# Patient Record
Sex: FEMALE | Race: BLACK OR AFRICAN AMERICAN | NOT HISPANIC OR LATINO | Employment: OTHER | URBAN - METROPOLITAN AREA
[De-identification: names, ages, dates, MRNs, and addresses within clinical notes are randomized per-mention and may not be internally consistent; named-entity substitution may affect disease eponyms.]

---

## 2017-11-09 ENCOUNTER — TELEPHONE (OUTPATIENT)
Dept: SURGERY | Facility: CLINIC | Age: 62
End: 2017-11-09

## 2017-11-09 NOTE — TELEPHONE ENCOUNTER
----- Message from Josephine De La Cruz sent at 11/9/2017  1:22 PM CST -----  Contact: Josephine De La Cruz - Clinic    Burton Manjarrez and staff     Dr SOUTH Hernández is referring this pt to see you dx Rectal cancer.     The pts records etc are in media.     Can you plz review and assist with appt?     Many thanks!   Josephine De La Cruz  [facility]    [id number]

## 2017-11-15 ENCOUNTER — OFFICE VISIT (OUTPATIENT)
Dept: SURGERY | Facility: CLINIC | Age: 62
End: 2017-11-15
Payer: COMMERCIAL

## 2017-11-15 VITALS — DIASTOLIC BLOOD PRESSURE: 71 MMHG | WEIGHT: 222.44 LBS | SYSTOLIC BLOOD PRESSURE: 151 MMHG | HEART RATE: 85 BPM

## 2017-11-15 DIAGNOSIS — C20 RECTAL CANCER: Primary | ICD-10-CM

## 2017-11-15 PROCEDURE — 99999 PR PBB SHADOW E&M-EST. PATIENT-LVL III: CPT | Mod: PBBFAC,,, | Performed by: COLON & RECTAL SURGERY

## 2017-11-15 PROCEDURE — 99205 OFFICE O/P NEW HI 60 MIN: CPT | Mod: S$GLB,,, | Performed by: COLON & RECTAL SURGERY

## 2017-11-15 RX ORDER — SILVER SULFADIAZINE 10 G/1000G
CREAM TOPICAL 2 TIMES DAILY
COMMUNITY
End: 2018-05-10

## 2017-11-15 RX ORDER — HYDROCHLOROTHIAZIDE 12.5 MG/1
12.5 CAPSULE ORAL DAILY
COMMUNITY

## 2017-11-15 RX ORDER — ASPIRIN 81 MG/1
81 TABLET ORAL DAILY
Status: ON HOLD | COMMUNITY
End: 2023-03-01

## 2017-11-15 RX ORDER — HYDROXYZINE HYDROCHLORIDE 25 MG/1
25 TABLET, FILM COATED ORAL 3 TIMES DAILY
COMMUNITY
End: 2018-05-10

## 2017-11-15 RX ORDER — NEOMYCIN SULFATE 500 MG/1
1000 TABLET ORAL ONCE
Qty: 6 TABLET | Refills: 0 | Status: SHIPPED | OUTPATIENT
Start: 2017-11-15 | End: 2017-11-15

## 2017-11-15 RX ORDER — CLONIDINE HYDROCHLORIDE 0.1 MG/1
0.1 TABLET ORAL 2 TIMES DAILY
COMMUNITY

## 2017-11-15 RX ORDER — SODIUM CHLORIDE 9 MG/ML
INJECTION, SOLUTION INTRAVENOUS CONTINUOUS
Status: CANCELLED | OUTPATIENT
Start: 2017-11-15

## 2017-11-15 RX ORDER — MUPIROCIN 20 MG/G
OINTMENT TOPICAL
Status: CANCELLED | OUTPATIENT
Start: 2017-11-15

## 2017-11-15 RX ORDER — ATORVASTATIN CALCIUM 20 MG/1
20 TABLET, FILM COATED ORAL NIGHTLY
COMMUNITY

## 2017-11-15 RX ORDER — FAMOTIDINE 20 MG/1
20 TABLET, FILM COATED ORAL 2 TIMES DAILY
COMMUNITY
End: 2018-07-11

## 2017-11-15 RX ORDER — SPIRONOLACTONE 25 MG/1
25 TABLET ORAL DAILY
COMMUNITY
End: 2018-07-11

## 2017-11-15 RX ORDER — HYDROMORPHONE HYDROCHLORIDE 4 MG/1
4 TABLET ORAL EVERY 4 HOURS PRN
Status: ON HOLD | COMMUNITY
End: 2017-12-15 | Stop reason: HOSPADM

## 2017-11-15 RX ORDER — DOCUSATE SODIUM 100 MG/1
100 CAPSULE, LIQUID FILLED ORAL 2 TIMES DAILY
Status: ON HOLD | COMMUNITY
End: 2017-12-15 | Stop reason: HOSPADM

## 2017-11-15 RX ORDER — OMEPRAZOLE 20 MG/1
20 CAPSULE, DELAYED RELEASE ORAL DAILY
COMMUNITY
End: 2018-05-10

## 2017-11-15 RX ORDER — DICLOFENAC SODIUM 75 MG/1
75 TABLET, DELAYED RELEASE ORAL 2 TIMES DAILY
COMMUNITY
End: 2018-05-10

## 2017-11-15 RX ORDER — ACETAMINOPHEN 10 MG/ML
1000 INJECTION, SOLUTION INTRAVENOUS
Status: CANCELLED | OUTPATIENT
Start: 2017-11-15 | End: 2017-11-15

## 2017-11-15 RX ORDER — METRONIDAZOLE 250 MG/1
TABLET ORAL
Qty: 3 TABLET | Refills: 0 | Status: ON HOLD | OUTPATIENT
Start: 2017-11-15 | End: 2017-12-15 | Stop reason: HOSPADM

## 2017-11-15 RX ORDER — FLUTICASONE PROPIONATE 50 MCG
1 SPRAY, SUSPENSION (ML) NASAL DAILY
COMMUNITY

## 2017-11-15 RX ORDER — METRONIDAZOLE 500 MG/100ML
500 INJECTION, SOLUTION INTRAVENOUS
Status: CANCELLED | OUTPATIENT
Start: 2017-11-15

## 2017-11-15 RX ORDER — DULOXETIN HYDROCHLORIDE 60 MG/1
60 CAPSULE, DELAYED RELEASE ORAL DAILY
COMMUNITY
End: 2018-05-10

## 2017-11-15 NOTE — LETTER
November 15, 2017      Keith Hernández MD  [address] [place] [address]           [facility]  [address]  [facility] [address]  Phone: [phone]          Patient: Coby Marshall   MR Number: [id number]   YOB: 1955   Date of Visit: 11/15/2017       Dear Dr. Keith Hernández:    Thank you for referring Coby Marshall to me for evaluation. Attached you will find relevant portions of my assessment and plan of care.    If you have questions, please do not hesitate to call me. I look forward to following Coby Marshall along with you.    Sincerely,    Suhas Manjarrez MD    Enclosure  CC:  No Recipients    If you would like to receive this communication electronically, please contact [email or web address] or [phone] to request more information on [employer] access.    For providers and/or their staff who would like to refer a patient to [facility], please contact us through our one-stop-shop provider referral line, [facility], at [phone].    If you feel you have received this communication in error or would no longer like to receive these types of communications, please e-mail [email or web address]

## 2017-11-15 NOTE — PROGRESS NOTES
Patient ID:  Coby Marshall is a 62 y.o. female     Chief Complaint:   Chief Complaint   Patient presents with    Rectal Cancer        HPI: Patient with rectal cancer. Completed chemoXRT      4 weeks ago     Procto Nov 11, 2017 mass at 4-5 cm from verge  Ct scan  (11/3/17) with improvement (nodes reduced in size) Thickening rectal wall 7 cm from anal verge    ROS:        Constitutional: No fever, chills, activity or appetite change.      HENT: No hearing loss, facial swelling, neck pain or stiffness.       Eyes: No discharge, itching and visual disturbance.      Respiratory: No apnea, cough, choking or shortness of breath.       Cardiovascular: No leg swelling or chest pain      Gastrointestinal: No abdominal distention or change in bowel habbits     Genitourinary: No dysuria, frequency or flank pain.      Musculoskeletal: No arthralgias or gait problem.      Neurological: No dizziness, seizures or weakness.      Hematological: No adenopathy.      Psychiatric/Behavioral: No hallucinations or behavioral problems.       PE:    APPEARANCE: Well nourished, well developed, in no acute distress.   CHEST: Lungs clear. Normal respiratory effort.  CARDIOVASCULAR: Normal rhythm. No edema.  ABDOMEN: Soft. No tenderness or masses.  Rectum:  Normal skin, NST, no masses or tenderness. Possible mass at tip of finger    Musculoskeletal: Symmetric, normal range of motion and strength.   Neurological: Alert and oriented to person, place, and time. Normal reflexes.   Skin: Skin is warm and dry.   Psychiatric: Normal mood and affect. Behavior is normal and appropriate.     Impression: Rectal cancer  PLAN: LAR and possible loop ileostomy Dec 11.  I have explained the procedure including indications, alternatives, expected outcomes and potential complications. The patient appears to understand and gives informed consent. The patient is medically ready for surgery.

## 2017-11-15 NOTE — H&P
Coby Marshall is a 62 y.o. female     Chief Complaint:   Chief Complaint   Patient presents with    Rectal Cancer        HPI: Patient with rectal cancer. Completed chemoXRT      4 weeks ago     Procto Nov 11, 2017 mass at 4-5 cm from verge  Ct scan  (11/3/17) with improvement (nodes reduced in size) Thickening rectal wall 7 cm from anal verge    ROS:        Constitutional: No fever, chills, activity or appetite change.      HENT: No hearing loss, facial swelling, neck pain or stiffness.       Eyes: No discharge, itching and visual disturbance.      Respiratory: No apnea, cough, choking or shortness of breath.       Cardiovascular: No leg swelling or chest pain      Gastrointestinal: No abdominal distention or change in bowel habbits     Genitourinary: No dysuria, frequency or flank pain.      Musculoskeletal: No arthralgias or gait problem.      Neurological: No dizziness, seizures or weakness.      Hematological: No adenopathy.      Psychiatric/Behavioral: No hallucinations or behavioral problems.       PE:    APPEARANCE: Well nourished, well developed, in no acute distress.   CHEST: Lungs clear. Normal respiratory effort.  CARDIOVASCULAR: Normal rhythm. No edema.  ABDOMEN: Soft. No tenderness or masses.  Rectum:  Normal skin, NST, no masses or tenderness. Possible mass at tip of finger    Musculoskeletal: Symmetric, normal range of motion and strength.   Neurological: Alert and oriented to person, place, and time. Normal reflexes.   Skin: Skin is warm and dry.   Psychiatric: Normal mood and affect. Behavior is normal and appropriate.     Impression: Rectal cancer  PLAN: LAR and possible loop ileostomy Dec 11.  I have explained the procedure including indications, alternatives, expected outcomes and potential complications. The patient appears to understand and gives informed consent. The patient is medically ready for surgery.

## 2017-11-29 ENCOUNTER — TELEPHONE (OUTPATIENT)
Dept: SURGERY | Facility: CLINIC | Age: 62
End: 2017-11-29

## 2017-11-29 NOTE — TELEPHONE ENCOUNTER
----- Message from Parviz Schaefer sent at 11/29/2017  3:45 PM CST -----  Contact: Pt :[phone]  Pt  states she would like to speak with 's Nurse in regards to accommodations for a place to stay.

## 2017-12-01 ENCOUNTER — TELEPHONE (OUTPATIENT)
Dept: RESEARCH | Facility: HOSPITAL | Age: 62
End: 2017-12-01

## 2017-12-01 NOTE — TELEPHONE ENCOUNTER
Study staff spoke with Ms. Marshall about participating in the research studies.  She stated that she is willing to participate in them.      Study staff will meet with her in the pre-op area to review the consents and sign.    During the conversation her  asked about their request to stay at [facility].  Study staff followed up with the nursing staff regarding the paperwork for their stay.  Nursing staff advised to have patient call and see if there would be a room available.  Study staff recontacted the Nathalia to pass on that information and relay the phone number for the [facility].    Study staff was Denise Lizama MS, CRC

## 2017-12-08 ENCOUNTER — TELEPHONE (OUTPATIENT)
Dept: SURGERY | Facility: CLINIC | Age: 62
End: 2017-12-08

## 2017-12-08 DIAGNOSIS — C20 RECTAL CANCER: Primary | ICD-10-CM

## 2017-12-08 DIAGNOSIS — Z00.6 RESEARCH STUDY PATIENT: ICD-10-CM

## 2017-12-11 ENCOUNTER — ANESTHESIA (OUTPATIENT)
Dept: SURGERY | Facility: HOSPITAL | Age: 62
DRG: 330 | End: 2017-12-11
Payer: COMMERCIAL

## 2017-12-11 ENCOUNTER — SURGERY (OUTPATIENT)
Age: 62
End: 2017-12-11

## 2017-12-11 ENCOUNTER — ANESTHESIA EVENT (OUTPATIENT)
Dept: SURGERY | Facility: HOSPITAL | Age: 62
DRG: 330 | End: 2017-12-11
Payer: COMMERCIAL

## 2017-12-11 ENCOUNTER — HOSPITAL ENCOUNTER (INPATIENT)
Facility: HOSPITAL | Age: 62
LOS: 4 days | Discharge: HOME | DRG: 330 | End: 2017-12-15
Attending: COLON & RECTAL SURGERY | Admitting: COLON & RECTAL SURGERY
Payer: COMMERCIAL

## 2017-12-11 DIAGNOSIS — C20 RECTAL CANCER: ICD-10-CM

## 2017-12-11 PROCEDURE — C9290 INJ, BUPIVACAINE LIPOSOME: HCPCS | Performed by: COLON & RECTAL SURGERY

## 2017-12-11 PROCEDURE — 25000003 PHARM REV CODE 250: Performed by: COLON & RECTAL SURGERY

## 2017-12-11 PROCEDURE — S0030 INJECTION, METRONIDAZOLE: HCPCS | Performed by: COLON & RECTAL SURGERY

## 2017-12-11 PROCEDURE — 88342 IMHCHEM/IMCYTCHM 1ST ANTB: CPT | Mod: 26,,, | Performed by: PATHOLOGY

## 2017-12-11 PROCEDURE — 88331 PATH CONSLTJ SURG 1 BLK 1SPC: CPT | Mod: 26,,, | Performed by: PATHOLOGY

## 2017-12-11 PROCEDURE — 71000039 HC RECOVERY, EACH ADD'L HOUR: Performed by: COLON & RECTAL SURGERY

## 2017-12-11 PROCEDURE — 37000008 HC ANESTHESIA 1ST 15 MINUTES: Performed by: COLON & RECTAL SURGERY

## 2017-12-11 PROCEDURE — 88307 TISSUE EXAM BY PATHOLOGIST: CPT | Mod: 26,,, | Performed by: PATHOLOGY

## 2017-12-11 PROCEDURE — 63600175 PHARM REV CODE 636 W HCPCS: Performed by: COLON & RECTAL SURGERY

## 2017-12-11 PROCEDURE — 0D1B0Z4 BYPASS ILEUM TO CUTANEOUS, OPEN APPROACH: ICD-10-PCS | Performed by: COLON & RECTAL SURGERY

## 2017-12-11 PROCEDURE — 25000003 PHARM REV CODE 250: Performed by: STUDENT IN AN ORGANIZED HEALTH CARE EDUCATION/TRAINING PROGRAM

## 2017-12-11 PROCEDURE — 88302 TISSUE EXAM BY PATHOLOGIST: CPT | Mod: 26,,, | Performed by: PATHOLOGY

## 2017-12-11 PROCEDURE — 36000708 HC OR TIME LEV III 1ST 15 MIN: Performed by: COLON & RECTAL SURGERY

## 2017-12-11 PROCEDURE — D9220A PRA ANESTHESIA: Mod: ,,, | Performed by: ANESTHESIOLOGY

## 2017-12-11 PROCEDURE — C1729 CATH, DRAINAGE: HCPCS | Performed by: COLON & RECTAL SURGERY

## 2017-12-11 PROCEDURE — 27201423 OPTIME MED/SURG SUP & DEVICES STERILE SUPPLY: Performed by: COLON & RECTAL SURGERY

## 2017-12-11 PROCEDURE — 45119 REMOVE RECTUM W/RESERVOIR: CPT | Mod: ,,, | Performed by: COLON & RECTAL SURGERY

## 2017-12-11 PROCEDURE — 88309 TISSUE EXAM BY PATHOLOGIST: CPT | Mod: 26,,, | Performed by: PATHOLOGY

## 2017-12-11 PROCEDURE — 94761 N-INVAS EAR/PLS OXIMETRY MLT: CPT

## 2017-12-11 PROCEDURE — 63600175 PHARM REV CODE 636 W HCPCS: Performed by: STUDENT IN AN ORGANIZED HEALTH CARE EDUCATION/TRAINING PROGRAM

## 2017-12-11 PROCEDURE — 0DBP0ZZ EXCISION OF RECTUM, OPEN APPROACH: ICD-10-PCS | Performed by: COLON & RECTAL SURGERY

## 2017-12-11 PROCEDURE — 36000709 HC OR TIME LEV III EA ADD 15 MIN: Performed by: COLON & RECTAL SURGERY

## 2017-12-11 PROCEDURE — 94799 UNLISTED PULMONARY SVC/PX: CPT

## 2017-12-11 PROCEDURE — 88305 TISSUE EXAM BY PATHOLOGIST: CPT | Mod: 26,,, | Performed by: PATHOLOGY

## 2017-12-11 PROCEDURE — 88331 PATH CONSLTJ SURG 1 BLK 1SPC: CPT | Performed by: PATHOLOGY

## 2017-12-11 PROCEDURE — 88313 SPECIAL STAINS GROUP 2: CPT | Mod: 26,,, | Performed by: PATHOLOGY

## 2017-12-11 PROCEDURE — 25000003 PHARM REV CODE 250: Performed by: ANESTHESIOLOGY

## 2017-12-11 PROCEDURE — 37000009 HC ANESTHESIA EA ADD 15 MINS: Performed by: COLON & RECTAL SURGERY

## 2017-12-11 PROCEDURE — 44120 REMOVAL OF SMALL INTESTINE: CPT | Mod: 51,,, | Performed by: COLON & RECTAL SURGERY

## 2017-12-11 PROCEDURE — C1765 ADHESION BARRIER: HCPCS | Performed by: COLON & RECTAL SURGERY

## 2017-12-11 PROCEDURE — 0DBE0ZZ EXCISION OF LARGE INTESTINE, OPEN APPROACH: ICD-10-PCS | Performed by: COLON & RECTAL SURGERY

## 2017-12-11 PROCEDURE — 88305 TISSUE EXAM BY PATHOLOGIST: CPT | Performed by: PATHOLOGY

## 2017-12-11 PROCEDURE — 20600001 HC STEP DOWN PRIVATE ROOM

## 2017-12-11 PROCEDURE — 71000033 HC RECOVERY, INTIAL HOUR: Performed by: COLON & RECTAL SURGERY

## 2017-12-11 DEVICE — MEMBRANE SEPRAFILM 5 X 6: Type: IMPLANTABLE DEVICE | Site: ABDOMEN | Status: FUNCTIONAL

## 2017-12-11 RX ORDER — ENOXAPARIN SODIUM 100 MG/ML
40 INJECTION SUBCUTANEOUS
Status: DISCONTINUED | OUTPATIENT
Start: 2017-12-12 | End: 2017-12-14

## 2017-12-11 RX ORDER — FAMOTIDINE 20 MG/1
20 TABLET, FILM COATED ORAL 2 TIMES DAILY
Status: DISCONTINUED | OUTPATIENT
Start: 2017-12-11 | End: 2017-12-15 | Stop reason: HOSPADM

## 2017-12-11 RX ORDER — LABETALOL HYDROCHLORIDE 5 MG/ML
10 INJECTION, SOLUTION INTRAVENOUS ONCE
Status: COMPLETED | OUTPATIENT
Start: 2017-12-11 | End: 2017-12-11

## 2017-12-11 RX ORDER — METRONIDAZOLE 500 MG/100ML
500 INJECTION, SOLUTION INTRAVENOUS
Status: COMPLETED | OUTPATIENT
Start: 2017-12-11 | End: 2017-12-12

## 2017-12-11 RX ORDER — MEPERIDINE HYDROCHLORIDE 50 MG/ML
12.5 INJECTION INTRAMUSCULAR; INTRAVENOUS; SUBCUTANEOUS ONCE AS NEEDED
Status: DISCONTINUED | OUTPATIENT
Start: 2017-12-11 | End: 2017-12-11 | Stop reason: HOSPADM

## 2017-12-11 RX ORDER — LIDOCAINE HYDROCHLORIDE 10 MG/ML
1 INJECTION, SOLUTION EPIDURAL; INFILTRATION; INTRACAUDAL; PERINEURAL ONCE
Status: COMPLETED | OUTPATIENT
Start: 2017-12-11 | End: 2017-12-11

## 2017-12-11 RX ORDER — IBUPROFEN 200 MG
16 TABLET ORAL
Status: DISCONTINUED | OUTPATIENT
Start: 2017-12-11 | End: 2017-12-15 | Stop reason: HOSPADM

## 2017-12-11 RX ORDER — ATORVASTATIN CALCIUM 20 MG/1
20 TABLET, FILM COATED ORAL DAILY
Status: DISCONTINUED | OUTPATIENT
Start: 2017-12-11 | End: 2017-12-15 | Stop reason: HOSPADM

## 2017-12-11 RX ORDER — MEPERIDINE HYDROCHLORIDE 50 MG/ML
12.5 INJECTION INTRAMUSCULAR; INTRAVENOUS; SUBCUTANEOUS ONCE AS NEEDED
Status: DISCONTINUED | OUTPATIENT
Start: 2017-12-11 | End: 2017-12-11

## 2017-12-11 RX ORDER — MIDAZOLAM HYDROCHLORIDE 1 MG/ML
INJECTION, SOLUTION INTRAMUSCULAR; INTRAVENOUS
Status: DISCONTINUED | OUTPATIENT
Start: 2017-12-11 | End: 2017-12-11

## 2017-12-11 RX ORDER — DULOXETIN HYDROCHLORIDE 60 MG/1
60 CAPSULE, DELAYED RELEASE ORAL DAILY
Status: DISCONTINUED | OUTPATIENT
Start: 2017-12-11 | End: 2017-12-15 | Stop reason: HOSPADM

## 2017-12-11 RX ORDER — ROCURONIUM BROMIDE 10 MG/ML
INJECTION, SOLUTION INTRAVENOUS
Status: DISCONTINUED | OUTPATIENT
Start: 2017-12-11 | End: 2017-12-11

## 2017-12-11 RX ORDER — ONDANSETRON 2 MG/ML
4 INJECTION INTRAMUSCULAR; INTRAVENOUS EVERY 6 HOURS PRN
Status: DISCONTINUED | OUTPATIENT
Start: 2017-12-11 | End: 2017-12-15 | Stop reason: HOSPADM

## 2017-12-11 RX ORDER — SPIRONOLACTONE 25 MG/1
25 TABLET ORAL DAILY
Status: DISCONTINUED | OUTPATIENT
Start: 2017-12-11 | End: 2017-12-15 | Stop reason: HOSPADM

## 2017-12-11 RX ORDER — METRONIDAZOLE 500 MG/100ML
500 INJECTION, SOLUTION INTRAVENOUS
Status: COMPLETED | OUTPATIENT
Start: 2017-12-11 | End: 2017-12-11

## 2017-12-11 RX ORDER — SODIUM CHLORIDE 0.9 % (FLUSH) 0.9 %
3 SYRINGE (ML) INJECTION
Status: DISCONTINUED | OUTPATIENT
Start: 2017-12-11 | End: 2017-12-11

## 2017-12-11 RX ORDER — SODIUM CHLORIDE 9 MG/ML
INJECTION, SOLUTION INTRAVENOUS CONTINUOUS
Status: DISCONTINUED | OUTPATIENT
Start: 2017-12-11 | End: 2017-12-12

## 2017-12-11 RX ORDER — FENTANYL CITRATE 50 UG/ML
INJECTION, SOLUTION INTRAMUSCULAR; INTRAVENOUS
Status: DISCONTINUED | OUTPATIENT
Start: 2017-12-11 | End: 2017-12-11

## 2017-12-11 RX ORDER — SODIUM CHLORIDE, SODIUM LACTATE, POTASSIUM CHLORIDE, CALCIUM CHLORIDE 600; 310; 30; 20 MG/100ML; MG/100ML; MG/100ML; MG/100ML
INJECTION, SOLUTION INTRAVENOUS CONTINUOUS
Status: DISCONTINUED | OUTPATIENT
Start: 2017-12-11 | End: 2017-12-12

## 2017-12-11 RX ORDER — GLYCOPYRROLATE 0.2 MG/ML
INJECTION INTRAMUSCULAR; INTRAVENOUS
Status: DISCONTINUED | OUTPATIENT
Start: 2017-12-11 | End: 2017-12-11

## 2017-12-11 RX ORDER — NEOSTIGMINE METHYLSULFATE 1 MG/ML
INJECTION, SOLUTION INTRAVENOUS
Status: DISCONTINUED | OUTPATIENT
Start: 2017-12-11 | End: 2017-12-11

## 2017-12-11 RX ORDER — CLONIDINE HYDROCHLORIDE 0.1 MG/1
0.1 TABLET ORAL 2 TIMES DAILY
Status: DISCONTINUED | OUTPATIENT
Start: 2017-12-11 | End: 2017-12-15 | Stop reason: HOSPADM

## 2017-12-11 RX ORDER — HYDROMORPHONE HYDROCHLORIDE 2 MG/ML
0.2 INJECTION, SOLUTION INTRAMUSCULAR; INTRAVENOUS; SUBCUTANEOUS EVERY 5 MIN PRN
Status: COMPLETED | OUTPATIENT
Start: 2017-12-11 | End: 2017-12-11

## 2017-12-11 RX ORDER — HYDROMORPHONE HYDROCHLORIDE 2 MG/ML
1 INJECTION, SOLUTION INTRAMUSCULAR; INTRAVENOUS; SUBCUTANEOUS EVERY 4 HOURS PRN
Status: DISCONTINUED | OUTPATIENT
Start: 2017-12-11 | End: 2017-12-15 | Stop reason: HOSPADM

## 2017-12-11 RX ORDER — SODIUM CHLORIDE 9 MG/ML
INJECTION, SOLUTION INTRAVENOUS CONTINUOUS
Status: DISCONTINUED | OUTPATIENT
Start: 2017-12-11 | End: 2017-12-11

## 2017-12-11 RX ORDER — ACETAMINOPHEN 10 MG/ML
1000 INJECTION, SOLUTION INTRAVENOUS
Status: COMPLETED | OUTPATIENT
Start: 2017-12-11 | End: 2017-12-11

## 2017-12-11 RX ORDER — IBUPROFEN 200 MG
24 TABLET ORAL
Status: DISCONTINUED | OUTPATIENT
Start: 2017-12-11 | End: 2017-12-15 | Stop reason: HOSPADM

## 2017-12-11 RX ORDER — ASPIRIN 81 MG/1
81 TABLET ORAL DAILY
Status: DISCONTINUED | OUTPATIENT
Start: 2017-12-12 | End: 2017-12-11

## 2017-12-11 RX ORDER — GABAPENTIN 100 MG/1
100 CAPSULE ORAL 3 TIMES DAILY
Status: DISCONTINUED | OUTPATIENT
Start: 2017-12-11 | End: 2017-12-15

## 2017-12-11 RX ORDER — NALOXONE HCL 0.4 MG/ML
0.02 VIAL (ML) INJECTION
Status: DISCONTINUED | OUTPATIENT
Start: 2017-12-11 | End: 2017-12-15 | Stop reason: HOSPADM

## 2017-12-11 RX ORDER — PROPOFOL 10 MG/ML
VIAL (ML) INTRAVENOUS
Status: DISCONTINUED | OUTPATIENT
Start: 2017-12-11 | End: 2017-12-11

## 2017-12-11 RX ORDER — SIMETHICONE 80 MG
1 TABLET,CHEWABLE ORAL EVERY 8 HOURS PRN
Status: DISCONTINUED | OUTPATIENT
Start: 2017-12-11 | End: 2017-12-15 | Stop reason: HOSPADM

## 2017-12-11 RX ORDER — METOCLOPRAMIDE HYDROCHLORIDE 5 MG/ML
5 INJECTION INTRAMUSCULAR; INTRAVENOUS EVERY 6 HOURS PRN
Status: DISCONTINUED | OUTPATIENT
Start: 2017-12-11 | End: 2017-12-15 | Stop reason: HOSPADM

## 2017-12-11 RX ORDER — GLUCAGON 1 MG
1 KIT INJECTION CONTINUOUS PRN
Status: DISCONTINUED | OUTPATIENT
Start: 2017-12-11 | End: 2017-12-15 | Stop reason: HOSPADM

## 2017-12-11 RX ORDER — PANTOPRAZOLE SODIUM 40 MG/1
40 TABLET, DELAYED RELEASE ORAL DAILY
Status: DISCONTINUED | OUTPATIENT
Start: 2017-12-11 | End: 2017-12-11

## 2017-12-11 RX ORDER — OXYCODONE HYDROCHLORIDE 5 MG/1
5 TABLET ORAL EVERY 6 HOURS PRN
Status: DISCONTINUED | OUTPATIENT
Start: 2017-12-11 | End: 2017-12-15 | Stop reason: HOSPADM

## 2017-12-11 RX ORDER — LIDOCAINE HCL/PF 100 MG/5ML
SYRINGE (ML) INTRAVENOUS
Status: DISCONTINUED | OUTPATIENT
Start: 2017-12-11 | End: 2017-12-11

## 2017-12-11 RX ORDER — BUPIVACAINE HYDROCHLORIDE 2.5 MG/ML
INJECTION, SOLUTION EPIDURAL; INFILTRATION; INTRACAUDAL
Status: DISCONTINUED | OUTPATIENT
Start: 2017-12-11 | End: 2017-12-11 | Stop reason: HOSPADM

## 2017-12-11 RX ORDER — ACETAMINOPHEN 10 MG/ML
1000 INJECTION, SOLUTION INTRAVENOUS EVERY 8 HOURS
Status: COMPLETED | OUTPATIENT
Start: 2017-12-11 | End: 2017-12-12

## 2017-12-11 RX ORDER — HYDROCHLOROTHIAZIDE 12.5 MG/1
12.5 TABLET ORAL DAILY
Status: DISCONTINUED | OUTPATIENT
Start: 2017-12-11 | End: 2017-12-15 | Stop reason: HOSPADM

## 2017-12-11 RX ORDER — FLUTICASONE PROPIONATE 50 MCG
1 SPRAY, SUSPENSION (ML) NASAL DAILY
Status: DISCONTINUED | OUTPATIENT
Start: 2017-12-11 | End: 2017-12-15 | Stop reason: HOSPADM

## 2017-12-11 RX ORDER — ONDANSETRON 2 MG/ML
4 INJECTION INTRAMUSCULAR; INTRAVENOUS DAILY PRN
Status: DISCONTINUED | OUTPATIENT
Start: 2017-12-11 | End: 2017-12-11

## 2017-12-11 RX ORDER — HYDROMORPHONE HYDROCHLORIDE 2 MG/ML
0.2 INJECTION, SOLUTION INTRAMUSCULAR; INTRAVENOUS; SUBCUTANEOUS EVERY 5 MIN PRN
Status: DISCONTINUED | OUTPATIENT
Start: 2017-12-11 | End: 2017-12-11 | Stop reason: HOSPADM

## 2017-12-11 RX ORDER — CALCIUM CARBONATE 200(500)MG
1000 TABLET,CHEWABLE ORAL EVERY 8 HOURS PRN
Status: DISCONTINUED | OUTPATIENT
Start: 2017-12-11 | End: 2017-12-15 | Stop reason: HOSPADM

## 2017-12-11 RX ORDER — MUPIROCIN 20 MG/G
OINTMENT TOPICAL
Status: DISCONTINUED | OUTPATIENT
Start: 2017-12-11 | End: 2017-12-11

## 2017-12-11 RX ORDER — TAMSULOSIN HYDROCHLORIDE 0.4 MG/1
0.4 CAPSULE ORAL DAILY
Status: DISCONTINUED | OUTPATIENT
Start: 2017-12-11 | End: 2017-12-14

## 2017-12-11 RX ORDER — HYDROMORPHONE HYDROCHLORIDE 2 MG/1
4 TABLET ORAL EVERY 4 HOURS PRN
Status: DISCONTINUED | OUTPATIENT
Start: 2017-12-11 | End: 2017-12-11

## 2017-12-11 RX ORDER — ALBUTEROL SULFATE 90 UG/1
1 AEROSOL, METERED RESPIRATORY (INHALATION) EVERY 6 HOURS PRN
Status: DISCONTINUED | OUTPATIENT
Start: 2017-12-11 | End: 2017-12-15 | Stop reason: HOSPADM

## 2017-12-11 RX ORDER — ONDANSETRON 2 MG/ML
INJECTION INTRAMUSCULAR; INTRAVENOUS
Status: DISCONTINUED | OUTPATIENT
Start: 2017-12-11 | End: 2017-12-11

## 2017-12-11 RX ORDER — HYDROXYZINE HYDROCHLORIDE 25 MG/1
25 TABLET, FILM COATED ORAL 3 TIMES DAILY
Status: DISCONTINUED | OUTPATIENT
Start: 2017-12-11 | End: 2017-12-11

## 2017-12-11 RX ADMIN — SODIUM CHLORIDE, SODIUM GLUCONATE, SODIUM ACETATE, POTASSIUM CHLORIDE, MAGNESIUM CHLORIDE, SODIUM PHOSPHATE, DIBASIC, AND POTASSIUM PHOSPHATE: .53; .5; .37; .037; .03; .012; .00082 INJECTION, SOLUTION INTRAVENOUS at 07:12

## 2017-12-11 RX ADMIN — HYDROMORPHONE HYDROCHLORIDE 0.2 MG: 2 INJECTION, SOLUTION INTRAMUSCULAR; INTRAVENOUS; SUBCUTANEOUS at 10:12

## 2017-12-11 RX ADMIN — OXYCODONE HYDROCHLORIDE 5 MG: 5 TABLET ORAL at 12:12

## 2017-12-11 RX ADMIN — LIDOCAINE HYDROCHLORIDE 80 MG: 20 INJECTION, SOLUTION INTRAVENOUS at 07:12

## 2017-12-11 RX ADMIN — FAMOTIDINE 20 MG: 20 TABLET, FILM COATED ORAL at 01:12

## 2017-12-11 RX ADMIN — FAMOTIDINE 20 MG: 20 TABLET, FILM COATED ORAL at 10:12

## 2017-12-11 RX ADMIN — ROCURONIUM BROMIDE 10 MG: 10 INJECTION, SOLUTION INTRAVENOUS at 09:12

## 2017-12-11 RX ADMIN — SPIRONOLACTONE 25 MG: 25 TABLET, FILM COATED ORAL at 12:12

## 2017-12-11 RX ADMIN — SODIUM CHLORIDE 1000 ML: 0.9 INJECTION, SOLUTION INTRAVENOUS at 06:12

## 2017-12-11 RX ADMIN — ROCURONIUM BROMIDE 50 MG: 10 INJECTION, SOLUTION INTRAVENOUS at 07:12

## 2017-12-11 RX ADMIN — METRONIDAZOLE 500 MG: 500 SOLUTION INTRAVENOUS at 06:12

## 2017-12-11 RX ADMIN — GABAPENTIN 100 MG: 100 CAPSULE ORAL at 10:12

## 2017-12-11 RX ADMIN — OXYCODONE HYDROCHLORIDE 5 MG: 5 TABLET ORAL at 06:12

## 2017-12-11 RX ADMIN — HYDROMORPHONE HYDROCHLORIDE 0.2 MG: 2 INJECTION, SOLUTION INTRAMUSCULAR; INTRAVENOUS; SUBCUTANEOUS at 11:12

## 2017-12-11 RX ADMIN — LIDOCAINE HYDROCHLORIDE 0.2 MG: 10 INJECTION, SOLUTION EPIDURAL; INFILTRATION; INTRACAUDAL; PERINEURAL at 06:12

## 2017-12-11 RX ADMIN — GLYCOPYRROLATE 0.6 MG: 0.2 INJECTION, SOLUTION INTRAMUSCULAR; INTRAVENOUS at 10:12

## 2017-12-11 RX ADMIN — ONDANSETRON 4 MG: 2 INJECTION INTRAMUSCULAR; INTRAVENOUS at 09:12

## 2017-12-11 RX ADMIN — ROCURONIUM BROMIDE 20 MG: 10 INJECTION, SOLUTION INTRAVENOUS at 08:12

## 2017-12-11 RX ADMIN — IBUPROFEN 400 MG: 800 INJECTION INTRAVENOUS at 05:12

## 2017-12-11 RX ADMIN — NEOSTIGMINE METHYLSULFATE 5 MG: 1 INJECTION INTRAVENOUS at 10:12

## 2017-12-11 RX ADMIN — SODIUM CHLORIDE: 0.9 INJECTION, SOLUTION INTRAVENOUS at 06:12

## 2017-12-11 RX ADMIN — CLONIDINE HYDROCHLORIDE 0.1 MG: 0.1 TABLET ORAL at 01:12

## 2017-12-11 RX ADMIN — ACETAMINOPHEN 1000 MG: 10 INJECTION, SOLUTION INTRAVENOUS at 07:12

## 2017-12-11 RX ADMIN — Medication 800 MG: at 07:12

## 2017-12-11 RX ADMIN — HYDROCHLOROTHIAZIDE 12.5 MG: 12.5 TABLET ORAL at 01:12

## 2017-12-11 RX ADMIN — DULOXETINE 60 MG: 60 CAPSULE, DELAYED RELEASE ORAL at 12:12

## 2017-12-11 RX ADMIN — BUPIVACAINE 20 ML: 13.3 INJECTION, SUSPENSION, LIPOSOMAL INFILTRATION at 09:12

## 2017-12-11 RX ADMIN — GABAPENTIN 100 MG: 100 CAPSULE ORAL at 01:12

## 2017-12-11 RX ADMIN — MIDAZOLAM HYDROCHLORIDE 2 MG: 1 INJECTION, SOLUTION INTRAMUSCULAR; INTRAVENOUS at 07:12

## 2017-12-11 RX ADMIN — ATORVASTATIN CALCIUM 20 MG: 20 TABLET, FILM COATED ORAL at 01:12

## 2017-12-11 RX ADMIN — SODIUM CHLORIDE 250 ML: 900 INJECTION, SOLUTION INTRAVENOUS at 05:12

## 2017-12-11 RX ADMIN — ACETAMINOPHEN 1000 MG: 10 INJECTION, SOLUTION INTRAVENOUS at 02:12

## 2017-12-11 RX ADMIN — LABETALOL HYDROCHLORIDE 10 MG: 5 INJECTION, SOLUTION INTRAVENOUS at 05:12

## 2017-12-11 RX ADMIN — METRONIDAZOLE 500 MG: 500 SOLUTION INTRAVENOUS at 07:12

## 2017-12-11 RX ADMIN — FENTANYL CITRATE 50 MCG: 50 INJECTION, SOLUTION INTRAMUSCULAR; INTRAVENOUS at 07:12

## 2017-12-11 RX ADMIN — PROPOFOL 150 MG: 10 INJECTION, EMULSION INTRAVENOUS at 07:12

## 2017-12-11 RX ADMIN — TAMSULOSIN HYDROCHLORIDE 0.4 MG: 0.4 CAPSULE ORAL at 01:12

## 2017-12-11 RX ADMIN — MUPIROCIN: 20 OINTMENT TOPICAL at 07:12

## 2017-12-11 RX ADMIN — BUPIVACAINE HYDROCHLORIDE 30 ML: 2.5 INJECTION, SOLUTION EPIDURAL; INFILTRATION; INTRACAUDAL; PERINEURAL at 09:12

## 2017-12-11 RX ADMIN — SODIUM CHLORIDE, SODIUM LACTATE, POTASSIUM CHLORIDE, AND CALCIUM CHLORIDE: 600; 310; 30; 20 INJECTION, SOLUTION INTRAVENOUS at 10:12

## 2017-12-11 RX ADMIN — ACETAMINOPHEN 1000 MG: 10 INJECTION, SOLUTION INTRAVENOUS at 10:12

## 2017-12-11 RX ADMIN — CEFTRIAXONE SODIUM 2 G: 2 INJECTION, POWDER, FOR SOLUTION INTRAMUSCULAR; INTRAVENOUS at 07:12

## 2017-12-11 NOTE — NURSING TRANSFER
Nursing Transfer Note      12/11/2017     Transfer To: 605 From PACU    Transfer via stretcher    Transfer with IV pump    Transported by PCT    Medicines sent: none    Chart send with patient: Yes    Notified: spouse, daughter    Patient reassessed at: 12/11/17 @ 1730     Upon arrival to floor:

## 2017-12-11 NOTE — ANESTHESIA PREPROCEDURE EVALUATION
[facility]  Anesthesia Pre-Operative Evaluation         Patient Name: Coby Marshall  YOB: 1955  MRN: [id number]    SUBJECTIVE:     Pre-operative evaluation for Procedure(s) (LRB):  RESECTION-COLON-LOW ANTERIOR, possible loop ileostomy (N/A)     12/11/2017    Coby Marshall is a 62 y.o. female w/ a significant PMHx Asthma (well controlled), HTN, HLD, GERD who was recently diagnosed with rectal cancer who presents for the above procedure..    Patient now presents for the above procedure(s).      LDA: None documented.       Prev airway: None documented.    Drips: None documented.      Patient Active Problem List   Diagnosis    Rectal cancer       Review of patient's allergies indicates:   Allergen Reactions    Lisinopril-hydrochlorothiazide Swelling     Mouth Swelling       Current Inpatient Medications:      No current facility-administered medications on file prior to encounter.      Current Outpatient Prescriptions on File Prior to Encounter   Medication Sig Dispense Refill    ALBUTEROL INHL Inhale into the lungs.      aspirin (ECOTRIN) 81 MG EC tablet Take 81 mg by mouth once daily.      atorvastatin (LIPITOR) 20 MG tablet Take 20 mg by mouth once daily.      cloNIDine (CATAPRES) 0.1 MG tablet Take 0.1 mg by mouth 2 (two) times daily.      diclofenac (VOLTAREN) 75 MG EC tablet Take 75 mg by mouth 2 (two) times daily.      docusate sodium (COLACE) 100 MG capsule Take 100 mg by mouth 2 (two) times daily.      DULoxetine (CYMBALTA) 60 MG capsule Take 60 mg by mouth once daily.      famotidine (PEPCID) 20 MG tablet Take 20 mg by mouth 2 (two) times daily.      fluticasone (FLONASE) 50 mcg/actuation nasal spray 1 spray by Each Nare route once daily.      hydroCHLOROthiazide (MICROZIDE) 12.5 mg capsule Take 12.5 mg by mouth once daily.      HYDROmorphone (DILAUDID) 4 MG tablet Take 4 mg by mouth every 4 (four) hours as needed for Pain.      hydrOXYzine HCl (ATARAX) 25 MG  tablet Take 25 mg by mouth 3 (three) times daily.      metroNIDAZOLE (FLAGYL) 250 MG tablet Take tablet at 1300, 1400, and 2300 day prior to surgery 3 tablet 0    omeprazole (PRILOSEC) 20 MG capsule Take 20 mg by mouth once daily.      silver sulfADIAZINE 1% (SILVADENE) 1 % cream Apply topically 2 (two) times daily.      spironolactone (ALDACTONE) 25 MG tablet Take 25 mg by mouth once daily.         No past surgical history on file.    Social History     Social History    Marital status:      Spouse name: N/A    Number of children: N/A    Years of education: N/A     Occupational History    Not on file.     Social History Main Topics    Smoking status: Not on file    Smokeless tobacco: Not on file    Alcohol use Not on file    Drug use: Unknown    Sexual activity: Not on file     Other Topics Concern    Not on file     Social History Narrative    No narrative on file       OBJECTIVE:     Vital Signs Range (Last 24H):         CBC:   No results for input(s): WBC, RBC, HGB, HCT, PLT, MCV, MCH, MCHC in the last 72 hours.    CMP: No results for input(s): NA, K, CL, CO2, BUN, CREATININE, GLU, MG, PHOS, CALCIUM, ALBUMIN, PROT, ALKPHOS, ALT, AST, BILITOT in the last 72 hours.    INR:  No results for input(s): PT, INR, PROTIME, APTT in the last 72 hours.    Diagnostic Studies: No relevant studies.    EKG: No recent studies available.    2D ECHO:  No results found for this or any previous visit.      ASSESSMENT/PLAN:         Anesthesia Evaluation    I have reviewed the Patient Summary Reports.     I have reviewed the Medications.     Review of Systems  Anesthesia Hx:  No problems with previous Anesthesia  History of prior surgery of interest to airway management or planning: Denies Family Hx of Anesthesia complications.   Denies Personal Hx of Anesthesia complications.   Social:  No Alcohol Use, Non-Smoker    Hematology/Oncology:  Hematology Normal      Current/Recent Cancer.   EENT/Dental:EENT/Dental  Normal   Cardiovascular:   Hypertension hyperlipidemia    Pulmonary:   Asthma asymptomatic    Renal/:  Renal/ Normal     Hepatic/GI:   GERD    Musculoskeletal:  Musculoskeletal Normal    Neurological:  Neurology Normal    Endocrine:  Endocrine Normal    Psych:  Psychiatric Normal           Physical Exam  General:  Well nourished, Obesity    Airway/Jaw/Neck:  Airway Findings: Mouth Opening: Normal Tongue: Normal  General Airway Assessment: Adult, Good  Mallampati: I  TM Distance: 4 - 6 cm  Jaw/Neck Findings:  Neck ROM: Normal ROM      Dental:  Dental Findings: Periodontal disease, Severe, Prominent Incisors    Chest/Lungs:  Chest/Lungs Findings: Clear to auscultation, Normal Respiratory Rate     Heart/Vascular:  Heart Findings: Normal Heart murmur: negative    Abdomen:  Abdomen Findings: Normal    Musculoskeletal:  Musculoskeletal Findings: Normal   Skin:  Skin Findings: Normal    Mental Status:  Mental Status Findings:  Cooperative, Alert and Oriented         Anesthesia Plan  Type of Anesthesia, risks & benefits discussed:  Anesthesia Type:  general  Patient's Preference:   Intra-op Monitoring Plan: standard ASA monitors  Intra-op Monitoring Plan Comments:   Post Op Pain Control Plan: per primary service following discharge from PACU, IV/PO Opioids PRN and multimodal analgesia  Post Op Pain Control Plan Comments:   Induction:   IV  Beta Blocker:  Patient is not currently on a Beta-Blocker (No further documentation required).       Informed Consent: Patient understands risks and agrees with Anesthesia plan.  Questions answered. Anesthesia consent signed with patient.  ASA Score: 2     Day of Surgery Review of History & Physical:  There are no significant changes.          Ready For Surgery From Anesthesia Perspective.

## 2017-12-11 NOTE — TRANSFER OF CARE
"Anesthesia Transfer of Care Note    Patient: Coby Marshall    Procedure(s) Performed: Procedure(s) (LRB):  RESECTION-COLON-LOW ANTERIOR, loop ileostomy, Colonic J Pouch, Ileo Resection, Ileocolic Anastomosis (N/A)    Patient location: PACU    Anesthesia Type: general    Transport from OR: Transported from OR on room air with adequate spontaneous ventilation    Post pain: adequate analgesia    Post assessment: no apparent anesthetic complications    Post vital signs: stable    Level of consciousness: awake and alert    Nausea/Vomiting: no nausea/vomiting    Complications: none    Transfer of care protocol was followed      Last vitals:   Visit Vitals  /73   Pulse 69   Temp 36.5 °C (97.7 °F) (Temporal)   Resp 12   Ht 5' 6" (1.676 m)   Wt 94.8 kg (209 lb)   SpO2 100%   Breastfeeding? No   BMI 33.73 kg/m²     "

## 2017-12-11 NOTE — BRIEF OP NOTE
[facility]  Colon and Rectal Surgery  Operative Note    SUMMARY     Date of Procedure: 12/11/2017     Procedure Procedure(s) (LRB):  RESECTION-COLON-LOW ANTERIOR, colonic J-pouch, loop ileostomy (N/A), ileal resevtion with ileocolic anastomosis    :Surgeon(s) and Role:     * Suhas Manjarrez MD - Primary    Assisting Surgeon: None    Pre-Operative Diagnosis: Rectal cancer [C20]    Post-Operative Diagnosis: Post-Op Diagnosis Codes:     * Rectal cancer [C20]    Anesthesia: General, Exparel 266 mg, Marcaine 0.25% (75 mg) Saline 10 cc preperironeal injection      Technical Procedures Used: colonic J 29 CDS , distal spatle, STSFETE Ileocolic anastomosis, loop ileostomy    Description of the Findings of the Procedure: low small tumor rectum, small neoplasm 1 cm in TI ,   Significant Surgical Tasks Conducted by the Assistant(s), if Applicable: n/a    Complications: No    Estimated Blood Loss (EBL): 400 mL           Implants:   Implant Name Type Inv. Item Serial No.  Lot No. LRB No. Used   MEMBRANE SEPRAFILM 5 X 6 - [id number]   MEMBRANE SEPRAFILM 5 X 6   [employer] [id number] N/A 1       Specimens:   Specimen (12h ago through future)    Start     Ordered    12/11/17 0937  Specimen to Pathology - Surgery  Once     Comments:  1. Ileo-mass (frozen)2. Sigmoid and Rectum (perm)3. Distal anastomotic donut (perm)4. Proximal anastomotic donut (perm)5. Terminal Ileum (perm)      12/11/17 0907           Condition: Good    Disposition: PACU - hemodynamically stable.    Attestation: I was present and scrubbed for the entire procedure.

## 2017-12-11 NOTE — INTERVAL H&P NOTE
The patient has been examined and the H&P has been reviewed:    I concur with the findings and no changes have occurred since H&P was written.    Anesthesia/Surgery risks, benefits and alternative options discussed and understood by patient/family. Ostomy site marked.          There are no hospital problems to display for this patient.

## 2017-12-11 NOTE — ANESTHESIA POSTPROCEDURE EVALUATION
"Anesthesia Post Evaluation    Patient: Coby Marshall    Procedure(s) Performed: Procedure(s) (LRB):  RESECTION-COLON-LOW ANTERIOR, loop ileostomy, Colonic J Pouch, Ileo Resection, Ileocolic Anastomosis (N/A)    Final Anesthesia Type: general  Patient location during evaluation: PACU  Patient participation: Yes- Able to Participate  Level of consciousness: awake and alert  Post-procedure vital signs: reviewed and stable  Pain management: adequate  Airway patency: patent  PONV status at discharge: No PONV  Anesthetic complications: no      Cardiovascular status: blood pressure returned to baseline  Respiratory status: spontaneous ventilation and room air  Hydration status: euvolemic  Follow-up not needed.        Visit Vitals  BP (!) 169/77   Pulse 77   Temp 36.7 °C (98.1 °F)   Resp 14   Ht 5' 6" (1.676 m)   Wt 94.8 kg (209 lb)   SpO2 100%   Breastfeeding? No   BMI 33.73 kg/m²       Pain/Mikey Score: Pain Assessment Performed: Yes (12/11/2017  2:30 PM)  Presence of Pain: complains of pain/discomfort (12/11/2017  2:30 PM)  Pain Rating Prior to Med Admin: 5 (12/11/2017  2:20 PM)  Pain Rating Post Med Admin: 4 (12/11/2017  2:30 PM)  Mikey Score: 10 (12/11/2017  2:30 PM)      "

## 2017-12-12 LAB
ANION GAP SERPL CALC-SCNC: 8 MMOL/L
BASOPHILS # BLD AUTO: 0.01 K/UL
BASOPHILS NFR BLD: 0.1 %
BUN SERPL-MCNC: 13 MG/DL
CALCIUM SERPL-MCNC: 8.4 MG/DL
CHLORIDE SERPL-SCNC: 104 MMOL/L
CO2 SERPL-SCNC: 25 MMOL/L
CREAT SERPL-MCNC: 1 MG/DL
DIFFERENTIAL METHOD: ABNORMAL
EOSINOPHIL # BLD AUTO: 0.1 K/UL
EOSINOPHIL NFR BLD: 0.6 %
ERYTHROCYTE [DISTWIDTH] IN BLOOD BY AUTOMATED COUNT: 16.1 %
EST. GFR  (AFRICAN AMERICAN): >60 ML/MIN/1.73 M^2
EST. GFR  (NON AFRICAN AMERICAN): 59.7 ML/MIN/1.73 M^2
GLUCOSE SERPL-MCNC: 122 MG/DL
HCT VFR BLD AUTO: 32.6 %
HGB BLD-MCNC: 10.3 G/DL
IMM GRANULOCYTES # BLD AUTO: 0.03 K/UL
IMM GRANULOCYTES NFR BLD AUTO: 0.3 %
LYMPHOCYTES # BLD AUTO: 0.9 K/UL
LYMPHOCYTES NFR BLD: 9.4 %
MAGNESIUM SERPL-MCNC: 1.9 MG/DL
MCH RBC QN AUTO: 27.2 PG
MCHC RBC AUTO-ENTMCNC: 31.6 G/DL
MCV RBC AUTO: 86 FL
MONOCYTES # BLD AUTO: 0.8 K/UL
MONOCYTES NFR BLD: 8.4 %
NEUTROPHILS # BLD AUTO: 7.6 K/UL
NEUTROPHILS NFR BLD: 81.2 %
NRBC BLD-RTO: 0 /100 WBC
PHOSPHATE SERPL-MCNC: 3.7 MG/DL
PLATELET # BLD AUTO: 260 K/UL
PMV BLD AUTO: 9.1 FL
POTASSIUM SERPL-SCNC: 4.1 MMOL/L
RBC # BLD AUTO: 3.78 M/UL
SODIUM SERPL-SCNC: 137 MMOL/L
WBC # BLD AUTO: 9.34 K/UL

## 2017-12-12 PROCEDURE — 83735 ASSAY OF MAGNESIUM: CPT

## 2017-12-12 PROCEDURE — 25000242 PHARM REV CODE 250 ALT 637 W/ HCPCS: Performed by: COLON & RECTAL SURGERY

## 2017-12-12 PROCEDURE — 63600175 PHARM REV CODE 636 W HCPCS: Performed by: COLON & RECTAL SURGERY

## 2017-12-12 PROCEDURE — S0030 INJECTION, METRONIDAZOLE: HCPCS | Performed by: COLON & RECTAL SURGERY

## 2017-12-12 PROCEDURE — 84100 ASSAY OF PHOSPHORUS: CPT

## 2017-12-12 PROCEDURE — 80048 BASIC METABOLIC PNL TOTAL CA: CPT

## 2017-12-12 PROCEDURE — 63600175 PHARM REV CODE 636 W HCPCS: Performed by: NURSE PRACTITIONER

## 2017-12-12 PROCEDURE — 85025 COMPLETE CBC W/AUTO DIFF WBC: CPT

## 2017-12-12 PROCEDURE — 20600001 HC STEP DOWN PRIVATE ROOM

## 2017-12-12 PROCEDURE — 25000003 PHARM REV CODE 250: Performed by: COLON & RECTAL SURGERY

## 2017-12-12 PROCEDURE — 36415 COLL VENOUS BLD VENIPUNCTURE: CPT

## 2017-12-12 RX ORDER — DIPHENHYDRAMINE HYDROCHLORIDE 50 MG/ML
12.5 INJECTION INTRAMUSCULAR; INTRAVENOUS EVERY 6 HOURS PRN
Status: DISCONTINUED | OUTPATIENT
Start: 2017-12-12 | End: 2017-12-15 | Stop reason: HOSPADM

## 2017-12-12 RX ORDER — DEXTROSE MONOHYDRATE, SODIUM CHLORIDE, AND POTASSIUM CHLORIDE 50; 1.49; 4.5 G/1000ML; G/1000ML; G/1000ML
INJECTION, SOLUTION INTRAVENOUS CONTINUOUS
Status: DISCONTINUED | OUTPATIENT
Start: 2017-12-12 | End: 2017-12-15

## 2017-12-12 RX ADMIN — SODIUM CHLORIDE, SODIUM LACTATE, POTASSIUM CHLORIDE, AND CALCIUM CHLORIDE: 600; 310; 30; 20 INJECTION, SOLUTION INTRAVENOUS at 12:12

## 2017-12-12 RX ADMIN — DULOXETINE 60 MG: 60 CAPSULE, DELAYED RELEASE ORAL at 08:12

## 2017-12-12 RX ADMIN — DEXTROSE MONOHYDRATE, SODIUM CHLORIDE, AND POTASSIUM CHLORIDE: 50; 4.5; 1.49 INJECTION, SOLUTION INTRAVENOUS at 08:12

## 2017-12-12 RX ADMIN — OXYCODONE HYDROCHLORIDE 5 MG: 5 TABLET ORAL at 04:12

## 2017-12-12 RX ADMIN — CLONIDINE HYDROCHLORIDE 0.1 MG: 0.1 TABLET ORAL at 09:12

## 2017-12-12 RX ADMIN — ENOXAPARIN SODIUM 40 MG: 100 INJECTION SUBCUTANEOUS at 08:12

## 2017-12-12 RX ADMIN — IBUPROFEN 400 MG: 800 INJECTION INTRAVENOUS at 11:12

## 2017-12-12 RX ADMIN — SPIRONOLACTONE 25 MG: 25 TABLET, FILM COATED ORAL at 08:12

## 2017-12-12 RX ADMIN — GABAPENTIN 100 MG: 100 CAPSULE ORAL at 02:12

## 2017-12-12 RX ADMIN — DIPHENHYDRAMINE HYDROCHLORIDE 12.5 MG: 50 INJECTION, SOLUTION INTRAMUSCULAR; INTRAVENOUS at 03:12

## 2017-12-12 RX ADMIN — FLUTICASONE PROPIONATE 1 SPRAY: 50 SPRAY, METERED NASAL at 09:12

## 2017-12-12 RX ADMIN — FAMOTIDINE 20 MG: 20 TABLET, FILM COATED ORAL at 08:12

## 2017-12-12 RX ADMIN — GABAPENTIN 100 MG: 100 CAPSULE ORAL at 09:12

## 2017-12-12 RX ADMIN — TAMSULOSIN HYDROCHLORIDE 0.4 MG: 0.4 CAPSULE ORAL at 08:12

## 2017-12-12 RX ADMIN — IBUPROFEN 400 MG: 800 INJECTION INTRAVENOUS at 05:12

## 2017-12-12 RX ADMIN — OXYCODONE HYDROCHLORIDE 5 MG: 5 TABLET ORAL at 12:12

## 2017-12-12 RX ADMIN — FAMOTIDINE 20 MG: 20 TABLET, FILM COATED ORAL at 09:12

## 2017-12-12 RX ADMIN — DEXTROSE MONOHYDRATE, SODIUM CHLORIDE, AND POTASSIUM CHLORIDE: 50; 4.5; 1.49 INJECTION, SOLUTION INTRAVENOUS at 11:12

## 2017-12-12 RX ADMIN — ACETAMINOPHEN 1000 MG: 10 INJECTION, SOLUTION INTRAVENOUS at 07:12

## 2017-12-12 RX ADMIN — METRONIDAZOLE 500 MG: 500 SOLUTION INTRAVENOUS at 04:12

## 2017-12-12 RX ADMIN — IBUPROFEN 400 MG: 800 INJECTION INTRAVENOUS at 12:12

## 2017-12-12 RX ADMIN — ATORVASTATIN CALCIUM 20 MG: 20 TABLET, FILM COATED ORAL at 08:12

## 2017-12-12 RX ADMIN — HYDROCHLOROTHIAZIDE 12.5 MG: 12.5 TABLET ORAL at 08:12

## 2017-12-12 RX ADMIN — GABAPENTIN 100 MG: 100 CAPSULE ORAL at 05:12

## 2017-12-12 NOTE — PLAN OF CARE
Problem: Patient Care Overview  Goal: Plan of Care Review  Outcome: Ongoing (interventions implemented as appropriate)  Patient is awake and oriented. Complains of mild abdominal pain. Prn pain medicine is available. VS stable. Right colostomy. [place] to gravity. Verbalizes understanding of plan of care. Nurse will continue to monitor.

## 2017-12-12 NOTE — PLAN OF CARE
Problem: Patient Care Overview  Goal: Plan of Care Review  Outcome: Ongoing (interventions implemented as appropriate)  POC reviewed with pt, pt verbalized understanding. AAOx4. VSS on room air. Up in chair during shift and walked in gunter x1 with stand-by assist. Using IS independently. Adequate UOP per cuba catheter. Ostomy with + thin brown output. Pain controlled with prn pain meds. Tolerating clears without N/V. C/o itching, benadryl ordered. IV fluids infusing. Call light within reach. Frequent rounds made.

## 2017-12-12 NOTE — PLAN OF CARE
SW met with pt to discuss d/c planning. SW discussed the recommendation of HH for d/c.  SW provided pt with a list of HH options.  SW and pt reviewed the HH list.  Pt chose [facility] [phone] as her provider of choice.  SW will send referral to [facility].             Angeles Ellis, LMSW [facility]  [id number]

## 2017-12-12 NOTE — PROGRESS NOTES
"[facility]  Colorectal Surgery  Progress Note    Patient Name: Coby Marshall  MRN: [id number]  Admission Date: 12/11/2017  Hospital Length of Stay: 1 days  Attending Physician: Suhas Manjarrez MD    Subjective:     Interval History:     NAEON. Pain controlled this morning, rates it a "3". Denies CP or SOB. Denies nausea or vomiting, tolerating some liquids. No issues with ostomy bag, has been changed once already.    Post-Op Info:  Procedure(s) (LRB):  RESECTION-COLON-LOW ANTERIOR, loop ileostomy, Colonic J Pouch, Ileo Resection, Ileocolic Anastomosis (N/A)   1 Day Post-Op      Medications:  Continuous Infusions:   sodium chloride 0.9%      glucagon (human recombinant)      lactated ringers 75 mL/hr at 12/12/17 0024     Scheduled Meds:   acetaminophen  1,000 mg Intravenous Q8H    atorvastatin  20 mg Oral Daily    cloNIDine  0.1 mg Oral BID    DULoxetine  60 mg Oral Daily    enoxparin  40 mg Subcutaneous Q24H    famotidine  20 mg Oral BID    fluticasone  1 spray Each Nare Daily    gabapentin  100 mg Oral TID    hydroCHLOROthiazide  12.5 mg Oral Daily    ibuprofen  400 mg Intravenous Q6H    spironolactone  25 mg Oral Daily    tamsulosin  0.4 mg Oral Daily     PRN Meds:   albuterol    calcium carbonate    dextrose 50%    dextrose 50%    dextrose 50%    glucagon (human recombinant)    glucose    glucose    glucose    HYDROmorphone    metoclopramide HCl    naloxone    ondansetron    oxyCODONE    simethicone        Objective:     Vital Signs (Most Recent):  Temp: 98 °F (36.7 °C) (12/12/17 0416)  Pulse: 80 (12/12/17 0416)  Resp: 16 (12/12/17 0024)  BP: 139/62 (12/12/17 0416)  SpO2: 100 % (12/12/17 0416) Vital Signs (24h Range):  Temp:  [97.3 °F (36.3 °C)-98.4 °F (36.9 °C)] 98 °F (36.7 °C)  Pulse:  [65-92] 80  Resp:  [10-19] 16  SpO2:  [100 %] 100 %  BP: (103-180)/(62-94) 139/62     Intake/Output - Last 3 Shifts       12/10 0700 - 12/11 0659 12/11 0700 - 12/12 0659    P.O.  " 240    I.V. (mL/kg)  2845 (30)    IV Piggyback  450    Total Intake(mL/kg)  3535 (37.3)    Urine (mL/kg/hr)  970 (0.4)    Stool  125 (0.1)    Blood  400 (0.2)    Total Output   1495    Net   +2040                Physical Exam   Constitutional: She is oriented to person, place, and time. She appears well-developed and well-nourished. No distress.   HENT:   Head: Normocephalic and atraumatic.   Eyes: EOM are normal.   Neck: Normal range of motion.   Cardiovascular: Normal rate.    Pulmonary/Chest: Effort normal. No respiratory distress. She has no wheezes.   Abdominal: Soft. She exhibits no distension and no mass. There is tenderness (at incisions which are c/d/i). There is no rebound and no guarding. No hernia.   Ostomy mucosa pink and healthy, succus in bag   Neurological: She is alert and oriented to person, place, and time.   Skin: Skin is warm and dry. She is not diaphoretic.   Psychiatric: She has a normal mood and affect. Her behavior is normal. Thought content normal.     Significant Labs:  BMP (Last 3 Results):   Recent Labs  Lab 12/12/17  0405   *      K 4.1      CO2 25   BUN 13   CREATININE 1.0   CALCIUM 8.4*   MG 1.9     CBC (Last 3 Results):   Recent Labs  Lab 12/12/17  0405   WBC 9.34   RBC 3.78*   HGB 10.3*   HCT 32.6*      MCV 86   MCH 27.2   MCHC 31.6*     Assessment/Plan:     Rectal cancer    62yoF 1 Day Post-Op LAR+DLI, colonic J, SBR for ileal mass.    -Continue CLD, possibly advance later today. MIVF.  -Ambulate, PT to see.  -WOCN to see for ostomy teaching.  -IRON cuba tomorrow.  -Pain meds PRN.  -DVT ppx.                  Dimitrios Bautista MD  Colorectal Surgery  [facility]

## 2017-12-12 NOTE — CONSULTS
Consult received on patient's ileostomy education. Patient POD 1. Educational booklet, diet education sheet, and ostomy nurses contact information provided. Plan on following up tomorrow for lesson. Nursing to continue care.

## 2017-12-12 NOTE — SUBJECTIVE & OBJECTIVE
"  Subjective:     Interval History:     NAEON. Pain controlled this morning, rates it a "3". Denies CP or SOB. Denies nausea or vomiting, tolerating some liquids. No issues with ostomy bag, has been changed once already.    Post-Op Info:  Procedure(s) (LRB):  RESECTION-COLON-LOW ANTERIOR, loop ileostomy, Colonic J Pouch, Ileo Resection, Ileocolic Anastomosis (N/A)   1 Day Post-Op      Medications:  Continuous Infusions:   sodium chloride 0.9%      glucagon (human recombinant)      lactated ringers 75 mL/hr at 12/12/17 0024     Scheduled Meds:   acetaminophen  1,000 mg Intravenous Q8H    atorvastatin  20 mg Oral Daily    cloNIDine  0.1 mg Oral BID    DULoxetine  60 mg Oral Daily    enoxparin  40 mg Subcutaneous Q24H    famotidine  20 mg Oral BID    fluticasone  1 spray Each Nare Daily    gabapentin  100 mg Oral TID    hydroCHLOROthiazide  12.5 mg Oral Daily    ibuprofen  400 mg Intravenous Q6H    spironolactone  25 mg Oral Daily    tamsulosin  0.4 mg Oral Daily     PRN Meds:   albuterol    calcium carbonate    dextrose 50%    dextrose 50%    dextrose 50%    glucagon (human recombinant)    glucose    glucose    glucose    HYDROmorphone    metoclopramide HCl    naloxone    ondansetron    oxyCODONE    simethicone        Objective:     Vital Signs (Most Recent):  Temp: 98 °F (36.7 °C) (12/12/17 0416)  Pulse: 80 (12/12/17 0416)  Resp: 16 (12/12/17 0024)  BP: 139/62 (12/12/17 0416)  SpO2: 100 % (12/12/17 0416) Vital Signs (24h Range):  Temp:  [97.3 °F (36.3 °C)-98.4 °F (36.9 °C)] 98 °F (36.7 °C)  Pulse:  [65-92] 80  Resp:  [10-19] 16  SpO2:  [100 %] 100 %  BP: (103-180)/(62-94) 139/62     Intake/Output - Last 3 Shifts       12/10 0700 - 12/11 0659 12/11 0700 - 12/12 0659    P.O.  240    I.V. (mL/kg)  2845 (30)    IV Piggyback  450    Total Intake(mL/kg)  3535 (37.3)    Urine (mL/kg/hr)  970 (0.4)    Stool  125 (0.1)    Blood  400 (0.2)    Total Output   1495    Net   +2040          "       Physical Exam   Constitutional: She is oriented to person, place, and time. She appears well-developed and well-nourished. No distress.   HENT:   Head: Normocephalic and atraumatic.   Eyes: EOM are normal.   Neck: Normal range of motion.   Cardiovascular: Normal rate.    Pulmonary/Chest: Effort normal. No respiratory distress. She has no wheezes.   Abdominal: Soft. She exhibits no distension and no mass. There is tenderness (at incisions which are c/d/i). There is no rebound and no guarding. No hernia.   Ostomy mucosa pink and healthy, succus in bag   Neurological: She is alert and oriented to person, place, and time.   Skin: Skin is warm and dry. She is not diaphoretic.   Psychiatric: She has a normal mood and affect. Her behavior is normal. Thought content normal.     Significant Labs:  BMP (Last 3 Results):   Recent Labs  Lab 12/12/17  0405   *      K 4.1      CO2 25   BUN 13   CREATININE 1.0   CALCIUM 8.4*   MG 1.9     CBC (Last 3 Results):   Recent Labs  Lab 12/12/17  0405   WBC 9.34   RBC 3.78*   HGB 10.3*   HCT 32.6*      MCV 86   MCH 27.2   MCHC 31.6*

## 2017-12-12 NOTE — NURSING TRANSFER
Nursing Transfer Note      12/11/2017     Transfer to: 605 from PACU    Transfer via stretcher    Transfer with IV pump    Transported by PCT    Medicines sent: none    Chart send with patient: yes    Notified: spouse    Patient reassessed at: 12/11/17 1808    Upon arrival to floor: bed in lowest position, call light in reach

## 2017-12-12 NOTE — ASSESSMENT & PLAN NOTE
62yoF 1 Day Post-Op LAR+DLI, colonic J, SBR for ileal mass.    -Advance diet to LRD. MIVF.  -Ambulate, PT to see.  -WOCN to see for ostomy teaching.  -IRON cub[place] tomorrow.  -Pain meds PRN.  -DVT ppx.

## 2017-12-12 NOTE — OP NOTE
DATE OF PROCEDURE:  12/11/2017    PREOPERATIVE DIAGNOSIS:  Rectal cancer.    POSTOPERATIVE DIAGNOSES:  1.  Rectal cancer.  2.  Terminal ileal mass.    PROCEDURES PERFORMED:  Low anterior resection with colonic J-pouch and diverting   loop ileostomy, takedown of splenic flexure, excision of terminal ileal mass.    SURGEON:  Suhas Manjarrez M.D.    ASSISTANT:  Dimitrios Bautista M.D. (RES)    ESTIMATED BLOOD LOSS:  400 mL.    SPECIMENS:  Rectum and sigmoid, proximal and distal anastomotic donuts, ileal   mass, terminal ileum.    INDICATIONS FOR PROCEDURE:  The patient is a 62-year-old female who was found to   have rectal cancer about 45 cm from the anal verge.  She underwent neoadjuvant   chemoradiotherapy, which finished in October.  This resulted in improvement and   size of nodes and thickening of the rectum on CT scan.  She was consented and   set up for a low anterior resection.    DESCRIPTION OF PROCEDURE: Details of procedure, risks, benefits, and   alternatives were discussed with the patient and consent was obtained.  The   patient was brought to the OR and was induced under general anesthesia after   SCDs were on and pumping.  She was placed into the supine lithotomy position [facility]   [facility].  Rectal irrigation utilizing a Malecot drain was performed.  A Osuna   catheter was placed into the bladder in a sterile fashion.  The patient was then   prepped and draped in the standard sterile technique and prior to incision, a   timeout was performed confirming correct patient, procedure, surgeon, and   perioperative medications.  The patient did receive preincision antibiotics.  A   midline incision was made going from about the level of the pubis up above the   umbilicus.  This was brought down through the subcutaneous tissues down to the   level of the fascia using electrocautery.  The abdomen was then entered and a   few omental attachments to the anterior abdominal wall were taken down with   electrocautery.  The  abdominal cavity was surveyed and there was no evidence of   peritoneal carcinomatosis.  The liver was palpated, but the left and right   lobes, and no gross tumor was appreciated.  We placed our wound retractor into   the incision for exposure.  The sigmoid colon was grasped and we performed some   lateral mobilization along the white line of Toldt going up towards the splenic   flexure.  This was carried up to the level of the splenic flexure and then we   proceeded to go to the transverse colon.  The distal transverse colon where we   divided the omentum from the gastrocolic ligament and further freed up the   splenic flexure.  Once we had completely mobilized the splenic flexure, we then   turned our attention back down to the sigmoid colon.  Further lateral   attachments were taken down and the left ureter was clearly identified.  The LILIAM   pedicle was identified and a window was made just distal to the vessel in a   clear space and we encircled the inferior mesenteric artery.  We performed a   high ligation by clamping with Josephine's and oversewing with a chromic suture.  We   then followed the ascending branch of the left colic artery out after a branch   from the LILIAM and chose this as our division point for our proximal portion of   our descending colon.  This was done with a blue load YULISSA stapler.  We then   brought our dissection down to the pelvis entering into the posterior presacral   space utilizing again electrocautery dissection.  This was brought down to the   level of the levators and we then extended our dissection laterally and   anteriorly, taking care to avoid injury of the vagina by staying within the   rectovaginal septum.  We were careful not to disrupt the mesorectum and once we   had dissected down circumferentially down to the level of the levators, which we   were able to palpate the tumor, which was approximately 2 cm in size and was   approximately 5 or 6 cm above the anal verge.  We  dissected out the rectum and   came across it with a green load TA stapler ensuring that we had an adequate   margin distal to the tumor.  The stapler was fired and the proximal rectum was   sharply cut with a 10-blade scalpel.  Specimen was removed and was marked for   pathology.  We opted due to this being a very low anastomosis to create a   colonic J-pouch.  In order to get adequate length, we did go up and divide the   IMV again between two Josephine's and oversewing with a chromic suture.  After doing   this, our descending colon reached easily.  We fashioned an approximately 5 cm   colonic J-pouch.  A colotomy was made after stay sutures were placed at four   points and a single firing of a blue load YULISSA stapler was used to create a   common channel.  The staple line was Lemberted with 2-0 Vicryl and we used a   Prolene to perform a pursestring around the colotomy.  The anvil of the 29 mm   CDH stapler was then placed into the colonic J-pouch and the pursestring was   tied tight around this.  Once this was done, I then went below and introduced   the CDH stapler into the rectal stump.  Utilizing this stapler, we then   performed our anastomosis.  The stapler was removed and two complete donuts were   seen and these were sent separately for pathology.  On digital rectal   examination, the staple lines felt complete circumferentially.  Using an Asepto   bulb, we then placed Betadine into the rectum and there was no gross leakage of   Betadine as seen from above.  We then performed an air leak test, which was also   negative for any leaks.  We ensured adequate hemostasis in the pelvis and then   turned our attention to fashioning our loop ileostomy.  We identified the cecum   and proceeded to mobilize the cecum off the lateral wall.  In assessing our   ideal spot for our loop ileostomy, we noted that there was an intraluminal mass   within the terminal ileum just proximal to the ileocecal valve, which was hard   and  fixed.  We opted to perform an enterotomy here and excised an approximately   1 cm lesion, which was slightly firm to the touch and tan appearing.  This was   sent for frozen section and frozen section showed that this was an endocrine   tumor.  Further identification would be pending for complete pathology.  We then   opted to do a small bowel resection to excise that portion of the terminal   ileum.  This was done with blue load YULISSA staplers and then we performed an   ileocecal anastomosis.  This was again done using blue load YULISSA stapler and the   common enterotomy was closed in two layers.  We then ran the small bowel in its   entirety by lifting up the transverse colon and identifying the ligament of   Treitz and tracing this down to the level of our previous anastomosis.  We did   not palpate any other abnormalities within small intestine.  We then chose a   portion of terminal ileum just upstream to our anastomosis for our planned loop   ileostomy.  The patient had been previously marked by an enterostomal therapist   and we performed a trephination at that marked spot.  This was brought down   again to the level of the fascia and this was incised with electrocautery.  We   then performed a muscle splitting technique to bring the loop of ileum up   through the fascia to the skin and this came up easily and we used a chromic to   tack this in place due to the patient's abundance of subcutaneous fat.  We then   performed a bilateral TAP block using Exparel solution along with Marcaine.    After this was done, we performed a lap count and once this was confirmed as   correct and we were happy with our hemostasis, we then closed the midline fascia   utilizing two #1 PDS sutures from the superior and inferior aspect of the   incision.  Once the fascia was closed, we then closed our incision using 4-0   Monocryl suture.  We then turned our attention to fashioning the loop ileostomy.    A longitudinal incision was  made using electrocautery and then using 2-0   chromic, we performed a standard Erica loop ileostomy.  Once this had been   completed, we placed glue over the midline incision and then an ostomy bag over   the loop ileostomy.  Final lap, needle, and sponge counts were all reported as   correct.  The patient was then extubated and was transferred to the PACU without   any apparent complications.  Dr. Manjarrez was present for all aspects of the   surgery.    DICTATED BY:  Dimitrios Bautista M.D. (RES)      GELACIO/PATRICIA  dd: 12/11/2017 13:15:39 (CST)  td: 12/11/2017 18:06:36 (CST)  Doc ID   #[id number]  Job ID #[id number]    CC:

## 2017-12-12 NOTE — PLAN OF CARE
Pt is postop day #1, AA&Ox4, resting in bed.   met with patient and obtained assessment information.      Pt with new Ileostomy.  WOCN consulted.   educated pt on importance of starting to learn self-care of ostomy while here in hospital, patient voiced understanding and agreement.  CM discussed  home health care as a resource at time of discharge, pt expressed interest in HH.  CM will communicate this to SW.     PCP:Randal Casillas MD    Pharmacy of Choice:   [id number] - [place], [place] - [address]  [address]  [place] [address]  Phone: [phone] Fax: [phone]       12/12/17 0750   Discharge Assessment   Assessment Type Discharge Planning Assessment   Confirmed/corrected address and phone number on facesheet? Yes   Assessment information obtained from? Patient;Medical Record   Expected Length of Stay (days) 6   Communicated expected length of stay with patient/caregiver yes   Prior to hospitilization cognitive status: Alert/Oriented   Prior to hospitalization functional status: Independent   Current cognitive status: Alert/Oriented   Current Functional Status: Needs Assistance   Lives With spouse   Able to Return to Prior Arrangements unable to determine at this time (comments)   Is patient able to care for self after discharge? Unable to determine at this time (comments)   Who are your caregiver(s) and their phone number(s)? Sree Akers , [phone]   Patient's perception of discharge disposition home or selfcare   Readmission Within The Last 30 Days no previous admission in last 30 days   Patient currently being followed by outpatient case management? No   Patient currently receives any other outside agency services? No   Equipment Currently Used at Home none   Do you have any problems affording any of your prescribed medications? No   Is the patient taking medications as prescribed? yes   Does the patient have transportation home? Yes   Transportation  Available family or friend will provide   Does the patient receive services at the [facility]? No   Discharge Plan A Home with family;Home Health   Discharge Plan B Home with family   Patient/Family In Agreement With Plan yes

## 2017-12-13 LAB
ANION GAP SERPL CALC-SCNC: 6 MMOL/L
BASOPHILS # BLD AUTO: 0.03 K/UL
BASOPHILS NFR BLD: 0.3 %
BUN SERPL-MCNC: 11 MG/DL
CALCIUM SERPL-MCNC: 9 MG/DL
CHLORIDE SERPL-SCNC: 103 MMOL/L
CO2 SERPL-SCNC: 28 MMOL/L
CREAT SERPL-MCNC: 1 MG/DL
DIFFERENTIAL METHOD: ABNORMAL
EOSINOPHIL # BLD AUTO: 0.3 K/UL
EOSINOPHIL NFR BLD: 3.4 %
ERYTHROCYTE [DISTWIDTH] IN BLOOD BY AUTOMATED COUNT: 16.3 %
EST. GFR  (AFRICAN AMERICAN): >60 ML/MIN/1.73 M^2
EST. GFR  (NON AFRICAN AMERICAN): 59.7 ML/MIN/1.73 M^2
GLUCOSE SERPL-MCNC: 115 MG/DL
HCT VFR BLD AUTO: 29.8 %
HGB BLD-MCNC: 9.4 G/DL
IMM GRANULOCYTES # BLD AUTO: 0.03 K/UL
IMM GRANULOCYTES NFR BLD AUTO: 0.3 %
LYMPHOCYTES # BLD AUTO: 0.9 K/UL
LYMPHOCYTES NFR BLD: 10.1 %
MAGNESIUM SERPL-MCNC: 2 MG/DL
MCH RBC QN AUTO: 27.5 PG
MCHC RBC AUTO-ENTMCNC: 31.5 G/DL
MCV RBC AUTO: 87 FL
MONOCYTES # BLD AUTO: 0.9 K/UL
MONOCYTES NFR BLD: 10.2 %
NEUTROPHILS # BLD AUTO: 6.8 K/UL
NEUTROPHILS NFR BLD: 75.7 %
NRBC BLD-RTO: 0 /100 WBC
PHOSPHATE SERPL-MCNC: 3.1 MG/DL
PLATELET # BLD AUTO: 244 K/UL
PMV BLD AUTO: 9.5 FL
POTASSIUM SERPL-SCNC: 4.4 MMOL/L
RBC # BLD AUTO: 3.42 M/UL
SODIUM SERPL-SCNC: 137 MMOL/L
WBC # BLD AUTO: 8.99 K/UL

## 2017-12-13 PROCEDURE — 80048 BASIC METABOLIC PNL TOTAL CA: CPT

## 2017-12-13 PROCEDURE — 97530 THERAPEUTIC ACTIVITIES: CPT

## 2017-12-13 PROCEDURE — 84100 ASSAY OF PHOSPHORUS: CPT

## 2017-12-13 PROCEDURE — 25000003 PHARM REV CODE 250: Performed by: COLON & RECTAL SURGERY

## 2017-12-13 PROCEDURE — 83735 ASSAY OF MAGNESIUM: CPT

## 2017-12-13 PROCEDURE — 20600001 HC STEP DOWN PRIVATE ROOM

## 2017-12-13 PROCEDURE — 36415 COLL VENOUS BLD VENIPUNCTURE: CPT

## 2017-12-13 PROCEDURE — 97161 PT EVAL LOW COMPLEX 20 MIN: CPT

## 2017-12-13 PROCEDURE — 63600175 PHARM REV CODE 636 W HCPCS: Performed by: COLON & RECTAL SURGERY

## 2017-12-13 PROCEDURE — 85025 COMPLETE CBC W/AUTO DIFF WBC: CPT

## 2017-12-13 RX ADMIN — GABAPENTIN 100 MG: 100 CAPSULE ORAL at 01:12

## 2017-12-13 RX ADMIN — TAMSULOSIN HYDROCHLORIDE 0.4 MG: 0.4 CAPSULE ORAL at 08:12

## 2017-12-13 RX ADMIN — CLONIDINE HYDROCHLORIDE 0.1 MG: 0.1 TABLET ORAL at 08:12

## 2017-12-13 RX ADMIN — FAMOTIDINE 20 MG: 20 TABLET, FILM COATED ORAL at 08:12

## 2017-12-13 RX ADMIN — IBUPROFEN 400 MG: 800 INJECTION INTRAVENOUS at 06:12

## 2017-12-13 RX ADMIN — GABAPENTIN 100 MG: 100 CAPSULE ORAL at 06:12

## 2017-12-13 RX ADMIN — DEXTROSE MONOHYDRATE, SODIUM CHLORIDE, AND POTASSIUM CHLORIDE: 50; 4.5; 1.49 INJECTION, SOLUTION INTRAVENOUS at 09:12

## 2017-12-13 RX ADMIN — IBUPROFEN 400 MG: 800 INJECTION INTRAVENOUS at 11:12

## 2017-12-13 RX ADMIN — IBUPROFEN 400 MG: 800 INJECTION INTRAVENOUS at 05:12

## 2017-12-13 RX ADMIN — DULOXETINE 60 MG: 60 CAPSULE, DELAYED RELEASE ORAL at 08:12

## 2017-12-13 RX ADMIN — GABAPENTIN 100 MG: 100 CAPSULE ORAL at 09:12

## 2017-12-13 RX ADMIN — ENOXAPARIN SODIUM 40 MG: 100 INJECTION SUBCUTANEOUS at 08:12

## 2017-12-13 RX ADMIN — FAMOTIDINE 20 MG: 20 TABLET, FILM COATED ORAL at 09:12

## 2017-12-13 RX ADMIN — HYDROCHLOROTHIAZIDE 12.5 MG: 12.5 TABLET ORAL at 08:12

## 2017-12-13 RX ADMIN — CLONIDINE HYDROCHLORIDE 0.1 MG: 0.1 TABLET ORAL at 09:12

## 2017-12-13 RX ADMIN — SPIRONOLACTONE 25 MG: 25 TABLET, FILM COATED ORAL at 08:12

## 2017-12-13 RX ADMIN — ATORVASTATIN CALCIUM 20 MG: 20 TABLET, FILM COATED ORAL at 08:12

## 2017-12-13 NOTE — SUBJECTIVE & OBJECTIVE
Subjective:     Interval History:     NAEON. Pain controlled. Ambulated in halls yesterday. Osuna removed this morning. Denies nausea or vomiting, tolerating liquids. No issues with stoma, teaching done yesterday.    Post-Op Info:  Procedure(s) (LRB):  RESECTION-COLON-LOW ANTERIOR, loop ileostomy, Colonic J Pouch, Ileo Resection, Ileocolic Anastomosis (N/A)   2 Days Post-Op      Medications:  Continuous Infusions:   dextrose 5 % and 0.45 % NaCl with KCl 20 mEq 50 mL/hr at 12/12/17 2344    glucagon (human recombinant)       Scheduled Meds:   atorvastatin  20 mg Oral Daily    cloNIDine  0.1 mg Oral BID    DULoxetine  60 mg Oral Daily    enoxparin  40 mg Subcutaneous Q24H    famotidine  20 mg Oral BID    fluticasone  1 spray Each Nare Daily    gabapentin  100 mg Oral TID    hydroCHLOROthiazide  12.5 mg Oral Daily    ibuprofen  400 mg Intravenous Q6H    spironolactone  25 mg Oral Daily    tamsulosin  0.4 mg Oral Daily     PRN Meds:   albuterol    calcium carbonate    dextrose 50%    dextrose 50%    dextrose 50%    diphenhydrAMINE    glucagon (human recombinant)    glucose    glucose    glucose    HYDROmorphone    metoclopramide HCl    naloxone    ondansetron    oxyCODONE    simethicone        Objective:     Vital Signs (Most Recent):  Temp: 98.3 °F (36.8 °C) (12/13/17 0709)  Pulse: 79 (12/13/17 0709)  Resp: 17 (12/13/17 0709)  BP: 128/62 (12/13/17 0709)  SpO2: (!) 94 % (12/13/17 0709) Vital Signs (24h Range):  Temp:  [97.4 °F (36.3 °C)-98.6 °F (37 °C)] 98.3 °F (36.8 °C)  Pulse:  [74-84] 79  Resp:  [14-20] 17  SpO2:  [94 %-97 %] 94 %  BP: (114-128)/(56-66) 128/62     Intake/Output - Last 3 Shifts       12/11 0700 - 12/12 0659 12/12 0700 - 12/13 0659 12/13 0700 - 12/14 0659    P.O. 340 920     I.V. (mL/kg) 2845 (30) 2139.6 (22.6)     IV Piggyback 450 500     Total Intake(mL/kg) 3635 (38.3) 3559.6 (37.5)     Urine (mL/kg/hr) 970 (0.4) 2625 (1.2)     Emesis/NG output  0 (0)     Other  0 (0)      Stool 125 (0.1) 70 (0)     Blood 400 (0.2) 0 (0)     Total Output 1495 2695      Net +2140 +864.6             Stool Occurrence  0 x 0 x    Emesis Occurrence  0 x           Physical Exam   Constitutional: She is oriented to person, place, and time. She appears well-developed and well-nourished. No distress.   HENT:   Head: Normocephalic and atraumatic.   Eyes: EOM are normal.   Neck: Normal range of motion.   Cardiovascular: Normal rate.    Pulmonary/Chest: Effort normal. No respiratory distress. She has no wheezes.   Abdominal: Soft. She exhibits no distension and no mass. There is tenderness (at incisions which are c/d/i). There is no rebound and no guarding. No hernia.   Ostomy mucosa pink and healthy, succus in bag   Neurological: She is alert and oriented to person, place, and time.   Skin: Skin is warm and dry. She is not diaphoretic.   Psychiatric: She has a normal mood and affect. Her behavior is normal. Thought content normal.     Significant Labs:  BMP (Last 3 Results):   Recent Labs  Lab 12/12/17  0405 12/13/17  0451   * 115*    137   K 4.1 4.4    103   CO2 25 28   BUN 13 11   CREATININE 1.0 1.0   CALCIUM 8.4* 9.0   MG 1.9 2.0     CBC (Last 3 Results):   Recent Labs  Lab 12/12/17  0405 12/13/17  0451   WBC 9.34 8.99   RBC 3.78* 3.42*   HGB 10.3* 9.4*   HCT 32.6* 29.8*    244   MCV 86 87   MCH 27.2 27.5   MCHC 31.6* 31.5*

## 2017-12-13 NOTE — PLAN OF CARE
Problem: Patient Care Overview  Goal: Plan of Care Review  Outcome: Ongoing (interventions implemented as appropriate)  Plan of care reviewed with pt. Pt AAOx's4, vital signs stable. Currently on room. Tolerating a regular low fiber diet well. No complaints of pain. Ostomy and incision site intact. Pt ambulated in gunter with stand-by assistance and remains free from falls. Bed in low and locked position with call light in reach. No acute events at this time. WCTM

## 2017-12-13 NOTE — PLAN OF CARE
SW following for d/c needs. Pt expected to d/c on 12/16/2017.  Pt to d/c home with Hh.  [facility] chose [facility] her [facility] provider.  SW sent referral for HH services. Pt accepted by [facility].           Angeles Ellis, LMSW [facility]  [id number]

## 2017-12-13 NOTE — PLAN OF CARE
Problem: Patient Care Overview  Goal: Plan of Care Review  Outcome: Ongoing (interventions implemented as appropriate)  POC reviewed and understood by patient. Patient's AAOx4. Patient didn't c/o of any pain during the shift. Patient's IVs patent and intact. IV dressing clean, dry, and intact. Patient's ostomy been putting out gas and thin liquid stool. Patient tolerated clears and IV fluids w/o any c/o of N/V. Patient's VSS. SCDS in use.Family at bedside. Call light WNR. Bed in lowest position. WCTM.

## 2017-12-13 NOTE — PT/OT/SLP EVAL
"Physical Therapy Evaluation    Patient Name:  Coby Marshall   MRN:  [id number]    Recommendations:     Discharge Recommendations:  home   Discharge Equipment Recommendations: none   Barriers to discharge: None    Assessment:     Coby Marshall is a 62 y.o. female admitted with a medical diagnosis of Rectal cancer.  She presents with the following impairments/functional limitations:  pain, impaired functional mobilty . Pt is motivated to progress with mobility.    Rehab Prognosis:  good; patient would benefit from acute skilled PT services to address these deficits and reach maximum level of function.      Recent Surgery: Procedure(s) (LRB):  RESECTION-COLON-LOW ANTERIOR, loop ileostomy, Colonic J Pouch, Ileo Resection, Ileocolic Anastomosis (N/A) 2 Days Post-Op    Plan:     During this hospitalization, patient to be seen 3 x/week to address the above listed problems via gait training, therapeutic activities, therapeutic exercises, neuromuscular re-education  · Plan of Care Expires:  01/12/18   Plan of Care Reviewed with: patient, spouse    Subjective     Communicated with nurse prior to session.  Patient found supine upon PT entry to room, agreeable to evaluation.      Chief Complaint: "I am ready to walk"  Patient comments/goals: "I need to use the bathroom"  Pain/Comfort:  · Pain Rating 1: 3/10  · Location - Orientation 1: generalized  · Location 1: abdomen  · Pain Addressed 1: Pre-medicate for activity, Reposition  · Pain Rating Post-Intervention 1: 3/10    Patients cultural, spiritual, Rastafari conflicts given the current situation: none noted    Living Environment:  Pt lives with  in 1 story home with no steps to enter.   Prior to admission, patients level of function was independent and drives; pt has a walk in shower with a grab bar.  Patient has the following equipment: none.  Upon discharge, patient will have assistance from .    Objective:     Patient found with: peripheral IV "     General Precautions: Standard, fall   Orthopedic Precautions:N/A   Braces: N/A     Exams:  · Cognitive Exam:  Patient is oriented to Person, Place, Time and Situation and follows 100% of multistep commands   · Sensation:    · -       Intact  light/touch B LE  · RLE ROM: WFL  · RLE Strength: WFL  · LLE ROM: WFL  · LLE Strength: WFL    Functional Mobility:  Pt's family had assisted pt with supine to sit prior to PT arrival.  · Transfers:     · Sit to Stand:  contact guard assistance with no AD  · Gait: 200ft x 2 trials with seated rest period in between with decreased step length and at slow pace.    AM-PAC 6 CLICK MOBILITY  Total Score:18     Therapeutic Activities and Exercises:   Pt ambulated to the bathroom and attempted to urinate with no success. Pt stood at sink to wash her hands with supervision    Patient left seated on the EOB with all lines intact, call button in reach, nurse notified and family present.    GOALS:    Physical Therapy Goals        Problem: Physical Therapy Goal    Goal Priority Disciplines Outcome Goal Variances Interventions   Physical Therapy Goal     PT/OT, PT Ongoing (interventions implemented as appropriate)     Description:  PT goals until 12/18/17    1. Pt supine to sit with supervision-not met  2. Pt sit to supine with supervision-not met  3. Pt sit to stand with no AD with supervision-not met  4. Pt to perform gait 400ft with no AD with supervision.-not met                      History:     History reviewed. No pertinent past medical history.    History reviewed. No pertinent surgical history.    Clinical Decision Making:     History  Co-morbidities and personal factors that may impact the plan of care Examination  Body Structures and Functions, activity limitations and participation restrictions that may impact the plan of care Clinical Presentation   Decision Making/ Complexity Score   Co-morbidities:   [] Time since onset of injury / illness / exacerbation  [] Status of  current condition  []Patient's cognitive status and safety concerns    [] Multiple Medical Problems (see med hx)  Personal Factors:   [] Patient's age  [] Prior Level of function   [] Patient's home situation (environment and family support)  [] Patient's level of motivation  [] Expected progression of patient      HISTORY:(criteria)    [x] 27294 - no personal factors/history    [] 83622 - has 1-2 personal factor/comorbidity     [] 25248 - has >3 personal factor/comorbidity     Body Regions:  [] Objective examination findings  [] Head     []  Neck  [] Trunk   [] Upper Extremity  [] Lower Extremity    Body Systems:  [] For communication ability, affect, cognition, language, and learning style: the assessment of the ability to make needs known, consciousness, orientation (person, place, and time), expected emotional /behavioral responses, and learning preferences (eg, learning barriers, education  needs)  [x] For the neuromuscular system: a general assessment of gross coordinated movement (eg, balance, gait, locomotion, transfers, and transitions) and motor function  (motor control and motor learning)  [] For the musculoskeletal system: the assessment of gross symmetry, gross range of motion, gross strength, height, and weight  [] For the integumentary system: the assessment of pliability(texture), presence of scar formation, skin color, and skin integrity  [] For cardiovascular/pulmonary system: the assessment of heart rate, respiratory rate, blood pressure, and edema     Activity limitations:    [] Patient's cognitive status and saf ety concerns          [] Status of current condition      [] Weight bearing restriction  [] Cardiopulmunary Restriction    Participation Restrictions:   [] Goals and goal agreement with the patient     [] Rehab potential (prognosis) and probable outcome      Examination of Body System: (criteria)    [] 08831 - addressing 1-2 elements    [] 59677 - addressing a total of 3 or more elements      [] 91940 -  Addressing a total of 4 or more elements         Clinical Presentation: (criteria)  Stable - 31467     On examination of body system using standardized tests and measures patient presents with 1-2 elements from any of the following: body structures and functions, activity limitations, and/or participation restrictions.  Leading to a clinical presentation that is considered stable and/or uncomplicated                              Clinical Decision Making  (Eval Complexity):  Low- 80690     Time Tracking:     PT Received On: 12/13/17  PT Start Time: 1313     PT Stop Time: 1337  PT Total Time (min): 24 min     Billable Minutes: Evaluation 14 and Therapeutic Activity 10      Thuy Rosen, PT  12/13/2017

## 2017-12-13 NOTE — PROGRESS NOTES
[facility]  Colorectal Surgery  Progress Note    Patient Name: Coby Marshall  MRN: [id number]  Admission Date: 12/11/2017  Hospital Length of Stay: 2 days  Attending Physician: Suhas Manjarrez MD    Subjective:     Interval History:     NAEON. Pain controlled. Ambulated in halls yesterday. Osuna removed this morning. Denies nausea or vomiting, tolerating liquids. No issues with stoma, teaching done yesterday.    Post-Op Info:  Procedure(s) (LRB):  RESECTION-COLON-LOW ANTERIOR, loop ileostomy, Colonic J Pouch, Ileo Resection, Ileocolic Anastomosis (N/A)   2 Days Post-Op      Medications:  Continuous Infusions:   dextrose 5 % and 0.45 % NaCl with KCl 20 mEq 50 mL/hr at 12/12/17 2344    glucagon (human recombinant)       Scheduled Meds:   atorvastatin  20 mg Oral Daily    cloNIDine  0.1 mg Oral BID    DULoxetine  60 mg Oral Daily    enoxparin  40 mg Subcutaneous Q24H    famotidine  20 mg Oral BID    fluticasone  1 spray Each Nare Daily    gabapentin  100 mg Oral TID    hydroCHLOROthiazide  12.5 mg Oral Daily    ibuprofen  400 mg Intravenous Q6H    spironolactone  25 mg Oral Daily    tamsulosin  0.4 mg Oral Daily     PRN Meds:   albuterol    calcium carbonate    dextrose 50%    dextrose 50%    dextrose 50%    diphenhydrAMINE    glucagon (human recombinant)    glucose    glucose    glucose    HYDROmorphone    metoclopramide HCl    naloxone    ondansetron    oxyCODONE    simethicone        Objective:     Vital Signs (Most Recent):  Temp: 98.3 °F (36.8 °C) (12/13/17 0709)  Pulse: 79 (12/13/17 0709)  Resp: 17 (12/13/17 0709)  BP: 128/62 (12/13/17 0709)  SpO2: (!) 94 % (12/13/17 0709) Vital Signs (24h Range):  Temp:  [97.4 °F (36.3 °C)-98.6 °F (37 °C)] 98.3 °F (36.8 °C)  Pulse:  [74-84] 79  Resp:  [14-20] 17  SpO2:  [94 %-97 %] 94 %  BP: (114-128)/(56-66) 128/62     Intake/Output - Last 3 Shifts       12/11 0700 - 12/12 0659 12/12 0700 - 12/13 0659 12/13 0700 - 12/14 0659     P.O. 340 920     I.V. (mL/kg) 2845 (30) 2139.6 (22.6)     IV Piggyback 450 500     Total Intake(mL/kg) 3635 (38.3) 3559.6 (37.5)     Urine (mL/kg/hr) 970 (0.4) 2625 (1.2)     Emesis/NG output  0 (0)     Other  0 (0)     Stool 125 (0.1) 70 (0)     Blood 400 (0.2) 0 (0)     Total Output 1495 2695      Net +2140 +864.6             Stool Occurrence  0 x 0 x    Emesis Occurrence  0 x           Physical Exam   Constitutional: She is oriented to person, place, and time. She appears well-developed and well-nourished. No distress.   HENT:   Head: Normocephalic and atraumatic.   Eyes: EOM are normal.   Neck: Normal range of motion.   Cardiovascular: Normal rate.    Pulmonary/Chest: Effort normal. No respiratory distress. She has no wheezes.   Abdominal: Soft. She exhibits no distension and no mass. There is tenderness (at incisions which are c/d/i). There is no rebound and no guarding. No hernia.   Ostomy mucosa pink and healthy, succus in bag   Neurological: She is alert and oriented to person, place, and time.   Skin: Skin is warm and dry. She is not diaphoretic.   Psychiatric: She has a normal mood and affect. Her behavior is normal. Thought content normal.     Significant Labs:  BMP (Last 3 Results):   Recent Labs  Lab 12/12/17  0405 12/13/17  0451   * 115*    137   K 4.1 4.4    103   CO2 25 28   BUN 13 11   CREATININE 1.0 1.0   CALCIUM 8.4* 9.0   MG 1.9 2.0     CBC (Last 3 Results):   Recent Labs  Lab 12/12/17  0405 12/13/17  0451   WBC 9.34 8.99   RBC 3.78* 3.42*   HGB 10.3* 9.4*   HCT 32.6* 29.8*    244   MCV 86 87   MCH 27.2 27.5   MCHC 31.6* 31.5*     Assessment/Plan:     * Rectal cancer    62yoF 2 Days Post-Op LAR+DLI, colonic J, SBR for ileal mass.    -LRD, continue MIVF.  -Continue OOB, IS.  -Osuna removed, void trial.  -Pain meds PRN.  -DVT ppx.  -F/U pathology.                  Dimitrios Bautista MD  Colorectal Surgery  [facility]

## 2017-12-13 NOTE — PLAN OF CARE
Problem: Physical Therapy Goal  Goal: Physical Therapy Goal  PT goals until 12/18/17    1. Pt supine to sit with supervision-not met  2. Pt sit to supine with supervision-not met  3. Pt sit to stand with no AD with supervision-not met  4. Pt to perform gait 400ft with no AD with supervision.-not met    Outcome: Ongoing (interventions implemented as appropriate)  Pt's goals set and pt will benefit from skilled PT services to work towards improved functional mobility including: bed mobility, transfers, and gait.   Thuy Rosen, PT  12/13/2017

## 2017-12-13 NOTE — ASSESSMENT & PLAN NOTE
62yoF 2 Days Post-Op LAR+DLI, colonic J, SBR for ileal mass.    -LRD, continue MIVF.  -Continue OOB, IS.  -Osuna removed, void trial.  -Pain meds PRN.  -DVT ppx.  -F/U pathology.

## 2017-12-14 LAB
ANION GAP SERPL CALC-SCNC: 6 MMOL/L
BASOPHILS # BLD AUTO: 0.02 K/UL
BASOPHILS NFR BLD: 0.2 %
BUN SERPL-MCNC: 15 MG/DL
CALCIUM SERPL-MCNC: 9.1 MG/DL
CHLORIDE SERPL-SCNC: 101 MMOL/L
CO2 SERPL-SCNC: 26 MMOL/L
CREAT SERPL-MCNC: 1.3 MG/DL
DIFFERENTIAL METHOD: ABNORMAL
EOSINOPHIL # BLD AUTO: 0.3 K/UL
EOSINOPHIL NFR BLD: 3.9 %
ERYTHROCYTE [DISTWIDTH] IN BLOOD BY AUTOMATED COUNT: 16.1 %
EST. GFR  (AFRICAN AMERICAN): 50.1 ML/MIN/1.73 M^2
EST. GFR  (NON AFRICAN AMERICAN): 43.5 ML/MIN/1.73 M^2
GLUCOSE SERPL-MCNC: 118 MG/DL
HCT VFR BLD AUTO: 28.6 %
HGB BLD-MCNC: 9 G/DL
IMM GRANULOCYTES # BLD AUTO: 0.03 K/UL
IMM GRANULOCYTES NFR BLD AUTO: 0.4 %
LYMPHOCYTES # BLD AUTO: 0.9 K/UL
LYMPHOCYTES NFR BLD: 10.6 %
MAGNESIUM SERPL-MCNC: 1.8 MG/DL
MCH RBC QN AUTO: 26.9 PG
MCHC RBC AUTO-ENTMCNC: 31.5 G/DL
MCV RBC AUTO: 85 FL
MONOCYTES # BLD AUTO: 0.8 K/UL
MONOCYTES NFR BLD: 9.5 %
NEUTROPHILS # BLD AUTO: 6 K/UL
NEUTROPHILS NFR BLD: 75.4 %
NRBC BLD-RTO: 0 /100 WBC
PHOSPHATE SERPL-MCNC: 3.7 MG/DL
PLATELET # BLD AUTO: 263 K/UL
PMV BLD AUTO: 9.4 FL
POTASSIUM SERPL-SCNC: 4.3 MMOL/L
RBC # BLD AUTO: 3.35 M/UL
SODIUM SERPL-SCNC: 133 MMOL/L
WBC # BLD AUTO: 8.01 K/UL

## 2017-12-14 PROCEDURE — 63600175 PHARM REV CODE 636 W HCPCS: Performed by: NURSE PRACTITIONER

## 2017-12-14 PROCEDURE — 20600001 HC STEP DOWN PRIVATE ROOM

## 2017-12-14 PROCEDURE — 84100 ASSAY OF PHOSPHORUS: CPT

## 2017-12-14 PROCEDURE — 36415 COLL VENOUS BLD VENIPUNCTURE: CPT

## 2017-12-14 PROCEDURE — 25000003 PHARM REV CODE 250: Performed by: COLON & RECTAL SURGERY

## 2017-12-14 PROCEDURE — 80048 BASIC METABOLIC PNL TOTAL CA: CPT

## 2017-12-14 PROCEDURE — 85025 COMPLETE CBC W/AUTO DIFF WBC: CPT

## 2017-12-14 PROCEDURE — 83735 ASSAY OF MAGNESIUM: CPT

## 2017-12-14 PROCEDURE — 63600175 PHARM REV CODE 636 W HCPCS: Performed by: COLON & RECTAL SURGERY

## 2017-12-14 RX ORDER — HEPARIN SODIUM 5000 [USP'U]/ML
5000 INJECTION, SOLUTION INTRAVENOUS; SUBCUTANEOUS EVERY 8 HOURS
Status: DISCONTINUED | OUTPATIENT
Start: 2017-12-14 | End: 2017-12-15 | Stop reason: HOSPADM

## 2017-12-14 RX ADMIN — DIPHENHYDRAMINE HYDROCHLORIDE 12.5 MG: 50 INJECTION, SOLUTION INTRAMUSCULAR; INTRAVENOUS at 12:12

## 2017-12-14 RX ADMIN — HEPARIN SODIUM 5000 UNITS: 5000 INJECTION, SOLUTION INTRAVENOUS; SUBCUTANEOUS at 01:12

## 2017-12-14 RX ADMIN — GABAPENTIN 100 MG: 100 CAPSULE ORAL at 01:12

## 2017-12-14 RX ADMIN — ATORVASTATIN CALCIUM 20 MG: 20 TABLET, FILM COATED ORAL at 09:12

## 2017-12-14 RX ADMIN — IBUPROFEN 400 MG: 800 INJECTION INTRAVENOUS at 06:12

## 2017-12-14 RX ADMIN — DIPHENHYDRAMINE HYDROCHLORIDE 12.5 MG: 50 INJECTION, SOLUTION INTRAMUSCULAR; INTRAVENOUS at 11:12

## 2017-12-14 RX ADMIN — CLONIDINE HYDROCHLORIDE 0.1 MG: 0.1 TABLET ORAL at 09:12

## 2017-12-14 RX ADMIN — GABAPENTIN 100 MG: 100 CAPSULE ORAL at 10:12

## 2017-12-14 RX ADMIN — SPIRONOLACTONE 25 MG: 25 TABLET, FILM COATED ORAL at 09:12

## 2017-12-14 RX ADMIN — SODIUM CHLORIDE, SODIUM LACTATE, POTASSIUM CHLORIDE, AND CALCIUM CHLORIDE 500 ML: .6; .31; .03; .02 INJECTION, SOLUTION INTRAVENOUS at 09:12

## 2017-12-14 RX ADMIN — IBUPROFEN 400 MG: 800 INJECTION INTRAVENOUS at 12:12

## 2017-12-14 RX ADMIN — OXYCODONE HYDROCHLORIDE 5 MG: 5 TABLET ORAL at 01:12

## 2017-12-14 RX ADMIN — CLONIDINE HYDROCHLORIDE 0.1 MG: 0.1 TABLET ORAL at 08:12

## 2017-12-14 RX ADMIN — DULOXETINE 60 MG: 60 CAPSULE, DELAYED RELEASE ORAL at 09:12

## 2017-12-14 RX ADMIN — GABAPENTIN 100 MG: 100 CAPSULE ORAL at 06:12

## 2017-12-14 RX ADMIN — DEXTROSE MONOHYDRATE, SODIUM CHLORIDE, AND POTASSIUM CHLORIDE: 50; 4.5; 1.49 INJECTION, SOLUTION INTRAVENOUS at 08:12

## 2017-12-14 RX ADMIN — SIMETHICONE CHEW TAB 80 MG 80 MG: 80 TABLET ORAL at 08:12

## 2017-12-14 RX ADMIN — HEPARIN SODIUM 5000 UNITS: 5000 INJECTION, SOLUTION INTRAVENOUS; SUBCUTANEOUS at 10:12

## 2017-12-14 RX ADMIN — FAMOTIDINE 20 MG: 20 TABLET, FILM COATED ORAL at 09:12

## 2017-12-14 RX ADMIN — HYDROCHLOROTHIAZIDE 12.5 MG: 12.5 TABLET ORAL at 09:12

## 2017-12-14 RX ADMIN — FAMOTIDINE 20 MG: 20 TABLET, FILM COATED ORAL at 08:12

## 2017-12-14 NOTE — SUBJECTIVE & OBJECTIVE
Subjective:     Interval History:     NAEON. Tolerating solid food. Ostomy functioning no leakage issues. Pain controlled. Ambulating.    Post-Op Info:  Procedure(s) (LRB):  RESECTION-COLON-LOW ANTERIOR, loop ileostomy, Colonic J Pouch, Ileo Resection, Ileocolic Anastomosis (N/A)   3 Days Post-Op      Medications:  Continuous Infusions:   dextrose 5 % and 0.45 % NaCl with KCl 20 mEq 50 mL/hr at 12/13/17 2116    glucagon (human recombinant)       Scheduled Meds:   atorvastatin  20 mg Oral Daily    cloNIDine  0.1 mg Oral BID    DULoxetine  60 mg Oral Daily    famotidine  20 mg Oral BID    fluticasone  1 spray Each Nare Daily    gabapentin  100 mg Oral TID    heparin (porcine)  5,000 Units Subcutaneous Q8H    hydroCHLOROthiazide  12.5 mg Oral Daily    lactated ringers  500 mL Intravenous Once    spironolactone  25 mg Oral Daily     PRN Meds:   albuterol    calcium carbonate    dextrose 50%    dextrose 50%    dextrose 50%    diphenhydrAMINE    glucagon (human recombinant)    glucose    glucose    glucose    HYDROmorphone    metoclopramide HCl    naloxone    ondansetron    oxyCODONE    simethicone        Objective:     Vital Signs (Most Recent):  Temp: 98 °F (36.7 °C) (12/14/17 0719)  Pulse: 86 (12/14/17 0719)  Resp: 17 (12/14/17 0719)  BP: (!) 107/58 (12/14/17 0719)  SpO2: 95 % (12/14/17 0719) Vital Signs (24h Range):  Temp:  [98 °F (36.7 °C)-99.2 °F (37.3 °C)] 98 °F (36.7 °C)  Pulse:  [86-97] 86  Resp:  [16-18] 17  SpO2:  [95 %-98 %] 95 %  BP: (102-120)/(56-65) 107/58     Intake/Output - Last 3 Shifts       12/12 0700 - 12/13 0659 12/13 0700 - 12/14 0659 12/14 0700 - 12/15 0659    P.O. 920 720     I.V. (mL/kg) 2139.6 (22.6) 838.3 (8.8)     IV Piggyback 500      Total Intake(mL/kg) 3559.6 (37.5) 1558.3 (16.4)     Urine (mL/kg/hr) 2625 (1.2) 850 (0.4) 250 (0.8)    Emesis/NG output 0 (0)      Other 0 (0)      Stool 70 (0) 575 (0.3) 0 (0)    Blood 0 (0)      Total Output 2695 1425 250    Net  +864.6 +133.3 -250           Stool Occurrence 0 x 0 x 0 x    Emesis Occurrence 0 x            Physical Exam   Constitutional: She is oriented to person, place, and time. She appears well-developed and well-nourished. No distress.   HENT:   Head: Normocephalic and atraumatic.   Eyes: EOM are normal.   Neck: Normal range of motion.   Cardiovascular: Normal rate.    Pulmonary/Chest: Effort normal. No respiratory distress. She has no wheezes.   Abdominal: Soft. She exhibits no distension and no mass. There is tenderness (at incisions which are c/d/i). There is no rebound and no guarding. No hernia.   Ostomy mucosa pink and healthy, succus in bag   Neurological: She is alert and oriented to person, place, and time.   Skin: Skin is warm and dry. She is not diaphoretic.   Psychiatric: She has a normal mood and affect. Her behavior is normal. Thought content normal.     Significant Labs:  BMP (Last 3 Results):   Recent Labs  Lab 12/12/17  0405 12/13/17  0451 12/14/17  0427   * 115* 118*    137 133*   K 4.1 4.4 4.3    103 101   CO2 25 28 26   BUN 13 11 15   CREATININE 1.0 1.0 1.3   CALCIUM 8.4* 9.0 9.1   MG 1.9 2.0 1.8     CBC (Last 3 Results):   Recent Labs  Lab 12/12/17  0405 12/13/17  0451 12/14/17  0427   WBC 9.34 8.99 8.01   RBC 3.78* 3.42* 3.35*   HGB 10.3* 9.4* 9.0*   HCT 32.6* 29.8* 28.6*    244 263   MCV 86 87 85   MCH 27.2 27.5 26.9*   MCHC 31.6* 31.5* 31.5*

## 2017-12-14 NOTE — PROGRESS NOTES
Patient seen for follow up ileostomy education. Patient's  and daughter at bedside. Attentive and involved in care. Educated patient and family on pouch care: how often, removing pouch, cleaning peristomal skin, measuring/cutting/applying pouch, and emptying pouch. Educated on diet and hydration. Reviewed diet sheet with patient and family. Pouch change performed. Stoma pink slightly budded, pink, 30mm, liquid output noted. Peristomal skin intact. Abdomen distended. Scant serosanguinous drainage noted from midline abdominal incision. Patient may benefit from convexity pouch, coloplast convexity 75464 applied. Patient tolerated well. Answered patient's and family members' questions. Educational booklet and food chart left at bedside. Ostomy dept to follow.

## 2017-12-14 NOTE — PLAN OF CARE
Problem: Patient Care Overview  Goal: Plan of Care Review  Outcome: Ongoing (interventions implemented as appropriate)  POC reviewed with patient, who verbalized understanding. AAOx4.   Remains free of falls and injury.   VSS. Small ML with dermabond. Ileostomy in place, stoma intact, liquid dark green stool and gas. Tolerating diet.  No Nausea. No Pain. Episode of itching, relieved with 12.5 Benadryl.    IVF infusing. Voiding per BC.    Up with assist to BC.    TEDS SCDS in place.   No acute events. No distress noted. Call bell in reach. Eager for ostomy teaching  Will continue to monitor.

## 2017-12-14 NOTE — PLAN OF CARE
Problem: Patient Care Overview  Goal: Plan of Care Review  Outcome: Ongoing (interventions implemented as appropriate)  Plan of care reviewed with pt. Pt AAOX'S 4, vital signs stable. No complaints of pain. Pt ambulated throughout the day independently and remained free from falls. Colostomy and incision intact. Pt currently on room air and tolerating a regular low fiber diet well. No acute events at this time. Bed in low and locked position with call light in reach.  at bedside. WCTM

## 2017-12-14 NOTE — PROGRESS NOTES
[facility]  Colorectal Surgery  Progress Note    Patient Name: Coby Marshall  MRN: [id number]  Admission Date: 12/11/2017  Hospital Length of Stay: 3 days  Attending Physician: Suhas Manjarrez MD    Subjective:     Interval History:     NAEON. Tolerating solid food. Ostomy functioning no leakage issues. Pain controlled. Ambulating.    Post-Op Info:  Procedure(s) (LRB):  RESECTION-COLON-LOW ANTERIOR, loop ileostomy, Colonic J Pouch, Ileo Resection, Ileocolic Anastomosis (N/A)   3 Days Post-Op      Medications:  Continuous Infusions:   dextrose 5 % and 0.45 % NaCl with KCl 20 mEq 50 mL/hr at 12/13/17 2116    glucagon (human recombinant)       Scheduled Meds:   atorvastatin  20 mg Oral Daily    cloNIDine  0.1 mg Oral BID    DULoxetine  60 mg Oral Daily    famotidine  20 mg Oral BID    fluticasone  1 spray Each Nare Daily    gabapentin  100 mg Oral TID    heparin (porcine)  5,000 Units Subcutaneous Q8H    hydroCHLOROthiazide  12.5 mg Oral Daily    lactated ringers  500 mL Intravenous Once    spironolactone  25 mg Oral Daily     PRN Meds:   albuterol    calcium carbonate    dextrose 50%    dextrose 50%    dextrose 50%    diphenhydrAMINE    glucagon (human recombinant)    glucose    glucose    glucose    HYDROmorphone    metoclopramide HCl    naloxone    ondansetron    oxyCODONE    simethicone        Objective:     Vital Signs (Most Recent):  Temp: 98 °F (36.7 °C) (12/14/17 0719)  Pulse: 86 (12/14/17 0719)  Resp: 17 (12/14/17 0719)  BP: (!) 107/58 (12/14/17 0719)  SpO2: 95 % (12/14/17 0719) Vital Signs (24h Range):  Temp:  [98 °F (36.7 °C)-99.2 °F (37.3 °C)] 98 °F (36.7 °C)  Pulse:  [86-97] 86  Resp:  [16-18] 17  SpO2:  [95 %-98 %] 95 %  BP: (102-120)/(56-65) 107/58     Intake/Output - Last 3 Shifts       12/12 0700 - 12/13 0659 12/13 0700 - 12/14 0659 12/14 0700 - 12/15 0659    P.O. 920 720     I.V. (mL/kg) 2139.6 (22.6) 838.3 (8.8)     IV Piggyback 500      Total  Intake(mL/kg) 3559.6 (37.5) 1558.3 (16.4)     Urine (mL/kg/hr) 2625 (1.2) 850 (0.4) 250 (0.8)    Emesis/NG output 0 (0)      Other 0 (0)      Stool 70 (0) 575 (0.3) 0 (0)    Blood 0 (0)      Total Output 2695 1425 250    Net +864.6 +133.3 -250           Stool Occurrence 0 x 0 x 0 x    Emesis Occurrence 0 x            Physical Exam   Constitutional: She is oriented to person, place, and time. She appears well-developed and well-nourished. No distress.   HENT:   Head: Normocephalic and atraumatic.   Eyes: EOM are normal.   Neck: Normal range of motion.   Cardiovascular: Normal rate.    Pulmonary/Chest: Effort normal. No respiratory distress. She has no wheezes.   Abdominal: Soft. She exhibits no distension and no mass. There is tenderness (at incisions which are c/d/i). There is no rebound and no guarding. No hernia.   Ostomy mucosa pink and healthy, succus in bag   Neurological: She is alert and oriented to person, place, and time.   Skin: Skin is warm and dry. She is not diaphoretic.   Psychiatric: She has a normal mood and affect. Her behavior is normal. Thought content normal.     Significant Labs:  BMP (Last 3 Results):   Recent Labs  Lab 12/12/17 0405 12/13/17 0451 12/14/17 0427   * 115* 118*    137 133*   K 4.1 4.4 4.3    103 101   CO2 25 28 26   BUN 13 11 15   CREATININE 1.0 1.0 1.3   CALCIUM 8.4* 9.0 9.1   MG 1.9 2.0 1.8     CBC (Last 3 Results):   Recent Labs  Lab 12/12/17  0405 12/13/17  0451 12/14/17  0427   WBC 9.34 8.99 8.01   RBC 3.78* 3.42* 3.35*   HGB 10.3* 9.4* 9.0*   HCT 32.6* 29.8* 28.6*    244 263   MCV 86 87 85   MCH 27.2 27.5 26.9*   MCHC 31.6* 31.5* 31.5*     Assessment/Plan:     * Rectal cancer    62yoF 3 Days Post-Op LAR+DLI, colonic J, SBR for ileal mass.    -Continue LRD. Keep MIVF for now.  -Cr bumped to 1.3, urine a little dark per patient. 500cc bolus this AM. DC ibuprofen and switch lvx to heparin.  -Anticipate discharge tomorrow.                  Dimitrios  MD Lily  Colorectal Surgery  [facility]

## 2017-12-14 NOTE — ASSESSMENT & PLAN NOTE
62yoF 3 Days Post-Op LAR+DLI, colonic J, SBR for ileal mass.    -Continue LRD. Keep MIVF for now.  -Cr bumped to 1.3, urine a little dark per patient. 500cc bolus this AM. DC ibuprofen and switch lvx to heparin.  -Anticipate discharge tomorrow.

## 2017-12-14 NOTE — PLAN OF CARE
[facility]    HOME HEALTH ORDERS  FACE TO FACE ENCOUNTER    Patient Name: Coby Marshall  YOB: 1955    PCP: Randal Casillas MD   PCP Address: [address] [place] [address] [facility] [place] [address]  PCP Phone Number: [phone]  PCP Fax: [phone]    Encounter Date: 12/14/2017    Admit to Home Health    Diagnoses:  Active Hospital Problems    Diagnosis  POA    *Rectal cancer [C20]  Yes      Resolved Hospital Problems    Diagnosis Date Resolved POA   No resolved problems to display.       Future Appointments  Date Time Provider Department Center   12/27/2017 10:15 AM NELIDA Andrews [facility] [place]O [place]   12/27/2017 11:00 AM Suhas Manjarrez MD [facility] [place]ON [place]           I have seen and examined this patient face to face today. My clinical findings that support the need for the home health skilled services and home bound status are the following:  Weakness/numbness causing balance and gait disturbance due to Surgery making it taxing to leave home.    Allergies:  Review of patient's allergies indicates:   Allergen Reactions    Lisinopril-hydrochlorothiazide Swelling     Mouth Swelling    Sulfa (sulfonamide antibiotics) Swelling     Swelling of lips       Diet: regular diet    Activities: no lifting, Driving, or Strenuous exercise for 6 weeks    Nursing:   SN to complete comprehensive assessment including routine vital signs. Instruct on disease process and s/s of complications to report to MD. Review/verify medication list sent home with the patient at time of discharge  and instruct patient/caregiver as needed. Frequency may be adjusted depending on start of care date.    Notify MD if SBP > 160 or < 90; DBP > 90 or < 50; HR > 120 or < 50; Temp > 101; Other:         CONSULTS:    Physical Therapy to evaluate and treat. Evaluate for home safety and equipment needs; Establish/upgrade home exercise program. Perform / instruct on therapeutic exercises, gait training,  transfer training, and Range of Motion.  Occupational Therapy to evaluate and treat. Evaluate home environment for safety and equipment needs. Perform/Instruct on transfers, ADL training, ROM, and therapeutic exercises.   to evaluate for community resources/long-range planning.    MISCELLANEOUS CARE:  Colostomy Care:  Instruct patient/caregiver to empty bag when full and PRN., Change and clean site every 48 hours and Monitor skin integrity.    WOUND CARE ORDERS  yes:  Surgical Wound:  Location: abd    Consult ET nurse        Apply the following to wound:   Cleanse wound daily withr wound cleanse and keep clean and dry      Medications: Review discharge medications with patient and family and provide education.      Current Discharge Medication List      CONTINUE these medications which have NOT CHANGED    Details   atorvastatin (LIPITOR) 20 MG tablet Take 20 mg by mouth once daily.      cloNIDine (CATAPRES) 0.1 MG tablet Take 0.1 mg by mouth 2 (two) times daily.      diclofenac (VOLTAREN) 75 MG EC tablet Take 75 mg by mouth 2 (two) times daily.      docusate sodium (COLACE) 100 MG capsule Take 100 mg by mouth 2 (two) times daily.      DULoxetine (CYMBALTA) 60 MG capsule Take 60 mg by mouth once daily.      famotidine (PEPCID) 20 MG tablet Take 20 mg by mouth 2 (two) times daily.      hydroCHLOROthiazide (MICROZIDE) 12.5 mg capsule Take 12.5 mg by mouth once daily.      HYDROmorphone (DILAUDID) 4 MG tablet Take 4 mg by mouth every 4 (four) hours as needed for Pain.      metroNIDAZOLE (FLAGYL) 250 MG tablet Take tablet at 1300, 1400, and 2300 day prior to surgery  Qty: 3 tablet, Refills: 0      spironolactone (ALDACTONE) 25 MG tablet Take 25 mg by mouth once daily.      ALBUTEROL INHL Inhale into the lungs.      aspirin (ECOTRIN) 81 MG EC tablet Take 81 mg by mouth once daily.      fluticasone (FLONASE) 50 mcg/actuation nasal spray 1 spray by Each Nare route once daily.      hydrOXYzine HCl (ATARAX) 25 MG  tablet Take 25 mg by mouth 3 (three) times daily.      omeprazole (PRILOSEC) 20 MG capsule Take 20 mg by mouth once daily.      silver sulfADIAZINE 1% (SILVADENE) 1 % cream Apply topically 2 (two) times daily.         STOP taking these medications       INV lifeseal kit Comments:   Reason for Stopping:               I certify that this patient is confined to her home and needs intermittent skilled nursing care, physical therapy and occupational therapy.

## 2017-12-14 NOTE — PLAN OF CARE
SW sent HH orders to [facility].  Pt expected to d/c HH on 12/15/2017.                Angeles Ellis, LMSW [facility]  [id number]

## 2017-12-15 VITALS
OXYGEN SATURATION: 100 % | HEIGHT: 66 IN | SYSTOLIC BLOOD PRESSURE: 104 MMHG | RESPIRATION RATE: 17 BRPM | DIASTOLIC BLOOD PRESSURE: 59 MMHG | TEMPERATURE: 98 F | WEIGHT: 209 LBS | HEART RATE: 80 BPM | BODY MASS INDEX: 33.59 KG/M2

## 2017-12-15 LAB
ANION GAP SERPL CALC-SCNC: 7 MMOL/L
BASOPHILS # BLD AUTO: 0.03 K/UL
BASOPHILS NFR BLD: 0.4 %
BUN SERPL-MCNC: 17 MG/DL
CALCIUM SERPL-MCNC: 9.5 MG/DL
CHLORIDE SERPL-SCNC: 102 MMOL/L
CO2 SERPL-SCNC: 27 MMOL/L
CREAT SERPL-MCNC: 1.4 MG/DL
DIFFERENTIAL METHOD: ABNORMAL
EOSINOPHIL # BLD AUTO: 0.4 K/UL
EOSINOPHIL NFR BLD: 4.3 %
ERYTHROCYTE [DISTWIDTH] IN BLOOD BY AUTOMATED COUNT: 16.1 %
EST. GFR  (AFRICAN AMERICAN): 45.8 ML/MIN/1.73 M^2
EST. GFR  (NON AFRICAN AMERICAN): 39.7 ML/MIN/1.73 M^2
GLUCOSE SERPL-MCNC: 105 MG/DL
HCT VFR BLD AUTO: 30.2 %
HGB BLD-MCNC: 9.4 G/DL
IMM GRANULOCYTES # BLD AUTO: 0.04 K/UL
IMM GRANULOCYTES NFR BLD AUTO: 0.5 %
LYMPHOCYTES # BLD AUTO: 1.1 K/UL
LYMPHOCYTES NFR BLD: 13.3 %
MAGNESIUM SERPL-MCNC: 1.8 MG/DL
MCH RBC QN AUTO: 27.2 PG
MCHC RBC AUTO-ENTMCNC: 31.1 G/DL
MCV RBC AUTO: 87 FL
MONOCYTES # BLD AUTO: 0.8 K/UL
MONOCYTES NFR BLD: 9.6 %
NEUTROPHILS # BLD AUTO: 5.9 K/UL
NEUTROPHILS NFR BLD: 71.9 %
NRBC BLD-RTO: 0 /100 WBC
PHOSPHATE SERPL-MCNC: 4.1 MG/DL
PLATELET # BLD AUTO: 290 K/UL
PMV BLD AUTO: 8.7 FL
POTASSIUM SERPL-SCNC: 4.5 MMOL/L
RBC # BLD AUTO: 3.46 M/UL
SODIUM SERPL-SCNC: 136 MMOL/L
WBC # BLD AUTO: 8.14 K/UL

## 2017-12-15 PROCEDURE — 85025 COMPLETE CBC W/AUTO DIFF WBC: CPT

## 2017-12-15 PROCEDURE — 63600175 PHARM REV CODE 636 W HCPCS: Performed by: COLON & RECTAL SURGERY

## 2017-12-15 PROCEDURE — 84100 ASSAY OF PHOSPHORUS: CPT

## 2017-12-15 PROCEDURE — 83735 ASSAY OF MAGNESIUM: CPT

## 2017-12-15 PROCEDURE — 36415 COLL VENOUS BLD VENIPUNCTURE: CPT

## 2017-12-15 PROCEDURE — 80048 BASIC METABOLIC PNL TOTAL CA: CPT

## 2017-12-15 PROCEDURE — 25000003 PHARM REV CODE 250: Performed by: COLON & RECTAL SURGERY

## 2017-12-15 RX ORDER — OXYCODONE HYDROCHLORIDE 5 MG/1
5 TABLET ORAL EVERY 4 HOURS PRN
Qty: 30 TABLET | Refills: 0 | Status: ON HOLD | OUTPATIENT
Start: 2017-12-15 | End: 2018-01-25 | Stop reason: HOSPADM

## 2017-12-15 RX ADMIN — DULOXETINE 60 MG: 60 CAPSULE, DELAYED RELEASE ORAL at 08:12

## 2017-12-15 RX ADMIN — ATORVASTATIN CALCIUM 20 MG: 20 TABLET, FILM COATED ORAL at 08:12

## 2017-12-15 RX ADMIN — FAMOTIDINE 20 MG: 20 TABLET, FILM COATED ORAL at 08:12

## 2017-12-15 RX ADMIN — HEPARIN SODIUM 5000 UNITS: 5000 INJECTION, SOLUTION INTRAVENOUS; SUBCUTANEOUS at 06:12

## 2017-12-15 RX ADMIN — SPIRONOLACTONE 25 MG: 25 TABLET, FILM COATED ORAL at 08:12

## 2017-12-15 RX ADMIN — GABAPENTIN 100 MG: 100 CAPSULE ORAL at 06:12

## 2017-12-15 RX ADMIN — FLUTICASONE PROPIONATE 1 SPRAY: 50 SPRAY, METERED NASAL at 08:12

## 2017-12-15 RX ADMIN — HYDROCHLOROTHIAZIDE 12.5 MG: 12.5 TABLET ORAL at 08:12

## 2017-12-15 RX ADMIN — CLONIDINE HYDROCHLORIDE 0.1 MG: 0.1 TABLET ORAL at 08:12

## 2017-12-15 NOTE — PROGRESS NOTES
[facility]  Colorectal Surgery  Progress Note    Patient Name: oCby Marshall  MRN: [id number]  Admission Date: 12/11/2017  Hospital Length of Stay: 4 days  Attending Physician: Suhas Manjarrez MD    Subjective:     Interval History:     NAEON. Tolerating LRD. Denies nausea or vomiting. No issues with her stoma. Ambulating. Using IS. Pain controlled.    Post-Op Info:  Procedure(s) (LRB):  RESECTION-COLON-LOW ANTERIOR, loop ileostomy, Colonic J Pouch, Ileo Resection, Ileocolic Anastomosis (N/A)   4 Days Post-Op      Medications:  Continuous Infusions:   dextrose 5 % and 0.45 % NaCl with KCl 20 mEq 50 mL/hr at 12/14/17 2003    glucagon (human recombinant)       Scheduled Meds:   atorvastatin  20 mg Oral Daily    cloNIDine  0.1 mg Oral BID    DULoxetine  60 mg Oral Daily    famotidine  20 mg Oral BID    fluticasone  1 spray Each Nare Daily    heparin (porcine)  5,000 Units Subcutaneous Q8H    hydroCHLOROthiazide  12.5 mg Oral Daily    lactated ringers  500 mL Intravenous Once    spironolactone  25 mg Oral Daily     PRN Meds:   albuterol    calcium carbonate    dextrose 50%    dextrose 50%    dextrose 50%    diphenhydrAMINE    glucagon (human recombinant)    glucose    glucose    glucose    HYDROmorphone    metoclopramide HCl    naloxone    ondansetron    oxyCODONE    simethicone        Objective:     Vital Signs (Most Recent):  Temp: 98.4 °F (36.9 °C) (12/15/17 0701)  Pulse: 80 (12/15/17 0701)  Resp: 17 (12/15/17 0701)  BP: (!) 104/59 (12/15/17 0701)  SpO2: 100 % (12/15/17 0701) Vital Signs (24h Range):  Temp:  [98.2 °F (36.8 °C)-98.5 °F (36.9 °C)] 98.4 °F (36.9 °C)  Pulse:  [75-90] 80  Resp:  [17-18] 17  SpO2:  [96 %-100 %] 100 %  BP: (102-119)/(55-63) 104/59     Intake/Output - Last 3 Shifts       12/13 0700 - 12/14 0659 12/14 0700 - 12/15 0659 12/15 0700 - 12/16 0659    P.O. 720 480     I.V. (mL/kg) 838.3 (8.8) 1611.7 (17)     IV Piggyback       Total Intake(mL/kg) 1558.3  (16.4) 2091.7 (22.1)     Urine (mL/kg/hr) 850 (0.4) 1465 (0.6) 300 (1.1)    Emesis/NG output       Other       Stool 575 (0.3) 350 (0.2) 0 (0)    Blood       Total Output 1425 1815 300    Net +133.3 +276.7 -300           Stool Occurrence 0 x 0 x 0 x          Physical Exam   Constitutional: She is oriented to person, place, and time. She appears well-developed and well-nourished. No distress.   HENT:   Head: Normocephalic and atraumatic.   Eyes: EOM are normal.   Neck: Normal range of motion.   Cardiovascular: Normal rate.    Pulmonary/Chest: Effort normal. No respiratory distress. She has no wheezes.   Abdominal: Soft. She exhibits no distension and no mass. There is tenderness (at incisions which are c/d/i). There is no rebound and no guarding. No hernia.   Ostomy mucosa pink and healthy, succus in bag   Neurological: She is alert and oriented to person, place, and time.   Skin: Skin is warm and dry. She is not diaphoretic.   Psychiatric: She has a normal mood and affect. Her behavior is normal. Thought content normal.     Significant Labs:  BMP (Last 3 Results):   Recent Labs  Lab 12/13/17  0451 12/14/17  0427 12/15/17  0558   * 118* 105    133* 136   K 4.4 4.3 4.5    101 102   CO2 28 26 27   BUN 11 15 17   CREATININE 1.0 1.3 1.4   CALCIUM 9.0 9.1 9.5   MG 2.0 1.8 1.8     CBC (Last 3 Results):   Recent Labs  Lab 12/13/17 0451 12/14/17 0427 12/15/17  0558   WBC 8.99 8.01 8.14   RBC 3.42* 3.35* 3.46*   HGB 9.4* 9.0* 9.4*   HCT 29.8* 28.6* 30.2*    263 290   MCV 87 85 87   MCH 27.5 26.9* 27.2   MCHC 31.5* 31.5* 31.1*     Assessment/Plan:     * Rectal cancer    62yoF 4 Days Post-Op LAR+DLI, colonic J, SBR for ileal mass.    -Continue LRD, encouraged PO hydration. HLIV.  -Cr 1.4 from 1.3 from 1.0. Has good UOP. Will administer another 500cc bolus.  -F/U pathology.  -Patient and her  feel comfortable taking care of stoma.  -Anticipate discharge today.                    Dimitrios Bautista,  MD  Colorectal Surgery  [facility]

## 2017-12-15 NOTE — HPI
The patient is a 62-year-old female who was found to   have rectal cancer about 45 cm from the anal verge.  She underwent neoadjuvant   chemoradiotherapy, which finished in October.  This resulted in improvement and   size of nodes and thickening of the rectum on CT scan.  She was consented and   set up for a low anterior resection.

## 2017-12-15 NOTE — SUBJECTIVE & OBJECTIVE
Subjective:     Interval History:     NAEON. Tolerating LRD. Denies nausea or vomiting. No issues with her stoma. Ambulating. Using IS. Pain controlled.    Post-Op Info:  Procedure(s) (LRB):  RESECTION-COLON-LOW ANTERIOR, loop ileostomy, Colonic J Pouch, Ileo Resection, Ileocolic Anastomosis (N/A)   4 Days Post-Op      Medications:  Continuous Infusions:   dextrose 5 % and 0.45 % NaCl with KCl 20 mEq 50 mL/hr at 12/14/17 2003    glucagon (human recombinant)       Scheduled Meds:   atorvastatin  20 mg Oral Daily    cloNIDine  0.1 mg Oral BID    DULoxetine  60 mg Oral Daily    famotidine  20 mg Oral BID    fluticasone  1 spray Each Nare Daily    heparin (porcine)  5,000 Units Subcutaneous Q8H    hydroCHLOROthiazide  12.5 mg Oral Daily    lactated ringers  500 mL Intravenous Once    spironolactone  25 mg Oral Daily     PRN Meds:   albuterol    calcium carbonate    dextrose 50%    dextrose 50%    dextrose 50%    diphenhydrAMINE    glucagon (human recombinant)    glucose    glucose    glucose    HYDROmorphone    metoclopramide HCl    naloxone    ondansetron    oxyCODONE    simethicone        Objective:     Vital Signs (Most Recent):  Temp: 98.4 °F (36.9 °C) (12/15/17 0701)  Pulse: 80 (12/15/17 0701)  Resp: 17 (12/15/17 0701)  BP: (!) 104/59 (12/15/17 0701)  SpO2: 100 % (12/15/17 0701) Vital Signs (24h Range):  Temp:  [98.2 °F (36.8 °C)-98.5 °F (36.9 °C)] 98.4 °F (36.9 °C)  Pulse:  [75-90] 80  Resp:  [17-18] 17  SpO2:  [96 %-100 %] 100 %  BP: (102-119)/(55-63) 104/59     Intake/Output - Last 3 Shifts       12/13 0700 - 12/14 0659 12/14 0700 - 12/15 0659 12/15 0700 - 12/16 0659    P.O. 720 480     I.V. (mL/kg) 838.3 (8.8) 1611.7 (17)     IV Piggyback       Total Intake(mL/kg) 1558.3 (16.4) 2091.7 (22.1)     Urine (mL/kg/hr) 850 (0.4) 1465 (0.6) 300 (1.1)    Emesis/NG output       Other       Stool 575 (0.3) 350 (0.2) 0 (0)    Blood       Total Output 1425 1815 300    Net +133.3 +276.7 -300            Stool Occurrence 0 x 0 x 0 x          Physical Exam   Constitutional: She is oriented to person, place, and time. She appears well-developed and well-nourished. No distress.   HENT:   Head: Normocephalic and atraumatic.   Eyes: EOM are normal.   Neck: Normal range of motion.   Cardiovascular: Normal rate.    Pulmonary/Chest: Effort normal. No respiratory distress. She has no wheezes.   Abdominal: Soft. She exhibits no distension and no mass. There is tenderness (at incisions which are c/d/i). There is no rebound and no guarding. No hernia.   Ostomy mucosa pink and healthy, succus in bag   Neurological: She is alert and oriented to person, place, and time.   Skin: Skin is warm and dry. She is not diaphoretic.   Psychiatric: She has a normal mood and affect. Her behavior is normal. Thought content normal.     Significant Labs:  BMP (Last 3 Results):   Recent Labs  Lab 12/13/17  0451 12/14/17  0427 12/15/17  0558   * 118* 105    133* 136   K 4.4 4.3 4.5    101 102   CO2 28 26 27   BUN 11 15 17   CREATININE 1.0 1.3 1.4   CALCIUM 9.0 9.1 9.5   MG 2.0 1.8 1.8     CBC (Last 3 Results):   Recent Labs  Lab 12/13/17 0451 12/14/17 0427 12/15/17  0558   WBC 8.99 8.01 8.14   RBC 3.42* 3.35* 3.46*   HGB 9.4* 9.0* 9.4*   HCT 29.8* 28.6* 30.2*    263 290   MCV 87 85 87   MCH 27.5 26.9* 27.2   MCHC 31.5* 31.5* 31.1*

## 2017-12-15 NOTE — PLAN OF CARE
Future Appointments  Date Time Provider Department Center   12/27/2017 10:15 AM NELIDA Andrews [facility] [place] [facility]   12/27/2017 11:00 AM Suhas Manjarrez MD [facility] COLON [place] [facility]          12/15/17 1045   Final Note   Assessment Type Final Discharge Note   Discharge Disposition Home[facility]   Hospital Follow Up  Appt(s) scheduled? Yes   Discharge plans and expectations educations in teach back method with documentation complete? Yes

## 2017-12-15 NOTE — PROGRESS NOTES
Patient seen for follow up ileostomy education prior to discharge. Patient and family denied any questions or needs. Pouch intact with scant stool in pouch, patient recently emptied. Extra ostomy supplies provided, coloplast convexity [id number]. coloplast contacted for samples. Nursing to continue care.

## 2017-12-15 NOTE — PLAN OF CARE
EM sent message to [facility] via [employer] to inform them of pt d/c on today.              Angeles Ellis, LMSW [facility]  [id number]

## 2017-12-15 NOTE — HOSPITAL COURSE
Pt underwent above procedure, she had a normal post op course, once bowel function resumed and ostomy was functioanl diet was advanced.  On discharged day she is kali low fiber diet, is independent in care of ostomy, amb in gunter without diff, inc line heailng well, adequate pain control on oral meds, AF and VS stable, discharge home with home health and fu 2 weeks, instructed to call office for excessive output from ileostomy

## 2017-12-15 NOTE — ASSESSMENT & PLAN NOTE
62yoF 4 Days Post-Op LAR+DLI, colonic J, SBR for ileal mass.    -Continue LRD, encouraged PO hydration. HLIV.  -Cr 1.4 from 1.3 from 1.0. Has good UOP. Will administer another 500cc bolus.  -F/U pathology.  -Patient and her  feel comfortable taking care of stoma.  -Anticipate discharge today.

## 2017-12-15 NOTE — DISCHARGE SUMMARY
[facility]  Colorectal Surgery  Discharge Summary      Patient Name: Coby Marshall  MRN: [id number]  Admission Date: 12/11/2017  Hospital Length of Stay: 4 days  Discharge Date and Time: 12/15/2017 12:17 PM  Attending Physician: No att. providers found   Discharging Provider: Nataly An NP  Primary Care Provider: Randal Casillas MD     HPI:  The patient is a 62-year-old female who was found to   have rectal cancer about 45 cm from the anal verge.  She underwent neoadjuvant   chemoradiotherapy, which finished in October.  This resulted in improvement and   size of nodes and thickening of the rectum on CT scan.  She was consented and   set up for a low anterior resection.    Procedure(s) (LRB):  RESECTION-COLON-LOW ANTERIOR, loop ileostomy, Colonic J Pouch, Ileo Resection, Ileocolic Anastomosis (N/A)     Hospital Course:  Pt underwent above procedure, she had a normal post op course, once bowel function resumed and ostomy was functioanl diet was advanced.  On discharged day she is kali low fiber diet, is independent in care of ostomy, amb in gunter without diff, inc line heailng well, adequate pain control on oral meds, AF and VS stable, discharge home with home health and fu 2 weeks, instructed to call office for excessive output from ileostomy         Significant Diagnostic Studies: Labs:   BMP:   Recent Labs  Lab 12/14/17 0427 12/15/17  0558   * 105   * 136   K 4.3 4.5    102   CO2 26 27   BUN 15 17   CREATININE 1.3 1.4   CALCIUM 9.1 9.5   MG 1.8 1.8    and CBC   Recent Labs  Lab 12/14/17 0427 12/15/17  0558   WBC 8.01 8.14   HGB 9.0* 9.4*   HCT 28.6* 30.2*    290       Pending Diagnostic Studies:     None        Final Active Diagnoses:    Diagnosis Date Noted POA    PRINCIPAL PROBLEM:  Rectal cancer [C20] 11/15/2017 Yes      Problems Resolved During this Admission:    Diagnosis Date Noted Date Resolved POA      Discharged Condition: good    Disposition: Home  or Self Care    Follow Up:  Follow-up Information     Suhas Manjarrez MD In 2 weeks.    Specialty:  Colon and Rectal Surgery  Contact information:  [address]  [facility] [id number] [phone]             NELIDA Andrews In 2 weeks.    Specialties:  Colon and Rectal Surgery, Wound Care  Contact information:  [address]  [facility] [id number] [phone]                 Patient Instructions:     Diet general   Order Comments: No fresh fruit or fresh vegetables, no nuts, popcorn, grapes or raisins for 2 weeks low fiber diet     Lifting restrictions   Order Comments: No lifting greater then 5 pounds  May shower, no tub bath     Call MD for:  persistent nausea and vomiting or diarrhea     Call MD for:  severe uncontrolled pain     Call MD for:  redness, tenderness, or signs of infection (pain, swelling, redness, odor or green/yellow discharge around incision site)     Call MD for:  difficulty breathing or increased cough     Call MD for:  severe persistent headache     Call MD for:  worsening rash     Call MD for:  persistent dizziness, light-headedness, or visual disturbances     Call MD for:  increased confusion or weakness     Call MD for:   Order Comments: Call for temp above 101       Medications:  Reconciled Home Medications:   Discharge Medication List as of 12/15/2017  9:17 AM      START taking these medications    Details   oxyCODONE (ROXICODONE) 5 MG immediate release tablet Take 1 tablet (5 mg total) by mouth every 4 (four) hours as needed., Starting Fri 12/15/2017, Print         CONTINUE these medications which have NOT CHANGED    Details   atorvastatin (LIPITOR) 20 MG tablet Take 20 mg by mouth once daily., Historical Med      cloNIDine (CATAPRES) 0.1 MG tablet Take 0.1 mg by mouth 2 (two) times daily., Historical Med      diclofenac (VOLTAREN) 75 MG EC tablet Take 75 mg by mouth 2 (two) times daily., Historical Med      DULoxetine (CYMBALTA) 60 MG capsule Take 60 mg by mouth once  daily., Historical Med      famotidine (PEPCID) 20 MG tablet Take 20 mg by mouth 2 (two) times daily., Historical Med      hydroCHLOROthiazide (MICROZIDE) 12.5 mg capsule Take 12.5 mg by mouth once daily., Historical Med      spironolactone (ALDACTONE) 25 MG tablet Take 25 mg by mouth once daily., Historical Med      ALBUTEROL INHL Inhale into the lungs., Historical Med      aspirin (ECOTRIN) 81 MG EC tablet Take 81 mg by mouth once daily., Historical Med      fluticasone (FLONASE) 50 mcg/actuation nasal spray 1 spray by Each Nare route once daily., Historical Med      hydrOXYzine HCl (ATARAX) 25 MG tablet Take 25 mg by mouth 3 (three) times daily., Historical Med      omeprazole (PRILOSEC) 20 MG capsule Take 20 mg by mouth once daily., Historical Med      silver sulfADIAZINE 1% (SILVADENE) 1 % cream Apply topically 2 (two) times daily., Historical Med         STOP taking these medications       docusate sodium (COLACE) 100 MG capsule Comments:   Reason for Stopping:         HYDROmorphone (DILAUDID) 4 MG tablet Comments:   Reason for Stopping:         metroNIDAZOLE (FLAGYL) 250 MG tablet Comments:   Reason for Stopping:         INV lifeseal kit Comments:   Reason for Stopping:               Nataly An NP  Colorectal Surgery  [facility]

## 2017-12-19 NOTE — PROGRESS NOTES
Physical Therapy Discharge Summary    Name: Coby Marshall  MRN: [id number]   Principal Problem: Rectal cancer     Patient Discharged from acute Physical Therapy on 12/15/17.  Please refer to prior PT noted date on 12/13/17 for functional status.     Assessment:     Patient appropriate for care in another setting.    Objective:     GOALS:    Physical Therapy Goals        Problem: Physical Therapy Goal    Goal Priority Disciplines Outcome Goal Variances Interventions   Physical Therapy Goal     PT/OT, PT Ongoing (interventions implemented as appropriate)     Description:  PT goals until 12/18/17    1. Pt supine to sit with supervision-not met  2. Pt sit to supine with supervision-not met  3. Pt sit to stand with no AD with supervision-not met  4. Pt to perform gait 400ft with no AD with supervision.-not met                    No goals met due to pt seen for eval only prior to D/C  Reasons for Discontinuation of Therapy Services  Transfer to alternate level of care.      Plan:     Patient Discharged to: Home with Home Health Service.    Thuy Rosen, PT  12/19/2017

## 2017-12-26 NOTE — PHYSICIAN QUERY
PT Name: Coby Marshall  MR #: [id number]     Physician Query Form - Documentation Clarification      CDS/: Sheyla Dsouza RN  CCDS               Contact information: [email or web address]  This form is a permanent document in the medical record.     Query Date: December 26, 2017    By submitting this query, we are merely seeking further clarification of documentation. Please utilize your independent clinical judgment when addressing the question(s) below.    The Medical record reflects the following:    Supporting Clinical Findings Location in Medical Record    The patient is a 62-year-old female who was found to   have rectal cancer about 45 cm from the anal verge. She underwent neoadjuvant chemoradiotherapy, which finished in October. This resulted in improvement and size of nodes and thickening of the rectum on CT scan. She was consented and set up for a low anterior resection   Pt underwent above procedure, she had a normal post op course, once bowel function resumed and ostomy was functioanl diet was advanced.     62 y.o. female w/ a significant PMHx Asthma (well controlled), HTN, HLD, GERD who was recently diagnosed with rectal cancer   Discharge Summary                           Anesthesia note 12/11    BUN = 11, creat = 1.0, GFR 59.7   BUN = 15, creat = 1.3, GFR = 43.5   BUN = 17, creat = 1.4, GFR = 39.7  Labs 12/13   Labs 12/13   Labs 12/15                                                                         Doctor, please specify diagnosis or diagnoses associated with above clinical findings.    Please clarify the clinical significance, if any, of the above indicators:    Provider Use Only      [  ]  Acute kidney injury/acute renal failure      [x  ]  Acute renal insufficiency      x ]  Clinically insignificant      [  ]  Clinically undetermined

## 2017-12-26 NOTE — PHYSICIAN QUERY
"PT Name: Coby Marshall  MR #: [id number]    Physician Query Form - Hematology Clarification      CDS/: Sheyla Dsouza RN  CCDS               Contact information: [email or web address]    This form is a permanent document in the medical record.      Query Date: December 26, 2017    By submitting this query, we are merely seeking further clarification of documentation. Please utilize your independent clinical judgment when addressing the question(s) below.    The Medical record contains the following:   Indicators  Supporting Clinical Findings Location in Medical Record    "Anemia" documented     X H & H =  H/H = 10.3/32.6   H/H = 9.4/29.8   H/H = 9.0/28.6  Labs 12/12   Labs 12/13   Labs 12/14    BP =                     HR=      "GI bleeding" documented     X Acute bleeding (Non GI site)  EBL = 400 mL  OP note 12/11    Transfusion(s)     X Treatment:  Patient with rectal cancer. Completed chemo XRT 4 weeks ago   H&P   X Other:   POSTOPERATIVE DIAGNOSES:   1. Rectal cancer.   2. Terminal ileal mass  PROCEDURES PERFORMED:  Low anterior resection with colonic J-pouch and diverting   loop ileostomy, takedown of splenic flexure, excision of terminal ileal mass.   OP note 12/11     Provider, please specify diagnosis or diagnoses associated with above clinical findings.    [x  ] Acute blood loss anemia expected post-operatively    [ x ] Anemia of chronic disease ( Specify chronic disease)      [  ] Neoplastic disease       [  ] Due to Chemotherapy      [  ] Other (Specify) _______________________________     [  ] Clinically Undetermined     [  ] Other Hematological Diagnosis (please specify): _________________________________    [  ] Clinically Undetermined       Please document in your progress notes daily for the duration of treatment, until resolved, and include in your discharge summary.                                                                                                      "

## 2017-12-27 ENCOUNTER — OFFICE VISIT (OUTPATIENT)
Dept: WOUND CARE | Facility: CLINIC | Age: 62
End: 2017-12-27
Payer: COMMERCIAL

## 2017-12-27 ENCOUNTER — TELEPHONE (OUTPATIENT)
Dept: SURGERY | Facility: CLINIC | Age: 62
End: 2017-12-27

## 2017-12-27 ENCOUNTER — OFFICE VISIT (OUTPATIENT)
Dept: SURGERY | Facility: CLINIC | Age: 62
End: 2017-12-27
Payer: COMMERCIAL

## 2017-12-27 VITALS
WEIGHT: 207.25 LBS | HEIGHT: 66 IN | SYSTOLIC BLOOD PRESSURE: 142 MMHG | DIASTOLIC BLOOD PRESSURE: 64 MMHG | BODY MASS INDEX: 33.31 KG/M2 | HEART RATE: 89 BPM

## 2017-12-27 DIAGNOSIS — D3A.026 CARCINOID TUMOR OF RECTUM: ICD-10-CM

## 2017-12-27 DIAGNOSIS — D3A.012 CARCINOID TUMOR OF ILEUM: ICD-10-CM

## 2017-12-27 DIAGNOSIS — Z86.012 HISTORY OF BENIGN CARCINOID TUMOR OF GASTROINTESTINAL TRACT: ICD-10-CM

## 2017-12-27 DIAGNOSIS — Z43.2 ATTENTION TO ILEOSTOMY: Primary | ICD-10-CM

## 2017-12-27 DIAGNOSIS — K94.19 IRRITANT CONTACT DERMATITIS DUE TO ILEOSTOMY: ICD-10-CM

## 2017-12-27 DIAGNOSIS — Z93.2 ILEOSTOMY IN PLACE: Primary | ICD-10-CM

## 2017-12-27 DIAGNOSIS — L24.9 IRRITANT CONTACT DERMATITIS DUE TO ILEOSTOMY: ICD-10-CM

## 2017-12-27 PROBLEM — C20 RECTAL CANCER: Status: RESOLVED | Noted: 2017-11-15 | Resolved: 2017-12-27

## 2017-12-27 PROCEDURE — 99999 PR PBB SHADOW E&M-EST. PATIENT-LVL IV: CPT | Mod: PBBFAC,,, | Performed by: COLON & RECTAL SURGERY

## 2017-12-27 PROCEDURE — 99024 POSTOP FOLLOW-UP VISIT: CPT | Mod: S$GLB,,, | Performed by: CLINICAL NURSE SPECIALIST

## 2017-12-27 PROCEDURE — 99024 POSTOP FOLLOW-UP VISIT: CPT | Mod: S$GLB,,, | Performed by: COLON & RECTAL SURGERY

## 2017-12-27 PROCEDURE — 99999 PR PBB SHADOW E&M-EST. PATIENT-LVL I: CPT | Mod: PBBFAC,,, | Performed by: CLINICAL NURSE SPECIALIST

## 2017-12-27 RX ORDER — SODIUM CHLORIDE 9 MG/ML
INJECTION, SOLUTION INTRAVENOUS CONTINUOUS
Status: CANCELLED | OUTPATIENT
Start: 2017-12-27

## 2017-12-27 RX ORDER — MUPIROCIN 20 MG/G
OINTMENT TOPICAL
Status: CANCELLED | OUTPATIENT
Start: 2017-12-27

## 2017-12-27 RX ORDER — METRONIDAZOLE 500 MG/100ML
500 INJECTION, SOLUTION INTRAVENOUS
Status: CANCELLED | OUTPATIENT
Start: 2017-12-27

## 2017-12-27 NOTE — PROGRESS NOTES
Patient ID:  Coby Marshall is a 62 y.o. female     Chief Complaint:   Chief Complaint   Patient presents with    Follow-up    Ileostomy    Hx carcinoid        HPI: 12/11/17 LAR  and ileal resection with loop ileostomy for 2 carcinoids.  (neg nodes)     Patient with rectal cancer. Completed chemoXRT      4 weeks ago     Procto Nov 11, 2017 mass at 4-5 cm from verge  Ct scan  (11/3/17) with improvement (nodes reduced in size) Thickening rectal wall 7 cm from anal verge    ROS:        Constitutional: No fever, chills, activity or appetite change.      HENT: No hearing loss, facial swelling, neck pain or stiffness.       Eyes: No discharge, itching and visual disturbance.      Respiratory: No apnea, cough, choking or shortness of breath.       Cardiovascular: No leg swelling or chest pain      Gastrointestinal: No abdominal distention or change in bowel habbits     Genitourinary: No dysuria, frequency or flank pain.      Musculoskeletal: No arthralgias or gait problem.      Neurological: No dizziness, seizures or weakness.      Hematological: No adenopathy.      Psychiatric/Behavioral: No hallucinations or behavioral problems.       PE:    APPEARANCE: Well nourished, well developed, in no acute distress.   CHEST: Lungs clear. Normal respiratory effort.  CARDIOVASCULAR: Normal rhythm. No edema.  ABDOMEN: Soft. Healed incision with loop ileostomy in RLQ. Seen wit WOCN    Rectum:  Normal skin,    Musculoskeletal: Symmetric, normal range of motion and strength.   Neurological: Alert and oriented to person, place, and time. Normal reflexes.   Skin: Skin is warm and dry.   Psychiatric: Normal mood and affect. Behavior is normal and appropriate.     Impression: Hx carcinoid of ileum and rectum  PLAN:  Pouchagram Jan 22 and if OK ileostomy c;losure Jan 23

## 2017-12-27 NOTE — PROGRESS NOTES
"This patient is known to me and is here in clinic today for first post op evaluation related to ileostomy. Surgery done 12/11 by Dr. Manjarrez. The patient reports no  problems with  new ostomy. The patient is receiving home health care with [facility] once a week.   Pain level today is reported as  0.    The ileostomy is 1"oval sheeba low budded stoma.  The patient is currently  wearing a 1piece pouching system by  [employer] ([id number]).   Average wear time is 3 days.   Peristomal skin is slightly eroded at base from cutting round and stoma is oval    There is no  mucocutaneous separation.  Pt is coping well with the new ostomy.  SUPPLIES/DME: none needed as closure set for  1/23 if all is healed    Pouching concerns include:just need to cut oval         SPECIAL NEEDS:  Pt counseled on skin care, nutrition, hydration   I spent greater than 50% of this 25 minute visit in face to face counseling.  "

## 2018-01-09 ENCOUNTER — TELEPHONE (OUTPATIENT)
Dept: SURGERY | Facility: CLINIC | Age: 63
End: 2018-01-09

## 2018-01-09 NOTE — TELEPHONE ENCOUNTER
----- Message from Ame Puente MA sent at 1/9/2018 11:07 AM CST -----  Contact: Home: [phone] Mobile: [phone]   Asking if arrangements can be made for her family to stay at the [facility] the week of 1/22.    Pt is seeing Dr Manjarerz on 1/22 then having surgery on 1/23 and will be in the hospital a few days.         Home: [phone] Mobile: [phone]

## 2018-01-09 NOTE — TELEPHONE ENCOUNTER
Spoke with pt and informed her that I faxed request form to [facility] and gave her their number to contact for status of request.

## 2018-01-22 ENCOUNTER — HOSPITAL ENCOUNTER (OUTPATIENT)
Dept: RADIOLOGY | Facility: HOSPITAL | Age: 63
Discharge: HOME | DRG: 331 | End: 2018-01-22
Attending: COLON & RECTAL SURGERY
Payer: COMMERCIAL

## 2018-01-22 DIAGNOSIS — Z93.2 ILEOSTOMY IN PLACE: ICD-10-CM

## 2018-01-22 DIAGNOSIS — Z86.012 HISTORY OF BENIGN CARCINOID TUMOR OF GASTROINTESTINAL TRACT: ICD-10-CM

## 2018-01-22 PROCEDURE — 25000003 PHARM REV CODE 250: Performed by: COLON & RECTAL SURGERY

## 2018-01-22 PROCEDURE — 74270 X-RAY XM COLON 1CNTRST STD: CPT | Mod: TC

## 2018-01-22 PROCEDURE — 74270 X-RAY XM COLON 1CNTRST STD: CPT | Mod: 26,,, | Performed by: RADIOLOGY

## 2018-01-22 PROCEDURE — 25500020 PHARM REV CODE 255: Performed by: COLON & RECTAL SURGERY

## 2018-01-22 RX ORDER — LIDOCAINE HYDROCHLORIDE 20 MG/ML
JELLY TOPICAL ONCE
Status: COMPLETED | OUTPATIENT
Start: 2018-01-22 | End: 2018-01-22

## 2018-01-22 RX ORDER — CHLORHEXIDINE/GLYCERIN/HE-CELL
JELLY (GRAM) TOPICAL ONCE
Status: COMPLETED | OUTPATIENT
Start: 2018-01-22 | End: 2018-01-22

## 2018-01-22 RX ADMIN — LIDOCAINE HYDROCHLORIDE: 20 JELLY TOPICAL at 09:01

## 2018-01-22 RX ADMIN — DIATRIZOATE MEGLUMINE AND DIATRIZOATE SODIUM 240 ML: 600; 100 SOLUTION ORAL; RECTAL at 09:01

## 2018-01-22 RX ADMIN — Medication: at 09:01

## 2018-01-23 ENCOUNTER — HOSPITAL ENCOUNTER (INPATIENT)
Facility: HOSPITAL | Age: 63
LOS: 2 days | Discharge: HOME | DRG: 331 | End: 2018-01-25
Attending: COLON & RECTAL SURGERY | Admitting: COLON & RECTAL SURGERY
Payer: COMMERCIAL

## 2018-01-23 ENCOUNTER — ANESTHESIA (OUTPATIENT)
Dept: SURGERY | Facility: HOSPITAL | Age: 63
DRG: 331 | End: 2018-01-23
Payer: COMMERCIAL

## 2018-01-23 ENCOUNTER — ANESTHESIA EVENT (OUTPATIENT)
Dept: SURGERY | Facility: HOSPITAL | Age: 63
DRG: 331 | End: 2018-01-23
Payer: COMMERCIAL

## 2018-01-23 DIAGNOSIS — Z86.012 HISTORY OF BENIGN CARCINOID TUMOR OF GASTROINTESTINAL TRACT: ICD-10-CM

## 2018-01-23 DIAGNOSIS — Z93.2 ILEOSTOMY IN PLACE: ICD-10-CM

## 2018-01-23 DIAGNOSIS — D3A.026 CARCINOID TUMOR OF RECTUM: Primary | ICD-10-CM

## 2018-01-23 PROCEDURE — 63600175 PHARM REV CODE 636 W HCPCS: Performed by: SURGERY

## 2018-01-23 PROCEDURE — 25000003 PHARM REV CODE 250: Performed by: COLON & RECTAL SURGERY

## 2018-01-23 PROCEDURE — 44625 REPAIR BOWEL OPENING: CPT | Mod: 58,,, | Performed by: COLON & RECTAL SURGERY

## 2018-01-23 PROCEDURE — 37000009 HC ANESTHESIA EA ADD 15 MINS: Performed by: COLON & RECTAL SURGERY

## 2018-01-23 PROCEDURE — C9290 INJ, BUPIVACAINE LIPOSOME: HCPCS | Performed by: COLON & RECTAL SURGERY

## 2018-01-23 PROCEDURE — D9220A PRA ANESTHESIA: Mod: ,,, | Performed by: ANESTHESIOLOGY

## 2018-01-23 PROCEDURE — 63600175 PHARM REV CODE 636 W HCPCS: Performed by: COLON & RECTAL SURGERY

## 2018-01-23 PROCEDURE — 25000003 PHARM REV CODE 250: Performed by: SURGERY

## 2018-01-23 PROCEDURE — 88304 TISSUE EXAM BY PATHOLOGIST: CPT | Performed by: PATHOLOGY

## 2018-01-23 PROCEDURE — 63600175 PHARM REV CODE 636 W HCPCS

## 2018-01-23 PROCEDURE — 63600175 PHARM REV CODE 636 W HCPCS: Performed by: STUDENT IN AN ORGANIZED HEALTH CARE EDUCATION/TRAINING PROGRAM

## 2018-01-23 PROCEDURE — 94799 UNLISTED PULMONARY SVC/PX: CPT

## 2018-01-23 PROCEDURE — 20600001 HC STEP DOWN PRIVATE ROOM

## 2018-01-23 PROCEDURE — 94761 N-INVAS EAR/PLS OXIMETRY MLT: CPT

## 2018-01-23 PROCEDURE — 37000008 HC ANESTHESIA 1ST 15 MINUTES: Performed by: COLON & RECTAL SURGERY

## 2018-01-23 PROCEDURE — 25000003 PHARM REV CODE 250: Performed by: STUDENT IN AN ORGANIZED HEALTH CARE EDUCATION/TRAINING PROGRAM

## 2018-01-23 PROCEDURE — 0DBB0ZZ EXCISION OF ILEUM, OPEN APPROACH: ICD-10-PCS | Performed by: COLON & RECTAL SURGERY

## 2018-01-23 PROCEDURE — 36000707: Performed by: COLON & RECTAL SURGERY

## 2018-01-23 PROCEDURE — 88304 TISSUE EXAM BY PATHOLOGIST: CPT | Mod: 26,,, | Performed by: PATHOLOGY

## 2018-01-23 PROCEDURE — 36000706: Performed by: COLON & RECTAL SURGERY

## 2018-01-23 PROCEDURE — 71000039 HC RECOVERY, EACH ADD'L HOUR: Performed by: COLON & RECTAL SURGERY

## 2018-01-23 PROCEDURE — S0030 INJECTION, METRONIDAZOLE: HCPCS | Performed by: COLON & RECTAL SURGERY

## 2018-01-23 PROCEDURE — 27201423 OPTIME MED/SURG SUP & DEVICES STERILE SUPPLY: Performed by: COLON & RECTAL SURGERY

## 2018-01-23 PROCEDURE — 27000221 HC OXYGEN, UP TO 24 HOURS

## 2018-01-23 PROCEDURE — 71000033 HC RECOVERY, INTIAL HOUR: Performed by: COLON & RECTAL SURGERY

## 2018-01-23 RX ORDER — MIDAZOLAM HYDROCHLORIDE 1 MG/ML
INJECTION, SOLUTION INTRAMUSCULAR; INTRAVENOUS
Status: DISCONTINUED | OUTPATIENT
Start: 2018-01-23 | End: 2018-01-23

## 2018-01-23 RX ORDER — ACETAMINOPHEN 10 MG/ML
1000 INJECTION, SOLUTION INTRAVENOUS EVERY 8 HOURS
Status: COMPLETED | OUTPATIENT
Start: 2018-01-23 | End: 2018-01-24

## 2018-01-23 RX ORDER — FAMOTIDINE 20 MG/1
20 TABLET, FILM COATED ORAL ONCE
Status: COMPLETED | OUTPATIENT
Start: 2018-01-23 | End: 2018-01-23

## 2018-01-23 RX ORDER — PROPOFOL 10 MG/ML
VIAL (ML) INTRAVENOUS
Status: DISCONTINUED | OUTPATIENT
Start: 2018-01-23 | End: 2018-01-23

## 2018-01-23 RX ORDER — ASPIRIN 81 MG/1
81 TABLET ORAL DAILY
Status: DISCONTINUED | OUTPATIENT
Start: 2018-01-23 | End: 2018-01-25 | Stop reason: HOSPADM

## 2018-01-23 RX ORDER — LIDOCAINE HCL/PF 100 MG/5ML
SYRINGE (ML) INTRAVENOUS
Status: DISCONTINUED | OUTPATIENT
Start: 2018-01-23 | End: 2018-01-23

## 2018-01-23 RX ORDER — DICLOFENAC SODIUM 75 MG/1
75 TABLET, DELAYED RELEASE ORAL 2 TIMES DAILY
Status: DISCONTINUED | OUTPATIENT
Start: 2018-01-23 | End: 2018-01-25 | Stop reason: HOSPADM

## 2018-01-23 RX ORDER — METOCLOPRAMIDE HYDROCHLORIDE 5 MG/ML
5 INJECTION INTRAMUSCULAR; INTRAVENOUS EVERY 6 HOURS PRN
Status: DISCONTINUED | OUTPATIENT
Start: 2018-01-23 | End: 2018-01-25 | Stop reason: HOSPADM

## 2018-01-23 RX ORDER — ONDANSETRON 2 MG/ML
INJECTION INTRAMUSCULAR; INTRAVENOUS
Status: DISCONTINUED | OUTPATIENT
Start: 2018-01-23 | End: 2018-01-23

## 2018-01-23 RX ORDER — SODIUM CHLORIDE 9 MG/ML
INJECTION, SOLUTION INTRAVENOUS CONTINUOUS
Status: DISCONTINUED | OUTPATIENT
Start: 2018-01-23 | End: 2018-01-23

## 2018-01-23 RX ORDER — SPIRONOLACTONE 25 MG/1
25 TABLET ORAL DAILY
Status: DISCONTINUED | OUTPATIENT
Start: 2018-01-23 | End: 2018-01-25 | Stop reason: HOSPADM

## 2018-01-23 RX ORDER — FENTANYL CITRATE 50 UG/ML
25 INJECTION, SOLUTION INTRAMUSCULAR; INTRAVENOUS
Status: DISCONTINUED | OUTPATIENT
Start: 2018-01-23 | End: 2018-01-25 | Stop reason: HOSPADM

## 2018-01-23 RX ORDER — MUPIROCIN 20 MG/G
1 OINTMENT TOPICAL 2 TIMES DAILY
Status: DISCONTINUED | OUTPATIENT
Start: 2018-01-23 | End: 2018-01-25 | Stop reason: HOSPADM

## 2018-01-23 RX ORDER — CLONIDINE HYDROCHLORIDE 0.1 MG/1
0.1 TABLET ORAL 2 TIMES DAILY
Status: DISCONTINUED | OUTPATIENT
Start: 2018-01-23 | End: 2018-01-25 | Stop reason: HOSPADM

## 2018-01-23 RX ORDER — NALOXONE HCL 0.4 MG/ML
0.02 VIAL (ML) INJECTION
Status: DISCONTINUED | OUTPATIENT
Start: 2018-01-23 | End: 2018-01-25 | Stop reason: HOSPADM

## 2018-01-23 RX ORDER — PANTOPRAZOLE SODIUM 40 MG/1
40 TABLET, DELAYED RELEASE ORAL DAILY
Status: DISCONTINUED | OUTPATIENT
Start: 2018-01-23 | End: 2018-01-25 | Stop reason: HOSPADM

## 2018-01-23 RX ORDER — ROCURONIUM BROMIDE 10 MG/ML
INJECTION, SOLUTION INTRAVENOUS
Status: DISCONTINUED | OUTPATIENT
Start: 2018-01-23 | End: 2018-01-23

## 2018-01-23 RX ORDER — FENTANYL CITRATE 50 UG/ML
25 INJECTION, SOLUTION INTRAMUSCULAR; INTRAVENOUS EVERY 5 MIN PRN
Status: DISCONTINUED | OUTPATIENT
Start: 2018-01-23 | End: 2018-01-23 | Stop reason: HOSPADM

## 2018-01-23 RX ORDER — MUPIROCIN 20 MG/G
OINTMENT TOPICAL
Status: DISCONTINUED | OUTPATIENT
Start: 2018-01-23 | End: 2018-01-23

## 2018-01-23 RX ORDER — ONDANSETRON 2 MG/ML
4 INJECTION INTRAMUSCULAR; INTRAVENOUS EVERY 12 HOURS PRN
Status: DISCONTINUED | OUTPATIENT
Start: 2018-01-23 | End: 2018-01-25 | Stop reason: HOSPADM

## 2018-01-23 RX ORDER — BUPIVACAINE HYDROCHLORIDE 2.5 MG/ML
INJECTION, SOLUTION EPIDURAL; INFILTRATION; INTRACAUDAL
Status: DISCONTINUED | OUTPATIENT
Start: 2018-01-23 | End: 2018-01-23 | Stop reason: HOSPADM

## 2018-01-23 RX ORDER — SODIUM CHLORIDE 0.9 % (FLUSH) 0.9 %
3 SYRINGE (ML) INJECTION
Status: DISCONTINUED | OUTPATIENT
Start: 2018-01-23 | End: 2018-01-25 | Stop reason: HOSPADM

## 2018-01-23 RX ORDER — SUCCINYLCHOLINE CHLORIDE 20 MG/ML
INJECTION INTRAMUSCULAR; INTRAVENOUS
Status: DISCONTINUED | OUTPATIENT
Start: 2018-01-23 | End: 2018-01-23

## 2018-01-23 RX ORDER — METRONIDAZOLE 500 MG/100ML
500 INJECTION, SOLUTION INTRAVENOUS
Status: COMPLETED | OUTPATIENT
Start: 2018-01-23 | End: 2018-01-23

## 2018-01-23 RX ORDER — ATORVASTATIN CALCIUM 20 MG/1
20 TABLET, FILM COATED ORAL DAILY
Status: DISCONTINUED | OUTPATIENT
Start: 2018-01-23 | End: 2018-01-25 | Stop reason: HOSPADM

## 2018-01-23 RX ORDER — HYDROXYZINE HYDROCHLORIDE 25 MG/1
25 TABLET, FILM COATED ORAL 3 TIMES DAILY
Status: DISCONTINUED | OUTPATIENT
Start: 2018-01-23 | End: 2018-01-25 | Stop reason: HOSPADM

## 2018-01-23 RX ORDER — CALCIUM CARBONATE 200(500)MG
1000 TABLET,CHEWABLE ORAL EVERY 8 HOURS PRN
Status: DISCONTINUED | OUTPATIENT
Start: 2018-01-23 | End: 2018-01-25 | Stop reason: HOSPADM

## 2018-01-23 RX ORDER — DULOXETIN HYDROCHLORIDE 60 MG/1
60 CAPSULE, DELAYED RELEASE ORAL DAILY
Status: DISCONTINUED | OUTPATIENT
Start: 2018-01-23 | End: 2018-01-25 | Stop reason: HOSPADM

## 2018-01-23 RX ORDER — FENTANYL CITRATE 50 UG/ML
INJECTION, SOLUTION INTRAMUSCULAR; INTRAVENOUS
Status: DISCONTINUED | OUTPATIENT
Start: 2018-01-23 | End: 2018-01-23

## 2018-01-23 RX ORDER — ONDANSETRON 2 MG/ML
4 INJECTION INTRAMUSCULAR; INTRAVENOUS DAILY PRN
Status: DISCONTINUED | OUTPATIENT
Start: 2018-01-23 | End: 2018-01-23 | Stop reason: HOSPADM

## 2018-01-23 RX ORDER — ALBUTEROL SULFATE 90 UG/1
2 AEROSOL, METERED RESPIRATORY (INHALATION) EVERY 4 HOURS PRN
Status: DISCONTINUED | OUTPATIENT
Start: 2018-01-23 | End: 2018-01-25 | Stop reason: HOSPADM

## 2018-01-23 RX ORDER — DIPHENHYDRAMINE HYDROCHLORIDE 50 MG/ML
12.5 INJECTION INTRAMUSCULAR; INTRAVENOUS EVERY 4 HOURS PRN
Status: DISCONTINUED | OUTPATIENT
Start: 2018-01-23 | End: 2018-01-25 | Stop reason: HOSPADM

## 2018-01-23 RX ORDER — ACETAMINOPHEN 10 MG/ML
INJECTION, SOLUTION INTRAVENOUS
Status: COMPLETED
Start: 2018-01-23 | End: 2018-01-23

## 2018-01-23 RX ORDER — HYDROCHLOROTHIAZIDE 12.5 MG/1
12.5 TABLET ORAL DAILY
Status: DISCONTINUED | OUTPATIENT
Start: 2018-01-23 | End: 2018-01-25 | Stop reason: HOSPADM

## 2018-01-23 RX ORDER — SODIUM CHLORIDE, SODIUM LACTATE, POTASSIUM CHLORIDE, CALCIUM CHLORIDE 600; 310; 30; 20 MG/100ML; MG/100ML; MG/100ML; MG/100ML
INJECTION, SOLUTION INTRAVENOUS CONTINUOUS
Status: DISCONTINUED | OUTPATIENT
Start: 2018-01-23 | End: 2018-01-24

## 2018-01-23 RX ORDER — FAMOTIDINE 20 MG/1
20 TABLET, FILM COATED ORAL 2 TIMES DAILY
Status: DISCONTINUED | OUTPATIENT
Start: 2018-01-23 | End: 2018-01-23

## 2018-01-23 RX ORDER — FLUTICASONE PROPIONATE 50 MCG
1 SPRAY, SUSPENSION (ML) NASAL DAILY
Status: DISCONTINUED | OUTPATIENT
Start: 2018-01-23 | End: 2018-01-25 | Stop reason: HOSPADM

## 2018-01-23 RX ORDER — RAMELTEON 8 MG/1
8 TABLET ORAL NIGHTLY PRN
Status: DISCONTINUED | OUTPATIENT
Start: 2018-01-23 | End: 2018-01-25 | Stop reason: HOSPADM

## 2018-01-23 RX ORDER — ENOXAPARIN SODIUM 100 MG/ML
40 INJECTION SUBCUTANEOUS
Status: DISCONTINUED | OUTPATIENT
Start: 2018-01-24 | End: 2018-01-25 | Stop reason: HOSPADM

## 2018-01-23 RX ORDER — OXYCODONE HYDROCHLORIDE 5 MG/1
5 TABLET ORAL EVERY 4 HOURS PRN
Status: DISCONTINUED | OUTPATIENT
Start: 2018-01-23 | End: 2018-01-25 | Stop reason: HOSPADM

## 2018-01-23 RX ORDER — FAMOTIDINE 20 MG/1
20 TABLET, FILM COATED ORAL DAILY
Status: DISCONTINUED | OUTPATIENT
Start: 2018-01-24 | End: 2018-01-25 | Stop reason: HOSPADM

## 2018-01-23 RX ADMIN — LIDOCAINE HYDROCHLORIDE 75 MG: 20 INJECTION, SOLUTION INTRAVENOUS at 12:01

## 2018-01-23 RX ADMIN — SODIUM CHLORIDE: 0.9 INJECTION, SOLUTION INTRAVENOUS at 11:01

## 2018-01-23 RX ADMIN — FAMOTIDINE 20 MG: 20 TABLET, FILM COATED ORAL at 09:01

## 2018-01-23 RX ADMIN — FENTANYL CITRATE 25 MCG: 50 INJECTION, SOLUTION INTRAMUSCULAR; INTRAVENOUS at 02:01

## 2018-01-23 RX ADMIN — MUPIROCIN 1 G: 20 OINTMENT TOPICAL at 09:01

## 2018-01-23 RX ADMIN — MUPIROCIN: 20 OINTMENT TOPICAL at 11:01

## 2018-01-23 RX ADMIN — DULOXETINE 60 MG: 60 CAPSULE, DELAYED RELEASE ORAL at 06:01

## 2018-01-23 RX ADMIN — ACETAMINOPHEN 1000 MG: 10 INJECTION, SOLUTION INTRAVENOUS at 09:01

## 2018-01-23 RX ADMIN — ROCURONIUM BROMIDE 10 MG: 10 INJECTION, SOLUTION INTRAVENOUS at 12:01

## 2018-01-23 RX ADMIN — SPIRONOLACTONE 25 MG: 25 TABLET, FILM COATED ORAL at 03:01

## 2018-01-23 RX ADMIN — ATORVASTATIN CALCIUM 20 MG: 20 TABLET, FILM COATED ORAL at 02:01

## 2018-01-23 RX ADMIN — OXYCODONE HYDROCHLORIDE 5 MG: 5 TABLET ORAL at 02:01

## 2018-01-23 RX ADMIN — PROPOFOL 150 MG: 10 INJECTION, EMULSION INTRAVENOUS at 12:01

## 2018-01-23 RX ADMIN — SODIUM CHLORIDE, SODIUM LACTATE, POTASSIUM CHLORIDE, AND CALCIUM CHLORIDE: .6; .31; .03; .02 INJECTION, SOLUTION INTRAVENOUS at 02:01

## 2018-01-23 RX ADMIN — SUCCINYLCHOLINE CHLORIDE 120 MG: 20 INJECTION, SOLUTION INTRAMUSCULAR; INTRAVENOUS at 12:01

## 2018-01-23 RX ADMIN — ACETAMINOPHEN 1000 MG: 10 INJECTION, SOLUTION INTRAVENOUS at 02:01

## 2018-01-23 RX ADMIN — ONDANSETRON 4 MG: 2 INJECTION INTRAMUSCULAR; INTRAVENOUS at 01:01

## 2018-01-23 RX ADMIN — FENTANYL CITRATE 100 MCG: 50 INJECTION, SOLUTION INTRAMUSCULAR; INTRAVENOUS at 12:01

## 2018-01-23 RX ADMIN — METRONIDAZOLE 500 MG: 500 INJECTION, SOLUTION INTRAVENOUS at 11:01

## 2018-01-23 RX ADMIN — METRONIDAZOLE 500 MG: 500 INJECTION, SOLUTION INTRAVENOUS at 12:01

## 2018-01-23 RX ADMIN — DICLOFENAC SODIUM 75 MG: 75 TABLET, DELAYED RELEASE ORAL at 09:01

## 2018-01-23 RX ADMIN — CEFTRIAXONE 2 G: 2 INJECTION, SOLUTION INTRAVENOUS at 11:01

## 2018-01-23 RX ADMIN — HYDROXYZINE HYDROCHLORIDE 25 MG: 25 TABLET, FILM COATED ORAL at 09:01

## 2018-01-23 RX ADMIN — ASPIRIN 81 MG: 81 TABLET, COATED ORAL at 02:01

## 2018-01-23 RX ADMIN — PANTOPRAZOLE SODIUM 40 MG: 40 TABLET, DELAYED RELEASE ORAL at 02:01

## 2018-01-23 RX ADMIN — CEFTRIAXONE 2 G: 2 INJECTION, SOLUTION INTRAVENOUS at 12:01

## 2018-01-23 RX ADMIN — IBUPROFEN 400 MG: 800 INJECTION INTRAVENOUS at 07:01

## 2018-01-23 RX ADMIN — OXYCODONE HYDROCHLORIDE 5 MG: 5 TABLET ORAL at 08:01

## 2018-01-23 RX ADMIN — MIDAZOLAM HYDROCHLORIDE 1 MG: 1 INJECTION, SOLUTION INTRAMUSCULAR; INTRAVENOUS at 11:01

## 2018-01-23 RX ADMIN — Medication 20 MG: at 12:01

## 2018-01-23 NOTE — ANESTHESIA PREPROCEDURE EVALUATION
01/23/2018  Pre-operative evaluation for Procedure(s) (LRB):  CLOSURE-ILEOSTOMY, loop (Bilateral)    Coby Marshall is a 62 y.o. female with PMHx of HTN, HLD, GERD, asthma, rectal ca (s/p chemo/XRT x4 weeks ago), and benign carcinoids (negative nodes, s/p resection with loop ileostomy) who presents for above procedure.      LDA:            Incision/Site 12/11/17 0744 abdomen   Incision Properties Date First Assessed/Time First Assessed: 12/11/17 0744 Location: abdomen          Colostomy 12/11/17 0830 Ascending RUQ   Colostomy Properties Placement Date/Time: 12/11/17 0830 Inserted by: MD Colostomy Type: Ascending Location: RUQ          Peripheral IV - Single Lumen 01/23/18 1100 Right   IV Properties Placement Date/Time: 01/23/18 1100 Size/Length: 20 G Orientation: Right Placement directed by: Anatomic Landmarks Site Prep: Chlorhexidine Local Anesthetic: Injectable Inserted by: RN Insertion attempts (enter comment if more than 2 attempts)...         Prev airway: Present Prior to Hospital Arrival?: No; Placement Date: 12/11/17; Placement Time: 0719; Method of Intubation: Direct laryngoscopy; Inserted by: Anesthesia Resident; Airway Device: Endotracheal Tube; Mask Ventilation: Easy - oral; Intubated: Postinduction; Blade: Clayton #2; Airway Device Size: 7.0; Style: Cuffed; Cuff Inflation: Minimal occlusive pressure; Inflation Amount: 8; Placement Verified By: Auscultation, Capnometry, ETT Condensation; Grade: Grade I; Complicating Factors: None; Intubation Findings: Positive EtCO2, Bilateral breath sounds, Atraumatic/Condition of teeth unchanged;  Depth of Insertion: 22; Securment: Lips; Complications: None; Breath Sounds: Equal Bilateral; Insertion Attempts: 1; Removal Date: 12/11/17;  Removal Time: 1016    Drips:    sodium chloride 0.9% 10 mL/hr at 01/23/18 1130       Patient Active Problem List   Diagnosis     Carcinoid tumor of rectum    Carcinoid tumor of ileum    Ileostomy in place       Review of patient's allergies indicates:   Allergen Reactions    Lisinopril-hydrochlorothiazide Swelling     Mouth Swelling    Sulfa (sulfonamide antibiotics) Swelling     Swelling of lips        No current facility-administered medications on file prior to encounter.      Current Outpatient Prescriptions on File Prior to Encounter   Medication Sig Dispense Refill    atorvastatin (LIPITOR) 20 MG tablet Take 20 mg by mouth once daily.      cloNIDine (CATAPRES) 0.1 MG tablet Take 0.1 mg by mouth 2 (two) times daily.      diclofenac (VOLTAREN) 75 MG EC tablet Take 75 mg by mouth 2 (two) times daily.      DULoxetine (CYMBALTA) 60 MG capsule Take 60 mg by mouth once daily.      famotidine (PEPCID) 20 MG tablet Take 20 mg by mouth 2 (two) times daily.      fluticasone (FLONASE) 50 mcg/actuation nasal spray 1 spray by Each Nare route once daily.      hydroCHLOROthiazide (MICROZIDE) 12.5 mg capsule Take 12.5 mg by mouth once daily.      hydrOXYzine HCl (ATARAX) 25 MG tablet Take 25 mg by mouth 3 (three) times daily.      omeprazole (PRILOSEC) 20 MG capsule Take 20 mg by mouth once daily.      silver sulfADIAZINE 1% (SILVADENE) 1 % cream Apply topically 2 (two) times daily.      spironolactone (ALDACTONE) 25 MG tablet Take 25 mg by mouth once daily.      ALBUTEROL INHL Inhale into the lungs.      aspirin (ECOTRIN) 81 MG EC tablet Take 81 mg by mouth once daily.      oxyCODONE (ROXICODONE) 5 MG immediate release tablet Take 1 tablet (5 mg total) by mouth every 4 (four) hours as needed. 30 tablet 0       Past Surgical History:   Procedure Laterality Date    COLON SURGERY      HYSTERECTOMY      port a cath Right     R chest wall       Social History     Social History    Marital status:      Spouse name: N/A    Number of children: N/A    Years of education: N/A     Occupational History    Not on file.     Social  History Main Topics    Smoking status: Never Smoker    Smokeless tobacco: Never Used    Alcohol use No    Drug use: No    Sexual activity: Not on file     Other Topics Concern    Not on file     Social History Narrative    No narrative on file         Vital Signs Range (Last 24H):  Temp:  [36.7 °C (98 °F)]   Pulse:  [82]   Resp:  [16]   BP: (123)/(67)   SpO2:  [100 %]       CBC: No results for input(s): WBC, RBC, HGB, HCT, PLT, MCV, MCH, MCHC in the last 72 hours.    CMP: No results for input(s): NA, K, CL, CO2, BUN, CREATININE, GLU, MG, PHOS, CALCIUM, ALBUMIN, PROT, ALKPHOS, ALT, AST, BILITOT in the last 72 hours.    INR  No results for input(s): PT, INR, PROTIME, APTT in the last 72 hours.          Anesthesia Evaluation    I have reviewed the Patient Summary Reports.     I have reviewed the Medications.     Review of Systems  Anesthesia Hx:  No problems with previous Anesthesia  History of prior surgery of interest to airway management or planning: Denies Family Hx of Anesthesia complications.   Denies Personal Hx of Anesthesia complications.   Social:  No Alcohol Use, Non-Smoker    Hematology/Oncology:  Hematology Normal      Current/Recent Cancer.   EENT/Dental:EENT/Dental Normal   Cardiovascular:   Hypertension hyperlipidemia    Pulmonary:   Asthma asymptomatic    Renal/:  Renal/ Normal     Hepatic/GI:   GERD    Musculoskeletal:  Musculoskeletal Normal    Neurological:  Neurology Normal    Endocrine:  Endocrine Normal    Psych:  Psychiatric Normal           Physical Exam  General:  Well nourished, Obesity    Airway/Jaw/Neck:  Airway Findings: Mouth Opening: Normal Tongue: Normal  General Airway Assessment: Adult  Mallampati: II  TM Distance: Normal, at least 6 cm     Eyes/Ears/Nose:  EYES/EARS/NOSE FINDINGS: Normal   Dental:  Dental Findings: upper partial dentures, lower partial dentures   Chest/Lungs:  Chest/Lungs Findings: Clear to auscultation, Normal Respiratory Rate     Heart/Vascular:  Heart  Findings: Rate: Normal        Mental Status:  Mental Status Findings:  Cooperative, Alert and Oriented         Anesthesia Plan  Type of Anesthesia, risks & benefits discussed:  Anesthesia Type:  general  Patient's Preference:   Intra-op Monitoring Plan: standard ASA monitors  Intra-op Monitoring Plan Comments:   Post Op Pain Control Plan:   Post Op Pain Control Plan Comments:   Induction:   IV  Beta Blocker:  Patient is not currently on a Beta-Blocker (No further documentation required).       Informed Consent: Patient understands risks and agrees with Anesthesia plan.  Questions answered. Anesthesia consent signed with patient.  ASA Score: 3     Day of Surgery Review of History & Physical: I have interviewed and examined the patient. I have reviewed the patient's H&P dated:    H&P update referred to the surgeon.         Ready For Surgery From Anesthesia Perspective.

## 2018-01-23 NOTE — BRIEF OP NOTE
Dictation Id: [id number]  [facility]  Colon and Rectal Surgery  Operative Note    SUMMARY     Date of Procedure: 1/23/2018     Procedure: Procedure(s) (LRB):  CLOSURE-ILEOSTOMY, loop (Bilateral)     Surgeon(s) and Role:     * Suhas Manjarrez MD - Primary    Assisting Surgeon: None    Pre-Operative Diagnosis: Ileostomy in place [Z93.2]  History of benign carcinoid tumor of gastrointestinal tract [Z86.012]    Post-Operative Diagnosis: Post-Op Diagnosis Codes:     * Ileostomy in place [Z93.2]     * History of benign carcinoid tumor of gastrointestinal tract [Z86.012]    Anesthesia: General, Exparel 266 mg, Marcaine 0.25% (75 mg) preperironeal injection    Technical Procedures Used: STSFETYE LC 75 Blue  Description of the Findings of the Procedure: debse adhesions  Significant Surgical Tasks Conducted by the Assistant(s), if Applicable: n/a    Complications: No    Estimated Blood Loss (EBL): * No values recorded between 1/23/2018 12:13 PM and 1/23/2018  1:14 PM *           Implants: * No implants in log *    Specimens:   Specimen (12h ago through future)    Start     Ordered    01/23/18 1308  Specimen to Pathology - Surgery  Once     Comments:  1.ILEOSTOMY      01/23/18 1308           Condition: Good    Disposition: PACU - hemodynamically stable.    Attestation: I was present and scrubbed for the entire procedure.

## 2018-01-23 NOTE — TRANSFER OF CARE
"Anesthesia Transfer of Care Note    Patient: Coby Marshall    Procedure(s) Performed: Procedure(s) (LRB):  CLOSURE-ILEOSTOMY, loop (Bilateral)    Patient location: PACU    Anesthesia Type: general    Transport from OR: Transported from OR on 2-3 L/min O2 by NC with adequate spontaneous ventilation    Post pain: adequate analgesia    Post assessment: no apparent anesthetic complications    Post vital signs: stable    Level of consciousness: awake, alert and oriented    Nausea/Vomiting: no nausea/vomiting    Complications: none    Transfer of care protocol was followed      Last vitals:   Visit Vitals  BP (!) 141/74 (BP Location: Right arm, Patient Position: Lying)   Pulse 86   Temp 36.7 °C (98.1 °F) (Axillary)   Resp 15   Ht 5' 6" (1.676 m)   Wt 93 kg (205 lb)   SpO2 100%   Breastfeeding? No   BMI 33.09 kg/m²     "

## 2018-01-23 NOTE — H&P
Coby Marshall is a 62 y.o. female      Chief Complaint:       Chief Complaint   Patient presents with    Follow-up    Ileostomy    Hx carcinoid         HPI: 12/11/17 LAR  and ileal resection with loop ileostomy for 2 carcinoids.  (neg nodes)      Patient with rectal cancer. Completed chemoXRT      4 weeks ago      Procto Nov 11, 2017 mass at 4-5 cm from verge  Ct scan  (11/3/17) with improvement (nodes reduced in size) Thickening rectal wall 7 cm from anal verge    1/22/18 Pouchagram OK     ROS:        Constitutional: No fever, chills, activity or appetite change.      HENT: No hearing loss, facial swelling, neck pain or stiffness.       Eyes: No discharge, itching and visual disturbance.      Respiratory: No apnea, cough, choking or shortness of breath.       Cardiovascular: No leg swelling or chest pain      Gastrointestinal: No abdominal distention or change in bowel habbits     Genitourinary: No dysuria, frequency or flank pain.      Musculoskeletal: No arthralgias or gait problem.      Neurological: No dizziness, seizures or weakness.      Hematological: No adenopathy.      Psychiatric/Behavioral: No hallucinations or behavioral problems.         PE:    APPEARANCE: Well nourished, well developed, in no acute distress.   CHEST: Lungs clear. Normal respiratory effort.  CARDIOVASCULAR: Normal rhythm. No edema.  ABDOMEN: Soft. Healed incision with loop ileostomy in RLQ. Seen wit WOCN     Rectum:  Normal skin,    Musculoskeletal: Symmetric, normal range of motion and strength.   Neurological: Alert and oriented to person, place, and time. Normal reflexes.   Skin: Skin is warm and dry.   Psychiatric: Normal mood and affect. Behavior is normal and appropriate.      Impression: Hx carcinoid of ileum and rectum  PLAN:   Ileostomy closure.  I have explained the procedure including indications, alternatives, expected outcomes and potential complications. The patient appears to understand and gives informed consent.  The patient is medically ready for surgery.

## 2018-01-24 LAB
ANION GAP SERPL CALC-SCNC: 10 MMOL/L
BASOPHILS # BLD AUTO: 0.02 K/UL
BASOPHILS NFR BLD: 0.3 %
BUN SERPL-MCNC: 19 MG/DL
CALCIUM SERPL-MCNC: 9.1 MG/DL
CHLORIDE SERPL-SCNC: 106 MMOL/L
CO2 SERPL-SCNC: 25 MMOL/L
CREAT SERPL-MCNC: 1.5 MG/DL
DIFFERENTIAL METHOD: ABNORMAL
EOSINOPHIL # BLD AUTO: 0.2 K/UL
EOSINOPHIL NFR BLD: 2.9 %
ERYTHROCYTE [DISTWIDTH] IN BLOOD BY AUTOMATED COUNT: 14.1 %
EST. GFR  (AFRICAN AMERICAN): 42.1 ML/MIN/1.73 M^2
EST. GFR  (NON AFRICAN AMERICAN): 36.6 ML/MIN/1.73 M^2
GLUCOSE SERPL-MCNC: 98 MG/DL
HCT VFR BLD AUTO: 27.7 %
HGB BLD-MCNC: 8.4 G/DL
IMM GRANULOCYTES # BLD AUTO: 0.02 K/UL
IMM GRANULOCYTES NFR BLD AUTO: 0.3 %
LYMPHOCYTES # BLD AUTO: 0.9 K/UL
LYMPHOCYTES NFR BLD: 11.7 %
MAGNESIUM SERPL-MCNC: 1.5 MG/DL
MCH RBC QN AUTO: 26.1 PG
MCHC RBC AUTO-ENTMCNC: 30.3 G/DL
MCV RBC AUTO: 86 FL
MONOCYTES # BLD AUTO: 0.7 K/UL
MONOCYTES NFR BLD: 9.3 %
NEUTROPHILS # BLD AUTO: 5.8 K/UL
NEUTROPHILS NFR BLD: 75.5 %
NRBC BLD-RTO: 0 /100 WBC
PHOSPHATE SERPL-MCNC: 4.3 MG/DL
PLATELET # BLD AUTO: 265 K/UL
PMV BLD AUTO: 9.5 FL
POTASSIUM SERPL-SCNC: 3.9 MMOL/L
RBC # BLD AUTO: 3.22 M/UL
SODIUM SERPL-SCNC: 141 MMOL/L
WBC # BLD AUTO: 7.62 K/UL

## 2018-01-24 PROCEDURE — 20600001 HC STEP DOWN PRIVATE ROOM

## 2018-01-24 PROCEDURE — 25000003 PHARM REV CODE 250: Performed by: SURGERY

## 2018-01-24 PROCEDURE — 84100 ASSAY OF PHOSPHORUS: CPT

## 2018-01-24 PROCEDURE — 85025 COMPLETE CBC W/AUTO DIFF WBC: CPT

## 2018-01-24 PROCEDURE — 63600175 PHARM REV CODE 636 W HCPCS: Performed by: SURGERY

## 2018-01-24 PROCEDURE — 36415 COLL VENOUS BLD VENIPUNCTURE: CPT

## 2018-01-24 PROCEDURE — 83735 ASSAY OF MAGNESIUM: CPT

## 2018-01-24 PROCEDURE — 80048 BASIC METABOLIC PNL TOTAL CA: CPT

## 2018-01-24 PROCEDURE — 25000242 PHARM REV CODE 250 ALT 637 W/ HCPCS: Performed by: SURGERY

## 2018-01-24 RX ADMIN — CLONIDINE HYDROCHLORIDE 0.1 MG: 0.1 TABLET ORAL at 09:01

## 2018-01-24 RX ADMIN — IBUPROFEN 400 MG: 800 INJECTION INTRAVENOUS at 11:01

## 2018-01-24 RX ADMIN — DICLOFENAC SODIUM 75 MG: 75 TABLET, DELAYED RELEASE ORAL at 09:01

## 2018-01-24 RX ADMIN — MUPIROCIN 1 G: 20 OINTMENT TOPICAL at 09:01

## 2018-01-24 RX ADMIN — HYDROXYZINE HYDROCHLORIDE 25 MG: 25 TABLET, FILM COATED ORAL at 09:01

## 2018-01-24 RX ADMIN — ATORVASTATIN CALCIUM 20 MG: 20 TABLET, FILM COATED ORAL at 09:01

## 2018-01-24 RX ADMIN — DULOXETINE 60 MG: 60 CAPSULE, DELAYED RELEASE ORAL at 09:01

## 2018-01-24 RX ADMIN — OXYCODONE HYDROCHLORIDE 5 MG: 5 TABLET ORAL at 09:01

## 2018-01-24 RX ADMIN — HYDROXYZINE HYDROCHLORIDE 25 MG: 25 TABLET, FILM COATED ORAL at 01:01

## 2018-01-24 RX ADMIN — SPIRONOLACTONE 25 MG: 25 TABLET, FILM COATED ORAL at 09:01

## 2018-01-24 RX ADMIN — IBUPROFEN 400 MG: 800 INJECTION INTRAVENOUS at 05:01

## 2018-01-24 RX ADMIN — HYDROXYZINE HYDROCHLORIDE 25 MG: 25 TABLET, FILM COATED ORAL at 05:01

## 2018-01-24 RX ADMIN — ASPIRIN 81 MG: 81 TABLET, COATED ORAL at 09:01

## 2018-01-24 RX ADMIN — HYDROCHLOROTHIAZIDE 12.5 MG: 12.5 TABLET ORAL at 09:01

## 2018-01-24 RX ADMIN — FAMOTIDINE 20 MG: 20 TABLET, FILM COATED ORAL at 01:01

## 2018-01-24 RX ADMIN — IBUPROFEN 400 MG: 800 INJECTION INTRAVENOUS at 06:01

## 2018-01-24 RX ADMIN — IBUPROFEN 400 MG: 800 INJECTION INTRAVENOUS at 12:01

## 2018-01-24 RX ADMIN — ACETAMINOPHEN 1000 MG: 10 INJECTION, SOLUTION INTRAVENOUS at 05:01

## 2018-01-24 RX ADMIN — FLUTICASONE PROPIONATE 50 MCG: 50 SPRAY, METERED NASAL at 09:01

## 2018-01-24 RX ADMIN — PANTOPRAZOLE SODIUM 40 MG: 40 TABLET, DELAYED RELEASE ORAL at 09:01

## 2018-01-24 RX ADMIN — ENOXAPARIN SODIUM 40 MG: 100 INJECTION SUBCUTANEOUS at 09:01

## 2018-01-24 NOTE — SUBJECTIVE & OBJECTIVE
Subjective:     Interval History: no acute events overnite, no n/v, pos for flatus, adequate pain control  Post-Op Info:  Procedure(s) (LRB):  CLOSURE-ILEOSTOMY, loop (Bilateral)   1 Day Post-Op      Medications:  Continuous Infusions:  Scheduled Meds:   aspirin  81 mg Oral Daily    atorvastatin  20 mg Oral Daily    cloNIDine  0.1 mg Oral BID    diclofenac  75 mg Oral BID    DULoxetine  60 mg Oral Daily    enoxparin  40 mg Subcutaneous Q24H    famotidine  20 mg Oral Daily    fluticasone  1 spray Each Nare Daily    hydroCHLOROthiazide  12.5 mg Oral Daily    hydrOXYzine HCl  25 mg Oral TID    ibuprofen  400 mg Intravenous Q6H    mupirocin  1 g Nasal BID    pantoprazole  40 mg Oral Daily    spironolactone  25 mg Oral Daily     PRN Meds:   albuterol    calcium carbonate    diphenhydrAMINE    fentaNYL    metoclopramide HCl    naloxone    ondansetron    oxyCODONE    ramelteon    sodium chloride 0.9%        Objective:     Vital Signs (Most Recent):  Temp: 97.6 °F (36.4 °C) (01/24/18 0709)  Pulse: 65 (01/24/18 0736)  Resp: 18 (01/24/18 0709)  BP: (!) 96/54 (01/24/18 0709)  SpO2: 100 % (01/24/18 0736) Vital Signs (24h Range):  Temp:  [97.6 °F (36.4 °C)-98.4 °F (36.9 °C)] 97.6 °F (36.4 °C)  Pulse:  [65-87] 65  Resp:  [10-20] 18  SpO2:  [96 %-100 %] 100 %  BP: ()/(54-76) 96/54     Intake/Output - Last 3 Shifts       01/22 0700 - 01/23 0659 01/23 0700 - 01/24 0659 01/24 0700 - 01/25 0659    I.V. (mL/kg)  1273.3 (13.7)     IV Piggyback  600     Total Intake(mL/kg)  1873.3 (20.1)     Urine (mL/kg/hr)  150 275 (0.7)    Total Output   150 275    Net   +1723.3 -275           Urine Occurrence  1 x           Physical Exam   Constitutional: She is oriented to person, place, and time. She appears well-developed and well-nourished. No distress.   HENT:   Head: Normocephalic.   Eyes: Pupils are equal, round, and reactive to light.   Cardiovascular: Normal rate, regular rhythm and normal heart sounds.     Pulmonary/Chest: Effort normal and breath sounds normal. No respiratory distress. She has no wheezes. She has no rales.   Abdominal: Soft. She exhibits no mass. There is no rebound and no guarding.   Former ostomy site clean and dry  Packing removed and repacked with damp gauze   Neurological: She is alert and oriented to person, place, and time.   Skin: Skin is warm and dry.   Psychiatric: She has a normal mood and affect. Her behavior is normal. Judgment and thought content normal.   Nursing note and vitals reviewed.        Significant Labs:  BMP (Last 3 Results):   Recent Labs  Lab 01/24/18  0421   GLU 98      K 3.9      CO2 25   BUN 19   CREATININE 1.5*   CALCIUM 9.1   MG 1.5*     CBC (Last 3 Results):   Recent Labs  Lab 01/24/18 0421   WBC 7.62   RBC 3.22*   HGB 8.4*   HCT 27.7*      MCV 86   MCH 26.1*   MCHC 30.3*       Significant Diagnostics:  None

## 2018-01-24 NOTE — NURSING
LAURA Summers MD on the floor. Notified that patients dressing site is saturated and leaked through the gown. Changed by MD Melina. Packing not removed, reinforced with ABD pad x2 and taped with paper tape. Will monitor site.

## 2018-01-24 NOTE — PROGRESS NOTES
[facility]  Colorectal Surgery  Progress Note    Patient Name: Coby Marshall  MRN: [id number]  Admission Date: 1/23/2018  Hospital Length of Stay: 1 days  Attending Physician: Suhas Manjarrez MD    Subjective:     Interval History: no acute events overnite, no n/v, pos for flatus, adequate pain control  Post-Op Info:  Procedure(s) (LRB):  CLOSURE-ILEOSTOMY, loop (Bilateral)   1 Day Post-Op      Medications:  Continuous Infusions:  Scheduled Meds:   aspirin  81 mg Oral Daily    atorvastatin  20 mg Oral Daily    cloNIDine  0.1 mg Oral BID    diclofenac  75 mg Oral BID    DULoxetine  60 mg Oral Daily    enoxparin  40 mg Subcutaneous Q24H    famotidine  20 mg Oral Daily    fluticasone  1 spray Each Nare Daily    hydroCHLOROthiazide  12.5 mg Oral Daily    hydrOXYzine HCl  25 mg Oral TID    ibuprofen  400 mg Intravenous Q6H    mupirocin  1 g Nasal BID    pantoprazole  40 mg Oral Daily    spironolactone  25 mg Oral Daily     PRN Meds:   albuterol    calcium carbonate    diphenhydrAMINE    fentaNYL    metoclopramide HCl    naloxone    ondansetron    oxyCODONE    ramelteon    sodium chloride 0.9%        Objective:     Vital Signs (Most Recent):  Temp: 97.6 °F (36.4 °C) (01/24/18 0709)  Pulse: 65 (01/24/18 0736)  Resp: 18 (01/24/18 0709)  BP: (!) 96/54 (01/24/18 0709)  SpO2: 100 % (01/24/18 0736) Vital Signs (24h Range):  Temp:  [97.6 °F (36.4 °C)-98.4 °F (36.9 °C)] 97.6 °F (36.4 °C)  Pulse:  [65-87] 65  Resp:  [10-20] 18  SpO2:  [96 %-100 %] 100 %  BP: ()/(54-76) 96/54     Intake/Output - Last 3 Shifts       01/22 0700 - 01/23 0659 01/23 0700 - 01/24 0659 01/24 0700 - 01/25 0659    I.V. (mL/kg)  1273.3 (13.7)     IV Piggyback  600     Total Intake(mL/kg)  1873.3 (20.1)     Urine (mL/kg/hr)  150 275 (0.7)    Total Output   150 275    Net   +1723.3 -275           Urine Occurrence  1 x           Physical Exam   Constitutional: She is oriented to person, place, and time. She  appears well-developed and well-nourished. No distress.   HENT:   Head: Normocephalic.   Eyes: Pupils are equal, round, and reactive to light.   Cardiovascular: Normal rate, regular rhythm and normal heart sounds.    Pulmonary/Chest: Effort normal and breath sounds normal. No respiratory distress. She has no wheezes. She has no rales.   Abdominal: Soft. She exhibits no mass. There is no rebound and no guarding.   Former ostomy site clean and dry  Packing removed and repacked with damp gauze   Neurological: She is alert and oriented to person, place, and time.   Skin: Skin is warm and dry.   Psychiatric: She has a normal mood and affect. Her behavior is normal. Judgment and thought content normal.   Nursing note and vitals reviewed.        Significant Labs:  BMP (Last 3 Results):   Recent Labs  Lab 01/24/18  0421   GLU 98      K 3.9      CO2 25   BUN 19   CREATININE 1.5*   CALCIUM 9.1   MG 1.5*     CBC (Last 3 Results):   Recent Labs  Lab 01/24/18  0421   WBC 7.62   RBC 3.22*   HGB 8.4*   HCT 27.7*      MCV 86   MCH 26.1*   MCHC 30.3*       Significant Diagnostics:  None    Assessment/Plan:     * Ileostomy in place    PO 1 ileostomy closure  -cld-lfd  -enc amb and IS  -scds and wiley hose  -monitor labs  -await return of bowel function  -HL IVF              Nataly An NP  Colorectal Surgery  [facility]

## 2018-01-24 NOTE — PLAN OF CARE
Problem: Patient Care Overview  Goal: Plan of Care Review  Outcome: Ongoing (interventions implemented as appropriate)  Patient and her spouse both verbalize understand POC review with no further questions at this time. AAO x4 with VSS on room air. RUQ old ostomy site dressed with gauze wet to dry with small packing with occlusive Tegaderm CDI. Pt up >3 times today ambulating independently while remaining free from falls and injury. PRN pain medications available as needed. Tolerating low fiber diet with no c/o nausea. Pt agrees with spending one more night in the hospital since she just started eating a normal diet.  and daughter at bedside. No acute distress or needs at this time. Bed in lowest position with call light within reach. WCTM

## 2018-01-24 NOTE — ASSESSMENT & PLAN NOTE
PO 1 ileostomy closure  -cld-lfd  -enc amb and IS  -scds and wiley hose  -monitor labs  -await return of bowel function  -HL IVF

## 2018-01-24 NOTE — PLAN OF CARE
Randal Csaillas MD     Payor: BLUE CROSS BLUE SHIELD / Plan: BCBS ALL OUT OF STATE / Product Type: PPO /      Extended Emergency Contact Information  Primary Emergency Contact: Sree Marshall   [facility]  Mobile Phone: [phone]  Relation: Spouse        01/24/18 1428   Discharge Assessment   Assessment Type Discharge Planning Assessment   Confirmed/corrected address and phone number on facesheet? Yes   Assessment information obtained from? Patient   Expected Length of Stay (days) 3   Communicated expected length of stay with patient/caregiver yes   Prior to hospitilization cognitive status: Alert/Oriented   Prior to hospitalization functional status: Independent   Current cognitive status: Alert/Oriented   Current Functional Status: Needs Assistance   Lives With spouse   Able to Return to Prior Arrangements yes   Is patient able to care for self after discharge? Yes   Patient's perception of discharge disposition home or selfcare   Readmission Within The Last 30 Days no previous admission in last 30 days   Patient currently being followed by outpatient case management? No   Patient currently receives any other outside agency services? No   Equipment Currently Used at Home (ostomy supplies)   Do you have any problems affording any of your prescribed medications? No   Is the patient taking medications as prescribed? yes   Does the patient have transportation home? Yes   Transportation Available family or friend will provide   Does the patient receive services at the [facility]? No   Discharge Plan A Home with family   Discharge Plan B Home with family;Home Health   Patient/Family In Agreement With Plan yes

## 2018-01-24 NOTE — PLAN OF CARE
Problem: Patient Care Overview  Goal: Plan of Care Review  Outcome: Ongoing (interventions implemented as appropriate)  Patient AAOx4. Plan of care and all safety precautions maintained and dicussed. Vital signs stable throughout shift. Pain controlled with PRN and scheduled medications. R abdomen abd pads dry and intact. IV fluids at 50mL/hr. Patient assisted to bathroom throughout the night without injury/fall. No acute events throughout shift.  remains at bedside. Call bell in reach. Encouraged to call for assistance. Verbalized understanding. Will continue to monitor.

## 2018-01-24 NOTE — OP NOTE
DATE OF PROCEDURE:  01/23/2018.    PROCEDURE PERFORMED:  Closure of diverting loop ileostomy.    ATTENDING SURGEON:  Suhas Manjarrez M.D.    ASSISTANT:  Jackelin Summers M.D., fellow.    PREOPERATIVE DIAGNOSES:  Status post diverting loop ileostomy and history of   rectal cancer and benign carcinoid of the small bowel.    POSTOPERATIVE DIAGNOSES:  Status post diverting loop ileostomy and history of   rectal cancer and benign carcinoid of the small bowel.    ANESTHESIA:  General and Exparel and Marcaine in preperitoneal injection.    ESTIMATED BLOOD LOSS:  Minimal.    SPECIMENS:  Ileostomy trim.    COMPLICATIONS:  None apparent.    DISPOSITION:  Extubated and stable to PACU.    INDICATIONS FOR PROCEDURE:  Coby Marshall is a 62-year-old female patient with   history of low rectal cancer, status post neoadjuvant chemoradiation, who   underwent a low anterior resection as well as ileal resection for small bowel   masses that ended up being carcinoid on 12/11/2017.  She also had a diverting   loop ileostomy at that time.  She underwent a Gastrografin enema, which did not   show any leak at the area of the anastomosis.  Therefore, she was indicated for   ileostomy reversal.  Risks, benefits and alternatives of the procedure were   discussed with the patient and consent was obtained.    INTRAOPERATIVE FINDINGS:  There were dense adhesions circumferentially to the   diverting loop ileostomy.  A couple of serosal tears were made, which were   repaired.    DESCRIPTION OF THE PROCEDURE IN DETAIL:  The patient was appropriately   identified in the preoperative holding area and brought to the Operating Room   where general endotracheal anesthesia was administered.  She was placed supine   on the operating table.  SCDs were placed.  She received preoperative   antibiotic.  The ileostomy bag was removed and the abdomen was then prepped and   draped in the usual sterile fashion.  We began the procedure by incising sharply   at the  mucocutaneous junction.  This was dissected down until the subcutaneous   tissue was divided.  The bowel was noted to be densely adhesed even in the   superficial location.  A combination of Metzenbaum scissors and dissection with   electrocautery was then performed circumferentially around the ileostomy.  It   was eventually freed from the fascia circumferentially.  There was a couple   serosal tears, which were repaired with interrupted Lembert Ethibond sutures.    Once the bowel was freed circumferentially and mobilized with adequate length,   then the edges of each of the proximal and distal limbs were then trimmed back   to healthy bowel and a stapled side-to-side functional end-to-end anastomosis   was performed with a YULISSA-75 blue load stapler.  The common enterotomy was then   closed using an inner layer of 3-0 Vicryl and an outer running 4-0 layer of   Ethibond suture in Lembert fashion.  The anastomosis was widely patent and was   healthy.  The bowel was then allowed to drop back down into the abdomen.  The   fascia was then closed using a running 0 PDS suture.  The wound was irrigated   with sterile saline and skin was closed partially with 4-0 Monocryl.  A 4 x 4   was then placed within the previous ileostomy site and then Tegaderm was placed   over this.  This concluded the case.  The final counts were correct at the end   of the procedure.  The patient tolerated the procedure well and was extubated   and taken to Postanesthesia Recovery Unit in stable condition.    The patient's attending surgeon, Dr. Suhas Manjarrez, was present and scrubbed for   the duration of the procedure.    DICTATED BY:  LUCIA Davis/PATRICIA  dd: 01/23/2018 21:07:53 (CST)  td: 01/24/2018 01:10:39 ([facility])  Doc ID   #[id number]  Job ID #[id number]    CC:

## 2018-01-25 VITALS
OXYGEN SATURATION: 97 % | HEART RATE: 81 BPM | RESPIRATION RATE: 16 BRPM | DIASTOLIC BLOOD PRESSURE: 52 MMHG | SYSTOLIC BLOOD PRESSURE: 89 MMHG | BODY MASS INDEX: 32.95 KG/M2 | WEIGHT: 205 LBS | TEMPERATURE: 98 F | HEIGHT: 66 IN

## 2018-01-25 LAB
ANION GAP SERPL CALC-SCNC: 12 MMOL/L
BASOPHILS # BLD AUTO: 0.02 K/UL
BASOPHILS NFR BLD: 0.2 %
BUN SERPL-MCNC: 28 MG/DL
CALCIUM SERPL-MCNC: 8.7 MG/DL
CHLORIDE SERPL-SCNC: 105 MMOL/L
CO2 SERPL-SCNC: 21 MMOL/L
CREAT SERPL-MCNC: 2.1 MG/DL
DIFFERENTIAL METHOD: ABNORMAL
EOSINOPHIL # BLD AUTO: 0.3 K/UL
EOSINOPHIL NFR BLD: 3.9 %
ERYTHROCYTE [DISTWIDTH] IN BLOOD BY AUTOMATED COUNT: 14.1 %
EST. GFR  (AFRICAN AMERICAN): 28.1 ML/MIN/1.73 M^2
EST. GFR  (NON AFRICAN AMERICAN): 24.3 ML/MIN/1.73 M^2
GLUCOSE SERPL-MCNC: 114 MG/DL
HCT VFR BLD AUTO: 27 %
HGB BLD-MCNC: 8.2 G/DL
IMM GRANULOCYTES # BLD AUTO: 0.03 K/UL
IMM GRANULOCYTES NFR BLD AUTO: 0.4 %
LYMPHOCYTES # BLD AUTO: 0.6 K/UL
LYMPHOCYTES NFR BLD: 7.3 %
MAGNESIUM SERPL-MCNC: 1.6 MG/DL
MCH RBC QN AUTO: 26.7 PG
MCHC RBC AUTO-ENTMCNC: 30.4 G/DL
MCV RBC AUTO: 88 FL
MONOCYTES # BLD AUTO: 0.6 K/UL
MONOCYTES NFR BLD: 7.1 %
NEUTROPHILS # BLD AUTO: 6.9 K/UL
NEUTROPHILS NFR BLD: 81.1 %
NRBC BLD-RTO: 0 /100 WBC
PHOSPHATE SERPL-MCNC: 4.1 MG/DL
PLATELET # BLD AUTO: 241 K/UL
PMV BLD AUTO: 9.6 FL
POTASSIUM SERPL-SCNC: 3.6 MMOL/L
RBC # BLD AUTO: 3.07 M/UL
SODIUM SERPL-SCNC: 138 MMOL/L
WBC # BLD AUTO: 8.5 K/UL

## 2018-01-25 PROCEDURE — 80048 BASIC METABOLIC PNL TOTAL CA: CPT

## 2018-01-25 PROCEDURE — 83735 ASSAY OF MAGNESIUM: CPT

## 2018-01-25 PROCEDURE — 63600175 PHARM REV CODE 636 W HCPCS: Performed by: SURGERY

## 2018-01-25 PROCEDURE — 25000003 PHARM REV CODE 250: Performed by: SURGERY

## 2018-01-25 PROCEDURE — 36415 COLL VENOUS BLD VENIPUNCTURE: CPT

## 2018-01-25 PROCEDURE — 85025 COMPLETE CBC W/AUTO DIFF WBC: CPT

## 2018-01-25 PROCEDURE — 84100 ASSAY OF PHOSPHORUS: CPT

## 2018-01-25 RX ORDER — OXYCODONE HYDROCHLORIDE 5 MG/1
5 TABLET ORAL EVERY 4 HOURS PRN
Qty: 30 TABLET | Refills: 0 | Status: ON HOLD | OUTPATIENT
Start: 2018-01-25 | End: 2018-03-08 | Stop reason: HOSPADM

## 2018-01-25 RX ADMIN — DICLOFENAC SODIUM 75 MG: 75 TABLET, DELAYED RELEASE ORAL at 08:01

## 2018-01-25 RX ADMIN — HYDROCHLOROTHIAZIDE 12.5 MG: 12.5 TABLET ORAL at 08:01

## 2018-01-25 RX ADMIN — FLUTICASONE PROPIONATE 50 MCG: 50 SPRAY, METERED NASAL at 08:01

## 2018-01-25 RX ADMIN — SPIRONOLACTONE 25 MG: 25 TABLET, FILM COATED ORAL at 08:01

## 2018-01-25 RX ADMIN — PANTOPRAZOLE SODIUM 40 MG: 40 TABLET, DELAYED RELEASE ORAL at 08:01

## 2018-01-25 RX ADMIN — MUPIROCIN 1 G: 20 OINTMENT TOPICAL at 08:01

## 2018-01-25 RX ADMIN — HYDROXYZINE HYDROCHLORIDE 25 MG: 25 TABLET, FILM COATED ORAL at 05:01

## 2018-01-25 RX ADMIN — ASPIRIN 81 MG: 81 TABLET, COATED ORAL at 08:01

## 2018-01-25 RX ADMIN — ATORVASTATIN CALCIUM 20 MG: 20 TABLET, FILM COATED ORAL at 08:01

## 2018-01-25 RX ADMIN — FAMOTIDINE 20 MG: 20 TABLET, FILM COATED ORAL at 08:01

## 2018-01-25 RX ADMIN — DULOXETINE 60 MG: 60 CAPSULE, DELAYED RELEASE ORAL at 08:01

## 2018-01-25 RX ADMIN — IBUPROFEN 400 MG: 800 INJECTION INTRAVENOUS at 05:01

## 2018-01-25 NOTE — PT/OT/SLP PROGRESS
Physical Therapy   Discharge    Coby Marshall   MRN: [id number]     Hospital discharge before PT eval could be initiated.    Suhas Kaur, PT  1/25/2018

## 2018-01-25 NOTE — PLAN OF CARE
01/25/18 1058   Final Note   Assessment Type Final Discharge Note   Discharge Disposition Home   Hospital Follow Up  Appt(s) scheduled? Yes   Discharge plans and expectations educations in teach back method with documentation complete? Yes   Right Care Referral Info   Post Acute Recommendation No Care

## 2018-01-25 NOTE — PLAN OF CARE
PT is AAOX3, no acute changes noted during shift, pt is up ad maria m with asst, pt repositions self in bed q2, no skin breakdown noted, VSS. No falls noted. Fall precautions remain Pain assessed. No pain noted. Pt resting comfortable in bed. Call light in reach. Will continue to monitor.

## 2018-01-25 NOTE — HPI
Coby Marshall is a 62-year-old female patient with   history of low rectal cancer, status post neoadjuvant chemoradiation, who   underwent a low anterior resection as well as ileal resection for small bowel   masses that ended up being carcinoid on 12/11/2017.  She also had a diverting   loop ileostomy at that time.  She underwent a Gastrografin enema, which did not   show any leak at the area of the anastomosis.  Therefore, she was indicated for   ileostomy reversal.  Risks, benefits and alternatives of the procedure were   discussed with the patient and consent was obtained.

## 2018-01-25 NOTE — PROGRESS NOTES
[facility]  Colorectal Surgery  Progress Note    Patient Name: Coby Marshall  MRN: [id number]  Admission Date: 1/23/2018  Hospital Length of Stay: 2 days  Attending Physician: Suhas Manjarrez MD    Subjective:     Interval History: Had BM and flatus yesterday. Pain controlled. Tolerating diet.    Post-Op Info:  Procedure(s) (LRB):  CLOSURE-ILEOSTOMY, loop (Bilateral)   2 Days Post-Op      Medications:  Continuous Infusions:  Scheduled Meds:   aspirin  81 mg Oral Daily    atorvastatin  20 mg Oral Daily    cloNIDine  0.1 mg Oral BID    diclofenac  75 mg Oral BID    DULoxetine  60 mg Oral Daily    enoxparin  40 mg Subcutaneous Q24H    famotidine  20 mg Oral Daily    fluticasone  1 spray Each Nare Daily    hydroCHLOROthiazide  12.5 mg Oral Daily    hydrOXYzine HCl  25 mg Oral TID    ibuprofen  400 mg Intravenous Q6H    mupirocin  1 g Nasal BID    pantoprazole  40 mg Oral Daily    spironolactone  25 mg Oral Daily     PRN Meds:   albuterol    calcium carbonate    diphenhydrAMINE    fentaNYL    metoclopramide HCl    naloxone    ondansetron    oxyCODONE    ramelteon    sodium chloride 0.9%        Objective:     Vital Signs (Most Recent):  Temp: 97 °F (36.1 °C) (01/25/18 0454)  Pulse: 78 (01/25/18 0454)  Resp: 18 (01/25/18 0454)  BP: (!) 91/50 (01/25/18 0454)  SpO2: 98 % (01/25/18 0454) Vital Signs (24h Range):  Temp:  [97 °F (36.1 °C)-98.4 °F (36.9 °C)] 97 °F (36.1 °C)  Pulse:  [65-81] 78  Resp:  [16-18] 18  SpO2:  [96 %-100 %] 98 %  BP: ()/(50-60) 91/50     Intake/Output - Last 3 Shifts       01/23 0700 - 01/24 0659 01/24 0700 - 01/25 0659    P.O.  1320    I.V. (mL/kg) 1273.3 (13.7)     IV Piggyback 600 200    Total Intake(mL/kg) 1873.3 (20.1) 1520 (16.3)    Urine (mL/kg/hr) 150 850 (0.4)    Total Output 150 850    Net +1723.3 +670          Urine Occurrence 1 x 1 x          Physical Exam   Constitutional: She is oriented to person, place, and time. She appears well-developed  and well-nourished. No distress.   HENT:   Head: Normocephalic.   Eyes: Pupils are equal, round, and reactive to light.   Cardiovascular: Normal rate, regular rhythm and normal heart sounds.    Pulmonary/Chest: Effort normal and breath sounds normal. No respiratory distress. She has no wheezes. She has no rales.   Abdominal: Soft. She exhibits no mass. There is no rebound and no guarding.   Former ostomy site clean and dry  Packing removed and repacked with gauze   Neurological: She is alert and oriented to person, place, and time.   Skin: Skin is warm and dry.   Psychiatric: She has a normal mood and affect. Her behavior is normal. Judgment and thought content normal.   Nursing note and vitals reviewed.      Significant Labs:  BMP (Last 3 Results):   Recent Labs  Lab 01/24/18  0421   GLU 98      K 3.9      CO2 25   BUN 19   CREATININE 1.5*   CALCIUM 9.1   MG 1.5*     CBC (Last 3 Results):   Recent Labs  Lab 01/24/18  0421   WBC 7.62   RBC 3.22*   HGB 8.4*   HCT 27.7*      MCV 86   MCH 26.1*   MCHC 30.3*       Significant Diagnostics:  None    Assessment/Plan:     * Ileostomy in place    2 Days Post-Op ileostomy closure  -Regular diet as tolerated  -Ambulation and IS           -scds and wiley hose  -monitor labs  -hep lock              Reji Nava MD  Colorectal Surgery  [facility]

## 2018-01-25 NOTE — SUBJECTIVE & OBJECTIVE
Subjective:     Interval History: Had BM and flatus yesterday. Pain controlled. Tolerating diet.    Post-Op Info:  Procedure(s) (LRB):  CLOSURE-ILEOSTOMY, loop (Bilateral)   2 Days Post-Op      Medications:  Continuous Infusions:  Scheduled Meds:   aspirin  81 mg Oral Daily    atorvastatin  20 mg Oral Daily    cloNIDine  0.1 mg Oral BID    diclofenac  75 mg Oral BID    DULoxetine  60 mg Oral Daily    enoxparin  40 mg Subcutaneous Q24H    famotidine  20 mg Oral Daily    fluticasone  1 spray Each Nare Daily    hydroCHLOROthiazide  12.5 mg Oral Daily    hydrOXYzine HCl  25 mg Oral TID    ibuprofen  400 mg Intravenous Q6H    mupirocin  1 g Nasal BID    pantoprazole  40 mg Oral Daily    spironolactone  25 mg Oral Daily     PRN Meds:   albuterol    calcium carbonate    diphenhydrAMINE    fentaNYL    metoclopramide HCl    naloxone    ondansetron    oxyCODONE    ramelteon    sodium chloride 0.9%        Objective:     Vital Signs (Most Recent):  Temp: 97 °F (36.1 °C) (01/25/18 0454)  Pulse: 78 (01/25/18 0454)  Resp: 18 (01/25/18 0454)  BP: (!) 91/50 (01/25/18 0454)  SpO2: 98 % (01/25/18 0454) Vital Signs (24h Range):  Temp:  [97 °F (36.1 °C)-98.4 °F (36.9 °C)] 97 °F (36.1 °C)  Pulse:  [65-81] 78  Resp:  [16-18] 18  SpO2:  [96 %-100 %] 98 %  BP: ()/(50-60) 91/50     Intake/Output - Last 3 Shifts       01/23 0700 - 01/24 0659 01/24 0700 - 01/25 0659    P.O.  1320    I.V. (mL/kg) 1273.3 (13.7)     IV Piggyback 600 200    Total Intake(mL/kg) 1873.3 (20.1) 1520 (16.3)    Urine (mL/kg/hr) 150 850 (0.4)    Total Output 150 850    Net +1723.3 +670          Urine Occurrence 1 x 1 x          Physical Exam   Constitutional: She is oriented to person, place, and time. She appears well-developed and well-nourished. No distress.   HENT:   Head: Normocephalic.   Eyes: Pupils are equal, round, and reactive to light.   Cardiovascular: Normal rate, regular rhythm and normal heart sounds.    Pulmonary/Chest:  Effort normal and breath sounds normal. No respiratory distress. She has no wheezes. She has no rales.   Abdominal: Soft. She exhibits no mass. There is no rebound and no guarding.   Former ostomy site clean and dry  Packing removed and repacked with gauze   Neurological: She is alert and oriented to person, place, and time.   Skin: Skin is warm and dry.   Psychiatric: She has a normal mood and affect. Her behavior is normal. Judgment and thought content normal.   Nursing note and vitals reviewed.      Significant Labs:  BMP (Last 3 Results):   Recent Labs  Lab 01/24/18  0421   GLU 98      K 3.9      CO2 25   BUN 19   CREATININE 1.5*   CALCIUM 9.1   MG 1.5*     CBC (Last 3 Results):   Recent Labs  Lab 01/24/18  0421   WBC 7.62   RBC 3.22*   HGB 8.4*   HCT 27.7*      MCV 86   MCH 26.1*   MCHC 30.3*       Significant Diagnostics:  None

## 2018-01-25 NOTE — HOSPITAL COURSE
Pt underwent above procdure, she had a normal post op course.  Once bowel function resumed diet was adavnced.  On discharge day she it kali low fiber diet, former ostomy site healing well, positive for flatus with small bm, adequate pain control with oral meds, VS stable, amb in [place] without diff and AF, fu Dr. Manjarrez 2 weeks

## 2018-01-25 NOTE — NURSING
Discharge instructions, medication, and follow-up appts complete with pt and her  verbalize understanding with no further questions at this jonel. IV d/c'd with no acute distress or needs at this time. Pt request wheel chair for transport to hospital exit for discharge.

## 2018-01-25 NOTE — ASSESSMENT & PLAN NOTE
2 Days Post-Op ileostomy closure  -Regular diet as tolerated  -Ambulation and IS           -scds and wiley hose  -monitor labs  -hep lock

## 2018-01-25 NOTE — DISCHARGE SUMMARY
[facility]  Colorectal Surgery  Discharge Summary      Patient Name: Coby Marshall  MRN: [id number]  Admission Date: 1/23/2018  Hospital Length of Stay: 2 days  Discharge Date and Time: 1/25/2018  9:59 AM  Attending Physician: Nicole att. providers found   Discharging Provider: Nataly An NP  Primary Care Provider: Randal Casillas MD     HPI:  Coby Marshall is a 62-year-old female patient with   history of low rectal cancer, status post neoadjuvant chemoradiation, who   underwent a low anterior resection as well as ileal resection for small bowel   masses that ended up being carcinoid on 12/11/2017.  She also had a diverting   loop ileostomy at that time.  She underwent a Gastrografin enema, which did not   show any leak at the area of the anastomosis.  Therefore, she was indicated for   ileostomy reversal.  Risks, benefits and alternatives of the procedure were   discussed with the patient and consent was obtained.    Procedure(s) (LRB):  CLOSURE-ILEOSTOMY, loop (Bilateral)     Hospital Course:  Pt underwent above procdure, she had a normal post op course.  Once bowel function resumed diet was adavnced.  On discharge day she it kali low fiber diet, former ostomy site healing well, positive for flatus with small bm, adequate pain control with oral meds, VS stable, amb in [place] without diff and AF, fu Dr. Manjarrez 2 weeks         Significant Diagnostic Studies: Labs:   BMP:   Recent Labs  Lab 01/24/18  0421 01/25/18  0858   GLU 98 114*    138   K 3.9 3.6    105   CO2 25 21*   BUN 19 28*   CREATININE 1.5* 2.1*   CALCIUM 9.1 8.7   MG 1.5* 1.6   , CMP   Recent Labs  Lab 01/24/18  0421 01/25/18  0858    138   K 3.9 3.6    105   CO2 25 21*   GLU 98 114*   BUN 19 28*   CREATININE 1.5* 2.1*   CALCIUM 9.1 8.7   ANIONGAP 10 12   ESTGFRAFRICA 42.1* 28.1*   EGFRNONAA 36.6* 24.3*    and CBC   Recent Labs  Lab 01/24/18  0421 01/25/18  0858   WBC 7.62 8.50   HGB 8.4* 8.2*   HCT 27.7*  27.0*    241       Pending Diagnostic Studies:     None        Final Active Diagnoses:    Diagnosis Date Noted POA    PRINCIPAL PROBLEM:  Ileostomy in place [Z93.2] 12/27/2017 Not Applicable    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    Specialty:  Colon and Rectal Surgery  Contact information:  [address]  [facility] [address] [phone]                 Patient Instructions:     Diet Adult Regular   Scheduling Instructions: No fresh fruit or fresh vegetables, no nuts, popcorn, raisins or grapes     Call MD for:  persistent nausea and vomiting or diarrhea     Call MD for:  severe uncontrolled pain     Call MD for:  redness, tenderness, or signs of infection (pain, swelling, redness, odor or green/yellow discharge around incision site)     Call MD for:  difficulty breathing or increased cough     Call MD for:  severe persistent headache     Call MD for:  worsening rash     Call MD for:  persistent dizziness, light-headedness, or visual disturbances     Call MD for:  increased confusion or weakness     Call MD for:   Order Comments: Call for temp above 101     Lifting restrictions   Scheduling Instructions: No lifting greater then 5 pounds     Change dressing (specify)   Order Comments: Lightly pack wound daily with damp dressing       Medications:  Reconciled Home Medications:   Discharge Medication List as of 1/25/2018  9:31 AM      CONTINUE these medications which have CHANGED    Details   oxyCODONE (ROXICODONE) 5 MG immediate release tablet Take 1 tablet (5 mg total) by mouth every 4 (four) hours as needed., Starting Thu 1/25/2018, Print         CONTINUE these medications which have NOT CHANGED    Details   ALBUTEROL INHL Inhale into the lungs., Historical Med       aspirin (ECOTRIN) 81 MG EC tablet Take 81 mg by mouth once daily., Historical Med      atorvastatin (LIPITOR) 20 MG tablet Take 20 mg by mouth once daily., Historical Med      cloNIDine (CATAPRES) 0.1 MG tablet Take 0.1 mg by mouth 2 (two) times daily., Historical Med      diclofenac (VOLTAREN) 75 MG EC tablet Take 75 mg by mouth 2 (two) times daily., Historical Med      DULoxetine (CYMBALTA) 60 MG capsule Take 60 mg by mouth once daily., Historical Med      famotidine (PEPCID) 20 MG tablet Take 20 mg by mouth 2 (two) times daily., Historical Med      fluticasone (FLONASE) 50 mcg/actuation nasal spray 1 spray by Each Nare route once daily., Historical Med      hydroCHLOROthiazide (MICROZIDE) 12.5 mg capsule Take 12.5 mg by mouth once daily., Historical Med      hydrOXYzine HCl (ATARAX) 25 MG tablet Take 25 mg by mouth 3 (three) times daily., Historical Med      omeprazole (PRILOSEC) 20 MG capsule Take 20 mg by mouth once daily., Historical Med      silver sulfADIAZINE 1% (SILVADENE) 1 % cream Apply topically 2 (two) times daily., Historical Med      spironolactone (ALDACTONE) 25 MG tablet Take 25 mg by mouth once daily., Historical Med             Nataly An NP  Colorectal Surgery  [facility]

## 2018-01-26 NOTE — ANESTHESIA POSTPROCEDURE EVALUATION
"Anesthesia Post Evaluation    Patient: Coby Marshall    Procedure(s) Performed: Procedure(s) (LRB):  CLOSURE-ILEOSTOMY, loop (Bilateral)    Final Anesthesia Type: general  Patient location during evaluation: PACU  Patient participation: No - Unable to Participate, Other Reason (see comments)  Level of consciousness: awake  Post-procedure vital signs: reviewed and stable  Pain management: adequate  Airway patency: patent  PONV status at discharge: No PONV  Anesthetic complications: no      Cardiovascular status: stable  Respiratory status: unassisted  Hydration status: euvolemic  Follow-up not needed.        Visit Vitals  BP (!) 89/52 (BP Location: Left arm, Patient Position: Lying)   Pulse 81   Temp 36.6 °C (97.9 °F) (Oral)   Resp 16   Ht 5' 6" (1.676 m)   Wt 93 kg (205 lb)   SpO2 97%   Breastfeeding? No   BMI 33.09 kg/m²       Pain/Mikey Score: Pain Assessment Performed: Yes (1/25/2018  7:50 AM)  Presence of Pain: denies (1/25/2018  7:50 AM)  Pain Rating Prior to Med Admin: 7 (1/25/2018  8:31 AM)      "

## 2018-02-05 ENCOUNTER — HOSPITAL ENCOUNTER (INPATIENT)
Facility: HOSPITAL | Age: 63
LOS: 4 days | Discharge: HOME | DRG: 390 | End: 2018-02-09
Attending: COLON & RECTAL SURGERY | Admitting: COLON & RECTAL SURGERY
Payer: COMMERCIAL

## 2018-02-05 DIAGNOSIS — D3A.012 CARCINOID TUMOR OF ILEUM: ICD-10-CM

## 2018-02-05 DIAGNOSIS — K56.600 PARTIAL SMALL BOWEL OBSTRUCTION: Primary | ICD-10-CM

## 2018-02-05 DIAGNOSIS — D3A.026 CARCINOID TUMOR OF RECTUM: ICD-10-CM

## 2018-02-05 PROCEDURE — 25000003 PHARM REV CODE 250: Performed by: STUDENT IN AN ORGANIZED HEALTH CARE EDUCATION/TRAINING PROGRAM

## 2018-02-05 PROCEDURE — 63600175 PHARM REV CODE 636 W HCPCS: Performed by: STUDENT IN AN ORGANIZED HEALTH CARE EDUCATION/TRAINING PROGRAM

## 2018-02-05 PROCEDURE — 20600001 HC STEP DOWN PRIVATE ROOM

## 2018-02-05 PROCEDURE — 99221 1ST HOSP IP/OBS SF/LOW 40: CPT | Mod: 24,,, | Performed by: COLON & RECTAL SURGERY

## 2018-02-05 RX ORDER — DULOXETIN HYDROCHLORIDE 60 MG/1
60 CAPSULE, DELAYED RELEASE ORAL DAILY
Status: DISCONTINUED | OUTPATIENT
Start: 2018-02-06 | End: 2018-02-09 | Stop reason: HOSPADM

## 2018-02-05 RX ORDER — SODIUM CHLORIDE 0.9 % (FLUSH) 0.9 %
3 SYRINGE (ML) INJECTION
Status: DISCONTINUED | OUTPATIENT
Start: 2018-02-05 | End: 2018-02-09 | Stop reason: HOSPADM

## 2018-02-05 RX ORDER — ALBUTEROL SULFATE 90 UG/1
1 AEROSOL, METERED RESPIRATORY (INHALATION) EVERY 4 HOURS PRN
Status: DISCONTINUED | OUTPATIENT
Start: 2018-02-05 | End: 2018-02-09 | Stop reason: HOSPADM

## 2018-02-05 RX ORDER — ACETAMINOPHEN 325 MG/1
650 TABLET ORAL EVERY 4 HOURS PRN
Status: DISCONTINUED | OUTPATIENT
Start: 2018-02-05 | End: 2018-02-09 | Stop reason: HOSPADM

## 2018-02-05 RX ORDER — FAMOTIDINE 20 MG/1
20 TABLET, FILM COATED ORAL 2 TIMES DAILY
Status: DISCONTINUED | OUTPATIENT
Start: 2018-02-05 | End: 2018-02-09 | Stop reason: HOSPADM

## 2018-02-05 RX ORDER — HYDROCHLOROTHIAZIDE 12.5 MG/1
12.5 TABLET ORAL DAILY
Status: DISCONTINUED | OUTPATIENT
Start: 2018-02-06 | End: 2018-02-09 | Stop reason: HOSPADM

## 2018-02-05 RX ORDER — HEPARIN SODIUM 5000 [USP'U]/ML
5000 INJECTION, SOLUTION INTRAVENOUS; SUBCUTANEOUS EVERY 8 HOURS
Status: DISCONTINUED | OUTPATIENT
Start: 2018-02-05 | End: 2018-02-09 | Stop reason: HOSPADM

## 2018-02-05 RX ORDER — ATORVASTATIN CALCIUM 20 MG/1
20 TABLET, FILM COATED ORAL DAILY
Status: DISCONTINUED | OUTPATIENT
Start: 2018-02-06 | End: 2018-02-09 | Stop reason: HOSPADM

## 2018-02-05 RX ORDER — ONDANSETRON 2 MG/ML
4 INJECTION INTRAMUSCULAR; INTRAVENOUS EVERY 12 HOURS PRN
Status: DISCONTINUED | OUTPATIENT
Start: 2018-02-05 | End: 2018-02-09 | Stop reason: HOSPADM

## 2018-02-05 RX ORDER — DIPHENHYDRAMINE HYDROCHLORIDE 50 MG/ML
25 INJECTION INTRAMUSCULAR; INTRAVENOUS EVERY 4 HOURS PRN
Status: DISCONTINUED | OUTPATIENT
Start: 2018-02-05 | End: 2018-02-09 | Stop reason: HOSPADM

## 2018-02-05 RX ORDER — SPIRONOLACTONE 25 MG/1
25 TABLET ORAL DAILY
Status: DISCONTINUED | OUTPATIENT
Start: 2018-02-06 | End: 2018-02-09 | Stop reason: HOSPADM

## 2018-02-05 RX ORDER — SODIUM CHLORIDE 9 MG/ML
INJECTION, SOLUTION INTRAVENOUS CONTINUOUS
Status: DISCONTINUED | OUTPATIENT
Start: 2018-02-05 | End: 2018-02-08

## 2018-02-05 RX ORDER — ACETAMINOPHEN 325 MG/1
650 TABLET ORAL EVERY 8 HOURS PRN
Status: DISCONTINUED | OUTPATIENT
Start: 2018-02-05 | End: 2018-02-06

## 2018-02-05 RX ADMIN — HEPARIN SODIUM 5000 UNITS: 5000 INJECTION, SOLUTION INTRAVENOUS; SUBCUTANEOUS at 06:02

## 2018-02-05 RX ADMIN — HEPARIN SODIUM 5000 UNITS: 5000 INJECTION, SOLUTION INTRAVENOUS; SUBCUTANEOUS at 09:02

## 2018-02-05 RX ADMIN — SODIUM CHLORIDE: 0.9 INJECTION, SOLUTION INTRAVENOUS at 06:02

## 2018-02-05 RX ADMIN — HEPARIN SODIUM 5000 UNITS: 5000 INJECTION, SOLUTION INTRAVENOUS; SUBCUTANEOUS at 01:02

## 2018-02-05 RX ADMIN — SODIUM CHLORIDE: 0.9 INJECTION, SOLUTION INTRAVENOUS at 09:02

## 2018-02-05 RX ADMIN — SODIUM CHLORIDE: 0.9 INJECTION, SOLUTION INTRAVENOUS at 02:02

## 2018-02-05 RX ADMIN — FAMOTIDINE 20 MG: 20 TABLET, FILM COATED ORAL at 09:02

## 2018-02-05 NOTE — PLAN OF CARE
Problem: Patient Care Overview  Goal: Plan of Care Review  Outcome: Ongoing (interventions implemented as appropriate)  POC reviewed with pt and pt's spouse, both verbalized understanding. AAOx4. VSS. Denies pain. NG to LIWS, scant amount of clear output in canister. Denies nausea. Denies pain. Skin intact. Pt is having incontinence episodes of bowel, small amounts of light brown liquid. Voiding in toilet. IV fluids infusing. Pt tolerating ice chips. Port unaccessed. Home meds not restarted. No falls or injuries. Walking in halls. Up ad maria m. Spouse at bedside. Call bell within reach, bed low. WCTM.

## 2018-02-05 NOTE — ASSESSMENT & PLAN NOTE
Ms. Marshall is a 61 y/o F who was found to have rectal mass and LAR w/ ileal resection with loop ileostomy for 2 carcinoids on 12/11/17 .  (neg nodes) On 1/22/18 she underwent loop ileostomy takedown on 1/23/18 CT at [facility] 2/4/18 revealing mildly dilated small bowel and distally decompressed bowel. Likely partial small bowel obstruction.    -Ng tube to LIWS  -KUB to confirm ng placement  -NPO  -IVF  -AM labs daily  -replete electrolytes  -GI/DVT ppx    Will attempt ngt decompression

## 2018-02-05 NOTE — PLAN OF CARE
PCP- DR. JL MALONE    PT HAS A RIDE HOME AND FAMILY SUPPORT WITH        02/05/18 1610   Discharge Assessment   Assessment Type Discharge Planning Assessment   Confirmed/corrected address and phone number on facesheet? Yes   Assessment information obtained from? Patient   Expected Length of Stay (days) 3   Communicated expected length of stay with patient/caregiver yes   Prior to hospitilization cognitive status: Alert/Oriented   Prior to hospitalization functional status: Independent   Current cognitive status: Alert/Oriented   Current Functional Status: Independent   Lives With spouse   Able to Return to Prior Arrangements yes   Is patient able to care for self after discharge? Yes   Patient's perception of discharge disposition home or selfcare   Readmission Within The Last 30 Days no previous admission in last 30 days   Patient currently being followed by outpatient case management? No   Patient currently receives any other outside agency services? No   Equipment Currently Used at Home colostomy/ostomy supplies   Do you have any problems affording any of your prescribed medications? No   Is the patient taking medications as prescribed? yes   Does the patient have transportation home? Yes   Transportation Available family or friend will provide;car   Does the patient receive services at the Coumadin Clinic? No   Discharge Plan A Home Health   Discharge Plan B Home Health;Home with family   Patient/Family In Agreement With Plan yes

## 2018-02-05 NOTE — HPI
Ms. Marshall is a 63 y/o F who was found to hav rectal mass and completed chemoXRT and LAR w/ ileal resection with loop ileostomy for 2 carcinoids on 12/11/17 .  (neg nodes) On 1/22/18 she underwent pouchagram and then subsequent loop ileostomy takedown on 1/23/18, she was discharged on 1/25/18 tolerating a low res diet and having bowel function.     Patient admitted as transfer this AM from outside ED w/ abdominal pain and small amounts of intermittent loose stools, as well as nausea and emesis on Friday w/ poor PO intake, none since. CT at OSH 2/4/18 revealing mildly dilated small bowel and distally decompressed bowel. Concern for Partial small bowel obstruction. She denies fevers/ chills/ blood in stool/ other symptoms.

## 2018-02-05 NOTE — SUBJECTIVE & OBJECTIVE
PTA Medications   Medication Sig    ALBUTEROL INHL Inhale into the lungs.    aspirin (ECOTRIN) 81 MG EC tablet Take 81 mg by mouth once daily.    atorvastatin (LIPITOR) 20 MG tablet Take 20 mg by mouth once daily.    cloNIDine (CATAPRES) 0.1 MG tablet Take 0.1 mg by mouth 2 (two) times daily.    diclofenac (VOLTAREN) 75 MG EC tablet Take 75 mg by mouth 2 (two) times daily.    DULoxetine (CYMBALTA) 60 MG capsule Take 60 mg by mouth once daily.    famotidine (PEPCID) 20 MG tablet Take 20 mg by mouth 2 (two) times daily.    fluticasone (FLONASE) 50 mcg/actuation nasal spray 1 spray by Each Nare route once daily.    hydroCHLOROthiazide (MICROZIDE) 12.5 mg capsule Take 12.5 mg by mouth once daily.    hydrOXYzine HCl (ATARAX) 25 MG tablet Take 25 mg by mouth 3 (three) times daily.    omeprazole (PRILOSEC) 20 MG capsule Take 20 mg by mouth once daily.    oxyCODONE (ROXICODONE) 5 MG immediate release tablet Take 1 tablet (5 mg total) by mouth every 4 (four) hours as needed.    silver sulfADIAZINE 1% (SILVADENE) 1 % cream Apply topically 2 (two) times daily.    spironolactone (ALDACTONE) 25 MG tablet Take 25 mg by mouth once daily.       Review of patient's allergies indicates:   Allergen Reactions    Lisinopril-hydrochlorothiazide Swelling     Mouth Swelling    Sulfa (sulfonamide antibiotics) Swelling     Swelling of lips       Past Medical History:   Diagnosis Date    Asthma     Hyperlipidemia     Hypertension      Past Surgical History:   Procedure Laterality Date    COLON SURGERY      HYSTERECTOMY      port a cath Right     R chest wall     Family History     None        Social History Main Topics    Smoking status: Never Smoker    Smokeless tobacco: Never Used    Alcohol use No    Drug use: No    Sexual activity: Not on file     Review of Systems   Constitutional: Positive for appetite change and fatigue. Negative for chills and fever.   HENT: Negative for congestion and dental problem.     Eyes: Negative for discharge.   Respiratory: Negative for chest tightness and shortness of breath.    Cardiovascular: Negative for chest pain and leg swelling.   Gastrointestinal: Positive for abdominal pain and diarrhea. Negative for blood in stool.   Endocrine: Negative for cold intolerance and heat intolerance.   Genitourinary: Negative for difficulty urinating and flank pain.   Musculoskeletal: Negative for joint swelling.   Skin: Negative for color change.   Neurological: Negative for dizziness and headaches.   Hematological: Negative for adenopathy.   Psychiatric/Behavioral: Negative for agitation.     Objective:     Vital Signs (Most Recent):  Temp: 98.2 °F (36.8 °C) (02/05/18 0811)  Pulse: 80 (02/05/18 0811)  Resp: 16 (02/05/18 0811)  BP: 137/72 (02/05/18 0811)  SpO2: 100 % (02/05/18 0811) Vital Signs (24h Range):  Temp:  [98.2 °F (36.8 °C)-98.3 °F (36.8 °C)] 98.2 °F (36.8 °C)  Pulse:  [67-80] 80  Resp:  [16] 16  SpO2:  [96 %-100 %] 100 %  BP: (137-155)/(72-77) 137/72        There is no height or weight on file to calculate BMI.    Physical Exam   Constitutional: She is oriented to person, place, and time. She appears well-developed and well-nourished.   HENT:   Head: Normocephalic and atraumatic.   Ng in place   Eyes: Conjunctivae are normal.   Neck: Normal range of motion. Neck supple.   Cardiovascular: Normal rate and regular rhythm.    Pulmonary/Chest: Effort normal and breath sounds normal. No respiratory distress.   Abdominal: She exhibits distension. She exhibits no mass. There is tenderness. There is no rebound and no guarding.   slightly tender to palpation diffusely  Distended   Musculoskeletal: Normal range of motion. She exhibits no edema.   Neurological: She is alert and oriented to person, place, and time.   Skin: Skin is warm and dry.   Psychiatric: She has a normal mood and affect. Her behavior is normal. Judgment and thought content normal.   Nursing note and vitals  reviewed.      Significant Labs:  CBC (Last 3 Results): No results for input(s): WBC, RBC, HGB, HCT, PLT, MCV, MCH, MCHC in the last 168 hours.  CMP (Last 3 Results): No results for input(s): GLU, CALCIUM, ALBUMIN, PROT, NA, K, CO2, CL, BUN, CREATININE, ALKPHOS, ALT, AST, BILITOT in the last 168 hours.    Significant Diagnostics:  None

## 2018-02-05 NOTE — H&P
[facility]  Colorectal Surgery  History & Physical    Patient Name: Coby Marshall  MRN: [id number]  Admission Date: 2/5/2018  Attending Physician: Suhas Manjarrez MD   Primary Care Provider: Randal Casillas MD    Subjective:     Chief Complaint/Reason for Admission: abdominal pain/ distention transfer from OSH    History of Present Illness:  Ms. Marshall is a 63 y/o F who was found to hav rectal mass and completed chemoXRT and LAR w/ ileal resection with loop ileostomy for 2 carcinoids on 12/11/17 .  (neg nodes) On 1/22/18 she underwent pouchagram and then subsequent loop ileostomy takedown on 1/23/18, she was discharged on 1/25/18 tolerating a low res diet and having bowel function.     Patient admitted as transfer this AM from outside ED w/ abdominal pain and small amounts of intermittent loose stools, as well as nausea and emesis on Friday w/ poor PO intake, none since. CT at OSH 2/4/18 revealing mildly dilated small bowel and distally decompressed bowel. Concern for Partial small bowel obstruction. She denies fevers/ chills/ blood in stool/ other symptoms.     PTA Medications   Medication Sig    ALBUTEROL INHL Inhale into the lungs.    aspirin (ECOTRIN) 81 MG EC tablet Take 81 mg by mouth once daily.    atorvastatin (LIPITOR) 20 MG tablet Take 20 mg by mouth once daily.    cloNIDine (CATAPRES) 0.1 MG tablet Take 0.1 mg by mouth 2 (two) times daily.    diclofenac (VOLTAREN) 75 MG EC tablet Take 75 mg by mouth 2 (two) times daily.    DULoxetine (CYMBALTA) 60 MG capsule Take 60 mg by mouth once daily.    famotidine (PEPCID) 20 MG tablet Take 20 mg by mouth 2 (two) times daily.    fluticasone (FLONASE) 50 mcg/actuation nasal spray 1 spray by Each Nare route once daily.    hydroCHLOROthiazide (MICROZIDE) 12.5 mg capsule Take 12.5 mg by mouth once daily.    hydrOXYzine HCl (ATARAX) 25 MG tablet Take 25 mg by mouth 3 (three) times daily.    omeprazole (PRILOSEC) 20 MG capsule Take 20 mg by  mouth once daily.    oxyCODONE (ROXICODONE) 5 MG immediate release tablet Take 1 tablet (5 mg total) by mouth every 4 (four) hours as needed.    silver sulfADIAZINE 1% (SILVADENE) 1 % cream Apply topically 2 (two) times daily.    spironolactone (ALDACTONE) 25 MG tablet Take 25 mg by mouth once daily.       Review of patient's allergies indicates:   Allergen Reactions    Lisinopril-hydrochlorothiazide Swelling     Mouth Swelling    Sulfa (sulfonamide antibiotics) Swelling     Swelling of lips       Past Medical History:   Diagnosis Date    Asthma     Hyperlipidemia     Hypertension      Past Surgical History:   Procedure Laterality Date    COLON SURGERY      HYSTERECTOMY      port a cath Right     R chest wall     Family History     None        Social History Main Topics    Smoking status: Never Smoker    Smokeless tobacco: Never Used    Alcohol use No    Drug use: No    Sexual activity: Not on file     Review of Systems   Constitutional: Positive for appetite change and fatigue. Negative for chills and fever.   HENT: Negative for congestion and dental problem.    Eyes: Negative for discharge.   Respiratory: Negative for chest tightness and shortness of breath.    Cardiovascular: Negative for chest pain and leg swelling.   Gastrointestinal: Positive for abdominal pain and diarrhea. Negative for blood in stool.   Endocrine: Negative for cold intolerance and heat intolerance.   Genitourinary: Negative for difficulty urinating and flank pain.   Musculoskeletal: Negative for joint swelling.   Skin: Negative for color change.   Neurological: Negative for dizziness and headaches.   Hematological: Negative for adenopathy.   Psychiatric/Behavioral: Negative for agitation.     Objective:     Vital Signs (Most Recent):  Temp: 98.2 °F (36.8 °C) (02/05/18 0811)  Pulse: 80 (02/05/18 0811)  Resp: 16 (02/05/18 0811)  BP: 137/72 (02/05/18 0811)  SpO2: 100 % (02/05/18 0811) Vital Signs (24h Range):  Temp:  [98.2 °F  (36.8 °C)-98.3 °F (36.8 °C)] 98.2 °F (36.8 °C)  Pulse:  [67-80] 80  Resp:  [16] 16  SpO2:  [96 %-100 %] 100 %  BP: (137-155)/(72-77) 137/72        There is no height or weight on file to calculate BMI.    Physical Exam   Constitutional: She is oriented to person, place, and time. She appears well-developed and well-nourished.   HENT:   Head: Normocephalic and atraumatic.   Ng in place   Eyes: Conjunctivae are normal.   Neck: Normal range of motion. Neck supple.   Cardiovascular: Normal rate and regular rhythm.    Pulmonary/Chest: Effort normal and breath sounds normal. No respiratory distress.   Abdominal: She exhibits distension. She exhibits no mass. There is tenderness. There is no rebound and no guarding.   slightly tender to palpation diffusely  Distended   Musculoskeletal: Normal range of motion. She exhibits no edema.   Neurological: She is alert and oriented to person, place, and time.   Skin: Skin is warm and dry.   Psychiatric: She has a normal mood and affect. Her behavior is normal. Judgment and thought content normal.   Nursing note and vitals reviewed.      Significant Labs:  CBC (Last 3 Results): No results for input(s): WBC, RBC, HGB, HCT, PLT, MCV, MCH, MCHC in the last 168 hours.  CMP (Last 3 Results): No results for input(s): GLU, CALCIUM, ALBUMIN, PROT, NA, K, CO2, CL, BUN, CREATININE, ALKPHOS, ALT, AST, BILITOT in the last 168 hours.    Significant Diagnostics:  None    Assessment/Plan:     Partial small bowel obstruction    Ms. Marshall is a 61 y/o F who was found to have rectal mass and LAR w/ ileal resection with loop ileostomy for 2 carcinoids on 12/11/17 .  (neg nodes) On 1/22/18 she underwent loop ileostomy takedown on 1/23/18 CT at OSH 2/4/18 revealing mildly dilated small bowel and distally decompressed bowel. Likely partial small bowel obstruction.    -Ng tube to LIWS  -KUB to confirm ng placement  -NPO  -IVF  -AM labs daily  -replete electrolytes  -GI/DVT ppx    Will attempt ngt  decompression                Pavan Seth MD  Colorectal Surgery  [facility]

## 2018-02-05 NOTE — NURSING
Notified MD Ann With MD [facility] service regarding patient's arrival to unit. Vitals signs stable and patient is resting quietly, will continue to monitor patient status.

## 2018-02-06 LAB
ANION GAP SERPL CALC-SCNC: 11 MMOL/L
BASOPHILS # BLD AUTO: 0.01 K/UL
BASOPHILS NFR BLD: 0.2 %
BUN SERPL-MCNC: 16 MG/DL
CALCIUM SERPL-MCNC: 8.6 MG/DL
CHLORIDE SERPL-SCNC: 108 MMOL/L
CO2 SERPL-SCNC: 24 MMOL/L
CREAT SERPL-MCNC: 0.9 MG/DL
DIFFERENTIAL METHOD: ABNORMAL
EOSINOPHIL # BLD AUTO: 0.2 K/UL
EOSINOPHIL NFR BLD: 3.8 %
ERYTHROCYTE [DISTWIDTH] IN BLOOD BY AUTOMATED COUNT: 14 %
EST. GFR  (AFRICAN AMERICAN): >60 ML/MIN/1.73 M^2
EST. GFR  (NON AFRICAN AMERICAN): >60 ML/MIN/1.73 M^2
GLUCOSE SERPL-MCNC: 79 MG/DL
HCT VFR BLD AUTO: 28 %
HGB BLD-MCNC: 8.4 G/DL
IMM GRANULOCYTES # BLD AUTO: 0.02 K/UL
IMM GRANULOCYTES NFR BLD AUTO: 0.4 %
LYMPHOCYTES # BLD AUTO: 1 K/UL
LYMPHOCYTES NFR BLD: 18.7 %
MAGNESIUM SERPL-MCNC: 1.6 MG/DL
MCH RBC QN AUTO: 25.8 PG
MCHC RBC AUTO-ENTMCNC: 30 G/DL
MCV RBC AUTO: 86 FL
MONOCYTES # BLD AUTO: 0.6 K/UL
MONOCYTES NFR BLD: 10.1 %
NEUTROPHILS # BLD AUTO: 3.7 K/UL
NEUTROPHILS NFR BLD: 66.8 %
NRBC BLD-RTO: 0 /100 WBC
PHOSPHATE SERPL-MCNC: 2.9 MG/DL
PLATELET # BLD AUTO: 430 K/UL
PMV BLD AUTO: 9.2 FL
POTASSIUM SERPL-SCNC: 4.1 MMOL/L
RBC # BLD AUTO: 3.25 M/UL
SODIUM SERPL-SCNC: 143 MMOL/L
WBC # BLD AUTO: 5.46 K/UL

## 2018-02-06 PROCEDURE — 20600001 HC STEP DOWN PRIVATE ROOM

## 2018-02-06 PROCEDURE — 84100 ASSAY OF PHOSPHORUS: CPT

## 2018-02-06 PROCEDURE — 99231 SBSQ HOSP IP/OBS SF/LOW 25: CPT | Mod: 24,,, | Performed by: COLON & RECTAL SURGERY

## 2018-02-06 PROCEDURE — 83735 ASSAY OF MAGNESIUM: CPT

## 2018-02-06 PROCEDURE — 36415 COLL VENOUS BLD VENIPUNCTURE: CPT

## 2018-02-06 PROCEDURE — 80048 BASIC METABOLIC PNL TOTAL CA: CPT

## 2018-02-06 PROCEDURE — 25000003 PHARM REV CODE 250: Performed by: STUDENT IN AN ORGANIZED HEALTH CARE EDUCATION/TRAINING PROGRAM

## 2018-02-06 PROCEDURE — 85025 COMPLETE CBC W/AUTO DIFF WBC: CPT

## 2018-02-06 PROCEDURE — 63600175 PHARM REV CODE 636 W HCPCS: Performed by: STUDENT IN AN ORGANIZED HEALTH CARE EDUCATION/TRAINING PROGRAM

## 2018-02-06 RX ORDER — DOCUSATE SODIUM 100 MG/1
100 CAPSULE, LIQUID FILLED ORAL 2 TIMES DAILY
Status: DISCONTINUED | OUTPATIENT
Start: 2018-02-06 | End: 2018-02-09 | Stop reason: HOSPADM

## 2018-02-06 RX ORDER — LANOLIN ALCOHOL/MO/W.PET/CERES
400 CREAM (GRAM) TOPICAL ONCE
Status: COMPLETED | OUTPATIENT
Start: 2018-02-06 | End: 2018-02-06

## 2018-02-06 RX ADMIN — MAGNESIUM OXIDE TAB 400 MG (241.3 MG ELEMENTAL MG) 400 MG: 400 (241.3 MG) TAB at 09:02

## 2018-02-06 RX ADMIN — HEPARIN SODIUM 5000 UNITS: 5000 INJECTION, SOLUTION INTRAVENOUS; SUBCUTANEOUS at 09:02

## 2018-02-06 RX ADMIN — FAMOTIDINE 20 MG: 20 TABLET, FILM COATED ORAL at 09:02

## 2018-02-06 RX ADMIN — DOCUSATE SODIUM 100 MG: 100 CAPSULE, LIQUID FILLED ORAL at 09:02

## 2018-02-06 RX ADMIN — DULOXETINE 60 MG: 60 CAPSULE, DELAYED RELEASE ORAL at 09:02

## 2018-02-06 RX ADMIN — SODIUM CHLORIDE: 0.9 INJECTION, SOLUTION INTRAVENOUS at 01:02

## 2018-02-06 RX ADMIN — SODIUM CHLORIDE: 0.9 INJECTION, SOLUTION INTRAVENOUS at 09:02

## 2018-02-06 RX ADMIN — HEPARIN SODIUM 5000 UNITS: 5000 INJECTION, SOLUTION INTRAVENOUS; SUBCUTANEOUS at 01:02

## 2018-02-06 RX ADMIN — ATORVASTATIN CALCIUM 20 MG: 20 TABLET, FILM COATED ORAL at 09:02

## 2018-02-06 RX ADMIN — HYDROCHLOROTHIAZIDE 12.5 MG: 12.5 TABLET ORAL at 09:02

## 2018-02-06 RX ADMIN — HEPARIN SODIUM 5000 UNITS: 5000 INJECTION, SOLUTION INTRAVENOUS; SUBCUTANEOUS at 05:02

## 2018-02-06 RX ADMIN — SPIRONOLACTONE 25 MG: 25 TABLET, FILM COATED ORAL at 09:02

## 2018-02-06 NOTE — SUBJECTIVE & OBJECTIVE
Subjective:     Interval History:     Patient improving, decrease NGT output to 300 cc. Having BM.     Post-Op Info:  * No surgery found *          Medications:  Continuous Infusions:   sodium chloride 0.9% 125 mL/hr at 02/05/18 2114     Scheduled Meds:   atorvastatin  20 mg Oral Daily    DULoxetine  60 mg Oral Daily    famotidine  20 mg Oral BID    heparin (porcine)  5,000 Units Subcutaneous Q8H    hydroCHLOROthiazide  12.5 mg Oral Daily    spironolactone  25 mg Oral Daily     PRN Meds:   acetaminophen    acetaminophen    albuterol    diphenhydrAMINE    ondansetron    pneumoc 13-janine conj-dip cr(PF)    promethazine (PHENERGAN) IVPB    sodium chloride 0.9%        Objective:     Vital Signs (Most Recent):  Temp: 97.8 °F (36.6 °C) (02/06/18 0732)  Pulse: 83 (02/06/18 0732)  Resp: 16 (02/06/18 0732)  BP: 130/60 (02/06/18 0732)  SpO2: 100 % (02/06/18 0732) Vital Signs (24h Range):  Temp:  [97.4 °F (36.3 °C)-98.5 °F (36.9 °C)] 97.8 °F (36.6 °C)  Pulse:  [81-90] 83  Resp:  [16-20] 16  SpO2:  [95 %-100 %] 100 %  BP: (116-140)/(56-69) 130/60     Intake/Output - Last 3 Shifts       02/04 0700 - 02/05 0659 02/05 0700 - 02/06 0659 02/06 0700 - 02/07 0659    P.O.  300     I.V.  2464.6     Total Intake   2764.6      Urine  400     Emesis/NG output  0     Drains  300     Other  0     Stool  0     Total Output   700      Net   +2064.6             Urine Occurrence  2 x     Stool Occurrence  2 x     Emesis Occurrence  0 x           Physical Exam    General: In no acute distress, well appearance.   CV: RRR, S1, S2 no murmur or gallops   Pulm: non labored breathing, b/l clear breath sounds.   Abd: soft, non distended, non tender. Minimal output from the NGT, clear color.   Ext: wwp      Significant Labs:  BMP (Last 3 Results):   Recent Labs  Lab 02/06/18  0633   GLU 79      K 4.1      CO2 24   BUN 16   CREATININE 0.9   CALCIUM 8.6*   MG 1.6     CBC (Last 3 Results):   Recent Labs  Lab 02/06/18  0633   WBC  5.46   RBC 3.25*   HGB 8.4*   HCT 28.0*   *   MCV 86   MCH 25.8*   MCHC 30.0*       Significant Diagnostics:    Abdominal XR    NG tube fundus. No obstruction, ileus, or perforation seen.

## 2018-02-06 NOTE — PROGRESS NOTES
[facility]  Colorectal Surgery  Progress Note    Patient Name: Coby Marshall  MRN: [id number]  Admission Date: 2/5/2018  Hospital Length of Stay: 1 days  Attending Physician: Suhas Manjarrez MD    Subjective:     Interval History:     Patient improving, decrease NGT output to 300 cc. Having BM.     Post-Op Info:  * No surgery found *          Medications:  Continuous Infusions:   sodium chloride 0.9% 125 mL/hr at 02/05/18 2114     Scheduled Meds:   atorvastatin  20 mg Oral Daily    DULoxetine  60 mg Oral Daily    famotidine  20 mg Oral BID    heparin (porcine)  5,000 Units Subcutaneous Q8H    hydroCHLOROthiazide  12.5 mg Oral Daily    spironolactone  25 mg Oral Daily     PRN Meds:   acetaminophen    acetaminophen    albuterol    diphenhydrAMINE    ondansetron    pneumoc 13-janine conj-dip cr(PF)    promethazine (PHENERGAN) IVPB    sodium chloride 0.9%        Objective:     Vital Signs (Most Recent):  Temp: 97.8 °F (36.6 °C) (02/06/18 0732)  Pulse: 83 (02/06/18 0732)  Resp: 16 (02/06/18 0732)  BP: 130/60 (02/06/18 0732)  SpO2: 100 % (02/06/18 0732) Vital Signs (24h Range):  Temp:  [97.4 °F (36.3 °C)-98.5 °F (36.9 °C)] 97.8 °F (36.6 °C)  Pulse:  [81-90] 83  Resp:  [16-20] 16  SpO2:  [95 %-100 %] 100 %  BP: (116-140)/(56-69) 130/60     Intake/Output - Last 3 Shifts       02/04 0700 - 02/05 0659 02/05 0700 - 02/06 0659 02/06 0700 - 02/07 0659    P.O.  300     I.V.  2464.6     Total Intake   2764.6      Urine  400     Emesis/NG output  0     Drains  300     Other  0     Stool  0     Total Output   700      Net   +2064.6             Urine Occurrence  2 x     Stool Occurrence  2 x     Emesis Occurrence  0 x           Physical Exam    General: In no acute distress, well appearance.   CV: RRR, S1, S2 no murmur or gallops   Pulm: non labored breathing, b/l clear breath sounds.   Abd: soft, non distended, non tender. Minimal output from the NGT, clear color.   Ext: wwp      Significant Labs:  BMP  (Last 3 Results):   Recent Labs  Lab 02/06/18  0633   GLU 79      K 4.1      CO2 24   BUN 16   CREATININE 0.9   CALCIUM 8.6*   MG 1.6     CBC (Last 3 Results):   Recent Labs  Lab 02/06/18  0633   WBC 5.46   RBC 3.25*   HGB 8.4*   HCT 28.0*   *   MCV 86   MCH 25.8*   MCHC 30.0*       Significant Diagnostics:    Abdominal XR    NG tube fundus. No obstruction, ileus, or perforation seen.    Assessment/Plan:     Partial small bowel obstruction    Ms. Marshall is a 63 y/o F who was found to have rectal mass and LAR w/ ileal resection with loop ileostomy for 2 carcinoids on 12/11/17 .  (neg nodes) On 1/22/18 she underwent loop ileostomy takedown on 1/23/18 CT at OSH 2/4/18 revealing mildly dilated small bowel and distally decompressed bowel. Likely partial small bowel obstruction.    - Remove NGT  - Keep NPO for now, just ice chips. May start some CLD.   - IVF  - AM labs daily  - replete electrolytes  - GI/DVT ppx        Jocelyn Kolb MD  General Surgery PGY IV  Beeper: [phone]

## 2018-02-06 NOTE — PLAN OF CARE
Problem: Patient Care Overview  Goal: Plan of Care Review  Outcome: Ongoing (interventions implemented as appropriate)  POC reviewed with pt and pt's spouse, both verbalized understanding. AAOx4. VSS. Denies pain. NG removed this am. Denies nausea. No emesis. Denies pain. Skin intact. Pt is having incontinence episodes of bowel, states it is becoming more firm. Now passing flatus. Voiding in toilet. IV fluids infusing. Pt tolerating clear liquids Port unaccessed. No falls or injuries. Walking in halls. Up ad maria m. Spouse and daughter at bedside. Call bell within reach, bed low. WCTM.

## 2018-02-06 NOTE — PLAN OF CARE
Problem: Patient Care Overview  Goal: Plan of Care Review  Outcome: Ongoing (interventions implemented as appropriate)  Pt AAOx4.  at bedside. L NGT intact and patent to LIWS. Pt denied pain throughout shift. VSS on RA. Pt slept between care. Pt ambulates to the bathroom with standby assist. Pt remains free of falls and injury. No acute events this shift. Will continue to monitor.

## 2018-02-06 NOTE — ASSESSMENT & PLAN NOTE
Ms. Marshall is a 63 y/o F who was found to have rectal mass and LAR w/ ileal resection with loop ileostomy for 2 carcinoids on 12/11/17 .  (neg nodes) On 1/22/18 she underwent loop ileostomy takedown on 1/23/18 CT at OSH 2/4/18 revealing mildly dilated small bowel and distally decompressed bowel. Likely partial small bowel obstruction.    - Remove NGT  - Keep NPO for now, just ice chips. May start some CLD.   - IVF  - AM labs daily  - replete electrolytes  - GI/DVT ppx        Jocelyn Kolb MD  General Surgery PGY IV  Beeper: [phone]

## 2018-02-07 PROBLEM — Z93.2 ILEOSTOMY IN PLACE: Status: RESOLVED | Noted: 2017-12-27 | Resolved: 2018-02-07

## 2018-02-07 LAB
ANION GAP SERPL CALC-SCNC: 8 MMOL/L
BASOPHILS # BLD AUTO: 0.01 K/UL
BASOPHILS NFR BLD: 0.2 %
BUN SERPL-MCNC: 13 MG/DL
CALCIUM SERPL-MCNC: 9 MG/DL
CHLORIDE SERPL-SCNC: 106 MMOL/L
CO2 SERPL-SCNC: 27 MMOL/L
CREAT SERPL-MCNC: 0.9 MG/DL
DIFFERENTIAL METHOD: ABNORMAL
EOSINOPHIL # BLD AUTO: 0.1 K/UL
EOSINOPHIL NFR BLD: 1.2 %
ERYTHROCYTE [DISTWIDTH] IN BLOOD BY AUTOMATED COUNT: 13.7 %
EST. GFR  (AFRICAN AMERICAN): >60 ML/MIN/1.73 M^2
EST. GFR  (NON AFRICAN AMERICAN): >60 ML/MIN/1.73 M^2
GLUCOSE SERPL-MCNC: 111 MG/DL
HCT VFR BLD AUTO: 27.3 %
HGB BLD-MCNC: 8.2 G/DL
IMM GRANULOCYTES # BLD AUTO: 0.02 K/UL
IMM GRANULOCYTES NFR BLD AUTO: 0.3 %
LYMPHOCYTES # BLD AUTO: 0.8 K/UL
LYMPHOCYTES NFR BLD: 12 %
MAGNESIUM SERPL-MCNC: 1.6 MG/DL
MCH RBC QN AUTO: 25.5 PG
MCHC RBC AUTO-ENTMCNC: 30 G/DL
MCV RBC AUTO: 85 FL
MONOCYTES # BLD AUTO: 0.7 K/UL
MONOCYTES NFR BLD: 10 %
NEUTROPHILS # BLD AUTO: 5 K/UL
NEUTROPHILS NFR BLD: 76.3 %
NRBC BLD-RTO: 0 /100 WBC
PHOSPHATE SERPL-MCNC: 3.1 MG/DL
PLATELET # BLD AUTO: 451 K/UL
PMV BLD AUTO: 9.1 FL
POTASSIUM SERPL-SCNC: 4 MMOL/L
RBC # BLD AUTO: 3.22 M/UL
SODIUM SERPL-SCNC: 141 MMOL/L
WBC # BLD AUTO: 6.49 K/UL

## 2018-02-07 PROCEDURE — 84100 ASSAY OF PHOSPHORUS: CPT

## 2018-02-07 PROCEDURE — 25000003 PHARM REV CODE 250: Performed by: STUDENT IN AN ORGANIZED HEALTH CARE EDUCATION/TRAINING PROGRAM

## 2018-02-07 PROCEDURE — 83735 ASSAY OF MAGNESIUM: CPT

## 2018-02-07 PROCEDURE — 99231 SBSQ HOSP IP/OBS SF/LOW 25: CPT | Mod: 24,,, | Performed by: COLON & RECTAL SURGERY

## 2018-02-07 PROCEDURE — 20600001 HC STEP DOWN PRIVATE ROOM

## 2018-02-07 PROCEDURE — 80048 BASIC METABOLIC PNL TOTAL CA: CPT

## 2018-02-07 PROCEDURE — 36415 COLL VENOUS BLD VENIPUNCTURE: CPT

## 2018-02-07 PROCEDURE — 63600175 PHARM REV CODE 636 W HCPCS: Performed by: STUDENT IN AN ORGANIZED HEALTH CARE EDUCATION/TRAINING PROGRAM

## 2018-02-07 PROCEDURE — 85025 COMPLETE CBC W/AUTO DIFF WBC: CPT

## 2018-02-07 RX ADMIN — FAMOTIDINE 20 MG: 20 TABLET, FILM COATED ORAL at 08:02

## 2018-02-07 RX ADMIN — ACETAMINOPHEN 650 MG: 325 TABLET, FILM COATED ORAL at 08:02

## 2018-02-07 RX ADMIN — ACETAMINOPHEN 650 MG: 325 TABLET, FILM COATED ORAL at 03:02

## 2018-02-07 RX ADMIN — HEPARIN SODIUM 5000 UNITS: 5000 INJECTION, SOLUTION INTRAVENOUS; SUBCUTANEOUS at 09:02

## 2018-02-07 RX ADMIN — HEPARIN SODIUM 5000 UNITS: 5000 INJECTION, SOLUTION INTRAVENOUS; SUBCUTANEOUS at 01:02

## 2018-02-07 RX ADMIN — HYDROCHLOROTHIAZIDE 12.5 MG: 12.5 TABLET ORAL at 08:02

## 2018-02-07 RX ADMIN — DOCUSATE SODIUM 100 MG: 100 CAPSULE, LIQUID FILLED ORAL at 09:02

## 2018-02-07 RX ADMIN — SPIRONOLACTONE 25 MG: 25 TABLET, FILM COATED ORAL at 08:02

## 2018-02-07 RX ADMIN — DULOXETINE 60 MG: 60 CAPSULE, DELAYED RELEASE ORAL at 08:02

## 2018-02-07 RX ADMIN — FAMOTIDINE 20 MG: 20 TABLET, FILM COATED ORAL at 09:02

## 2018-02-07 RX ADMIN — ACETAMINOPHEN 650 MG: 325 TABLET, FILM COATED ORAL at 07:02

## 2018-02-07 RX ADMIN — HEPARIN SODIUM 5000 UNITS: 5000 INJECTION, SOLUTION INTRAVENOUS; SUBCUTANEOUS at 05:02

## 2018-02-07 RX ADMIN — DOCUSATE SODIUM 100 MG: 100 CAPSULE, LIQUID FILLED ORAL at 08:02

## 2018-02-07 RX ADMIN — ATORVASTATIN CALCIUM 20 MG: 20 TABLET, FILM COATED ORAL at 08:02

## 2018-02-07 NOTE — PLAN OF CARE
Problem: Patient Care Overview  Goal: Plan of Care Review  Outcome: Ongoing (interventions implemented as appropriate)  Pt AAOx4.  at bedside. Pt denied nausea and pain throughout shift. VSS on RA. Pt slept between care. Pt ambulates to the bathroom with standby assist, UOP adequate overnight. No BM overnight, but passing gas. Pt remains free of falls and injury. No acute events this shift. Will continue to monitor.

## 2018-02-07 NOTE — SUBJECTIVE & OBJECTIVE
Subjective:     Interval History:     Patient improving. Ng tube removed yesterday. Had 2 small BM last night. Some nausea this AM, has been tolerating clears.     Post-Op Info:  * No surgery found *          Medications:  Continuous Infusions:   sodium chloride 0.9% 50 mL/hr at 02/06/18 2130     Scheduled Meds:   atorvastatin  20 mg Oral Daily    docusate sodium  100 mg Oral BID    DULoxetine  60 mg Oral Daily    famotidine  20 mg Oral BID    heparin (porcine)  5,000 Units Subcutaneous Q8H    hydroCHLOROthiazide  12.5 mg Oral Daily    spironolactone  25 mg Oral Daily     PRN Meds:   acetaminophen    albuterol    diphenhydrAMINE    ondansetron    pneumoc 13-janine conj-dip cr(PF)    promethazine (PHENERGAN) IVPB    sodium chloride 0.9%        Objective:     Vital Signs (Most Recent):  Temp: 98.9 °F (37.2 °C) (02/07/18 0745)  Pulse: 89 (02/07/18 0745)  Resp: 18 (02/07/18 0745)  BP: 127/71 (02/07/18 0745)  SpO2: 96 % (02/07/18 0745) Vital Signs (24h Range):  Temp:  [96.4 °F (35.8 °C)-98.9 °F (37.2 °C)] 98.9 °F (37.2 °C)  Pulse:  [79-89] 89  Resp:  [18-20] 18  SpO2:  [96 %-98 %] 96 %  BP: (118-160)/(56-73) 127/71     Intake/Output - Last 3 Shifts       02/05 0700 - 02/06 0659 02/06 0700 - 02/07 0659 02/07 0700 - 02/08 0659    P.O. 300 790     I.V. 2464.6 2557.5     Total Intake 2764.6 3347.5      Urine 400 800     Emesis/NG output 0 0     Drains 300      Other 0 0     Stool 0 0     Total Output 700 800      Net +2064.6 +2547.5             Urine Occurrence 2 x 0 x     Stool Occurrence 2 x 2 x     Emesis Occurrence 0 x 0 x           Physical Exam      General: In no acute distress, well appearance.   CV: RRR, S1, S2 no murmur or gallops   Pulm: non labored breathing, b/l clear breath sounds.   Abd: soft, non distended, non tender. NGT removed.   Ext: wwp      Significant Labs:  BMP (Last 3 Results):     Recent Labs  Lab 02/06/18  0633 02/07/18  0645   GLU 79 111*    141   K 4.1 4.0    106   CO2  24 27   BUN 16 13   CREATININE 0.9 0.9   CALCIUM 8.6* 9.0   MG 1.6 1.6     CBC (Last 3 Results):     Recent Labs  Lab 02/06/18  0633 02/07/18  0645   WBC 5.46 6.49   RBC 3.25* 3.22*   HGB 8.4* 8.2*   HCT 28.0* 27.3*   * 451*   MCV 86 85   MCH 25.8* 25.5*   MCHC 30.0* 30.0*       Significant Diagnostics:    Abdominal XR    NG tube fundus. No obstruction, ileus, or perforation seen.

## 2018-02-07 NOTE — PROGRESS NOTES
[facility]  Colorectal Surgery  Progress Note    Patient Name: Coby Marshall  MRN: [id number]  Admission Date: 2/5/2018  Hospital Length of Stay: 2 days  Attending Physician: Suhas Manjarrez MD    Subjective:     Interval History:     Patient improving. Ng tube removed yesterday. Had 2 small BM last night. Some nausea this AM, has been tolerating clears.     Post-Op Info:  * No surgery found *          Medications:  Continuous Infusions:   sodium chloride 0.9% 50 mL/hr at 02/06/18 2130     Scheduled Meds:   atorvastatin  20 mg Oral Daily    docusate sodium  100 mg Oral BID    DULoxetine  60 mg Oral Daily    famotidine  20 mg Oral BID    heparin (porcine)  5,000 Units Subcutaneous Q8H    hydroCHLOROthiazide  12.5 mg Oral Daily    spironolactone  25 mg Oral Daily     PRN Meds:   acetaminophen    albuterol    diphenhydrAMINE    ondansetron    pneumoc 13-janine conj-dip cr(PF)    promethazine (PHENERGAN) IVPB    sodium chloride 0.9%        Objective:     Vital Signs (Most Recent):  Temp: 98.9 °F (37.2 °C) (02/07/18 0745)  Pulse: 89 (02/07/18 0745)  Resp: 18 (02/07/18 0745)  BP: 127/71 (02/07/18 0745)  SpO2: 96 % (02/07/18 0745) Vital Signs (24h Range):  Temp:  [96.4 °F (35.8 °C)-98.9 °F (37.2 °C)] 98.9 °F (37.2 °C)  Pulse:  [79-89] 89  Resp:  [18-20] 18  SpO2:  [96 %-98 %] 96 %  BP: (118-160)/(56-73) 127/71     Intake/Output - Last 3 Shifts       02/05 0700 - 02/06 0659 02/06 0700 - 02/07 0659 02/07 0700 - 02/08 0659    P.O. 300 790     I.V. 2464.6 2557.5     Total Intake 2764.6 3347.5      Urine 400 800     Emesis/NG output 0 0     Drains 300      Other 0 0     Stool 0 0     Total Output 700 800      Net +2064.6 +2547.5             Urine Occurrence 2 x 0 x     Stool Occurrence 2 x 2 x     Emesis Occurrence 0 x 0 x           Physical Exam      General: In no acute distress, well appearance.   CV: RRR, S1, S2 no murmur or gallops   Pulm: non labored breathing, b/l clear breath sounds.    Abd: soft, non distended, non tender. NGT removed.   Ext: wwp      Significant Labs:  BMP (Last 3 Results):     Recent Labs  Lab 02/06/18  0633 02/07/18  0645   GLU 79 111*    141   K 4.1 4.0    106   CO2 24 27   BUN 16 13   CREATININE 0.9 0.9   CALCIUM 8.6* 9.0   MG 1.6 1.6     CBC (Last 3 Results):     Recent Labs  Lab 02/06/18  0633 02/07/18  0645   WBC 5.46 6.49   RBC 3.25* 3.22*   HGB 8.4* 8.2*   HCT 28.0* 27.3*   * 451*   MCV 86 85   MCH 25.8* 25.5*   MCHC 30.0* 30.0*       Significant Diagnostics:    Abdominal XR    NG tube fundus. No obstruction, ileus, or perforation seen.    Assessment/Plan:     Partial small bowel obstruction    Ms. Marshall is a 63 y/o F who was found to have rectal mass and LAR w/ ileal resection with loop ileostomy for 2 carcinoids on 12/11/17 .  (neg nodes) On 1/22/18 she underwent loop ileostomy takedown on 1/23/18 CT at OSH 2/4/18 revealing mildly dilated small bowel and distally decompressed bowel. Likely partial small bowel obstruction.    - NGT removed  - Tolerating CLD may advance to low residue diet later today  - IVF  - AM labs daily  - replete electrolytes  - GI/DVT ppx                      Pavan Seth MD  Colorectal Surgery  [facility]

## 2018-02-07 NOTE — DISCHARGE INSTRUCTIONS
Low-Fiber Diet     Eggs are high in protein and easy to digest.     Eating a low-fiber diet means eating foods that dont have much fiber. These foods are easy to digest.  Most of the fiber that you eat passes undigested through your bowel. This is what forms stool. Low-fiber foods can help to slow down your bowel movements. When you eat a low-fiber diet, you have fewer stools. This lets your intestine rest.  Your healthcare provider will tell you how long you need to be on this diet. It may only be for a short time. Low-fiber foods often don't give you all the nutrients you need to keep healthy. Your provider may have you take certain vitamins while you are on this diet.  Reasons to eat a low-fiber diet  The goal of a low-fiber diet is to limit the size and number of your stools. It may be prescribed if you:  · Are having chemotherapy or radiation treatments  · Have had intestinal surgery  · Have a condition that affects your intestine, such as irritable bowel syndrome, Crohns disease, ulcerative colitis, or diverticulitis  General guidelines for a low-fiber diet  In general, a low-fiber diet means having fewer than 13 grams of fiber a day. Your provider may give you a list of things you can and cant eat or drink. Read food labels. Choose foods and drinks that have as close to zero grams of fiber as possible. Here are general guidelines to follow:  Breads, pasta, cereal, rice, and other starches (6 to 11 servings daily)  · What to choose: white bread, biscuits, muffins, and white rolls; plain crackers; waffles; white pasta; white rice; cream of wheat; grits; white pancakes; corn flakes; cooked potatoes without skin.  Fiber content of these foods should be less than 0.5 (1/2) gram per serving.  · What to avoid: whole-wheat or whole-grain breads, crackers, and pasta; breads with seeds or nuts; wheat germ; jeffry crackers; cornbread; wild or brown rice; whole-grain, bran, and granola cereals; cereals with seeds,  nuts, coconut, or dried fruit; potatoes with skin  Milk and dairy (2 servings daily)  · What to choose: milk, buttermilk; yogurt or ice cream without seeds or nuts; custard or pudding; sour cream; cheese and cottage cheese  · What to avoid: ice cream and yogurt with seeds, nuts, or fruit chunks  Fruit (2 to 4 servings daily)  · What to choose: ripe banana; ripe nectarine, peach, apricot, papaya, and plum; soft honeydew melon and cantaloupe; cooked or canned fruit without skin or seeds (not sweetened with sorbitol); applesauce; strained fruit juice (without pulp)  · What to avoid: raw or dried fruit; all berries; raisins; canned and raw pineapple; prunes and prune juice; fruit juice with pulp  Vegetables (3 to 5 servings daily)  · What to choose: well-cooked or canned vegetables without seeds, such as spinach, eggplant, green and wax beans, carrots, yellow squash, pumpkin; lettuce on a sandwich  · What to avoid: all raw or steamed vegetables; vegetables with seeds, such as unstrained tomato sauce; green peas; lima beans; broccoli; corn; parsnips  Meats and protein (4 to 6 ounces daily)  · What to choose: tender, well-cooked meat, including ground meat, poultry, and fish; eggs; tofu; creamy peanut butter  · What to avoid: tough, chewy meat with gristle; peas, including split, yellow, and black-eyed; beans, including navy, lima, black, garbanzo, soy, jones, and lentil; peanuts and crunchy peanut butter   Fats, oils, sauces, condiments (fewer than 8 teaspoons daily)  · What to choose: butter, magarine, oils, whipped cream, sour cream, mayonnaise, smooth dressings and sauces; plain gravy; smooth condiments  · What to avoid: dressing with seeds or fruit chunks; pickles and relishes  Other foods and drinks  · What to choose: water; plain gelatin; plain puddings; pretzels; plain cookies and cakes; honey, syrup; decaffeinated drinks, including tea and coffee    · What to avoid: popcorn; potato chips; spicy foods; fried,  greasy foods; alcohol (ask your provider); marmalade, jam, and preserves; desserts that have seeds, nuts, coconut, dried fruit, whole grains or bran; candy that has seeds or nuts; drinks sweetened with sorbitol or other sugar substitutes; caffeinated drinks, including tea, coffee, soda, and energy drinks  Date Last Reviewed: 6/18/2015  © 9329-4198 [employer]. [address] [place] [address], [place] [address]. All rights reserved. This information is not intended as a substitute for professional medical care. Always follow your healthcare professional's instructions.

## 2018-02-07 NOTE — ASSESSMENT & PLAN NOTE
Ms. Marshall is a 63 y/o F who was found to have rectal mass and LAR w/ ileal resection with loop ileostomy for 2 carcinoids on 12/11/17 .  (neg nodes) On 1/22/18 she underwent loop ileostomy takedown on 1/23/18 CT at OSH 2/4/18 revealing mildly dilated small bowel and distally decompressed bowel. Likely partial small bowel obstruction.    - NGT removed  - Tolerating CLD may advance to low residue diet later today  - IVF  - AM labs daily  - replete electrolytes  - GI/DVT ppx

## 2018-02-07 NOTE — PLAN OF CARE
Problem: Patient Care Overview  Goal: Plan of Care Review  Outcome: Ongoing (interventions implemented as appropriate)  Plan of care discussed with pt. Pt verbalizes understanding. Pt tolerating clear liquid diet with no complaints of discomfort or nausea. Pain managed with PRN pain medication.  normal sinus rhythm. Pt ambulated in gunter once with PT/OT. Pt positions independently. Vital signs stable. Pt remains free of falls and injury. No acute events this shift. Will continue to monitor.

## 2018-02-08 LAB
ALBUMIN SERPL BCP-MCNC: 2.6 G/DL
ANION GAP SERPL CALC-SCNC: 9 MMOL/L
ANISOCYTOSIS BLD QL SMEAR: SLIGHT
BASOPHILS # BLD AUTO: 0.01 K/UL
BASOPHILS NFR BLD: 0.1 %
BUN SERPL-MCNC: 9 MG/DL
BURR CELLS BLD QL SMEAR: ABNORMAL
CALCIUM SERPL-MCNC: 8.8 MG/DL
CHLORIDE SERPL-SCNC: 104 MMOL/L
CO2 SERPL-SCNC: 27 MMOL/L
CREAT SERPL-MCNC: 0.9 MG/DL
DIFFERENTIAL METHOD: ABNORMAL
EOSINOPHIL # BLD AUTO: 0 K/UL
EOSINOPHIL NFR BLD: 0.6 %
ERYTHROCYTE [DISTWIDTH] IN BLOOD BY AUTOMATED COUNT: 13.9 %
EST. GFR  (AFRICAN AMERICAN): >60 ML/MIN/1.73 M^2
EST. GFR  (NON AFRICAN AMERICAN): >60 ML/MIN/1.73 M^2
GLUCOSE SERPL-MCNC: 107 MG/DL
HCT VFR BLD AUTO: 27.7 %
HGB BLD-MCNC: 8.6 G/DL
IMM GRANULOCYTES # BLD AUTO: 0.03 K/UL
IMM GRANULOCYTES NFR BLD AUTO: 0.4 %
LYMPHOCYTES # BLD AUTO: 0.9 K/UL
LYMPHOCYTES NFR BLD: 13 %
MAGNESIUM SERPL-MCNC: 1.4 MG/DL
MCH RBC QN AUTO: 26.3 PG
MCHC RBC AUTO-ENTMCNC: 31 G/DL
MCV RBC AUTO: 85 FL
MONOCYTES # BLD AUTO: 0.9 K/UL
MONOCYTES NFR BLD: 12.1 %
NEUTROPHILS # BLD AUTO: 5.4 K/UL
NEUTROPHILS NFR BLD: 73.8 %
NRBC BLD-RTO: 0 /100 WBC
PHOSPHATE SERPL-MCNC: 3.3 MG/DL
PLATELET # BLD AUTO: 468 K/UL
PLATELET BLD QL SMEAR: ABNORMAL
PMV BLD AUTO: 9.3 FL
POTASSIUM SERPL-SCNC: 3.9 MMOL/L
PREALB SERPL-MCNC: 10 MG/DL
RBC # BLD AUTO: 3.27 M/UL
SODIUM SERPL-SCNC: 140 MMOL/L
WBC # BLD AUTO: 7.25 K/UL

## 2018-02-08 PROCEDURE — 25000003 PHARM REV CODE 250: Performed by: STUDENT IN AN ORGANIZED HEALTH CARE EDUCATION/TRAINING PROGRAM

## 2018-02-08 PROCEDURE — 84134 ASSAY OF PREALBUMIN: CPT

## 2018-02-08 PROCEDURE — 85025 COMPLETE CBC W/AUTO DIFF WBC: CPT

## 2018-02-08 PROCEDURE — 82040 ASSAY OF SERUM ALBUMIN: CPT

## 2018-02-08 PROCEDURE — 80048 BASIC METABOLIC PNL TOTAL CA: CPT

## 2018-02-08 PROCEDURE — 83735 ASSAY OF MAGNESIUM: CPT

## 2018-02-08 PROCEDURE — 84100 ASSAY OF PHOSPHORUS: CPT

## 2018-02-08 PROCEDURE — 36415 COLL VENOUS BLD VENIPUNCTURE: CPT

## 2018-02-08 PROCEDURE — 63600175 PHARM REV CODE 636 W HCPCS: Performed by: STUDENT IN AN ORGANIZED HEALTH CARE EDUCATION/TRAINING PROGRAM

## 2018-02-08 PROCEDURE — 20600001 HC STEP DOWN PRIVATE ROOM

## 2018-02-08 RX ADMIN — ONDANSETRON 4 MG: 2 INJECTION INTRAMUSCULAR; INTRAVENOUS at 08:02

## 2018-02-08 RX ADMIN — ACETAMINOPHEN 650 MG: 325 TABLET, FILM COATED ORAL at 07:02

## 2018-02-08 RX ADMIN — SPIRONOLACTONE 25 MG: 25 TABLET, FILM COATED ORAL at 08:02

## 2018-02-08 RX ADMIN — DOCUSATE SODIUM 100 MG: 100 CAPSULE, LIQUID FILLED ORAL at 09:02

## 2018-02-08 RX ADMIN — HEPARIN SODIUM 5000 UNITS: 5000 INJECTION, SOLUTION INTRAVENOUS; SUBCUTANEOUS at 09:02

## 2018-02-08 RX ADMIN — DULOXETINE 60 MG: 60 CAPSULE, DELAYED RELEASE ORAL at 08:02

## 2018-02-08 RX ADMIN — FAMOTIDINE 20 MG: 20 TABLET, FILM COATED ORAL at 08:02

## 2018-02-08 RX ADMIN — FAMOTIDINE 20 MG: 20 TABLET, FILM COATED ORAL at 09:02

## 2018-02-08 RX ADMIN — MAGNESIUM OXIDE TAB 400 MG (241.3 MG ELEMENTAL MG) 600 MG: 400 (241.3 MG) TAB at 11:02

## 2018-02-08 RX ADMIN — HEPARIN SODIUM 5000 UNITS: 5000 INJECTION, SOLUTION INTRAVENOUS; SUBCUTANEOUS at 05:02

## 2018-02-08 RX ADMIN — SODIUM CHLORIDE: 0.9 INJECTION, SOLUTION INTRAVENOUS at 12:02

## 2018-02-08 RX ADMIN — HYDROCHLOROTHIAZIDE 12.5 MG: 12.5 TABLET ORAL at 08:02

## 2018-02-08 RX ADMIN — HEPARIN SODIUM 5000 UNITS: 5000 INJECTION, SOLUTION INTRAVENOUS; SUBCUTANEOUS at 01:02

## 2018-02-08 RX ADMIN — DOCUSATE SODIUM 100 MG: 100 CAPSULE, LIQUID FILLED ORAL at 08:02

## 2018-02-08 RX ADMIN — ATORVASTATIN CALCIUM 20 MG: 20 TABLET, FILM COATED ORAL at 08:02

## 2018-02-08 NOTE — PHYSICIAN QUERY
"PT Name: Coby Marshall  MR #: [id number]    Physician Query Form - Relationship to Procedure Clarification     Lanie Mitchell RN, CCDS  Contact Info: [phone] [email or web address]      This form is a permanent document in the medical record.     Query Date: February 8, 2018    By submitting this query, we are merely seeking further clarification of documentation. Please utilize your independent clinical judgment when addressing the question(s) below.    The Medical record contains the following:  Supporting Clinical Findings Location in Medical Record   Partial small bowel obstruction     Ms. Marshall is a 61 y/o F who was found to have rectal mass and LAR w/ ileal resection with loop ileostomy for 2 carcinoids on 12/11/17 .  (neg nodes) On 1/22/18 she underwent loop ileostomy takedown on 1/23/18 CT at OSH 2/4/18 revealing mildly dilated small bowel and distally decompressed bowel. Likely partial small bowel obstruction now resolved.      - Regular diet today.   - Stop IVF  - AM labs daily  - replete electrolytes  - GI/DVT ppx  - May be discharged home later today.      On 1/22/18 she underwent pouchagram and then subsequent loop ileostomy takedown on 1/23/18, she was discharged on 1/25/18 tolerating a low res diet and having bowel function.  Basté at 2/8/2018                              H&P       Please clarify if "partial sbo" is    [ x ] Inherent/Integral to procedure (1/23/18 loop ileostomy take down)  [  ] Routine outcome  [  ] Incidental finding  [  ] Complication of procedure  [  ] Clinically insignificant  [  ] Clinically undetermined      "

## 2018-02-08 NOTE — SUBJECTIVE & OBJECTIVE
Subjective:     Interval History:     No acute events overnight, patient had x3 bowel movements overnight. No more nausea or emesis. States she is hungry this morning.     Post-Op Info:  * No surgery found *          Medications:  Continuous Infusions:  Scheduled Meds:   atorvastatin  20 mg Oral Daily    docusate sodium  100 mg Oral BID    DULoxetine  60 mg Oral Daily    famotidine  20 mg Oral BID    heparin (porcine)  5,000 Units Subcutaneous Q8H    hydroCHLOROthiazide  12.5 mg Oral Daily    spironolactone  25 mg Oral Daily     PRN Meds:   acetaminophen    albuterol    diphenhydrAMINE    ondansetron    pneumoc 13-janine conj-dip cr(PF)    promethazine (PHENERGAN) IVPB    sodium chloride 0.9%        Objective:     Vital Signs (Most Recent):  Temp: 98.7 °F (37.1 °C) (02/08/18 0900)  Pulse: 84 (02/08/18 0900)  Resp: 18 (02/08/18 0900)  BP: (!) 150/70 (02/08/18 0900)  SpO2: 97 % (02/08/18 0900) Vital Signs (24h Range):  Temp:  [98.3 °F (36.8 °C)-99.1 °F (37.3 °C)] 98.7 °F (37.1 °C)  Pulse:  [83-97] 84  Resp:  [16-18] 18  SpO2:  [96 %-100 %] 97 %  BP: (128-151)/(63-70) 150/70     Intake/Output - Last 3 Shifts       02/06 0700 - 02/07 0659 02/07 0700 - 02/08 0659 02/08 0700 - 02/09 0659    P.O. 790 560     I.V. (mL/kg) 2557.5 1200 (12.9)     Total Intake(mL/kg) 3347.5 1760 (18.9)     Urine (mL/kg/hr) 800 925 (0.4)     Emesis/NG output 0      Drains       Other 0      Stool 0 0 (0)     Total Output 800 925      Net +2547.5 +835             Urine Occurrence 0 x 1 x     Stool Occurrence 2 x 1 x     Emesis Occurrence 0 x            Physical Exam    General: In no acute distress, well appearance.   CV: RRR, S1, S2 no murmur or gallops   Pulm: non labored breathing, b/l clear breath sounds.   Abd: soft, non distended, non tender. Incision healing well   Ext: wwp      Significant Labs:  BMP (Last 3 Results):   Recent Labs  Lab 02/06/18  0633 02/07/18  0645 02/08/18  0616   GLU 79 111* 107    141 140   K 4.1  4.0 3.9    106 104   CO2 24 27 27   BUN 16 13 9   CREATININE 0.9 0.9 0.9   CALCIUM 8.6* 9.0 8.8   MG 1.6 1.6 1.4*     CBC (Last 3 Results):   Recent Labs  Lab 02/06/18  0633 02/07/18  0645 02/08/18  0616   WBC 5.46 6.49 7.25   RBC 3.25* 3.22* 3.27*   HGB 8.4* 8.2* 8.6*   HCT 28.0* 27.3* 27.7*   * 451* 468*   MCV 86 85 85   MCH 25.8* 25.5* 26.3*   MCHC 30.0* 30.0* 31.0*

## 2018-02-08 NOTE — PLAN OF CARE
Problem: Patient Care Overview  Goal: Plan of Care Review  Outcome: Ongoing (interventions implemented as appropriate)  POC reviewed with patient, stated understanding. AAOX4, VSS, no pain issues this shift. Amb ind in room, no adverse events this shift, will cont to manage POC.

## 2018-02-08 NOTE — PLAN OF CARE
Problem: Patient Care Overview  Goal: Plan of Care Review  Outcome: Ongoing (interventions implemented as appropriate)  Plan of care discussed with pt. Pt verbalizes understanding. Pt tolerating low fiber low residue diet with no complaints of discomfort or nausea. Pain managed with PRN pain medication. Pt  normal sinus rhythm. Pt ambulated in gunter  with PT/OT. Pt positions independently. Vital signs stable. Pt remains free of falls and injury. No acute events this shift. Will continue to monitor.

## 2018-02-08 NOTE — ASSESSMENT & PLAN NOTE
Ms. Marshall is a 63 y/o F who was found to have rectal mass and LAR w/ ileal resection with loop ileostomy for 2 carcinoids on 12/11/17 .  (neg nodes) On 1/22/18 she underwent loop ileostomy takedown on 1/23/18 CT at OSH 2/4/18 revealing mildly dilated small bowel and distally decompressed bowel. Likely partial small bowel obstruction now resolved.     - Regular diet today.   - Stop IVF  - AM labs daily  - replete electrolytes  - GI/DVT ppx  - May be discharged home later today.     Jocelyn Kolb MD  General Surgery PGY IV  Beeper: [phone]

## 2018-02-08 NOTE — PROGRESS NOTES
[facility]  Colorectal Surgery  Progress Note    Patient Name: Coby Marshall  MRN: [id number]  Admission Date: 2/5/2018  Hospital Length of Stay: 3 days  Attending Physician: Suhas Manjarrez MD    Subjective:     Interval History:     No acute events overnight, patient had x3 bowel movements overnight. No more nausea or emesis. States she is hungry this morning.     Post-Op Info:  * No surgery found *          Medications:  Continuous Infusions:  Scheduled Meds:   atorvastatin  20 mg Oral Daily    docusate sodium  100 mg Oral BID    DULoxetine  60 mg Oral Daily    famotidine  20 mg Oral BID    heparin (porcine)  5,000 Units Subcutaneous Q8H    hydroCHLOROthiazide  12.5 mg Oral Daily    spironolactone  25 mg Oral Daily     PRN Meds:   acetaminophen    albuterol    diphenhydrAMINE    ondansetron    pneumoc 13-janine conj-dip cr(PF)    promethazine (PHENERGAN) IVPB    sodium chloride 0.9%        Objective:     Vital Signs (Most Recent):  Temp: 98.7 °F (37.1 °C) (02/08/18 0900)  Pulse: 84 (02/08/18 0900)  Resp: 18 (02/08/18 0900)  BP: (!) 150/70 (02/08/18 0900)  SpO2: 97 % (02/08/18 0900) Vital Signs (24h Range):  Temp:  [98.3 °F (36.8 °C)-99.1 °F (37.3 °C)] 98.7 °F (37.1 °C)  Pulse:  [83-97] 84  Resp:  [16-18] 18  SpO2:  [96 %-100 %] 97 %  BP: (128-151)/(63-70) 150/70     Intake/Output - Last 3 Shifts       02/06 0700 - 02/07 0659 02/07 0700 - 02/08 0659 02/08 0700 - 02/09 0659    P.O. 790 560     I.V. (mL/kg) 2557.5 1200 (12.9)     Total Intake(mL/kg) 3347.5 1760 (18.9)     Urine (mL/kg/hr) 800 925 (0.4)     Emesis/NG output 0      Drains       Other 0      Stool 0 0 (0)     Total Output 800 925      Net +2547.5 +835             Urine Occurrence 0 x 1 x     Stool Occurrence 2 x 1 x     Emesis Occurrence 0 x            Physical Exam    General: In no acute distress, well appearance.   CV: RRR, S1, S2 no murmur or gallops   Pulm: non labored breathing, b/l clear breath sounds.   Abd: soft,  non distended, non tender. Incision healing well   Ext: wwp      Significant Labs:  BMP (Last 3 Results):   Recent Labs  Lab 02/06/18  0633 02/07/18  0645 02/08/18  0616   GLU 79 111* 107    141 140   K 4.1 4.0 3.9    106 104   CO2 24 27 27   BUN 16 13 9   CREATININE 0.9 0.9 0.9   CALCIUM 8.6* 9.0 8.8   MG 1.6 1.6 1.4*     CBC (Last 3 Results):   Recent Labs  Lab 02/06/18  0633 02/07/18  0645 02/08/18  0616   WBC 5.46 6.49 7.25   RBC 3.25* 3.22* 3.27*   HGB 8.4* 8.2* 8.6*   HCT 28.0* 27.3* 27.7*   * 451* 468*   MCV 86 85 85   MCH 25.8* 25.5* 26.3*   MCHC 30.0* 30.0* 31.0*           Assessment/Plan:     * Partial small bowel obstruction    Ms. Marshall is a 63 y/o F who was found to have rectal mass and LAR w/ ileal resection with loop ileostomy for 2 carcinoids on 12/11/17 .  (neg nodes) On 1/22/18 she underwent loop ileostomy takedown on 1/23/18 CT at OSH 2/4/18 revealing mildly dilated small bowel and distally decompressed bowel. Likely partial small bowel obstruction now resolved.     - Regular diet today.   - Stop IVF  - AM labs daily  - replete electrolytes  - GI/DVT ppx  - May be discharged home later today.     Jocelyn Kolb MD  General Surgery PGY IV  Beeper: [phone]

## 2018-02-09 VITALS
OXYGEN SATURATION: 97 % | HEART RATE: 83 BPM | DIASTOLIC BLOOD PRESSURE: 67 MMHG | BODY MASS INDEX: 32.95 KG/M2 | SYSTOLIC BLOOD PRESSURE: 144 MMHG | HEIGHT: 66 IN | RESPIRATION RATE: 14 BRPM | TEMPERATURE: 98 F | WEIGHT: 205 LBS

## 2018-02-09 LAB
ALBUMIN SERPL BCP-MCNC: 2.5 G/DL
ANION GAP SERPL CALC-SCNC: 8 MMOL/L
ANISOCYTOSIS BLD QL SMEAR: SLIGHT
BASOPHILS # BLD AUTO: 0.02 K/UL
BASOPHILS NFR BLD: 0.2 %
BUN SERPL-MCNC: 9 MG/DL
CALCIUM SERPL-MCNC: 8.9 MG/DL
CHLORIDE SERPL-SCNC: 101 MMOL/L
CO2 SERPL-SCNC: 29 MMOL/L
CREAT SERPL-MCNC: 0.8 MG/DL
DIFFERENTIAL METHOD: ABNORMAL
EOSINOPHIL # BLD AUTO: 0.1 K/UL
EOSINOPHIL NFR BLD: 1.4 %
ERYTHROCYTE [DISTWIDTH] IN BLOOD BY AUTOMATED COUNT: 13.8 %
EST. GFR  (AFRICAN AMERICAN): >60 ML/MIN/1.73 M^2
EST. GFR  (NON AFRICAN AMERICAN): >60 ML/MIN/1.73 M^2
GLUCOSE SERPL-MCNC: 96 MG/DL
HCT VFR BLD AUTO: 26.6 %
HGB BLD-MCNC: 8.5 G/DL
IMM GRANULOCYTES # BLD AUTO: 0.05 K/UL
IMM GRANULOCYTES NFR BLD AUTO: 0.6 %
LYMPHOCYTES # BLD AUTO: 1 K/UL
LYMPHOCYTES NFR BLD: 12.5 %
MAGNESIUM SERPL-MCNC: 1.5 MG/DL
MCH RBC QN AUTO: 26.3 PG
MCHC RBC AUTO-ENTMCNC: 32 G/DL
MCV RBC AUTO: 82 FL
MONOCYTES # BLD AUTO: 1.1 K/UL
MONOCYTES NFR BLD: 13.9 %
NEUTROPHILS # BLD AUTO: 5.8 K/UL
NEUTROPHILS NFR BLD: 71.4 %
NRBC BLD-RTO: 0 /100 WBC
PHOSPHATE SERPL-MCNC: 3.1 MG/DL
PLATELET # BLD AUTO: 458 K/UL
PLATELET BLD QL SMEAR: ABNORMAL
PMV BLD AUTO: 9.6 FL
POLYCHROMASIA BLD QL SMEAR: ABNORMAL
POTASSIUM SERPL-SCNC: 3.6 MMOL/L
PREALB SERPL-MCNC: 9 MG/DL
RBC # BLD AUTO: 3.23 M/UL
SODIUM SERPL-SCNC: 138 MMOL/L
WBC # BLD AUTO: 8.11 K/UL

## 2018-02-09 PROCEDURE — 90471 IMMUNIZATION ADMIN: CPT | Performed by: COLON & RECTAL SURGERY

## 2018-02-09 PROCEDURE — 63600175 PHARM REV CODE 636 W HCPCS: Performed by: COLON & RECTAL SURGERY

## 2018-02-09 PROCEDURE — 85025 COMPLETE CBC W/AUTO DIFF WBC: CPT

## 2018-02-09 PROCEDURE — 82040 ASSAY OF SERUM ALBUMIN: CPT

## 2018-02-09 PROCEDURE — 25000003 PHARM REV CODE 250: Performed by: STUDENT IN AN ORGANIZED HEALTH CARE EDUCATION/TRAINING PROGRAM

## 2018-02-09 PROCEDURE — 63600175 PHARM REV CODE 636 W HCPCS: Performed by: STUDENT IN AN ORGANIZED HEALTH CARE EDUCATION/TRAINING PROGRAM

## 2018-02-09 PROCEDURE — 3E0234Z INTRODUCTION OF SERUM, TOXOID AND VACCINE INTO MUSCLE, PERCUTANEOUS APPROACH: ICD-10-PCS | Performed by: COLON & RECTAL SURGERY

## 2018-02-09 PROCEDURE — 84100 ASSAY OF PHOSPHORUS: CPT

## 2018-02-09 PROCEDURE — 36415 COLL VENOUS BLD VENIPUNCTURE: CPT

## 2018-02-09 PROCEDURE — 84134 ASSAY OF PREALBUMIN: CPT

## 2018-02-09 PROCEDURE — 80048 BASIC METABOLIC PNL TOTAL CA: CPT

## 2018-02-09 PROCEDURE — 90670 PCV13 VACCINE IM: CPT | Performed by: COLON & RECTAL SURGERY

## 2018-02-09 PROCEDURE — 83735 ASSAY OF MAGNESIUM: CPT

## 2018-02-09 RX ADMIN — FAMOTIDINE 20 MG: 20 TABLET, FILM COATED ORAL at 09:02

## 2018-02-09 RX ADMIN — ATORVASTATIN CALCIUM 20 MG: 20 TABLET, FILM COATED ORAL at 09:02

## 2018-02-09 RX ADMIN — HEPARIN SODIUM 5000 UNITS: 5000 INJECTION, SOLUTION INTRAVENOUS; SUBCUTANEOUS at 05:02

## 2018-02-09 RX ADMIN — PNEUMOCOCCAL 13-VALENT CONJUGATE VACCINE 0.5 ML: 2.2; 2.2; 2.2; 2.2; 2.2; 4.4; 2.2; 2.2; 2.2; 2.2; 2.2; 2.2; 2.2 INJECTION, SUSPENSION INTRAMUSCULAR at 12:02

## 2018-02-09 RX ADMIN — DULOXETINE 60 MG: 60 CAPSULE, DELAYED RELEASE ORAL at 09:02

## 2018-02-09 RX ADMIN — HYDROCHLOROTHIAZIDE 12.5 MG: 12.5 TABLET ORAL at 09:02

## 2018-02-09 RX ADMIN — SPIRONOLACTONE 25 MG: 25 TABLET, FILM COATED ORAL at 09:02

## 2018-02-09 NOTE — NURSING
Discharge instructions and follow up appointments given to pt. Pneumonia vaccine given in left deltoid. PIV removed with catheter tip intact. Wheelchair offered, pt awaiting wheelchair in room.

## 2018-02-09 NOTE — DISCHARGE SUMMARY
[facility]  Colorectal Surgery  Discharge Summary      Patient Name: Coby Marshall  MRN: [id number]  Admission Date: 2/5/2018  Hospital Length of Stay: 4 days  Discharge Date and Time: 2/9/2018 12:27 PM  Attending Physician: No att. providers found   Discharging Provider: Pavan Seth MD  Primary Care Provider: Randal Casillas MD     HPI: Ms. Marshall is a 61 y/o F who was found to hav rectal mass and completed chemoXRT and LAR w/ ileal resection with loop ileostomy for 2 carcinoids on 12/11/17 .  (neg nodes) On 1/22/18 she underwent pouchagram and then subsequent loop ileostomy takedown on 1/23/18, she was discharged on 1/25/18 tolerating a low res diet and having bowel function.      Patient admitted as transfer this AM from outside ED w/ abdominal pain and small amounts of intermittent loose stools, as well as nausea and emesis on Friday w/ poor PO intake, none since. CT at OSH 2/4/18 revealing mildly dilated small bowel and distally decompressed bowel. Concern for Partial small bowel obstruction. She denies fevers/ chills/ blood in stool/ other symptoms.     * No surgery found *     Hospital Course:   2/5/18: admitted with abdominal pain/ concern for partial small bowel obstruction ng tube placed  2/6/18: ng tube removed, having small BMs  2/7/18: diet advanced, multiple BMs  2/8/18: low res diet started having BMs, less distended  2/9/18: PAtient stable for discharge PSBO resolved, tolerating diet having BMs      Significant Diagnostic Studies: Labs:   CMP   Recent Labs  Lab 02/08/18  0616 02/09/18  0418    138   K 3.9 3.6    101   CO2 27 29    96   BUN 9 9   CREATININE 0.9 0.8   CALCIUM 8.8 8.9   ALBUMIN 2.6* 2.5*   ANIONGAP 9 8   ESTGFRAFRICA >60.0 >60.0   EGFRNONAA >60.0 >60.0    and CBC   Recent Labs  Lab 02/08/18  0616 02/09/18  0418   WBC 7.25 8.11   HGB 8.6* 8.5*   HCT 27.7* 26.6*   * 458*       Pending Diagnostic Studies:     None        Final Active  Diagnoses:    Diagnosis Date Noted POA    PRINCIPAL PROBLEM:  Partial small bowel obstruction [K56.600] 02/05/2018 Yes    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    Specialty:  Colon and Rectal Surgery  Contact information:  [address] [place] [id number]  [phone]             Suhas Manjarrez MD In 2 weeks.    Specialty:  Colon and Rectal Surgery  Contact information:  [address] [facility] [address]  [phone]                 Patient Instructions:     Activity as tolerated     Notify your health care provider if you experience any of the following:  temperature >100.4     Notify your health care provider if you experience any of the following:  persistent nausea and vomiting or diarrhea     Notify your health care provider if you experience any of the following:  redness, tenderness, or signs of infection (pain, swelling, redness, odor or green/yellow discharge around incision site)     Notify your health care provider if you experience any of the following:  severe uncontrolled pain     Notify your health care provider if you experience any of the following:  difficulty breathing or increased cough     Notify your health care provider if you experience any of the following:  severe persistent headache     Notify your health care provider if you experience any of the following:  worsening rash     Notify your health care provider if you experience any of the following:  persistent dizziness, light-headedness, or visual disturbances     Notify your health care provider if you experience any of the following:  increased confusion or weakness     Other restrictions (specify):   Scheduling Instructions: Be sure to  eat a low residue diet       Medications:  Reconciled Home Medications:   Discharge Medication List as of 2/9/2018 12:05 PM      CONTINUE these medications which have NOT CHANGED    Details   ALBUTEROL INHL Inhale into the lungs., Historical Med      aspirin (ECOTRIN) 81 MG EC tablet Take 81 mg by mouth once daily., Historical Med      atorvastatin (LIPITOR) 20 MG tablet Take 20 mg by mouth once daily., Historical Med      cloNIDine (CATAPRES) 0.1 MG tablet Take 0.1 mg by mouth 2 (two) times daily., Historical Med      diclofenac (VOLTAREN) 75 MG EC tablet Take 75 mg by mouth 2 (two) times daily., Historical Med      DULoxetine (CYMBALTA) 60 MG capsule Take 60 mg by mouth once daily., Historical Med      famotidine (PEPCID) 20 MG tablet Take 20 mg by mouth 2 (two) times daily., Historical Med      fluticasone (FLONASE) 50 mcg/actuation nasal spray 1 spray by Each Nare route once daily., Historical Med      hydroCHLOROthiazide (MICROZIDE) 12.5 mg capsule Take 12.5 mg by mouth once daily., Historical Med      hydrOXYzine HCl (ATARAX) 25 MG tablet Take 25 mg by mouth 3 (three) times daily., Historical Med      omeprazole (PRILOSEC) 20 MG capsule Take 20 mg by mouth once daily., Historical Med      oxyCODONE (ROXICODONE) 5 MG immediate release tablet Take 1 tablet (5 mg total) by mouth every 4 (four) hours as needed., Starting Thu 1/25/2018, Print      silver sulfADIAZINE 1% (SILVADENE) 1 % cream Apply topically 2 (two) times daily., Historical Med      spironolactone (ALDACTONE) 25 MG tablet Take 25 mg by mouth once daily., Historical Med             Pavan Seth MD  Colorectal Surgery  [facility]

## 2018-02-19 ENCOUNTER — ANESTHESIA EVENT (OUTPATIENT)
Dept: SURGERY | Facility: HOSPITAL | Age: 63
DRG: 857 | End: 2018-02-19
Payer: COMMERCIAL

## 2018-02-19 ENCOUNTER — HOSPITAL ENCOUNTER (INPATIENT)
Facility: HOSPITAL | Age: 63
LOS: 17 days | Discharge: HOME HEALTH | DRG: 857 | End: 2018-03-08
Attending: EMERGENCY MEDICINE | Admitting: COLON & RECTAL SURGERY
Payer: COMMERCIAL

## 2018-02-19 ENCOUNTER — TELEPHONE (OUTPATIENT)
Dept: SURGERY | Facility: CLINIC | Age: 63
End: 2018-02-19

## 2018-02-19 ENCOUNTER — ANESTHESIA (OUTPATIENT)
Dept: SURGERY | Facility: HOSPITAL | Age: 63
DRG: 857 | End: 2018-02-19
Payer: COMMERCIAL

## 2018-02-19 DIAGNOSIS — K56.600 PARTIAL SMALL BOWEL OBSTRUCTION: ICD-10-CM

## 2018-02-19 DIAGNOSIS — T81.89XA DELAYED SURGICAL WOUND HEALING: ICD-10-CM

## 2018-02-19 DIAGNOSIS — D3A.026 CARCINOID TUMOR OF RECTUM: Primary | ICD-10-CM

## 2018-02-19 DIAGNOSIS — A49.9 ESBL (EXTENDED SPECTRUM BETA-LACTAMASE) PRODUCING BACTERIA INFECTION: ICD-10-CM

## 2018-02-19 DIAGNOSIS — Z16.12 ESBL (EXTENDED SPECTRUM BETA-LACTAMASE) PRODUCING BACTERIA INFECTION: ICD-10-CM

## 2018-02-19 DIAGNOSIS — K63.2 ENTEROCUTANEOUS FISTULA: ICD-10-CM

## 2018-02-19 LAB
ANION GAP SERPL CALC-SCNC: 13 MMOL/L
ANISOCYTOSIS BLD QL SMEAR: SLIGHT
BASOPHILS # BLD AUTO: 0.03 K/UL
BASOPHILS NFR BLD: 0.1 %
BUN SERPL-MCNC: 26 MG/DL
CALCIUM SERPL-MCNC: 8.1 MG/DL
CHLORIDE SERPL-SCNC: 101 MMOL/L
CO2 SERPL-SCNC: 26 MMOL/L
CREAT SERPL-MCNC: 1.3 MG/DL
CRP SERPL-MCNC: 231.2 MG/L
DIFFERENTIAL METHOD: ABNORMAL
EOSINOPHIL # BLD AUTO: 0 K/UL
EOSINOPHIL NFR BLD: 0 %
ERYTHROCYTE [DISTWIDTH] IN BLOOD BY AUTOMATED COUNT: 14.5 %
EST. GFR  (AFRICAN AMERICAN): 50.1 ML/MIN/1.73 M^2
EST. GFR  (NON AFRICAN AMERICAN): 43.5 ML/MIN/1.73 M^2
GLUCOSE SERPL-MCNC: 139 MG/DL
GRAM STN SPEC: NORMAL
HCT VFR BLD AUTO: 29 %
HGB BLD-MCNC: 9.3 G/DL
IMM GRANULOCYTES # BLD AUTO: 0.12 K/UL
IMM GRANULOCYTES NFR BLD AUTO: 0.6 %
LACTATE SERPL-SCNC: 1.8 MMOL/L
LYMPHOCYTES # BLD AUTO: 0.8 K/UL
LYMPHOCYTES NFR BLD: 3.9 %
MAGNESIUM SERPL-MCNC: 1.6 MG/DL
MCH RBC QN AUTO: 26.2 PG
MCHC RBC AUTO-ENTMCNC: 32.1 G/DL
MCV RBC AUTO: 82 FL
MONOCYTES # BLD AUTO: 1.2 K/UL
MONOCYTES NFR BLD: 5.5 %
NEUTROPHILS # BLD AUTO: 19.3 K/UL
NEUTROPHILS NFR BLD: 89.9 %
NRBC BLD-RTO: 0 /100 WBC
PHOSPHATE SERPL-MCNC: 3.3 MG/DL
PLATELET # BLD AUTO: 410 K/UL
PLATELET BLD QL SMEAR: ABNORMAL
PMV BLD AUTO: 9 FL
POTASSIUM SERPL-SCNC: 3.9 MMOL/L
RBC # BLD AUTO: 3.55 M/UL
SODIUM SERPL-SCNC: 140 MMOL/L
WBC # BLD AUTO: 21.46 K/UL

## 2018-02-19 PROCEDURE — 11005 DBRDMT SKIN ABDOMINAL WALL: CPT | Mod: 78,,, | Performed by: COLON & RECTAL SURGERY

## 2018-02-19 PROCEDURE — S0030 INJECTION, METRONIDAZOLE: HCPCS | Performed by: STUDENT IN AN ORGANIZED HEALTH CARE EDUCATION/TRAINING PROGRAM

## 2018-02-19 PROCEDURE — 83735 ASSAY OF MAGNESIUM: CPT

## 2018-02-19 PROCEDURE — 25000003 PHARM REV CODE 250: Performed by: NURSE ANESTHETIST, CERTIFIED REGISTERED

## 2018-02-19 PROCEDURE — 88305 TISSUE EXAM BY PATHOLOGIST: CPT | Mod: 26,,, | Performed by: PATHOLOGY

## 2018-02-19 PROCEDURE — 87116 MYCOBACTERIA CULTURE: CPT

## 2018-02-19 PROCEDURE — 87186 SC STD MICRODIL/AGAR DIL: CPT | Mod: 59

## 2018-02-19 PROCEDURE — 88305 TISSUE EXAM BY PATHOLOGIST: CPT | Performed by: PATHOLOGY

## 2018-02-19 PROCEDURE — 87102 FUNGUS ISOLATION CULTURE: CPT

## 2018-02-19 PROCEDURE — 99285 EMERGENCY DEPT VISIT HI MDM: CPT | Mod: ,,, | Performed by: EMERGENCY MEDICINE

## 2018-02-19 PROCEDURE — 25000003 PHARM REV CODE 250: Performed by: STUDENT IN AN ORGANIZED HEALTH CARE EDUCATION/TRAINING PROGRAM

## 2018-02-19 PROCEDURE — 87077 CULTURE AEROBIC IDENTIFY: CPT | Mod: 59

## 2018-02-19 PROCEDURE — 99285 EMERGENCY DEPT VISIT HI MDM: CPT | Mod: 25

## 2018-02-19 PROCEDURE — 63600175 PHARM REV CODE 636 W HCPCS: Performed by: STUDENT IN AN ORGANIZED HEALTH CARE EDUCATION/TRAINING PROGRAM

## 2018-02-19 PROCEDURE — 87075 CULTR BACTERIA EXCEPT BLOOD: CPT

## 2018-02-19 PROCEDURE — S0077 INJECTION, CLINDAMYCIN PHOSP: HCPCS | Performed by: STUDENT IN AN ORGANIZED HEALTH CARE EDUCATION/TRAINING PROGRAM

## 2018-02-19 PROCEDURE — C9290 INJ, BUPIVACAINE LIPOSOME: HCPCS | Performed by: COLON & RECTAL SURGERY

## 2018-02-19 PROCEDURE — 20600001 HC STEP DOWN PRIVATE ROOM

## 2018-02-19 PROCEDURE — 71000039 HC RECOVERY, EACH ADD'L HOUR: Performed by: COLON & RECTAL SURGERY

## 2018-02-19 PROCEDURE — 87015 SPECIMEN INFECT AGNT CONCNTJ: CPT

## 2018-02-19 PROCEDURE — 63600175 PHARM REV CODE 636 W HCPCS: Performed by: ANESTHESIOLOGY

## 2018-02-19 PROCEDURE — 37000009 HC ANESTHESIA EA ADD 15 MINS: Performed by: COLON & RECTAL SURGERY

## 2018-02-19 PROCEDURE — 25000003 PHARM REV CODE 250: Performed by: EMERGENCY MEDICINE

## 2018-02-19 PROCEDURE — 86140 C-REACTIVE PROTEIN: CPT

## 2018-02-19 PROCEDURE — 85025 COMPLETE CBC W/AUTO DIFF WBC: CPT

## 2018-02-19 PROCEDURE — 87076 CULTURE ANAEROBE IDENT EACH: CPT | Mod: 59

## 2018-02-19 PROCEDURE — 36000706: Performed by: COLON & RECTAL SURGERY

## 2018-02-19 PROCEDURE — 36000707: Performed by: COLON & RECTAL SURGERY

## 2018-02-19 PROCEDURE — 84100 ASSAY OF PHOSPHORUS: CPT

## 2018-02-19 PROCEDURE — 63600175 PHARM REV CODE 636 W HCPCS: Performed by: NURSE ANESTHETIST, CERTIFIED REGISTERED

## 2018-02-19 PROCEDURE — 96374 THER/PROPH/DIAG INJ IV PUSH: CPT

## 2018-02-19 PROCEDURE — 25000003 PHARM REV CODE 250: Performed by: COLON & RECTAL SURGERY

## 2018-02-19 PROCEDURE — 0JB80ZZ EXCISION OF ABDOMEN SUBCUTANEOUS TISSUE AND FASCIA, OPEN APPROACH: ICD-10-PCS | Performed by: COLON & RECTAL SURGERY

## 2018-02-19 PROCEDURE — D9220A PRA ANESTHESIA: Mod: CRNA,,, | Performed by: NURSE ANESTHETIST, CERTIFIED REGISTERED

## 2018-02-19 PROCEDURE — 80048 BASIC METABOLIC PNL TOTAL CA: CPT

## 2018-02-19 PROCEDURE — 63600175 PHARM REV CODE 636 W HCPCS: Performed by: COLON & RECTAL SURGERY

## 2018-02-19 PROCEDURE — D9220A PRA ANESTHESIA: Mod: ANES,,, | Performed by: ANESTHESIOLOGY

## 2018-02-19 PROCEDURE — A4216 STERILE WATER/SALINE, 10 ML: HCPCS | Performed by: STUDENT IN AN ORGANIZED HEALTH CARE EDUCATION/TRAINING PROGRAM

## 2018-02-19 PROCEDURE — 87070 CULTURE OTHR SPECIMN AEROBIC: CPT

## 2018-02-19 PROCEDURE — 63600175 PHARM REV CODE 636 W HCPCS: Performed by: EMERGENCY MEDICINE

## 2018-02-19 PROCEDURE — 37000008 HC ANESTHESIA 1ST 15 MINUTES: Performed by: COLON & RECTAL SURGERY

## 2018-02-19 PROCEDURE — 87206 SMEAR FLUORESCENT/ACID STAI: CPT

## 2018-02-19 PROCEDURE — 87205 SMEAR GRAM STAIN: CPT

## 2018-02-19 PROCEDURE — 27000221 HC OXYGEN, UP TO 24 HOURS

## 2018-02-19 PROCEDURE — 94761 N-INVAS EAR/PLS OXIMETRY MLT: CPT

## 2018-02-19 PROCEDURE — 71000033 HC RECOVERY, INTIAL HOUR: Performed by: COLON & RECTAL SURGERY

## 2018-02-19 PROCEDURE — 83605 ASSAY OF LACTIC ACID: CPT

## 2018-02-19 RX ORDER — FENTANYL CITRATE 50 UG/ML
50 INJECTION, SOLUTION INTRAMUSCULAR; INTRAVENOUS ONCE
Status: COMPLETED | OUTPATIENT
Start: 2018-02-19 | End: 2018-02-19

## 2018-02-19 RX ORDER — OXYCODONE HYDROCHLORIDE 5 MG/1
10 TABLET ORAL EVERY 4 HOURS PRN
Status: DISCONTINUED | OUTPATIENT
Start: 2018-02-19 | End: 2018-02-20

## 2018-02-19 RX ORDER — OXYCODONE HYDROCHLORIDE 5 MG/1
5 TABLET ORAL EVERY 4 HOURS PRN
Status: DISCONTINUED | OUTPATIENT
Start: 2018-02-19 | End: 2018-02-20

## 2018-02-19 RX ORDER — MEPERIDINE HYDROCHLORIDE 50 MG/ML
25 INJECTION INTRAMUSCULAR; INTRAVENOUS; SUBCUTANEOUS EVERY 4 HOURS PRN
Status: DISCONTINUED | OUTPATIENT
Start: 2018-02-19 | End: 2018-02-19

## 2018-02-19 RX ORDER — ONDANSETRON 2 MG/ML
4 INJECTION INTRAMUSCULAR; INTRAVENOUS EVERY 8 HOURS PRN
Status: DISCONTINUED | OUTPATIENT
Start: 2018-02-19 | End: 2018-03-08 | Stop reason: HOSPADM

## 2018-02-19 RX ORDER — SODIUM CHLORIDE, SODIUM LACTATE, POTASSIUM CHLORIDE, CALCIUM CHLORIDE 600; 310; 30; 20 MG/100ML; MG/100ML; MG/100ML; MG/100ML
INJECTION, SOLUTION INTRAVENOUS CONTINUOUS
Status: DISCONTINUED | OUTPATIENT
Start: 2018-02-19 | End: 2018-02-27

## 2018-02-19 RX ORDER — FENTANYL CITRATE 50 UG/ML
25 INJECTION, SOLUTION INTRAMUSCULAR; INTRAVENOUS EVERY 4 HOURS PRN
Status: DISCONTINUED | OUTPATIENT
Start: 2018-02-19 | End: 2018-02-20

## 2018-02-19 RX ORDER — ENOXAPARIN SODIUM 100 MG/ML
40 INJECTION SUBCUTANEOUS EVERY 24 HOURS
Status: DISCONTINUED | OUTPATIENT
Start: 2018-02-19 | End: 2018-02-20

## 2018-02-19 RX ORDER — FENTANYL CITRATE 50 UG/ML
75 INJECTION, SOLUTION INTRAMUSCULAR; INTRAVENOUS
Status: COMPLETED | OUTPATIENT
Start: 2018-02-19 | End: 2018-02-19

## 2018-02-19 RX ORDER — BUPIVACAINE HYDROCHLORIDE 2.5 MG/ML
INJECTION, SOLUTION EPIDURAL; INFILTRATION; INTRACAUDAL
Status: DISCONTINUED | OUTPATIENT
Start: 2018-02-19 | End: 2018-02-19 | Stop reason: HOSPADM

## 2018-02-19 RX ORDER — SODIUM CHLORIDE 0.9 % (FLUSH) 0.9 %
3 SYRINGE (ML) INJECTION EVERY 8 HOURS
Status: DISCONTINUED | OUTPATIENT
Start: 2018-02-19 | End: 2018-03-08 | Stop reason: HOSPADM

## 2018-02-19 RX ORDER — PROPOFOL 10 MG/ML
VIAL (ML) INTRAVENOUS
Status: DISCONTINUED | OUTPATIENT
Start: 2018-02-19 | End: 2018-02-19

## 2018-02-19 RX ORDER — METRONIDAZOLE 500 MG/100ML
500 INJECTION, SOLUTION INTRAVENOUS
Status: DISCONTINUED | OUTPATIENT
Start: 2018-02-19 | End: 2018-02-22

## 2018-02-19 RX ORDER — LIDOCAINE HCL/PF 100 MG/5ML
SYRINGE (ML) INTRAVENOUS
Status: DISCONTINUED | OUTPATIENT
Start: 2018-02-19 | End: 2018-02-19

## 2018-02-19 RX ORDER — NEOSTIGMINE METHYLSULFATE 1 MG/ML
INJECTION, SOLUTION INTRAVENOUS
Status: DISCONTINUED | OUTPATIENT
Start: 2018-02-19 | End: 2018-02-19

## 2018-02-19 RX ORDER — ROCURONIUM BROMIDE 10 MG/ML
INJECTION, SOLUTION INTRAVENOUS
Status: DISCONTINUED | OUTPATIENT
Start: 2018-02-19 | End: 2018-02-19

## 2018-02-19 RX ORDER — NALOXONE HCL 0.4 MG/ML
0.02 VIAL (ML) INJECTION
Status: DISCONTINUED | OUTPATIENT
Start: 2018-02-19 | End: 2018-02-20 | Stop reason: SDUPTHER

## 2018-02-19 RX ORDER — DEXAMETHASONE SODIUM PHOSPHATE 4 MG/ML
INJECTION, SOLUTION INTRA-ARTICULAR; INTRALESIONAL; INTRAMUSCULAR; INTRAVENOUS; SOFT TISSUE
Status: DISCONTINUED | OUTPATIENT
Start: 2018-02-19 | End: 2018-02-19

## 2018-02-19 RX ORDER — FENTANYL CITRATE 50 UG/ML
INJECTION, SOLUTION INTRAMUSCULAR; INTRAVENOUS
Status: DISCONTINUED | OUTPATIENT
Start: 2018-02-19 | End: 2018-02-19

## 2018-02-19 RX ORDER — ACETAMINOPHEN 10 MG/ML
INJECTION, SOLUTION INTRAVENOUS
Status: DISCONTINUED | OUTPATIENT
Start: 2018-02-19 | End: 2018-02-19

## 2018-02-19 RX ORDER — CLINDAMYCIN PHOSPHATE 900 MG/50ML
900 INJECTION, SOLUTION INTRAVENOUS
Status: DISCONTINUED | OUTPATIENT
Start: 2018-02-19 | End: 2018-02-20

## 2018-02-19 RX ORDER — MIDAZOLAM HYDROCHLORIDE 1 MG/ML
INJECTION, SOLUTION INTRAMUSCULAR; INTRAVENOUS
Status: DISCONTINUED | OUTPATIENT
Start: 2018-02-19 | End: 2018-02-19

## 2018-02-19 RX ORDER — GLYCOPYRROLATE 0.2 MG/ML
INJECTION INTRAMUSCULAR; INTRAVENOUS
Status: DISCONTINUED | OUTPATIENT
Start: 2018-02-19 | End: 2018-02-19

## 2018-02-19 RX ORDER — PHENYLEPHRINE HYDROCHLORIDE 10 MG/ML
INJECTION INTRAVENOUS
Status: DISCONTINUED | OUTPATIENT
Start: 2018-02-19 | End: 2018-02-19

## 2018-02-19 RX ADMIN — LIDOCAINE HYDROCHLORIDE 100 MG: 20 INJECTION, SOLUTION INTRAVENOUS at 10:02

## 2018-02-19 RX ADMIN — ENOXAPARIN SODIUM 40 MG: 100 INJECTION SUBCUTANEOUS at 04:02

## 2018-02-19 RX ADMIN — NEOSTIGMINE METHYLSULFATE 5 MG: 1 INJECTION INTRAVENOUS at 11:02

## 2018-02-19 RX ADMIN — ONDANSETRON HYDROCHLORIDE 4 MG: 2 INJECTION, SOLUTION INTRAMUSCULAR; INTRAVENOUS at 11:02

## 2018-02-19 RX ADMIN — ROCURONIUM BROMIDE 5 MG: 10 INJECTION, SOLUTION INTRAVENOUS at 11:02

## 2018-02-19 RX ADMIN — SODIUM CHLORIDE, SODIUM GLUCONATE, SODIUM ACETATE, POTASSIUM CHLORIDE, MAGNESIUM CHLORIDE, SODIUM PHOSPHATE, DIBASIC, AND POTASSIUM PHOSPHATE: .53; .5; .37; .037; .03; .012; .00082 INJECTION, SOLUTION INTRAVENOUS at 10:02

## 2018-02-19 RX ADMIN — Medication 3 ML: at 10:02

## 2018-02-19 RX ADMIN — PHENYLEPHRINE HYDROCHLORIDE 100 MCG: 10 INJECTION INTRAVENOUS at 11:02

## 2018-02-19 RX ADMIN — FENTANYL CITRATE 75 MCG: 50 INJECTION INTRAMUSCULAR; INTRAVENOUS at 07:02

## 2018-02-19 RX ADMIN — ACETAMINOPHEN 1000 MG: 10 INJECTION, SOLUTION INTRAVENOUS at 11:02

## 2018-02-19 RX ADMIN — CLINDAMYCIN IN 5 PERCENT DEXTROSE 900 MG: 18 INJECTION, SOLUTION INTRAVENOUS at 10:02

## 2018-02-19 RX ADMIN — METRONIDAZOLE 500 MG: 500 INJECTION, SOLUTION INTRAVENOUS at 09:02

## 2018-02-19 RX ADMIN — SODIUM CHLORIDE 1000 ML: 0.9 INJECTION, SOLUTION INTRAVENOUS at 08:02

## 2018-02-19 RX ADMIN — FENTANYL CITRATE 100 MCG: 50 INJECTION, SOLUTION INTRAMUSCULAR; INTRAVENOUS at 10:02

## 2018-02-19 RX ADMIN — OXYCODONE HYDROCHLORIDE 10 MG: 5 TABLET ORAL at 04:02

## 2018-02-19 RX ADMIN — CLINDAMYCIN IN 5 PERCENT DEXTROSE 900 MG: 18 INJECTION, SOLUTION INTRAVENOUS at 04:02

## 2018-02-19 RX ADMIN — FENTANYL CITRATE 50 MCG: 50 INJECTION, SOLUTION INTRAMUSCULAR; INTRAVENOUS at 09:02

## 2018-02-19 RX ADMIN — ROCURONIUM BROMIDE 40 MG: 10 INJECTION, SOLUTION INTRAVENOUS at 10:02

## 2018-02-19 RX ADMIN — SODIUM CHLORIDE, SODIUM LACTATE, POTASSIUM CHLORIDE, AND CALCIUM CHLORIDE: .6; .31; .03; .02 INJECTION, SOLUTION INTRAVENOUS at 01:02

## 2018-02-19 RX ADMIN — SODIUM CHLORIDE, SODIUM LACTATE, POTASSIUM CHLORIDE, AND CALCIUM CHLORIDE 1000 ML: .6; .31; .03; .02 INJECTION, SOLUTION INTRAVENOUS at 06:02

## 2018-02-19 RX ADMIN — CEFTRIAXONE 2 G: 2 INJECTION, SOLUTION INTRAVENOUS at 10:02

## 2018-02-19 RX ADMIN — PHENYLEPHRINE HYDROCHLORIDE 100 MCG: 10 INJECTION INTRAVENOUS at 10:02

## 2018-02-19 RX ADMIN — SODIUM CHLORIDE, SODIUM LACTATE, POTASSIUM CHLORIDE, AND CALCIUM CHLORIDE 1000 ML: .6; .31; .03; .02 INJECTION, SOLUTION INTRAVENOUS at 08:02

## 2018-02-19 RX ADMIN — MIDAZOLAM HYDROCHLORIDE 2 MG: 1 INJECTION, SOLUTION INTRAMUSCULAR; INTRAVENOUS at 10:02

## 2018-02-19 RX ADMIN — Medication 3 ML: at 02:02

## 2018-02-19 RX ADMIN — SUGAMMADEX 160 MG: 100 INJECTION, SOLUTION INTRAVENOUS at 11:02

## 2018-02-19 RX ADMIN — OXYCODONE HYDROCHLORIDE 10 MG: 5 TABLET ORAL at 12:02

## 2018-02-19 RX ADMIN — PROPOFOL 170 MG: 10 INJECTION, EMULSION INTRAVENOUS at 10:02

## 2018-02-19 RX ADMIN — METRONIDAZOLE 500 MG: 500 INJECTION, SOLUTION INTRAVENOUS at 04:02

## 2018-02-19 RX ADMIN — CLINDAMYCIN IN 5 PERCENT DEXTROSE 900 MG: 18 INJECTION, SOLUTION INTRAVENOUS at 08:02

## 2018-02-19 RX ADMIN — METRONIDAZOLE 500 MG: 500 INJECTION, SOLUTION INTRAVENOUS at 10:02

## 2018-02-19 RX ADMIN — PHENYLEPHRINE HYDROCHLORIDE 200 MCG: 10 INJECTION INTRAVENOUS at 10:02

## 2018-02-19 RX ADMIN — DEXAMETHASONE SODIUM PHOSPHATE 4 MG: 4 INJECTION, SOLUTION INTRAMUSCULAR; INTRAVENOUS at 11:02

## 2018-02-19 RX ADMIN — GLYCOPYRROLATE 0.4 MG: 0.2 INJECTION, SOLUTION INTRAMUSCULAR; INTRAVENOUS at 11:02

## 2018-02-19 RX ADMIN — PHENYLEPHRINE HYDROCHLORIDE 200 MCG: 10 INJECTION INTRAVENOUS at 11:02

## 2018-02-19 NOTE — ASSESSMENT & PLAN NOTE
Ms. Marshall is a 63 y/o F who was found to have a rectal mass and completed chemoXRT and LAR w/ ileal resection with loop ileostomy for 2 carcinoids on 12/11/17  (neg nodes) on 1/22/18 she underwent pouchagram and then subsequent loop ileostomy takedown on 1/23/18 who presents now with a large RLQ abscess concerning for possible NEC or ECF.     - Admit to Colorectal surgery under Dr. Manjarrez.   - Schedule to the OR for Wound debridement ASAP.   - Consent obtained.   - Labs pending.   - CT scan reviewed and uploaded.   - Daily labs.   - Continue with antibiotics, Clindamycin, Ceftriaxone and Flagyl.     Jocelyn Kolb MD  General Surgery PGY IV  Beeper: [phone]

## 2018-02-19 NOTE — SUBJECTIVE & OBJECTIVE
PTA Medications   Medication Sig    ALBUTEROL INHL Inhale into the lungs.    aspirin (ECOTRIN) 81 MG EC tablet Take 81 mg by mouth once daily.    atorvastatin (LIPITOR) 20 MG tablet Take 20 mg by mouth once daily.    cloNIDine (CATAPRES) 0.1 MG tablet Take 0.1 mg by mouth 2 (two) times daily.    diclofenac (VOLTAREN) 75 MG EC tablet Take 75 mg by mouth 2 (two) times daily.    DULoxetine (CYMBALTA) 60 MG capsule Take 60 mg by mouth once daily.    famotidine (PEPCID) 20 MG tablet Take 20 mg by mouth 2 (two) times daily.    fluticasone (FLONASE) 50 mcg/actuation nasal spray 1 spray by Each Nare route once daily.    hydroCHLOROthiazide (MICROZIDE) 12.5 mg capsule Take 12.5 mg by mouth once daily.    hydrOXYzine HCl (ATARAX) 25 MG tablet Take 25 mg by mouth 3 (three) times daily.    omeprazole (PRILOSEC) 20 MG capsule Take 20 mg by mouth once daily.    oxyCODONE (ROXICODONE) 5 MG immediate release tablet Take 1 tablet (5 mg total) by mouth every 4 (four) hours as needed.    silver sulfADIAZINE 1% (SILVADENE) 1 % cream Apply topically 2 (two) times daily.    spironolactone (ALDACTONE) 25 MG tablet Take 25 mg by mouth once daily.       Review of patient's allergies indicates:   Allergen Reactions    Lisinopril-hydrochlorothiazide Swelling     Mouth Swelling    Sulfa (sulfonamide antibiotics) Swelling     Swelling of lips       Past Medical History:   Diagnosis Date    Asthma     Cancer     rectal cancer in remission    Hyperlipidemia     Hypertension      Past Surgical History:   Procedure Laterality Date    COLON SURGERY      HYSTERECTOMY      port a cath Right     R chest wall     Family History     None        Social History Main Topics    Smoking status: Never Smoker    Smokeless tobacco: Never Used    Alcohol use No    Drug use: No    Sexual activity: Not on file     Review of Systems     Negative except above.       Objective:     Vital Signs (Most Recent):  Temp: 98.6 °F (37 °C) (02/19/18  0705)  Pulse: 102 (02/19/18 0820)  Resp: 14 (02/19/18 0820)  BP: (!) 112/59 (02/19/18 0802)  SpO2: 99 % (02/19/18 0820) Vital Signs (24h Range):  Temp:  [98.6 °F (37 °C)] 98.6 °F (37 °C)  Pulse:  [102-106] 102  Resp:  [13-18] 14  SpO2:  [96 %-100 %] 99 %  BP: (109-115)/(57-61) 112/59     Weight: 82.6 kg (182 lb)  Body mass index is 29.38 kg/m².    Physical Exam     General: In no acute distress, well appearance.   CV: RRR, S1, S2 no murmur or gallops   Pulm: non labored breathing  Abd: soft, mildly distended, RLQ tenderness. Large inflammation and bulge at the RLQ at the ileostomy closure site. No drainage seen.   Ext: wwp    Significant Labs:  BMP (Last 3 Results):   Recent Labs  Lab 02/19/18  0809   *      K 3.9      CO2 26   BUN 26*   CREATININE 1.3   CALCIUM 8.1*   MG 1.6     CBC (Last 3 Results):   Recent Labs  Lab 02/19/18  0809   WBC 21.46*   RBC 3.55*   HGB 9.3*   HCT 29.0*   *   MCV 82   MCH 26.2*   MCHC 32.1       Significant Diagnostics:    CT scan reviewed.

## 2018-02-19 NOTE — HPI
Ms. Marshall is a 61 y/o F who was found to have a rectal mass and completed chemoXRT and LAR w/ ileal resection with loop ileostomy for 2 carcinoids on 12/11/17   (neg nodes) On 1/22/18 she underwent pouchagram and then subsequent loop ileostomy takedown on 1/23/18 post op she presented with late return of bowel function and was discharged on 2/9/2018 having bowel movements and tolerating a regular diet.      Since discharge patient states she has been having decreased amount of bowel movements as well as nausea, no emesis. She took an enema and a suppository on Saturday and had a small amount of stool. Otherwise states she hasn't passed any gas. On Saturday she started noticing a bulge on the RLQ were her old ileostomy was which got worse yesterday and very tender. Has been having fevers and chills as well.

## 2018-02-19 NOTE — NURSING TRANSFER
Nursing Transfer Note      2/19/2018     Transfer From: PACU    Transfer via stretcher     Transfer with cardiac monitoring    Transported by transport    Medicines sent: No    Chart send with patient: Yes    Notified: spouse    Patient reassessed at: 1400 on 2/19/2018     Upon arrival to floor: cardiac monitor applied, patient oriented to room, call bell in reach and bed in lowest position

## 2018-02-19 NOTE — ANESTHESIA PREPROCEDURE EVALUATION
02/19/2018  Coby Marshall is a 62 y.o., female.    Anesthesia Evaluation    I have reviewed the Patient Summary Reports.    I have reviewed the Nursing Notes.   I have reviewed the Medications.     Review of Systems  Anesthesia Hx:  No problems with previous Anesthesia  History of prior surgery of interest to airway management or planning: Previous anesthesia: General Denies Family Hx of Anesthesia complications.   Denies Personal Hx of Anesthesia complications.   Social:  Non-Smoker    Hematology/Oncology:  Hematology Normal      Current/Recent Cancer. Oncology Comments: Rectal CA    EENT/Dental:EENT/Dental Normal   Cardiovascular:   Exercise tolerance: good Hypertension hyperlipidemia    Pulmonary:   Asthma mild    Renal/:  Renal/ Normal     Hepatic/GI:  Hepatic/GI Normal    Musculoskeletal:  Musculoskeletal Normal    Neurological:  Neurology Normal    Endocrine:  Endocrine Normal    Dermatological:  Skin Normal    Psych:  Psychiatric Normal           Physical Exam  General:  Well nourished    Airway/Jaw/Neck:  Airway Findings: Mouth Opening: Small, but > 3cm Tongue: Normal  General Airway Assessment: Adult, Average  Mallampati: III  Improves to II with phonation.  TM Distance: 4 - 6 cm        Eyes/Ears/Nose:  EYES/EARS/NOSE FINDINGS: Normal   Dental:  Dental Findings: upper partial dentures, lower partial dentures, upper front caps   Chest/Lungs:  Chest/Lungs Findings: Clear to auscultation, Normal Respiratory Rate     Heart/Vascular:  Heart Findings: Rate: Normal  Rhythm: Regular Rhythm  Sounds: Normal  Heart murmur: negative Vascular Findings: Normal    Abdomen:  Abdomen Findings: Normal    Musculoskeletal:  Musculoskeletal Findings: Normal   Skin:  Skin Findings: Normal    Mental Status:  Mental Status Findings:  Cooperative, Alert and Oriented         Anesthesia Plan  Type of Anesthesia, risks &  benefits discussed:  Anesthesia Type:  general  Patient's Preference:   Intra-op Monitoring Plan: standard ASA monitors  Intra-op Monitoring Plan Comments:   Post Op Pain Control Plan:   Post Op Pain Control Plan Comments:   Induction:   IV  Beta Blocker:  Patient is not currently on a Beta-Blocker (No further documentation required).       Informed Consent: Patient understands risks and agrees with Anesthesia plan.  Questions answered. Anesthesia consent signed with patient.  ASA Score: 3     Day of Surgery Review of History & Physical:            Ready For Surgery From Anesthesia Perspective.

## 2018-02-19 NOTE — NURSING TRANSFER
Nursing Transfer Note      2/19/2018     Transfer To: 643    Transfer via stretcher    Transfer with cardiac monitoring,ivf    Transported by pct    Medicines sent: none    Chart send with patient: Yes    Notified: daughter

## 2018-02-19 NOTE — ED TRIAGE NOTES
Report received from EMS.  Patient is here for abdominal pain that started around 9 pm last night.  Patient has chronic bowel and an abscess from a resection.  Patient is a transfer here from [facility].  Patient is here to see colorectal surgery.  Patient c/o RLQ abdominal pain 10/10.

## 2018-02-19 NOTE — TELEPHONE ENCOUNTER
----- Message from Jackelin Summers MD sent at 2/17/2018  1:29 PM CST -----  Can we add her on to [facility] on Thursday?     Having continued problems with bloating and not passing flatus but is eating. S/p rectal carcinoid resection and ileostomy takedown (end of Jan).    Thanks!

## 2018-02-19 NOTE — BRIEF OP NOTE
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 Exparel 266 mg, Marcaine 0.25% (75 mg) Saline 10 cc preperironeal injection      Technical Procedures Used: open and debridement of abscess and necrotic fat  Description of the Findings of the Procedure: abdominal wall abscess, small fascial opening, no fistula or stool seen, necrotic fat    Significant Surgical Tasks Conducted by the Assistant(s), if Applicable: n/a    Complications: No    Estimated Blood Loss (EBL):  100 cc         Implants: * No implants in log *    Specimens:   Specimen (12h ago through future)    None           Condition: Good    Disposition: PACU - hemodynamically stable.    Attestation: I was present and scrubbed for the entire procedure.

## 2018-02-19 NOTE — PLAN OF CARE
Problem: Patient Care Overview  Goal: Plan of Care Review  Outcome: Ongoing (interventions implemented as appropriate)  Plan of care reviewed with patient and spouse, verbalizes understanding. AAOx4, VSS. Dressing to RLQ CDI. Patient NPO at this time, reports no N/V. Patient on tele, NSR. Patient reports pain as well controlled with PRN meds. Osuna intact with adequate output. SCD's in place. Patient states no other needs at this time, call light in reach, WCTM.

## 2018-02-19 NOTE — ED PROVIDER NOTES
Encounter Date: 2/19/2018    SCRIBE #1 NOTE: Oskar ELIZALDE am scribing for, and in the presence of, Dr. Iyer .       History     Chief Complaint   Patient presents with    Transfer     [facility] transfer c/o abdominal pain.  Possible bowel necrosis from post op bowel resection     Time patient was seen by the provider: 7:09 AM    The patient is a 62 y.o. female with co-morbidities including: HTN, hyperlipidemia, and asthma who presents to the ED via EMS with a complaint of RLQ abdominal pain. Pt was transferred from [facility] to see the colorectal team. She recently had a bowel resection and now she is experiencing abdominal pain. Of note: Pt received Unasyn at approximatelly 3 AM.         The history is provided by the patient and the EMS personnel.     Review of patient's allergies indicates:   Allergen Reactions    Lisinopril-hydrochlorothiazide Swelling     Mouth Swelling    Sulfa (sulfonamide antibiotics) Swelling     Swelling of lips     Past Medical History:   Diagnosis Date    Asthma     Cancer     rectal cancer in remission    Hyperlipidemia     Hypertension      Past Surgical History:   Procedure Laterality Date    COLON SURGERY      HYSTERECTOMY      port a cath Right     R chest wall     History reviewed. No pertinent family history.  Social History   Substance Use Topics    Smoking status: Never Smoker    Smokeless tobacco: Never Used    Alcohol use No     Review of Systems   Constitutional: Negative for fever.   HENT: Negative for sore throat.    Respiratory: Negative for shortness of breath.    Cardiovascular: Negative for chest pain.   Gastrointestinal: Positive for abdominal pain (RLQ). Negative for nausea.   Genitourinary: Negative for dysuria.   Musculoskeletal: Negative for back pain.   Skin: Negative for rash.   Neurological: Negative for weakness.   Hematological: Does not bruise/bleed easily.       Physical Exam     Initial Vitals [02/19/18 0705]   BP Pulse Resp  Temp SpO2   109/61 105 18 98.6 °F (37 °C) 96 %      MAP       77         Physical Exam    Nursing note and vitals reviewed.    Appearance: No acute distress.  Skin: No rashes seen.  Good turgor.  No abrasions.  No ecchymoses.  Eyes: No conjunctival injection.  ENT: Oropharynx clear.    Chest: Clear to auscultation bilaterally.  Good air movement.  No wheezes.  No rhonchi.  Cardiovascular: Regular rate and rhythm.  No murmurs. No gallops. No rubs.  Abdomen: Soft.  Not distended. RLQ tenderness. Positive guarding. Positive rebound. No diffuse rebound.   Musculoskeletal: Good range of motion all joints.  No deformities.  Neck supple.  No meningismus.  Neurologic: Motor intact.  Sensation intact.  Cerebellar intact.  Cranial nerves intact.  Mental Status:  Alert and oriented x 3.  Appropriate, conversant.      ED Course   Procedures  Labs Reviewed   BASIC METABOLIC PANEL - Abnormal; Notable for the following:        Result Value    Glucose 139 (*)     BUN, Bld 26 (*)     Calcium 8.1 (*)     eGFR if  50.1 (*)     eGFR if non  43.5 (*)     All other components within normal limits   CBC W/ AUTO DIFFERENTIAL - Abnormal; Notable for the following:     WBC 21.46 (*)     RBC 3.55 (*)     Hemoglobin 9.3 (*)     Hematocrit 29.0 (*)     MCH 26.2 (*)     Platelets 410 (*)     MPV 9.0 (*)     Immature Granulocytes 0.6 (*)     Gran # (ANC) 19.3 (*)     Immature Grans (Abs) 0.12 (*)     Lymph # 0.8 (*)     Mono # 1.2 (*)     Gran% 89.9 (*)     Lymph% 3.9 (*)     Platelet Estimate Increased (*)     All other components within normal limits   MAGNESIUM   PHOSPHORUS   LACTIC ACID, PLASMA             Medical Decision Making:   History:   Old Medical Records: I decided to obtain old medical records.  Initial Assessment:   62 y.o. female presents to the ED for an intraabdominal infection. She was transferred from [facility] for a colorectal evaluation. She got Unasyn antibiotic at 3:00AM. I  will give pain medicine and discuss with colorectal for admission.     7:17AM  Discussed with CRS. They will evaluate pt.             Scribe Attestation:   Scribe #1: I performed the above scribed service and the documentation accurately describes the services I performed. I attest to the accuracy of the note.               Clinical Impression:   The primary encounter diagnosis was Carcinoid tumor of rectum. Diagnoses of Partial small bowel obstruction and Enterocutaneous fistula were also pertinent to this visit.                           Suhas Iyer MD  02/20/18 0918

## 2018-02-19 NOTE — INTERVAL H&P NOTE
The patient has been examined and the H&P has been reviewed:    I concur with the findings and no changes have occurred since H&P was written.    Anesthesia/Surgery risks, benefits and alternative options discussed and understood by patient/family.          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

## 2018-02-19 NOTE — TRANSFER OF CARE
"Anesthesia Transfer of Care Note    Patient: Coby Marshall    Procedure(s) Performed: Procedure(s) (LRB):  DEBRIDEMENT-WOUND, ABDOMINAL WALL ABSCESS (N/A)  EXPLORATION-WOUND, ABDOMINAL WALL ABSCESS (N/A)    Patient location: PACU    Anesthesia Type: general    Transport from OR: Transported from OR on 6-10 L/min O2 by face mask with adequate spontaneous ventilation    Post pain: adequate analgesia    Post assessment: no apparent anesthetic complications and tolerated procedure well    Post vital signs: stable    Level of consciousness: awake, oriented and alert    Nausea/Vomiting: no nausea/vomiting    Complications: none    Transfer of care protocol was followed      Last vitals:   Visit Vitals  BP 98/61 (BP Location: Left arm, Patient Position: Lying)   Pulse 100   Temp 37.6 °C (99.6 °F) (Oral)   Resp 18   Ht 5' 6" (1.676 m)   Wt 82.6 kg (182 lb)   SpO2 100%   Breastfeeding? No   BMI 29.38 kg/m²     "

## 2018-02-19 NOTE — CONSULTS
[facility]  Colorectal Surgery  Consult Note    Patient Name: Coby Marshall  MRN: [id number]  Admission Date: 2/19/2018  Hospital Length of Stay: 0 days  Attending Physician: Suhas Manjarrez MD  Primary Care Provider: Randal Casillas MD    Consults  Subjective:     Chief Complaint/Reason for Admission:  Wound infection    History of Present Illness:  Ms. Marshall is a 63 y/o F who was found to have a rectal mass and completed chemoXRT and LAR w/ ileal resection with loop ileostomy for 2 carcinoids on 12/11/17   (neg nodes) On 1/22/18 she underwent pouchagram and then subsequent loop ileostomy takedown on 1/23/18 post op she presented with late return of bowel function and was discharged on 2/9/2018 having bowel movements and tolerating a regular diet.      Since discharge patient states she has been having decreased amount of bowel movements as well as nausea, no emesis. She took an enema and a suppository on Saturday and had a small amount of stool. Otherwise states she hasn't passed any gas. On Saturday she started noticing a bulge on the RLQ were her old ileostomy was which got worse yesterday and very tender. Has been having fevers and chills as well.     PTA Medications   Medication Sig    ALBUTEROL INHL Inhale into the lungs.    aspirin (ECOTRIN) 81 MG EC tablet Take 81 mg by mouth once daily.    atorvastatin (LIPITOR) 20 MG tablet Take 20 mg by mouth once daily.    cloNIDine (CATAPRES) 0.1 MG tablet Take 0.1 mg by mouth 2 (two) times daily.    diclofenac (VOLTAREN) 75 MG EC tablet Take 75 mg by mouth 2 (two) times daily.    DULoxetine (CYMBALTA) 60 MG capsule Take 60 mg by mouth once daily.    famotidine (PEPCID) 20 MG tablet Take 20 mg by mouth 2 (two) times daily.    fluticasone (FLONASE) 50 mcg/actuation nasal spray 1 spray by Each Nare route once daily.    hydroCHLOROthiazide (MICROZIDE) 12.5 mg capsule Take 12.5 mg by mouth once daily.    hydrOXYzine HCl (ATARAX) 25 MG tablet  Take 25 mg by mouth 3 (three) times daily.    omeprazole (PRILOSEC) 20 MG capsule Take 20 mg by mouth once daily.    oxyCODONE (ROXICODONE) 5 MG immediate release tablet Take 1 tablet (5 mg total) by mouth every 4 (four) hours as needed.    silver sulfADIAZINE 1% (SILVADENE) 1 % cream Apply topically 2 (two) times daily.    spironolactone (ALDACTONE) 25 MG tablet Take 25 mg by mouth once daily.       Review of patient's allergies indicates:   Allergen Reactions    Lisinopril-hydrochlorothiazide Swelling     Mouth Swelling    Sulfa (sulfonamide antibiotics) Swelling     Swelling of lips       Past Medical History:   Diagnosis Date    Asthma     Cancer     rectal cancer in remission    Hyperlipidemia     Hypertension      Past Surgical History:   Procedure Laterality Date    COLON SURGERY      HYSTERECTOMY      port a cath Right     R chest wall     Family History     None        Social History Main Topics    Smoking status: Never Smoker    Smokeless tobacco: Never Used    Alcohol use No    Drug use: No    Sexual activity: Not on file     Review of Systems     Negative except above.       Objective:     Vital Signs (Most Recent):  Temp: 98.6 °F (37 °C) (02/19/18 0705)  Pulse: 102 (02/19/18 0820)  Resp: 14 (02/19/18 0820)  BP: (!) 112/59 (02/19/18 0802)  SpO2: 99 % (02/19/18 0820) Vital Signs (24h Range):  Temp:  [98.6 °F (37 °C)] 98.6 °F (37 °C)  Pulse:  [102-106] 102  Resp:  [13-18] 14  SpO2:  [96 %-100 %] 99 %  BP: (109-115)/(57-61) 112/59     Weight: 82.6 kg (182 lb)  Body mass index is 29.38 kg/m².    Physical Exam     General: In no acute distress, well appearance.   CV: RRR, S1, S2 no murmur or gallops   Pulm: non labored breathing  Abd: soft, mildly distended, RLQ tenderness. Large inflammation and bulge at the RLQ at the ileostomy closure site. No drainage seen.   Ext: wwp    Significant Labs:  BMP (Last 3 Results):   Recent Labs  Lab 02/19/18  0809   *      K 3.9      CO2  26   BUN 26*   CREATININE 1.3   CALCIUM 8.1*   MG 1.6     CBC (Last 3 Results):   Recent Labs  Lab 02/19/18  0809   WBC 21.46*   RBC 3.55*   HGB 9.3*   HCT 29.0*   *   MCV 82   MCH 26.2*   MCHC 32.1       Significant Diagnostics:    CT scan reviewed.           Assessment/Plan:     * Carcinoid tumor of rectum    Ms. Marshall is a 61 y/o F who was found to have a rectal mass and completed chemoXRT and LAR w/ ileal resection with loop ileostomy for 2 carcinoids on 12/11/17  (neg nodes) on 1/22/18 she underwent pouchagram and then subsequent loop ileostomy takedown on 1/23/18 who presents now with a large RLQ abscess concerning for possible NEC or ECF.     - Admit to Colorectal surgery under Dr. Manjarrez.   - Schedule to the OR for Wound debridement ASAP.   - Consent obtained.   - Labs pending.   - CT scan reviewed and uploaded.   - Daily labs.   - LRINEC Score of 7  - Continue with antibiotics, Clindamycin, Ceftriaxone and Flagyl.     Jocelyn Kolb MD  General Surgery PGY IV  Beeper: [phone]

## 2018-02-19 NOTE — PLAN OF CARE
Plan of care and periop routine discussed with patient. Pt verbalized understanding. All questions answered. VSS. Respirations even and unlabored. Pt reports surgical pain is tolerable.Will continue to monitor.

## 2018-02-20 ENCOUNTER — ANESTHESIA (OUTPATIENT)
Dept: SURGERY | Facility: HOSPITAL | Age: 63
DRG: 857 | End: 2018-02-20
Payer: COMMERCIAL

## 2018-02-20 ENCOUNTER — ANESTHESIA EVENT (OUTPATIENT)
Dept: SURGERY | Facility: HOSPITAL | Age: 63
DRG: 857 | End: 2018-02-20
Payer: COMMERCIAL

## 2018-02-20 LAB
ABO + RH BLD: NORMAL
ANION GAP SERPL CALC-SCNC: 9 MMOL/L
ANISOCYTOSIS BLD QL SMEAR: SLIGHT
BASOPHILS # BLD AUTO: 0.02 K/UL
BASOPHILS NFR BLD: 0.1 %
BLD GP AB SCN CELLS X3 SERPL QL: NORMAL
BLD PROD TYP BPU: NORMAL
BLD PROD TYP BPU: NORMAL
BLOOD UNIT EXPIRATION DATE: NORMAL
BLOOD UNIT EXPIRATION DATE: NORMAL
BLOOD UNIT TYPE CODE: 6200
BLOOD UNIT TYPE CODE: 6200
BLOOD UNIT TYPE: NORMAL
BLOOD UNIT TYPE: NORMAL
BUN SERPL-MCNC: 23 MG/DL
CALCIUM SERPL-MCNC: 7.6 MG/DL
CHLORIDE SERPL-SCNC: 105 MMOL/L
CO2 SERPL-SCNC: 26 MMOL/L
CODING SYSTEM: NORMAL
CODING SYSTEM: NORMAL
CREAT SERPL-MCNC: 0.9 MG/DL
CRP SERPL-MCNC: 321.1 MG/L
DIFFERENTIAL METHOD: ABNORMAL
DISPENSE STATUS: NORMAL
DISPENSE STATUS: NORMAL
EOSINOPHIL # BLD AUTO: 0 K/UL
EOSINOPHIL NFR BLD: 0 %
ERYTHROCYTE [DISTWIDTH] IN BLOOD BY AUTOMATED COUNT: 14.4 %
EST. GFR  (AFRICAN AMERICAN): >60 ML/MIN/1.73 M^2
EST. GFR  (NON AFRICAN AMERICAN): >60 ML/MIN/1.73 M^2
GLUCOSE SERPL-MCNC: 101 MG/DL (ref 70–110)
GLUCOSE SERPL-MCNC: 107 MG/DL (ref 70–110)
GLUCOSE SERPL-MCNC: 110 MG/DL
GLUCOSE SERPL-MCNC: 115 MG/DL (ref 70–110)
HCO3 UR-SCNC: 22.4 MMOL/L (ref 24–28)
HCO3 UR-SCNC: 23.3 MMOL/L (ref 24–28)
HCO3 UR-SCNC: 26.4 MMOL/L (ref 24–28)
HCT VFR BLD AUTO: 23.7 %
HCT VFR BLD CALC: 22 %PCV (ref 36–54)
HCT VFR BLD CALC: 29 %PCV (ref 36–54)
HCT VFR BLD CALC: 30 %PCV (ref 36–54)
HGB BLD-MCNC: 7.4 G/DL
HYPOCHROMIA BLD QL SMEAR: ABNORMAL
IMM GRANULOCYTES # BLD AUTO: 0.08 K/UL
IMM GRANULOCYTES NFR BLD AUTO: 0.5 %
LYMPHOCYTES # BLD AUTO: 0.7 K/UL
LYMPHOCYTES NFR BLD: 4.3 %
MCH RBC QN AUTO: 25 PG
MCHC RBC AUTO-ENTMCNC: 31.2 G/DL
MCV RBC AUTO: 80 FL
MONOCYTES # BLD AUTO: 0.7 K/UL
MONOCYTES NFR BLD: 4.5 %
NEUTROPHILS # BLD AUTO: 15.1 K/UL
NEUTROPHILS NFR BLD: 90.6 %
NRBC BLD-RTO: 0 /100 WBC
OVALOCYTES BLD QL SMEAR: ABNORMAL
PCO2 BLDA: 31.4 MMHG (ref 35–45)
PCO2 BLDA: 31.9 MMHG (ref 35–45)
PCO2 BLDA: 39.9 MMHG (ref 35–45)
PH SMN: 7.43 [PH] (ref 7.35–7.45)
PH SMN: 7.46 [PH] (ref 7.35–7.45)
PH SMN: 7.47 [PH] (ref 7.35–7.45)
PLATELET # BLD AUTO: 322 K/UL
PLATELET BLD QL SMEAR: ABNORMAL
PMV BLD AUTO: 9.2 FL
PO2 BLDA: 147 MMHG (ref 80–100)
PO2 BLDA: 156 MMHG (ref 80–100)
PO2 BLDA: 190 MMHG (ref 80–100)
POC BE: -1 MMOL/L
POC BE: 0 MMOL/L
POC BE: 2 MMOL/L
POC IONIZED CALCIUM: 0.92 MMOL/L (ref 1.06–1.42)
POC IONIZED CALCIUM: 0.95 MMOL/L (ref 1.06–1.42)
POC IONIZED CALCIUM: 1.06 MMOL/L (ref 1.06–1.42)
POC SATURATED O2: 100 % (ref 95–100)
POC SATURATED O2: 100 % (ref 95–100)
POC SATURATED O2: 99 % (ref 95–100)
POC TCO2: 23 MMOL/L (ref 23–27)
POC TCO2: 24 MMOL/L (ref 23–27)
POC TCO2: 28 MMOL/L (ref 23–27)
POIKILOCYTOSIS BLD QL SMEAR: SLIGHT
POLYCHROMASIA BLD QL SMEAR: ABNORMAL
POTASSIUM BLD-SCNC: 3.1 MMOL/L (ref 3.5–5.1)
POTASSIUM BLD-SCNC: 3.1 MMOL/L (ref 3.5–5.1)
POTASSIUM BLD-SCNC: 3.2 MMOL/L (ref 3.5–5.1)
POTASSIUM SERPL-SCNC: 3.7 MMOL/L
RBC # BLD AUTO: 2.96 M/UL
SAMPLE: ABNORMAL
SODIUM BLD-SCNC: 140 MMOL/L (ref 136–145)
SODIUM BLD-SCNC: 141 MMOL/L (ref 136–145)
SODIUM BLD-SCNC: 142 MMOL/L (ref 136–145)
SODIUM SERPL-SCNC: 140 MMOL/L
TRANS ERYTHROCYTES VOL PATIENT: NORMAL ML
TRANS ERYTHROCYTES VOL PATIENT: NORMAL ML
WBC # BLD AUTO: 16.61 K/UL

## 2018-02-20 PROCEDURE — 86140 C-REACTIVE PROTEIN: CPT

## 2018-02-20 PROCEDURE — 63600175 PHARM REV CODE 636 W HCPCS: Performed by: NURSE ANESTHETIST, CERTIFIED REGISTERED

## 2018-02-20 PROCEDURE — 63600175 PHARM REV CODE 636 W HCPCS: Performed by: COLON & RECTAL SURGERY

## 2018-02-20 PROCEDURE — 25000003 PHARM REV CODE 250: Performed by: COLON & RECTAL SURGERY

## 2018-02-20 PROCEDURE — 36000708 HC OR TIME LEV III 1ST 15 MIN: Performed by: COLON & RECTAL SURGERY

## 2018-02-20 PROCEDURE — 63600175 PHARM REV CODE 636 W HCPCS: Performed by: STUDENT IN AN ORGANIZED HEALTH CARE EDUCATION/TRAINING PROGRAM

## 2018-02-20 PROCEDURE — 97161 PT EVAL LOW COMPLEX 20 MIN: CPT

## 2018-02-20 PROCEDURE — C9290 INJ, BUPIVACAINE LIPOSOME: HCPCS | Performed by: COLON & RECTAL SURGERY

## 2018-02-20 PROCEDURE — A4216 STERILE WATER/SALINE, 10 ML: HCPCS | Performed by: STUDENT IN AN ORGANIZED HEALTH CARE EDUCATION/TRAINING PROGRAM

## 2018-02-20 PROCEDURE — D9220A PRA ANESTHESIA: Mod: ANES,,, | Performed by: ANESTHESIOLOGY

## 2018-02-20 PROCEDURE — 88307 TISSUE EXAM BY PATHOLOGIST: CPT | Mod: 26,,, | Performed by: PATHOLOGY

## 2018-02-20 PROCEDURE — 37000008 HC ANESTHESIA 1ST 15 MINUTES: Performed by: COLON & RECTAL SURGERY

## 2018-02-20 PROCEDURE — 86920 COMPATIBILITY TEST SPIN: CPT

## 2018-02-20 PROCEDURE — 94761 N-INVAS EAR/PLS OXIMETRY MLT: CPT

## 2018-02-20 PROCEDURE — 25000003 PHARM REV CODE 250: Performed by: STUDENT IN AN ORGANIZED HEALTH CARE EDUCATION/TRAINING PROGRAM

## 2018-02-20 PROCEDURE — 44640 REPAIR BOWEL-SKIN FISTULA: CPT | Mod: 22,78,, | Performed by: COLON & RECTAL SURGERY

## 2018-02-20 PROCEDURE — P9021 RED BLOOD CELLS UNIT: HCPCS

## 2018-02-20 PROCEDURE — 27201423 OPTIME MED/SURG SUP & DEVICES STERILE SUPPLY: Performed by: COLON & RECTAL SURGERY

## 2018-02-20 PROCEDURE — 20600001 HC STEP DOWN PRIVATE ROOM

## 2018-02-20 PROCEDURE — 80048 BASIC METABOLIC PNL TOTAL CA: CPT

## 2018-02-20 PROCEDURE — S0077 INJECTION, CLINDAMYCIN PHOSP: HCPCS | Performed by: STUDENT IN AN ORGANIZED HEALTH CARE EDUCATION/TRAINING PROGRAM

## 2018-02-20 PROCEDURE — 25000003 PHARM REV CODE 250: Performed by: NURSE ANESTHETIST, CERTIFIED REGISTERED

## 2018-02-20 PROCEDURE — 71000039 HC RECOVERY, EACH ADD'L HOUR: Performed by: COLON & RECTAL SURGERY

## 2018-02-20 PROCEDURE — S0030 INJECTION, METRONIDAZOLE: HCPCS | Performed by: STUDENT IN AN ORGANIZED HEALTH CARE EDUCATION/TRAINING PROGRAM

## 2018-02-20 PROCEDURE — 85025 COMPLETE CBC W/AUTO DIFF WBC: CPT

## 2018-02-20 PROCEDURE — 27000221 HC OXYGEN, UP TO 24 HOURS

## 2018-02-20 PROCEDURE — D9220A PRA ANESTHESIA: Mod: CRNA,,, | Performed by: NURSE ANESTHETIST, CERTIFIED REGISTERED

## 2018-02-20 PROCEDURE — 63600175 PHARM REV CODE 636 W HCPCS

## 2018-02-20 PROCEDURE — 36620 INSERTION CATHETER ARTERY: CPT | Mod: 59,,, | Performed by: ANESTHESIOLOGY

## 2018-02-20 PROCEDURE — 36415 COLL VENOUS BLD VENIPUNCTURE: CPT

## 2018-02-20 PROCEDURE — 37000009 HC ANESTHESIA EA ADD 15 MINS: Performed by: COLON & RECTAL SURGERY

## 2018-02-20 PROCEDURE — 86850 RBC ANTIBODY SCREEN: CPT

## 2018-02-20 PROCEDURE — 71000033 HC RECOVERY, INTIAL HOUR: Performed by: COLON & RECTAL SURGERY

## 2018-02-20 PROCEDURE — 36000709 HC OR TIME LEV III EA ADD 15 MIN: Performed by: COLON & RECTAL SURGERY

## 2018-02-20 PROCEDURE — 88307 TISSUE EXAM BY PATHOLOGIST: CPT | Performed by: PATHOLOGY

## 2018-02-20 PROCEDURE — 27201037 HC PRESSURE MONITORING SET UP

## 2018-02-20 RX ORDER — CEFAZOLIN SODIUM 1 G/3ML
INJECTION, POWDER, FOR SOLUTION INTRAMUSCULAR; INTRAVENOUS
Status: DISCONTINUED | OUTPATIENT
Start: 2018-02-20 | End: 2018-02-20

## 2018-02-20 RX ORDER — PHENYLEPHRINE HYDROCHLORIDE 10 MG/ML
INJECTION INTRAVENOUS
Status: DISCONTINUED | OUTPATIENT
Start: 2018-02-20 | End: 2018-02-20

## 2018-02-20 RX ORDER — HYDROMORPHONE HCL IN 0.9% NACL 6 MG/30 ML
PATIENT CONTROLLED ANALGESIA SYRINGE INTRAVENOUS CONTINUOUS
Status: DISCONTINUED | OUTPATIENT
Start: 2018-02-20 | End: 2018-02-22

## 2018-02-20 RX ORDER — GLYCOPYRROLATE 0.2 MG/ML
INJECTION INTRAMUSCULAR; INTRAVENOUS
Status: DISCONTINUED | OUTPATIENT
Start: 2018-02-20 | End: 2018-02-20

## 2018-02-20 RX ORDER — ACETAMINOPHEN 10 MG/ML
1000 INJECTION, SOLUTION INTRAVENOUS EVERY 8 HOURS
Status: COMPLETED | OUTPATIENT
Start: 2018-02-20 | End: 2018-02-21

## 2018-02-20 RX ORDER — SODIUM CHLORIDE 9 MG/ML
INJECTION, SOLUTION INTRAVENOUS CONTINUOUS
Status: DISCONTINUED | OUTPATIENT
Start: 2018-02-20 | End: 2018-02-20

## 2018-02-20 RX ORDER — DEXAMETHASONE SODIUM PHOSPHATE 4 MG/ML
INJECTION, SOLUTION INTRA-ARTICULAR; INTRALESIONAL; INTRAMUSCULAR; INTRAVENOUS; SOFT TISSUE
Status: DISCONTINUED | OUTPATIENT
Start: 2018-02-20 | End: 2018-02-20

## 2018-02-20 RX ORDER — ACETAMINOPHEN 10 MG/ML
INJECTION, SOLUTION INTRAVENOUS
Status: DISCONTINUED | OUTPATIENT
Start: 2018-02-20 | End: 2018-02-20

## 2018-02-20 RX ORDER — LIDOCAINE HYDROCHLORIDE 10 MG/ML
1 INJECTION, SOLUTION EPIDURAL; INFILTRATION; INTRACAUDAL; PERINEURAL ONCE
Status: DISCONTINUED | OUTPATIENT
Start: 2018-02-20 | End: 2018-02-20

## 2018-02-20 RX ORDER — NALOXONE HCL 0.4 MG/ML
0.02 VIAL (ML) INJECTION
Status: DISCONTINUED | OUTPATIENT
Start: 2018-02-20 | End: 2018-02-22

## 2018-02-20 RX ORDER — FENTANYL CITRATE 50 UG/ML
INJECTION, SOLUTION INTRAMUSCULAR; INTRAVENOUS
Status: DISCONTINUED | OUTPATIENT
Start: 2018-02-20 | End: 2018-02-20

## 2018-02-20 RX ORDER — ROCURONIUM BROMIDE 10 MG/ML
INJECTION, SOLUTION INTRAVENOUS
Status: DISCONTINUED | OUTPATIENT
Start: 2018-02-20 | End: 2018-02-20

## 2018-02-20 RX ORDER — FENTANYL CITRATE 50 UG/ML
25 INJECTION, SOLUTION INTRAMUSCULAR; INTRAVENOUS ONCE
Status: COMPLETED | OUTPATIENT
Start: 2018-02-20 | End: 2018-02-20

## 2018-02-20 RX ORDER — ENOXAPARIN SODIUM 100 MG/ML
40 INJECTION SUBCUTANEOUS EVERY 24 HOURS
Status: DISCONTINUED | OUTPATIENT
Start: 2018-02-21 | End: 2018-02-22

## 2018-02-20 RX ORDER — BUPIVACAINE HYDROCHLORIDE 2.5 MG/ML
INJECTION, SOLUTION EPIDURAL; INFILTRATION; INTRACAUDAL
Status: DISCONTINUED | OUTPATIENT
Start: 2018-02-20 | End: 2018-02-20

## 2018-02-20 RX ORDER — MIDAZOLAM HYDROCHLORIDE 1 MG/ML
INJECTION, SOLUTION INTRAMUSCULAR; INTRAVENOUS
Status: DISCONTINUED | OUTPATIENT
Start: 2018-02-20 | End: 2018-02-20

## 2018-02-20 RX ORDER — PROPOFOL 10 MG/ML
VIAL (ML) INTRAVENOUS
Status: DISCONTINUED | OUTPATIENT
Start: 2018-02-20 | End: 2018-02-20

## 2018-02-20 RX ORDER — NEOSTIGMINE METHYLSULFATE 1 MG/ML
INJECTION, SOLUTION INTRAVENOUS
Status: DISCONTINUED | OUTPATIENT
Start: 2018-02-20 | End: 2018-02-20

## 2018-02-20 RX ORDER — LIDOCAINE HCL/PF 100 MG/5ML
SYRINGE (ML) INTRAVENOUS
Status: DISCONTINUED | OUTPATIENT
Start: 2018-02-20 | End: 2018-02-20

## 2018-02-20 RX ORDER — FENTANYL CITRATE 50 UG/ML
25 INJECTION, SOLUTION INTRAMUSCULAR; INTRAVENOUS EVERY 5 MIN PRN
Status: DISCONTINUED | OUTPATIENT
Start: 2018-02-20 | End: 2018-02-20 | Stop reason: HOSPADM

## 2018-02-20 RX ORDER — KETAMINE HYDROCHLORIDE 100 MG/ML
INJECTION, SOLUTION INTRAMUSCULAR; INTRAVENOUS
Status: DISCONTINUED | OUTPATIENT
Start: 2018-02-20 | End: 2018-02-20

## 2018-02-20 RX ORDER — SODIUM CHLORIDE 0.9 % (FLUSH) 0.9 %
3 SYRINGE (ML) INJECTION
Status: DISCONTINUED | OUTPATIENT
Start: 2018-02-20 | End: 2018-03-08 | Stop reason: HOSPADM

## 2018-02-20 RX ORDER — HYDROMORPHONE HCL IN 0.9% NACL 6 MG/30 ML
PATIENT CONTROLLED ANALGESIA SYRINGE INTRAVENOUS
Status: COMPLETED
Start: 2018-02-20 | End: 2018-02-20

## 2018-02-20 RX ORDER — OXYCODONE HYDROCHLORIDE 5 MG/1
5 TABLET ORAL
Status: DISCONTINUED | OUTPATIENT
Start: 2018-02-20 | End: 2018-02-20 | Stop reason: HOSPADM

## 2018-02-20 RX ORDER — SUCCINYLCHOLINE CHLORIDE 20 MG/ML
INJECTION INTRAMUSCULAR; INTRAVENOUS
Status: DISCONTINUED | OUTPATIENT
Start: 2018-02-20 | End: 2018-02-20

## 2018-02-20 RX ADMIN — FENTANYL CITRATE 25 MCG: 50 INJECTION, SOLUTION INTRAMUSCULAR; INTRAVENOUS at 05:02

## 2018-02-20 RX ADMIN — CLINDAMYCIN IN 5 PERCENT DEXTROSE 900 MG: 18 INJECTION, SOLUTION INTRAVENOUS at 08:02

## 2018-02-20 RX ADMIN — OXYCODONE HYDROCHLORIDE 10 MG: 5 TABLET ORAL at 11:02

## 2018-02-20 RX ADMIN — SODIUM CHLORIDE: 0.9 INJECTION, SOLUTION INTRAVENOUS at 02:02

## 2018-02-20 RX ADMIN — ROCURONIUM BROMIDE 15 MG: 10 INJECTION, SOLUTION INTRAVENOUS at 03:02

## 2018-02-20 RX ADMIN — PHENYLEPHRINE HYDROCHLORIDE 200 MCG: 10 INJECTION INTRAVENOUS at 02:02

## 2018-02-20 RX ADMIN — FENTANYL CITRATE 50 MCG: 50 INJECTION, SOLUTION INTRAMUSCULAR; INTRAVENOUS at 02:02

## 2018-02-20 RX ADMIN — Medication 3 ML: at 01:02

## 2018-02-20 RX ADMIN — FENTANYL CITRATE 100 MCG: 50 INJECTION, SOLUTION INTRAMUSCULAR; INTRAVENOUS at 02:02

## 2018-02-20 RX ADMIN — KETAMINE HYDROCHLORIDE 20 MG: 100 INJECTION, SOLUTION, CONCENTRATE INTRAMUSCULAR; INTRAVENOUS at 04:02

## 2018-02-20 RX ADMIN — Medication 3 ML: at 10:02

## 2018-02-20 RX ADMIN — CEFTRIAXONE SODIUM 2 G: 2 INJECTION, POWDER, FOR SOLUTION INTRAMUSCULAR; INTRAVENOUS at 11:02

## 2018-02-20 RX ADMIN — SODIUM CHLORIDE, SODIUM LACTATE, POTASSIUM CHLORIDE, AND CALCIUM CHLORIDE: .6; .31; .03; .02 INJECTION, SOLUTION INTRAVENOUS at 02:02

## 2018-02-20 RX ADMIN — MIDAZOLAM HYDROCHLORIDE 2 MG: 1 INJECTION, SOLUTION INTRAMUSCULAR; INTRAVENOUS at 02:02

## 2018-02-20 RX ADMIN — ROCURONIUM BROMIDE 30 MG: 10 INJECTION, SOLUTION INTRAVENOUS at 02:02

## 2018-02-20 RX ADMIN — FENTANYL CITRATE 25 MCG: 50 INJECTION, SOLUTION INTRAMUSCULAR; INTRAVENOUS at 09:02

## 2018-02-20 RX ADMIN — ROCURONIUM BROMIDE 5 MG: 10 INJECTION, SOLUTION INTRAVENOUS at 02:02

## 2018-02-20 RX ADMIN — ACETAMINOPHEN 1000 MG: 10 INJECTION, SOLUTION INTRAVENOUS at 09:02

## 2018-02-20 RX ADMIN — CLINDAMYCIN IN 5 PERCENT DEXTROSE 900 MG: 18 INJECTION, SOLUTION INTRAVENOUS at 01:02

## 2018-02-20 RX ADMIN — KETAMINE HYDROCHLORIDE 20 MG: 100 INJECTION, SOLUTION, CONCENTRATE INTRAMUSCULAR; INTRAVENOUS at 02:02

## 2018-02-20 RX ADMIN — DEXAMETHASONE SODIUM PHOSPHATE 8 MG: 4 INJECTION, SOLUTION INTRAMUSCULAR; INTRAVENOUS at 04:02

## 2018-02-20 RX ADMIN — PHENYLEPHRINE HYDROCHLORIDE 100 MCG: 10 INJECTION INTRAVENOUS at 04:02

## 2018-02-20 RX ADMIN — SUCCINYLCHOLINE CHLORIDE 100 MG: 20 INJECTION, SOLUTION INTRAMUSCULAR; INTRAVENOUS at 02:02

## 2018-02-20 RX ADMIN — LIDOCAINE HYDROCHLORIDE 80 MG: 20 INJECTION, SOLUTION INTRAVENOUS at 02:02

## 2018-02-20 RX ADMIN — METRONIDAZOLE 500 MG: 500 INJECTION, SOLUTION INTRAVENOUS at 08:02

## 2018-02-20 RX ADMIN — SODIUM CHLORIDE: 0.9 INJECTION, SOLUTION INTRAVENOUS at 04:02

## 2018-02-20 RX ADMIN — ONDANSETRON HYDROCHLORIDE 4 MG: 2 INJECTION, SOLUTION INTRAMUSCULAR; INTRAVENOUS at 04:02

## 2018-02-20 RX ADMIN — OXYCODONE HYDROCHLORIDE 5 MG: 5 TABLET ORAL at 06:02

## 2018-02-20 RX ADMIN — SODIUM CHLORIDE, SODIUM LACTATE, POTASSIUM CHLORIDE, AND CALCIUM CHLORIDE: .6; .31; .03; .02 INJECTION, SOLUTION INTRAVENOUS at 06:02

## 2018-02-20 RX ADMIN — Medication 3 ML: at 06:02

## 2018-02-20 RX ADMIN — Medication: at 06:02

## 2018-02-20 RX ADMIN — FENTANYL CITRATE 50 MCG: 50 INJECTION, SOLUTION INTRAMUSCULAR; INTRAVENOUS at 05:02

## 2018-02-20 RX ADMIN — FENTANYL CITRATE 25 MCG: 50 INJECTION INTRAMUSCULAR; INTRAVENOUS at 07:02

## 2018-02-20 RX ADMIN — METRONIDAZOLE 500 MG: 500 INJECTION, SOLUTION INTRAVENOUS at 01:02

## 2018-02-20 RX ADMIN — IBUPROFEN 800 MG: 800 INJECTION INTRAVENOUS at 06:02

## 2018-02-20 RX ADMIN — FENTANYL CITRATE 50 MCG: 50 INJECTION, SOLUTION INTRAMUSCULAR; INTRAVENOUS at 03:02

## 2018-02-20 RX ADMIN — SODIUM CHLORIDE, SODIUM GLUCONATE, SODIUM ACETATE, POTASSIUM CHLORIDE, MAGNESIUM CHLORIDE, SODIUM PHOSPHATE, DIBASIC, AND POTASSIUM PHOSPHATE: .53; .5; .37; .037; .03; .012; .00082 INJECTION, SOLUTION INTRAVENOUS at 03:02

## 2018-02-20 RX ADMIN — SODIUM CHLORIDE, SODIUM GLUCONATE, SODIUM ACETATE, POTASSIUM CHLORIDE, MAGNESIUM CHLORIDE, SODIUM PHOSPHATE, DIBASIC, AND POTASSIUM PHOSPHATE: .53; .5; .37; .037; .03; .012; .00082 INJECTION, SOLUTION INTRAVENOUS at 02:02

## 2018-02-20 RX ADMIN — ACETAMINOPHEN 1000 MG: 10 INJECTION, SOLUTION INTRAVENOUS at 04:02

## 2018-02-20 RX ADMIN — GLYCOPYRROLATE 0.6 MG: 0.2 INJECTION, SOLUTION INTRAMUSCULAR; INTRAVENOUS at 05:02

## 2018-02-20 RX ADMIN — NEOSTIGMINE METHYLSULFATE 5 MG: 1 INJECTION INTRAVENOUS at 05:02

## 2018-02-20 RX ADMIN — PROPOFOL 150 MG: 10 INJECTION, EMULSION INTRAVENOUS at 02:02

## 2018-02-20 RX ADMIN — ROCURONIUM BROMIDE 10 MG: 10 INJECTION, SOLUTION INTRAVENOUS at 04:02

## 2018-02-20 NOTE — PLAN OF CARE
Problem: Patient Care Overview  Goal: Plan of Care Review  Outcome: Ongoing (interventions implemented as appropriate)  Plan of care reviewed with patient, verbalizes understanding. AAOx4, VSS. Dressing to RLQ CDI, changed by MD's during shift. Patient NPO throughout shift, reports no N/V. Patient up out of bed to chair X1, remains free of any falls/trauma. Patient states pain as well controlled with PRN meds. Osuna intact with adequate output. Currently waiting patients return from surgery.

## 2018-02-20 NOTE — ASSESSMENT & PLAN NOTE
Ms. Marshall is a 61 y/o F who was found to have a rectal mass and completed chemoXRT and LAR w/ ileal resection with loop ileostomy for 2 carcinoids on 12/11/17  (neg nodes) on 1/22/18 she underwent pouchagram and then subsequent loop ileostomy takedown on 1/23/18 who presents now with ECF and pSBO now POD#1 from Ex lap, ileocolic resection (with fistula) and GIANNI.     - Continue with NPO/IVF and NGT to LIWS  - Patient likely malnourished, had anorexia this past week and expect prolonged ileus. Will proceed with placement of PICC line and starting TPN today.   - Daily labs, including CRP as well as Prealbumin tomorrow.   - Continue with antibiotics, Ceftriaxone and Flagyl.  - Wound care nurses to place Wound vac at the RLQ site.   - Currently presenting with mild RAYMOND with Cr up to 1.3. Will keep Osuna today to get close look at her UOP will likely remove tomorrow.   - OOB and ambulating. PT/OT and IS.     Jocelyn Kolb MD  General Surgery PGY IV  Beeper: [phone]

## 2018-02-20 NOTE — ANESTHESIA PROCEDURE NOTES
Arterial    Diagnosis: Acute anemia    Patient location during procedure: done in OR  Procedure start time: 2/20/2018 2:35 PM  Timeout: 2/20/2018 2:35 PM  Procedure end time: 2/20/2018 2:38 PM  Staffing  Anesthesiologist: SUSANA SWIFT  Performed: anesthesiologist   Anesthesiologist was present at the time of the procedure.  Preanesthetic Checklist  Completed: patient identified, site marked, surgical consent, pre-op evaluation, timeout performed, IV checked, risks and benefits discussed, monitors and equipment checked and anesthesia consent givenArterial  Skin Prep: chlorhexidine gluconate  Local Infiltration: none  Orientation: right  Location: radial  Catheter Size: 18 G  Catheter placement by Anatomical landmarks. Heme positive aspiration all ports.Insertion Attempts: 1  Assessment  Dressing: secured with tape and tegaderm  Patient: Tolerated well

## 2018-02-20 NOTE — PROGRESS NOTES
[facility]  Colorectal Surgery  Progress Note    Patient Name: Coby Marshall  MRN: [id number]  Admission Date: 2/19/2018  Hospital Length of Stay: 1 days  Attending Physician: Suhas Manjarrez MD    Subjective:     Interval History: Pain well controlled post op, dressing changed this AM and stool coming from wound debridement site. No nausea or emesis overnight.     Post-Op Info:  Procedure(s) (LRB):  DEBRIDEMENT-WOUND, ABDOMINAL WALL ABSCESS (N/A)  EXPLORATION-WOUND, ABDOMINAL WALL ABSCESS (N/A)   1 Day Post-Op      Medications:  Continuous Infusions:   lactated ringers 125 mL/hr at 02/20/18 0228     Scheduled Meds:   cefTRIAXone (ROCEPHIN) IVPB  2 g Intravenous Q24H    clindamycin (CLEOCIN) IVPB  900 mg Intravenous Q8H    enoxaparin  40 mg Subcutaneous Daily    metronidazole  500 mg Intravenous Q8H    sodium chloride 0.9%  3 mL Intravenous Q8H     PRN Meds:   fentaNYL    naloxone    ondansetron    oxyCODONE    oxyCODONE    promethazine (PHENERGAN) IVPB        Objective:     Vital Signs (Most Recent):  Temp: 97.8 °F (36.6 °C) (02/20/18 0750)  Pulse: 74 (02/20/18 0750)  Resp: 18 (02/20/18 0750)  BP: 105/60 (02/20/18 0750)  SpO2: 99 % (02/20/18 0750) Vital Signs (24h Range):  Temp:  [97.8 °F (36.6 °C)-99.6 °F (37.6 °C)] 97.8 °F (36.6 °C)  Pulse:  [] 74  Resp:  [13-21] 18  SpO2:  [98 %-100 %] 99 %  BP: ()/(52-62) 105/60     Intake/Output - Last 3 Shifts       02/18 0700 - 02/19 0659 02/19 0700 - 02/20 0659 02/20 0700 - 02/21 0659    I.V. (mL/kg)  2685.4 (32.5)     IV Piggyback  600     Total Intake(mL/kg)  3285.4 (39.8)     Urine (mL/kg/hr)  980     Total Output   980      Net   +2305.4                   Physical Exam   Constitutional: She appears well-developed. No distress.   HENT:   Head: Normocephalic and atraumatic.   Eyes: Conjunctivae are normal.   Neck: Normal range of motion. Neck supple.   Cardiovascular: Normal rate and intact distal pulses.    Pulmonary/Chest:  Effort normal. No respiratory distress.   Abdominal: Soft. She exhibits distension. She exhibits no mass. There is tenderness. There is no guarding.   Debridement site with clean edges, repacked today and stool and gas coming from base of wound debridement.    Nursing note and vitals reviewed.        Significant Labs:  BMP (Last 3 Results):   Recent Labs  Lab 02/19/18  0809 02/20/18  0419   * 110    140   K 3.9 3.7    105   CO2 26 26   BUN 26* 23   CREATININE 1.3 0.9   CALCIUM 8.1* 7.6*   MG 1.6  --      CBC (Last 3 Results):   Recent Labs  Lab 02/19/18  0809 02/20/18  0419   WBC 21.46* 16.61*   RBC 3.55* 2.96*   HGB 9.3* 7.4*   HCT 29.0* 23.7*   * 322   MCV 82 80*   MCH 26.2* 25.0*   MCHC 32.1 31.2*       Significant Diagnostics:  I have reviewed all pertinent imaging results/findings within the past 24 hours.    Assessment/Plan:     * Carcinoid tumor of rectum    Ms. Marshall is a 63 y/o F who was found to have a rectal mass and completed chemoXRT and LAR w/ ileal resection with loop ileostomy for 2 carcinoids on 12/11/17  (neg nodes) on 1/22/18 she underwent pouchagram and then subsequent loop ileostomy takedown on 1/23/18 who presents now with a large RLQ abscess concerning for possible ECF, now Post op from debridement with stool from wound bed    - Return to OR today  Exploratory laparotomy, ECF repair and possible bowel resection  - Consent obtained.   - May have to place PICC line if need for prolonged NPO status.   - Daily labs.   - NPO  - Continue with antibiotics, Clindamycin, Ceftriaxone and Flagyl.    Jocelyn Kolb MD  General Surgery PGY IV  Beeper: [phone]

## 2018-02-20 NOTE — PLAN OF CARE
Problem: Physical Therapy Goal  Goal: Physical Therapy Goal  Goals to be met by: 18     Patient will increase functional independence with mobility by performin. Supine to sit with Modified [place]  2. Sit to supine with Modified [place]  3. Sit to stand transfer with Supervision  4. Bed to chair transfer with Supervision using no AD  5. Gait  x 50 feet with Contact Guard Assistance using {no AD  6. Lower extremity exercise program x20 reps per handout, with independence    Outcome: Ongoing (interventions implemented as appropriate)  Goals set today

## 2018-02-20 NOTE — ASSESSMENT & PLAN NOTE
Ms. Marshall is a 61 y/o F who was found to have a rectal mass and completed chemoXRT and LAR w/ ileal resection with loop ileostomy for 2 carcinoids on 12/11/17  (neg nodes) on 1/22/18 she underwent pouchagram and then subsequent loop ileostomy takedown on 1/23/18 who presents now with a large RLQ abscess concerning for possible ECF, now Post op from debridement with stool from wound bed    - Return to OR today  Exploratory laparotomy, ECF repair and possible bowel resection  - Consent obtained.   - May have to place PICC line if need for prolonged NPO status.   - Daily labs.   - NPO  - Continue with antibiotics, Clindamycin, Ceftriaxone and Flagyl.    Jocelyn Kolb MD  General Surgery PGY IV  Beeper: [phone]

## 2018-02-20 NOTE — ANESTHESIA PREPROCEDURE EVALUATION
02/20/2018  Pre-operative evaluation for Procedure(s) (LRB):  RESECTION-SMALL BOWEL, exp lap (N/A)    Coby Marshall is a 62 y.o. female with PMHx of rectal mass (s/p chemoXRT and LAR w/ ileal resection with loop ileostomy for 2 carcinoids on 12/11/17). She subsequently underwent underwent pouchagram and then subsequent loop ileostomy takedown on 1/23/18 who presented 2/19 with a large RLQ abscess concerning for possible ECF, now Post op from debridement with stool from wound bed. She is now scheduled for above procedure 2/2 SBO and enterocutaneous fistula.     LDA:        Incision/Site 12/11/17 0744 abdomen   Incision Properties Date First Assessed/Time First Assessed: 12/11/17 0744 Location: abdomen          Incision/Site 01/23/18 1322 Right Abdomen   Incision Properties Date First Assessed/Time First Assessed: 01/23/18 1322 Side: Right Location: Abdomen          Peripheral IV - Single Lumen 02/19/18 0726 Left Hand   IV Properties Placement Date/Time: 02/19/18 0726 Present Prior to Hospital Arrival?: Yes Size/Length: 20 G Orientation: Left Location: Hand          Peripheral IV - Single Lumen 02/19/18 0809 Right Hand   IV Properties Placement Date/Time: 02/19/18 0809 Present Prior to Hospital Arrival?: No Size/Length: 18 G Orientation: Right Location: Hand Placement directed by: Anatomic Landmarks Site Prep: Chlorhexidine Local Anesthetic: None Inserted by: RN Insertio...          Urethral Catheter 02/19/18 1047 Non-latex;Straight-tip 16 Fr.   Urethral Catheter Properties Placement Date/Time: 02/19/18 1047 Present Prior to Hospital Arrival?: No Hand Hygiene: Performed Inserted by: MD  Name: Jocelyn Marin Name of 2nd Person Present for Insertion: Suhas Manjarrez Insertion attempts (enter comment if more than 2 a...          Incision/Site 02/19/18 1143 Abdomen   Incision Properties Date First Assessed/Time  First Assessed: 02/19/18 1143 Location: Abdomen         Prev airway:  Present Prior to Hospital Arrival?: No; Placement Date: 02/19/18; Placement Time: 1051; Method of Intubation: Direct laryngoscopy; Inserted by: Other (TEODORA VAZQUEZ); Airway Device: Endotracheal Tube; Mask Ventilation: Easy; Intubated: Postinduction; Blade: Clayton #2; Airway Device Size: 7.5; Style: Cuffed; Cuff Inflation: Minimal occlusive pressure; Inflation Amount: 8; Placement Verified By: Auscultation, ETT Condensation, Capnometry; Grade: Grade I; Complicating Factors: None; Intubation Findings: Positive EtCO2, Bilateral breath sounds, Atraumatic/Condition of teeth unchanged;  Depth of Insertion: 22; Securment: Lips; Complications: None; Breath Sounds: Equal Bilateral; Insertion Attempts: 1; Removal Date: 02/19/18;  Removal Time: 1152    Drips:    sodium chloride 0.9%      sodium chloride 0.9%      lactated ringers 125 mL/hr at 02/20/18 0228         Patient Active Problem List   Diagnosis    Carcinoid tumor of rectum    Carcinoid tumor of ileum    Partial small bowel obstruction       Review of patient's allergies indicates:   Allergen Reactions    Lisinopril-hydrochlorothiazide Swelling     Mouth Swelling    Sulfa (sulfonamide antibiotics) Swelling     Swelling of lips        No current facility-administered medications on file prior to encounter.      Current Outpatient Prescriptions on File Prior to Encounter   Medication Sig Dispense Refill    ALBUTEROL INHL Inhale into the lungs.      aspirin (ECOTRIN) 81 MG EC tablet Take 81 mg by mouth once daily.      atorvastatin (LIPITOR) 20 MG tablet Take 20 mg by mouth once daily.      cloNIDine (CATAPRES) 0.1 MG tablet Take 0.1 mg by mouth 2 (two) times daily.      diclofenac (VOLTAREN) 75 MG EC tablet Take 75 mg by mouth 2 (two) times daily.      DULoxetine (CYMBALTA) 60 MG capsule Take 60 mg by mouth once daily.      famotidine (PEPCID) 20 MG tablet Take 20 mg by mouth 2 (two)  times daily.      fluticasone (FLONASE) 50 mcg/actuation nasal spray 1 spray by Each Nare route once daily.      hydroCHLOROthiazide (MICROZIDE) 12.5 mg capsule Take 12.5 mg by mouth once daily.      hydrOXYzine HCl (ATARAX) 25 MG tablet Take 25 mg by mouth 3 (three) times daily.      omeprazole (PRILOSEC) 20 MG capsule Take 20 mg by mouth once daily.      oxyCODONE (ROXICODONE) 5 MG immediate release tablet Take 1 tablet (5 mg total) by mouth every 4 (four) hours as needed. 30 tablet 0    silver sulfADIAZINE 1% (SILVADENE) 1 % cream Apply topically 2 (two) times daily.      spironolactone (ALDACTONE) 25 MG tablet Take 25 mg by mouth once daily.         Past Surgical History:   Procedure Laterality Date    COLON SURGERY      HYSTERECTOMY      port a cath Right     R chest wall       Social History     Social History    Marital status:      Spouse name: N/A    Number of children: N/A    Years of education: N/A     Occupational History    Not on file.     Social History Main Topics    Smoking status: Never Smoker    Smokeless tobacco: Never Used    Alcohol use No    Drug use: No    Sexual activity: Not on file     Other Topics Concern    Not on file     Social History Narrative    No narrative on file         Vital Signs Range (Last 24H):  Temp:  [36.6 °C (97.8 °F)-36.7 °C (98.1 °F)]   Pulse:  [72-85]   Resp:  [13-18]   BP: ()/(46-62)   SpO2:  [98 %-100 %]       CBC:   Recent Labs      02/19/18   0809  02/20/18 0419   WBC  21.46*  16.61*   RBC  3.55*  2.96*   HGB  9.3*  7.4*   HCT  29.0*  23.7*   PLT  410*  322   MCV  82  80*   MCH  26.2*  25.0*   MCHC  32.1  31.2*       CMP:   Recent Labs      02/19/18   0809  02/20/18 0419   NA  140  140   K  3.9  3.7   CL  101  105   CO2  26  26   BUN  26*  23   CREATININE  1.3  0.9   GLU  139*  110   MG  1.6   --    PHOS  3.3   --    CALCIUM  8.1*  7.6*       INR  No results for input(s): PT, INR, PROTIME, APTT in the last 72  hours.        Anesthesia Evaluation    I have reviewed the Patient Summary Reports.    I have reviewed the Nursing Notes.   I have reviewed the Medications.     Review of Systems  Anesthesia Hx:  No problems with previous Anesthesia  History of prior surgery of interest to airway management or planning: Previous anesthesia: General Denies Family Hx of Anesthesia complications.   Denies Personal Hx of Anesthesia complications.   Social:  Non-Smoker    Hematology/Oncology:  Hematology Normal      Current/Recent Cancer. Oncology Comments: Rectal CA    EENT/Dental:EENT/Dental Normal   Cardiovascular:   Exercise tolerance: good Hypertension hyperlipidemia    Pulmonary:   Asthma mild    Renal/:  Renal/ Normal     Hepatic/GI:   Enterocutaneous fistula     Partial small bowel obstruction    Musculoskeletal:  Musculoskeletal Normal    Neurological:  Neurology Normal    Endocrine:  Endocrine Normal    Dermatological:  Skin Normal    Psych:  Psychiatric Normal           Physical Exam  General:  Well nourished    Airway/Jaw/Neck:  Airway Findings: Mouth Opening: Small, but > 3cm Tongue: Normal  General Airway Assessment: Adult, Average  Mallampati: III  Improves to II with phonation.  TM Distance: 4 - 6 cm        Eyes/Ears/Nose:  EYES/EARS/NOSE FINDINGS: Normal   Dental:  Dental Findings: upper partial dentures, lower partial dentures, upper front caps   Chest/Lungs:  Chest/Lungs Findings: Clear to auscultation, Normal Respiratory Rate     Heart/Vascular:  Heart Findings: Rate: Normal  Rhythm: Regular Rhythm  Sounds: Normal  Heart murmur: negative Vascular Findings: Normal    Abdomen:  Abdomen Findings: Normal    Musculoskeletal:  Musculoskeletal Findings: Normal   Skin:  Skin Findings: Normal    Mental Status:  Mental Status Findings:  Cooperative, Alert and Oriented         Anesthesia Plan  Type of Anesthesia, risks & benefits discussed:  Anesthesia Type:  general  Patient's Preference:   Intra-op Monitoring Plan:  standard ASA monitors and arterial line  Intra-op Monitoring Plan Comments:   Post Op Pain Control Plan:   Post Op Pain Control Plan Comments:   Induction:   IV  Beta Blocker:  Patient is not currently on a Beta-Blocker (No further documentation required).       Informed Consent: Patient understands risks and agrees with Anesthesia plan.  Questions answered. Anesthesia consent signed with patient.  ASA Score: 3     Day of Surgery Review of History & Physical: I have interviewed and examined the patient. I have reviewed the patient's H&P dated:    H&P update referred to the surgeon.         Ready For Surgery From Anesthesia Perspective.

## 2018-02-20 NOTE — ANESTHESIA POSTPROCEDURE EVALUATION
"Anesthesia Post Evaluation    Patient: Coby Marshall    Procedure(s) Performed: Procedure(s) (LRB):  DEBRIDEMENT-WOUND, ABDOMINAL WALL ABSCESS (N/A)  EXPLORATION-WOUND, ABDOMINAL WALL ABSCESS (N/A)    Final Anesthesia Type: general  Patient location during evaluation: PACU  Patient participation: Yes- Able to Participate  Level of consciousness: awake and alert and oriented  Post-procedure vital signs: reviewed and stable  Pain management: adequate  Airway patency: patent  PONV status at discharge: No PONV  Anesthetic complications: no      Cardiovascular status: hemodynamically stable  Respiratory status: unassisted, spontaneous ventilation and room air  Hydration status: euvolemic  Follow-up not needed.        Visit Vitals  BP (!) 97/56 (BP Location: Right arm, Patient Position: Lying)   Pulse 74   Temp 36.6 °C (97.9 °F) (Oral)   Resp 17   Ht 5' 6" (1.676 m)   Wt 82.6 kg (182 lb 1.6 oz)   SpO2 100%   Breastfeeding? No   BMI 29.39 kg/m²       Pain/Mikey Score: Pain Assessment Performed: Yes (2/20/2018  4:00 AM)  Presence of Pain: denies (2/19/2018  7:53 PM)  Pain Rating Prior to Med Admin: 5 (2/20/2018  6:41 AM)  Pain Rating Post Med Admin: 6 (2/19/2018  9:57 AM)  Mikey Score: 10 (2/19/2018  1:40 PM)      "

## 2018-02-20 NOTE — SUBJECTIVE & OBJECTIVE
Subjective:     Interval History: Pain well controlled post op, dressing changed this AM and stool coming from wound debridement site. No nausea or emesis overnight.     Post-Op Info:  Procedure(s) (LRB):  DEBRIDEMENT-WOUND, ABDOMINAL WALL ABSCESS (N/A)  EXPLORATION-WOUND, ABDOMINAL WALL ABSCESS (N/A)   1 Day Post-Op      Medications:  Continuous Infusions:   lactated ringers 125 mL/hr at 02/20/18 0228     Scheduled Meds:   cefTRIAXone (ROCEPHIN) IVPB  2 g Intravenous Q24H    clindamycin (CLEOCIN) IVPB  900 mg Intravenous Q8H    enoxaparin  40 mg Subcutaneous Daily    metronidazole  500 mg Intravenous Q8H    sodium chloride 0.9%  3 mL Intravenous Q8H     PRN Meds:   fentaNYL    naloxone    ondansetron    oxyCODONE    oxyCODONE    promethazine (PHENERGAN) IVPB        Objective:     Vital Signs (Most Recent):  Temp: 97.8 °F (36.6 °C) (02/20/18 0750)  Pulse: 74 (02/20/18 0750)  Resp: 18 (02/20/18 0750)  BP: 105/60 (02/20/18 0750)  SpO2: 99 % (02/20/18 0750) Vital Signs (24h Range):  Temp:  [97.8 °F (36.6 °C)-99.6 °F (37.6 °C)] 97.8 °F (36.6 °C)  Pulse:  [] 74  Resp:  [13-21] 18  SpO2:  [98 %-100 %] 99 %  BP: ()/(52-62) 105/60     Intake/Output - Last 3 Shifts       02/18 0700 - 02/19 0659 02/19 0700 - 02/20 0659 02/20 0700 - 02/21 0659    I.V. (mL/kg)  2685.4 (32.5)     IV Piggyback  600     Total Intake(mL/kg)  3285.4 (39.8)     Urine (mL/kg/hr)  980     Total Output   980      Net   +2305.4                   Physical Exam   Constitutional: She appears well-developed. No distress.   HENT:   Head: Normocephalic and atraumatic.   Eyes: Conjunctivae are normal.   Neck: Normal range of motion. Neck supple.   Cardiovascular: Normal rate and intact distal pulses.    Pulmonary/Chest: Effort normal. No respiratory distress.   Abdominal: Soft. She exhibits distension. She exhibits no mass. There is tenderness. There is no guarding.   Debridement site with clean edges, repacked today and stool and gas  coming from base of wound debridement.    Nursing note and vitals reviewed.        Significant Labs:  BMP (Last 3 Results):   Recent Labs  Lab 02/19/18  0809 02/20/18  0419   * 110    140   K 3.9 3.7    105   CO2 26 26   BUN 26* 23   CREATININE 1.3 0.9   CALCIUM 8.1* 7.6*   MG 1.6  --      CBC (Last 3 Results):   Recent Labs  Lab 02/19/18  0809 02/20/18  0419   WBC 21.46* 16.61*   RBC 3.55* 2.96*   HGB 9.3* 7.4*   HCT 29.0* 23.7*   * 322   MCV 82 80*   MCH 26.2* 25.0*   MCHC 32.1 31.2*       Significant Diagnostics:  I have reviewed all pertinent imaging results/findings within the past 24 hours.

## 2018-02-20 NOTE — PROGRESS NOTES
Patient escorted down to surgery via stretcher with family at side, report given, will await patients return

## 2018-02-20 NOTE — ANESTHESIA PREPROCEDURE EVALUATION
2018  Coby Marshall is a 62 y.o., female.  Pre-operative evaluation for RESECTION-SMALL BOWEL, exp lap (N/A)    Chief Complaint:    PMH:  Patient Active Problem List   Diagnosis    Carcinoid tumor of rectum    Carcinoid tumor of ileum    Partial small bowel obstruction         Past Surgical History:   Procedure Laterality Date    COLON SURGERY      HYSTERECTOMY      port a cath Right     R chest wall         Vital Signs Range (Last 24H):  Temp:  [36.6 °C (97.8 °F)-36.7 °C (98.1 °F)]   Pulse:  [72-85]   Resp:  [13-18]   BP: ()/(46-62)   SpO2:  [98 %-100 %]       CBC:     Recent Labs  Lab 1858 18  0633 18  0645 18  0616 18  0418 18  0809 18  0419   WBC 7.62 8.50 5.46 6.49 7.25 8.11 21.46* 16.61*   RBC 3.22* 3.07* 3.25* 3.22* 3.27* 3.23* 3.55* 2.96*   HGB 8.4* 8.2* 8.4* 8.2* 8.6* 8.5* 9.3* 7.4*   HCT 27.7* 27.0* 28.0* 27.3* 27.7* 26.6* 29.0* 23.7*    241 430* 451* 468* 458* 410* 322   MCV 86 88 86 85 85 82 82 80*   MCH 26.1* 26.7* 25.8* 25.5* 26.3* 26.3* 26.2* 25.0*   MCHC 30.3* 30.4* 30.0* 30.0* 31.0* 32.0 32.1 31.2*       CMP:   Recent Labs  Lab 18  04218  0858 18  0633 18  0645 18  0616 18  0418 18  0809 18  0419    138 143 141 140 138 140 140   K 3.9 3.6 4.1 4.0 3.9 3.6 3.9 3.7    105 108 106 104 101 101 105   CO2 25 21* 24 27 27 29 26 26   BUN 19 28* 16 13 9 9 26* 23   CREATININE 1.5* 2.1* 0.9 0.9 0.9 0.8 1.3 0.9   GLU 98 114* 79 111* 107 96 139* 110   MG 1.5* 1.6 1.6 1.6 1.4* 1.5* 1.6  --    PHOS 4.3 4.1 2.9 3.1 3.3 3.1 3.3  --    CALCIUM 9.1 8.7 8.6* 9.0 8.8 8.9 8.1* 7.6*   ALBUMIN  --   --   --   --  2.6* 2.5*  --   --        INR:  No results for input(s): PT, INR, PROTIME, APTT in the last 720 hours.      Diagnostic Studies:      EKD  Echo:    Pre-op Assessment         Review of Systems

## 2018-02-20 NOTE — SUBJECTIVE & OBJECTIVE
Subjective:     Interval History:     NO acute events overnight, patient complaining of some abdominal pain but well controlled with PCA Dilaudid. Otherwise has been afebrile, soft BP. Leukocytosis trending down to 12 from 169. Bump in Cr 1.3 from 0.9. CRP trending down 210 form 321. NO gas or BM as of yet. Good UOP overnight, Minimal NGT output.      Post-Op Info:  Procedure(s) (LRB):  DEBRIDEMENT-WOUND, ABDOMINAL WALL ABSCESS (N/A)  EXPLORATION-WOUND, ABDOMINAL WALL ABSCESS (N/A)   1 Day Post-Op      Medications:  Continuous Infusions:   lactated ringers 125 mL/hr at 02/20/18 0228     Scheduled Meds:   cefTRIAXone (ROCEPHIN) IVPB  2 g Intravenous Q24H    clindamycin (CLEOCIN) IVPB  900 mg Intravenous Q8H    enoxaparin  40 mg Subcutaneous Daily    metronidazole  500 mg Intravenous Q8H    sodium chloride 0.9%  3 mL Intravenous Q8H     PRN Meds:   fentaNYL    naloxone    ondansetron    oxyCODONE    oxyCODONE    promethazine (PHENERGAN) IVPB        Objective:     Vital Signs (Most Recent):  Temp: 97.8 °F (36.6 °C) (02/20/18 0750)  Pulse: 74 (02/20/18 0750)  Resp: 18 (02/20/18 0750)  BP: 105/60 (02/20/18 0750)  SpO2: 99 % (02/20/18 0750) Vital Signs (24h Range):  Temp:  [97.8 °F (36.6 °C)-99.6 °F (37.6 °C)] 97.8 °F (36.6 °C)  Pulse:  [] 74  Resp:  [13-21] 18  SpO2:  [98 %-100 %] 99 %  BP: ()/(52-62) 105/60     Intake/Output - Last 3 Shifts       02/18 0700 - 02/19 0659 02/19 0700 - 02/20 0659 02/20 0700 - 02/21 0659    I.V. (mL/kg)  2685.4 (32.5)     IV Piggyback  600     Total Intake(mL/kg)  3285.4 (39.8)     Urine (mL/kg/hr)  980     Total Output   980      Net   +2305.4                   Physical Exam    General: In no acute distress, well appearance.   CV: RRR, S1, S2 no murmur or gallops   Pulm: non labored breathing  Abd: soft, mildly distended, appropiately tender. Incision with glue c/d/i. RLQ packing in place may change later today.   Ext: wwp      Significant Labs:  BMP (Last 3  Results):   Recent Labs  Lab 02/19/18  0809 02/20/18  0419 02/21/18  0459   * 110 139*    140 138   K 3.9 3.7 4.0    105 104   CO2 26 26 23   BUN 26* 23 27*   CREATININE 1.3 0.9 1.3   CALCIUM 8.1* 7.6* 7.8*   MG 1.6  --   --      CBC:   Recent Labs  Lab 02/21/18  0459   WBC 11.66   RBC 4.53   HGB 11.7*   HCT 36.0*      MCV 80*   MCH 25.8*   MCHC 32.5

## 2018-02-20 NOTE — BRIEF OP NOTE
[facility]  Colon and Rectal Surgery  Operative Note    SUMMARY     Date of Procedure: 2/20/2018     Procedure: ileocoloc resection, l;ysis of adhesions (2 hrs)  Surgeon(s) and Role:     * Suhas Manjarrez MD - Primary     * Jocelyn Pak MD - Resident - Assisting        Pre-Operative Diagnosis: Enterocutaneous fistula [K63.2]  Partial small bowel obstruction [K56.600]    Post-Operative Diagnosis: Post-Op Diagnosis Codes:     * Enterocutaneous fistula [K63.2]     * Partial small bowel obstruction [K56.600]    Anesthesia: General, Exparel 266 mg, Marcaine 0.25% (75 mg) Saline 10 cc preperironeal injection      Technical Procedures Used: STSFETE LC 75 Green  Description of the Findings of the Procedure: adhesions, fistula from ileostp,y closure or ileocolic anastomosis at old stoma site, small bowel obstruxtion    Significant Surgical Tasks Conducted by the Assistant(s), if Applicable: n/a    Complications: No    Estimated Blood Loss (EBL): 500 mL           Implants: * No implants in log *    Specimens:   Specimen (12h ago through future)    Start     Ordered    Pending  Specimen to Pathology - Surgery  Once     Comments:  1) Small Bowel and Fistula - Permanent2) Cecum - Permanent3) Appendix - Permanent      Pending           Condition: Good    Disposition: PACU - hemodynamically stable.    Attestation: I was present and scrubbed for the entire procedure.

## 2018-02-20 NOTE — PLAN OF CARE
PCP- DR. JL MALONE    PT HAS A RIDE HOME AND FAMILY SUPPORT WITH  AND ADULT DAUGHTERS    PT CURRENT WITH [facility]        02/20/18 9677   Discharge Assessment   Assessment Type Discharge Planning Assessment   Confirmed/corrected address and phone number on facesheet? Yes   Assessment information obtained from? Patient   Expected Length of Stay (days) 3   Communicated expected length of stay with patient/caregiver yes   Prior to hospitilization cognitive status: Alert/Oriented   Prior to hospitalization functional status: Independent   Current cognitive status: Alert/Oriented   Current Functional Status: Independent   Lives With spouse   Able to Return to Prior Arrangements yes   Is patient able to care for self after discharge? Unable to determine at this time (comments)   Patient's perception of discharge disposition home health;home or selfcare   Readmission Within The Last 30 Days no previous admission in last 30 days   Patient currently being followed by outpatient case management? No   Patient currently receives any other outside agency services? No   Equipment Currently Used at Home colostomy/ostomy supplies   Do you have any problems affording any of your prescribed medications? No   Is the patient taking medications as prescribed? yes   Does the patient have transportation home? Yes   Transportation Available family or friend will provide;car   Does the patient receive services at the [facility]? No   Discharge Plan A Home with family   Discharge Plan B Home Health;Home with family   Patient/Family In Agreement With Plan yes

## 2018-02-20 NOTE — PT/OT/SLP EVAL
Physical Therapy Evaluation    Patient Name:  Coby Marshall   MRN:  [id number]    Recommendations:     Discharge Recommendations:  home with home health   Discharge Equipment Recommendations: bedside commode, shower chair   Barriers to discharge: None    Assessment:     Coby Marshall is a 62 y.o. female admitted with a medical diagnosis of Carcinoid tumor of rectum.  She presents with the following impairments/functional limitations:  weakness, impaired endurance, gait instability, impaired balance, pain requiring min assist for bed mobility, transfers, and taking a few steps at bedside without Ad. Pt was limited by increased pain today and stated during eval that she had just found out that she will be going back to surgery today for additional repairs. Pt will likely be appropriate for HHPT with assistance from spouse upon discharge but will reassess post second surgery to determine appropriateness.     Rehab Prognosis:  good; patient would benefit from acute skilled PT services to address these deficits and reach maximum level of function.      Recent Surgery: Procedure(s) (LRB):  DEBRIDEMENT-WOUND, ABDOMINAL WALL ABSCESS (N/A)  EXPLORATION-WOUND, ABDOMINAL WALL ABSCESS (N/A) 1 Day Post-Op    Plan:     During this hospitalization, patient to be seen 4 x/week to address the above listed problems via gait training, therapeutic activities, therapeutic exercises, neuromuscular re-education  · Plan of Care Expires:  03/22/18   Plan of Care Reviewed with: patient, spouse    Subjective     Communicated with pt and nurse prior to session.  Patient found supine in bed with nurse and spouse present upon PT entry to room, agreeable to evaluation.      Chief Complaint: pain in R lower back  Patient comments/goals: to improve mobility to return home  Pain/Comfort:  · Pain Rating 1: 6/10  · Location - Side 1: Right  · Location - Orientation 1: lower  · Location 1: back  · Pain Addressed 1: Pre-medicate for activity,  Reposition, Distraction, Cessation of Activity, Nurse notified  · Pain Rating Post-Intervention 1: 6/10    Patients cultural, spiritual, Yazdanism conflicts given the current situation: none    Living Environment:  Pt lives with spouse in [facility] with no JENNIFER. Walk in shower.   Prior to admission, patients level of function was independent without AD.  Patient has the following equipment:  standard walker.  DME owned (not currently used): standard walker.  Upon discharge, patient will have assistance from spouse.    Objective:     Patient found with: peripheral IV, telemetry, SCD, cuba catheter     General Precautions: Standard, fall   Orthopedic Precautions:N/A   Braces: N/A     Exams:  · Cognitive Exam:  Patient is oriented to Person, Place, Time and Situation and follows 100% of multi step commands   · Fine Motor Coordination:    · -       Intact  · Gross Motor Coordination:  WFL  · Postural Exam:  Patient presented with the following abnormalities:    · -       Rounded shoulders  · -       Forward head  · Sensation:    · -       Intact  · Skin Integrity/Edema:      · -       Skin integrity: Visible skin intact  · -       Edema: Mild BLE  · RUE ROM: WFL  · RUE Strength: WFL  · LUE ROM: WFL  · LUE Strength: WFL  · RLE ROM: WFL  · RLE Strength: WFL  · LLE ROM: WFL  · LLE Strength: WFL    Functional Mobility:  · Bed Mobility:     · Scooting: supervision while sitting EOB  · Supine to Sit: minimum assistance  · Transfers:     · Sit to Stand:  minimum assistance with no AD  · Bed to Chair: CGA with  no AD  using  Step Transfer  · Gait: pt was able to take a few steps from bed to chair without AD with CGA for stability  · Balance: pt was able to accept min challenges to standing balance without LOB    AM-PAC 6 CLICK MOBILITY  Total Score:17       Therapeutic Activities and Exercises:   PT provided pt and spouse education on:   -role of PT and POC   -importance of OOB activity   -call for assistance for transfers and amb  to the bathroom    Patient left up in chair with call button in reach with family present and nurse notified.    GOALS:    Physical Therapy Goals        Problem: Physical Therapy Goal    Goal Priority Disciplines Outcome Goal Variances Interventions   Physical Therapy Goal     PT/OT, PT Ongoing (interventions implemented as appropriate)     Description:  Goals to be met by: 18     Patient will increase functional independence with mobility by performin. Supine to sit with Modified [place]  2. Sit to supine with Modified [place]  3. Sit to stand transfer with Supervision  4. Bed to chair transfer with Supervision using no AD  5. Gait  x 50 feet with Contact Guard Assistance using {no AD  6. Lower extremity exercise program x20 reps per handout, with independence                      History:     Past Medical History:   Diagnosis Date    Asthma     Cancer     rectal cancer in remission    Hyperlipidemia     Hypertension        Past Surgical History:   Procedure Laterality Date    COLON SURGERY      HYSTERECTOMY      port a cath Right     R chest wall       Clinical Decision Making:     History  Co-morbidities and personal factors that may impact the plan of care Examination  Body Structures and Functions, activity limitations and participation restrictions that may impact the plan of care Clinical Presentation   Decision Making/ Complexity Score   Co-morbidities:   [] Time since onset of injury / illness / exacerbation  [x] Status of current condition  []Patient's cognitive status and safety concerns    [] Multiple Medical Problems (see med hx)  Personal Factors:   [] Patient's age  [] Prior Level of function   [] Patient's home situation (environment and family support)  [] Patient's level of motivation  [] Expected progression of patient      HISTORY:(criteria)    [] 80784 - no personal factors/history    [x] 95829 - has 1-2 personal factor/comorbidity     [] 86526 - has >3 personal  factor/comorbidity     Body Regions:  [] Objective examination findings  [] Head     []  Neck  [x] Trunk   [x] Upper Extremity  [x] Lower Extremity    Body Systems:  [] For communication ability, affect, cognition, language, and learning style: the assessment of the ability to make needs known, consciousness, orientation (person, place, and time), expected emotional /behavioral responses, and learning preferences (eg, learning barriers, education  needs)  [x] For the neuromuscular system: a general assessment of gross coordinated movement (eg, balance, gait, locomotion, transfers, and transitions) and motor function  (motor control and motor learning)  [x] For the musculoskeletal system: the assessment of gross symmetry, gross range of motion, gross strength, height, and weight  [] For the integumentary system: the assessment of pliability(texture), presence of scar formation, skin color, and skin integrity  [] For cardiovascular/pulmonary system: the assessment of heart rate, respiratory rate, blood pressure, and edema     Activity limitations:    [] Patient's cognitive status and saf ety concerns          [x] Status of current condition      [] Weight bearing restriction  [] Cardiopulmunary Restriction    Participation Restrictions:   [] Goals and goal agreement with the patient     [] Rehab potential (prognosis) and probable outcome      Examination of Body System: (criteria)    [x] 13325 - addressing 1-2 elements    [] 96786 - addressing a total of 3 or more elements     [] 06542 -  Addressing a total of 4 or more elements         Clinical Presentation: (criteria)  Stable - 94855     On examination of body system using standardized tests and measures patient presents with 1-2 elements from any of the following: body structures and functions, activity limitations, and/or participation restrictions.  Leading to a clinical presentation that is considered stable and/or  uncomplicated                              Clinical Decision Making  (Eval Complexity):  Low- 26234     Time Tracking:     PT Received On: 02/20/18  PT Start Time: 0947     PT Stop Time: 1003  PT Total Time (min): 16 min     Billable Minutes: Evaluation 16      Macarena Friedman, PT  02/20/2018

## 2018-02-21 PROBLEM — T81.89XA DELAYED SURGICAL WOUND HEALING: Status: ACTIVE | Noted: 2018-02-21

## 2018-02-21 LAB
ANION GAP SERPL CALC-SCNC: 11 MMOL/L
ANISOCYTOSIS BLD QL SMEAR: SLIGHT
BASOPHILS # BLD AUTO: 0.02 K/UL
BASOPHILS NFR BLD: 0.2 %
BUN SERPL-MCNC: 27 MG/DL
BURR CELLS BLD QL SMEAR: ABNORMAL
CALCIUM SERPL-MCNC: 7.8 MG/DL
CHLORIDE SERPL-SCNC: 104 MMOL/L
CO2 SERPL-SCNC: 23 MMOL/L
CREAT SERPL-MCNC: 1.3 MG/DL
CRP SERPL-MCNC: 210.8 MG/L
DIFFERENTIAL METHOD: ABNORMAL
EOSINOPHIL # BLD AUTO: 0 K/UL
EOSINOPHIL NFR BLD: 0 %
ERYTHROCYTE [DISTWIDTH] IN BLOOD BY AUTOMATED COUNT: 15.2 %
EST. GFR  (AFRICAN AMERICAN): 50.1 ML/MIN/1.73 M^2
EST. GFR  (NON AFRICAN AMERICAN): 43.5 ML/MIN/1.73 M^2
GLUCOSE SERPL-MCNC: 139 MG/DL
HCT VFR BLD AUTO: 36 %
HGB BLD-MCNC: 11.7 G/DL
IMM GRANULOCYTES # BLD AUTO: 0.04 K/UL
IMM GRANULOCYTES NFR BLD AUTO: 0.3 %
LYMPHOCYTES # BLD AUTO: 0.8 K/UL
LYMPHOCYTES NFR BLD: 6.4 %
MCH RBC QN AUTO: 25.8 PG
MCHC RBC AUTO-ENTMCNC: 32.5 G/DL
MCV RBC AUTO: 80 FL
MONOCYTES # BLD AUTO: 0.5 K/UL
MONOCYTES NFR BLD: 4.4 %
NEUTROPHILS # BLD AUTO: 10.3 K/UL
NEUTROPHILS NFR BLD: 88.7 %
NRBC BLD-RTO: 0 /100 WBC
PLATELET # BLD AUTO: 334 K/UL
PLATELET BLD QL SMEAR: ABNORMAL
PMV BLD AUTO: 9.5 FL
POTASSIUM SERPL-SCNC: 4 MMOL/L
RBC # BLD AUTO: 4.53 M/UL
SODIUM SERPL-SCNC: 138 MMOL/L
WBC # BLD AUTO: 11.66 K/UL
WBC TOXIC VACUOLES BLD QL SMEAR: PRESENT

## 2018-02-21 PROCEDURE — 25000003 PHARM REV CODE 250: Performed by: STUDENT IN AN ORGANIZED HEALTH CARE EDUCATION/TRAINING PROGRAM

## 2018-02-21 PROCEDURE — 20600001 HC STEP DOWN PRIVATE ROOM

## 2018-02-21 PROCEDURE — 85025 COMPLETE CBC W/AUTO DIFF WBC: CPT

## 2018-02-21 PROCEDURE — 36415 COLL VENOUS BLD VENIPUNCTURE: CPT

## 2018-02-21 PROCEDURE — 63600175 PHARM REV CODE 636 W HCPCS: Performed by: STUDENT IN AN ORGANIZED HEALTH CARE EDUCATION/TRAINING PROGRAM

## 2018-02-21 PROCEDURE — S0030 INJECTION, METRONIDAZOLE: HCPCS | Performed by: STUDENT IN AN ORGANIZED HEALTH CARE EDUCATION/TRAINING PROGRAM

## 2018-02-21 PROCEDURE — A4216 STERILE WATER/SALINE, 10 ML: HCPCS | Performed by: STUDENT IN AN ORGANIZED HEALTH CARE EDUCATION/TRAINING PROGRAM

## 2018-02-21 PROCEDURE — 86140 C-REACTIVE PROTEIN: CPT

## 2018-02-21 PROCEDURE — 80048 BASIC METABOLIC PNL TOTAL CA: CPT

## 2018-02-21 PROCEDURE — 0DNC0ZZ RELEASE ILEOCECAL VALVE, OPEN APPROACH: ICD-10-PCS | Performed by: COLON & RECTAL SURGERY

## 2018-02-21 PROCEDURE — B4185 PARENTERAL SOL 10 GM LIPIDS: HCPCS | Performed by: STUDENT IN AN ORGANIZED HEALTH CARE EDUCATION/TRAINING PROGRAM

## 2018-02-21 PROCEDURE — 0DNB0ZZ RELEASE ILEUM, OPEN APPROACH: ICD-10-PCS | Performed by: COLON & RECTAL SURGERY

## 2018-02-21 PROCEDURE — 0DBC0ZZ EXCISION OF ILEOCECAL VALVE, OPEN APPROACH: ICD-10-PCS | Performed by: COLON & RECTAL SURGERY

## 2018-02-21 PROCEDURE — 0DN80ZZ RELEASE SMALL INTESTINE, OPEN APPROACH: ICD-10-PCS | Performed by: COLON & RECTAL SURGERY

## 2018-02-21 PROCEDURE — A4217 STERILE WATER/SALINE, 500 ML: HCPCS | Performed by: STUDENT IN AN ORGANIZED HEALTH CARE EDUCATION/TRAINING PROGRAM

## 2018-02-21 RX ORDER — DIPHENHYDRAMINE HYDROCHLORIDE 50 MG/ML
12.5 INJECTION INTRAMUSCULAR; INTRAVENOUS EVERY 4 HOURS PRN
Status: DISCONTINUED | OUTPATIENT
Start: 2018-02-21 | End: 2018-03-08 | Stop reason: HOSPADM

## 2018-02-21 RX ADMIN — Medication: at 10:02

## 2018-02-21 RX ADMIN — SODIUM CHLORIDE, SODIUM LACTATE, POTASSIUM CHLORIDE, AND CALCIUM CHLORIDE: .6; .31; .03; .02 INJECTION, SOLUTION INTRAVENOUS at 05:02

## 2018-02-21 RX ADMIN — IBUPROFEN 800 MG: 800 INJECTION INTRAVENOUS at 11:02

## 2018-02-21 RX ADMIN — Medication 3 ML: at 10:02

## 2018-02-21 RX ADMIN — CALCIUM GLUCONATE: 94 INJECTION, SOLUTION INTRAVENOUS at 10:02

## 2018-02-21 RX ADMIN — Medication 3 ML: at 06:02

## 2018-02-21 RX ADMIN — CEFTRIAXONE SODIUM 2 G: 2 INJECTION, POWDER, FOR SOLUTION INTRAMUSCULAR; INTRAVENOUS at 10:02

## 2018-02-21 RX ADMIN — SOYBEAN OIL 250 ML: 20 INJECTION, SOLUTION INTRAVENOUS at 10:02

## 2018-02-21 RX ADMIN — IBUPROFEN 800 MG: 800 INJECTION INTRAVENOUS at 06:02

## 2018-02-21 RX ADMIN — METRONIDAZOLE 500 MG: 500 INJECTION, SOLUTION INTRAVENOUS at 05:02

## 2018-02-21 RX ADMIN — METRONIDAZOLE 500 MG: 500 INJECTION, SOLUTION INTRAVENOUS at 01:02

## 2018-02-21 RX ADMIN — ENOXAPARIN SODIUM 40 MG: 100 INJECTION SUBCUTANEOUS at 05:02

## 2018-02-21 RX ADMIN — METRONIDAZOLE 500 MG: 500 INJECTION, SOLUTION INTRAVENOUS at 08:02

## 2018-02-21 RX ADMIN — DIPHENHYDRAMINE HYDROCHLORIDE 12.5 MG: 50 INJECTION, SOLUTION INTRAMUSCULAR; INTRAVENOUS at 03:02

## 2018-02-21 RX ADMIN — IBUPROFEN 800 MG: 800 INJECTION INTRAVENOUS at 12:02

## 2018-02-21 RX ADMIN — DIPHENHYDRAMINE HYDROCHLORIDE 12.5 MG: 50 INJECTION, SOLUTION INTRAMUSCULAR; INTRAVENOUS at 11:02

## 2018-02-21 RX ADMIN — SODIUM CHLORIDE, SODIUM LACTATE, POTASSIUM CHLORIDE, AND CALCIUM CHLORIDE 1000 ML: .6; .31; .03; .02 INJECTION, SOLUTION INTRAVENOUS at 10:02

## 2018-02-21 RX ADMIN — ACETAMINOPHEN 1000 MG: 10 INJECTION, SOLUTION INTRAVENOUS at 01:02

## 2018-02-21 RX ADMIN — ACETAMINOPHEN 1000 MG: 10 INJECTION, SOLUTION INTRAVENOUS at 05:02

## 2018-02-21 NOTE — ANESTHESIA RELEASE NOTE
"Anesthesia Release from PACU Note    Patient: Coby Marshall    Procedure(s) Performed: Procedure(s) (LRB):  ileocoloc resection, lysis of adhesions (N/A)    Anesthesia type: general    Post pain: Adequate analgesia    Post assessment: no apparent anesthetic complications    Last Vitals:   Visit Vitals  /74   Pulse 72   Temp 36.6 °C (97.9 °F) (Temporal)   Resp 14   Ht 5' 6" (1.676 m)   Wt 82.6 kg (181 lb 15.8 oz)   SpO2 96%   Breastfeeding? No   BMI 29.37 kg/m²       Post vital signs: stable    Level of consciousness: awake and alert     Nausea/Vomiting: no nausea/no vomiting    Complications: none    Airway Patency: patent    Respiratory: unassisted    Cardiovascular: stable and blood pressure at baseline    Hydration: euvolemic  "

## 2018-02-21 NOTE — TRANSFER OF CARE
"Anesthesia Transfer of Care Note    Patient: Coby Marshall    Procedure(s) Performed: Procedure(s) (LRB):  ileocoloc resection, lysis of adhesions (N/A)    Patient location: PACU    Anesthesia Type: general    Transport from OR: Transported from OR on 6-10 L/min O2 by face mask with adequate spontaneous ventilation    Post pain: adequate analgesia    Post assessment: no apparent anesthetic complications and tolerated procedure well    Post vital signs: stable    Level of consciousness: awake, responds to stimulation and alert    Nausea/Vomiting: no nausea/vomiting    Complications: none    Transfer of care protocol was followed      Last vitals:   Visit Vitals  /83   Pulse 84   Temp 36.6 °C (97.9 °F) (Temporal)   Resp 18   Ht 5' 6" (1.676 m)   Wt 82.6 kg (181 lb 15.8 oz)   SpO2 100%   Breastfeeding? No   BMI 29.37 kg/m²     "

## 2018-02-21 NOTE — PLAN OF CARE
Problem: Patient Care Overview  Goal: Plan of Care Review  Outcome: Ongoing (interventions implemented as appropriate)  Plan of care reviewed with patient. Patient verbalized understanding.  Patient is AAO and VSS. Pain managed with PCA.  No complaint of nausea or vomiting. PRN benadryl in place.  Ng tube in place to LIWS.  NPO with medication.  On Cardiac monitoring running NSR.  Abdominal binder in place. Placed on contact for ESBL in abdominal wound. Up with assist. Free of falls and injury. Will continue to monitor. Jacqueline Roland RN

## 2018-02-21 NOTE — CONSULTS
AYESHA at Bedside to place PICC.  Patient mentioned RCW port in place and asked if it could be used.  Ok to access port per Dr Seth.

## 2018-02-21 NOTE — PROGRESS NOTES
[facility]  Colorectal Surgery  Progress Note    Patient Name: Coby Marshall  MRN: [id number]  Admission Date: 2/19/2018  Hospital Length of Stay: 2 days  Attending Physician: Suhas Manjarrez MD    Subjective:     Interval History:     NO acute events overnight, patient complaining of some abdominal pain but well controlled with PCA Dilaudid. Otherwise has been afebrile, soft BP. Leukocytosis trending down to 12 from 169. Bump in Cr 1.3 from 0.9. CRP trending down 210 form 321. NO gas or BM as of yet. Good UOP overnight, Minimal NGT output.      Post-Op Info:  Procedure(s) (LRB):  DEBRIDEMENT-WOUND, ABDOMINAL WALL ABSCESS (N/A)  EXPLORATION-WOUND, ABDOMINAL WALL ABSCESS (N/A)   1 Day Post-Op      Medications:  Continuous Infusions:   lactated ringers 125 mL/hr at 02/20/18 0228     Scheduled Meds:   cefTRIAXone (ROCEPHIN) IVPB  2 g Intravenous Q24H    clindamycin (CLEOCIN) IVPB  900 mg Intravenous Q8H    enoxaparin  40 mg Subcutaneous Daily    metronidazole  500 mg Intravenous Q8H    sodium chloride 0.9%  3 mL Intravenous Q8H     PRN Meds:   fentaNYL    naloxone    ondansetron    oxyCODONE    oxyCODONE    promethazine (PHENERGAN) IVPB        Objective:     Vital Signs (Most Recent):  Temp: 97.8 °F (36.6 °C) (02/20/18 0750)  Pulse: 74 (02/20/18 0750)  Resp: 18 (02/20/18 0750)  BP: 105/60 (02/20/18 0750)  SpO2: 99 % (02/20/18 0750) Vital Signs (24h Range):  Temp:  [97.8 °F (36.6 °C)-99.6 °F (37.6 °C)] 97.8 °F (36.6 °C)  Pulse:  [] 74  Resp:  [13-21] 18  SpO2:  [98 %-100 %] 99 %  BP: ()/(52-62) 105/60     Intake/Output - Last 3 Shifts       02/18 0700 - 02/19 0659 02/19 0700 - 02/20 0659 02/20 0700 - 02/21 0659    I.V. (mL/kg)  2685.4 (32.5)     IV Piggyback  600     Total Intake(mL/kg)  3285.4 (39.8)     Urine (mL/kg/hr)  980     Total Output   980      Net   +2305.4                   Physical Exam    General: In no acute distress, well appearance.   CV: RRR, S1, S2 no murmur  or gallops   Pulm: non labored breathing  Abd: soft, mildly distended, appropiately tender. Incision with glue c/d/i. RLQ packing in place may change later today.   Ext: wwp      Significant Labs:  BMP (Last 3 Results):   Recent Labs  Lab 02/19/18  0809 02/20/18  0419 02/21/18  0459   * 110 139*    140 138   K 3.9 3.7 4.0    105 104   CO2 26 26 23   BUN 26* 23 27*   CREATININE 1.3 0.9 1.3   CALCIUM 8.1* 7.6* 7.8*   MG 1.6  --   --      CBC:   Recent Labs  Lab 02/21/18  0459   WBC 11.66   RBC 4.53   HGB 11.7*   HCT 36.0*      MCV 80*   MCH 25.8*   MCHC 32.5           Assessment/Plan:     * Carcinoid tumor of rectum    Ms. Marshall is a 61 y/o F who was found to have a rectal mass and completed chemoXRT and LAR w/ ileal resection with loop ileostomy for 2 carcinoids on 12/11/17  (neg nodes) on 1/22/18 she underwent pouchagram and then subsequent loop ileostomy takedown on 1/23/18 who presents now with ECF and pSBO now POD#1 from Ex lap, ileocolic resection (with fistula) and GIANNI.     - Continue with NPO/IVF and NGT to LIWS  - Patient likely malnourished, had anorexia this past week and expect prolonged ileus. Will proceed with placement of PICC line and starting TPN today.   - Daily labs, including CRP as well as Prealbumin tomorrow.   - Continue with antibiotics, Ceftriaxone and Flagyl.  - Wound care nurses to place Wound vac at the RLQ site.   - Currently presenting with mild RAYMOND with Cr up to 1.3. Will keep Osuna today to get close look at her UOP will likely remove tomorrow.   - OOB and ambulating. PT/OT and IS.     Jocelyn Kolb MD  General Surgery PGY IV  Beeper: [phone]

## 2018-02-21 NOTE — ANESTHESIA POSTPROCEDURE EVALUATION
"Anesthesia Post Evaluation    Patient: Coby Marshall    Procedure(s) Performed: Procedure(s) (LRB):  ileocoloc resection, lysis of adhesions (N/A)    Final Anesthesia Type: general  Patient location during evaluation: PACU  Patient participation: Yes- Able to Participate  Level of consciousness: awake and alert  Post-procedure vital signs: reviewed and stable  Pain management: adequate  Airway patency: patent  PONV status at discharge: No PONV  Anesthetic complications: no      Cardiovascular status: blood pressure returned to baseline  Respiratory status: unassisted  Hydration status: euvolemic          Visit Vitals  /74   Pulse 72   Temp 36.6 °C (97.9 °F) (Temporal)   Resp 14   Ht 5' 6" (1.676 m)   Wt 82.6 kg (181 lb 15.8 oz)   SpO2 96%   Breastfeeding? No   BMI 29.37 kg/m²       Pain/Mikey Score: Pain Assessment Performed: Yes (2/20/2018  7:20 PM)  Presence of Pain: complains of pain/discomfort (2/20/2018  6:24 PM)  Pain Assessment Performed: Yes (2/20/2018  7:20 PM)  Presence of Pain: complains of pain/discomfort (2/20/2018  7:20 PM)  Pain Rating Prior to Med Admin: 5 (2/20/2018  6:34 PM)  Pain Rating Post Med Admin: 6 (2/19/2018  9:57 AM)  Mikey Score: 9 (2/20/2018  7:20 PM)      "

## 2018-02-21 NOTE — OP NOTE
OPERATIVE NOTE    Preoperative Diagnosis: Enterocutaneous fistula    Procedure:      1. Exploratory laparotomy with enterocutaneous fistula takedown and ileocecectomy   2. Extensive Lysis of adhesions 2 hours.      Postoperative Diagnosis: Same.    Surgery Date: 2/20/2018    Surgeon(s) and Role:       * Jocelyn Pak MD - Resident - Assisting     * Suhas Manjarrez MD - Primary    Anesthesia: General  EBL: 500 cc    Specimen: Small Bowel and Fistula, Cecum and Appendix.     Findings: Extensive adhesions (2 hours of lysis), Fistula from ileostomy closure or ileocolic anastomosis at old stoma site. Small bowel obstruction.     Implants/Drains: No drains    Complications: None    Indications:     Ms. Marshall is a 61 y/o F who was found to have a rectal mass and completed chemoXRT and LAR w/ ileal resection with loop ileostomy for 2 carcinoids on 12/11/17  (neg nodes) on 1/22/18 she underwent pouchagram and then subsequent loop ileostomy takedown on 1/23/18 who presents now with ECF and pSBO. After discussion of treatment options, risks and benefits of the current procedure, the patient elected to proceed for Exploratory laparotomy and signed informed consent.    Description of Procedure:     The patient was identified by name and date of birth and marked in the preoperative holding area following which she was brought to the operating room and placed on the OR table in supine position. After a time out, anesthesia was induced without incident. The patient's abdomen was prepped with betadine and dressed in standard sterile fashion. Following a final time out, a midline abdominal incision was made through old scar and extending cranially until subxiphoid.     Extensive adhesions were found upon entrance to the abdomen, careful lysis of adhesions was done for about 2 hours carefully dissecting out the ileostomy closure and the ileocecal valve were a fistula was found, due to the inflammatory changes it was unclear if  the source was the ileocolic anastomosis or the ileostomy closure anastomosis. We proceeded with ileocolic resection of the old anastomosis including the fistula with primary anastomosis using the YULISSA stapler 75 mm.    We proceeded with more lysis of adhesions to free up the partial obstruction, it seemed like transition point was in the pelvis. Once small bowel was all free, we irrigated the abdomen with NS and we proceeded with closure of the abdomen. Fascia was closed with two loop PDS and Skin was closed with 4-0 Monocryl.      RLQ old ileostomy site wound was irrigated and necrotic tissue was debrided. This was packed with Kerlix. Incision was cleaned and dressed with Dermabond.    The patient tolerated the procedure well. There were no complications.  Dr. Manjarrez was present for all the surgery.     Disposition: Extubated. To PACU then to floor.

## 2018-02-21 NOTE — OP NOTE
OPERATIVE NOTE    Preoperative Diagnosis: Abdominal Soft Tissue abscess  Procedure:  Local wound exploration with debridement of abdominal abscess.   Postoperative Diagnosis: Necrotizing Soft Tissue infection with wound dehiscence.   Surgery Date: 2/190/2018    Surgeon(s) and Role:     * Jocelyn Pak MD - Resident - Assisting     * Suhas Manjarrez MD - Primary    Anesthesia: General  EBL: 100 cc    Specimen: Cultures sent    Findings: Necrotizing soft tissue infection of the RLQ surgical incision with fascia dehiscence and exposed bowel. No ECF seen.     Implants/Drains: None    Complications: None    Indications:     Ms. Marshall is a 63 y/o F who was found to have a rectal mass and completed chemoXRT and LAR w/ ileal resection with loop ileostomy for 2 carcinoids on 12/11/17  (neg nodes) on 1/22/18 she underwent pouchagram and then subsequent loop ileostomy takedown on 1/23/18 who presents now with a large RLQ abscess concerning for possible NEC or ECF.    After discussion of treatment options, risks and benefits of the current procedure, the patient elected to proceed for Wound exploration and signed informed consent.    Description of Procedure:     The patient was identified by name and date of birth and marked in the preoperative holding area following which she was brought to the operating room and placed on the OR table in supine position. After a time out, anesthesia was induced without incident. The patient's abomen was prepped with betadine and dressed in standard sterile fashion. Following a final time out, an elliptical incision was made at the old ileostomy site. Electrocautery was used to dissect down tot he fascia. During our dissection large amount necrotic fat and purulence/feculent drainage was encountered. Extensive debridement of all necrotic fat was done with removal of loculations.The abscess was sampled for culture and gram stain. The fascia was found to be dehisced to one figure of  eight stitch was placed to approximated it. Careful inspection of all the area was done and no ECF was found. Extensive irrigation of the wound was done and this was then packed with Kerlex.   The skin was then cleaned and dried following which the wound was covered with Tegaderm.   The patient tolerated the procedure well. There were no complications.  Dr. Manjarrez was present for the critical portions of the procedure and available to call.    Disposition: Extubated. To PACU then to floor.

## 2018-02-21 NOTE — NURSING TRANSFER
Nursing Transfer Note      2/20/2018     [id number]    Transfer via stretcher    Transfer with pct    Transported by pct    Medicines sent: pca dilaudud    Chart send with patient: yes    Notified: spouse    Patient reassessed at: 02/20/18    Upon arrival to floor:

## 2018-02-22 PROBLEM — A49.9 ESBL (EXTENDED SPECTRUM BETA-LACTAMASE) PRODUCING BACTERIA INFECTION: Status: ACTIVE | Noted: 2018-02-22

## 2018-02-22 PROBLEM — Z16.12 ESBL (EXTENDED SPECTRUM BETA-LACTAMASE) PRODUCING BACTERIA INFECTION: Status: ACTIVE | Noted: 2018-02-22

## 2018-02-22 PROBLEM — E43 SEVERE MALNUTRITION: Status: ACTIVE | Noted: 2018-02-22

## 2018-02-22 LAB
ANION GAP SERPL CALC-SCNC: 10 MMOL/L
ANISOCYTOSIS BLD QL SMEAR: SLIGHT
BACTERIA SPEC AEROBE CULT: NORMAL
BASOPHILS # BLD AUTO: 0.01 K/UL
BASOPHILS NFR BLD: 0.1 %
BUN SERPL-MCNC: 36 MG/DL
BURR CELLS BLD QL SMEAR: ABNORMAL
CALCIUM SERPL-MCNC: 8.1 MG/DL
CHLORIDE SERPL-SCNC: 102 MMOL/L
CO2 SERPL-SCNC: 25 MMOL/L
CREAT SERPL-MCNC: 1.8 MG/DL
CREAT UR-MCNC: 115 MG/DL
CRP SERPL-MCNC: 300.4 MG/L
DIFFERENTIAL METHOD: ABNORMAL
EOSINOPHIL # BLD AUTO: 0 K/UL
EOSINOPHIL NFR BLD: 0.1 %
ERYTHROCYTE [DISTWIDTH] IN BLOOD BY AUTOMATED COUNT: 15.6 %
EST. GFR  (AFRICAN AMERICAN): 33.8 ML/MIN/1.73 M^2
EST. GFR  (NON AFRICAN AMERICAN): 29.3 ML/MIN/1.73 M^2
GLUCOSE SERPL-MCNC: 150 MG/DL
HCT VFR BLD AUTO: 30.9 %
HGB BLD-MCNC: 10.1 G/DL
HYPOCHROMIA BLD QL SMEAR: ABNORMAL
IMM GRANULOCYTES # BLD AUTO: 0.11 K/UL
IMM GRANULOCYTES NFR BLD AUTO: 0.8 %
LYMPHOCYTES # BLD AUTO: 0.6 K/UL
LYMPHOCYTES NFR BLD: 4.6 %
MAGNESIUM SERPL-MCNC: 2.1 MG/DL
MCH RBC QN AUTO: 26.3 PG
MCHC RBC AUTO-ENTMCNC: 32.7 G/DL
MCV RBC AUTO: 81 FL
MONOCYTES # BLD AUTO: 0.3 K/UL
MONOCYTES NFR BLD: 2.5 %
NEUTROPHILS # BLD AUTO: 12.5 K/UL
NEUTROPHILS NFR BLD: 91.9 %
NRBC BLD-RTO: 0 /100 WBC
OVALOCYTES BLD QL SMEAR: ABNORMAL
PHOSPHATE SERPL-MCNC: 3.2 MG/DL
PLATELET # BLD AUTO: 256 K/UL
PMV BLD AUTO: 9.1 FL
POIKILOCYTOSIS BLD QL SMEAR: SLIGHT
POLYCHROMASIA BLD QL SMEAR: ABNORMAL
POTASSIUM SERPL-SCNC: 3.4 MMOL/L
RBC # BLD AUTO: 3.84 M/UL
SODIUM SERPL-SCNC: 137 MMOL/L
SODIUM UR-SCNC: 34 MMOL/L
UUN UR-MCNC: 736 MG/DL
WBC # BLD AUTO: 13.62 K/UL

## 2018-02-22 PROCEDURE — 99223 1ST HOSP IP/OBS HIGH 75: CPT | Mod: ,,, | Performed by: INTERNAL MEDICINE

## 2018-02-22 PROCEDURE — 63600175 PHARM REV CODE 636 W HCPCS: Performed by: NURSE PRACTITIONER

## 2018-02-22 PROCEDURE — B4185 PARENTERAL SOL 10 GM LIPIDS: HCPCS | Performed by: STUDENT IN AN ORGANIZED HEALTH CARE EDUCATION/TRAINING PROGRAM

## 2018-02-22 PROCEDURE — 25000003 PHARM REV CODE 250: Performed by: STUDENT IN AN ORGANIZED HEALTH CARE EDUCATION/TRAINING PROGRAM

## 2018-02-22 PROCEDURE — 86140 C-REACTIVE PROTEIN: CPT

## 2018-02-22 PROCEDURE — A4216 STERILE WATER/SALINE, 10 ML: HCPCS | Performed by: STUDENT IN AN ORGANIZED HEALTH CARE EDUCATION/TRAINING PROGRAM

## 2018-02-22 PROCEDURE — 82570 ASSAY OF URINE CREATININE: CPT

## 2018-02-22 PROCEDURE — 97802 MEDICAL NUTRITION INDIV IN: CPT

## 2018-02-22 PROCEDURE — 97164 PT RE-EVAL EST PLAN CARE: CPT

## 2018-02-22 PROCEDURE — 84300 ASSAY OF URINE SODIUM: CPT

## 2018-02-22 PROCEDURE — 85025 COMPLETE CBC W/AUTO DIFF WBC: CPT

## 2018-02-22 PROCEDURE — 97165 OT EVAL LOW COMPLEX 30 MIN: CPT

## 2018-02-22 PROCEDURE — A4217 STERILE WATER/SALINE, 500 ML: HCPCS | Performed by: STUDENT IN AN ORGANIZED HEALTH CARE EDUCATION/TRAINING PROGRAM

## 2018-02-22 PROCEDURE — 20600001 HC STEP DOWN PRIVATE ROOM

## 2018-02-22 PROCEDURE — 97607 NEG PRS WND THR NDME<=50SQCM: CPT

## 2018-02-22 PROCEDURE — 80048 BASIC METABOLIC PNL TOTAL CA: CPT

## 2018-02-22 PROCEDURE — 63600175 PHARM REV CODE 636 W HCPCS: Performed by: STUDENT IN AN ORGANIZED HEALTH CARE EDUCATION/TRAINING PROGRAM

## 2018-02-22 PROCEDURE — 25000003 PHARM REV CODE 250: Performed by: NURSE PRACTITIONER

## 2018-02-22 PROCEDURE — 84540 ASSAY OF URINE/UREA-N: CPT

## 2018-02-22 PROCEDURE — S0030 INJECTION, METRONIDAZOLE: HCPCS | Performed by: STUDENT IN AN ORGANIZED HEALTH CARE EDUCATION/TRAINING PROGRAM

## 2018-02-22 PROCEDURE — 83735 ASSAY OF MAGNESIUM: CPT

## 2018-02-22 PROCEDURE — 84100 ASSAY OF PHOSPHORUS: CPT

## 2018-02-22 RX ORDER — CYCLOBENZAPRINE HCL 5 MG
5 TABLET ORAL 3 TIMES DAILY
Status: DISCONTINUED | OUTPATIENT
Start: 2018-02-22 | End: 2018-03-08 | Stop reason: HOSPADM

## 2018-02-22 RX ORDER — OXYCODONE HYDROCHLORIDE 5 MG/1
10 TABLET ORAL EVERY 4 HOURS PRN
Status: DISCONTINUED | OUTPATIENT
Start: 2018-02-22 | End: 2018-03-08 | Stop reason: HOSPADM

## 2018-02-22 RX ORDER — POTASSIUM CHLORIDE 7.45 MG/ML
10 INJECTION INTRAVENOUS
Status: COMPLETED | OUTPATIENT
Start: 2018-02-22 | End: 2018-02-22

## 2018-02-22 RX ORDER — HEPARIN SODIUM 5000 [USP'U]/ML
5000 INJECTION, SOLUTION INTRAVENOUS; SUBCUTANEOUS EVERY 8 HOURS
Status: DISCONTINUED | OUTPATIENT
Start: 2018-02-22 | End: 2018-03-08 | Stop reason: HOSPADM

## 2018-02-22 RX ORDER — OXYCODONE HYDROCHLORIDE 5 MG/1
5 TABLET ORAL EVERY 4 HOURS PRN
Status: DISCONTINUED | OUTPATIENT
Start: 2018-02-22 | End: 2018-03-08 | Stop reason: HOSPADM

## 2018-02-22 RX ADMIN — METRONIDAZOLE 500 MG: 500 INJECTION, SOLUTION INTRAVENOUS at 12:02

## 2018-02-22 RX ADMIN — PIPERACILLIN AND TAZOBACTAM 4.5 G: 4; .5 INJECTION, POWDER, LYOPHILIZED, FOR SOLUTION INTRAVENOUS; PARENTERAL at 10:02

## 2018-02-22 RX ADMIN — POTASSIUM CHLORIDE 10 MEQ: 10 INJECTION, SOLUTION INTRAVENOUS at 12:02

## 2018-02-22 RX ADMIN — POTASSIUM CHLORIDE 10 MEQ: 10 INJECTION, SOLUTION INTRAVENOUS at 10:02

## 2018-02-22 RX ADMIN — CALCIUM GLUCONATE: 94 INJECTION, SOLUTION INTRAVENOUS at 09:02

## 2018-02-22 RX ADMIN — IBUPROFEN 800 MG: 800 INJECTION INTRAVENOUS at 12:02

## 2018-02-22 RX ADMIN — OXYCODONE HYDROCHLORIDE 10 MG: 5 TABLET ORAL at 03:02

## 2018-02-22 RX ADMIN — OXYCODONE HYDROCHLORIDE 10 MG: 5 TABLET ORAL at 09:02

## 2018-02-22 RX ADMIN — DIPHENHYDRAMINE HYDROCHLORIDE 12.5 MG: 50 INJECTION, SOLUTION INTRAMUSCULAR; INTRAVENOUS at 03:02

## 2018-02-22 RX ADMIN — PIPERACILLIN AND TAZOBACTAM 4.5 G: 4; .5 INJECTION, POWDER, LYOPHILIZED, FOR SOLUTION INTRAVENOUS; PARENTERAL at 07:02

## 2018-02-22 RX ADMIN — HEPARIN SODIUM 5000 UNITS: 5000 INJECTION, SOLUTION INTRAVENOUS; SUBCUTANEOUS at 01:02

## 2018-02-22 RX ADMIN — OXYCODONE HYDROCHLORIDE 10 MG: 5 TABLET ORAL at 07:02

## 2018-02-22 RX ADMIN — Medication 3 ML: at 10:02

## 2018-02-22 RX ADMIN — SOYBEAN OIL 250 ML: 20 INJECTION, SOLUTION INTRAVENOUS at 09:02

## 2018-02-22 RX ADMIN — IBUPROFEN 800 MG: 800 INJECTION INTRAVENOUS at 05:02

## 2018-02-22 RX ADMIN — POTASSIUM CHLORIDE 10 MEQ: 10 INJECTION, SOLUTION INTRAVENOUS at 02:02

## 2018-02-22 RX ADMIN — DIPHENHYDRAMINE HYDROCHLORIDE 12.5 MG: 50 INJECTION, SOLUTION INTRAMUSCULAR; INTRAVENOUS at 11:02

## 2018-02-22 RX ADMIN — METRONIDAZOLE 500 MG: 500 INJECTION, SOLUTION INTRAVENOUS at 08:02

## 2018-02-22 RX ADMIN — CYCLOBENZAPRINE HYDROCHLORIDE 5 MG: 5 TABLET, FILM COATED ORAL at 01:02

## 2018-02-22 RX ADMIN — DIPHENHYDRAMINE HYDROCHLORIDE 12.5 MG: 50 INJECTION, SOLUTION INTRAMUSCULAR; INTRAVENOUS at 09:02

## 2018-02-22 RX ADMIN — DIPHENHYDRAMINE HYDROCHLORIDE 12.5 MG: 50 INJECTION, SOLUTION INTRAMUSCULAR; INTRAVENOUS at 06:02

## 2018-02-22 RX ADMIN — Medication 3 ML: at 06:02

## 2018-02-22 RX ADMIN — POTASSIUM CHLORIDE 10 MEQ: 10 INJECTION, SOLUTION INTRAVENOUS at 11:02

## 2018-02-22 RX ADMIN — HEPARIN SODIUM 5000 UNITS: 5000 INJECTION, SOLUTION INTRAVENOUS; SUBCUTANEOUS at 09:02

## 2018-02-22 RX ADMIN — CYCLOBENZAPRINE HYDROCHLORIDE 5 MG: 5 TABLET, FILM COATED ORAL at 09:02

## 2018-02-22 NOTE — PROGRESS NOTES
[facility]  Adult Nutrition  Progress Note    SUMMARY     Recommendations    Recommendation/Intervention:   Continue current TPN 5/15 @ 75mL/hr with 20% lipids in 250mL. If able to advance diet, recommend Regular.     RD to monitor.    Goals: Pt to continue receiving >85% EEN and EPN  Nutrition Goal Status: new  Communication of RD Recs: reviewed with RN    Reason for Assessment    Reason for Assessment: new TPN  Diagnosis: gastrointestinal disease  Relevent Medical History: rectal mass, LAR with ileal resection         General Information Comments: Pt admitted with ECF and pSBO. s/p ileocolic resection with fistula and GIANNI. NG tube removed today    Nutrition Discharge Planning: unable to determine at this time    Nutrition Prescription Ordered    Current Diet Order: NPO     Current Nutrition Support Formula Ordered: Other (Comment) (Custom TPN 5/15)  Current Nutrition Support Rate Ordered: 75 (ml)  Current Nutrition Support Frequency Ordered: mL/hr  Oral Nutrition Supplement: with lipids     Evaluation of Received Nutrients/Fluid Intake     Parenteral Calories (kcal): 1278  Parenteral Protein (gm): 90  Parenteral Fluid (mL): 1800     Total Calories (kcal): 1778     Energy Calories Required: meeting needs  % Kcal Needs: 100     Protein Required: meeting needs  % Protein Needs: 90     IV Fluid (mL): 1800     GIR (Glucose Infusion Rate) (mg/kg/min): 2.27 mg/kg/min  Lipid Calories (kcals): 500 kcals  I/O: +12L, low UOP         Fluid Required: exceed needs        % Intake of Estimated Energy Needs: 75 - 100 %  % Meal Intake: NPO     Nutrition Risk Screen     Nutrition Risk Screen: no indicators present    Nutrition/Diet History       Typical Food/Fluid Intake: Pt remains NPO on TPN. Weight loss since previous admission.  Food Preferences: No Episcopal/cultural food preferences at this time        Factors Affecting Nutritional Intake: NPO, altered gastrointestinal function             "    Labs/Tests/Procedures/Meds       Pertinent Labs Reviewed: reviewed  Pertinent Labs Comments: K 3.4  Pertinent Medications Reviewed: reviewed  Pertinent Medications Comments: TPN, lipids, IVF    Physical Findings    Overall Physical Appearance: loss of muscle mass, obese        Skin: incision    Anthropometrics    Temp: 97.9 °F (36.6 °C)     Height: 5' 6" (167.6 cm)  Weight Method: Bed Scale  Weight: 82.5 kg (181 lb 14.1 oz)     Ideal Body Weight (IBW), Female: 130 lb     % Ideal Body Weight, Female (lb): 139.91 lb  BMI (Calculated): 29.4  BMI Grade: 25 - 29.9 - overweight  Weight Loss: unintentional  Usual Body Weight (UBW), k.1 kg     % Usual Body Weight: 88.8  % Weight Change From Usual Weight: -11.39 %             Estimated/Assessed Needs    Weight Used For Calorie Calculations: 82.6 kg (182 lb 1.6 oz)      Energy Calorie Requirements (kcal): 1670- 1948  Energy Need Method: Schleicher-St Jeor (PAL 1.2-1.4)        RMR (Schleicher-St. Jeor Equation): 1392.75        Weight Used For Protein Calculations: 82.5 kg (181 lb 14.1 oz)  Protein Requirements: 99-116g (1.2-1.4g/kg)    Fluid Requirements (mL): 1mL/kcal or per MD    RDA Method (mL): 1670               Assessment and Plan    Severe malnutrition    Nutrition Problem:  Malnutrition    Related to (etiology):   Malabsorption following ileal resection, inadequate intake     Signs and Symptoms (as evidenced by):   11% weight loss in 1 month, delayed wound healing and  requiring TPN for nutrition needs.     Interventions/Recommendations (treatment strategy):  Please see RD recs above.    Nutrition Diagnosis Status:   New          Delayed surgical wound healing                  Monitor and Evaluation    Food and Nutrient Intake: energy intake, food and beverage intake, parenteral nutrition intake  Food and Nutrient Adminstration: diet order, enteral and parenteral nutrition administration        Anthropometric Measurements: weight, weight change, body mass " index  Biochemical Data, Medical Tests and Procedures: electrolyte and renal panel, gastrointestinal profile, glucose/endocrine profile, inflammatory profile, lipid profile  Nutrition-Focused Physical Findings: overall appearance    Nutrition Risk    Level of Risk: other (see comments) (f/u 2x/week)    Nutrition Follow-Up    RD Follow-up?: Yes

## 2018-02-22 NOTE — PLAN OF CARE
Problem: Patient Care Overview  Goal: Plan of Care Review  Outcome: Ongoing (interventions implemented as appropriate)  POC reviewed and understood by patient. Patient's AAOx4. Pain managed by PCA pump. Patient's IV patent and intact. RT LQ abd incision packed and abd pad with dried drainage. NG tube secured to RT nostril  putting out green drainage. Patient's VSS. Family at bedside. Call light WNR. Bed in lowest position. WCTM.

## 2018-02-22 NOTE — PT/OT/SLP RE-EVAL
Physical Therapy Re-evaluation    Patient Name:  Coby Marshall   MRN:  [id number]    Recommendations:     Discharge Recommendations:  home health PT   Discharge Equipment Recommendations: bedside commode, shower chair, walker, rolling   Barriers to discharge: None    Assessment:     Coby Marshall is a 62 y.o. female admitted with a medical diagnosis of Carcinoid tumor of rectum.  She presents with the following impairments/functional limitations:  weakness, impaired endurance, gait instability, impaired functional mobilty, impaired balance, pain Pt. cooperative and tolerated treatment well.    Rehab Prognosis:  good; patient would benefit from acute skilled PT services to address these deficits and reach maximum level of function.      Recent Surgery: Procedure(s) (LRB):  ileocoloc resection, lysis of adhesions (N/A) 2 Days Post-Op    Plan:     During this hospitalization, patient to be seen 4 x/week to address the above listed problems via gait training, therapeutic activities, therapeutic exercises  · Plan of Care Expires:  03/22/18   Plan of Care Reviewed with: patient, spouse    Subjective     Communicated with nursing prior to session.  Patient found supine upon PT entry to room, agreeable to evaluation.      Chief Complaint: abd. discomfort  Patient comments/goals: to get stronger  Pain/Comfort:  · Pain Rating 1: 6/10  · Location - Orientation 1: generalized  · Location 1: abdomen  · Pain Addressed 1: Pre-medicate for activity, Reposition, Cessation of Activity, Nurse notified  · Pain Rating Post-Intervention 1: 6/10    Patients cultural, spiritual, Hoahaoism conflicts given the current situation: no      Objective:     Patient found with: cuba catheter, telemetry, wound vac, peripheral IV     General Precautions: Standard, contact, fall   Orthopedic Precautions:N/A   Braces:       Exams:  · RUE ROM: WFL  · RUE Strength: WFL  · LUE ROM: WFL  · LUE Strength: WFL  · RLE ROM: WFL  · RLE Strength: WFL  · LLE  ROM: WFL  · LLE Strength: WFL    Functional Mobility:  · Bed Mobility:     · Rolling Right: stand by assistance  · Scooting: stand by assistance  · Supine to Sit: stand by assistance  · Transfers:     · Sit to Stand:  contact guard assistance and minimum assistance with rolling walker  · Gait: 60' with RW and CGA with decreased step length/eric  · Balance: fair    AM-PAC 6 CLICK MOBILITY  Total Score:17       Therapeutic Activities and Exercises:   Discussed therapy/DME needs and PT POC    Patient left up in chair with all lines intact and call button in reach.    GOALS:    Physical Therapy Goals        Problem: Physical Therapy Goal    Goal Priority Disciplines Outcome Goal Variances Interventions   Physical Therapy Goal     PT/OT, PT Ongoing (interventions implemented as appropriate)     Description:  Goals to be met by: 18, extended to 3/8/2018    Patient will increase functional independence with mobility by performin. Supine to sit with Modified [place]  2. Sit to supine with Modified [place]  3. Sit to stand transfer with Supervision  4. Bed to chair transfer with Supervision using no AD  5. Gait  x 150 feet with Stand by Assistance with/without AD  6. Lower extremity exercise program x20 reps per handout, with independence                       History:     Past Medical History:   Diagnosis Date    Asthma     Cancer     rectal cancer in remission    Hyperlipidemia     Hypertension        Past Surgical History:   Procedure Laterality Date    COLON SURGERY      HYSTERECTOMY      port a cath Right     R chest wall       Clinical Decision Making:     History  Co-morbidities and personal factors that may impact the plan of care Examination  Body Structures and Functions, activity limitations and participation restrictions that may impact the plan of care Clinical Presentation   Decision Making/ Complexity Score   Co-morbidities:   [] Time since onset of injury / illness /  exacerbation  [] Status of current condition  []Patient's cognitive status and safety concerns    [] Multiple Medical Problems (see med hx)  Personal Factors:   [] Patient's age  [] Prior Level of function   [] Patient's home situation (environment and family support)  [] Patient's level of motivation  [] Expected progression of patient      HISTORY:(criteria)    [] 38891 - no personal factors/history    [] 99568 - has 1-2 personal factor/comorbidity     [] 09621 - has >3 personal factor/comorbidity     Body Regions:  [] Objective examination findings  [] Head     []  Neck  [] Trunk   [] Upper Extremity  [] Lower Extremity    Body Systems:  [] For communication ability, affect, cognition, language, and learning style: the assessment of the ability to make needs known, consciousness, orientation (person, place, and time), expected emotional /behavioral responses, and learning preferences (eg, learning barriers, education  needs)  [] For the neuromuscular system: a general assessment of gross coordinated movement (eg, balance, gait, locomotion, transfers, and transitions) and motor function  (motor control and motor learning)  [] For the musculoskeletal system: the assessment of gross symmetry, gross range of motion, gross strength, height, and weight  [] For the integumentary system: the assessment of pliability(texture), presence of scar formation, skin color, and skin integrity  [] For cardiovascular/pulmonary system: the assessment of heart rate, respiratory rate, blood pressure, and edema     Activity limitations:    [] Patient's cognitive status and saf ety concerns          [] Status of current condition      [] Weight bearing restriction  [] Cardiopulmunary Restriction    Participation Restrictions:   [] Goals and goal agreement with the patient     [] Rehab potential (prognosis) and probable outcome      Examination of Body System: (criteria)    [] 93588 - addressing 1-2 elements    [] 60417 - addressing a  total of 3 or more elements     [] 32516 -  Addressing a total of 4 or more elements         Clinical Presentation: (criteria)  Choose one     On examination of body system using standardized tests and measures patient presents with (CHOOSE ONE) elements from any of the following: body structures and functions, activity limitations, and/or participation restrictions.  Leading to a clinical presentation that is considered (CHOOSE ONE)                              Clinical Decision Making  (Eval Complexity):  Choose One     Time Tracking:     PT Received On: 02/22/18  PT Start Time: 1344     PT Stop Time: 1407  PT Total Time (min): 23 min     Billable Minutes: Re-eval 23      Suhas Kaur, PT  02/22/2018

## 2018-02-22 NOTE — PLAN OF CARE
Problem: Occupational Therapy Goal  Goal: Occupational Therapy Goal  Goals to be met by: 03/15/2018    Patient will increase functional independence with ADLs by performing:    Feeding with Modified [place].  UE Dressing with Supervision sitting upright EOB.  LE Dressing with Stand-by Assistance.  Grooming while standing with Supervision.  Toileting from bedside commode with Supervision for hygiene and clothing management.   Supine to sit with Modified [place].  Toilet transfer to bedside commode with Supervision.    Outcome: Ongoing (interventions implemented as appropriate)  Initial eval completed. POC implemented and goals established. Pt progressing towards all goals at this time. All goals remain appropriate. Revise goals as needed.    Jason Lundberg, OT  2/22/2018

## 2018-02-22 NOTE — ASSESSMENT & PLAN NOTE
62 year old woman with history of rectal mass, s/p completion of chemo/XRT and LAR with ileal resection with loop ileostomy for carcinoids on 12/11/17, s/p pouchagram 1/22 and loop ileostomy takedown on 1/23/18 who subsequently developed large RLQ abscess and was re-admitted.  She underwent I&D on 2/19 and large amount of necrotic fat/purulence/feculant drainage found. She underwent extensive debridement. Also found to have ECF and SBO with extensive adhesions, now s/p lysis of adhesions and ileocolic resection (with fistula).  Abscess cultures showing bacteroides ovatus, parabacteroides distanosis, ESBL E Coli,  Proteus vulgaris and an enterococcus avium (sensitivities pending).  ID is consulted for antibiotic recommendations.      Currently on ceftriaxone and flagyl.  Zosyn added this morning.  Patient is afebrile, WBC 13.6 (down trending).  .   RAYMOND this morning, creatinine 1.3 >>1.8.  At time of visit, patient reports she is feeling much better than on admit.  Pain controlled.      -  Discontinue IV ceftriaxone and flagyl   -  Agree with zosyn 4.5 g extended infusion (will cover current organisms including anaerobes) but have increased dose to q 8 hours (for creatinine clearance >20).  Will follow renal function and adjust dose accordingly.    -  Anticipate 10 days of IV antibiotics.    -  Will follow pending culture sensitivities and adjust antibiotics accordingly.    -  Will follow up tomorrow.     Patient seen by, and plan discussed with, ID staff  Discussed with Primary Team

## 2018-02-22 NOTE — ASSESSMENT & PLAN NOTE
Nutrition Problem:  Malnutrition    Related to (etiology):   Malabsorption following ileal resection, inadequate intake     Signs and Symptoms (as evidenced by):   11% weight loss in 1 month, delayed wound healing and  requiring TPN for nutrition needs.     Interventions/Recommendations (treatment strategy):  Please see RD recs above.    Nutrition Diagnosis Status:   New

## 2018-02-22 NOTE — ASSESSMENT & PLAN NOTE
Ms. Marshall is a 61 y/o F who was found to have a rectal mass and completed chemoXRT and LAR w/ ileal resection with loop ileostomy for 2 carcinoids on 12/11/17  (neg nodes) on 1/22/18 she underwent pouchagram and then subsequent loop ileostomy takedown on 1/23/18 who presents now with ECF and pSBO now 2 Days Post-Op from Ex lap, ileocolic resection (with fistula) and GIANNI.     - Continue with NPO/IVF and NGT to LIWS, may remove ng later today  - Patient likely malnourished, had anorexia this past week and expect prolonged ileus. ContinueTPN today.   - Daily labs, including CRP as well as Prealbumin   - Continue with antibiotics, Ceftriaxone and Flagyl currently Abdominal wound growing out ESBL will need to expand abx today to likely zosyn.   - Wound care nurses to place Wound vac at the RLQ site today  - Currently presenting with RAYMOND with Cr up to 1.8. Will keep Osuna today to monitor UOP, FeNa today, bmp later today  - OOB and ambulating. PT/OT and IS.

## 2018-02-22 NOTE — SUBJECTIVE & OBJECTIVE
Past Medical History:   Diagnosis Date    Asthma     Cancer     rectal cancer in remission    Hyperlipidemia     Hypertension        Past Surgical History:   Procedure Laterality Date    COLON SURGERY      HYSTERECTOMY      port a cath Right     R chest wall       Review of patient's allergies indicates:   Allergen Reactions    Lisinopril-hydrochlorothiazide Swelling     Mouth Swelling    Sulfa (sulfonamide antibiotics) Swelling     Swelling of lips       Medications:  Prescriptions Prior to Admission   Medication Sig    ALBUTEROL INHL Inhale into the lungs.    aspirin (ECOTRIN) 81 MG EC tablet Take 81 mg by mouth once daily.    atorvastatin (LIPITOR) 20 MG tablet Take 20 mg by mouth once daily.    cloNIDine (CATAPRES) 0.1 MG tablet Take 0.1 mg by mouth 2 (two) times daily.    diclofenac (VOLTAREN) 75 MG EC tablet Take 75 mg by mouth 2 (two) times daily.    DULoxetine (CYMBALTA) 60 MG capsule Take 60 mg by mouth once daily.    famotidine (PEPCID) 20 MG tablet Take 20 mg by mouth 2 (two) times daily.    fluticasone (FLONASE) 50 mcg/actuation nasal spray 1 spray by Each Nare route once daily.    hydroCHLOROthiazide (MICROZIDE) 12.5 mg capsule Take 12.5 mg by mouth once daily.    hydrOXYzine HCl (ATARAX) 25 MG tablet Take 25 mg by mouth 3 (three) times daily.    omeprazole (PRILOSEC) 20 MG capsule Take 20 mg by mouth once daily.    oxyCODONE (ROXICODONE) 5 MG immediate release tablet Take 1 tablet (5 mg total) by mouth every 4 (four) hours as needed.    silver sulfADIAZINE 1% (SILVADENE) 1 % cream Apply topically 2 (two) times daily.    spironolactone (ALDACTONE) 25 MG tablet Take 25 mg by mouth once daily.     Antibiotics     Start     Stop Route Frequency Ordered    02/22/18 1100  piperacillin-tazobactam 4.5 g in sodium chloride 0.9% 100 mL IVPB (ready to mix system)      -- IV Every 12 hours (non-standard times) 02/22/18 0917        Antifungals     None        Antivirals     None            Immunization History   Administered Date(s) Administered    Pneumococcal Conjugate - 13 Valent 02/09/2018       Family History     None        Social History     Social History    Marital status:      Spouse name: N/A    Number of children: N/A    Years of education: N/A     Social History Main Topics    Smoking status: Never Smoker    Smokeless tobacco: Never Used    Alcohol use No    Drug use: No    Sexual activity: Not Asked     Other Topics Concern    None     Social History Narrative    None     Review of Systems   Constitutional: Positive for activity change and appetite change. Negative for chills, diaphoresis, fatigue and fever.   HENT: Negative.    Eyes: Negative.    Respiratory: Negative for cough and shortness of breath.    Cardiovascular: Negative for chest pain, palpitations and leg swelling.   Gastrointestinal: Positive for abdominal pain. Negative for diarrhea, nausea and vomiting.   Genitourinary:        [place]   Musculoskeletal: Negative.    Neurological: Negative for dizziness and weakness.   Psychiatric/Behavioral: Negative for agitation.     Objective:     Vital Signs (Most Recent):  Temp: 97.9 °F (36.6 °C) (02/22/18 1250)  Pulse: 85 (02/22/18 1250)  Resp: 20 (02/22/18 1250)  BP: 134/78 (02/22/18 1250)  SpO2: 96 % (02/22/18 1250) Vital Signs (24h Range):  Temp:  [97.8 °F (36.6 °C)-98.3 °F (36.8 °C)] 97.9 °F (36.6 °C)  Pulse:  [71-85] 85  Resp:  [16-20] 20  SpO2:  [96 %-100 %] 96 %  BP: (112-143)/(63-78) 134/78     Weight: 82.5 kg (181 lb 14.1 oz)  Body mass index is 29.36 kg/m².    Estimated Creatinine Clearance: 34.2 mL/min (A) (based on SCr of 1.8 mg/dL (H)).    Physical Exam   Constitutional: She is oriented to person, place, and time. She appears well-developed and well-nourished. No distress.   HENT:   Head: Normocephalic and atraumatic.   Eyes: Conjunctivae are normal. No scleral icterus.   Cardiovascular: Normal rate, regular rhythm and normal heart sounds.    No  murmur heard.  Pulmonary/Chest: Effort normal and breath sounds normal. No respiratory distress.   Abdominal: Soft. She exhibits no distension. There is tenderness.   Wound vac to surgical site.  No surrounding erythema.  Mild periwound tenderness.    Genitourinary:   Genitourinary Comments: [place] to gravity - urine yellow, clear   Musculoskeletal: She exhibits edema (mild bilateral ankle edema).   Neurological: She is alert and oriented to person, place, and time.   Skin: Skin is warm and dry. No rash noted. She is not diaphoretic.   RU chest port - c/d/i   Psychiatric: She has a normal mood and affect. Her behavior is normal.   Vitals reviewed.      Significant Labs:   Blood Culture: No results for input(s): LABBLOO in the last 4320 hours.  CBC:   Recent Labs  Lab 02/20/18  1638 02/21/18  0459 02/22/18  0400   WBC  --  11.66 13.62*   HGB  --  11.7* 10.1*   HCT 29* 36.0* 30.9*   PLT  --  334 256     CMP:   Recent Labs  Lab 02/21/18  0459 02/22/18  0400    137   K 4.0 3.4*    102   CO2 23 25   * 150*   BUN 27* 36*   CREATININE 1.3 1.8*   CALCIUM 7.8* 8.1*   ANIONGAP 11 10   EGFRNONAA 43.5* 29.3*     Wound Culture:   Recent Labs  Lab 02/19/18  1109   LABAERO ESCHERICHIA COLI ESBLModerate  PROTEUS VULGARISFew  ENTEROCOCCUS  AVIUMModerateSusceptibility pending       Significant Imaging: I have reviewed all pertinent imaging results/findings within the past 24 hours.

## 2018-02-22 NOTE — SUBJECTIVE & OBJECTIVE
Subjective:     Interval History:     NO acute events overnight, pain well controlled. Afebrile. Leukocytosis at 13 . Bump in Cr continued to 1.8 this AM likely RAYMOND. Passing flatus. Poor urine output yesterday bolused overnight.     Post-Op Info:  Procedure(s) (LRB):  ileocoloc resection, lysis of adhesions (N/A)   2 Days Post-Op      Medications:  Continuous Infusions:   lactated ringers 125 mL/hr at 02/21/18 0554    TPN ADULT CENTRAL LINE CUSTOM 75 mL/hr at 02/21/18 2229     Scheduled Meds:   cefTRIAXone (ROCEPHIN) IVPB  2 g Intravenous Q24H    fat emulsion 20%  250 mL Intravenous Daily    heparin (porcine)  5,000 Units Subcutaneous Q8H    metronidazole  500 mg Intravenous Q8H    sodium chloride 0.9%  3 mL Intravenous Q8H     PRN Meds:   diphenhydrAMINE    ondansetron    oxyCODONE    oxyCODONE    promethazine (PHENERGAN) IVPB    sodium chloride 0.9%        Objective:     Vital Signs (Most Recent):  Temp: 98.1 °F (36.7 °C) (02/22/18 0717)  Pulse: 78 (02/22/18 0800)  Resp: 16 (02/22/18 0717)  BP: (!) 143/66 (02/22/18 0717)  SpO2: 100 % (02/22/18 0717) Vital Signs (24h Range):  Temp:  [97.7 °F (36.5 °C)-98.3 °F (36.8 °C)] 98.1 °F (36.7 °C)  Pulse:  [65-79] 78  Resp:  [16-18] 16  SpO2:  [97 %-100 %] 100 %  BP: (112-143)/(62-69) 143/66     Intake/Output - Last 3 Shifts       02/20 0700 - 02/21 0659 02/21 0700 - 02/22 0659 02/22 0700 - 02/23 0659    P.O.  230     I.V. (mL/kg) 6250 (75.8) 2972.1 (36)     Blood 731      NG/GT  60     IV Piggyback 300 2450     TPN  683.4     Total Intake(mL/kg) 7281 (88.3) 6395.5 (77.5)     Urine (mL/kg/hr) 1620 (0.8) 700 (0.4)     Emesis/NG output  0 (0)     Drains 400 (0.2) 700 (0.4)     Other  0 (0)     Stool  0 (0)     Blood 500 (0.3)      Total Output 2520 1400      Net +4761 +4995.5             Stool Occurrence  0 x     Emesis Occurrence  0 x           Physical Exam      General: In no acute distress, well appearance.   CV: RRR, S1, S2 no murmur or gallops   Pulm: non  labored breathing  Abd: soft, mildly distended, appropiately tender. Incision with glue c/d/i. RLQ packing in place may change later today.   Ext: wwp      Significant Labs:  BMP (Last 3 Results):     Recent Labs  Lab 02/19/18  0809 02/20/18  0419 02/21/18  0459 02/22/18  0400   * 110 139* 150*    140 138 137   K 3.9 3.7 4.0 3.4*    105 104 102   CO2 26 26 23 25   BUN 26* 23 27* 36*   CREATININE 1.3 0.9 1.3 1.8*   CALCIUM 8.1* 7.6* 7.8* 8.1*   MG 1.6  --   --  2.1     CBC:     Recent Labs  Lab 02/22/18  0400   WBC 13.62*   RBC 3.84*   HGB 10.1*   HCT 30.9*      MCV 81*   MCH 26.3*   MCHC 32.7

## 2018-02-22 NOTE — PLAN OF CARE
Problem: Nutrition, Parenteral (Adult)  Goal: Signs and Symptoms of Listed Potential Problems Will be Absent, Minimized or Managed (Nutrition, Parenteral)  Signs and symptoms of listed potential problems will be absent, minimized or managed by discharge/transition of care (reference Nutrition, Parenteral (Adult) CPG).  Outcome: Ongoing (interventions implemented as appropriate)  Nutrition assessment completed. Please see RD note for details.    Recommendation/Intervention:  Continue current TPN 5/15 @ 75mL/hr with 20% lipids in 250mL. If able to advance diet, recommend Regular.     RD to monitor.    Goals: Pt to continue receiving >85% EEN and EPN  Nutrition Goal Status: new  Communication of RD Recs: reviewed with RN

## 2018-02-22 NOTE — PROGRESS NOTES
Wound care consult received and vac therapy applied with Dr. Browne at bedside. Per Dr. Browne's orders, utilized white foam to undermining and black on top with vac therapy set to 75/mmHg continuous low intensity. Pt tolerated procedure well. Will follow up with pt on Monday 2/26 for next dressing change. [id number]     02/22/18 0933       Incision/Site 02/19/18 1143 Abdomen   Date First Assessed/Time First Assessed: 02/19/18 1143   Location: Abdomen   Wound Image    Wound Length (cm) 1.5   Wound Width (cm) 10.5   Depth (cm) 4.5   Tunneling (depth (cm)/location) 6  (@9'oclock, 4.5@ 12 o'clock)   Dressing Other (see comments)  (vac therapy)   Dressing Change Due 02/26/18       Negative Pressure Wound Therapy    Placement Date: 02/22/18   Side: Right  Location: Abdomen   NPWT Type Vacuum Therapy   Therapy Setting NPWT Continuous therapy   Pressure Setting NPWT 75 mmHg   Sponges Inserted NPWT White;3;Black;1

## 2018-02-22 NOTE — CONSULTS
[facility]  Infectious Disease  Consult Note    Patient Name: Coby Marshall  MRN: [id number]  Admission Date: 2/19/2018  Hospital Length of Stay: 3 days  Attending Physician: Suhas Manjarrez MD  Primary Care Provider: Randal Casillas MD     Isolation Status: Contact      Inpatient consult to Infectious Diseases  Consult performed by: NAHID ANGLIN  Consult ordered by: ADÁN MEIER  Reason for consult: intraabdominal esbl          ID Consult acknowledged.   Full consult and recommendations to follow today  In the interim, please call for any immediate concerns.     Thank you.   CHASE Josue, ANP-C  [phone] pager,  [email or web address] [phone]

## 2018-02-22 NOTE — PT/OT/SLP EVAL
Occupational Therapy   Evaluation    Name: Coby Marshall  MRN: [id number]  Admitting Diagnosis:  Carcinoid tumor of rectum 2 Days Post-Op    Recommendations:     Discharge Recommendations: home with home health  Discharge Equipment Recommendations:  bedside commode, walker, rolling, shower chair  Barriers to discharge:    None    History:     Occupational Profile:  Living Environment: Per pt report, pt lives in [facility] with no JENNIFER (a small threshold). Pt has a walk in shower.   Previous level of function: Per pt report, pt was independent with all ADLs and mobility prior. Pt was driving prior. DME owned (not currently used): standard walker.   Roles and Routines: wife, works part time  Equipment Owned:  colostomy/ostomy supplies, raised toilet  Assistance upon Discharge: Pt will have assist from  upon D/C.     Past Medical History:   Diagnosis Date    Asthma     Cancer     rectal cancer in remission    Hyperlipidemia     Hypertension        Past Surgical History:   Procedure Laterality Date    COLON SURGERY      HYSTERECTOMY      port a cath Right     R chest wall       Subjective     Chief Complaint: pain  Patient/Family stated goals: to get back to PLOF   Communicated with: RN prior to session. Pt agreeable to evaluation   Pain/Comfort:  · Pain Rating 1: 6/10  · Location - Orientation 1: generalized  · Location 1: abdomen  · Pain Addressed 1: Pre-medicate for activity, Reposition, Cessation of Activity, Nurse notified    Patients cultural, spiritual, Congregation conflicts given the current situation: none    Objective:     Patient found with: cuba catheter, telemetry, wound vac, peripheral IV, blood pressure cuff    General Precautions: Standard, contact, fall   Orthopedic Precautions:N/A   Braces: N/A     Occupational Performance:    Bed Mobility:    · Patient completed Rolling/Turning to Right with stand by assistance  · Patient completed Supine to Sit with stand by assistance and with HOB elevated      Functional Mobility/Transfers:  · Patient completed Sit > Stand Transfer with minimum assistance  with  rolling walker. Patient completed stand<>sit transfers with CGA and with RW  · Patient completed Bed <> Chair Transfer using Stand Pivot technique with contact guard assistance with rolling walker  · Functional Mobility: Pt performed functional mobility for household distances with CGA using RW     Activities of Daily Living:  · UB Dressing: minimum assistance to don gown like a jacket sitting EOB  · LB Dressing: moderate assistance to don/doff socks sitting upright in bedside chair    Cognitive/Visual Perceptual:  Cognitive/Psychosocial Skills:     -       Oriented to: Person, Place, Time and Situation   -       Follows Commands/attention:Follows multistep  commands  -       Safety awareness/insight to disability: intact     Physical Exam:  Balance: CGA  Dynamic standing balance   Postural examination/scapula alignment:    -       Rounded shoulders  -       Forward head  Skin integrity: Visible skin intact  Edema:  Mild BLE  Sensation:    -       Intact  Dominant hand:    -       Right  Upper Extremity Range of Motion:     -       Right Upper Extremity: WFL  -       Left Upper Extremity: WFL  Upper Extremity Strength:    -       Right Upper Extremity: WFL  -       Left Upper Extremity: WFL   Strength:    -       Right Upper Extremity: WFL  -       Left Upper Extremity: WFL    Patient left up in chair with all lines intact and call button in reach    [facility] 6 Click:  [facility] Total Score: 15    Treatment & Education:  · Pt and pt family educated on safety awareness with functional transfers and with completion of ADLS  · Pt and pt family educated on role of OT and purpose of evaluation and goals for therapy  · Pt completed ADLS and functional mobility as noted above  · Pt and pt family educated on importance of completing OOB with nursing staff assistance to increase pt functional activity tolerance and for  "the prevention of secondary complications following surgery  · White board/Communication board updated       Education:    Assessment:     Coby Marshall is a 62 y.o. female with a medical diagnosis of Carcinoid tumor of rectum.  She presents with performance deficits affecting function including weakness, impaired endurance, impaired self care skills, gait instability, impaired functional mobilty, impaired balance, pain. Pt motivated to participate with therapy, and will be appropriate for HHOT with assistance from spouse upon DC from hospital.     Rehab Prognosis:  Good; patient would benefit from acute skilled OT services to address these deficits and reach maximum level of function.         Clinical Decision Makin.  OT Low:  "Pt evaluation falls under low complexity for evaluation coding due to performance deficits noted in 1-3 areas as stated above and 0 co-morbities affecting current functional status. Data obtained from problem focused assessments. No modifications or assistance was required for completion of evaluation. Only brief occupational profile and history review completed."     Plan:     Patient to be seen 4 x/week to address the above listed problems via self-care/home management, therapeutic activities, therapeutic exercises  · Plan of Care Expires: 18  · Plan of Care Reviewed with: patient, spouse    This Plan of care has been discussed with the patient who was involved in its development and understands and is in agreement with the identified goals and treatment plan    GOALS:    Occupational Therapy Goals        Problem: Occupational Therapy Goal    Goal Priority Disciplines Outcome Interventions   Occupational Therapy Goal     OT, PT/OT Ongoing (interventions implemented as appropriate)    Description:  Goals to be met by: 03/15/2018    Patient will increase functional independence with ADLs by performing:    Feeding with Modified [place].  UE Dressing with Supervision " sitting upright EOB.  LE Dressing with Stand-by Assistance.  Grooming while standing with Supervision.  Toileting from bedside commode with Supervision for hygiene and clothing management.   Supine to sit with Modified [place].  Toilet transfer to bedside commode with Supervision.                      Time Tracking:     OT Date of Treatment: 02/22/18  OT Start Time: 1345  OT Stop Time: 1407  OT Total Time (min): 22 min    Billable Minutes:Evaluation 22    Jason Lundberg OT  2/22/2018

## 2018-02-22 NOTE — PLAN OF CARE
Problem: Physical Therapy Goal  Goal: Physical Therapy Goal  Goals to be met by: 18, extended to 3/8/2018    Patient will increase functional independence with mobility by performin. Supine to sit with Modified [place]  2. Sit to supine with Modified [place]  3. Sit to stand transfer with Supervision  4. Bed to chair transfer with Supervision using no AD  5. Gait  x 150 feet with Stand by Assistance with/without AD  6. Lower extremity exercise program x20 reps per handout, with independence     Outcome: Ongoing (interventions implemented as appropriate)  Goals revised, re-established, and extended

## 2018-02-22 NOTE — PROGRESS NOTES
[facility]  Colorectal Surgery  Progress Note    Patient Name: Coby Marshall  MRN: [id number]  Admission Date: 2/19/2018  Hospital Length of Stay: 3 days  Attending Physician: Suhas Manjarrez MD    Subjective:     Interval History:     NO acute events overnight, pain well controlled. Afebrile. Leukocytosis at 13 . Bump in Cr continued to 1.8 this AM likely RAYMOND. Passing flatus. Poor urine output yesterday bolused overnight.     Post-Op Info:  Procedure(s) (LRB):  ileocoloc resection, lysis of adhesions (N/A)   2 Days Post-Op      Medications:  Continuous Infusions:   lactated ringers 125 mL/hr at 02/21/18 0554    TPN ADULT CENTRAL LINE CUSTOM 75 mL/hr at 02/21/18 2229     Scheduled Meds:   cefTRIAXone (ROCEPHIN) IVPB  2 g Intravenous Q24H    fat emulsion 20%  250 mL Intravenous Daily    heparin (porcine)  5,000 Units Subcutaneous Q8H    metronidazole  500 mg Intravenous Q8H    sodium chloride 0.9%  3 mL Intravenous Q8H     PRN Meds:   diphenhydrAMINE    ondansetron    oxyCODONE    oxyCODONE    promethazine (PHENERGAN) IVPB    sodium chloride 0.9%        Objective:     Vital Signs (Most Recent):  Temp: 98.1 °F (36.7 °C) (02/22/18 0717)  Pulse: 78 (02/22/18 0800)  Resp: 16 (02/22/18 0717)  BP: (!) 143/66 (02/22/18 0717)  SpO2: 100 % (02/22/18 0717) Vital Signs (24h Range):  Temp:  [97.7 °F (36.5 °C)-98.3 °F (36.8 °C)] 98.1 °F (36.7 °C)  Pulse:  [65-79] 78  Resp:  [16-18] 16  SpO2:  [97 %-100 %] 100 %  BP: (112-143)/(62-69) 143/66     Intake/Output - Last 3 Shifts       02/20 0700 - 02/21 0659 02/21 0700 - 02/22 0659 02/22 0700 - 02/23 0659    P.O.  230     I.V. (mL/kg) 6250 (75.8) 2972.1 (36)     Blood 731      NG/GT  60     IV Piggyback 300 2450     TPN  683.4     Total Intake(mL/kg) 7281 (88.3) 6395.5 (77.5)     Urine (mL/kg/hr) 1620 (0.8) 700 (0.4)     Emesis/NG output  0 (0)     Drains 400 (0.2) 700 (0.4)     Other  0 (0)     Stool  0 (0)     Blood 500 (0.3)      Total Output 2520  1400      Net +4761 +4995.5             Stool Occurrence  0 x     Emesis Occurrence  0 x           Physical Exam      General: In no acute distress, well appearance.   CV: RRR, S1, S2 no murmur or gallops   Pulm: non labored breathing  Abd: soft, mildly distended, appropiately tender. Incision with glue c/d/i. RLQ packing in place may change later today.   Ext: wwp      Significant Labs:  BMP (Last 3 Results):     Recent Labs  Lab 02/19/18  0809 02/20/18  0419 02/21/18  0459 02/22/18  0400   * 110 139* 150*    140 138 137   K 3.9 3.7 4.0 3.4*    105 104 102   CO2 26 26 23 25   BUN 26* 23 27* 36*   CREATININE 1.3 0.9 1.3 1.8*   CALCIUM 8.1* 7.6* 7.8* 8.1*   MG 1.6  --   --  2.1     CBC:     Recent Labs  Lab 02/22/18  0400   WBC 13.62*   RBC 3.84*   HGB 10.1*   HCT 30.9*      MCV 81*   MCH 26.3*   MCHC 32.7           Assessment/Plan:     * Carcinoid tumor of rectum    Ms. Marshall is a 63 y/o F who was found to have a rectal mass and completed chemoXRT and LAR w/ ileal resection with loop ileostomy for 2 carcinoids on 12/11/17  (neg nodes) on 1/22/18 she underwent pouchagram and then subsequent loop ileostomy takedown on 1/23/18 who presents now with ECF and pSBO now 2 Days Post-Op from Ex lap, ileocolic resection (with fistula) and GIANNI.     - Continue with NPO/IVF and NGT to LIWS, may remove ng later today  - Patient likely malnourished, had anorexia this past week and expect prolonged ileus. ContinueTPN today.   - Daily labs, including CRP as well as Prealbumin   - Continue with antibiotics, Ceftriaxone and Flagyl currently Abdominal wound growing out ESBL will need to expand abx today to likely zosyn.   - Wound care nurses to place Wound vac at the RLQ site today  - Currently presenting with RAYMOND with Cr up to 1.8. Will keep Osuna today to monitor UOP, FeNa today, bmp later today  - OOB and ambulating. PT/OT and IS.                           Pavan Seth MD  Colorectal Surgery  [facility]

## 2018-02-22 NOTE — HPI
Ms. Yamile Marshall is a 62 year old woman with history of rectal mass,s/p completion of chemo/XRT and LAR with ileal resection with loop ileostomy for cardinoids on 12/11/17, s/p pouchagram 1/22 and loope ileostomy takedown on 1/23/18 who subsequently developed large RLQ abscess and was re-admitted.  She underwent I&D on 2/19 - large amount of necrotic fat/purulence/feculant drainage found. She underwent extensive debridement. Also found to have ECF and SBO with extensive adhesions, now s/p lysis of adhesions and ileocolic resection (with fistula).  Cultures showing bacteroides ovatus, parabacteroides distanosis, ESBL E Coli, presumptive Proteus (sensitivities pending) and an enterococcus avium (sensitivities pending).  ID is consulted for antibiotic recommendations.     Currently on ceftriaxone and flagyl.  Zosyn added this morning.  Patient is afebrile, WBC 13.6 (down trending).   .  RAYMOND this morning, creatinine 1.3 >>1.8.  At time of visit, patient reports she is feeling much better than on admit.  Pain controlled.

## 2018-02-23 LAB
ANION GAP SERPL CALC-SCNC: 10 MMOL/L
ANISOCYTOSIS BLD QL SMEAR: SLIGHT
BACTERIA SPEC ANAEROBE CULT: NORMAL
BACTERIA SPEC ANAEROBE CULT: NORMAL
BASOPHILS # BLD AUTO: 0.02 K/UL
BASOPHILS NFR BLD: 0.2 %
BUN SERPL-MCNC: 29 MG/DL
BURR CELLS BLD QL SMEAR: ABNORMAL
CALCIUM SERPL-MCNC: 9.2 MG/DL
CHLORIDE SERPL-SCNC: 101 MMOL/L
CO2 SERPL-SCNC: 26 MMOL/L
CREAT SERPL-MCNC: 1.3 MG/DL
CRP SERPL-MCNC: 330.5 MG/L
DACRYOCYTES BLD QL SMEAR: ABNORMAL
DIFFERENTIAL METHOD: ABNORMAL
EOSINOPHIL # BLD AUTO: 0.3 K/UL
EOSINOPHIL NFR BLD: 2.1 %
ERYTHROCYTE [DISTWIDTH] IN BLOOD BY AUTOMATED COUNT: 15.8 %
EST. GFR  (AFRICAN AMERICAN): 50.1 ML/MIN/1.73 M^2
EST. GFR  (NON AFRICAN AMERICAN): 43.5 ML/MIN/1.73 M^2
GLUCOSE SERPL-MCNC: 137 MG/DL
HCT VFR BLD AUTO: 33.3 %
HGB BLD-MCNC: 10.6 G/DL
HYPOCHROMIA BLD QL SMEAR: ABNORMAL
IMM GRANULOCYTES # BLD AUTO: 0.6 K/UL
IMM GRANULOCYTES NFR BLD AUTO: 4.9 %
LYMPHOCYTES # BLD AUTO: 0.7 K/UL
LYMPHOCYTES NFR BLD: 5.9 %
MCH RBC QN AUTO: 25.2 PG
MCHC RBC AUTO-ENTMCNC: 31.8 G/DL
MCV RBC AUTO: 79 FL
MONOCYTES # BLD AUTO: 0.4 K/UL
MONOCYTES NFR BLD: 3.6 %
NEUTROPHILS # BLD AUTO: 10.3 K/UL
NEUTROPHILS NFR BLD: 83.3 %
NRBC BLD-RTO: 0 /100 WBC
OVALOCYTES BLD QL SMEAR: ABNORMAL
PHOSPHATE SERPL-MCNC: 2.8 MG/DL
PLATELET # BLD AUTO: 284 K/UL
PLATELET BLD QL SMEAR: ABNORMAL
PMV BLD AUTO: 9.6 FL
POIKILOCYTOSIS BLD QL SMEAR: SLIGHT
POLYCHROMASIA BLD QL SMEAR: ABNORMAL
POTASSIUM SERPL-SCNC: 3.5 MMOL/L
PREALB SERPL-MCNC: 7 MG/DL
RBC # BLD AUTO: 4.2 M/UL
SODIUM SERPL-SCNC: 137 MMOL/L
WBC # BLD AUTO: 12.3 K/UL

## 2018-02-23 PROCEDURE — 63600175 PHARM REV CODE 636 W HCPCS: Performed by: NURSE PRACTITIONER

## 2018-02-23 PROCEDURE — 84100 ASSAY OF PHOSPHORUS: CPT

## 2018-02-23 PROCEDURE — 25000003 PHARM REV CODE 250: Performed by: NURSE PRACTITIONER

## 2018-02-23 PROCEDURE — 85025 COMPLETE CBC W/AUTO DIFF WBC: CPT

## 2018-02-23 PROCEDURE — A4216 STERILE WATER/SALINE, 10 ML: HCPCS | Performed by: STUDENT IN AN ORGANIZED HEALTH CARE EDUCATION/TRAINING PROGRAM

## 2018-02-23 PROCEDURE — 25000003 PHARM REV CODE 250: Performed by: STUDENT IN AN ORGANIZED HEALTH CARE EDUCATION/TRAINING PROGRAM

## 2018-02-23 PROCEDURE — 63600175 PHARM REV CODE 636 W HCPCS: Performed by: STUDENT IN AN ORGANIZED HEALTH CARE EDUCATION/TRAINING PROGRAM

## 2018-02-23 PROCEDURE — A4217 STERILE WATER/SALINE, 500 ML: HCPCS | Performed by: STUDENT IN AN ORGANIZED HEALTH CARE EDUCATION/TRAINING PROGRAM

## 2018-02-23 PROCEDURE — 99232 SBSQ HOSP IP/OBS MODERATE 35: CPT | Mod: ,,, | Performed by: NURSE PRACTITIONER

## 2018-02-23 PROCEDURE — 86140 C-REACTIVE PROTEIN: CPT

## 2018-02-23 PROCEDURE — B4185 PARENTERAL SOL 10 GM LIPIDS: HCPCS | Performed by: STUDENT IN AN ORGANIZED HEALTH CARE EDUCATION/TRAINING PROGRAM

## 2018-02-23 PROCEDURE — 84134 ASSAY OF PREALBUMIN: CPT

## 2018-02-23 PROCEDURE — 20600001 HC STEP DOWN PRIVATE ROOM

## 2018-02-23 PROCEDURE — 80048 BASIC METABOLIC PNL TOTAL CA: CPT

## 2018-02-23 RX ORDER — FAMOTIDINE 20 MG/1
20 TABLET, FILM COATED ORAL DAILY
Status: DISCONTINUED | OUTPATIENT
Start: 2018-02-23 | End: 2018-03-08 | Stop reason: HOSPADM

## 2018-02-23 RX ORDER — FAMOTIDINE 20 MG/1
20 TABLET, FILM COATED ORAL 2 TIMES DAILY
Status: DISCONTINUED | OUTPATIENT
Start: 2018-02-23 | End: 2018-02-23 | Stop reason: DRUGHIGH

## 2018-02-23 RX ORDER — FLUTICASONE PROPIONATE 50 MCG
1 SPRAY, SUSPENSION (ML) NASAL DAILY
Status: DISCONTINUED | OUTPATIENT
Start: 2018-02-23 | End: 2018-03-08 | Stop reason: HOSPADM

## 2018-02-23 RX ORDER — PANTOPRAZOLE SODIUM 40 MG/1
40 TABLET, DELAYED RELEASE ORAL DAILY
Status: DISCONTINUED | OUTPATIENT
Start: 2018-02-23 | End: 2018-03-08 | Stop reason: HOSPADM

## 2018-02-23 RX ORDER — POTASSIUM CHLORIDE 7.45 MG/ML
10 INJECTION INTRAVENOUS
Status: COMPLETED | OUTPATIENT
Start: 2018-02-23 | End: 2018-02-23

## 2018-02-23 RX ORDER — DULOXETIN HYDROCHLORIDE 60 MG/1
60 CAPSULE, DELAYED RELEASE ORAL DAILY
Status: DISCONTINUED | OUTPATIENT
Start: 2018-02-23 | End: 2018-03-08 | Stop reason: HOSPADM

## 2018-02-23 RX ADMIN — CYCLOBENZAPRINE HYDROCHLORIDE 5 MG: 5 TABLET, FILM COATED ORAL at 05:02

## 2018-02-23 RX ADMIN — POTASSIUM CHLORIDE 10 MEQ: 10 INJECTION, SOLUTION INTRAVENOUS at 09:02

## 2018-02-23 RX ADMIN — PIPERACILLIN AND TAZOBACTAM 4.5 G: 4; .5 INJECTION, POWDER, LYOPHILIZED, FOR SOLUTION INTRAVENOUS; PARENTERAL at 06:02

## 2018-02-23 RX ADMIN — OXYCODONE HYDROCHLORIDE 10 MG: 5 TABLET ORAL at 08:02

## 2018-02-23 RX ADMIN — PIPERACILLIN AND TAZOBACTAM 4.5 G: 4; .5 INJECTION, POWDER, LYOPHILIZED, FOR SOLUTION INTRAVENOUS; PARENTERAL at 03:02

## 2018-02-23 RX ADMIN — DIPHENHYDRAMINE HYDROCHLORIDE 12.5 MG: 50 INJECTION, SOLUTION INTRAMUSCULAR; INTRAVENOUS at 05:02

## 2018-02-23 RX ADMIN — Medication 3 ML: at 10:02

## 2018-02-23 RX ADMIN — POTASSIUM CHLORIDE 10 MEQ: 10 INJECTION, SOLUTION INTRAVENOUS at 12:02

## 2018-02-23 RX ADMIN — PIPERACILLIN AND TAZOBACTAM 4.5 G: 4; .5 INJECTION, POWDER, LYOPHILIZED, FOR SOLUTION INTRAVENOUS; PARENTERAL at 11:02

## 2018-02-23 RX ADMIN — Medication 3 ML: at 02:02

## 2018-02-23 RX ADMIN — OXYCODONE HYDROCHLORIDE 10 MG: 5 TABLET ORAL at 12:02

## 2018-02-23 RX ADMIN — SODIUM CHLORIDE, SODIUM LACTATE, POTASSIUM CHLORIDE, AND CALCIUM CHLORIDE: .6; .31; .03; .02 INJECTION, SOLUTION INTRAVENOUS at 09:02

## 2018-02-23 RX ADMIN — POTASSIUM CHLORIDE 10 MEQ: 10 INJECTION, SOLUTION INTRAVENOUS at 11:02

## 2018-02-23 RX ADMIN — HEPARIN SODIUM 5000 UNITS: 5000 INJECTION, SOLUTION INTRAVENOUS; SUBCUTANEOUS at 09:02

## 2018-02-23 RX ADMIN — SOYBEAN OIL 250 ML: 20 INJECTION, SOLUTION INTRAVENOUS at 09:02

## 2018-02-23 RX ADMIN — FAMOTIDINE 20 MG: 20 TABLET, FILM COATED ORAL at 03:02

## 2018-02-23 RX ADMIN — CALCIUM GLUCONATE: 94 INJECTION, SOLUTION INTRAVENOUS at 09:02

## 2018-02-23 RX ADMIN — OXYCODONE HYDROCHLORIDE 10 MG: 5 TABLET ORAL at 05:02

## 2018-02-23 RX ADMIN — CYCLOBENZAPRINE HYDROCHLORIDE 5 MG: 5 TABLET, FILM COATED ORAL at 09:02

## 2018-02-23 RX ADMIN — HEPARIN SODIUM 5000 UNITS: 5000 INJECTION, SOLUTION INTRAVENOUS; SUBCUTANEOUS at 01:02

## 2018-02-23 RX ADMIN — PANTOPRAZOLE SODIUM 40 MG: 40 TABLET, DELAYED RELEASE ORAL at 03:02

## 2018-02-23 RX ADMIN — HEPARIN SODIUM 5000 UNITS: 5000 INJECTION, SOLUTION INTRAVENOUS; SUBCUTANEOUS at 05:02

## 2018-02-23 RX ADMIN — SODIUM CHLORIDE, SODIUM LACTATE, POTASSIUM CHLORIDE, AND CALCIUM CHLORIDE 1000 ML: .6; .31; .03; .02 INJECTION, SOLUTION INTRAVENOUS at 02:02

## 2018-02-23 RX ADMIN — DULOXETINE 60 MG: 60 CAPSULE, DELAYED RELEASE ORAL at 03:02

## 2018-02-23 RX ADMIN — Medication 3 ML: at 06:02

## 2018-02-23 RX ADMIN — CYCLOBENZAPRINE HYDROCHLORIDE 5 MG: 5 TABLET, FILM COATED ORAL at 01:02

## 2018-02-23 NOTE — ASSESSMENT & PLAN NOTE
Ms. Marshall is a 63 y/o F who was found to have a rectal mass and completed chemoXRT and LAR w/ ileal resection with loop ileostomy for 2 carcinoids on 12/11/17  (neg nodes) on 1/22/18 she underwent pouchagram and then subsequent loop ileostomy takedown on 1/23/18 who presents now with ECF and pSBO now 3 Days Post-Op from Ex lap, ileocolic resection (with fistula) and GIANNI.     - Continue with NPO/IVF and NGT to LIWS, ngt removed today.  - Patient likely malnourished, had anorexia this past week and expect prolonged ileus. Continue TPN today.   - Daily labs, including CRP as well as Prealbumin   - Continue with antibiotics (zosyn) Abdominal wound growing out ESBL.  - Continue Wound vac at the RLQ site today changes Monday/thursday  - Currently presenting with RAYMOND improving w/ cr to 1.3 today. Will remove cuba today, FeNa 0.4% showed prerenal.  - OOB and ambulating. PT/OT and IS.

## 2018-02-23 NOTE — PLAN OF CARE
Problem: Patient Care Overview  Goal: Plan of Care Review  Outcome: Ongoing (interventions implemented as appropriate)  Plan of care of care reviewed with pt. Pt AAOx's 4, vital signs stable. Midline with gauze and tape intact. RLQ wound vac intact. Pt currently NPO. Few complaints of pain. Currently on room air. Pt ambulated today with assistance and remains free from falls or injuries. No acute events at this time. Bed in low and locked position with call light in reach. TM

## 2018-02-23 NOTE — PROGRESS NOTES
Wound care follow up:  Pt tolerating vac therapy well.  Reports no complications with vac seal.  [id number]

## 2018-02-23 NOTE — PROGRESS NOTES
[facility]  Infectious Disease  Progress Note    Patient Name: Coby Marshall  MRN: [id number]  Admission Date: 2/19/2018  Length of Stay: 4 days  Attending Physician: Suhas Manjarrez MD  Primary Care Provider: Randal Casillas MD    Isolation Status: Contact  Assessment/Plan:      Abdominal abscess       62 year old woman with history of rectal mass, s/p completion of chemo/XRT and LAR with ileal resection with loop ileostomy for carcinoids on 12/11/17, s/p pouchagram 1/22 and loop ileostomy takedown on 1/23/18 who subsequently developed large RLQ abscess and was re-admitted.  She underwent I&D on 2/19 and large amount of necrotic fat/purulence/feculant drainage found. She underwent extensive debridement. Also found to have ECF and SBO with extensive adhesions, now POD #3  lysis of adhesions and ileocolic resection (with fistula).  Abscess cultures showing bacteroides ovatus, parabacteroides distanosis, ESBL E Coli,  Proteus vulgaris and an enterococcus avium (ampicillin sensitive).  Currently on IV zosyn which will cover all organisms.  She has wound vac to RLQ wound, but new serosanguinous drainage noted this morning from midline incision site.      She reports feeling overall continued improvement.  Afebrile. Leukocytosis resolved.  Kidney function improving.       -  Continue IV zosyn 4.5 g extended infusion q 8 hours (for creatinine clearance >20).     -  New drainage today.  Per CRS notes, may need further surgery.  If additional washout/I&D, please obtain new cultures.    -  Anticipate 10 days of IV antibiotics from date zosyn started (estimated end date 3/3/18).    -  Will sign off.  Please re-consult ID as needed    Data reviewed and plan discussed with ID staff                    Thank you.   Please call for any questions or concerns.  Gay Irizarry, APRN, ANP-C  [phone]    Subjective:     Principal Problem:Carcinoid tumor of rectum    HPI:   Ms. Yamile Marshall is a 62 year old woman with  history of rectal mass,s/p completion of chemo/XRT and LAR with ileal resection with loop ileostomy for cardinoids on 12/11/17, s/p pouchagram 1/22 and loope ileostomy takedown on 1/23/18 who subsequently developed large RLQ abscess and was re-admitted.  She underwent I&D on 2/19 - large amount of necrotic fat/purulence/feculant drainage found. She underwent extensive debridement. Also found to have ECF and SBO with extensive adhesions, now s/p lysis of adhesions and ileocolic resection (with fistula).  Cultures showing bacteroides ovatus, parabacteroides distanosis, ESBL E Coli, presumptive Proteus (sensitivities pending) and an enterococcus avium (sensitivities pending).  ID is consulted for antibiotic recommendations.     Currently on ceftriaxone and flagyl.  Zosyn added this morning.  Patient is afebrile, WBC 13.6 (down trending).   .  RAYMOND this morning, creatinine 1.3 >>1.8.  At time of visit, patient reports she is feeling much better than on admit.  Pain controlled.         Interval History: No acute events overnight.  Afebrile. No leukocytosis. Continues to feel better overall. Wound vac is in place, but has new drainage from midline surgical incision site.  Kidney function improving.     Review of Systems   Constitutional: Positive for activity change and appetite change. Negative for chills, diaphoresis, fatigue and fever.   HENT: Negative.    Eyes: Negative.    Respiratory: Negative for cough and shortness of breath.    Cardiovascular: Negative for chest pain, palpitations and leg swelling.   Gastrointestinal: Positive for abdominal pain. Negative for diarrhea, nausea and vomiting.   Genitourinary:        Osuna   Musculoskeletal: Negative.    Neurological: Negative for dizziness and weakness.   Psychiatric/Behavioral: Negative for agitation.     Objective:     Vital Signs (Most Recent):  Temp: 97.6 °F (36.4 °C) (02/23/18 0737)  Pulse: 101 (02/23/18 0737)  Resp: 16 (02/23/18 0737)  BP: 126/77  (02/23/18 0737)  SpO2: 99 % (02/23/18 0737) Vital Signs (24h Range):  Temp:  [97.6 °F (36.4 °C)-98.6 °F (37 °C)] 97.6 °F (36.4 °C)  Pulse:  [] 101  Resp:  [16-20] 16  SpO2:  [95 %-100 %] 99 %  BP: (126-138)/(64-80) 126/77     Weight: 82.5 kg (181 lb 14.1 oz)  Body mass index is 29.36 kg/m².    Estimated Creatinine Clearance: 47.3 mL/min (based on SCr of 1.3 mg/dL).    Physical Exam   Constitutional: She is oriented to person, place, and time. She appears well-developed and well-nourished. No distress.   HENT:   Head: Normocephalic and atraumatic.   Eyes: Conjunctivae are normal. No scleral icterus.   Cardiovascular: Normal rate, regular rhythm and normal heart sounds.    No murmur heard.  Pulmonary/Chest: Effort normal and breath sounds normal. No respiratory distress.   Abdominal: Soft. She exhibits no distension. There is tenderness.   Wound vac to surgical site.  No surrounding erythema.  Mild periwound tenderness.   Midline prior surgical incision draining small amount serosanguinous drainage.    Genitourinary:   Genitourinary Comments: [place] to gravity - urine yellow, clear   Musculoskeletal: She exhibits edema (mild bilateral ankle edema).   Neurological: She is alert and oriented to person, place, and time.   Skin: Skin is warm and dry. No rash noted. She is not diaphoretic.   RU chest port - c/d/i   Psychiatric: She has a normal mood and affect. Her behavior is normal.   Vitals reviewed.      Significant Labs:   Blood Culture: No results for input(s): LABBLOO in the last 4320 hours.  CBC:   Recent Labs  Lab 02/22/18  0400 02/23/18  0342   WBC 13.62* 12.30   HGB 10.1* 10.6*   HCT 30.9* 33.3*    284     CMP:   Recent Labs  Lab 02/22/18  0400 02/23/18  0342    137   K 3.4* 3.5    101   CO2 25 26   * 137*   BUN 36* 29*   CREATININE 1.8* 1.3   CALCIUM 8.1* 9.2   ANIONGAP 10 10   EGFRNONAA 29.3* 43.5*     Urine Culture: No results for input(s): LABURIN in the last 4320 hours.  Urine  Studies: No results for input(s): COLORU, APPEARANCEUA, PHUR, SPECGRAV, PROTEINUA, GLUCUA, KETONESU, BILIRUBINUA, OCCULTUA, NITRITE, UROBILINOGEN, LEUKOCYTESUR, RBCUA, WBCUA, BACTERIA, SQUAMEPITHEL, HYALINECASTS in the last 4320 hours.    Invalid input(s): JUAN  Wound Culture:   Recent Labs  Lab 02/19/18  1109   LABAERO ESCHERICHIA COLI ESBLModerate  PROTEUS VULGARISFew  ENTEROCOCCUS  AVIUMModerate       Significant Imaging: None

## 2018-02-23 NOTE — SUBJECTIVE & OBJECTIVE
Subjective:     Interval History:     NO acute events overnight, pain well controlled. Afebrile. Leukocytosis at 12 . Bump in Cr back down to 1.3 this AM likely resolving RAYMOND. Passing flatus. UOP greatly improved. Some serous drainage from midline    Post-Op Info:  Procedure(s) (LRB):  ileocoloc resection, lysis of adhesions (N/A)   3 Days Post-Op      Medications:  Continuous Infusions:   lactated ringers 50 mL/hr at 02/22/18 0846    TPN ADULT CENTRAL LINE CUSTOM 75 mL/hr at 02/22/18 2147     Scheduled Meds:   cyclobenzaprine  5 mg Oral TID    fat emulsion 20%  250 mL Intravenous Daily    heparin (porcine)  5,000 Units Subcutaneous Q8H    piperacillin-tazobactam (ZOSYN) IVPB  4.5 g Intravenous Q8H    potassium chloride  10 mEq Intravenous Q1H    sodium chloride 0.9%  3 mL Intravenous Q8H     PRN Meds:   diphenhydrAMINE    ondansetron    oxyCODONE    oxyCODONE    promethazine (PHENERGAN) IVPB    sodium chloride 0.9%        Objective:     Vital Signs (Most Recent):  Temp: 97.6 °F (36.4 °C) (02/23/18 0737)  Pulse: 101 (02/23/18 0737)  Resp: 16 (02/23/18 0737)  BP: 126/77 (02/23/18 0737)  SpO2: 99 % (02/23/18 0737) Vital Signs (24h Range):  Temp:  [97.6 °F (36.4 °C)-98.6 °F (37 °C)] 97.6 °F (36.4 °C)  Pulse:  [] 101  Resp:  [16-20] 16  SpO2:  [95 %-100 %] 99 %  BP: (126-138)/(64-80) 126/77     Intake/Output - Last 3 Shifts       02/21 0700 - 02/22 0659 02/22 0700 - 02/23 0659 02/23 0700 - 02/24 0659    P.O. 230 300     I.V. (mL/kg) 2972.1 (36) 1455.4 (17.6)     Blood       NG/GT 60      IV Piggyback 2450 700     .4 1917.2     Total Intake(mL/kg) 6395.5 (77.5) 4372.6 (53)     Urine (mL/kg/hr) 700 (0.4) 1825 (0.9)     Emesis/NG output 0 (0) 0 (0)     Drains 700 (0.4)      Other 0 (0) 50 (0)     Stool 0 (0) 0 (0)     Blood  0 (0)     Total Output 1400 1875      Net +4995.5 +2497.6             Urine Occurrence  0 x     Stool Occurrence 0 x 0 x     Emesis Occurrence 0 x 0 x           Physical  Exam      General: In no acute distress, well appearance.   CV: RRR, S1, S2 no murmur or gallops   Pulm: non labored breathing  Abd: soft, mildly distended, appropiately tender. Incision with glue. Wound vac in place in RLQ, midline with some serous drainage, may need to be opened.  Ext: wwp      Significant Labs:  BMP (Last 3 Results):     Recent Labs  Lab 02/19/18  0809  02/21/18  0459 02/22/18  0400 02/23/18  0342   *  < > 139* 150* 137*     < > 138 137 137   K 3.9  < > 4.0 3.4* 3.5     < > 104 102 101   CO2 26  < > 23 25 26   BUN 26*  < > 27* 36* 29*   CREATININE 1.3  < > 1.3 1.8* 1.3   CALCIUM 8.1*  < > 7.8* 8.1* 9.2   MG 1.6  --   --  2.1  --    < > = values in this interval not displayed.  CBC:     Recent Labs  Lab 02/23/18  0342   WBC 12.30   RBC 4.20   HGB 10.6*   HCT 33.3*      MCV 79*   MCH 25.2*   MCHC 31.8*

## 2018-02-23 NOTE — SUBJECTIVE & OBJECTIVE
Interval History: No acute events overnight.  Afebrile. No leukocytosis. Continues to feel better overall. Wound vac is in place, but has new drainage from midline surgical incision site.  Kidney function improving.     Review of Systems   Constitutional: Positive for activity change and appetite change. Negative for chills, diaphoresis, fatigue and fever.   HENT: Negative.    Eyes: Negative.    Respiratory: Negative for cough and shortness of breath.    Cardiovascular: Negative for chest pain, palpitations and leg swelling.   Gastrointestinal: Positive for abdominal pain. Negative for diarrhea, nausea and vomiting.   Genitourinary:        Osuna   Musculoskeletal: Negative.    Neurological: Negative for dizziness and weakness.   Psychiatric/Behavioral: Negative for agitation.     Objective:     Vital Signs (Most Recent):  Temp: 97.6 °F (36.4 °C) (02/23/18 0737)  Pulse: 101 (02/23/18 0737)  Resp: 16 (02/23/18 0737)  BP: 126/77 (02/23/18 0737)  SpO2: 99 % (02/23/18 0737) Vital Signs (24h Range):  Temp:  [97.6 °F (36.4 °C)-98.6 °F (37 °C)] 97.6 °F (36.4 °C)  Pulse:  [] 101  Resp:  [16-20] 16  SpO2:  [95 %-100 %] 99 %  BP: (126-138)/(64-80) 126/77     Weight: 82.5 kg (181 lb 14.1 oz)  Body mass index is 29.36 kg/m².    Estimated Creatinine Clearance: 47.3 mL/min (based on SCr of 1.3 mg/dL).    Physical Exam   Constitutional: She is oriented to person, place, and time. She appears well-developed and well-nourished. No distress.   HENT:   Head: Normocephalic and atraumatic.   Eyes: Conjunctivae are normal. No scleral icterus.   Cardiovascular: Normal rate, regular rhythm and normal heart sounds.    No murmur heard.  Pulmonary/Chest: Effort normal and breath sounds normal. No respiratory distress.   Abdominal: Soft. She exhibits no distension. There is tenderness.   Wound vac to surgical site.  No surrounding erythema.  Mild periwound tenderness.   Midline prior surgical incision draining small amount  serosanguinous drainage.    Genitourinary:   Genitourinary Comments: Osuna to gravity - urine yellow, clear   Musculoskeletal: She exhibits edema (mild bilateral ankle edema).   Neurological: She is alert and oriented to person, place, and time.   Skin: Skin is warm and dry. No rash noted. She is not diaphoretic.   RU chest port - c/d/i   Psychiatric: She has a normal mood and affect. Her behavior is normal.   Vitals reviewed.      Significant Labs:   Blood Culture: No results for input(s): LABBLOO in the last 4320 hours.  CBC:   Recent Labs  Lab 02/22/18  0400 02/23/18  0342   WBC 13.62* 12.30   HGB 10.1* 10.6*   HCT 30.9* 33.3*    284     CMP:   Recent Labs  Lab 02/22/18  0400 02/23/18  0342    137   K 3.4* 3.5    101   CO2 25 26   * 137*   BUN 36* 29*   CREATININE 1.8* 1.3   CALCIUM 8.1* 9.2   ANIONGAP 10 10   EGFRNONAA 29.3* 43.5*     Urine Culture: No results for input(s): LABURIN in the last 4320 hours.  Urine Studies: No results for input(s): COLORU, APPEARANCEUA, PHUR, SPECGRAV, PROTEINUA, GLUCUA, KETONESU, BILIRUBINUA, OCCULTUA, NITRITE, UROBILINOGEN, LEUKOCYTESUR, RBCUA, WBCUA, BACTERIA, SQUAMEPITHEL, HYALINECASTS in the last 4320 hours.    Invalid input(s): WRIGHTSUR  Wound Culture:   Recent Labs  Lab 02/19/18  1109   LABAERO ESCHERICHIA COLI ESBLModerate  PROTEUS VULGARISFew  ENTEROCOCCUS  AVIUMModerate       Significant Imaging: None

## 2018-02-23 NOTE — ASSESSMENT & PLAN NOTE
62 year old woman with history of rectal mass, s/p completion of chemo/XRT and LAR with ileal resection with loop ileostomy for carcinoids on 12/11/17, s/p pouchagram 1/22 and loop ileostomy takedown on 1/23/18 who subsequently developed large RLQ abscess and was re-admitted.  She underwent I&D on 2/19 and large amount of necrotic fat/purulence/feculant drainage found. She underwent extensive debridement. Also found to have ECF and SBO with extensive adhesions, now POD #3  lysis of adhesions and ileocolic resection (with fistula).  Abscess cultures showing bacteroides ovatus, parabacteroides distanosis, ESBL E Coli,  Proteus vulgaris and an enterococcus avium (ampicillin sensitive).  Currently on IV zosyn which will cover all organisms.  She has wound vac to RLQ wound, but new serosanguinous drainage noted this morning from midline incision site.      She reports feeling overall continued improvement.  Afebrile. Leukocytosis resolved.  Kidney function improving.       -  Continue IV zosyn 4.5 g extended infusion q 8 hours (for creatinine clearance >20).     -  New drainage today.  Per CRS notes, may need further surgery.  If additional washout/I&D, please obtain new cultures.    -  Anticipate 10 days of IV antibiotics from date zosyn started (estimated end date 3/3/18).    -  Will sign off.  Please re-consult ID as needed    Data reviewed and plan discussed with ID staff

## 2018-02-23 NOTE — PLAN OF CARE
Problem: Patient Care Overview  Goal: Plan of Care Review  Outcome: Ongoing (interventions implemented as appropriate)  Plan of care reviewed with patient. Patient verbalized understanding.  Patient is AAO and VSS. Pain managed with PCA.  No complaint of nausea or vomiting. PRN benadryl given.  Ng tube removed.  NPO with medication.  On Cardiac monitoring running NSR.  Wound vac placed to right abdomen.  On contact for ESBL in abdominal wound. Up with assist. Ambulated in gunter with PT.  Free of falls and injury. Will continue to monitor. Jacqueline Roland RN

## 2018-02-23 NOTE — PROGRESS NOTES
[facility]  Colorectal Surgery  Progress Note    Patient Name: Coby Marshall  MRN: [id number]  Admission Date: 2/19/2018  Hospital Length of Stay: 4 days  Attending Physician: Suhas Manjarrez MD    Subjective:     Interval History:     NO acute events overnight, pain well controlled. Afebrile. Leukocytosis at 12 . Bump in Cr back down to 1.3 this AM likely resolving RAYMOND. Passing flatus. UOP greatly improved. Some serous drainage from midline    Post-Op Info:  Procedure(s) (LRB):  ileocoloc resection, lysis of adhesions (N/A)   3 Days Post-Op      Medications:  Continuous Infusions:   lactated ringers 50 mL/hr at 02/22/18 0846    TPN ADULT CENTRAL LINE CUSTOM 75 mL/hr at 02/22/18 2147     Scheduled Meds:   cyclobenzaprine  5 mg Oral TID    fat emulsion 20%  250 mL Intravenous Daily    heparin (porcine)  5,000 Units Subcutaneous Q8H    piperacillin-tazobactam (ZOSYN) IVPB  4.5 g Intravenous Q8H    potassium chloride  10 mEq Intravenous Q1H    sodium chloride 0.9%  3 mL Intravenous Q8H     PRN Meds:   diphenhydrAMINE    ondansetron    oxyCODONE    oxyCODONE    promethazine (PHENERGAN) IVPB    sodium chloride 0.9%        Objective:     Vital Signs (Most Recent):  Temp: 97.6 °F (36.4 °C) (02/23/18 0737)  Pulse: 101 (02/23/18 0737)  Resp: 16 (02/23/18 0737)  BP: 126/77 (02/23/18 0737)  SpO2: 99 % (02/23/18 0737) Vital Signs (24h Range):  Temp:  [97.6 °F (36.4 °C)-98.6 °F (37 °C)] 97.6 °F (36.4 °C)  Pulse:  [] 101  Resp:  [16-20] 16  SpO2:  [95 %-100 %] 99 %  BP: (126-138)/(64-80) 126/77     Intake/Output - Last 3 Shifts       02/21 0700 - 02/22 0659 02/22 0700 - 02/23 0659 02/23 0700 - 02/24 0659    P.O. 230 300     I.V. (mL/kg) 2972.1 (36) 1455.4 (17.6)     Blood       NG/GT 60      IV Piggyback 2450 700     .4 1917.2     Total Intake(mL/kg) 6395.5 (77.5) 4372.6 (53)     Urine (mL/kg/hr) 700 (0.4) 1825 (0.9)     Emesis/NG output 0 (0) 0 (0)     Drains 700 (0.4)      Other 0  (0) 50 (0)     Stool 0 (0) 0 (0)     Blood  0 (0)     Total Output 1400 1875      Net +4995.5 +2497.6             Urine Occurrence  0 x     Stool Occurrence 0 x 0 x     Emesis Occurrence 0 x 0 x           Physical Exam      General: In no acute distress, well appearance.   CV: RRR, S1, S2 no murmur or gallops   Pulm: non labored breathing  Abd: soft, mildly distended, appropiately tender. Incision with glue. Wound vac in place in RLQ, midline with some serous drainage, may need to be opened.  Ext: wwp      Significant Labs:  BMP (Last 3 Results):     Recent Labs  Lab 02/19/18  0809  02/21/18  0459 02/22/18  0400 02/23/18  0342   *  < > 139* 150* 137*     < > 138 137 137   K 3.9  < > 4.0 3.4* 3.5     < > 104 102 101   CO2 26  < > 23 25 26   BUN 26*  < > 27* 36* 29*   CREATININE 1.3  < > 1.3 1.8* 1.3   CALCIUM 8.1*  < > 7.8* 8.1* 9.2   MG 1.6  --   --  2.1  --    < > = values in this interval not displayed.  CBC:     Recent Labs  Lab 02/23/18  0342   WBC 12.30   RBC 4.20   HGB 10.6*   HCT 33.3*      MCV 79*   MCH 25.2*   MCHC 31.8*           Assessment/Plan:     * Carcinoid tumor of rectum    Ms. Marshall is a 63 y/o F who was found to have a rectal mass and completed chemoXRT and LAR w/ ileal resection with loop ileostomy for 2 carcinoids on 12/11/17  (neg nodes) on 1/22/18 she underwent pouchagram and then subsequent loop ileostomy takedown on 1/23/18 who presents now with ECF and pSBO now 3 Days Post-Op from Ex lap, ileocolic resection (with fistula) and GIANNI.     - Continue with NPO/IVF and NGT to LIWS, ngt removed today.  - Patient likely malnourished, had anorexia this past week and expect prolonged ileus. Continue TPN today.   - Daily labs, including CRP as well as Prealbumin   - Continue with antibiotics (zosyn) Abdominal wound growing out ESBL.  - Continue Wound vac at the RLQ site today changes Monday/thursday  - Currently presenting with RAYMOND improving w/ cr to 1.3 today. Will remove  cuba today, FeNa 0.4% showed prerenal.  - OOB and ambulating. PT/OT and IS.                           Pavan Seth MD  Colorectal Surgery  [facility]

## 2018-02-23 NOTE — PLAN OF CARE
Problem: Patient Care Overview  Goal: Plan of Care Review  Outcome: Ongoing (interventions implemented as appropriate)  Pt AAOx4.  at the bedside. Pt NPO except sips with meds. Pain managed with PRN pain meds. Pt on tele, NSR, all other VSS on RA. Osuna catheter intact and patent, adequate UOP. Wound vac intact with no leakage, 50cc output overnight. Call light in reach and bed in lowest position. Pt remains free of falls and injury. No acute events this shift. Will continue to monitor.

## 2018-02-24 LAB
ANION GAP SERPL CALC-SCNC: 9 MMOL/L
ANISOCYTOSIS BLD QL SMEAR: SLIGHT
BASOPHILS NFR BLD: 0 %
BLD PROD TYP BPU: NORMAL
BLD PROD TYP BPU: NORMAL
BLOOD UNIT EXPIRATION DATE: NORMAL
BLOOD UNIT EXPIRATION DATE: NORMAL
BLOOD UNIT TYPE CODE: 6200
BLOOD UNIT TYPE CODE: 6200
BLOOD UNIT TYPE: NORMAL
BLOOD UNIT TYPE: NORMAL
BUN SERPL-MCNC: 22 MG/DL
BURR CELLS BLD QL SMEAR: ABNORMAL
CALCIUM SERPL-MCNC: 9.2 MG/DL
CHLORIDE SERPL-SCNC: 101 MMOL/L
CO2 SERPL-SCNC: 27 MMOL/L
CODING SYSTEM: NORMAL
CODING SYSTEM: NORMAL
CREAT SERPL-MCNC: 0.9 MG/DL
CRP SERPL-MCNC: 250.7 MG/L
DIFFERENTIAL METHOD: ABNORMAL
DISPENSE STATUS: NORMAL
DISPENSE STATUS: NORMAL
EOSINOPHIL NFR BLD: 3 %
ERYTHROCYTE [DISTWIDTH] IN BLOOD BY AUTOMATED COUNT: 16.1 %
EST. GFR  (AFRICAN AMERICAN): >60 ML/MIN/1.73 M^2
EST. GFR  (NON AFRICAN AMERICAN): >60 ML/MIN/1.73 M^2
GLUCOSE SERPL-MCNC: 118 MG/DL
HCT VFR BLD AUTO: 31.2 %
HGB BLD-MCNC: 10.3 G/DL
HYPOCHROMIA BLD QL SMEAR: ABNORMAL
IMM GRANULOCYTES # BLD AUTO: ABNORMAL K/UL
IMM GRANULOCYTES NFR BLD AUTO: ABNORMAL %
LYMPHOCYTES NFR BLD: 8 %
MCH RBC QN AUTO: 26.3 PG
MCHC RBC AUTO-ENTMCNC: 33 G/DL
MCV RBC AUTO: 80 FL
MONOCYTES NFR BLD: 4 %
NEUTROPHILS NFR BLD: 81 %
NEUTS BAND NFR BLD MANUAL: 4 %
NRBC BLD-RTO: 0 /100 WBC
OVALOCYTES BLD QL SMEAR: ABNORMAL
PHOSPHATE SERPL-MCNC: 2.9 MG/DL
PLATELET # BLD AUTO: 276 K/UL
PLATELET BLD QL SMEAR: ABNORMAL
PMV BLD AUTO: 9.9 FL
POIKILOCYTOSIS BLD QL SMEAR: SLIGHT
POLYCHROMASIA BLD QL SMEAR: ABNORMAL
POTASSIUM SERPL-SCNC: 3.8 MMOL/L
PREALB SERPL-MCNC: 9 MG/DL
RBC # BLD AUTO: 3.92 M/UL
SODIUM SERPL-SCNC: 137 MMOL/L
TRANS ERYTHROCYTES VOL PATIENT: NORMAL ML
TRANS ERYTHROCYTES VOL PATIENT: NORMAL ML
WBC # BLD AUTO: 12.33 K/UL

## 2018-02-24 PROCEDURE — 63600175 PHARM REV CODE 636 W HCPCS: Performed by: NURSE PRACTITIONER

## 2018-02-24 PROCEDURE — 63600175 PHARM REV CODE 636 W HCPCS: Performed by: STUDENT IN AN ORGANIZED HEALTH CARE EDUCATION/TRAINING PROGRAM

## 2018-02-24 PROCEDURE — B4185 PARENTERAL SOL 10 GM LIPIDS: HCPCS | Performed by: STUDENT IN AN ORGANIZED HEALTH CARE EDUCATION/TRAINING PROGRAM

## 2018-02-24 PROCEDURE — 20600001 HC STEP DOWN PRIVATE ROOM

## 2018-02-24 PROCEDURE — 84134 ASSAY OF PREALBUMIN: CPT

## 2018-02-24 PROCEDURE — 25000003 PHARM REV CODE 250: Performed by: NURSE PRACTITIONER

## 2018-02-24 PROCEDURE — 80048 BASIC METABOLIC PNL TOTAL CA: CPT

## 2018-02-24 PROCEDURE — 36415 COLL VENOUS BLD VENIPUNCTURE: CPT

## 2018-02-24 PROCEDURE — 86140 C-REACTIVE PROTEIN: CPT

## 2018-02-24 PROCEDURE — A4216 STERILE WATER/SALINE, 10 ML: HCPCS | Performed by: STUDENT IN AN ORGANIZED HEALTH CARE EDUCATION/TRAINING PROGRAM

## 2018-02-24 PROCEDURE — 25000003 PHARM REV CODE 250: Performed by: STUDENT IN AN ORGANIZED HEALTH CARE EDUCATION/TRAINING PROGRAM

## 2018-02-24 PROCEDURE — A4217 STERILE WATER/SALINE, 500 ML: HCPCS | Performed by: STUDENT IN AN ORGANIZED HEALTH CARE EDUCATION/TRAINING PROGRAM

## 2018-02-24 PROCEDURE — 84100 ASSAY OF PHOSPHORUS: CPT

## 2018-02-24 RX ORDER — POTASSIUM CHLORIDE 7.45 MG/ML
10 INJECTION INTRAVENOUS
Status: DISCONTINUED | OUTPATIENT
Start: 2018-02-24 | End: 2018-02-24

## 2018-02-24 RX ORDER — FENTANYL CITRATE 50 UG/ML
25 INJECTION, SOLUTION INTRAMUSCULAR; INTRAVENOUS
Status: DISCONTINUED | OUTPATIENT
Start: 2018-02-24 | End: 2018-03-08 | Stop reason: HOSPADM

## 2018-02-24 RX ADMIN — HEPARIN SODIUM 5000 UNITS: 5000 INJECTION, SOLUTION INTRAVENOUS; SUBCUTANEOUS at 01:02

## 2018-02-24 RX ADMIN — CYCLOBENZAPRINE HYDROCHLORIDE 5 MG: 5 TABLET, FILM COATED ORAL at 01:02

## 2018-02-24 RX ADMIN — OXYCODONE HYDROCHLORIDE 10 MG: 5 TABLET ORAL at 01:02

## 2018-02-24 RX ADMIN — FAMOTIDINE 20 MG: 20 TABLET, FILM COATED ORAL at 08:02

## 2018-02-24 RX ADMIN — HEPARIN SODIUM 5000 UNITS: 5000 INJECTION, SOLUTION INTRAVENOUS; SUBCUTANEOUS at 05:02

## 2018-02-24 RX ADMIN — Medication 3 ML: at 02:02

## 2018-02-24 RX ADMIN — SOYBEAN OIL 250 ML: 20 INJECTION, SOLUTION INTRAVENOUS at 10:02

## 2018-02-24 RX ADMIN — PIPERACILLIN AND TAZOBACTAM 4.5 G: 4; .5 INJECTION, POWDER, LYOPHILIZED, FOR SOLUTION INTRAVENOUS; PARENTERAL at 11:02

## 2018-02-24 RX ADMIN — Medication 3 ML: at 10:02

## 2018-02-24 RX ADMIN — DULOXETINE 60 MG: 60 CAPSULE, DELAYED RELEASE ORAL at 08:02

## 2018-02-24 RX ADMIN — PANTOPRAZOLE SODIUM 40 MG: 40 TABLET, DELAYED RELEASE ORAL at 08:02

## 2018-02-24 RX ADMIN — CYCLOBENZAPRINE HYDROCHLORIDE 5 MG: 5 TABLET, FILM COATED ORAL at 05:02

## 2018-02-24 RX ADMIN — HEPARIN SODIUM 5000 UNITS: 5000 INJECTION, SOLUTION INTRAVENOUS; SUBCUTANEOUS at 10:02

## 2018-02-24 RX ADMIN — ONDANSETRON HYDROCHLORIDE 4 MG: 2 INJECTION, SOLUTION INTRAMUSCULAR; INTRAVENOUS at 06:02

## 2018-02-24 RX ADMIN — PIPERACILLIN AND TAZOBACTAM 4.5 G: 4; .5 INJECTION, POWDER, LYOPHILIZED, FOR SOLUTION INTRAVENOUS; PARENTERAL at 02:02

## 2018-02-24 RX ADMIN — Medication 3 ML: at 06:02

## 2018-02-24 RX ADMIN — FENTANYL CITRATE 25 MCG: 50 INJECTION, SOLUTION INTRAMUSCULAR; INTRAVENOUS at 11:02

## 2018-02-24 RX ADMIN — CYCLOBENZAPRINE HYDROCHLORIDE 5 MG: 5 TABLET, FILM COATED ORAL at 10:02

## 2018-02-24 RX ADMIN — CALCIUM GLUCONATE: 94 INJECTION, SOLUTION INTRAVENOUS at 10:02

## 2018-02-24 RX ADMIN — PIPERACILLIN AND TAZOBACTAM 4.5 G: 4; .5 INJECTION, POWDER, LYOPHILIZED, FOR SOLUTION INTRAVENOUS; PARENTERAL at 06:02

## 2018-02-24 NOTE — PLAN OF CARE
Problem: Fall Risk (Adult)  Goal: Identify Related Risk Factors and Signs and Symptoms  Related risk factors and signs and symptoms are identified upon initiation of Human Response Clinical Practice Guideline (CPG)   No falls during this shift.     Problem: Infection, Risk/Actual (Adult)  Goal: Identify Related Risk Factors and Signs and Symptoms  Related risk factors and signs and symptoms are identified upon initiation of Human Response Clinical Practice Guideline (CPG)   IV antibiotics administered as ordered.     Problem: Wound, Traumatic, Nonburn (Adult)  Goal: Signs and Symptoms of Listed Potential Problems Will be Absent, Minimized or Managed (Wound, Traumatic, Nonburn)  Signs and symptoms of listed potential problems will be absent, minimized or managed by discharge/transition of care (reference Wound, Traumatic, Nonburn (Adult) CPG).   Wound vac remains in place.

## 2018-02-24 NOTE — PROGRESS NOTES
[facility]  Colorectal Surgery  Progress Note    Patient Name: Coby Marshall  MRN: [id number]  Admission Date: 2/19/2018  Hospital Length of Stay: 5 days  Attending Physician: Suhas Manjarrez MD    Subjective:     Interval History:     Incision opened and pack at bedside yesterday. Afebrile. Leukocytosis stable at 12 . Bump in Cr back down to baseline this AM likely resolving RAYMOND. Not passing flatus. Distended this AM and uncomfortable/ emesis, UOP good. Some serous drainage from midline.     Post-Op Info:  Procedure(s) (LRB):  ileocoloc resection, lysis of adhesions (N/A)   4 Days Post-Op      Medications:  Continuous Infusions:   lactated ringers 50 mL/hr at 02/23/18 2155    TPN ADULT CENTRAL LINE CUSTOM 75 mL/hr at 02/23/18 2159     Scheduled Meds:   cyclobenzaprine  5 mg Oral TID    DULoxetine  60 mg Oral Daily    famotidine  20 mg Oral Daily    fluticasone  1 spray Each Nare Daily    heparin (porcine)  5,000 Units Subcutaneous Q8H    pantoprazole  40 mg Oral Daily    piperacillin-tazobactam (ZOSYN) IVPB  4.5 g Intravenous Q8H    sodium chloride 0.9%  3 mL Intravenous Q8H     PRN Meds:   diphenhydrAMINE    fentaNYL    ondansetron    oxyCODONE    oxyCODONE    promethazine (PHENERGAN) IVPB    sodium chloride 0.9%        Objective:     Vital Signs (Most Recent):  Temp: 96.6 °F (35.9 °C) (02/24/18 0801)  Pulse: 98 (02/24/18 0801)  Resp: 18 (02/24/18 0801)  BP: (!) 163/80 (02/24/18 0801)  SpO2: 98 % (02/24/18 0801) Vital Signs (24h Range):  Temp:  [96.6 °F (35.9 °C)-99.1 °F (37.3 °C)] 96.6 °F (35.9 °C)  Pulse:  [] 98  Resp:  [16-18] 18  SpO2:  [94 %-100 %] 98 %  BP: (124-163)/(71-91) 163/80     Intake/Output - Last 3 Shifts       02/22 0700 - 02/23 0659 02/23 0700 - 02/24 0659 02/24 0700 - 02/25 0659    P.O. 300 150     I.V. (mL/kg) 1455.4 (17.6) 1190 (14.4)     NG/GT       IV Piggyback 700 1300     TPN 1917.2 748.8     Total Intake(mL/kg) 4372.6 (53) 3388.8 (41.1)     Urine  (mL/kg/hr) 1825 (0.9) 1200 (0.6)     Emesis/NG output 0 (0)      Drains       Other 50 (0) 150 (0.1)     Stool 0 (0)      Blood 0 (0)      Total Output 1875 1350      Net +2497.6 +2038.8             Urine Occurrence 0 x 3 x     Stool Occurrence 0 x 0 x     Emesis Occurrence 0 x            Physical Exam      General: In no acute distress, well appearance.   CV: RRR, S1, S2 no murmur or gallops   Pulm: non labored breathing  Abd: soft, mildly distended, appropiately tender. Incision with glue. Wound vac in place in RLQ, midline with some serous drainage, opened and packed, distended   Ext: wwp      Significant Labs:  BMP (Last 3 Results):     Recent Labs  Lab 02/19/18  0809  02/22/18  0400 02/23/18  0342 02/24/18  0526   *  < > 150* 137* 118*     < > 137 137 137   K 3.9  < > 3.4* 3.5 3.8     < > 102 101 101   CO2 26  < > 25 26 27   BUN 26*  < > 36* 29* 22   CREATININE 1.3  < > 1.8* 1.3 0.9   CALCIUM 8.1*  < > 8.1* 9.2 9.2   MG 1.6  --  2.1  --   --    < > = values in this interval not displayed.  CBC:     Recent Labs  Lab 02/24/18  0526   WBC 12.33   RBC 3.92*   HGB 10.3*   HCT 31.2*      MCV 80*   MCH 26.3*   MCHC 33.0           Assessment/Plan:     * Carcinoid tumor of rectum    Ms. Marshall is a 63 y/o F who was found to have a rectal mass and completed chemoXRT and LAR w/ ileal resection with loop ileostomy for 2 carcinoids on 12/11/17  (neg nodes) on 1/22/18 she underwent pouchagram and then subsequent loop ileostomy takedown on 1/23/18 who presents now with ECF and pSBO now 4 Days Post-Op from Ex lap, ileocolic resection (with fistula) and GIANNI.     - Continue with NPO/IVF and NGT to LIWS, ngt removed yesterday but may need to be replaced pending KUB this AM  - Persistently tachycardic  - Patient likely malnourished, had anorexia this past week and expect prolonged ileus. Continue TPN today.   - Daily labs, including CRP as well as Prealbumin   - Surgical site infection opened at superior  and inferior midline, repacked today  - Continue with antibiotics (zosyn) Abdominal wound growing out ESBL.  - Continue Wound vac at the RLQ site today changes Monday/thursday  - Currently presenting with RAYMOND resolved w/ cr to 0.9 today. Osuna removed, FeNa 0.4% showed prerenal.  - OOB and ambulating. PT/OT and IS.                           Pavan Seth MD  Colorectal Surgery  [facility]

## 2018-02-24 NOTE — PLAN OF CARE
Problem: Patient Care Overview  Goal: Plan of Care Review  Outcome: Ongoing (interventions implemented as appropriate)  Pt AAOx4.  at the bedside. Pt NPO except sips with meds. TPN @75. Pain managed with PRN pain meds. Pt on tele, NSR, all other VSS on RA. Pt up to BSC, UOP adequate. Wound vac intact with no leakage. Call light in reach and bed in lowest position. Pt remains free of falls and injury. No acute events this shift. Will continue to monitor.

## 2018-02-24 NOTE — ASSESSMENT & PLAN NOTE
Ms. Marshall is a 61 y/o F who was found to have a rectal mass and completed chemoXRT and LAR w/ ileal resection with loop ileostomy for 2 carcinoids on 12/11/17  (neg nodes) on 1/22/18 she underwent pouchagram and then subsequent loop ileostomy takedown on 1/23/18 who presents now with ECF and pSBO now 4 Days Post-Op from Ex lap, ileocolic resection (with fistula) and GIANNI.     - Continue with NPO/IVF and NGT to LIWS, ngt removed yesterday but may need to be replaced pending KUB this AM  - Persistently tachycardic  - Patient likely malnourished, had anorexia this past week and expect prolonged ileus. Continue TPN today.   - Daily labs, including CRP as well as Prealbumin   - Surgical site infection opened at superior and inferior midline, repacked today  - Continue with antibiotics (zosyn) Abdominal wound growing out ESBL.  - Continue Wound vac at the RLQ site today changes Monday/thursday  - Currently presenting with RAYMOND resolved w/ cr to 0.9 today. Osuna removed, FeNa 0.4% showed prerenal.  - OOB and ambulating. PT/OT and IS.

## 2018-02-24 NOTE — SUBJECTIVE & OBJECTIVE
Subjective:     Interval History:     Incision opened and pack at bedside yesterday. Afebrile. Leukocytosis stable at 12 . Bump in Cr back down to baseline this AM likely resolving RAYMOND. Not passing flatus. Distended this AM and uncomfortable/ emesis, UOP good. Some serous drainage from midline.     Post-Op Info:  Procedure(s) (LRB):  ileocoloc resection, lysis of adhesions (N/A)   4 Days Post-Op      Medications:  Continuous Infusions:   lactated ringers 50 mL/hr at 02/23/18 2155    TPN ADULT CENTRAL LINE CUSTOM 75 mL/hr at 02/23/18 2159     Scheduled Meds:   cyclobenzaprine  5 mg Oral TID    DULoxetine  60 mg Oral Daily    famotidine  20 mg Oral Daily    fluticasone  1 spray Each Nare Daily    heparin (porcine)  5,000 Units Subcutaneous Q8H    pantoprazole  40 mg Oral Daily    piperacillin-tazobactam (ZOSYN) IVPB  4.5 g Intravenous Q8H    sodium chloride 0.9%  3 mL Intravenous Q8H     PRN Meds:   diphenhydrAMINE    fentaNYL    ondansetron    oxyCODONE    oxyCODONE    promethazine (PHENERGAN) IVPB    sodium chloride 0.9%        Objective:     Vital Signs (Most Recent):  Temp: 96.6 °F (35.9 °C) (02/24/18 0801)  Pulse: 98 (02/24/18 0801)  Resp: 18 (02/24/18 0801)  BP: (!) 163/80 (02/24/18 0801)  SpO2: 98 % (02/24/18 0801) Vital Signs (24h Range):  Temp:  [96.6 °F (35.9 °C)-99.1 °F (37.3 °C)] 96.6 °F (35.9 °C)  Pulse:  [] 98  Resp:  [16-18] 18  SpO2:  [94 %-100 %] 98 %  BP: (124-163)/(71-91) 163/80     Intake/Output - Last 3 Shifts       02/22 0700 - 02/23 0659 02/23 0700 - 02/24 0659 02/24 0700 - 02/25 0659    P.O. 300 150     I.V. (mL/kg) 1455.4 (17.6) 1190 (14.4)     NG/GT       IV Piggyback 700 1300     TPN 1917.2 748.8     Total Intake(mL/kg) 4372.6 (53) 3388.8 (41.1)     Urine (mL/kg/hr) 1825 (0.9) 1200 (0.6)     Emesis/NG output 0 (0)      Drains       Other 50 (0) 150 (0.1)     Stool 0 (0)      Blood 0 (0)      Total Output 1875 1350      Net +2497.6 +2038.8             Urine Occurrence  0 x 3 x     Stool Occurrence 0 x 0 x     Emesis Occurrence 0 x            Physical Exam      General: In no acute distress, well appearance.   CV: RRR, S1, S2 no murmur or gallops   Pulm: non labored breathing  Abd: soft, mildly distended, appropiately tender. Incision with glue. Wound vac in place in RLQ, midline with some serous drainage, opened and packed, distended   Ext: wwp      Significant Labs:  BMP (Last 3 Results):     Recent Labs  Lab 02/19/18  0809  02/22/18  0400 02/23/18  0342 02/24/18  0526   *  < > 150* 137* 118*     < > 137 137 137   K 3.9  < > 3.4* 3.5 3.8     < > 102 101 101   CO2 26  < > 25 26 27   BUN 26*  < > 36* 29* 22   CREATININE 1.3  < > 1.8* 1.3 0.9   CALCIUM 8.1*  < > 8.1* 9.2 9.2   MG 1.6  --  2.1  --   --    < > = values in this interval not displayed.  CBC:     Recent Labs  Lab 02/24/18  0526   WBC 12.33   RBC 3.92*   HGB 10.3*   HCT 31.2*      MCV 80*   MCH 26.3*   MCHC 33.0

## 2018-02-25 LAB
ANION GAP SERPL CALC-SCNC: 8 MMOL/L
ANISOCYTOSIS BLD QL SMEAR: SLIGHT
BASOPHILS # BLD AUTO: ABNORMAL K/UL
BASOPHILS NFR BLD: 0 %
BUN SERPL-MCNC: 17 MG/DL
CALCIUM SERPL-MCNC: 9 MG/DL
CHLORIDE SERPL-SCNC: 102 MMOL/L
CO2 SERPL-SCNC: 28 MMOL/L
CREAT SERPL-MCNC: 0.8 MG/DL
CRP SERPL-MCNC: 203.3 MG/L
DIFFERENTIAL METHOD: ABNORMAL
EOSINOPHIL # BLD AUTO: ABNORMAL K/UL
EOSINOPHIL NFR BLD: 4 %
ERYTHROCYTE [DISTWIDTH] IN BLOOD BY AUTOMATED COUNT: 16.2 %
EST. GFR  (AFRICAN AMERICAN): >60 ML/MIN/1.73 M^2
EST. GFR  (NON AFRICAN AMERICAN): >60 ML/MIN/1.73 M^2
GLUCOSE SERPL-MCNC: 135 MG/DL
HCT VFR BLD AUTO: 28.7 %
HGB BLD-MCNC: 9.3 G/DL
IMM GRANULOCYTES # BLD AUTO: ABNORMAL K/UL
IMM GRANULOCYTES NFR BLD AUTO: ABNORMAL %
LYMPHOCYTES # BLD AUTO: ABNORMAL K/UL
LYMPHOCYTES NFR BLD: 5 %
MCH RBC QN AUTO: 25.6 PG
MCHC RBC AUTO-ENTMCNC: 32.4 G/DL
MCV RBC AUTO: 79 FL
METAMYELOCYTES NFR BLD MANUAL: 1 %
MONOCYTES # BLD AUTO: ABNORMAL K/UL
MONOCYTES NFR BLD: 2 %
NEUTROPHILS NFR BLD: 88 %
NRBC BLD-RTO: 0 /100 WBC
PHOSPHATE SERPL-MCNC: 3 MG/DL
PLATELET # BLD AUTO: 247 K/UL
PLATELET BLD QL SMEAR: ABNORMAL
PMV BLD AUTO: 9.9 FL
POTASSIUM SERPL-SCNC: 3.7 MMOL/L
RBC # BLD AUTO: 3.63 M/UL
SODIUM SERPL-SCNC: 138 MMOL/L
WBC # BLD AUTO: 11.89 K/UL

## 2018-02-25 PROCEDURE — 20600001 HC STEP DOWN PRIVATE ROOM

## 2018-02-25 PROCEDURE — 63600175 PHARM REV CODE 636 W HCPCS: Performed by: STUDENT IN AN ORGANIZED HEALTH CARE EDUCATION/TRAINING PROGRAM

## 2018-02-25 PROCEDURE — 25000003 PHARM REV CODE 250: Performed by: STUDENT IN AN ORGANIZED HEALTH CARE EDUCATION/TRAINING PROGRAM

## 2018-02-25 PROCEDURE — 25000003 PHARM REV CODE 250: Performed by: NURSE PRACTITIONER

## 2018-02-25 PROCEDURE — 84100 ASSAY OF PHOSPHORUS: CPT

## 2018-02-25 PROCEDURE — 97530 THERAPEUTIC ACTIVITIES: CPT

## 2018-02-25 PROCEDURE — 80048 BASIC METABOLIC PNL TOTAL CA: CPT

## 2018-02-25 PROCEDURE — 97110 THERAPEUTIC EXERCISES: CPT

## 2018-02-25 PROCEDURE — 85007 BL SMEAR W/DIFF WBC COUNT: CPT

## 2018-02-25 PROCEDURE — A4216 STERILE WATER/SALINE, 10 ML: HCPCS | Performed by: STUDENT IN AN ORGANIZED HEALTH CARE EDUCATION/TRAINING PROGRAM

## 2018-02-25 PROCEDURE — A4217 STERILE WATER/SALINE, 500 ML: HCPCS | Performed by: STUDENT IN AN ORGANIZED HEALTH CARE EDUCATION/TRAINING PROGRAM

## 2018-02-25 PROCEDURE — 63600175 PHARM REV CODE 636 W HCPCS: Performed by: NURSE PRACTITIONER

## 2018-02-25 PROCEDURE — 85027 COMPLETE CBC AUTOMATED: CPT

## 2018-02-25 PROCEDURE — 36415 COLL VENOUS BLD VENIPUNCTURE: CPT

## 2018-02-25 PROCEDURE — 25000242 PHARM REV CODE 250 ALT 637 W/ HCPCS: Performed by: STUDENT IN AN ORGANIZED HEALTH CARE EDUCATION/TRAINING PROGRAM

## 2018-02-25 PROCEDURE — 86140 C-REACTIVE PROTEIN: CPT

## 2018-02-25 PROCEDURE — B4185 PARENTERAL SOL 10 GM LIPIDS: HCPCS | Performed by: STUDENT IN AN ORGANIZED HEALTH CARE EDUCATION/TRAINING PROGRAM

## 2018-02-25 RX ADMIN — HEPARIN SODIUM 5000 UNITS: 5000 INJECTION, SOLUTION INTRAVENOUS; SUBCUTANEOUS at 10:02

## 2018-02-25 RX ADMIN — HEPARIN SODIUM 5000 UNITS: 5000 INJECTION, SOLUTION INTRAVENOUS; SUBCUTANEOUS at 06:02

## 2018-02-25 RX ADMIN — CALCIUM GLUCONATE: 94 INJECTION, SOLUTION INTRAVENOUS at 10:02

## 2018-02-25 RX ADMIN — CYCLOBENZAPRINE HYDROCHLORIDE 5 MG: 5 TABLET, FILM COATED ORAL at 02:02

## 2018-02-25 RX ADMIN — PIPERACILLIN AND TAZOBACTAM 4.5 G: 4; .5 INJECTION, POWDER, LYOPHILIZED, FOR SOLUTION INTRAVENOUS; PARENTERAL at 12:02

## 2018-02-25 RX ADMIN — Medication 3 ML: at 02:02

## 2018-02-25 RX ADMIN — SOYBEAN OIL 250 ML: 20 INJECTION, SOLUTION INTRAVENOUS at 10:02

## 2018-02-25 RX ADMIN — SODIUM CHLORIDE, SODIUM LACTATE, POTASSIUM CHLORIDE, AND CALCIUM CHLORIDE: .6; .31; .03; .02 INJECTION, SOLUTION INTRAVENOUS at 06:02

## 2018-02-25 RX ADMIN — FAMOTIDINE 20 MG: 20 TABLET, FILM COATED ORAL at 09:02

## 2018-02-25 RX ADMIN — FLUTICASONE PROPIONATE 50 MCG: 50 SPRAY, METERED NASAL at 10:02

## 2018-02-25 RX ADMIN — Medication 3 ML: at 10:02

## 2018-02-25 RX ADMIN — CYCLOBENZAPRINE HYDROCHLORIDE 5 MG: 5 TABLET, FILM COATED ORAL at 06:02

## 2018-02-25 RX ADMIN — DULOXETINE 60 MG: 60 CAPSULE, DELAYED RELEASE ORAL at 09:02

## 2018-02-25 RX ADMIN — PIPERACILLIN AND TAZOBACTAM 4.5 G: 4; .5 INJECTION, POWDER, LYOPHILIZED, FOR SOLUTION INTRAVENOUS; PARENTERAL at 06:02

## 2018-02-25 RX ADMIN — PIPERACILLIN AND TAZOBACTAM 4.5 G: 4; .5 INJECTION, POWDER, LYOPHILIZED, FOR SOLUTION INTRAVENOUS; PARENTERAL at 02:02

## 2018-02-25 RX ADMIN — HEPARIN SODIUM 5000 UNITS: 5000 INJECTION, SOLUTION INTRAVENOUS; SUBCUTANEOUS at 02:02

## 2018-02-25 RX ADMIN — Medication 3 ML: at 06:02

## 2018-02-25 RX ADMIN — PANTOPRAZOLE SODIUM 40 MG: 40 TABLET, DELAYED RELEASE ORAL at 09:02

## 2018-02-25 RX ADMIN — CYCLOBENZAPRINE HYDROCHLORIDE 5 MG: 5 TABLET, FILM COATED ORAL at 10:02

## 2018-02-25 RX ADMIN — OXYCODONE HYDROCHLORIDE 5 MG: 5 TABLET ORAL at 12:02

## 2018-02-25 RX ADMIN — OXYCODONE HYDROCHLORIDE 5 MG: 5 TABLET ORAL at 06:02

## 2018-02-25 NOTE — PLAN OF CARE
Problem: Patient Care Overview  Goal: Plan of Care Review  Outcome: Ongoing (interventions implemented as appropriate)  POC reviewed with patient and spouse who both verbalized understanding. AAOx4. VSS. Midline abdominal incision packed with gauze and dressing CDI. RLQ wound noted with wound vac in place @ 75. Left nare NGT in place to LIWS with green output. Patient remains NPO, IVF, TPN, and lipids infusing. Incontinent at times to urine, changed throughout shift, also using bedpan. Refusing TEDS/SCDS. Telemetry being monitored running NSR. Denies any pain or nausea. Remains free from falls and injury. No acute events. No distress noted. Will continue to monitor.  at bedside.

## 2018-02-25 NOTE — PROGRESS NOTES
[facility]  Colorectal Surgery  Progress Note    Patient Name: Coby Marshall  MRN: [id number]  Admission Date: 2/19/2018  Hospital Length of Stay: 6 days  Attending Physician: Suhas Manjarrez MD    Subjective:     Interval History:     Ng tube placed yesterday 800ml out, less distended. Afebrile. Leukocytosis stable at 12. Not passing flatus.  UOP good. Some serous drainage from midline.     Post-Op Info:  Procedure(s) (LRB):  ileocoloc resection, lysis of adhesions (N/A)   5 Days Post-Op      Medications:  Continuous Infusions:   lactated ringers 50 mL/hr at 02/23/18 2155    TPN ADULT CENTRAL LINE CUSTOM 75 mL/hr at 02/24/18 2241     Scheduled Meds:   cyclobenzaprine  5 mg Oral TID    DULoxetine  60 mg Oral Daily    famotidine  20 mg Oral Daily    fat emulsion 20%  250 mL Intravenous Daily    fluticasone  1 spray Each Nare Daily    heparin (porcine)  5,000 Units Subcutaneous Q8H    pantoprazole  40 mg Oral Daily    piperacillin-tazobactam (ZOSYN) IVPB  4.5 g Intravenous Q8H    sodium chloride 0.9%  3 mL Intravenous Q8H     PRN Meds:   diphenhydrAMINE    fentaNYL    ondansetron    oxyCODONE    oxyCODONE    promethazine (PHENERGAN) IVPB    sodium chloride 0.9%        Objective:     Vital Signs (Most Recent):  Temp: 98.1 °F (36.7 °C) (02/25/18 0444)  Pulse: 101 (02/25/18 0700)  Resp: 16 (02/25/18 0444)  BP: (!) 146/79 (02/25/18 0444)  SpO2: 95 % (02/25/18 0444) Vital Signs (24h Range):  Temp:  [96.6 °F (35.9 °C)-99.4 °F (37.4 °C)] 98.1 °F (36.7 °C)  Pulse:  [] 101  Resp:  [14-16] 16  SpO2:  [95 %-100 %] 95 %  BP: (138-158)/(77-97) 146/79     Intake/Output - Last 3 Shifts       02/23 0700 - 02/24 0659 02/24 0700 - 02/25 0659 02/25 0700 - 02/26 0659    P.O. 150 125     I.V. (mL/kg) 1190 (14.4) 1160 (14.1)     IV Piggyback 1300 300     .8 1647.5     Total Intake(mL/kg) 3388.8 (41.1) 3232.5 (39.2)     Urine (mL/kg/hr) 1200 (0.6) 0 (0)     Emesis/NG output  0 (0)     Drains   800 (0.4)     Other 150 (0.1) 50 (0)     Stool  0 (0)     Blood       Total Output 1350 850      Net +2038.8 +2382.5             Urine Occurrence 3 x 8 x     Stool Occurrence 0 x 0 x     Emesis Occurrence  0 x           Physical Exam      General: In no acute distress, well appearance.   CV: RRR, S1, S2 no murmur or gallops   Pulm: non labored breathing  Abd: soft, less distended, appropiately tender. Incision with glue. Wound vac in place in RLQ, midline with some serous drainage, opened and packed, distended   Ext: wwp      Significant Labs:  BMP (Last 3 Results):     Recent Labs  Lab 02/19/18  0809  02/22/18  0400 02/23/18  0342 02/24/18  0526 02/25/18  0355   *  < > 150* 137* 118* 135*     < > 137 137 137 138   K 3.9  < > 3.4* 3.5 3.8 3.7     < > 102 101 101 102   CO2 26  < > 25 26 27 28   BUN 26*  < > 36* 29* 22 17   CREATININE 1.3  < > 1.8* 1.3 0.9 0.8   CALCIUM 8.1*  < > 8.1* 9.2 9.2 9.0   MG 1.6  --  2.1  --   --   --    < > = values in this interval not displayed.  CBC:     Recent Labs  Lab 02/25/18  0355   WBC 11.89   RBC 3.63*   HGB 9.3*   HCT 28.7*      MCV 79*   MCH 25.6*   MCHC 32.4           Assessment/Plan:     * Carcinoid tumor of rectum    Ms. Marshall is a 63 y/o F who was found to have a rectal mass and completed chemoXRT and LAR w/ ileal resection with loop ileostomy for 2 carcinoids on 12/11/17  (neg nodes) on 1/22/18 she underwent pouchagram and then subsequent loop ileostomy takedown on 1/23/18 who presents now with ECF and pSBO now 5 Days Post-Op from Ex lap, ileocolic resection (with fistula) and GIANNI.     - Continue with NPO/IVF and NGT to LIWS, ngt replaced yesterday   - Persistently tachycardic  - Patient likely malnourished, had anorexia on admission and expect prolonged ileus. Continue TPN today.   - Daily labs, including CRP as well as Prealbumin   - Surgical site infection opened at superior and inferior midline, repacked today  - Continue with antibiotics  (zosyn) Abdominal wound growing out ESBL.  - Continue Wound vac at the RLQ site today changes Monday/thursday  - Currently presenting with RAYMOND resolved w/ cr to 0.8 today. Osuna removed, FeNa 0.4% showed prerenal.  - OOB and ambulating. PT/OT and IS.                           Pavan Seth MD  Colorectal Surgery  [facility]

## 2018-02-25 NOTE — PT/OT/SLP PROGRESS
"Physical Therapy Treatment    Patient Name:  Coby Marshall   MRN:  [id number]    Recommendations:     Discharge Recommendations:  home health PT   Discharge Equipment Recommendations: bedside commode, shower chair, walker, rolling   Barriers to discharge: None    Assessment:     Coby Marshall is a 62 y.o. female admitted with a medical diagnosis of Carcinoid tumor of rectum.  She presents with the following impairments/functional limitations:  weakness, impaired endurance, impaired self care skills, impaired functional mobilty, gait instability, impaired balance, pain, edema, impaired skin . Patient showed decreased ability to ambulate due to performing cleaning activity in standing for a long period and was fatigued to perform gait training in her room. Moderate B LE swelling restricts ROM. Patient would benefit from continued P.T. To address deficits.    Rehab Prognosis:  good; patient would benefit from acute skilled PT services to address these deficits and reach maximum level of function.      Recent Surgery: Procedure(s) (LRB):  ileocoloc resection, lysis of adhesions (N/A) 5 Days Post-Op    Plan:     During this hospitalization, patient to be seen 4 x/week to address the above listed problems via gait training, therapeutic activities, therapeutic exercises, neuromuscular re-education  · Plan of Care Expires:  03/22/18   Plan of Care Reviewed with: patient, daughter, family    Subjective     Communicated with RN prior to session.  Patient found on bedside chair upon PT entry to room, agreeable to treatment.    Patient states " My  got me out of bed this morning.    Chief Complaint: fatigue  Patient comments/goals: to go home  Pain/Comfort:  · Pain Rating 1: 4/10  · Location - Side 1: Right  · Location - Orientation 1: generalized  · Location 1: abdomen  · Pain Addressed 1: Pre-medicate for activity, Reposition, Distraction  · Pain Rating Post-Intervention 1: 4/10    Patients cultural, spiritual, " Baptist conflicts given the current situation: None    Objective:     Patient found with: wound vac, NG tube, telemetry, peripheral IV     General Precautions: Standard, contact, fall   Orthopedic Precautions:N/A   Braces: N/A     Functional Mobility:  · Transfers:     · Sit to Stand:  minimum assistance with rolling walker  · Toilet Transfer: minimum assistance with  no AD  using  Stand Pivot  · Gait: 3 ft x 2 from bedisde chair<>bedside commode      AM-PAC 6 CLICK MOBILITY  Turning over in bed (including adjusting bedclothes, sheets and blankets)?: 3  Sitting down on and standing up from a chair with arms (e.g., wheelchair, bedside commode, etc.): 3  Moving from lying on back to sitting on the side of the bed?: 3  Moving to and from a bed to a chair (including a wheelchair)?: 3  Need to walk in hospital room?: 3  Climbing 3-5 steps with a railing?: 2  Total Score: 17       Therapeutic Activities and Exercises:   Assisted RN transferring patient from BS chair to bedside commode. Patient performed static standing activity  For 6 minutes and 3 minutes to allow RN to clean patient and alicea gowns after having a BM. Patient Performed there ex in sitting: LAQ, HIP FLEX AND HEEL/TOE RAISES 2X12 REPS B LE.    Patient left up in chair with all lines intact, call button in reach and daughters present..    GOALS:    Physical Therapy Goals        Problem: Physical Therapy Goal    Goal Priority Disciplines Outcome Goal Variances Interventions   Physical Therapy Goal     PT/OT, PT Ongoing (interventions implemented as appropriate)     Description:  Goals to be met by: 18, extended to 3/8/2018    Patient will increase functional independence with mobility by performin. Supine to sit with Modified [place]  2. Sit to supine with Modified [place]  3. Sit to stand transfer with Supervision  4. Bed to chair transfer with Supervision using no AD  5. Gait  x 150 feet with Stand by Assistance with/without AD  6.  Lower extremity exercise program x20 reps per handout, with independence                       Time Tracking:     PT Received On: 02/25/18  PT Start Time: 1123     PT Stop Time: 1202  PT Total Time (min): 39 min     Billable Minutes: Therapeutic Activity 29 and Therapeutic Exercise 10    Treatment Type: Treatment  PT/PTA: PTA     PTA Visit Number: 1     Azael Muhammad, PTA  02/25/2018

## 2018-02-25 NOTE — PLAN OF CARE
Problem: Physical Therapy Goal  Goal: Physical Therapy Goal  Goals to be met by: 18, extended to 3/8/2018    Patient will increase functional independence with mobility by performin. Supine to sit with Modified [place]  2. Sit to supine with Modified [place]  3. Sit to stand transfer with Supervision  4. Bed to chair transfer with Supervision using no AD  5. Gait  x 150 feet with Stand by Assistance with/without AD  6. Lower extremity exercise program x20 reps per handout, with independence      Outcome: Ongoing (interventions implemented as appropriate)  Patient's strength continues to improve, but endurance continues to be limited.

## 2018-02-25 NOTE — ASSESSMENT & PLAN NOTE
Ms. Marshall is a 61 y/o F who was found to have a rectal mass and completed chemoXRT and LAR w/ ileal resection with loop ileostomy for 2 carcinoids on 12/11/17  (neg nodes) on 1/22/18 she underwent pouchagram and then subsequent loop ileostomy takedown on 1/23/18 who presents now with ECF and pSBO now 5 Days Post-Op from Ex lap, ileocolic resection (with fistula) and GIANNI.     - Continue with NPO/IVF and NGT to LIWS, ngt replaced yesterday   - Persistently tachycardic  - Patient likely malnourished, had anorexia on admission and expect prolonged ileus. Continue TPN today.   - Daily labs, including CRP as well as Prealbumin   - Surgical site infection opened at superior and inferior midline, repacked today  - Continue with antibiotics (zosyn) Abdominal wound growing out ESBL.  - Continue Wound vac at the RLQ site today changes Monday/thursday  - Currently presenting with RAYMOND resolved w/ cr to 0.8 today. Osuna removed, FeNa 0.4% showed prerenal.  - OOB and ambulating. PT/OT and IS.

## 2018-02-26 LAB
ANION GAP SERPL CALC-SCNC: 10 MMOL/L
BASOPHILS # BLD AUTO: 0.05 K/UL
BASOPHILS NFR BLD: 0.3 %
BUN SERPL-MCNC: 14 MG/DL
CALCIUM SERPL-MCNC: 8.8 MG/DL
CHLORIDE SERPL-SCNC: 102 MMOL/L
CO2 SERPL-SCNC: 27 MMOL/L
CREAT SERPL-MCNC: 0.7 MG/DL
CRP SERPL-MCNC: 159.9 MG/L
DIFFERENTIAL METHOD: ABNORMAL
EOSINOPHIL # BLD AUTO: 0.4 K/UL
EOSINOPHIL NFR BLD: 2.4 %
ERYTHROCYTE [DISTWIDTH] IN BLOOD BY AUTOMATED COUNT: 16.2 %
EST. GFR  (AFRICAN AMERICAN): >60 ML/MIN/1.73 M^2
EST. GFR  (NON AFRICAN AMERICAN): >60 ML/MIN/1.73 M^2
GLUCOSE SERPL-MCNC: 134 MG/DL
HCT VFR BLD AUTO: 27.2 %
HGB BLD-MCNC: 8.9 G/DL
IMM GRANULOCYTES # BLD AUTO: 0.7 K/UL
IMM GRANULOCYTES NFR BLD AUTO: 4.7 %
LYMPHOCYTES # BLD AUTO: 0.8 K/UL
LYMPHOCYTES NFR BLD: 5.5 %
MAGNESIUM SERPL-MCNC: 1.4 MG/DL
MCH RBC QN AUTO: 26.3 PG
MCHC RBC AUTO-ENTMCNC: 32.7 G/DL
MCV RBC AUTO: 80 FL
MONOCYTES # BLD AUTO: 0.9 K/UL
MONOCYTES NFR BLD: 6.2 %
NEUTROPHILS # BLD AUTO: 12 K/UL
NEUTROPHILS NFR BLD: 80.9 %
NRBC BLD-RTO: 0 /100 WBC
PHOSPHATE SERPL-MCNC: 3.8 MG/DL
PLATELET # BLD AUTO: 290 K/UL
PMV BLD AUTO: 9.9 FL
POTASSIUM SERPL-SCNC: 4.1 MMOL/L
RBC # BLD AUTO: 3.39 M/UL
SODIUM SERPL-SCNC: 139 MMOL/L
WBC # BLD AUTO: 14.84 K/UL

## 2018-02-26 PROCEDURE — 20600001 HC STEP DOWN PRIVATE ROOM

## 2018-02-26 PROCEDURE — 25000242 PHARM REV CODE 250 ALT 637 W/ HCPCS: Performed by: STUDENT IN AN ORGANIZED HEALTH CARE EDUCATION/TRAINING PROGRAM

## 2018-02-26 PROCEDURE — 25000003 PHARM REV CODE 250: Performed by: NURSE PRACTITIONER

## 2018-02-26 PROCEDURE — 63600175 PHARM REV CODE 636 W HCPCS: Performed by: NURSE PRACTITIONER

## 2018-02-26 PROCEDURE — 86140 C-REACTIVE PROTEIN: CPT

## 2018-02-26 PROCEDURE — 85025 COMPLETE CBC W/AUTO DIFF WBC: CPT

## 2018-02-26 PROCEDURE — 97530 THERAPEUTIC ACTIVITIES: CPT

## 2018-02-26 PROCEDURE — A4217 STERILE WATER/SALINE, 500 ML: HCPCS | Performed by: STUDENT IN AN ORGANIZED HEALTH CARE EDUCATION/TRAINING PROGRAM

## 2018-02-26 PROCEDURE — 80048 BASIC METABOLIC PNL TOTAL CA: CPT

## 2018-02-26 PROCEDURE — A4216 STERILE WATER/SALINE, 10 ML: HCPCS | Performed by: STUDENT IN AN ORGANIZED HEALTH CARE EDUCATION/TRAINING PROGRAM

## 2018-02-26 PROCEDURE — 97116 GAIT TRAINING THERAPY: CPT

## 2018-02-26 PROCEDURE — 25000003 PHARM REV CODE 250: Performed by: STUDENT IN AN ORGANIZED HEALTH CARE EDUCATION/TRAINING PROGRAM

## 2018-02-26 PROCEDURE — 97535 SELF CARE MNGMENT TRAINING: CPT

## 2018-02-26 PROCEDURE — 63600175 PHARM REV CODE 636 W HCPCS: Performed by: STUDENT IN AN ORGANIZED HEALTH CARE EDUCATION/TRAINING PROGRAM

## 2018-02-26 PROCEDURE — 84100 ASSAY OF PHOSPHORUS: CPT

## 2018-02-26 PROCEDURE — B4185 PARENTERAL SOL 10 GM LIPIDS: HCPCS | Performed by: STUDENT IN AN ORGANIZED HEALTH CARE EDUCATION/TRAINING PROGRAM

## 2018-02-26 PROCEDURE — 97803 MED NUTRITION INDIV SUBSEQ: CPT

## 2018-02-26 PROCEDURE — 83735 ASSAY OF MAGNESIUM: CPT

## 2018-02-26 PROCEDURE — 97607 NEG PRS WND THR NDME<=50SQCM: CPT

## 2018-02-26 PROCEDURE — 97110 THERAPEUTIC EXERCISES: CPT

## 2018-02-26 RX ORDER — HYDROCHLOROTHIAZIDE 12.5 MG/1
12.5 TABLET ORAL DAILY
Status: DISCONTINUED | OUTPATIENT
Start: 2018-02-26 | End: 2018-03-08 | Stop reason: HOSPADM

## 2018-02-26 RX ORDER — MAGNESIUM SULFATE/D5W 2 G/50 ML
2 INTRAVENOUS SOLUTION, PIGGYBACK (ML) INTRAVENOUS ONCE
Status: COMPLETED | OUTPATIENT
Start: 2018-02-26 | End: 2018-02-26

## 2018-02-26 RX ADMIN — Medication 3 ML: at 02:02

## 2018-02-26 RX ADMIN — CYCLOBENZAPRINE HYDROCHLORIDE 5 MG: 5 TABLET, FILM COATED ORAL at 02:02

## 2018-02-26 RX ADMIN — SOYBEAN OIL 250 ML: 20 INJECTION, SOLUTION INTRAVENOUS at 09:02

## 2018-02-26 RX ADMIN — HEPARIN SODIUM 5000 UNITS: 5000 INJECTION, SOLUTION INTRAVENOUS; SUBCUTANEOUS at 09:02

## 2018-02-26 RX ADMIN — PIPERACILLIN AND TAZOBACTAM 4.5 G: 4; .5 INJECTION, POWDER, LYOPHILIZED, FOR SOLUTION INTRAVENOUS; PARENTERAL at 11:02

## 2018-02-26 RX ADMIN — PANTOPRAZOLE SODIUM 40 MG: 40 TABLET, DELAYED RELEASE ORAL at 09:02

## 2018-02-26 RX ADMIN — CALCIUM GLUCONATE: 94 INJECTION, SOLUTION INTRAVENOUS at 09:02

## 2018-02-26 RX ADMIN — CYCLOBENZAPRINE HYDROCHLORIDE 5 MG: 5 TABLET, FILM COATED ORAL at 06:02

## 2018-02-26 RX ADMIN — Medication 3 ML: at 06:02

## 2018-02-26 RX ADMIN — PIPERACILLIN AND TAZOBACTAM 4.5 G: 4; .5 INJECTION, POWDER, LYOPHILIZED, FOR SOLUTION INTRAVENOUS; PARENTERAL at 02:02

## 2018-02-26 RX ADMIN — HEPARIN SODIUM 5000 UNITS: 5000 INJECTION, SOLUTION INTRAVENOUS; SUBCUTANEOUS at 06:02

## 2018-02-26 RX ADMIN — HYDROCHLOROTHIAZIDE 12.5 MG: 12.5 TABLET ORAL at 02:02

## 2018-02-26 RX ADMIN — FAMOTIDINE 20 MG: 20 TABLET, FILM COATED ORAL at 09:02

## 2018-02-26 RX ADMIN — DULOXETINE 60 MG: 60 CAPSULE, DELAYED RELEASE ORAL at 09:02

## 2018-02-26 RX ADMIN — OXYCODONE HYDROCHLORIDE 10 MG: 5 TABLET ORAL at 09:02

## 2018-02-26 RX ADMIN — OXYCODONE HYDROCHLORIDE 10 MG: 5 TABLET ORAL at 02:02

## 2018-02-26 RX ADMIN — Medication 2 G: at 09:02

## 2018-02-26 RX ADMIN — PIPERACILLIN AND TAZOBACTAM 4.5 G: 4; .5 INJECTION, POWDER, LYOPHILIZED, FOR SOLUTION INTRAVENOUS; PARENTERAL at 06:02

## 2018-02-26 RX ADMIN — FLUTICASONE PROPIONATE 50 MCG: 50 SPRAY, METERED NASAL at 09:02

## 2018-02-26 RX ADMIN — CYCLOBENZAPRINE HYDROCHLORIDE 5 MG: 5 TABLET, FILM COATED ORAL at 09:02

## 2018-02-26 RX ADMIN — OXYCODONE HYDROCHLORIDE 10 MG: 5 TABLET ORAL at 06:02

## 2018-02-26 RX ADMIN — Medication 3 ML: at 10:02

## 2018-02-26 RX ADMIN — HEPARIN SODIUM 5000 UNITS: 5000 INJECTION, SOLUTION INTRAVENOUS; SUBCUTANEOUS at 02:02

## 2018-02-26 NOTE — PT/OT/SLP PROGRESS
Physical Therapy Treatment    Patient Name:  Coby Marshall   MRN:  [id number]    Recommendations:     Discharge Recommendations:    home health PT   Discharge Equipment Recommendations: bedside commode, walker, rolling, shower chair   Barriers to discharge: None    Assessment:     Coby Marshall is a 62 y.o. female admitted with a medical diagnosis of Carcinoid tumor of rectum.  She presents with the following impairments/functional limitations:  weakness, impaired endurance, impaired self care skills, gait instability, impaired functional mobilty, edema, pain, decreased lower extremity function .Pt Progressing with PT Intervention. Pt Progressing with improving gait distance. Pt would continue to benefit from skilled PT to address overall functional mobility and goals. Goals remain appropriate      Rehab Prognosis:  good; patient would benefit from acute skilled PT services to address these deficits and reach maximum level of function.      Recent Surgery: Procedure(s) (LRB):  ileocoloc resection, lysis of adhesions (N/A) 6 Days Post-Op    Plan:     During this hospitalization, patient to be seen 4 x/week to address the above listed problems via gait training, therapeutic activities, therapeutic exercises, neuromuscular re-education  · Plan of Care Expires:  03/22/18   Plan of Care Reviewed with: patient, spouse    Subjective     Communicated with supine prior to session.  Patient found RN upon PT entry to room, agreeable to treatment.      Chief Complaint: I need to use the bathroom first    Pain/Comfort:  · Pain Rating 1: 6/10  · Location - Side 1: Right  · Location - Orientation 1: generalized  · Location 1: abdomen  · Pain Addressed 1: Pre-medicate for activity, Reposition, Distraction  · Pain Rating Post-Intervention 1: 6/10    Patients cultural, spiritual, Adventist conflicts given the current situation: none noted    Objective:     Patient found with: NG tube, wound vac, telemetry, peripheral IV      General Precautions: Standard, fall, contact   Orthopedic Precautions:N/A   Braces: N/A     Functional Mobility:  · Bed Mobility:     · Rolling Right: stand by assistance  · Scooting: stand by assistance  · Supine to Sit: stand by assistance  · Transfers:     · Sit to Stand:  contact guard assistance and minimum assistance with rolling walker  · SPT bed to toilet with CGA with RW  · Gait: 80' with RW and CGA with decreased step length/eric      AM-PAC 6 CLICK MOBILITY  Turning over in bed (including adjusting bedclothes, sheets and blankets)?: 3  Sitting down on and standing up from a chair with arms (e.g., wheelchair, bedside commode, etc.): 3  Moving from lying on back to sitting on the side of the bed?: 3  Moving to and from a bed to a chair (including a wheelchair)?: 3  Need to walk in hospital room?: 3  Climbing 3-5 steps with a railing?: 2  Total Score: 17       Therapeutic Activities and Exercises:   Discussed/educated patient on progress, safety, d/c, PT POC   Patient peformed therex X 15 reps seated in bedside chair B LE AROM AP, LAQ, Hip Flexion, Hip Abd/Add   White board updated   Donned an extra gown and gripping socks   Pt encouraged to ambulate  3x/day with nursing or family assistance to improve endurance.   Pt safe to ambulate in hallway with RN or PCT assistance       Patient left up in chair with all lines intact, call button in reach, nsg notified and spouse present..    GOALS:    Physical Therapy Goals        Problem: Physical Therapy Goal    Goal Priority Disciplines Outcome Goal Variances Interventions   Physical Therapy Goal     PT/OT, PT Ongoing (interventions implemented as appropriate)     Description:  Goals to be met by: 18, extended to 3/8/2018    Patient will increase functional independence with mobility by performin. Supine to sit with Modified [place]  2. Sit to supine with Modified [place]  3. Sit to stand transfer with Supervision  4. Bed to chair  transfer with Supervision using no AD  5. Gait  x 150 feet with Stand by Assistance with/without AD  6. Lower extremity exercise program x20 reps per handout, with independence                       Time Tracking:     PT Received On: 02/26/18  PT Start Time: 1228     PT Stop Time: 1307  PT Total Time (min): 39 min     Billable Minutes: Gait Training 15, Therapeutic Activity 15 and Therapeutic Exercise 9    Treatment Type: Treatment  PT/PTA: PTA     PTA Visit Number: 2     Cachorro Pritchard, PTA  02/26/2018

## 2018-02-26 NOTE — PROGRESS NOTES
Wound vac dressing changed as directed.  Wound with increased granulation and less tunneling.  No odor. Serosanguinous drainage.  Pt tolerated procedure well. Next dressing change Thursday 3/1.       02/26/18 0950       Incision/Site 02/19/18 1143 Abdomen   Date First Assessed/Time First Assessed: 02/19/18 1143   Location: Abdomen   Incision WDL ex   Dressing Appearance Intact   Drainage Amount Small   Drainage Characteristics/Odor No odor;Serosanguineous   Appearance Slough;Pink;Red;Moist;Granulating   Periwound Area Intact   Wound Edges Open   Wound Length (cm) 1.5   Wound Width (cm) 10.5   Depth (cm) 4.5   Tunneling (depth (cm)/location) 4.8  (@9 oclock, 3.5 @12 oclock)   Care Cleansed with:;Sterile normal saline   Dressing Other (see comments)  (vac dressing)       Negative Pressure Wound Therapy    Placement Date: 02/22/18   Side: Right  Location: Abdomen   NPWT Type Vacuum Therapy   Therapy Setting NPWT Continuous therapy   Pressure Setting NPWT 75 mmHg   Sponges Inserted NPWT Black;White

## 2018-02-26 NOTE — PLAN OF CARE
Problem: Physical Therapy Goal  Goal: Physical Therapy Goal  Goals to be met by: 18, extended to 3/8/2018    Patient will increase functional independence with mobility by performin. Supine to sit with Modified [place]  2. Sit to supine with Modified [place]  3. Sit to stand transfer with Supervision  4. Bed to chair transfer with Supervision using no AD  5. Gait  x 150 feet with Stand by Assistance with/without AD  6. Lower extremity exercise program x20 reps per handout, with independence      Pt progressing towards goals. continue with PT POC.Goals remain appropriate.  Cachorro Pritchard PTA  2018

## 2018-02-26 NOTE — PT/OT/SLP PROGRESS
Occupational Therapy   Treatment    Name: Coby Marshall  MRN: [id number]  Admitting Diagnosis:  Carcinoid tumor of rectum  6 Days Post-Op    Recommendations:     Discharge Recommendations: home health OT  Discharge Equipment Recommendations:  bedside commode, shower chair, walker, rolling  Barriers to discharge:       Subjective     Communicated with: RN prior to session. Pt agreeable to participate in OT session.   Pain/Comfort:  · Pain Rating 1: 8/10  · Location - Orientation 1: generalized  · Location 1: abdomen  · Pain Addressed 1: Pre-medicate for activity, Reposition, Distraction    Patients cultural, spiritual, Christianity conflicts given the current situation: none reported     Objective:     Patient found with: wound vac, NG tube, telemetry, peripheral IV    General Precautions: Standard, fall, contact   Orthopedic Precautions:N/A   Braces: N/A     Occupational Performance:    Bed Mobility:    · Pt found sitting up in chair upon OT entry into room, having just finished PT session.     Functional Mobility/Transfers:  · Patient completed Sit <> Stand Transfer with minimum assistance  with  rolling walker   · Patient completed Toilet Transfer to [facility] with Step To Transfer technique with contact guard assistance with  no AD  · Functional Mobility: Did not complete secondary to pt increased pain/fatigue following toilet transfer     Activities of Daily Living:  · LB Dressing: maximal assistance to don socks sitting up in chair    Patient left up in chair with all lines intact and call button in reach    [facility] 6 Click:  [facility] Total Score: 13    Treatment & Education:  · Pt educated on safety awareness with functional transfers and with completion of ADLS  · Pt educated on and instructed on proper use and importance of incentive spirometer, completing 5 trials  · Pt completed ADLS and functional mobility for treatment session as noted above  · Pt educated on importance of completing OOB with nursing staff assistance  to increase pt functional activity tolerance and for the prevention of secondary complications following surgery  · Pt educated on energy conservation techniques with patient verbalizing understanding  · White board/Communication board updated     Education:    Assessment:     Coby Marshall is a 62 y.o. female with a medical diagnosis of Carcinoid tumor of rectum.  She presents with performance deficits affecting function including weakness, impaired endurance, impaired functional mobilty, impaired self care skills, impaired balance, decreased lower extremity function, pain, edemaPt pain and fatigue from prior PT session was a limiting factor for increasing pt functional independence with ADLs and mobility. Pt will continue to benefit from skilled OT services to address the above listed impairments. Recommend continued  OT services upon DC from hospital.     Rehab Prognosis:  Good; patient would benefit from acute skilled OT services to address these deficits and reach maximum level of function.       Plan:     Patient to be seen 4 x/week to address the above listed problems via therapeutic exercises, therapeutic activities, self-care/home management  · Plan of Care Expires: 03/22/18  · Plan of Care Reviewed with: patient, spouse    This Plan of care has been discussed with the patient who was involved in its development and understands and is in agreement with the identified goals and treatment plan    GOALS:    Occupational Therapy Goals        Problem: Occupational Therapy Goal    Goal Priority Disciplines Outcome Interventions   Occupational Therapy Goal     OT, PT/OT Ongoing (interventions implemented as appropriate)    Description:  Goals to be met by: 03/15/2018    Patient will increase functional independence with ADLs by performing:    Feeding with Modified [place].  UE Dressing with Supervision sitting upright EOB.  LE Dressing with Stand-by Assistance.  Grooming while standing with  Supervision.  Toileting from bedside commode with Supervision for hygiene and clothing management.   Supine to sit with Modified [place].  Toilet transfer to bedside commode with Supervision.                      Time Tracking:     OT Date of Treatment: 02/26/18  OT Start Time: 1308  OT Stop Time: 1319  OT Total Time (min): 11 min    Billable Minutes:Self Care/Home Management 11    Jason Lundberg OT  2/26/2018

## 2018-02-26 NOTE — PLAN OF CARE
Problem: Occupational Therapy Goal  Goal: Occupational Therapy Goal  Goals to be met by: 03/15/2018    Patient will increase functional independence with ADLs by performing:    Feeding with Modified [place].  UE Dressing with Supervision sitting upright EOB.  LE Dressing with Stand-by Assistance.  Grooming while standing with Supervision.  Toileting from bedside commode with Supervision for hygiene and clothing management.   Supine to sit with Modified [place].  Toilet transfer to bedside commode with Supervision.     Outcome: Ongoing (interventions implemented as appropriate)  Pt progressing towards all goals at this time. All goals remain appropriate. Revise goals as needed.    Jason Lundberg, OT  2/26/2018

## 2018-02-26 NOTE — ASSESSMENT & PLAN NOTE
Nutrition Problem:  Malnutrition    Related to (etiology):   Malabsorption following ileal resection, inadequate intake     Signs and Symptoms (as evidenced by):   11% weight loss in 1 month, delayed wound healing and  requiring TPN for nutrition needs.     Interventions/Recommendations (treatment strategy):  Please see RD recs above.    Nutrition Diagnosis Status:   Continues

## 2018-02-26 NOTE — PROGRESS NOTES
[facility]  Adult Nutrition  Progress Note    SUMMARY     Recommendations    Recommendation/Intervention:   Continue current TPN 5/15 @ 75mL/hr with 20% lipids in 250mL. If able to advance diet, recommend Regular.     RD to monitor.    Goals: Pt to continue receiving >85% EEN and EPN  Nutrition Goal Status: goal met  Communication of RD Recs: reviewed with RN    Reason for Assessment    Reason for Assessment: RD follow-up  Diagnosis: gastrointestinal disease  Relevent Medical History: rectal mass, LAR with ileal resection         General Information Comments: Pt remains NPO. NG tube placed, 200mL output overnight. TPN continues via PICC line.    Nutrition Discharge Planning: unable to determine at this time    Nutrition Prescription Ordered    Current Diet Order: NPO     Current Nutrition Support Formula Ordered: Other (Comment) (Custom TPN 5/15)  Current Nutrition Support Rate Ordered: 75 (ml)  Current Nutrition Support Frequency Ordered: mL/hr  Oral Nutrition Supplement: with lipids     Evaluation of Received Nutrients/Fluid Intake   Parenteral Calories (kcal): 1278  Parenteral Protein (gm): 90  Parenteral Fluid (mL): 1800     Total Calories (kcal): 1778     Energy Calories Required: meeting needs  % Kcal Needs: 100     Protein Required: meeting needs  % Protein Needs: 90     IV Fluid (mL): 1800     GIR (Glucose Infusion Rate) (mg/kg/min): 2.27 mg/kg/min  Lipid Calories (kcals): 500 kcals  I/O: +20L, + UOP, small BM 2/25         Fluid Required: exceed needs     Tolerance: tolerating  % Intake of Estimated Energy Needs: 75 - 100 %  % Meal Intake: NPO     Nutrition Risk Screen     Nutrition Risk Screen: no indicators present    Nutrition/Diet History       Typical Food/Fluid Intake: Pt remains NPO on TPN. Weight loss since previous admission.  Food Preferences: No Orthodox/cultural food preferences at this time        Factors Affecting Nutritional Intake: NPO, altered gastrointestinal function    "             Labs/Tests/Procedures/Meds       Pertinent Labs Reviewed: reviewed  Pertinent Labs Comments: noted  Pertinent Medications Reviewed: reviewed  Pertinent Medications Comments: TPN, lipids, IVF    Physical Findings    Overall Physical Appearance: loss of muscle mass, obese  Tubes: nasogastric tube     Skin: incision    Anthropometrics    Temp: 98.9 °F (37.2 °C)     Height: 5' 6" (167.6 cm)  Weight Method: Bed Scale  Weight: 82.5 kg (181 lb 14.1 oz)     Ideal Body Weight (IBW), Female: 130 lb     % Ideal Body Weight, Female (lb): 139.91 lb  BMI (Calculated): 29.4  BMI Grade: 25 - 29.9 - overweight  Weight Loss: unintentional  Usual Body Weight (UBW), k.1 kg     % Usual Body Weight: 88.8  % Weight Change From Usual Weight: -11.39 %             Estimated/Assessed Needs    Weight Used For Calorie Calculations: 82.6 kg (182 lb 1.6 oz)      Energy Calorie Requirements (kcal): 1670- 1948  Energy Need Method: Mora-St Jeor (PAL 1.2-1.4)        RMR (Mora-St. Jeor Equation): 1392.75        Weight Used For Protein Calculations: 82.5 kg (181 lb 14.1 oz)  Protein Requirements: 99-116g (1.2-1.4g/kg)    Fluid Requirements (mL): 1mL/kcal or per MD  RDA Method (mL): 1670               Assessment and Plan    Severe malnutrition    Nutrition Problem:  Malnutrition    Related to (etiology):   Malabsorption following ileal resection, inadequate intake     Signs and Symptoms (as evidenced by):   11% weight loss in 1 month, delayed wound healing and  requiring TPN for nutrition needs.     Interventions/Recommendations (treatment strategy):  Please see RD recs above.    Nutrition Diagnosis Status:   Continues              Monitor and Evaluation    Food and Nutrient Intake: energy intake, food and beverage intake, parenteral nutrition intake  Food and Nutrient Adminstration: diet order, enteral and parenteral nutrition administration        Anthropometric Measurements: weight, weight change, body mass index  Biochemical " Data, Medical Tests and Procedures: electrolyte and renal panel, gastrointestinal profile, glucose/endocrine profile, inflammatory profile, lipid profile  Nutrition-Focused Physical Findings: overall appearance    Nutrition Risk    Level of Risk: other (see comments) (f/u 1x/week)    Nutrition Follow-Up    RD Follow-up?: Yes

## 2018-02-26 NOTE — PLAN OF CARE
Problem: Patient Care Overview  Goal: Plan of Care Review  Outcome: Ongoing (interventions implemented as appropriate)  Pt A & O x 4, adequate urine output with one Bm, pain controlled with prn pain meds.  Pt up in chair most of day.  Very little output from wound vac, and only 100 mL from N/G tube.

## 2018-02-26 NOTE — PROGRESS NOTES
[facility]  Colorectal Surgery  Progress Note    Patient Name: Coby Marshall  MRN: [id number]  Admission Date: 2/19/2018  Hospital Length of Stay: 7 days  Attending Physician: Suhas Manjarrez MD    Subjective:     Interval History:     Ng tube with 200ml out, less distended. Afebrile. Leukocytosis up to 14.8. Small BM and flatus this AM.  UOP good. Some serous drainage from midline repacked on rounds    Post-Op Info:  Procedure(s) (LRB):  ileocoloc resection, lysis of adhesions (N/A)   6 Days Post-Op      Medications:  Continuous Infusions:   lactated ringers 50 mL/hr at 02/25/18 1803    TPN ADULT CENTRAL LINE CUSTOM 75 mL/hr at 02/25/18 2212     Scheduled Meds:   cyclobenzaprine  5 mg Oral TID    DULoxetine  60 mg Oral Daily    famotidine  20 mg Oral Daily    fat emulsion 20%  250 mL Intravenous Daily    fluticasone  1 spray Each Nare Daily    heparin (porcine)  5,000 Units Subcutaneous Q8H    pantoprazole  40 mg Oral Daily    piperacillin-tazobactam (ZOSYN) IVPB  4.5 g Intravenous Q8H    sodium chloride 0.9%  3 mL Intravenous Q8H     PRN Meds:   diphenhydrAMINE    fentaNYL    ondansetron    oxyCODONE    oxyCODONE    promethazine (PHENERGAN) IVPB    sodium chloride 0.9%        Objective:     Vital Signs (Most Recent):  Temp: 98.7 °F (37.1 °C) (02/26/18 0516)  Pulse: 93 (02/26/18 0640)  Resp: 18 (02/26/18 0516)  BP: (!) 160/81 (02/26/18 0516)  SpO2: (!) 94 % (02/26/18 0516) Vital Signs (24h Range):  Temp:  [96.9 °F (36.1 °C)-99.2 °F (37.3 °C)] 98.7 °F (37.1 °C)  Pulse:  [] 93  Resp:  [17-20] 18  SpO2:  [94 %-98 %] 94 %  BP: (134-169)/(79-99) 160/81     Intake/Output - Last 3 Shifts       02/24 0700 - 02/25 0659 02/25 0700 - 02/26 0659 02/26 0700 - 02/27 0659    P.O. 125 0     I.V. (mL/kg) 1160 (14.1) 1200 (14.5)     IV Piggyback 300 300     TPN 1647.5 1176.4     Total Intake(mL/kg) 3232.5 (39.2) 2676.4 (32.4)     Urine (mL/kg/hr) 0 (0) 1350 (0.7)     Emesis/NG output 0 (0)       Drains 800 (0.4) 200 (0.1)     Other 50 (0) 75 (0)     Stool 0 (0) 0 (0)     Total Output 850 1625      Net +2382.5 +1051.4             Urine Occurrence 8 x      Stool Occurrence 0 x 1 x     Emesis Occurrence 0 x            Physical Exam      General: In no acute distress, well appearance.   CV: RRR, S1, S2 no murmur or gallops   Pulm: non labored breathing  Abd: soft, less distended, appropiately tender. Incision with glue. Wound vac in place in RLQ, midline with some serous drainage, opened and packed, less distended    Ext: wwp      Significant Labs:  BMP (Last 3 Results):     Recent Labs  Lab 02/19/18  0809  02/22/18  0400  02/24/18  0526 02/25/18  0355 02/26/18  0539   *  < > 150*  < > 118* 135* 134*     < > 137  < > 137 138 139   K 3.9  < > 3.4*  < > 3.8 3.7 4.1     < > 102  < > 101 102 102   CO2 26  < > 25  < > 27 28 27   BUN 26*  < > 36*  < > 22 17 14   CREATININE 1.3  < > 1.8*  < > 0.9 0.8 0.7   CALCIUM 8.1*  < > 8.1*  < > 9.2 9.0 8.8   MG 1.6  --  2.1  --   --   --  1.4*   < > = values in this interval not displayed.  CBC:     Recent Labs  Lab 02/26/18  0539   WBC 14.84*   RBC 3.39*   HGB 8.9*   HCT 27.2*      MCV 80*   MCH 26.3*   MCHC 32.7           Assessment/Plan:     * Carcinoid tumor of rectum    Ms. Marshall is a 61 y/o F who was found to have a rectal mass and completed chemoXRT and LAR w/ ileal resection with loop ileostomy for 2 carcinoids on 12/11/17  (neg nodes) on 1/22/18 she underwent pouchagram and then subsequent loop ileostomy takedown on 1/23/18 who presents now with ECF and pSBO now 6 Days Post-Op from Ex lap, ileocolic resection (with fistula) and GIANNI.     - Continue with NPO/IVF and NGT to LIWS, ngt w/ bilious output  - Persistently tachycardic  - Patient likely malnourished, had anorexia on admission and expect prolonged ileus. Continue TPN today.   - Daily labs, including CRP as well as Prealbumin   - Surgical site infection opened at superior and inferior  midline, repacked today  - Continue with antibiotics (zosyn) Abdominal wound growing out ESBL. Estimated end date 3/3/18 per ID  - Continue Wound vac at the RLQ site today changes Monday/thursday (change today)  - Currently presenting with RAYMOND resolved w/ cr normalized to baseline. FeNa 0.4% showed prerenal.  - OOB and ambulating. PT/OT and IS.                   Pavan Seth MD  Colorectal Surgery  [facility]

## 2018-02-26 NOTE — ASSESSMENT & PLAN NOTE
Ms. Marshall is a 63 y/o F who was found to have a rectal mass and completed chemoXRT and LAR w/ ileal resection with loop ileostomy for 2 carcinoids on 12/11/17  (neg nodes) on 1/22/18 she underwent pouchagram and then subsequent loop ileostomy takedown on 1/23/18 who presents now with ECF and pSBO now 6 Days Post-Op from Ex lap, ileocolic resection (with fistula) and GIANNI.     - Continue with NPO/IVF and NGT to LIWS, ngt w/ bilious output  - Persistently tachycardic  - Patient likely malnourished, had anorexia on admission and expect prolonged ileus. Continue TPN today.   - Daily labs, including CRP as well as Prealbumin   - Surgical site infection opened at superior and inferior midline, repacked today  - Continue with antibiotics (zosyn) Abdominal wound growing out ESBL. Estimated end date 3/3/18 per ID  - Continue Wound vac at the RLQ site today changes Monday/thursday (change today)  - Currently presenting with RAYMOND resolved w/ cr normalized to baseline. FeNa 0.4% showed prerenal.  - OOB and ambulating. PT/OT and IS.

## 2018-02-26 NOTE — PLAN OF CARE
Problem: Patient Care Overview  Goal: Plan of Care Review  Outcome: Ongoing (interventions implemented as appropriate)  POC reviewed with patient and spouse who both verbalized understanding. AAOx4. VSS. Midline abdominal incision packed with gauze and dressing CDI. RLQ wound noted with wound vac in place @ 75, no output noted. Left nare NGT in place to LIWS with scant output noted to tubing only. Pain controlled with PRN medications per MAR, denies any nausea. Patient remains NPO, IVF, TPN, and lipids infusing. Patient up to the bedside commode with standby assist, voiding without any difficulties. Refusing TEDS/SCDS. Telemetry being monitored running NSR. Denies any pain or nausea. Remains free from falls and injury. No acute events. No distress noted. Will continue to monitor.  at bedside.

## 2018-02-26 NOTE — SUBJECTIVE & OBJECTIVE
Subjective:     Interval History:     Ng tube with 200ml out, less distended. Afebrile. Leukocytosis up to 14.8. Small BM and flatus this AM.  UOP good. Some serous drainage from midline repacked on rounds    Post-Op Info:  Procedure(s) (LRB):  ileocoloc resection, lysis of adhesions (N/A)   6 Days Post-Op      Medications:  Continuous Infusions:   lactated ringers 50 mL/hr at 02/25/18 1803    TPN ADULT CENTRAL LINE CUSTOM 75 mL/hr at 02/25/18 2212     Scheduled Meds:   cyclobenzaprine  5 mg Oral TID    DULoxetine  60 mg Oral Daily    famotidine  20 mg Oral Daily    fat emulsion 20%  250 mL Intravenous Daily    fluticasone  1 spray Each Nare Daily    heparin (porcine)  5,000 Units Subcutaneous Q8H    pantoprazole  40 mg Oral Daily    piperacillin-tazobactam (ZOSYN) IVPB  4.5 g Intravenous Q8H    sodium chloride 0.9%  3 mL Intravenous Q8H     PRN Meds:   diphenhydrAMINE    fentaNYL    ondansetron    oxyCODONE    oxyCODONE    promethazine (PHENERGAN) IVPB    sodium chloride 0.9%        Objective:     Vital Signs (Most Recent):  Temp: 98.7 °F (37.1 °C) (02/26/18 0516)  Pulse: 93 (02/26/18 0640)  Resp: 18 (02/26/18 0516)  BP: (!) 160/81 (02/26/18 0516)  SpO2: (!) 94 % (02/26/18 0516) Vital Signs (24h Range):  Temp:  [96.9 °F (36.1 °C)-99.2 °F (37.3 °C)] 98.7 °F (37.1 °C)  Pulse:  [] 93  Resp:  [17-20] 18  SpO2:  [94 %-98 %] 94 %  BP: (134-169)/(79-99) 160/81     Intake/Output - Last 3 Shifts       02/24 0700 - 02/25 0659 02/25 0700 - 02/26 0659 02/26 0700 - 02/27 0659    P.O. 125 0     I.V. (mL/kg) 1160 (14.1) 1200 (14.5)     IV Piggyback 300 300     TPN 1647.5 1176.4     Total Intake(mL/kg) 3232.5 (39.2) 2676.4 (32.4)     Urine (mL/kg/hr) 0 (0) 1350 (0.7)     Emesis/NG output 0 (0)      Drains 800 (0.4) 200 (0.1)     Other 50 (0) 75 (0)     Stool 0 (0) 0 (0)     Total Output 850 1625      Net +2382.5 +1051.4             Urine Occurrence 8 x      Stool Occurrence 0 x 1 x     Emesis Occurrence 0  x            Physical Exam      General: In no acute distress, well appearance.   CV: RRR, S1, S2 no murmur or gallops   Pulm: non labored breathing  Abd: soft, less distended, appropiately tender. Incision with glue. Wound vac in place in RLQ, midline with some serous drainage, opened and packed, less distended    Ext: wwp      Significant Labs:  BMP (Last 3 Results):     Recent Labs  Lab 02/19/18  0809  02/22/18  0400  02/24/18  0526 02/25/18  0355 02/26/18  0539   *  < > 150*  < > 118* 135* 134*     < > 137  < > 137 138 139   K 3.9  < > 3.4*  < > 3.8 3.7 4.1     < > 102  < > 101 102 102   CO2 26  < > 25  < > 27 28 27   BUN 26*  < > 36*  < > 22 17 14   CREATININE 1.3  < > 1.8*  < > 0.9 0.8 0.7   CALCIUM 8.1*  < > 8.1*  < > 9.2 9.0 8.8   MG 1.6  --  2.1  --   --   --  1.4*   < > = values in this interval not displayed.  CBC:     Recent Labs  Lab 02/26/18  0539   WBC 14.84*   RBC 3.39*   HGB 8.9*   HCT 27.2*      MCV 80*   MCH 26.3*   MCHC 32.7

## 2018-02-26 NOTE — PLAN OF CARE
Problem: Patient Care Overview  Goal: Plan of Care Review  Outcome: Ongoing (interventions implemented as appropriate)  POC reviewed with pt and pt's spouse, both verbalized understanding. AAOx4. VSS on room air. Pt restarted on home BP med this afternoon. ML with gauze packing, dry/intact/clean. Wound vac at 75 mmHg to R lower abdomen draining serous drainage; changed by NEFTALY RN this shift. Up in chair most of shift, walked by PT in gunter. NGT to LIWS. No emesis, denies nausea. x2 bowel movements this shift. Adequate UOP. Tolerating po meds with 30 min clamping of NG. Remains NPO. Port infusing TPN. Pain controlled with prn pain meds. Call bell within reach, bed low. Spouse at bedside. Remains on contact precautions. LALO.

## 2018-02-26 NOTE — PLAN OF CARE
02/26/18 1310   Discharge Reassessment   Assessment Type Discharge Planning Reassessment   Provided patient/caregiver education on the expected discharge date and the discharge plan Yes   Do you have any problems affording any of your prescribed medications? No   Discharge Plan A Home Health;Home with family   Discharge Plan B Home Health;Home with family

## 2018-02-27 LAB
ANION GAP SERPL CALC-SCNC: 7 MMOL/L
BASOPHILS # BLD AUTO: 0.04 K/UL
BASOPHILS NFR BLD: 0.2 %
BUN SERPL-MCNC: 13 MG/DL
CALCIUM SERPL-MCNC: 8.7 MG/DL
CHLORIDE SERPL-SCNC: 100 MMOL/L
CO2 SERPL-SCNC: 27 MMOL/L
CREAT SERPL-MCNC: 0.7 MG/DL
CRP SERPL-MCNC: 156.1 MG/L
DIFFERENTIAL METHOD: ABNORMAL
EOSINOPHIL # BLD AUTO: 0.4 K/UL
EOSINOPHIL NFR BLD: 2.3 %
ERYTHROCYTE [DISTWIDTH] IN BLOOD BY AUTOMATED COUNT: 16.3 %
EST. GFR  (AFRICAN AMERICAN): >60 ML/MIN/1.73 M^2
EST. GFR  (NON AFRICAN AMERICAN): >60 ML/MIN/1.73 M^2
GLUCOSE SERPL-MCNC: 129 MG/DL
HCT VFR BLD AUTO: 26.7 %
HGB BLD-MCNC: 8.7 G/DL
IMM GRANULOCYTES # BLD AUTO: 0.72 K/UL
IMM GRANULOCYTES NFR BLD AUTO: 4.3 %
LYMPHOCYTES # BLD AUTO: 0.8 K/UL
LYMPHOCYTES NFR BLD: 4.5 %
MAGNESIUM SERPL-MCNC: 1.7 MG/DL
MCH RBC QN AUTO: 25.7 PG
MCHC RBC AUTO-ENTMCNC: 32.6 G/DL
MCV RBC AUTO: 79 FL
MONOCYTES # BLD AUTO: 0.9 K/UL
MONOCYTES NFR BLD: 5.5 %
NEUTROPHILS # BLD AUTO: 13.9 K/UL
NEUTROPHILS NFR BLD: 83.2 %
NRBC BLD-RTO: 0 /100 WBC
PHOSPHATE SERPL-MCNC: 4 MG/DL
PLATELET # BLD AUTO: 367 K/UL
PMV BLD AUTO: 10.1 FL
POTASSIUM SERPL-SCNC: 4.3 MMOL/L
PREALB SERPL-MCNC: 12 MG/DL
RBC # BLD AUTO: 3.38 M/UL
SODIUM SERPL-SCNC: 134 MMOL/L
WBC # BLD AUTO: 16.7 K/UL

## 2018-02-27 PROCEDURE — 20600001 HC STEP DOWN PRIVATE ROOM

## 2018-02-27 PROCEDURE — 84100 ASSAY OF PHOSPHORUS: CPT

## 2018-02-27 PROCEDURE — 63600175 PHARM REV CODE 636 W HCPCS: Performed by: STUDENT IN AN ORGANIZED HEALTH CARE EDUCATION/TRAINING PROGRAM

## 2018-02-27 PROCEDURE — 86140 C-REACTIVE PROTEIN: CPT

## 2018-02-27 PROCEDURE — B4185 PARENTERAL SOL 10 GM LIPIDS: HCPCS | Performed by: STUDENT IN AN ORGANIZED HEALTH CARE EDUCATION/TRAINING PROGRAM

## 2018-02-27 PROCEDURE — 25000003 PHARM REV CODE 250: Performed by: NURSE PRACTITIONER

## 2018-02-27 PROCEDURE — A4217 STERILE WATER/SALINE, 500 ML: HCPCS | Performed by: STUDENT IN AN ORGANIZED HEALTH CARE EDUCATION/TRAINING PROGRAM

## 2018-02-27 PROCEDURE — 63600175 PHARM REV CODE 636 W HCPCS: Performed by: NURSE PRACTITIONER

## 2018-02-27 PROCEDURE — 36415 COLL VENOUS BLD VENIPUNCTURE: CPT

## 2018-02-27 PROCEDURE — 80048 BASIC METABOLIC PNL TOTAL CA: CPT

## 2018-02-27 PROCEDURE — 97535 SELF CARE MNGMENT TRAINING: CPT

## 2018-02-27 PROCEDURE — 84134 ASSAY OF PREALBUMIN: CPT

## 2018-02-27 PROCEDURE — 83735 ASSAY OF MAGNESIUM: CPT

## 2018-02-27 PROCEDURE — A4216 STERILE WATER/SALINE, 10 ML: HCPCS | Performed by: STUDENT IN AN ORGANIZED HEALTH CARE EDUCATION/TRAINING PROGRAM

## 2018-02-27 PROCEDURE — 97530 THERAPEUTIC ACTIVITIES: CPT

## 2018-02-27 PROCEDURE — 25000003 PHARM REV CODE 250: Performed by: STUDENT IN AN ORGANIZED HEALTH CARE EDUCATION/TRAINING PROGRAM

## 2018-02-27 PROCEDURE — 85025 COMPLETE CBC W/AUTO DIFF WBC: CPT

## 2018-02-27 RX ORDER — SPIRONOLACTONE 25 MG/1
25 TABLET ORAL DAILY
Status: DISCONTINUED | OUTPATIENT
Start: 2018-02-27 | End: 2018-03-08 | Stop reason: HOSPADM

## 2018-02-27 RX ORDER — CLONIDINE HYDROCHLORIDE 0.1 MG/1
0.1 TABLET ORAL 2 TIMES DAILY
Status: DISCONTINUED | OUTPATIENT
Start: 2018-02-27 | End: 2018-03-08 | Stop reason: HOSPADM

## 2018-02-27 RX ADMIN — OXYCODONE HYDROCHLORIDE 10 MG: 5 TABLET ORAL at 06:02

## 2018-02-27 RX ADMIN — FLUTICASONE PROPIONATE 50 MCG: 50 SPRAY, METERED NASAL at 09:02

## 2018-02-27 RX ADMIN — HYDROCHLOROTHIAZIDE 12.5 MG: 12.5 TABLET ORAL at 09:02

## 2018-02-27 RX ADMIN — CLONIDINE HYDROCHLORIDE 0.1 MG: 0.1 TABLET ORAL at 11:02

## 2018-02-27 RX ADMIN — PANTOPRAZOLE SODIUM 40 MG: 40 TABLET, DELAYED RELEASE ORAL at 09:02

## 2018-02-27 RX ADMIN — HEPARIN SODIUM 5000 UNITS: 5000 INJECTION, SOLUTION INTRAVENOUS; SUBCUTANEOUS at 02:02

## 2018-02-27 RX ADMIN — FAMOTIDINE 20 MG: 20 TABLET, FILM COATED ORAL at 09:02

## 2018-02-27 RX ADMIN — SOYBEAN OIL 250 ML: 20 INJECTION, SOLUTION INTRAVENOUS at 10:02

## 2018-02-27 RX ADMIN — CALCIUM GLUCONATE: 94 INJECTION, SOLUTION INTRAVENOUS at 10:02

## 2018-02-27 RX ADMIN — CLONIDINE HYDROCHLORIDE 0.1 MG: 0.1 TABLET ORAL at 09:02

## 2018-02-27 RX ADMIN — HEPARIN SODIUM 5000 UNITS: 5000 INJECTION, SOLUTION INTRAVENOUS; SUBCUTANEOUS at 06:02

## 2018-02-27 RX ADMIN — SPIRONOLACTONE 25 MG: 25 TABLET, FILM COATED ORAL at 11:02

## 2018-02-27 RX ADMIN — PIPERACILLIN AND TAZOBACTAM 4.5 G: 4; .5 INJECTION, POWDER, LYOPHILIZED, FOR SOLUTION INTRAVENOUS; PARENTERAL at 06:02

## 2018-02-27 RX ADMIN — Medication 3 ML: at 06:02

## 2018-02-27 RX ADMIN — Medication 3 ML: at 09:02

## 2018-02-27 RX ADMIN — CYCLOBENZAPRINE HYDROCHLORIDE 5 MG: 5 TABLET, FILM COATED ORAL at 09:02

## 2018-02-27 RX ADMIN — HEPARIN SODIUM 5000 UNITS: 5000 INJECTION, SOLUTION INTRAVENOUS; SUBCUTANEOUS at 09:02

## 2018-02-27 RX ADMIN — CYCLOBENZAPRINE HYDROCHLORIDE 5 MG: 5 TABLET, FILM COATED ORAL at 02:02

## 2018-02-27 RX ADMIN — PIPERACILLIN AND TAZOBACTAM 4.5 G: 4; .5 INJECTION, POWDER, LYOPHILIZED, FOR SOLUTION INTRAVENOUS; PARENTERAL at 11:02

## 2018-02-27 RX ADMIN — Medication 3 ML: at 02:02

## 2018-02-27 RX ADMIN — DULOXETINE 60 MG: 60 CAPSULE, DELAYED RELEASE ORAL at 09:02

## 2018-02-27 RX ADMIN — CYCLOBENZAPRINE HYDROCHLORIDE 5 MG: 5 TABLET, FILM COATED ORAL at 06:02

## 2018-02-27 RX ADMIN — PIPERACILLIN AND TAZOBACTAM 4.5 G: 4; .5 INJECTION, POWDER, LYOPHILIZED, FOR SOLUTION INTRAVENOUS; PARENTERAL at 03:02

## 2018-02-27 NOTE — SUBJECTIVE & OBJECTIVE
Subjective:     Interval History:     No acute events overnight, patient having BM. NGT removed yesterday and has no nausea or emesis. Abdominal pain improved. Wound vac in place.     Post-Op Info:  Procedure(s) (LRB):  ileocoloc resection, lysis of adhesions (N/A)   7 Days Post-Op      Medications:  Continuous Infusions:   TPN ADULT CENTRAL LINE CUSTOM 75 mL/hr at 02/26/18 2156    TPN ADULT CENTRAL LINE CUSTOM       Scheduled Meds:   cyclobenzaprine  5 mg Oral TID    DULoxetine  60 mg Oral Daily    famotidine  20 mg Oral Daily    fat emulsion 20%  250 mL Intravenous Daily    fluticasone  1 spray Each Nare Daily    heparin (porcine)  5,000 Units Subcutaneous Q8H    hydroCHLOROthiazide  12.5 mg Oral Daily    pantoprazole  40 mg Oral Daily    piperacillin-tazobactam (ZOSYN) IVPB  4.5 g Intravenous Q8H    sodium chloride 0.9%  3 mL Intravenous Q8H     PRN Meds:   diphenhydrAMINE    fentaNYL    ondansetron    oxyCODONE    oxyCODONE    promethazine (PHENERGAN) IVPB    sodium chloride 0.9%        Objective:     Vital Signs (Most Recent):  Temp: 99.3 °F (37.4 °C) (02/27/18 0745)  Pulse: 103 (02/27/18 0745)  Resp: 18 (02/27/18 0745)  BP: (!) 169/88 (02/27/18 0745)  SpO2: 95 % (02/27/18 0745) Vital Signs (24h Range):  Temp:  [98 °F (36.7 °C)-99.3 °F (37.4 °C)] 99.3 °F (37.4 °C)  Pulse:  [] 103  Resp:  [18] 18  SpO2:  [95 %-98 %] 95 %  BP: (142-173)/(77-88) 169/88     Intake/Output - Last 3 Shifts       02/25 0700 - 02/26 0659 02/26 0700 - 02/27 0659 02/27 0700 - 02/28 0659    P.O. 0 240     I.V. (mL/kg) 1200 (14.5) 765.8 (9.3)     IV Piggyback 300 300     TPN 1176.4 2014.2     Total Intake(mL/kg) 2676.4 (32.4) 3320 (40.2)     Urine (mL/kg/hr) 1350 (0.7) 1150 (0.6)     Emesis/NG output  0 (0)     Drains 200 (0.1) 375 (0.2)     Other 75 (0) 115 (0.1)     Stool 0 (0) 0 (0)     Total Output 1625 1640      Net +1051.4 +1680             Urine Occurrence  1 x     Stool Occurrence 1 x 1 x 1 x    Emesis  Occurrence  0 x           Physical Exam    General: In no acute distress, well appearance.   CV: RRR, S1, S2 no murmur or gallops   Pulm: non labored breathing  Abd: soft, less distended, appropiately tender. Incision with glue. Wound vac in place in RLQ, midline with some serous drainage, opened and packed, less distended    Ext: wwp       Significant Labs:  BMP (Last 3 Results):   Recent Labs  Lab 02/22/18  0400  02/25/18  0355 02/26/18  0539 02/27/18  0516   *  < > 135* 134* 129*     < > 138 139 134*   K 3.4*  < > 3.7 4.1 4.3     < > 102 102 100   CO2 25  < > 28 27 27   BUN 36*  < > 17 14 13   CREATININE 1.8*  < > 0.8 0.7 0.7   CALCIUM 8.1*  < > 9.0 8.8 8.7   MG 2.1  --   --  1.4* 1.7   < > = values in this interval not displayed.  CBC (Last 3 Results):   Recent Labs  Lab 02/25/18  0355 02/26/18  0539 02/27/18  0516   WBC 11.89 14.84* 16.70*   RBC 3.63* 3.39* 3.38*   HGB 9.3* 8.9* 8.7*   HCT 28.7* 27.2* 26.7*    290 367*   MCV 79* 80* 79*   MCH 25.6* 26.3* 25.7*   MCHC 32.4 32.7 32.6

## 2018-02-27 NOTE — PROGRESS NOTES
[facility]  Colorectal Surgery  Progress Note    Patient Name: Coby Marshall  MRN: [id number]  Admission Date: 2/19/2018  Hospital Length of Stay: 8 days  Attending Physician: Suhas Manjarrez MD    Subjective:     Interval History:     No acute events overnight, patient having BM. NGT removed yesterday and has no nausea or emesis. Abdominal pain improved. Wound vac in place.     Post-Op Info:  Procedure(s) (LRB):  ileocoloc resection, lysis of adhesions (N/A)   7 Days Post-Op      Medications:  Continuous Infusions:   TPN ADULT CENTRAL LINE CUSTOM 75 mL/hr at 02/26/18 2156    TPN ADULT CENTRAL LINE CUSTOM       Scheduled Meds:   cyclobenzaprine  5 mg Oral TID    DULoxetine  60 mg Oral Daily    famotidine  20 mg Oral Daily    fat emulsion 20%  250 mL Intravenous Daily    fluticasone  1 spray Each Nare Daily    heparin (porcine)  5,000 Units Subcutaneous Q8H    hydroCHLOROthiazide  12.5 mg Oral Daily    pantoprazole  40 mg Oral Daily    piperacillin-tazobactam (ZOSYN) IVPB  4.5 g Intravenous Q8H    sodium chloride 0.9%  3 mL Intravenous Q8H     PRN Meds:   diphenhydrAMINE    fentaNYL    ondansetron    oxyCODONE    oxyCODONE    promethazine (PHENERGAN) IVPB    sodium chloride 0.9%        Objective:     Vital Signs (Most Recent):  Temp: 99.3 °F (37.4 °C) (02/27/18 0745)  Pulse: 103 (02/27/18 0745)  Resp: 18 (02/27/18 0745)  BP: (!) 169/88 (02/27/18 0745)  SpO2: 95 % (02/27/18 0745) Vital Signs (24h Range):  Temp:  [98 °F (36.7 °C)-99.3 °F (37.4 °C)] 99.3 °F (37.4 °C)  Pulse:  [] 103  Resp:  [18] 18  SpO2:  [95 %-98 %] 95 %  BP: (142-173)/(77-88) 169/88     Intake/Output - Last 3 Shifts       02/25 0700 - 02/26 0659 02/26 0700 - 02/27 0659 02/27 0700 - 02/28 0659    P.O. 0 240     I.V. (mL/kg) 1200 (14.5) 765.8 (9.3)     IV Piggyback 300 300     TPN 1176.4 2014.2     Total Intake(mL/kg) 2676.4 (32.4) 3320 (40.2)     Urine (mL/kg/hr) 1350 (0.7) 1150 (0.6)     Emesis/NG output  0  (0)     Drains 200 (0.1) 375 (0.2)     Other 75 (0) 115 (0.1)     Stool 0 (0) 0 (0)     Total Output 1625 1640      Net +1051.4 +1680             Urine Occurrence  1 x     Stool Occurrence 1 x 1 x 1 x    Emesis Occurrence  0 x           Physical Exam    General: In no acute distress, well appearance.   CV: RRR, S1, S2 no murmur or gallops   Pulm: non labored breathing  Abd: soft, less distended, appropiately tender. Incision with glue. Wound vac in place in RLQ, midline with some serous drainage, opened and packed, less distended    Ext: wwp       Significant Labs:  BMP (Last 3 Results):   Recent Labs  Lab 02/22/18  0400  02/25/18  0355 02/26/18  0539 02/27/18  0516   *  < > 135* 134* 129*     < > 138 139 134*   K 3.4*  < > 3.7 4.1 4.3     < > 102 102 100   CO2 25  < > 28 27 27   BUN 36*  < > 17 14 13   CREATININE 1.8*  < > 0.8 0.7 0.7   CALCIUM 8.1*  < > 9.0 8.8 8.7   MG 2.1  --   --  1.4* 1.7   < > = values in this interval not displayed.  CBC (Last 3 Results):   Recent Labs  Lab 02/25/18  0355 02/26/18  0539 02/27/18  0516   WBC 11.89 14.84* 16.70*   RBC 3.63* 3.39* 3.38*   HGB 9.3* 8.9* 8.7*   HCT 28.7* 27.2* 26.7*    290 367*   MCV 79* 80* 79*   MCH 25.6* 26.3* 25.7*   MCHC 32.4 32.7 32.6           Assessment/Plan:     * Carcinoid tumor of rectum    Ms. Marshall is a 63 y/o F who was found to have a rectal mass and completed chemoXRT and LAR w/ ileal resection with loop ileostomy for 2 carcinoids on 12/11/17  (neg nodes) on 1/22/18 she underwent pouchagram and then subsequent loop ileostomy takedown on 1/23/18 who presents now with ECF and pSBO now 7 Days Post-Op from Ex lap, ileocolic resection (with fistula) and GIANNI.     - NGT removed yesterday, start CLD and Boost TID  - Stable tachycardia, restarted home blood pressure meds.   - Patient likely malnourished, had anorexia on admission and expect prolonged ileus. Continue TPN today.   - Daily labs, including CRP as well as Prealbumin (11  today)  - Surgical site infection opened at superior and inferior midline, repacked today  - Continue with antibiotics (zosyn) Abdominal wound growing out ESBL. Estimated end date 3/3/18 per ID  - Continue Wound vac at the RLQ site today changes Monday/thursday  - RAYMOND resolved, [place] out.  - OOB and ambulating. PT/OT and IS.                   Jocelyn Kolb MD  General Surgery PGY IV  Beeper: [phone]

## 2018-02-27 NOTE — PT/OT/SLP PROGRESS
Occupational Therapy   Treatment    Name: Coby Marshall  MRN: [id number]  Admitting Diagnosis:  Carcinoid tumor of rectum  7 Days Post-Op    Recommendations:     Discharge Recommendations: home health OT  Discharge Equipment Recommendations:  shower chair, walker, rolling, bedside commode  Barriers to discharge:       Subjective     Communicated with: RN prior to session. Pt agreeable to participate with therapy.   Pain/Comfort:  · Pain Rating 1: 4/10  · Location - Orientation 1: generalized  · Location 1: back  · Pain Addressed 1: Pre-medicate for activity, Distraction, Reposition    Patients cultural, spiritual, Anabaptism conflicts given the current situation: none reported     Objective:     Patient found with: telemetry, wound vac, peripheral IV    General Precautions: Standard, fall, contact   Orthopedic Precautions:N/A   Braces: N/A     Occupational Performance:    Bed Mobility:    · Patient completed Rolling/Turning to Right with stand by assistance  · Patient completed Supine to Sit with stand by assistance with HOB elevated     Functional Mobility/Transfers:  · Patient completed Sit <> Stand Transfer with contact guard assistance  with  rolling walker   · Patient completed Bed <> Chair Transfer using Stand Pivot technique with contact guard assistance with rolling walker  · Functional Mobility: Pt performed functional mobility for household distances with CGA using RW to complete functional grooming tasks and min standing rest breaks secondary to reported SOB and fatigue.    Activities of Daily Living:  · Grooming: contact guard assistance for completion of oral care, hand hygiene, and washing face standing at sinkside     Patient left up in chair with all lines intact and call button in reach    AMPA 6 Click:  [facility] Total Score: 15    Treatment & Education:  · Pt and pt family educated on safety awareness with functional transfers and with completion of ADLS  · Pt completed ADLS and functional mobility  for treatment session as noted above  · Pt and pt family educated on importance of completing OOB with nursing staff assistance to increase pt functional activity tolerance and for the prevention of secondary complications following surgery  · Pt completed BUE HEP: B shoulder flexion/extension, bicep curls x 10 reps x 2 sets with no resistance  · White board/Communication board updated     Education:    Assessment:     Coby Marshall is a 62 y.o. female with a medical diagnosis of Carcinoid tumor of rectum.  She presents with performance deficits affecting function including weakness, impaired endurance, impaired self care skills, gait instability, impaired balance, decreased lower extremity function, pain, edema. Pt continues to remain motivated to participate with therapy. Pt demo increased functional standing activity tolerance and functional mobility this session, but remains limited by pain and SOB/fatigue. Pt will continue to benefit from skilled OT services to address the above listed impairments, decrease caregiver burden, and increase pt functional independence level. Recommend  OT services upon DC from hospital.     Rehab Prognosis:  Good; patient would benefit from acute skilled OT services to address these deficits and reach maximum level of function.       Plan:     Patient to be seen 4 x/week to address the above listed problems via self-care/home management, therapeutic activities, therapeutic exercises  · Plan of Care Expires: 03/22/18  · Plan of Care Reviewed with: patient, spouse    This Plan of care has been discussed with the patient who was involved in its development and understands and is in agreement with the identified goals and treatment plan    GOALS:    Occupational Therapy Goals        Problem: Occupational Therapy Goal    Goal Priority Disciplines Outcome Interventions   Occupational Therapy Goal     OT, PT/OT Ongoing (interventions implemented as appropriate)    Description:  Goals  to be met by: 03/15/2018    Patient will increase functional independence with ADLs by performing:    Feeding with Modified [place].  UE Dressing with Supervision sitting upright EOB.  LE Dressing with Stand-by Assistance.  Grooming while standing with Supervision.  Toileting from bedside commode with Supervision for hygiene and clothing management.   Supine to sit with Modified [place].  Toilet transfer to bedside commode with Supervision.                      Time Tracking:     OT Date of Treatment: 02/27/18  OT Start Time: 0938  OT Stop Time: 1005  OT Total Time (min): 27 min    Billable Minutes:Self Care/Home Management 15  Therapeutic Activity 12    Jason Lundberg OT  2/27/2018

## 2018-02-27 NOTE — ASSESSMENT & PLAN NOTE
Ms. Marshlal is a 61 y/o F who was found to have a rectal mass and completed chemoXRT and LAR w/ ileal resection with loop ileostomy for 2 carcinoids on 12/11/17  (neg nodes) on 1/22/18 she underwent pouchagram and then subsequent loop ileostomy takedown on 1/23/18 who presents now with ECF and pSBO now 7 Days Post-Op from Ex lap, ileocolic resection (with fistula) and GIANNI.     - NGT removed yesterday, start CLD and Boost TID  - Stable tachycardia, restarted home blood pressure meds.   - Patient likely malnourished, had anorexia on admission and expect prolonged ileus. Continue TPN today.   - Daily labs, including CRP as well as Prealbumin (11 today)  - Surgical site infection opened at superior and inferior midline, repacked today  - Continue with antibiotics (zosyn) Abdominal wound growing out ESBL. Estimated end date 3/3/18 per ID  - Continue Wound vac at the RLQ site today changes Monday/thursday  - RAYMOND resolved, Osuna out.  - OOB and ambulating. PT/OT and IS.

## 2018-02-27 NOTE — PLAN OF CARE
Problem: Occupational Therapy Goal  Goal: Occupational Therapy Goal  Goals to be met by: 03/15/2018    Patient will increase functional independence with ADLs by performing:    Feeding with Modified [place].  UE Dressing with Supervision sitting upright EOB.  LE Dressing with Stand-by Assistance.  Grooming while standing with Supervision.  Toileting from bedside commode with Supervision for hygiene and clothing management.   Supine to sit with Modified [place].  Toilet transfer to bedside commode with Supervision.     Outcome: Ongoing (interventions implemented as appropriate)  Pt progressing towards all goals at this time. All goals remain appropriate. Revise goals as needed.    Jason Lundberg, OT  2/27/2018

## 2018-02-27 NOTE — PLAN OF CARE
Problem: Patient Care Overview  Goal: Plan of Care Review  Outcome: Ongoing (interventions implemented as appropriate)  Patient is AAOx4. Vital signs stable. No falls throughout shift.  at bedside. Patient ambulates to commode with assistance. No complaints of pain. TPN infusing at 75ml/hr. Wound vac to abdominal wound at 75mmHg.

## 2018-02-27 NOTE — PLAN OF CARE
Problem: Patient Care Overview  Goal: Plan of Care Review  Outcome: Ongoing (interventions implemented as appropriate)  Pt AAOx4.  at the bedside. Pt NPO except sips with meds. TPN @75. Pain managed with PRN pain meds. Pt on tele, NSR, all other VSS on RA. Pt up to BSC, UOP adequate. Wound vac cont @ 75 intact with no leakage. Pt slept between care. Call light in reach and bed in lowest position. Pt remains free of falls and injury. No acute events this shift. Will continue to monitor.

## 2018-02-27 NOTE — CONSULTS
"  Nutrition consult stating "NPO."    Please see note from 2/26 for full RD assessment.        Recommendations     Recommendation/Intervention:   Continue current TPN 5/15 @ 75mL/hr with 20% lipids in 250mL. If able to advance diet, recommend Regular.      RD to monitor.  "

## 2018-02-27 NOTE — PHYSICIAN QUERY
PT Name: Coby Marshall  MR #: [id number]    Physician Query Form - Nutrition Clarification     CDS/: Digna Santoro RN                Contact information:[email or web address]    This form is a permanent document in the medical record.     Query Date: February 27, 2018    By submitting this query, we are merely seeking further clarification of documentation.. Please utilize your independent clinical judgment when addressing the question(s) below.    The Medical record contains the following:   Indicators  Supporting Clinical Findings Location in Medical Record   x % of Estimated Energy Intake over a time frame from p.o., TF, or TPN % Intake of Estimated Energy Needs: 75 - 100 %  % Meal Intake: NPO Nutrition PN 2/22   x Weight Status over a time frame Weight Loss: unintentional  % Weight Change From Usual Weight: -11.39 %    Weight loss since previous admission. Nutrition PN 2/22   x Subcutaneous Fat and/or Muscle Loss Overall Physical Appearance: loss of muscle mass, obese Nutrition PN 2/22    Fluid Accumulation or Edema      Reduced  Strength     x Wt / BMI / Usual Body Weight % Usual Body Weight: 88.8  Weight: 82.5 kg   BMI (Calculated): 29.4 Nutrition PN 2/22    Delayed Wound Healing / Failure to Thrive     x Acute or Chronic Illness Pt admitted with ECF and pSBO. s/p ileocolic resection with fistula and GIANNI. Nutrition PN 2/22    Medication     x Treatment Pt remains NPO on TPN. Nutrition PN 2/22   x Other Severe malnutrition    Patient likely malnourished, had anorexia on admission and expect prolonged ileus. Continue TPN today.  Nutrition PN 2/22    CRS PN 2/26     AND / ASPEN Clinical Characteristics (October 2011)  A minimum of two characteristics is recommended for diagnosing either moderate or severe malnutrition   Mild Malnutrition Moderate Malnutrition Severe Malnutrition   Energy Intake from p.o., TF or TPN. < 75% intake of estimated energy needs for less than 7 days < 75% intake of estimated  energy needs for greater than 7 days < 50% intake of estimated energy needs for > 5 days   Weight Loss 1-2% in 1 month  5% in 3 months  7.5% in 6 months  10% in 1 year 1-2 % in 1 week  5% in 1 month  7.5% in 3 months  10% in 6 months  20% in 1 year > 2% in 1 week  > 5% in 1 month  > 7.5% in 3 months  > 10% in 6 months  > 20% in 1 year   Physical Findings     None *Mild subcutaneous fat and/or muscle loss  *Mild fluid accumulation  *Stage II decubitus  *Surgical wound or non-healing wound *Mod/severe subcutaneous fat and/or muscle loss  *Mod/severe fluid accumulation  *Stage III or IV decubitus  *Non-healing surgical wound     Provider, please specify diagnosis or diagnoses associated with above clinical findings.    [x ] Mild Protein-Calorie Malnutrition  [ ] Moderate Protein-Calorie Malnutrition  [ ] Severe Protein-Calorie Malnutrition  [ ] Other Nutritional Diagnosis (please specify): ____________________________________  [ ] Other: ________________________________  [ ] Clinically Undetermined    Please document in your progress notes daily for the duration of treatment until resolved and include in your discharge summary.

## 2018-02-28 LAB
ANION GAP SERPL CALC-SCNC: 8 MMOL/L
BASOPHILS # BLD AUTO: 0.03 K/UL
BASOPHILS NFR BLD: 0.2 %
BUN SERPL-MCNC: 12 MG/DL
CALCIUM SERPL-MCNC: 8.8 MG/DL
CHLORIDE SERPL-SCNC: 98 MMOL/L
CO2 SERPL-SCNC: 29 MMOL/L
CREAT SERPL-MCNC: 0.7 MG/DL
CRP SERPL-MCNC: 158.3 MG/L
DIFFERENTIAL METHOD: ABNORMAL
EOSINOPHIL # BLD AUTO: 0.4 K/UL
EOSINOPHIL NFR BLD: 2.2 %
ERYTHROCYTE [DISTWIDTH] IN BLOOD BY AUTOMATED COUNT: 16 %
EST. GFR  (AFRICAN AMERICAN): >60 ML/MIN/1.73 M^2
EST. GFR  (NON AFRICAN AMERICAN): >60 ML/MIN/1.73 M^2
GLUCOSE SERPL-MCNC: 117 MG/DL
HCT VFR BLD AUTO: 24.7 %
HGB BLD-MCNC: 8.2 G/DL
IMM GRANULOCYTES # BLD AUTO: 0.62 K/UL
IMM GRANULOCYTES NFR BLD AUTO: 3.6 %
LYMPHOCYTES # BLD AUTO: 0.6 K/UL
LYMPHOCYTES NFR BLD: 3.7 %
MAGNESIUM SERPL-MCNC: 2 MG/DL
MCH RBC QN AUTO: 26.4 PG
MCHC RBC AUTO-ENTMCNC: 33.2 G/DL
MCV RBC AUTO: 79 FL
MONOCYTES # BLD AUTO: 0.9 K/UL
MONOCYTES NFR BLD: 5.1 %
NEUTROPHILS # BLD AUTO: 14.8 K/UL
NEUTROPHILS NFR BLD: 85.2 %
NRBC BLD-RTO: 0 /100 WBC
PHOSPHATE SERPL-MCNC: 4.5 MG/DL
PLATELET # BLD AUTO: 410 K/UL
PMV BLD AUTO: 9.7 FL
POTASSIUM SERPL-SCNC: 4 MMOL/L
RBC # BLD AUTO: 3.11 M/UL
SODIUM SERPL-SCNC: 135 MMOL/L
WBC # BLD AUTO: 17.36 K/UL

## 2018-02-28 PROCEDURE — 25000003 PHARM REV CODE 250: Performed by: NURSE PRACTITIONER

## 2018-02-28 PROCEDURE — A4216 STERILE WATER/SALINE, 10 ML: HCPCS | Performed by: STUDENT IN AN ORGANIZED HEALTH CARE EDUCATION/TRAINING PROGRAM

## 2018-02-28 PROCEDURE — 80048 BASIC METABOLIC PNL TOTAL CA: CPT

## 2018-02-28 PROCEDURE — 97535 SELF CARE MNGMENT TRAINING: CPT

## 2018-02-28 PROCEDURE — 84100 ASSAY OF PHOSPHORUS: CPT

## 2018-02-28 PROCEDURE — 63600175 PHARM REV CODE 636 W HCPCS: Performed by: NURSE PRACTITIONER

## 2018-02-28 PROCEDURE — 83735 ASSAY OF MAGNESIUM: CPT

## 2018-02-28 PROCEDURE — 86140 C-REACTIVE PROTEIN: CPT

## 2018-02-28 PROCEDURE — 97530 THERAPEUTIC ACTIVITIES: CPT

## 2018-02-28 PROCEDURE — 97110 THERAPEUTIC EXERCISES: CPT

## 2018-02-28 PROCEDURE — 63600175 PHARM REV CODE 636 W HCPCS: Performed by: STUDENT IN AN ORGANIZED HEALTH CARE EDUCATION/TRAINING PROGRAM

## 2018-02-28 PROCEDURE — 97116 GAIT TRAINING THERAPY: CPT

## 2018-02-28 PROCEDURE — 20600001 HC STEP DOWN PRIVATE ROOM

## 2018-02-28 PROCEDURE — 85025 COMPLETE CBC W/AUTO DIFF WBC: CPT

## 2018-02-28 PROCEDURE — 25000003 PHARM REV CODE 250: Performed by: STUDENT IN AN ORGANIZED HEALTH CARE EDUCATION/TRAINING PROGRAM

## 2018-02-28 RX ADMIN — CYCLOBENZAPRINE HYDROCHLORIDE 5 MG: 5 TABLET, FILM COATED ORAL at 09:02

## 2018-02-28 RX ADMIN — CLONIDINE HYDROCHLORIDE 0.1 MG: 0.1 TABLET ORAL at 09:02

## 2018-02-28 RX ADMIN — CYCLOBENZAPRINE HYDROCHLORIDE 5 MG: 5 TABLET, FILM COATED ORAL at 01:02

## 2018-02-28 RX ADMIN — SPIRONOLACTONE 25 MG: 25 TABLET, FILM COATED ORAL at 08:02

## 2018-02-28 RX ADMIN — HEPARIN SODIUM 5000 UNITS: 5000 INJECTION, SOLUTION INTRAVENOUS; SUBCUTANEOUS at 01:02

## 2018-02-28 RX ADMIN — FLUTICASONE PROPIONATE 50 MCG: 50 SPRAY, METERED NASAL at 08:02

## 2018-02-28 RX ADMIN — PIPERACILLIN AND TAZOBACTAM 4.5 G: 4; .5 INJECTION, POWDER, LYOPHILIZED, FOR SOLUTION INTRAVENOUS; PARENTERAL at 11:02

## 2018-02-28 RX ADMIN — DULOXETINE 60 MG: 60 CAPSULE, DELAYED RELEASE ORAL at 08:02

## 2018-02-28 RX ADMIN — CYCLOBENZAPRINE HYDROCHLORIDE 5 MG: 5 TABLET, FILM COATED ORAL at 05:02

## 2018-02-28 RX ADMIN — Medication 3 ML: at 01:02

## 2018-02-28 RX ADMIN — OXYCODONE HYDROCHLORIDE 10 MG: 5 TABLET ORAL at 08:02

## 2018-02-28 RX ADMIN — Medication 3 ML: at 09:02

## 2018-02-28 RX ADMIN — HEPARIN SODIUM 5000 UNITS: 5000 INJECTION, SOLUTION INTRAVENOUS; SUBCUTANEOUS at 05:02

## 2018-02-28 RX ADMIN — HYDROCHLOROTHIAZIDE 12.5 MG: 12.5 TABLET ORAL at 08:02

## 2018-02-28 RX ADMIN — Medication 3 ML: at 05:02

## 2018-02-28 RX ADMIN — HEPARIN SODIUM 5000 UNITS: 5000 INJECTION, SOLUTION INTRAVENOUS; SUBCUTANEOUS at 09:02

## 2018-02-28 RX ADMIN — CLONIDINE HYDROCHLORIDE 0.1 MG: 0.1 TABLET ORAL at 08:02

## 2018-02-28 RX ADMIN — PANTOPRAZOLE SODIUM 40 MG: 40 TABLET, DELAYED RELEASE ORAL at 08:02

## 2018-02-28 RX ADMIN — OXYCODONE HYDROCHLORIDE 10 MG: 5 TABLET ORAL at 01:02

## 2018-02-28 RX ADMIN — FAMOTIDINE 20 MG: 20 TABLET, FILM COATED ORAL at 08:02

## 2018-02-28 RX ADMIN — OXYCODONE HYDROCHLORIDE 10 MG: 5 TABLET ORAL at 07:02

## 2018-02-28 RX ADMIN — PIPERACILLIN AND TAZOBACTAM 4.5 G: 4; .5 INJECTION, POWDER, LYOPHILIZED, FOR SOLUTION INTRAVENOUS; PARENTERAL at 06:02

## 2018-02-28 RX ADMIN — PIPERACILLIN AND TAZOBACTAM 4.5 G: 4; .5 INJECTION, POWDER, LYOPHILIZED, FOR SOLUTION INTRAVENOUS; PARENTERAL at 04:02

## 2018-02-28 NOTE — PLAN OF CARE
Problem: Patient Care Overview  Goal: Plan of Care Review  Outcome: Ongoing (interventions implemented as appropriate)  POC reviewed with pt who verbalized understanding. AAOx4. VSS. Remains free of falls and injury. ML dressing intact. Right abd wound vac in place. TPN, Lipids and ABX infusing thorughout shift. Tolerating CL diet; no complaints of nausea. No complaints of pain. Up to bedside commode with assist/incontinence care preformed PRN. SCDs in place. Resting between care. No acute events. No distress noted.  at bedside. Will continue to monitor.

## 2018-02-28 NOTE — PROGRESS NOTES
Notified Dr. Pak regarding pt's temp 100.1. MD stated not to give pt tylenol at this time and if pt's temp increases will culture.

## 2018-02-28 NOTE — PROGRESS NOTES
[facility]  Colorectal Surgery  Progress Note    Patient Name: Coby Marshall  MRN: [id number]  Admission Date: 2/19/2018  Hospital Length of Stay: 9 days  Attending Physician: Suhas Manjarrez MD    Subjective:     Interval History:     No acute events overnight, patient having mult BM. Tolerating clears, no nausea or emesis. Abdominal pain improved. Wound vac in place.     Post-Op Info:  Procedure(s) (LRB):  ileocoloc resection, lysis of adhesions (N/A)   8 Days Post-Op      Medications:  Continuous Infusions:   TPN ADULT CENTRAL LINE CUSTOM 75 mL/hr at 02/27/18 2203     Scheduled Meds:   cloNIDine  0.1 mg Oral BID    cyclobenzaprine  5 mg Oral TID    DULoxetine  60 mg Oral Daily    famotidine  20 mg Oral Daily    fat emulsion 20%  250 mL Intravenous Daily    fluticasone  1 spray Each Nare Daily    heparin (porcine)  5,000 Units Subcutaneous Q8H    hydroCHLOROthiazide  12.5 mg Oral Daily    pantoprazole  40 mg Oral Daily    piperacillin-tazobactam (ZOSYN) IVPB  4.5 g Intravenous Q8H    sodium chloride 0.9%  3 mL Intravenous Q8H    spironolactone  25 mg Oral Daily     PRN Meds:   diphenhydrAMINE    fentaNYL    ondansetron    oxyCODONE    oxyCODONE    promethazine (PHENERGAN) IVPB    sodium chloride 0.9%        Objective:     Vital Signs (Most Recent):  Temp: 97.6 °F (36.4 °C) (02/28/18 0830)  Pulse: 100 (02/28/18 0830)  Resp: 16 (02/28/18 0830)  BP: (!) 147/70 (02/28/18 0830)  SpO2: 98 % (02/28/18 0830) Vital Signs (24h Range):  Temp:  [97.6 °F (36.4 °C)-99 °F (37.2 °C)] 97.6 °F (36.4 °C)  Pulse:  [] 100  Resp:  [16-18] 16  SpO2:  [92 %-98 %] 98 %  BP: (142-181)/(70-85) 147/70     Intake/Output - Last 3 Shifts       02/26 0700 - 02/27 0659 02/27 0700 - 02/28 0659 02/28 0700 - 03/01 0659    P.O. 240 150     I.V. (mL/kg) 765.8 (9.3)      IV Piggyback 300 300     TPN 2014.2 570.4     Total Intake(mL/kg) 3320 (40.2) 1020.4 (12.4)     Urine (mL/kg/hr) 1150 (0.6) 350 (0.2)      Emesis/NG output 0 (0)      Drains 375 (0.2)      Other 115 (0.1) 0 (0)     Stool 0 (0)      Total Output 1640 350      Net +1680 +670.4             Urine Occurrence 1 x 3 x 2 x    Stool Occurrence 1 x 4 x 0 x    Emesis Occurrence 0 x  0 x          Physical Exam      General: In no acute distress, well appearance.   CV: RRR, S1, S2 no murmur or gallops   Pulm: non labored breathing  Abd: soft, less distended, appropiately tender. Incision with glue. Wound vac in place in RLQ, midline with some serous drainage, opened and packed, less distended    Ext: wwp       Significant Labs:  BMP (Last 3 Results):     Recent Labs  Lab 02/26/18  0539 02/27/18  0516 02/28/18  0346   * 129* 117*    134* 135*   K 4.1 4.3 4.0    100 98   CO2 27 27 29   BUN 14 13 12   CREATININE 0.7 0.7 0.7   CALCIUM 8.8 8.7 8.8   MG 1.4* 1.7 2.0     CBC (Last 3 Results):     Recent Labs  Lab 02/26/18  0539 02/27/18  0516 02/28/18  0346   WBC 14.84* 16.70* 17.36*   RBC 3.39* 3.38* 3.11*   HGB 8.9* 8.7* 8.2*   HCT 27.2* 26.7* 24.7*    367* 410*   MCV 80* 79* 79*   MCH 26.3* 25.7* 26.4*   MCHC 32.7 32.6 33.2           Assessment/Plan:     * Carcinoid tumor of rectum    Ms. Marshall is a 61 y/o F who was found to have a rectal mass and completed chemoXRT and LAR w/ ileal resection with loop ileostomy for 2 carcinoids on 12/11/17  (neg nodes) on 1/22/18 she underwent pouchagram and then subsequent loop ileostomy takedown on 1/23/18 who presents now with ECF and pSBO now 8 Days Post-Op from Ex lap, ileocolic resection (with fistula) and GIANNI.     - NGT removed , tolerating CLD and Boost TID, advance to low res today  - Stable tachycardia, restarted home blood pressure meds.   - Patient likely malnourished, had anorexia on admission and expect prolonged ileus. Let TPN run out today  - Daily labs, including CRP as well as Prealbumin (12 yesterday)  - Surgical site infection opened at superior and inferior midline, repacked today  -  Continue with antibiotics (zosyn) Abdominal wound growing out ESBL. Estimated end date 3/3/18 per ID  - Continue Wound vac at the RLQ site today changes Monday/thursday  - RAYMOND resolved, Osuna out.  - OOB and ambulating. PT/OT and IS.                   Pavan Seth MD  Colorectal Surgery  [facility]

## 2018-02-28 NOTE — PT/OT/SLP PROGRESS
Physical Therapy Treatment    Patient Name:  Coby Marshall   MRN:  [id number]    Recommendations:     Discharge Recommendations:  home health PT   Discharge Equipment Recommendations: bedside commode, walker, rolling, shower chair   Barriers to discharge: None    Assessment:     Coby Marshall is a 62 y.o. female admitted with a medical diagnosis of Carcinoid tumor of rectum.  She presents with the following impairments/functional limitations:  weakness, impaired endurance, impaired self care skills, gait instability, impaired functional mobilty, edema, decreased lower extremity function, impaired balance .Pt tolerated treatment  well and is progressing  with mobility. Pt would continue to benefit from skilled PT to address overall functional mobility and goals. Goals remain appropriate      Rehab Prognosis:  good; patient would benefit from acute skilled PT services to address these deficits and reach maximum level of function.      Recent Surgery: Procedure(s) (LRB):  ileocoloc resection, lysis of adhesions (N/A) 8 Days Post-Op    Plan:     During this hospitalization, patient to be seen 4 x/week to address the above listed problems via gait training, therapeutic activities, therapeutic exercises  · Plan of Care Expires:  03/22/18   Plan of Care Reviewed with: patient    Subjective     Communicated with RN prior to session.  Patient found seated upon PT entry to room, agreeable to treatment.      Chief Complaint: I am feeling a little stronger    Pain/Comfort:  · Pain Rating 1: 7/10  · Location - Side 1: Right  · Location - Orientation 1: generalized  · Location 1: abdomen  · Pain Addressed 1: Pre-medicate for activity, Reposition, Distraction  · Pain Rating Post-Intervention 1: 7/10    Patients cultural, spiritual, Confucianist conflicts given the current situation: none noted    Objective:     Patient found with: telemetry, wound vac, peripheral IV     General Precautions: Standard, fall, contact   Orthopedic  Precautions:N/A   Braces: N/A     Functional Mobility:  · Transfers:     · Sit to Stand:  contact guard assistance  with rolling walker  · Gait: 150' with RW and CGA with decreased step length/eric      AM-PAC 6 CLICK MOBILITY  Turning over in bed (including adjusting bedclothes, sheets and blankets)?: 3  Sitting down on and standing up from a chair with arms (e.g., wheelchair, bedside commode, etc.): 3  Moving from lying on back to sitting on the side of the bed?: 3  Moving to and from a bed to a chair (including a wheelchair)?: 3  Need to walk in hospital room?: 3  Climbing 3-5 steps with a railing?: 2  Total Score: 17       Therapeutic Activities and Exercises:   Discussed/educated patient on progress, safety, d/c, PT POC   Patient peformed therex X 15 reps seated in bedside chair B LE AROM AP, LAQ, Hip Flexion, Hip Abd/Add   White board updated   Donned an extra gown    Pt encouraged to ambulate  3x/day with nursing or family assistance to improve endurance.   Pt safe to ambulate in hallway with RN or PCT assistance     Patient left up in chair with all lines intact, call button in reach and nsg notified..    GOALS:    Physical Therapy Goals        Problem: Physical Therapy Goal    Goal Priority Disciplines Outcome Goal Variances Interventions   Physical Therapy Goal     PT/OT, PT Ongoing (interventions implemented as appropriate)     Description:  Goals to be met by: 18, extended to 3/8/2018    Patient will increase functional independence with mobility by performin. Supine to sit with Modified [place]  2. Sit to supine with Modified [place]  3. Sit to stand transfer with Supervision  4. Bed to chair transfer with Supervision using no AD  5. Gait  x 150 feet with Stand by Assistance with/without AD  6. Lower extremity exercise program x20 reps per handout, with independence                       Time Tracking:     PT Received On: 18  PT Start Time: 929     PT Stop Time: 1007  PT  Total Time (min): 38 min     Billable Minutes: Gait Training 15, Therapeutic Activity 8 and Therapeutic Exercise 15    Treatment Type: Treatment  PT/PTA: PTA     PTA Visit Number: 3     Cachorro Pritchard, DEISY  02/28/2018

## 2018-02-28 NOTE — PLAN OF CARE
Problem: Patient Care Overview  Goal: Plan of Care Review  Outcome: Ongoing (interventions implemented as appropriate)  POC reviewed with pt and pt's family, both verbalized understanding. AAOx4. VSS on room air. ML dressing with gauze, dried drainage noted. Wound vac to R lower abdomen at 75 mmHg, ~50 ml serosang drainage in canister. PRN pain meds controlling abdominal pain. Up in chair most of shift, walks with walker and spouse in hallway throughout shift. Adequate UOP. Passing flatus. LBM 2/27/18. Port accessed, blood return noted. TPN infusing, not reordering after this bag. Tolerating low fiber diet. No emesis/no nausea. Call bell within reach, frequent rounds made. WCTM.

## 2018-02-28 NOTE — ASSESSMENT & PLAN NOTE
Ms. Marshall is a 61 y/o F who was found to have a rectal mass and completed chemoXRT and LAR w/ ileal resection with loop ileostomy for 2 carcinoids on 12/11/17  (neg nodes) on 1/22/18 she underwent pouchagram and then subsequent loop ileostomy takedown on 1/23/18 who presents now with ECF and pSBO now 8 Days Post-Op from Ex lap, ileocolic resection (with fistula) and GIANNI.     - NGT removed , tolerating CLD and Boost TID, advance to low res today  - Stable tachycardia, restarted home blood pressure meds.   - Patient likely malnourished, had anorexia on admission and expect prolonged ileus. Let TPN run out today  - Daily labs, including CRP as well as Prealbumin (12 yesterday)  - Surgical site infection opened at superior and inferior midline, repacked today  - Continue with antibiotics (zosyn) Abdominal wound growing out ESBL. Estimated end date 3/3/18 per ID  - Continue Wound vac at the RLQ site today changes Monday/thursday  - RAYMOND resolved, Osuna out.  - OOB and ambulating. PT/OT and IS.

## 2018-02-28 NOTE — PLAN OF CARE
Problem: Occupational Therapy Goal  Goal: Occupational Therapy Goal  Goals to be met by: 03/15/2018    Patient will increase functional independence with ADLs by performing:    Feeding with Modified [place].  UE Dressing with Supervision sitting upright EOB.  LE Dressing with Stand-by Assistance.  Grooming while standing with Supervision.  Toileting from bedside commode with Supervision for hygiene and clothing management.   Supine to sit with Modified [place].  Toilet transfer to bedside commode with Supervision.     Outcome: Ongoing (interventions implemented as appropriate)  Pt progressing towards all goals at this time. All goals remain appropriate. Revise goals as needed.    Jason Lundberg, OT  2/28/2018

## 2018-02-28 NOTE — PLAN OF CARE
Problem: Physical Therapy Goal  Goal: Physical Therapy Goal  Goals to be met by: 18, extended to 3/8/2018    Patient will increase functional independence with mobility by performin. Supine to sit with Modified [place]  2. Sit to supine with Modified [place]  3. Sit to stand transfer with Supervision  4. Bed to chair transfer with Supervision using no AD  5. Gait  x 150 feet with Stand by Assistance with/without AD  6. Lower extremity exercise program x20 reps per handout, with independence      Pt progressing towards goals. continue with PT POC.Goals remain appropriate.  Cachorro Pritchard PTA  2018

## 2018-02-28 NOTE — PT/OT/SLP PROGRESS
Occupational Therapy   Treatment    Name: Coby Marshall  MRN: [id number]  Admitting Diagnosis:  Carcinoid tumor of rectum  8 Days Post-Op    Recommendations:     Discharge Recommendations: home health OT  Discharge Equipment Recommendations:  bedside commode, shower chair, walker, rolling  Barriers to discharge:    none    Subjective     Communicated with: RN prior to session. Pt agreeable to participate with therapy.   Pain/Comfort:  · Pain Rating 1: 7/10  · Location - Orientation 1: generalized  · Location 1: abdomen  · Pain Addressed 1: Pre-medicate for activity, Reposition, Distraction    Patients cultural, spiritual, Voodoo conflicts given the current situation: none reported     Objective:     Patient found with: telemetry, wound vac, peripheral IV    General Precautions: Standard, contact, fall   Orthopedic Precautions:N/A   Braces: N/A     Occupational Performance:    Bed Mobility:    · Pt found sitting up in bedside chair upon therapist entry into room having just finished PT session.     Functional Mobility/Transfers:  · Patient completed Sit <> Stand Transfer with contact guard assistance  with  rolling walker   · Patient completed Bed <> Chair Transfer using Stand Pivot technique with contact guard assistance with rolling walker  · Patient completed BSC Transfer Step To Transfer technique with stand by assistance with  no AD  · Functional Mobility: did not complete secondary to pt report of fatigue from previous PT session    Activities of Daily Living:  · UB Dressing: contact guard assistance To don gown like a jacket sitting up at EOB secondary to line management   · LB Dressing: maximal assistance to don/doff socks sitting EOB secondary to increased pain  · Toileting: minimum assistance for clothing management     Patient left up in chair with all lines intact, call button in reach and  present    [facility] 6 Click:  AMPA Total Score: 18    Treatment & Education:  · Pt and pt family educated  on safety awareness with functional transfers and with completion of ADLS  · Pt and pt family were educated on and instructed on proper use and importance of incentive spirometer, with patient completing 5 trials  · Pt completed ADLS and functional mobility for treatment session as noted above  · Pt and pt family educated on importance of completing OOB with nursing staff assistance to increase pt functional activity tolerance and for the prevention of secondary complications following surgery  · Pt completed BUE HEP: bicep curls and shoulder flexion/extension x 10 reps x 2 sets sitting EOB  · White board/Communication board updated     Education:    Assessment:     Coby Marshall is a 62 y.o. female with a medical diagnosis of Carcinoid tumor of rectum.  She presents with performance deficits affecting function including impaired endurance, weakness, impaired self care skills, impaired functional mobilty, gait instability, impaired balance, decreased lower extremity function, pain, edema.  Pt continues to remain motivated to participate with therapy. Pt progressing towards all goals at this time and will continue to benefit from skilled OT services to address the above listed impairments, decrease caregiver burden, and increase pt functional independence level prior to DC from hospital. Recommend  OT services upon discharge.     Rehab Prognosis:  Good; patient would benefit from acute skilled OT services to address these deficits and reach maximum level of function.       Plan:     Patient to be seen 4 x/week to address the above listed problems via self-care/home management, therapeutic activities, therapeutic exercises  · Plan of Care Expires: 03/22/18  · Plan of Care Reviewed with: patient    This Plan of care has been discussed with the patient who was involved in its development and understands and is in agreement with the identified goals and treatment plan    GOALS:    Occupational Therapy Goals         Problem: Occupational Therapy Goal    Goal Priority Disciplines Outcome Interventions   Occupational Therapy Goal     OT, PT/OT Ongoing (interventions implemented as appropriate)    Description:  Goals to be met by: 03/15/2018    Patient will increase functional independence with ADLs by performing:    Feeding with Modified [place].  UE Dressing with Supervision sitting upright EOB.  LE Dressing with Stand-by Assistance.  Grooming while standing with Supervision.  Toileting from bedside commode with Supervision for hygiene and clothing management.   Supine to sit with Modified [place].  Toilet transfer to bedside commode with Supervision.                      Time Tracking:     OT Date of Treatment: 02/28/18  OT Start Time: 1024  OT Stop Time: 1039  OT Total Time (min): 15 min    Billable Minutes:Self Care/Home Management 15    Jason Lundberg OT  2/28/2018

## 2018-02-28 NOTE — SUBJECTIVE & OBJECTIVE
Subjective:     Interval History:     No acute events overnight, patient having mult BM. Tolerating clears, no nausea or emesis. Abdominal pain improved. Wound vac in place.     Post-Op Info:  Procedure(s) (LRB):  ileocoloc resection, lysis of adhesions (N/A)   8 Days Post-Op      Medications:  Continuous Infusions:   TPN ADULT CENTRAL LINE CUSTOM 75 mL/hr at 02/27/18 2203     Scheduled Meds:   cloNIDine  0.1 mg Oral BID    cyclobenzaprine  5 mg Oral TID    DULoxetine  60 mg Oral Daily    famotidine  20 mg Oral Daily    fat emulsion 20%  250 mL Intravenous Daily    fluticasone  1 spray Each Nare Daily    heparin (porcine)  5,000 Units Subcutaneous Q8H    hydroCHLOROthiazide  12.5 mg Oral Daily    pantoprazole  40 mg Oral Daily    piperacillin-tazobactam (ZOSYN) IVPB  4.5 g Intravenous Q8H    sodium chloride 0.9%  3 mL Intravenous Q8H    spironolactone  25 mg Oral Daily     PRN Meds:   diphenhydrAMINE    fentaNYL    ondansetron    oxyCODONE    oxyCODONE    promethazine (PHENERGAN) IVPB    sodium chloride 0.9%        Objective:     Vital Signs (Most Recent):  Temp: 97.6 °F (36.4 °C) (02/28/18 0830)  Pulse: 100 (02/28/18 0830)  Resp: 16 (02/28/18 0830)  BP: (!) 147/70 (02/28/18 0830)  SpO2: 98 % (02/28/18 0830) Vital Signs (24h Range):  Temp:  [97.6 °F (36.4 °C)-99 °F (37.2 °C)] 97.6 °F (36.4 °C)  Pulse:  [] 100  Resp:  [16-18] 16  SpO2:  [92 %-98 %] 98 %  BP: (142-181)/(70-85) 147/70     Intake/Output - Last 3 Shifts       02/26 0700 - 02/27 0659 02/27 0700 - 02/28 0659 02/28 0700 - 03/01 0659    P.O. 240 150     I.V. (mL/kg) 765.8 (9.3)      IV Piggyback 300 300     TPN 2014.2 570.4     Total Intake(mL/kg) 3320 (40.2) 1020.4 (12.4)     Urine (mL/kg/hr) 1150 (0.6) 350 (0.2)     Emesis/NG output 0 (0)      Drains 375 (0.2)      Other 115 (0.1) 0 (0)     Stool 0 (0)      Total Output 1640 350      Net +1680 +670.4             Urine Occurrence 1 x 3 x 2 x    Stool Occurrence 1 x 4 x 0 x     Emesis Occurrence 0 x  0 x          Physical Exam      General: In no acute distress, well appearance.   CV: RRR, S1, S2 no murmur or gallops   Pulm: non labored breathing  Abd: soft, less distended, appropiately tender. Incision with glue. Wound vac in place in RLQ, midline with some serous drainage, opened and packed, less distended    Ext: wwp       Significant Labs:  BMP (Last 3 Results):     Recent Labs  Lab 02/26/18  0539 02/27/18  0516 02/28/18  0346   * 129* 117*    134* 135*   K 4.1 4.3 4.0    100 98   CO2 27 27 29   BUN 14 13 12   CREATININE 0.7 0.7 0.7   CALCIUM 8.8 8.7 8.8   MG 1.4* 1.7 2.0     CBC (Last 3 Results):     Recent Labs  Lab 02/26/18  0539 02/27/18  0516 02/28/18  0346   WBC 14.84* 16.70* 17.36*   RBC 3.39* 3.38* 3.11*   HGB 8.9* 8.7* 8.2*   HCT 27.2* 26.7* 24.7*    367* 410*   MCV 80* 79* 79*   MCH 26.3* 25.7* 26.4*   MCHC 32.7 32.6 33.2

## 2018-03-01 LAB
ANION GAP SERPL CALC-SCNC: 8 MMOL/L
BACTERIA #/AREA URNS AUTO: NORMAL /HPF
BASOPHILS # BLD AUTO: 0.04 K/UL
BASOPHILS NFR BLD: 0.2 %
BILIRUB UR QL STRIP: NEGATIVE
BUN SERPL-MCNC: 14 MG/DL
CALCIUM SERPL-MCNC: 8.7 MG/DL
CHLORIDE SERPL-SCNC: 97 MMOL/L
CLARITY UR REFRACT.AUTO: ABNORMAL
CO2 SERPL-SCNC: 28 MMOL/L
COLOR UR AUTO: YELLOW
CREAT SERPL-MCNC: 0.8 MG/DL
DIFFERENTIAL METHOD: ABNORMAL
EOSINOPHIL # BLD AUTO: 0.4 K/UL
EOSINOPHIL NFR BLD: 1.9 %
ERYTHROCYTE [DISTWIDTH] IN BLOOD BY AUTOMATED COUNT: 16.2 %
EST. GFR  (AFRICAN AMERICAN): >60 ML/MIN/1.73 M^2
EST. GFR  (NON AFRICAN AMERICAN): >60 ML/MIN/1.73 M^2
GLUCOSE SERPL-MCNC: 75 MG/DL
GLUCOSE UR QL STRIP: NEGATIVE
GRAM STN SPEC: NORMAL
GRAM STN SPEC: NORMAL
HCT VFR BLD AUTO: 24.1 %
HGB BLD-MCNC: 7.8 G/DL
HGB UR QL STRIP: NEGATIVE
IMM GRANULOCYTES # BLD AUTO: 0.52 K/UL
IMM GRANULOCYTES NFR BLD AUTO: 2.8 %
KETONES UR QL STRIP: NEGATIVE
LEUKOCYTE ESTERASE UR QL STRIP: ABNORMAL
LYMPHOCYTES # BLD AUTO: 0.8 K/UL
LYMPHOCYTES NFR BLD: 4.4 %
MAGNESIUM SERPL-MCNC: 1.7 MG/DL
MCH RBC QN AUTO: 25.5 PG
MCHC RBC AUTO-ENTMCNC: 32.4 G/DL
MCV RBC AUTO: 79 FL
MICROSCOPIC COMMENT: NORMAL
MONOCYTES # BLD AUTO: 1.1 K/UL
MONOCYTES NFR BLD: 5.9 %
NEUTROPHILS # BLD AUTO: 16 K/UL
NEUTROPHILS NFR BLD: 84.8 %
NITRITE UR QL STRIP: NEGATIVE
NON-SQ EPI CELLS #/AREA URNS AUTO: <1 /HPF
NRBC BLD-RTO: 0 /100 WBC
PH UR STRIP: 7 [PH] (ref 5–8)
PHOSPHATE SERPL-MCNC: 4.8 MG/DL
PLATELET # BLD AUTO: 482 K/UL
PMV BLD AUTO: 10 FL
POTASSIUM SERPL-SCNC: 4.5 MMOL/L
PREALB SERPL-MCNC: 12 MG/DL
PROT UR QL STRIP: NEGATIVE
RBC # BLD AUTO: 3.06 M/UL
RBC #/AREA URNS AUTO: 0 /HPF (ref 0–4)
SODIUM SERPL-SCNC: 133 MMOL/L
SP GR UR STRIP: 1.01 (ref 1–1.03)
SQUAMOUS #/AREA URNS AUTO: 5 /HPF
URN SPEC COLLECT METH UR: ABNORMAL
UROBILINOGEN UR STRIP-ACNC: NEGATIVE EU/DL
VANCOMYCIN SERPL-MCNC: <1.1 UG/ML
WBC # BLD AUTO: 18.9 K/UL
WBC #/AREA URNS AUTO: 1 /HPF (ref 0–5)

## 2018-03-01 PROCEDURE — 25500020 PHARM REV CODE 255: Performed by: COLON & RECTAL SURGERY

## 2018-03-01 PROCEDURE — 25000003 PHARM REV CODE 250: Performed by: STUDENT IN AN ORGANIZED HEALTH CARE EDUCATION/TRAINING PROGRAM

## 2018-03-01 PROCEDURE — 97116 GAIT TRAINING THERAPY: CPT

## 2018-03-01 PROCEDURE — 83735 ASSAY OF MAGNESIUM: CPT

## 2018-03-01 PROCEDURE — 84134 ASSAY OF PREALBUMIN: CPT

## 2018-03-01 PROCEDURE — 0W9F30Z DRAINAGE OF ABDOMINAL WALL WITH DRAINAGE DEVICE, PERCUTANEOUS APPROACH: ICD-10-PCS | Performed by: RADIOLOGY

## 2018-03-01 PROCEDURE — 84100 ASSAY OF PHOSPHORUS: CPT

## 2018-03-01 PROCEDURE — 87186 SC STD MICRODIL/AGAR DIL: CPT

## 2018-03-01 PROCEDURE — 51702 INSERT TEMP BLADDER CATH: CPT

## 2018-03-01 PROCEDURE — 87205 SMEAR GRAM STAIN: CPT

## 2018-03-01 PROCEDURE — 87070 CULTURE OTHR SPECIMN AEROBIC: CPT

## 2018-03-01 PROCEDURE — 20600001 HC STEP DOWN PRIVATE ROOM

## 2018-03-01 PROCEDURE — 81001 URINALYSIS AUTO W/SCOPE: CPT

## 2018-03-01 PROCEDURE — 63600175 PHARM REV CODE 636 W HCPCS: Performed by: STUDENT IN AN ORGANIZED HEALTH CARE EDUCATION/TRAINING PROGRAM

## 2018-03-01 PROCEDURE — 97607 NEG PRS WND THR NDME<=50SQCM: CPT

## 2018-03-01 PROCEDURE — 85025 COMPLETE CBC W/AUTO DIFF WBC: CPT

## 2018-03-01 PROCEDURE — 97530 THERAPEUTIC ACTIVITIES: CPT

## 2018-03-01 PROCEDURE — 80202 ASSAY OF VANCOMYCIN: CPT

## 2018-03-01 PROCEDURE — 63600175 PHARM REV CODE 636 W HCPCS: Performed by: NURSE PRACTITIONER

## 2018-03-01 PROCEDURE — 87077 CULTURE AEROBIC IDENTIFY: CPT

## 2018-03-01 PROCEDURE — 25000003 PHARM REV CODE 250: Performed by: NURSE PRACTITIONER

## 2018-03-01 PROCEDURE — 87075 CULTR BACTERIA EXCEPT BLOOD: CPT

## 2018-03-01 PROCEDURE — 87086 URINE CULTURE/COLONY COUNT: CPT

## 2018-03-01 PROCEDURE — A4216 STERILE WATER/SALINE, 10 ML: HCPCS | Performed by: STUDENT IN AN ORGANIZED HEALTH CARE EDUCATION/TRAINING PROGRAM

## 2018-03-01 PROCEDURE — 80048 BASIC METABOLIC PNL TOTAL CA: CPT

## 2018-03-01 PROCEDURE — 25500020 PHARM REV CODE 255: Performed by: STUDENT IN AN ORGANIZED HEALTH CARE EDUCATION/TRAINING PROGRAM

## 2018-03-01 PROCEDURE — 63600175 PHARM REV CODE 636 W HCPCS: Performed by: RADIOLOGY

## 2018-03-01 RX ORDER — LANOLIN ALCOHOL/MO/W.PET/CERES
400 CREAM (GRAM) TOPICAL ONCE
Status: DISCONTINUED | OUTPATIENT
Start: 2018-03-01 | End: 2018-03-01

## 2018-03-01 RX ORDER — LANOLIN ALCOHOL/MO/W.PET/CERES
800 CREAM (GRAM) TOPICAL ONCE
Status: COMPLETED | OUTPATIENT
Start: 2018-03-01 | End: 2018-03-01

## 2018-03-01 RX ORDER — VANCOMYCIN/0.9 % SOD CHLORIDE 1 G/100 ML
1000 PLASTIC BAG, INJECTION (ML) INTRAVENOUS
Status: DISCONTINUED | OUTPATIENT
Start: 2018-03-01 | End: 2018-03-05

## 2018-03-01 RX ORDER — MIDAZOLAM HYDROCHLORIDE 1 MG/ML
INJECTION INTRAMUSCULAR; INTRAVENOUS CODE/TRAUMA/SEDATION MEDICATION
Status: COMPLETED | OUTPATIENT
Start: 2018-03-01 | End: 2018-03-01

## 2018-03-01 RX ORDER — SODIUM CHLORIDE 9 MG/ML
INJECTION, SOLUTION INTRAVENOUS CONTINUOUS
Status: DISCONTINUED | OUTPATIENT
Start: 2018-03-01 | End: 2018-03-05

## 2018-03-01 RX ADMIN — Medication 1 G: at 06:03

## 2018-03-01 RX ADMIN — CYCLOBENZAPRINE HYDROCHLORIDE 5 MG: 5 TABLET, FILM COATED ORAL at 02:03

## 2018-03-01 RX ADMIN — IOHEXOL 15 ML: 350 INJECTION, SOLUTION INTRAVENOUS at 10:03

## 2018-03-01 RX ADMIN — Medication 3 ML: at 05:03

## 2018-03-01 RX ADMIN — MIDAZOLAM HYDROCHLORIDE 0.5 MG: 1 INJECTION, SOLUTION INTRAMUSCULAR; INTRAVENOUS at 04:03

## 2018-03-01 RX ADMIN — PIPERACILLIN AND TAZOBACTAM 4.5 G: 4; .5 INJECTION, POWDER, LYOPHILIZED, FOR SOLUTION INTRAVENOUS; PARENTERAL at 03:03

## 2018-03-01 RX ADMIN — OXYCODONE HYDROCHLORIDE 10 MG: 5 TABLET ORAL at 05:03

## 2018-03-01 RX ADMIN — SODIUM CHLORIDE: 0.9 INJECTION, SOLUTION INTRAVENOUS at 06:03

## 2018-03-01 RX ADMIN — MIDAZOLAM HYDROCHLORIDE 1.5 MG: 1 INJECTION, SOLUTION INTRAMUSCULAR; INTRAVENOUS at 04:03

## 2018-03-01 RX ADMIN — MAGNESIUM OXIDE TAB 400 MG (241.3 MG ELEMENTAL MG) 800 MG: 400 (241.3 MG) TAB at 09:03

## 2018-03-01 RX ADMIN — HYDROCHLOROTHIAZIDE 12.5 MG: 12.5 TABLET ORAL at 08:03

## 2018-03-01 RX ADMIN — SPIRONOLACTONE 25 MG: 25 TABLET, FILM COATED ORAL at 08:03

## 2018-03-01 RX ADMIN — PIPERACILLIN AND TAZOBACTAM 4.5 G: 4; .5 INJECTION, POWDER, LYOPHILIZED, FOR SOLUTION INTRAVENOUS; PARENTERAL at 10:03

## 2018-03-01 RX ADMIN — OXYCODONE HYDROCHLORIDE 10 MG: 5 TABLET ORAL at 03:03

## 2018-03-01 RX ADMIN — IOHEXOL 15 ML: 350 INJECTION, SOLUTION INTRAVENOUS at 09:03

## 2018-03-01 RX ADMIN — HEPARIN SODIUM 5000 UNITS: 5000 INJECTION, SOLUTION INTRAVENOUS; SUBCUTANEOUS at 05:03

## 2018-03-01 RX ADMIN — PIPERACILLIN AND TAZOBACTAM 4.5 G: 4; .5 INJECTION, POWDER, LYOPHILIZED, FOR SOLUTION INTRAVENOUS; PARENTERAL at 08:03

## 2018-03-01 RX ADMIN — Medication 3 ML: at 02:03

## 2018-03-01 RX ADMIN — CYCLOBENZAPRINE HYDROCHLORIDE 5 MG: 5 TABLET, FILM COATED ORAL at 05:03

## 2018-03-01 RX ADMIN — HEPARIN SODIUM 5000 UNITS: 5000 INJECTION, SOLUTION INTRAVENOUS; SUBCUTANEOUS at 09:03

## 2018-03-01 RX ADMIN — FAMOTIDINE 20 MG: 20 TABLET, FILM COATED ORAL at 08:03

## 2018-03-01 RX ADMIN — CLONIDINE HYDROCHLORIDE 0.1 MG: 0.1 TABLET ORAL at 08:03

## 2018-03-01 RX ADMIN — DULOXETINE 60 MG: 60 CAPSULE, DELAYED RELEASE ORAL at 08:03

## 2018-03-01 RX ADMIN — PANTOPRAZOLE SODIUM 40 MG: 40 TABLET, DELAYED RELEASE ORAL at 08:03

## 2018-03-01 RX ADMIN — HEPARIN SODIUM 5000 UNITS: 5000 INJECTION, SOLUTION INTRAVENOUS; SUBCUTANEOUS at 02:03

## 2018-03-01 RX ADMIN — IOHEXOL 75 ML: 350 INJECTION, SOLUTION INTRAVENOUS at 12:03

## 2018-03-01 RX ADMIN — OXYCODONE HYDROCHLORIDE 10 MG: 5 TABLET ORAL at 10:03

## 2018-03-01 RX ADMIN — Medication 3 ML: at 10:03

## 2018-03-01 RX ADMIN — FLUTICASONE PROPIONATE 50 MCG: 50 SPRAY, METERED NASAL at 08:03

## 2018-03-01 RX ADMIN — CYCLOBENZAPRINE HYDROCHLORIDE 5 MG: 5 TABLET, FILM COATED ORAL at 09:03

## 2018-03-01 NOTE — ASSESSMENT & PLAN NOTE
Ms. Marshall is a 61 y/o F who was found to have a rectal mass and completed chemoXRT and LAR w/ ileal resection with loop ileostomy for 2 carcinoids on 12/11/17  (neg nodes) on 1/22/18 she underwent pouchagram and then subsequent loop ileostomy takedown on 1/23/18 who presents now with ECF and pSBO now 9 Days Post-Op from Ex lap, ileocolic resection (with fistula) and GIANNI.     - NGT removed , tolerating Boost TID and low res diet  - Stable tachycardia, restarted home blood pressure meds.   - Increased WBC to 19 today, plan for CT abdomen pelvis w/ contrast, also urine culture/ CXR  - Patient likely malnourished, had anorexia on admission, TPN off now  - Daily labs, including CRP as well as Prealbumin  - Surgical site infection opened at superior and inferior midline, repacked today  - Continue with antibiotics (zosyn) Abdominal wound growing out ESBL. Estimated end date 3/3/18 per ID  - Continue Wound vac at the RLQ site today changes Monday/thursday  - RAYMOND resolved, Osuna out.  - OOB and ambulating. PT/OT and IS.

## 2018-03-01 NOTE — PROCEDURES
Radiology Post-Procedure Note    Pre Op Diagnosis: right pelvic abscess    Post Op Diagnosis: right pelvic abscess    Procedure:right pelvic abscess drainage    Procedure performed by: Yue CAPELLAN, Jas Davis    Written Informed Consent Obtained: Yes    Specimen Removed: YES serosanguinous fluid    Estimated Blood Loss: Minimal    Findings: CT was used for localization of abnormal fluid collection. A needle was inserted into the fluid collection and serosanguinous fluid was aspirated.  A wire was inserted into the collection and the tract was dilated.  A 8.0 Spanish all-purpose drainage catheter was inserted and a pigtail loop of the distal end was formed.  The catheter was sutured into place and approximately  10 mL fluid was removed initially for C/S.  Catheter then connected to suction bulb.     A specimen was sent to the lab for further analysis and culture.    The patient tolerated procedure well and there were no complications. Please see procedure report under Imaging for further details.    Ryan Turner MD  Staff Radiologist  Department of Radiology  Pager: [phone]

## 2018-03-01 NOTE — SUBJECTIVE & OBJECTIVE
Subjective:     Interval History:     No acute events overnight, No BMs since yesterday. Tolerating low res diet, no nausea or emesis. Abdominal pain improved. Wound vac in place, to be changed today     Post-Op Info:  Procedure(s) (LRB):  ileocoloc resection, lysis of adhesions (N/A)   9 Days Post-Op      Medications:  Continuous Infusions:    Scheduled Meds:   cloNIDine  0.1 mg Oral BID    cyclobenzaprine  5 mg Oral TID    DULoxetine  60 mg Oral Daily    famotidine  20 mg Oral Daily    fluticasone  1 spray Each Nare Daily    heparin (porcine)  5,000 Units Subcutaneous Q8H    hydroCHLOROthiazide  12.5 mg Oral Daily    pantoprazole  40 mg Oral Daily    piperacillin-tazobactam (ZOSYN) IVPB  4.5 g Intravenous Q8H    sodium chloride 0.9%  3 mL Intravenous Q8H    spironolactone  25 mg Oral Daily     PRN Meds:   diphenhydrAMINE    fentaNYL    ondansetron    oxyCODONE    oxyCODONE    promethazine (PHENERGAN) IVPB    sodium chloride 0.9%        Objective:     Vital Signs (Most Recent):  Temp: 99.2 °F (37.3 °C) (03/01/18 0748)  Pulse: 100 (03/01/18 0748)  Resp: 20 (03/01/18 0748)  BP: 106/68 (03/01/18 0748)  SpO2: 97 % (03/01/18 0748) Vital Signs (24h Range):  Temp:  [98 °F (36.7 °C)-100.1 °F (37.8 °C)] 99.2 °F (37.3 °C)  Pulse:  [] 100  Resp:  [16-20] 20  SpO2:  [94 %-98 %] 97 %  BP: (106-124)/(59-68) 106/68     Intake/Output - Last 3 Shifts       02/27 0700 - 02/28 0659 02/28 0700 - 03/01 0659 03/01 0700 - 03/02 0659    P.O. 150 740     I.V. (mL/kg)       IV Piggyback 300 300     .4 707.1     Total Intake(mL/kg) 1020.4 (12.4) 1747.1 (21.2)     Urine (mL/kg/hr) 350 (0.2) 1526 (0.8) 400 (2.9)    Emesis/NG output  0 (0)     Drains       Other 0 (0) 50 (0)     Stool  0 (0)     Total Output 350 1576 400    Net +670.4 +171.1 -400           Urine Occurrence 3 x 2 x     Stool Occurrence 4 x 0 x     Emesis Occurrence  0 x           Physical Exam      General: In no acute distress, well appearance.    CV: RRR, S1, S2 no murmur or gallops   Pulm: non labored breathing  Abd: soft, less distended, appropiately tender. Incision with glue. Wound vac in place in RLQ, midline with some serous drainage, opened and packed, still distended    Ext: wwp       Significant Labs:  BMP (Last 3 Results):     Recent Labs  Lab 02/27/18  0516 02/28/18  0346 03/01/18  0352   * 117* 75   * 135* 133*   K 4.3 4.0 4.5    98 97   CO2 27 29 28   BUN 13 12 14   CREATININE 0.7 0.7 0.8   CALCIUM 8.7 8.8 8.7   MG 1.7 2.0 1.7     CBC (Last 3 Results):     Recent Labs  Lab 02/27/18  0516 02/28/18  0346 03/01/18  0352   WBC 16.70* 17.36* 18.90*   RBC 3.38* 3.11* 3.06*   HGB 8.7* 8.2* 7.8*   HCT 26.7* 24.7* 24.1*   * 410* 482*   MCV 79* 79* 79*   MCH 25.7* 26.4* 25.5*   MCHC 32.6 33.2 32.4

## 2018-03-01 NOTE — PROGRESS NOTES
Wound vac dressing changed to abdomen wound as directed.  Wound with increased granulation.  No odor. No redness. Serosanguinous drainage. Abdomen swollen and tight and pt reports increased pain.  Nursing aware. Pt tolerated vac dressing change well. Pt spouse at bedside and took pictures with his cell phone of the open wound.  Spoke with Dr. Seth regarding midline incision, recommending incorporating vac to the incision or to pack with Aquacel AG.  Dr. Seth will make decision regarding the recommendation after viewing CT of abdomen.  Next vac dressing change Monday 3/5             03/01/18 1110       Incision/Site 02/19/18 1143 Abdomen   Date First Assessed/Time First Assessed: 02/19/18 1143   Location: Abdomen   Wound Image    Incision WDL ex   Dressing Appearance Intact   Drainage Amount Small   Drainage Characteristics/Odor Serosanguineous;No odor   Appearance Pink;Red;Moist;Adipose;Slough;Sutures intact   Periwound Area Intact   Wound Edges Open   Wound Length (cm) 1.5   Wound Width (cm) 10.5   Depth (cm) 4.5   Tunneling (depth (cm)/location) 4.5  (at 9 o'clock and 3.5 superiorly)   Care Cleansed with:;Sterile normal saline   Dressing Other (see comments)  (vac therapy)   Dressing Change Due 03/05/18       Negative Pressure Wound Therapy    Placement Date: 02/22/18   Side: Right  Location: Abdomen   NPWT Type Vacuum Therapy   Therapy Setting NPWT Continuous therapy   Pressure Setting NPWT 75 mmHg   Therapy Interventions NPWT Dressing changed   Sponges Inserted NPWT White;Black

## 2018-03-01 NOTE — PLAN OF CARE
03/01/18 1046   Discharge Reassessment   Assessment Type Discharge Planning Reassessment   Provided patient/caregiver education on the expected discharge date and the discharge plan Yes   Do you have any problems affording any of your prescribed medications? No   Discharge Plan A Home with family;Home Health   Discharge Plan B Home Health;Home with family

## 2018-03-01 NOTE — PT/OT/SLP PROGRESS
Physical Therapy Treatment    Patient Name:  Coby Marshall   MRN:  [id number]    Recommendations:     Discharge Recommendations:  home health PT   Discharge Equipment Recommendations: bedside commode, walker, rolling, shower chair   Barriers to discharge: None    Assessment:     Coby Marshall is a 62 y.o. female admitted with a medical diagnosis of Carcinoid tumor of rectum.  She presents with the following impairments/functional limitations:  weakness, impaired endurance, impaired self care skills, gait instability, impaired functional mobilty, impaired balance, decreased lower extremity function, edema .Pt continues to remain motivated and cooperative with treatment session. Pt Progressing with PT Intervention.  Pt would continue to benefit from skilled PT to address overall functional mobility and goals. Goals remain appropriate.      Rehab Prognosis:  good; patient would benefit from acute skilled PT services to address these deficits and reach maximum level of function.      Recent Surgery: Procedure(s) (LRB):  ileocoloc resection, lysis of adhesions (N/A) 9 Days Post-Op    Plan:     During this hospitalization, patient to be seen 4 x/week to address the above listed problems via gait training, therapeutic activities, therapeutic exercises  · Plan of Care Expires:  03/22/18   Plan of Care Reviewed with: patient, spouse    Subjective     Communicated with RN prior to session.  Patient found seated upon PT entry to room, agreeable to treatment.      Chief Complaint: I am going for a test later  Pain/Comfort:  · Pain Rating 1: 5/10  · Location - Side 1: Right  · Location - Orientation 1: generalized  · Location 1: abdomen  · Pain Addressed 1: Pre-medicate for activity, Reposition, Distraction  · Pain Rating Post-Intervention 1: 5/10    Patients cultural, spiritual, Hinduism conflicts given the current situation: none noted    Objective:     Patient found with: telemetry, wound vac     General Precautions:  Standard, fall   Orthopedic Precautions:N/A   Braces: N/A     Functional Mobility:  ·   · Transfers:     · Sit to Stand:  SBA with rolling walker  · SPT bed <> toilet with no AD with SBA/CGA  · Gait: 200' with RW and SBA/CGA with decreased step length/eric    AM-PAC 6 CLICK MOBILITY  Turning over in bed (including adjusting bedclothes, sheets and blankets)?: 3  Sitting down on and standing up from a chair with arms (e.g., wheelchair, bedside commode, etc.): 3  Moving from lying on back to sitting on the side of the bed?: 3  Moving to and from a bed to a chair (including a wheelchair)?: 3  Climbing 3-5 steps with a railing?: 2       Therapeutic Activities and Exercises:   Discussed/educated patient on progress, safety, d/c, PT POC   White board updated   Donned an extra gown    Pt encouraged to ambulate  3x/day with nursing or family assistance to improve endurance.   Pt safe to ambulate in hallway with RN or PCT assistance     Patient left up in chair with all lines intact, call button in reach, nsg notified and spouse present..    GOALS:    Physical Therapy Goals        Problem: Physical Therapy Goal    Goal Priority Disciplines Outcome Goal Variances Interventions   Physical Therapy Goal     PT/OT, PT Ongoing (interventions implemented as appropriate)     Description:  Goals to be met by: 18, extended to 3/8/2018    Patient will increase functional independence with mobility by performin. Supine to sit with Modified [place]-not met  2. Sit to supine with Modified [place]-not met  3. Sit to stand transfer with Supervision-not met  4. Bed to chair transfer with Supervision using no AD-not met  5. Gait  x 150 feet with Stand by Assistance with/without AD-met  6. Lower extremity exercise program x20 reps per handout, with independence-not met                        Time Tracking:     PT Received On: 18  PT Start Time: 0940     PT Stop Time: 1005  PT Total Time (min): 25 min     Billable  Minutes: Gait Training 15 and Therapeutic Activity 10    Treatment Type: Treatment  PT/PTA: PTA     PTA Visit Number: 4     Cachorro Pritchard, PTA  03/01/2018

## 2018-03-01 NOTE — PROGRESS NOTES
Drain placement complete, to bulb suction, dsd c/d/i.  Pt tolerates well.  Report called to Sabina LUCIO.  Pt awaits transport to floor.

## 2018-03-01 NOTE — PLAN OF CARE
Problem: Patient Care Overview  Goal: Plan of Care Review  Outcome: Ongoing (interventions implemented as appropriate)  POC reviewed with pt and pt's family, both verbalized understanding. AAOx4. VSS on room air. ML dressing with gauze, top gauze changed this afternoon. Wound vac to R lower abdomen at 75 mmHg, ~50 ml serosang drainage in canister. PRN pain meds controlling abdominal pain. Up in chair most of shift, walks with walker and spouse in hallway throughout shift. Adequate UOP. Passing flatus. LBM 3/1/18. Port accessed, blood return noted. CT scan done, has remained NPO throughout shift for scan and now IR drain placement. Osuna catheter replaced for IR drain placement, will pull once back up from IR.  No emesis/no nausea. Call bell within reach, frequent rounds made. WCTM.

## 2018-03-01 NOTE — PLAN OF CARE
Problem: Patient Care Overview  Goal: Plan of Care Review  Outcome: Ongoing (interventions implemented as appropriate)  POC reviewed with pt who verbalized understanding. AAOx4. VSS. Remains free of falls and injury. ML dressing intact. Right abd wound vac in place at 75suction. Tolerating low fiber/res diet; no complaints of nausea. No complaints of pain. Up to bedside commode with assist/incontinence care preformed PRN. SCDs in place. Resting between care. No acute events. No distress noted.  at bedside. Will continue to monitor.

## 2018-03-01 NOTE — PROGRESS NOTES
[facility]  Colorectal Surgery  Progress Note    Patient Name: Coby Marshall  MRN: [id number]  Admission Date: 2/19/2018  Hospital Length of Stay: 10 days  Attending Physician: Suhas Manjarrez MD    Subjective:     Interval History:     No acute events overnight, No BMs since yesterday. Tolerating low res diet, no nausea or emesis. Abdominal pain improved. Wound vac in place, to be changed today     Post-Op Info:  Procedure(s) (LRB):  ileocoloc resection, lysis of adhesions (N/A)   9 Days Post-Op      Medications:  Continuous Infusions:    Scheduled Meds:   cloNIDine  0.1 mg Oral BID    cyclobenzaprine  5 mg Oral TID    DULoxetine  60 mg Oral Daily    famotidine  20 mg Oral Daily    fluticasone  1 spray Each Nare Daily    heparin (porcine)  5,000 Units Subcutaneous Q8H    hydroCHLOROthiazide  12.5 mg Oral Daily    pantoprazole  40 mg Oral Daily    piperacillin-tazobactam (ZOSYN) IVPB  4.5 g Intravenous Q8H    sodium chloride 0.9%  3 mL Intravenous Q8H    spironolactone  25 mg Oral Daily     PRN Meds:   diphenhydrAMINE    fentaNYL    ondansetron    oxyCODONE    oxyCODONE    promethazine (PHENERGAN) IVPB    sodium chloride 0.9%        Objective:     Vital Signs (Most Recent):  Temp: 99.2 °F (37.3 °C) (03/01/18 0748)  Pulse: 100 (03/01/18 0748)  Resp: 20 (03/01/18 0748)  BP: 106/68 (03/01/18 0748)  SpO2: 97 % (03/01/18 0748) Vital Signs (24h Range):  Temp:  [98 °F (36.7 °C)-100.1 °F (37.8 °C)] 99.2 °F (37.3 °C)  Pulse:  [] 100  Resp:  [16-20] 20  SpO2:  [94 %-98 %] 97 %  BP: (106-124)/(59-68) 106/68     Intake/Output - Last 3 Shifts       02/27 0700 - 02/28 0659 02/28 0700 - 03/01 0659 03/01 0700 - 03/02 0659    P.O. 150 740     I.V. (mL/kg)       IV Piggyback 300 300     .4 707.1     Total Intake(mL/kg) 1020.4 (12.4) 1747.1 (21.2)     Urine (mL/kg/hr) 350 (0.2) 1526 (0.8) 400 (2.9)    Emesis/NG output  0 (0)     Drains       Other 0 (0) 50 (0)     Stool  0 (0)      Total Output 350 1576 400    Net +670.4 +171.1 -400           Urine Occurrence 3 x 2 x     Stool Occurrence 4 x 0 x     Emesis Occurrence  0 x           Physical Exam      General: In no acute distress, well appearance.   CV: RRR, S1, S2 no murmur or gallops   Pulm: non labored breathing  Abd: soft, less distended, appropiately tender. Incision with glue. Wound vac in place in RLQ, midline with some serous drainage, opened and packed, still distended    Ext: wwp       Significant Labs:  BMP (Last 3 Results):     Recent Labs  Lab 02/27/18  0516 02/28/18  0346 03/01/18  0352   * 117* 75   * 135* 133*   K 4.3 4.0 4.5    98 97   CO2 27 29 28   BUN 13 12 14   CREATININE 0.7 0.7 0.8   CALCIUM 8.7 8.8 8.7   MG 1.7 2.0 1.7     CBC (Last 3 Results):     Recent Labs  Lab 02/27/18  0516 02/28/18  0346 03/01/18  0352   WBC 16.70* 17.36* 18.90*   RBC 3.38* 3.11* 3.06*   HGB 8.7* 8.2* 7.8*   HCT 26.7* 24.7* 24.1*   * 410* 482*   MCV 79* 79* 79*   MCH 25.7* 26.4* 25.5*   MCHC 32.6 33.2 32.4           Assessment/Plan:     * Carcinoid tumor of rectum    Ms. Marshall is a 63 y/o F who was found to have a rectal mass and completed chemoXRT and LAR w/ ileal resection with loop ileostomy for 2 carcinoids on 12/11/17  (neg nodes) on 1/22/18 she underwent pouchagram and then subsequent loop ileostomy takedown on 1/23/18 who presents now with ECF and pSBO now 9 Days Post-Op from Ex lap, ileocolic resection (with fistula) and GIANNI.     - NGT removed , tolerating Boost TID and low res diet  - Stable tachycardia, restarted home blood pressure meds.   - Increased WBC to 19 today, plan for CT abdomen pelvis w/ contrast, also urine culture/ CXR  - Patient likely malnourished, had anorexia on admission, TPN off now  - Daily labs, including CRP as well as Prealbumin  - Surgical site infection opened at superior and inferior midline, repacked today  - Continue with antibiotics (zosyn) Abdominal wound growing out ESBL. Estimated  end date 3/3/18 per ID  - Continue Wound vac at the RLQ site today changes Monday/thursday  - RAYMOND resolved, Osuna out.  - OOB and ambulating. PT/OT and IS.                   Pavan Seth MD  Colorectal Surgery  [facility]

## 2018-03-01 NOTE — PLAN OF CARE
Problem: Physical Therapy Goal  Goal: Physical Therapy Goal  Goals to be met by: 18, extended to 3/8/2018    Patient will increase functional independence with mobility by performin. Supine to sit with Modified [place]-not met  2. Sit to supine with Modified [place]-not met  3. Sit to stand transfer with Supervision-not met  4. Bed to chair transfer with Supervision using no AD-not met  5. Gait  x 150 feet with Stand by Assistance with/without AD-met  6. Lower extremity exercise program x20 reps per handout, with independence-not met      Pt progressing towards goals. continue with PT POC.Goals remain appropriate.  Cachorro Pritchard PTA 3/1/2018

## 2018-03-01 NOTE — CONSULTS
Radiology Consult    Coby Marshall is a 62 y.o. female with a history of carcinoid tumor s/p small bowel resection, now with intra abdominal fluid and free air. Fluid is located along the right paracolic gutter and pelvis, with multiple close loops of bowel. Technically maybe difficult to place a drain.  Past Medical History:   Diagnosis Date    Asthma     Cancer     rectal cancer in remission    Hyperlipidemia     Hypertension      Past Surgical History:   Procedure Laterality Date    COLON SURGERY      HYSTERECTOMY      port a cath Right     R chest wall       Discussed with primary team, Dr. Seth.    Imaging reviewed with Radiology staff, Dr. Farley.     Procedure: Ir abscess aspiration and/or drain placement.    Scheduled Meds:    cloNIDine  0.1 mg Oral BID    cyclobenzaprine  5 mg Oral TID    DULoxetine  60 mg Oral Daily    famotidine  20 mg Oral Daily    fluticasone  1 spray Each Nare Daily    heparin (porcine)  5,000 Units Subcutaneous Q8H    hydroCHLOROthiazide  12.5 mg Oral Daily    pantoprazole  40 mg Oral Daily    piperacillin-tazobactam (ZOSYN) IVPB  4.5 g Intravenous Q8H    sodium chloride 0.9%  3 mL Intravenous Q8H    spironolactone  25 mg Oral Daily    vancomycin (VANCOCIN) IVPB  1,000 mg Intravenous Q12H     Continuous Infusions:   PRN Meds:diphenhydrAMINE, fentaNYL, ondansetron, oxyCODONE, oxyCODONE, promethazine (PHENERGAN) IVPB, sodium chloride 0.9%    Allergies:   Review of patient's allergies indicates:   Allergen Reactions    Lisinopril-hydrochlorothiazide Swelling     Mouth Swelling    Sulfa (sulfonamide antibiotics) Swelling     Swelling of lips       Labs:  No results for input(s): INR in the last 168 hours.    Invalid input(s):  PT,  PTT    Recent Labs  Lab 03/01/18  0352   WBC 18.90*   HGB 7.8*   HCT 24.1*   MCV 79*   *      Recent Labs  Lab 03/01/18  0352   GLU 75   *   K 4.5   CL 97   CO2 28   BUN 14   CREATININE 0.8   CALCIUM 8.7   MG 1.7          Vitals (Most Recent):  Temp: 98.9 °F (37.2 °C) (03/01/18 1130)  Pulse: 95 (03/01/18 1130)  Resp: 20 (03/01/18 1130)  BP: 120/68 (03/01/18 1130)  SpO2: 95 % (03/01/18 1130)    Plan:   1. Continue NPO.   2. Hold anticoagulants.  3. Pt scheduled for abscess aspiration or drain placement.   4. Place and clamp [place] catheter to enable procedure. [place] can be removed immediately following the procedure.    Magdaleno Joshi MD  Radiology

## 2018-03-01 NOTE — H&P
Inpatient Radiology Pre-procedure Note    History of Present Illness:  Coby Marshall is a 62 y.o. female who presents for CT guided pelvis abscess drain placement.    Admission H&P reviewed.  Past Medical History:   Diagnosis Date    Asthma     Cancer     rectal cancer in remission    Hyperlipidemia     Hypertension      Past Surgical History:   Procedure Laterality Date    COLON SURGERY      HYSTERECTOMY      port a cath Right     R chest wall       Review of Systems:   As documented in primary team H&P    Home Meds:   Prior to Admission medications    Medication Sig Start Date End Date Taking? Authorizing Provider   ALBUTEROL INHL Inhale into the lungs.   Yes Historical Provider, MD   aspirin (ECOTRIN) 81 MG EC tablet Take 81 mg by mouth once daily.   Yes Historical Provider, MD   atorvastatin (LIPITOR) 20 MG tablet Take 20 mg by mouth once daily.   Yes Historical Provider, MD   cloNIDine (CATAPRES) 0.1 MG tablet Take 0.1 mg by mouth 2 (two) times daily.   Yes Historical Provider, MD   diclofenac (VOLTAREN) 75 MG EC tablet Take 75 mg by mouth 2 (two) times daily.   Yes Historical Provider, MD   DULoxetine (CYMBALTA) 60 MG capsule Take 60 mg by mouth once daily.   Yes Historical Provider, MD   famotidine (PEPCID) 20 MG tablet Take 20 mg by mouth 2 (two) times daily.   Yes Historical Provider, MD   fluticasone (FLONASE) 50 mcg/actuation nasal spray 1 spray by Each Nare route once daily.   Yes Historical Provider, MD   hydroCHLOROthiazide (MICROZIDE) 12.5 mg capsule Take 12.5 mg by mouth once daily.   Yes Historical Provider, MD   hydrOXYzine HCl (ATARAX) 25 MG tablet Take 25 mg by mouth 3 (three) times daily.   Yes Historical Provider, MD   omeprazole (PRILOSEC) 20 MG capsule Take 20 mg by mouth once daily.   Yes Historical Provider, MD   oxyCODONE (ROXICODONE) 5 MG immediate release tablet Take 1 tablet (5 mg total) by mouth every 4 (four) hours as needed. 1/25/18  Yes LUIS DANIEL Sheehan  sulfADIAZINE 1% (SILVADENE) 1 % cream Apply topically 2 (two) times daily.   Yes Historical Provider, MD   spironolactone (ALDACTONE) 25 MG tablet Take 25 mg by mouth once daily.   Yes Historical Provider, MD     Scheduled Meds:    cloNIDine  0.1 mg Oral BID    cyclobenzaprine  5 mg Oral TID    DULoxetine  60 mg Oral Daily    famotidine  20 mg Oral Daily    fluticasone  1 spray Each Nare Daily    heparin (porcine)  5,000 Units Subcutaneous Q8H    hydroCHLOROthiazide  12.5 mg Oral Daily    pantoprazole  40 mg Oral Daily    piperacillin-tazobactam (ZOSYN) IVPB  4.5 g Intravenous Q8H    sodium chloride 0.9%  3 mL Intravenous Q8H    spironolactone  25 mg Oral Daily    vancomycin (VANCOCIN) IVPB  1,000 mg Intravenous Q12H     Continuous Infusions:   PRN Meds:diphenhydrAMINE, fentaNYL, ondansetron, oxyCODONE, oxyCODONE, promethazine (PHENERGAN) IVPB, sodium chloride 0.9%  Anticoagulants/Antiplatelets: no anticoagulation    Allergies:   Review of patient's allergies indicates:   Allergen Reactions    Lisinopril-hydrochlorothiazide Swelling     Mouth Swelling    Sulfa (sulfonamide antibiotics) Swelling     Swelling of lips     Sedation Hx: have not been any systemic reactions    Labs:  No results for input(s): INR in the last 168 hours.    Invalid input(s):  PT,  PTT    Recent Labs  Lab 03/01/18  0352   WBC 18.90*   HGB 7.8*   HCT 24.1*   MCV 79*   *      Recent Labs  Lab 03/01/18  0352   GLU 75   *   K 4.5   CL 97   CO2 28   BUN 14   CREATININE 0.8   CALCIUM 8.7   MG 1.7         Vitals:  Temp: 98.9 °F (37.2 °C) (03/01/18 1614)  Pulse: 100 (03/01/18 1614)  Resp: 16 (03/01/18 1614)  BP: (!) 109/59 (03/01/18 1614)  SpO2: 97 % (03/01/18 1614)     Physical Exam:  ASA: 2  Mallampati: 2    General: no acute distress  Mental Status: alert and oriented to person, place and time  HEENT: normocephalic, atraumatic  Chest: unlabored breathing  Heart: regular heart rate  Abdomen: nondistended  Extremity: moves  all extremities    Plan: CT guided pelvis abscess drain placement.    Sedation Plan: anel Mark M.D. 4:37 PM 3/1/2018

## 2018-03-02 ENCOUNTER — TELEPHONE (OUTPATIENT)
Dept: SURGERY | Facility: CLINIC | Age: 63
End: 2018-03-02

## 2018-03-02 PROBLEM — T81.31XA DEHISCENCE OF INCISION: Status: ACTIVE | Noted: 2018-03-02

## 2018-03-02 LAB
ANION GAP SERPL CALC-SCNC: 8 MMOL/L
BACTERIA UR CULT: NO GROWTH
BASOPHILS # BLD AUTO: 0.03 K/UL
BASOPHILS NFR BLD: 0.2 %
BUN SERPL-MCNC: 11 MG/DL
CALCIUM SERPL-MCNC: 8.3 MG/DL
CHLORIDE SERPL-SCNC: 98 MMOL/L
CO2 SERPL-SCNC: 28 MMOL/L
CREAT SERPL-MCNC: 0.8 MG/DL
DIFFERENTIAL METHOD: ABNORMAL
EOSINOPHIL # BLD AUTO: 0.3 K/UL
EOSINOPHIL NFR BLD: 1.5 %
ERYTHROCYTE [DISTWIDTH] IN BLOOD BY AUTOMATED COUNT: 16.3 %
EST. GFR  (AFRICAN AMERICAN): >60 ML/MIN/1.73 M^2
EST. GFR  (NON AFRICAN AMERICAN): >60 ML/MIN/1.73 M^2
GLUCOSE SERPL-MCNC: 90 MG/DL
HCT VFR BLD AUTO: 23.7 %
HGB BLD-MCNC: 7.7 G/DL
IMM GRANULOCYTES # BLD AUTO: 0.25 K/UL
IMM GRANULOCYTES NFR BLD AUTO: 1.4 %
LYMPHOCYTES # BLD AUTO: 0.7 K/UL
LYMPHOCYTES NFR BLD: 3.7 %
MAGNESIUM SERPL-MCNC: 1.6 MG/DL
MCH RBC QN AUTO: 25.8 PG
MCHC RBC AUTO-ENTMCNC: 32.5 G/DL
MCV RBC AUTO: 79 FL
MONOCYTES # BLD AUTO: 1 K/UL
MONOCYTES NFR BLD: 5.6 %
NEUTROPHILS # BLD AUTO: 16 K/UL
NEUTROPHILS NFR BLD: 87.6 %
NRBC BLD-RTO: 0 /100 WBC
PHOSPHATE SERPL-MCNC: 3.9 MG/DL
PLATELET # BLD AUTO: 545 K/UL
PMV BLD AUTO: 9.2 FL
POTASSIUM SERPL-SCNC: 4.3 MMOL/L
RBC # BLD AUTO: 2.99 M/UL
SODIUM SERPL-SCNC: 134 MMOL/L
VANCOMYCIN TROUGH SERPL-MCNC: 9.7 UG/ML
WBC # BLD AUTO: 18.23 K/UL

## 2018-03-02 PROCEDURE — 87040 BLOOD CULTURE FOR BACTERIA: CPT

## 2018-03-02 PROCEDURE — 85025 COMPLETE CBC W/AUTO DIFF WBC: CPT

## 2018-03-02 PROCEDURE — 97110 THERAPEUTIC EXERCISES: CPT

## 2018-03-02 PROCEDURE — 97116 GAIT TRAINING THERAPY: CPT

## 2018-03-02 PROCEDURE — 20600001 HC STEP DOWN PRIVATE ROOM

## 2018-03-02 PROCEDURE — 25000003 PHARM REV CODE 250: Performed by: NURSE PRACTITIONER

## 2018-03-02 PROCEDURE — 83735 ASSAY OF MAGNESIUM: CPT

## 2018-03-02 PROCEDURE — 80202 ASSAY OF VANCOMYCIN: CPT

## 2018-03-02 PROCEDURE — A4217 STERILE WATER/SALINE, 500 ML: HCPCS | Performed by: STUDENT IN AN ORGANIZED HEALTH CARE EDUCATION/TRAINING PROGRAM

## 2018-03-02 PROCEDURE — 80048 BASIC METABOLIC PNL TOTAL CA: CPT

## 2018-03-02 PROCEDURE — 63600175 PHARM REV CODE 636 W HCPCS: Performed by: NURSE PRACTITIONER

## 2018-03-02 PROCEDURE — 97530 THERAPEUTIC ACTIVITIES: CPT

## 2018-03-02 PROCEDURE — A4216 STERILE WATER/SALINE, 10 ML: HCPCS | Performed by: STUDENT IN AN ORGANIZED HEALTH CARE EDUCATION/TRAINING PROGRAM

## 2018-03-02 PROCEDURE — 63600175 PHARM REV CODE 636 W HCPCS: Performed by: STUDENT IN AN ORGANIZED HEALTH CARE EDUCATION/TRAINING PROGRAM

## 2018-03-02 PROCEDURE — 36415 COLL VENOUS BLD VENIPUNCTURE: CPT

## 2018-03-02 PROCEDURE — 87040 BLOOD CULTURE FOR BACTERIA: CPT | Mod: 59

## 2018-03-02 PROCEDURE — 25000003 PHARM REV CODE 250: Performed by: STUDENT IN AN ORGANIZED HEALTH CARE EDUCATION/TRAINING PROGRAM

## 2018-03-02 PROCEDURE — B4185 PARENTERAL SOL 10 GM LIPIDS: HCPCS | Performed by: STUDENT IN AN ORGANIZED HEALTH CARE EDUCATION/TRAINING PROGRAM

## 2018-03-02 PROCEDURE — 84100 ASSAY OF PHOSPHORUS: CPT

## 2018-03-02 RX ORDER — MAGNESIUM SULFATE/D5W 2 G/50 ML
2 INTRAVENOUS SOLUTION, PIGGYBACK (ML) INTRAVENOUS ONCE
Status: COMPLETED | OUTPATIENT
Start: 2018-03-02 | End: 2018-03-02

## 2018-03-02 RX ADMIN — OXYCODONE HYDROCHLORIDE 5 MG: 5 TABLET ORAL at 11:03

## 2018-03-02 RX ADMIN — PIPERACILLIN AND TAZOBACTAM 4.5 G: 4; .5 INJECTION, POWDER, LYOPHILIZED, FOR SOLUTION INTRAVENOUS; PARENTERAL at 06:03

## 2018-03-02 RX ADMIN — HEPARIN SODIUM 5000 UNITS: 5000 INJECTION, SOLUTION INTRAVENOUS; SUBCUTANEOUS at 05:03

## 2018-03-02 RX ADMIN — PIPERACILLIN AND TAZOBACTAM 4.5 G: 4; .5 INJECTION, POWDER, LYOPHILIZED, FOR SOLUTION INTRAVENOUS; PARENTERAL at 11:03

## 2018-03-02 RX ADMIN — DULOXETINE 60 MG: 60 CAPSULE, DELAYED RELEASE ORAL at 09:03

## 2018-03-02 RX ADMIN — Medication 2 G: at 02:03

## 2018-03-02 RX ADMIN — Medication 1 G: at 05:03

## 2018-03-02 RX ADMIN — FAMOTIDINE 20 MG: 20 TABLET, FILM COATED ORAL at 09:03

## 2018-03-02 RX ADMIN — SOYBEAN OIL 250 ML: 20 INJECTION, SOLUTION INTRAVENOUS at 09:03

## 2018-03-02 RX ADMIN — SODIUM CHLORIDE: 0.9 INJECTION, SOLUTION INTRAVENOUS at 06:03

## 2018-03-02 RX ADMIN — FLUTICASONE PROPIONATE 50 MCG: 50 SPRAY, METERED NASAL at 10:03

## 2018-03-02 RX ADMIN — CYCLOBENZAPRINE HYDROCHLORIDE 5 MG: 5 TABLET, FILM COATED ORAL at 05:03

## 2018-03-02 RX ADMIN — HYDROCHLOROTHIAZIDE 12.5 MG: 12.5 TABLET ORAL at 09:03

## 2018-03-02 RX ADMIN — CLONIDINE HYDROCHLORIDE 0.1 MG: 0.1 TABLET ORAL at 09:03

## 2018-03-02 RX ADMIN — CALCIUM GLUCONATE: 94 INJECTION, SOLUTION INTRAVENOUS at 09:03

## 2018-03-02 RX ADMIN — CYCLOBENZAPRINE HYDROCHLORIDE 5 MG: 5 TABLET, FILM COATED ORAL at 02:03

## 2018-03-02 RX ADMIN — Medication 3 ML: at 05:03

## 2018-03-02 RX ADMIN — HEPARIN SODIUM 5000 UNITS: 5000 INJECTION, SOLUTION INTRAVENOUS; SUBCUTANEOUS at 02:03

## 2018-03-02 RX ADMIN — OXYCODONE HYDROCHLORIDE 5 MG: 5 TABLET ORAL at 04:03

## 2018-03-02 RX ADMIN — PIPERACILLIN AND TAZOBACTAM 4.5 G: 4; .5 INJECTION, POWDER, LYOPHILIZED, FOR SOLUTION INTRAVENOUS; PARENTERAL at 02:03

## 2018-03-02 RX ADMIN — CYCLOBENZAPRINE HYDROCHLORIDE 5 MG: 5 TABLET, FILM COATED ORAL at 09:03

## 2018-03-02 RX ADMIN — SPIRONOLACTONE 25 MG: 25 TABLET, FILM COATED ORAL at 09:03

## 2018-03-02 RX ADMIN — HEPARIN SODIUM 5000 UNITS: 5000 INJECTION, SOLUTION INTRAVENOUS; SUBCUTANEOUS at 09:03

## 2018-03-02 RX ADMIN — PANTOPRAZOLE SODIUM 40 MG: 40 TABLET, DELAYED RELEASE ORAL at 09:03

## 2018-03-02 RX ADMIN — Medication 3 ML: at 02:03

## 2018-03-02 RX ADMIN — OXYCODONE HYDROCHLORIDE 10 MG: 5 TABLET ORAL at 02:03

## 2018-03-02 NOTE — PLAN OF CARE
Problem: Physical Therapy Goal  Goal: Physical Therapy Goal  Goals to be met by: 18, extended to 3/8/2018    Patient will increase functional independence with mobility by performin. Supine to sit with Modified [place]-not met  2. Sit to supine with Modified [place]-not met  3. Sit to stand transfer with Supervision-not met  4. Bed to chair transfer with Supervision using no AD-not met  5. Gait  x 150 feet with Stand by Assistance with/without AD-met  6. Lower extremity exercise program x20 reps per handout, with independence-not met       Pt progressing towards goals. continue with PT POC.Goals remain appropriate.  Cachorro Pritchard PTA  3/2/2018

## 2018-03-02 NOTE — PROGRESS NOTES
[facility]  Colorectal Surgery  Progress Note    Patient Name: Coby Marshall  MRN: [id number]  Admission Date: 2/19/2018  Hospital Length of Stay: 11 days  Attending Physician: Suhas Manjarrez MD    Subjective:     Interval History:     No acute events overnight, patient having BM but still very distended more than yesterday. Had an IR drain placement with SS fluid drainage. WBC still 18. On Zosyn and Vancomycin    Post-Op Info:  Procedure(s) (LRB):  ileocoloc resection, lysis of adhesions (N/A)   10 Days Post-Op      Medications:  Continuous Infusions:   sodium chloride 0.9% 50 mL/hr at 03/01/18 1840    TPN ADULT CENTRAL LINE CUSTOM       Scheduled Meds:   cloNIDine  0.1 mg Oral BID    cyclobenzaprine  5 mg Oral TID    DULoxetine  60 mg Oral Daily    famotidine  20 mg Oral Daily    fat emulsion 20%  250 mL Intravenous Daily    fluticasone  1 spray Each Nare Daily    heparin (porcine)  5,000 Units Subcutaneous Q8H    hydroCHLOROthiazide  12.5 mg Oral Daily    pantoprazole  40 mg Oral Daily    piperacillin-tazobactam (ZOSYN) IVPB  4.5 g Intravenous Q8H    sodium chloride 0.9%  3 mL Intravenous Q8H    spironolactone  25 mg Oral Daily    vancomycin (VANCOCIN) IVPB  1,000 mg Intravenous Q12H     PRN Meds:   diphenhydrAMINE    fentaNYL    ondansetron    oxyCODONE    oxyCODONE    promethazine (PHENERGAN) IVPB    sodium chloride 0.9%        Objective:     Vital Signs (Most Recent):  Temp: 98 °F (36.7 °C) (03/02/18 0459)  Pulse: 96 (03/02/18 0459)  Resp: 16 (03/02/18 0459)  BP: 132/77 (03/02/18 0459)  SpO2: (!) 94 % (03/02/18 0459) Vital Signs (24h Range):  Temp:  [98 °F (36.7 °C)-98.9 °F (37.2 °C)] 98 °F (36.7 °C)  Pulse:  [] 96  Resp:  [16-20] 16  SpO2:  [94 %-100 %] 94 %  BP: (109-139)/(59-78) 132/77     Intake/Output - Last 3 Shifts       02/28 0700 - 03/01 0659 03/01 0700 - 03/02 0659 03/02 0700 - 03/03 0659    P.O. 740 750     I.V. (mL/kg)  532.5 (6.5)     IV Piggyback  300 800     .1      Total Intake(mL/kg) 1747.1 (21.2) 2082.5 (25.2)     Urine (mL/kg/hr) 1526 (0.8) 2050 (1)     Emesis/NG output 0 (0)      Drains  125 (0.1)     Other 50 (0) 50 (0)     Stool 0 (0) 0 (0)     Total Output 1576 2225      Net +171.1 -142.5             Urine Occurrence 2 x 2 x     Stool Occurrence 0 x 1 x     Emesis Occurrence 0 x            Physical Exam     General: In no acute distress, well appearance.   CV: RRR, S1, S2 no murmur or gallops   Pulm: non labored breathing  Abd: soft, less distended, appropiately tender. Incision with glue. Wound vac in place in RLQ, midline with some serous drainage, opened and packed, still distended IR drain in place with SS fluid.   Ext: wwp      Significant Labs:    BMP (Last 3 Results):   Recent Labs  Lab 02/28/18 0346 03/01/18 0352 03/02/18 0223   * 75 90   * 133* 134*   K 4.0 4.5 4.3   CL 98 97 98   CO2 29 28 28   BUN 12 14 11   CREATININE 0.7 0.8 0.8   CALCIUM 8.8 8.7 8.3*   MG 2.0 1.7 1.6     CBC (Last 3 Results):   Recent Labs  Lab 02/28/18 0346 03/01/18 0352 03/02/18 0223   WBC 17.36* 18.90* 18.23*   RBC 3.11* 3.06* 2.99*   HGB 8.2* 7.8* 7.7*   HCT 24.7* 24.1* 23.7*   * 482* 545*   MCV 79* 79* 79*   MCH 26.4* 25.5* 25.8*   MCHC 33.2 32.4 32.5       Significant Diagnostics:    IR drain    Versed was given by the registered nurse for sedation.  Total dose to the patient was 345.22 cGy/cm squared.    After informed consent was obtained, the patient was placed supine on the CT table.  After initial images were acquired an appropriate entry site was marked and confirmed with CT guidance.  Subsequently, the patient was prepped and draped in sterile fashion.  1% lidocaine was utilized for local anesthesia.  A 19-gauge Harjeet guide needle was advanced using CT guidance into the fluid collection in lower abdomen.  A wire was introduced into the collection and the tract was sequentially dilated to 8-Y[place]i.  An 8-Yemeni APD was  introduced over the wire, the wire was removed, the pigtail was formed and locked, and 2-0 silk drain stitch was placed.  Imaging confirms location of the pigtail within the fluid collection.  10 cc of dark red fluid was aspirated and sent to the laboratory for further analysis. Postprocedure images reveal no evidence of postprocedural complications.  The drainage catheter was left to bulb suction.      Assessment/Plan:     * Carcinoid tumor of rectum    Ms. Marshall is a 61 y/o F who was found to have a rectal mass and completed chemoXRT and LAR w/ ileal resection with loop ileostomy for 2 carcinoids on 12/11/17  (neg nodes) on 1/22/18 she underwent pouchagram and then subsequent loop ileostomy takedown on 1/23/18 who presents now with ECF and pSBO now 10 Days Post-Op from Ex lap, ileocolic resection (with fistula) and GIANNI.     - Continue with Clear liquid diet and Boost, patient still very distended. Restart TPN.  - Stable tachycardia, restarted home blood pressure meds.   - Persistent Leukocytosis of 18.  - Pelvic fluid drained and SS.   - Cultures pending.   - Daily labs, including CRP as well as Prealbumin  - Surgical site infection opened at superior and inferior midline, repacked today  - Continue with antibiotics (zosyn) Abdominal wound growing out ESBL. Estimated end date 3/3/18 per ID and started Vancomycin.  - Continue Wound vac at the RLQ site today changes Monday/thursday  - RAYMOND resolved, Osuna out.  - OOB and ambulating. PT/OT and IS.                   Jocelyn Kolb MD  General Surgery PGY IV  Beeper: [phone]

## 2018-03-02 NOTE — SUBJECTIVE & OBJECTIVE
Subjective:     Interval History:     No acute events overnight, patient having BM but still very distended more than yesterday. Had an IR drain placement with SS fluid drainage. WBC still 18. On Zosyn and Vancomycin    Post-Op Info:  Procedure(s) (LRB):  ileocoloc resection, lysis of adhesions (N/A)   10 Days Post-Op      Medications:  Continuous Infusions:   sodium chloride 0.9% 50 mL/hr at 03/01/18 1840    TPN ADULT CENTRAL LINE CUSTOM       Scheduled Meds:   cloNIDine  0.1 mg Oral BID    cyclobenzaprine  5 mg Oral TID    DULoxetine  60 mg Oral Daily    famotidine  20 mg Oral Daily    fat emulsion 20%  250 mL Intravenous Daily    fluticasone  1 spray Each Nare Daily    heparin (porcine)  5,000 Units Subcutaneous Q8H    hydroCHLOROthiazide  12.5 mg Oral Daily    pantoprazole  40 mg Oral Daily    piperacillin-tazobactam (ZOSYN) IVPB  4.5 g Intravenous Q8H    sodium chloride 0.9%  3 mL Intravenous Q8H    spironolactone  25 mg Oral Daily    vancomycin (VANCOCIN) IVPB  1,000 mg Intravenous Q12H     PRN Meds:   diphenhydrAMINE    fentaNYL    ondansetron    oxyCODONE    oxyCODONE    promethazine (PHENERGAN) IVPB    sodium chloride 0.9%        Objective:     Vital Signs (Most Recent):  Temp: 98 °F (36.7 °C) (03/02/18 0459)  Pulse: 96 (03/02/18 0459)  Resp: 16 (03/02/18 0459)  BP: 132/77 (03/02/18 0459)  SpO2: (!) 94 % (03/02/18 0459) Vital Signs (24h Range):  Temp:  [98 °F (36.7 °C)-98.9 °F (37.2 °C)] 98 °F (36.7 °C)  Pulse:  [] 96  Resp:  [16-20] 16  SpO2:  [94 %-100 %] 94 %  BP: (109-139)/(59-78) 132/77     Intake/Output - Last 3 Shifts       02/28 0700 - 03/01 0659 03/01 0700 - 03/02 0659 03/02 0700 - 03/03 0659    P.O. 740 750     I.V. (mL/kg)  532.5 (6.5)     IV Piggyback 300 800     .1      Total Intake(mL/kg) 1747.1 (21.2) 2082.5 (25.2)     Urine (mL/kg/hr) 1526 (0.8) 2050 (1)     Emesis/NG output 0 (0)      Drains  125 (0.1)     Other 50 (0) 50 (0)     Stool 0 (0) 0 (0)      Total Output 1576 2225      Net +171.1 -142.5             Urine Occurrence 2 x 2 x     Stool Occurrence 0 x 1 x     Emesis Occurrence 0 x            Physical Exam     General: In no acute distress, well appearance.   CV: RRR, S1, S2 no murmur or gallops   Pulm: non labored breathing  Abd: soft, less distended, appropiately tender. Incision with glue. Wound vac in place in RLQ, midline with some serous drainage, opened and packed, still distended IR drain in place with SS fluid.   Ext: wwp      Significant Labs:    BMP (Last 3 Results):   Recent Labs  Lab 02/28/18 0346 03/01/18 0352 03/02/18 0223   * 75 90   * 133* 134*   K 4.0 4.5 4.3   CL 98 97 98   CO2 29 28 28   BUN 12 14 11   CREATININE 0.7 0.8 0.8   CALCIUM 8.8 8.7 8.3*   MG 2.0 1.7 1.6     CBC (Last 3 Results):   Recent Labs  Lab 02/28/18 0346 03/01/18 0352 03/02/18 0223   WBC 17.36* 18.90* 18.23*   RBC 3.11* 3.06* 2.99*   HGB 8.2* 7.8* 7.7*   HCT 24.7* 24.1* 23.7*   * 482* 545*   MCV 79* 79* 79*   MCH 26.4* 25.5* 25.8*   MCHC 33.2 32.4 32.5       Significant Diagnostics:    IR drain    Versed was given by the registered nurse for sedation.  Total dose to the patient was 345.22 cGy/cm squared.    After informed consent was obtained, the patient was placed supine on the CT table.  After initial images were acquired an appropriate entry site was marked and confirmed with CT guidance.  Subsequently, the patient was prepped and draped in sterile fashion.  1% lidocaine was utilized for local anesthesia.  A 19-gauge Harjeet guide needle was advanced using CT guidance into the fluid collection in lower abdomen.  A wire was introduced into the collection and the tract was sequentially dilated to 8-[place]ese.  An 8-[place] APD was introduced over the wire, the wire was removed, the pigtail was formed and locked, and 2-0 silk drain stitch was placed.  Imaging confirms location of the pigtail within the fluid collection.  10 cc of dark red fluid was  aspirated and sent to the laboratory for further analysis. Postprocedure images reveal no evidence of postprocedural complications.  The drainage catheter was left to bulb suction.

## 2018-03-02 NOTE — PROGRESS NOTES
Wound care follow-up    Mid-line incision dressing changed, noted new opening at lower incision.  Plan of care discussed with patient/family who verbalized understanding.     03/02/18 1230       Incision/Site 02/19/18 1143 Abdomen upper   Date First Assessed/Time First Assessed: 02/19/18 1143   Location: Abdomen  Orientation: upper  Closure Method: Other (see comments)   Wound Image    Incision WDL ex   Dressing Appearance Intact;Moist drainage   Appearance Red;Moist   Red (%), Wound Tissue Color 100 %   Periwound Area Intact;Swelling   Wound Edges Open   Wound Length (cm) 6.5   Wound Width (cm) 0.5   Depth (cm) 3.5   Care Cleansed with:;Sterile normal saline;Applied:;Skin Barrier   Dressing Changed;Silver;Island/border   Packing Incision packing removed;Incision packed with  (aquacel ag rope)   Dressing Change Due 03/05/18       Incision/Site 03/02/18 1230 Abdomen lower   Date First Assessed/Time First Assessed: 03/02/18 1230   Location: Abdomen  Orientation: lower   Wound Image    Incision WDL ex   Dressing Appearance Intact;Moist drainage   Drainage Amount Scant   Drainage Characteristics/Odor Serosanguineous   Appearance Red;Moist   Red (%), Wound Tissue Color 100 %   Periwound Area Intact;Dry;Swelling   Wound Edges Open   Wound Length (cm) 0.8   Wound Width (cm) 0.3   Depth (cm) 4   Care Cleansed with:;Sterile normal saline;Applied:;Skin Barrier   Dressing Changed;Silver;Island/border   Packing Incision packing removed;Incision packed with  (aquacel Ag rope)   Dressing Change Due 03/05/18

## 2018-03-02 NOTE — PLAN OF CARE
Problem: Patient Care Overview  Goal: Plan of Care Review  Outcome: Ongoing (interventions implemented as appropriate)  POC reviewed with patient and family. Stated understanding.  AAOx4, VSS, ABD midline CD&I, IR drain intact, serous drainage noted.  R subclavian vas port accessed, patent and infusing per order.  Tolerating clear liquid diet without complaints.  Standby assist with ambulation, voids/BSC adequate amount.  Pain managed with meds per order. Bed in low position, call light in reach, will cont to manage POC.

## 2018-03-02 NOTE — TELEPHONE ENCOUNTER
----- Message from Parviz Schaefer sent at 3/2/2018 10:10 AM CST -----  Contact: Pt :[phone]  Pt  called and states she would like to speak with the nurse in regards to getting another stay at the [facility].

## 2018-03-02 NOTE — ASSESSMENT & PLAN NOTE
Ms. Marshall is a 63 y/o F who was found to have a rectal mass and completed chemoXRT and LAR w/ ileal resection with loop ileostomy for 2 carcinoids on 12/11/17  (neg nodes) on 1/22/18 she underwent pouchagram and then subsequent loop ileostomy takedown on 1/23/18 who presents now with ECF and pSBO now 10 Days Post-Op from Ex lap, ileocolic resection (with fistula) and GIANNI.     - Continue with Clear liquid diet and Boost, patient still very distended. Restart TPN.  - Stable tachycardia, restarted home blood pressure meds.   - Persistent Leukocytosis of 18.  - Pelvic fluid drained and SS.   - Cultures pending.   - Daily labs, including CRP as well as Prealbumin  - Surgical site infection opened at superior and inferior midline, repacked today  - Continue with antibiotics (zosyn) Abdominal wound growing out ESBL. Estimated end date 3/3/18 per ID and started Vancomycin.  - Continue Wound vac at the RLQ site today changes Monday/thursday  - RAYMOND resolved, Osuna out.  - OOB and ambulating. PT/OT and IS.

## 2018-03-02 NOTE — TELEPHONE ENCOUNTER
Spoke with patient's  and gave him the number to [facility] to check and see if they have any rooms available.

## 2018-03-02 NOTE — PT/OT/SLP PROGRESS
Physical Therapy Treatment    Patient Name:  Coby Marshall   MRN:  [id number]    Recommendations:     Discharge Recommendations:  home health PT   Discharge Equipment Recommendations: bedside commode, walker, rolling, shower chair   Barriers to discharge: None    Assessment:     Coby Marshall is a 62 y.o. female admitted with a medical diagnosis of Carcinoid tumor of rectum.  She presents with the following impairments/functional limitations:  weakness, impaired endurance, impaired self care skills, gait instability, impaired functional mobilty, edema, decreased lower extremity function, impaired balance .Pt continues to remain motivated and cooperative with treatment session. Pt Progressing with PT Intervention. Pt Progressing with improving gait distance. Pt would continue to benefit from skilled PT to address overall functional mobility and goals. Goals remain appropriate      Rehab Prognosis:  good; patient would benefit from acute skilled PT services to address these deficits and reach maximum level of function.      Recent Surgery: Procedure(s) (LRB):  ileocoloc resection, lysis of adhesions (N/A) 10 Days Post-Op    Plan:     During this hospitalization, patient to be seen 4 x/week to address the above listed problems via gait training, therapeutic activities, therapeutic exercises  · Plan of Care Expires:  03/22/18   Plan of Care Reviewed with: patient, spouse    Subjective     Communicated with RN prior to session.  Patient found seated upon PT entry to room, agreeable to treatment.      Chief Complaint: I may need to use the bathroom before I walk  Pain/Comfort:  · Pain Rating 1: 4/10  · Location - Side 1: Right  · Location - Orientation 1: generalized  · Location 1: abdomen  · Pain Addressed 1: Pre-medicate for activity, Reposition, Distraction  · Pain Rating Post-Intervention 1: 4/10    Patients cultural, spiritual, Adventist conflicts given the current situation: none noted    Objective:     Patient  found with: telemetry, wound vac     General Precautions: Standard, fall   Orthopedic Precautions:N/A   Braces: N/A     Functional Mobility:  ·   · Transfers:     · Sit to Stand:  SBA with rolling walker  · SPT bed <> toilet with no AD with SBA/CGA  · Gait: 300' with RW and SBA/CGA with decreased step length/eric. Pt required 2 standing rest breaks    AM-PAC 6 CLICK MOBILITY  Turning over in bed (including adjusting bedclothes, sheets and blankets)?: 3  Sitting down on and standing up from a chair with arms (e.g., wheelchair, bedside commode, etc.): 3  Moving from lying on back to sitting on the side of the bed?: 3  Moving to and from a bed to a chair (including a wheelchair)?: 3  Need to walk in hospital room?: 3  Climbing 3-5 steps with a railing?: 2  Total Score: 17       Therapeutic Activities and Exercises:   Discussed/educated patient on progress, safety, d/c, PT POC   Patient performed therex X 15 reps seated in bedside chair B LE AROM AP, LAQ, Hip Flexion, Hip Abd/Add   White board updated   Donned an extra gown    Pt encouraged to ambulate  3x/day with nursing or family assistance to improve endurance.   Pt safe to ambulate in hallway with RN or PCT assistance      Patient left up in chair with all lines intact, call button in reach, nsg notified and spouse present..    GOALS:    Physical Therapy Goals        Problem: Physical Therapy Goal    Goal Priority Disciplines Outcome Goal Variances Interventions   Physical Therapy Goal     PT/OT, PT Ongoing (interventions implemented as appropriate)     Description:  Goals to be met by: 18, extended to 3/8/2018    Patient will increase functional independence with mobility by performin. Supine to sit with Modified [place]-not met  2. Sit to supine with Modified [place]-not met  3. Sit to stand transfer with Supervision-not met  4. Bed to chair transfer with Supervision using no AD-not met  5. Gait  x 150 feet with Stand by Assistance  with/without AD-met  6. Lower extremity exercise program x20 reps per handout, with independence-not met                        Time Tracking:     PT Received On: 03/02/18  PT Start Time: 0904     PT Stop Time: 0942  PT Total Time (min): 38 min     Billable Minutes: Gait Training 20, Therapeutic Activity 10 and Therapeutic Exercise 8    Treatment Type: Treatment  PT/PTA: PTA     PTA Visit Number: 5     Cachorro Pritchard, PTA  03/02/2018

## 2018-03-03 LAB
ANION GAP SERPL CALC-SCNC: 7 MMOL/L
BASOPHILS # BLD AUTO: 0.03 K/UL
BASOPHILS NFR BLD: 0.2 %
BUN SERPL-MCNC: 9 MG/DL
CALCIUM SERPL-MCNC: 8.4 MG/DL
CHLORIDE SERPL-SCNC: 99 MMOL/L
CO2 SERPL-SCNC: 27 MMOL/L
CREAT SERPL-MCNC: 0.8 MG/DL
DIFFERENTIAL METHOD: ABNORMAL
EOSINOPHIL # BLD AUTO: 0.3 K/UL
EOSINOPHIL NFR BLD: 1.7 %
ERYTHROCYTE [DISTWIDTH] IN BLOOD BY AUTOMATED COUNT: 16.2 %
EST. GFR  (AFRICAN AMERICAN): >60 ML/MIN/1.73 M^2
EST. GFR  (NON AFRICAN AMERICAN): >60 ML/MIN/1.73 M^2
GLUCOSE SERPL-MCNC: 152 MG/DL
HCT VFR BLD AUTO: 22.2 %
HGB BLD-MCNC: 7.3 G/DL
IMM GRANULOCYTES # BLD AUTO: 0.13 K/UL
IMM GRANULOCYTES NFR BLD AUTO: 0.9 %
LYMPHOCYTES # BLD AUTO: 0.5 K/UL
LYMPHOCYTES NFR BLD: 3.5 %
MAGNESIUM SERPL-MCNC: 2 MG/DL
MCH RBC QN AUTO: 26.4 PG
MCHC RBC AUTO-ENTMCNC: 32.9 G/DL
MCV RBC AUTO: 80 FL
MONOCYTES # BLD AUTO: 0.7 K/UL
MONOCYTES NFR BLD: 5 %
NEUTROPHILS # BLD AUTO: 13 K/UL
NEUTROPHILS NFR BLD: 88.7 %
NRBC BLD-RTO: 0 /100 WBC
PHOSPHATE SERPL-MCNC: 3.7 MG/DL
PLATELET # BLD AUTO: 585 K/UL
PMV BLD AUTO: 9.4 FL
POTASSIUM SERPL-SCNC: 4.1 MMOL/L
RBC # BLD AUTO: 2.76 M/UL
SODIUM SERPL-SCNC: 133 MMOL/L
WBC # BLD AUTO: 14.68 K/UL

## 2018-03-03 PROCEDURE — G8987 SELF CARE CURRENT STATUS: HCPCS | Mod: CK

## 2018-03-03 PROCEDURE — 25000003 PHARM REV CODE 250: Performed by: STUDENT IN AN ORGANIZED HEALTH CARE EDUCATION/TRAINING PROGRAM

## 2018-03-03 PROCEDURE — 83735 ASSAY OF MAGNESIUM: CPT

## 2018-03-03 PROCEDURE — 63600175 PHARM REV CODE 636 W HCPCS: Performed by: STUDENT IN AN ORGANIZED HEALTH CARE EDUCATION/TRAINING PROGRAM

## 2018-03-03 PROCEDURE — 63600175 PHARM REV CODE 636 W HCPCS: Performed by: NURSE PRACTITIONER

## 2018-03-03 PROCEDURE — A4216 STERILE WATER/SALINE, 10 ML: HCPCS | Performed by: STUDENT IN AN ORGANIZED HEALTH CARE EDUCATION/TRAINING PROGRAM

## 2018-03-03 PROCEDURE — 25000003 PHARM REV CODE 250: Performed by: NURSE PRACTITIONER

## 2018-03-03 PROCEDURE — 20600001 HC STEP DOWN PRIVATE ROOM

## 2018-03-03 PROCEDURE — 85025 COMPLETE CBC W/AUTO DIFF WBC: CPT

## 2018-03-03 PROCEDURE — A4217 STERILE WATER/SALINE, 500 ML: HCPCS | Performed by: STUDENT IN AN ORGANIZED HEALTH CARE EDUCATION/TRAINING PROGRAM

## 2018-03-03 PROCEDURE — 97110 THERAPEUTIC EXERCISES: CPT

## 2018-03-03 PROCEDURE — 84100 ASSAY OF PHOSPHORUS: CPT

## 2018-03-03 PROCEDURE — G8988 SELF CARE GOAL STATUS: HCPCS | Mod: CJ

## 2018-03-03 PROCEDURE — 97530 THERAPEUTIC ACTIVITIES: CPT

## 2018-03-03 PROCEDURE — B4185 PARENTERAL SOL 10 GM LIPIDS: HCPCS | Performed by: STUDENT IN AN ORGANIZED HEALTH CARE EDUCATION/TRAINING PROGRAM

## 2018-03-03 PROCEDURE — 80048 BASIC METABOLIC PNL TOTAL CA: CPT

## 2018-03-03 RX ADMIN — PIPERACILLIN AND TAZOBACTAM 4.5 G: 4; .5 INJECTION, POWDER, LYOPHILIZED, FOR SOLUTION INTRAVENOUS; PARENTERAL at 11:03

## 2018-03-03 RX ADMIN — DULOXETINE 60 MG: 60 CAPSULE, DELAYED RELEASE ORAL at 08:03

## 2018-03-03 RX ADMIN — FLUTICASONE PROPIONATE 50 MCG: 50 SPRAY, METERED NASAL at 08:03

## 2018-03-03 RX ADMIN — Medication 3 ML: at 06:03

## 2018-03-03 RX ADMIN — FENTANYL CITRATE 25 MCG: 50 INJECTION, SOLUTION INTRAMUSCULAR; INTRAVENOUS at 05:03

## 2018-03-03 RX ADMIN — HEPARIN SODIUM 5000 UNITS: 5000 INJECTION, SOLUTION INTRAVENOUS; SUBCUTANEOUS at 06:03

## 2018-03-03 RX ADMIN — PANTOPRAZOLE SODIUM 40 MG: 40 TABLET, DELAYED RELEASE ORAL at 08:03

## 2018-03-03 RX ADMIN — CLONIDINE HYDROCHLORIDE 0.1 MG: 0.1 TABLET ORAL at 08:03

## 2018-03-03 RX ADMIN — OXYCODONE HYDROCHLORIDE 10 MG: 5 TABLET ORAL at 08:03

## 2018-03-03 RX ADMIN — CYCLOBENZAPRINE HYDROCHLORIDE 5 MG: 5 TABLET, FILM COATED ORAL at 06:03

## 2018-03-03 RX ADMIN — FENTANYL CITRATE 25 MCG: 50 INJECTION, SOLUTION INTRAMUSCULAR; INTRAVENOUS at 11:03

## 2018-03-03 RX ADMIN — Medication 3 ML: at 02:03

## 2018-03-03 RX ADMIN — CYCLOBENZAPRINE HYDROCHLORIDE 5 MG: 5 TABLET, FILM COATED ORAL at 10:03

## 2018-03-03 RX ADMIN — FAMOTIDINE 20 MG: 20 TABLET, FILM COATED ORAL at 08:03

## 2018-03-03 RX ADMIN — SPIRONOLACTONE 25 MG: 25 TABLET, FILM COATED ORAL at 08:03

## 2018-03-03 RX ADMIN — Medication: at 03:03

## 2018-03-03 RX ADMIN — SOYBEAN OIL 250 ML: 20 INJECTION, SOLUTION INTRAVENOUS at 10:03

## 2018-03-03 RX ADMIN — Medication 1 G: at 05:03

## 2018-03-03 RX ADMIN — CALCIUM GLUCONATE: 94 INJECTION, SOLUTION INTRAVENOUS at 10:03

## 2018-03-03 RX ADMIN — SODIUM CHLORIDE: 0.9 INJECTION, SOLUTION INTRAVENOUS at 07:03

## 2018-03-03 RX ADMIN — OXYCODONE HYDROCHLORIDE 10 MG: 5 TABLET ORAL at 03:03

## 2018-03-03 RX ADMIN — Medication 3 ML: at 11:03

## 2018-03-03 RX ADMIN — HYDROCHLOROTHIAZIDE 12.5 MG: 12.5 TABLET ORAL at 08:03

## 2018-03-03 RX ADMIN — PIPERACILLIN AND TAZOBACTAM 4.5 G: 4; .5 INJECTION, POWDER, LYOPHILIZED, FOR SOLUTION INTRAVENOUS; PARENTERAL at 07:03

## 2018-03-03 RX ADMIN — HEPARIN SODIUM 5000 UNITS: 5000 INJECTION, SOLUTION INTRAVENOUS; SUBCUTANEOUS at 01:03

## 2018-03-03 RX ADMIN — PIPERACILLIN AND TAZOBACTAM 4.5 G: 4; .5 INJECTION, POWDER, LYOPHILIZED, FOR SOLUTION INTRAVENOUS; PARENTERAL at 03:03

## 2018-03-03 RX ADMIN — HEPARIN SODIUM 5000 UNITS: 5000 INJECTION, SOLUTION INTRAVENOUS; SUBCUTANEOUS at 10:03

## 2018-03-03 RX ADMIN — CYCLOBENZAPRINE HYDROCHLORIDE 5 MG: 5 TABLET, FILM COATED ORAL at 01:03

## 2018-03-03 RX ADMIN — Medication 1 G: at 06:03

## 2018-03-03 RX ADMIN — OXYCODONE HYDROCHLORIDE 5 MG: 5 TABLET ORAL at 08:03

## 2018-03-03 NOTE — ASSESSMENT & PLAN NOTE
Ms. Marshall is a 63 y/o F who was found to have a rectal mass and completed chemoXRT and LAR w/ ileal resection with loop ileostomy for 2 carcinoids on 12/11/17  (neg nodes) on 1/22/18 she underwent pouchagram and then subsequent loop ileostomy takedown on 1/23/18 who presents now with ECF and pSBO now 11 Days Post-Op from Ex lap, ileocolic resection (with fistula) and GIANNI.     - Advance diet, continue TPN  - Stable tachycardia, restarted home blood pressure meds.   - Leukocytosis improving  - Pelvic fluid drained and SS.   - Cultures pending.   - Daily labs, including CRP as well as Prealbumin  - Surgical site infection opened at superior and inferior midline, repacked today  - Continue with antibiotics (zosyn) Abdominal wound growing out ESBL. Estimated end date 3/3/18 per ID and started Vancomycin.  - Continue Wound vac at the RLQ site today changes Monday/thursday  - RAYMOND resolved, Osuna out.  - OOB and ambulating. PT/OT and IS.

## 2018-03-03 NOTE — PLAN OF CARE
Problem: Patient Care Overview  Goal: Plan of Care Review  Outcome: Ongoing (interventions implemented as appropriate)  Plan of care discussed with pt. Pt verbalizes understanding. Pt tolerating clear liquid diet with no complaints of nausea. Pain managed with PRN pain medications. Pt ambulated in gunter once with PT/OT and three times with nurse and family. Pt tolerated well. Pt positions independently. VS stable. Pt remains free of falls and injury. Will continue to monitor.

## 2018-03-03 NOTE — SUBJECTIVE & OBJECTIVE
Subjective:     Interval History:     No acute events overnight, patient having BM, distention improving. Had an IR drain placement with SS fluid drainage. WBC down to 14. On Zosyn and Vancomycin    Post-Op Info:  Procedure(s) (LRB):  ileocoloc resection, lysis of adhesions (N/A)   11 Days Post-Op      Medications:  Continuous Infusions:   sodium chloride 0.9% 50 mL/hr at 03/02/18 1832    TPN ADULT CENTRAL LINE CUSTOM 75 mL/hr at 03/02/18 2156     Scheduled Meds:   cloNIDine  0.1 mg Oral BID    cyclobenzaprine  5 mg Oral TID    DULoxetine  60 mg Oral Daily    famotidine  20 mg Oral Daily    fluticasone  1 spray Each Nare Daily    heparin (porcine)  5,000 Units Subcutaneous Q8H    hydroCHLOROthiazide  12.5 mg Oral Daily    pantoprazole  40 mg Oral Daily    piperacillin-tazobactam (ZOSYN) IVPB  4.5 g Intravenous Q8H    sodium chloride 0.9%  3 mL Intravenous Q8H    spironolactone  25 mg Oral Daily    vancomycin (VANCOCIN) IVPB  1,000 mg Intravenous Q12H     PRN Meds:   diphenhydrAMINE    fentaNYL    ondansetron    oxyCODONE    oxyCODONE    promethazine (PHENERGAN) IVPB    sodium chloride 0.9%        Objective:     Vital Signs (Most Recent):  Temp: 99 °F (37.2 °C) (03/03/18 0737)  Pulse: 96 (03/03/18 0737)  Resp: 16 (03/03/18 0737)  BP: 124/61 (03/03/18 0737)  SpO2: 99 % (03/03/18 0737) Vital Signs (24h Range):  Temp:  [98 °F (36.7 °C)-99.5 °F (37.5 °C)] 99 °F (37.2 °C)  Pulse:  [] 96  Resp:  [16-18] 16  SpO2:  [93 %-99 %] 99 %  BP: (103-124)/(56-62) 124/61     Intake/Output - Last 3 Shifts       03/01 0700 - 03/02 0659 03/02 0700 - 03/03 0659 03/03 0700 - 03/04 0659    P.O. 750 1260     I.V. (mL/kg) 532.5 (6.5) 3283.7 (39.8)     IV Piggyback 800 100     TPN       Total Intake(mL/kg) 2082.5 (25.2) 4643.7 (56.3)     Urine (mL/kg/hr) 2050 (1) 965 (0.5) 1575 (5.8)    Emesis/NG output       Drains 125 (0.1) 30 (0)     Other 50 (0) 30 (0)     Stool 0 (0) 0 (0)     Total Output 2225 1025 1575     Net -142.5 +3618.7 -1575           Urine Occurrence 2 x 2 x     Stool Occurrence 1 x 3 x           Physical Exam       General: In no acute distress, well appearance.   CV: RRR, S1, S2 no murmur or gallops   Pulm: non labored breathing  Abd: soft, less distended, appropiately tender. Incision with glue. Wound vac in place in RLQ, midline with some serous drainage, opened and packed, still distended IR drain in place with SS fluid.   Ext: wwp      Significant Labs:    BMP (Last 3 Results):     Recent Labs  Lab 03/01/18 0352 03/02/18 0223 03/03/18  0600   GLU 75 90 152*   * 134* 133*   K 4.5 4.3 4.1   CL 97 98 99   CO2 28 28 27   BUN 14 11 9   CREATININE 0.8 0.8 0.8   CALCIUM 8.7 8.3* 8.4*   MG 1.7 1.6 2.0     CBC (Last 3 Results):     Recent Labs  Lab 03/01/18 0352 03/02/18 0223 03/03/18  0600   WBC 18.90* 18.23* 14.68*   RBC 3.06* 2.99* 2.76*   HGB 7.8* 7.7* 7.3*   HCT 24.1* 23.7* 22.2*   * 545* 585*   MCV 79* 79* 80*   MCH 25.5* 25.8* 26.4*   MCHC 32.4 32.5 32.9       Significant Diagnostics:    IR drain    Versed was given by the registered nurse for sedation.  Total dose to the patient was 345.22 cGy/cm squared.    After informed consent was obtained, the patient was placed supine on the CT table.  After initial images were acquired an appropriate entry site was marked and confirmed with CT guidance.  Subsequently, the patient was prepped and draped in sterile fashion.  1% lidocaine was utilized for local anesthesia.  A 19-gauge Harjeet guide needle was advanced using CT guidance into the fluid collection in lower abdomen.  A wire was introduced into the collection and the tract was sequentially dilated to 8-Irish.  An 8-Irish APD was introduced over the wire, the wire was removed, the pigtail was formed and locked, and 2-0 silk drain stitch was placed.  Imaging confirms location of the pigtail within the fluid collection.  10 cc of dark red fluid was aspirated and sent to the laboratory for  further analysis. Postprocedure images reveal no evidence of postprocedural complications.  The drainage catheter was left to bulb suction.

## 2018-03-03 NOTE — PLAN OF CARE
Problem: Occupational Therapy Goal  Goal: Occupational Therapy Goal  Goals to be met by: 03/15/2018    Patient will increase functional independence with ADLs by performing:    Feeding with Modified [place].  UE Dressing with Supervision sitting upright EOB.  LE Dressing with Stand-by Assistance.  Grooming while standing with Supervision.  Toileting from bedside commode with Supervision for hygiene and clothing management.   Supine to sit with Modified [place].  Toilet transfer to bedside commode with Supervision.     Outcome: Ongoing (interventions implemented as appropriate)  Goals remain appropriate    Comments: Continue OT POC    Mary Tabares OT  3/3/2018

## 2018-03-03 NOTE — PROGRESS NOTES
[facility]  Colorectal Surgery  Progress Note    Patient Name: Coby Marshall  MRN: [id number]  Admission Date: 2/19/2018  Hospital Length of Stay: 12 days  Attending Physician: Suhas Manjarrez MD    Subjective:     Interval History:     No acute events overnight, patient having BM, distention improving. Had an IR drain placement with SS fluid drainage. WBC down to 14. On Zosyn and Vancomycin    Post-Op Info:  Procedure(s) (LRB):  ileocoloc resection, lysis of adhesions (N/A)   11 Days Post-Op      Medications:  Continuous Infusions:   sodium chloride 0.9% 50 mL/hr at 03/02/18 1832    TPN ADULT CENTRAL LINE CUSTOM 75 mL/hr at 03/02/18 2156     Scheduled Meds:   cloNIDine  0.1 mg Oral BID    cyclobenzaprine  5 mg Oral TID    DULoxetine  60 mg Oral Daily    famotidine  20 mg Oral Daily    fluticasone  1 spray Each Nare Daily    heparin (porcine)  5,000 Units Subcutaneous Q8H    hydroCHLOROthiazide  12.5 mg Oral Daily    pantoprazole  40 mg Oral Daily    piperacillin-tazobactam (ZOSYN) IVPB  4.5 g Intravenous Q8H    sodium chloride 0.9%  3 mL Intravenous Q8H    spironolactone  25 mg Oral Daily    vancomycin (VANCOCIN) IVPB  1,000 mg Intravenous Q12H     PRN Meds:   diphenhydrAMINE    fentaNYL    ondansetron    oxyCODONE    oxyCODONE    promethazine (PHENERGAN) IVPB    sodium chloride 0.9%        Objective:     Vital Signs (Most Recent):  Temp: 99 °F (37.2 °C) (03/03/18 0737)  Pulse: 96 (03/03/18 0737)  Resp: 16 (03/03/18 0737)  BP: 124/61 (03/03/18 0737)  SpO2: 99 % (03/03/18 0737) Vital Signs (24h Range):  Temp:  [98 °F (36.7 °C)-99.5 °F (37.5 °C)] 99 °F (37.2 °C)  Pulse:  [] 96  Resp:  [16-18] 16  SpO2:  [93 %-99 %] 99 %  BP: (103-124)/(56-62) 124/61     Intake/Output - Last 3 Shifts       03/01 0700 - 03/02 0659 03/02 0700 - 03/03 0659 03/03 0700 - 03/04 0659    P.O. 750 1260     I.V. (mL/kg) 532.5 (6.5) 3283.7 (39.8)     IV Piggyback 800 100     TPN       Total  Intake(mL/kg) 2082.5 (25.2) 4643.7 (56.3)     Urine (mL/kg/hr) 2050 (1) 965 (0.5) 1575 (5.8)    Emesis/NG output       Drains 125 (0.1) 30 (0)     Other 50 (0) 30 (0)     Stool 0 (0) 0 (0)     Total Output 2225 1025 1575    Net -142.5 +3618.7 -1575           Urine Occurrence 2 x 2 x     Stool Occurrence 1 x 3 x           Physical Exam       General: In no acute distress, well appearance.   CV: RRR, S1, S2 no murmur or gallops   Pulm: non labored breathing  Abd: soft, less distended, appropiately tender. Incision with glue. Wound vac in place in RLQ, midline with some serous drainage, opened and packed, still distended IR drain in place with SS fluid.   Ext: wwp      Significant Labs:    BMP (Last 3 Results):     Recent Labs  Lab 03/01/18  0352 03/02/18 0223 03/03/18  0600   GLU 75 90 152*   * 134* 133*   K 4.5 4.3 4.1   CL 97 98 99   CO2 28 28 27   BUN 14 11 9   CREATININE 0.8 0.8 0.8   CALCIUM 8.7 8.3* 8.4*   MG 1.7 1.6 2.0     CBC (Last 3 Results):     Recent Labs  Lab 03/01/18  0352 03/02/18 0223 03/03/18  0600   WBC 18.90* 18.23* 14.68*   RBC 3.06* 2.99* 2.76*   HGB 7.8* 7.7* 7.3*   HCT 24.1* 23.7* 22.2*   * 545* 585*   MCV 79* 79* 80*   MCH 25.5* 25.8* 26.4*   MCHC 32.4 32.5 32.9       Significant Diagnostics:    IR drain    Versed was given by the registered nurse for sedation.  Total dose to the patient was 345.22 cGy/cm squared.    After informed consent was obtained, the patient was placed supine on the CT table.  After initial images were acquired an appropriate entry site was marked and confirmed with CT guidance.  Subsequently, the patient was prepped and draped in sterile fashion.  1% lidocaine was utilized for local anesthesia.  A 19-gauge Harjeet guide needle was advanced using CT guidance into the fluid collection in lower abdomen.  A wire was introduced into the collection and the tract was sequentially dilated to 8-[place].  An 8-[place] APD was introduced over the wire, the wire was  removed, the pigtail was formed and locked, and 2-0 silk drain stitch was placed.  Imaging confirms location of the pigtail within the fluid collection.  10 cc of dark red fluid was aspirated and sent to the laboratory for further analysis. Postprocedure images reveal no evidence of postprocedural complications.  The drainage catheter was left to bulb suction.      Assessment/Plan:     * Carcinoid tumor of rectum    Ms. Marshall is a 63 y/o F who was found to have a rectal mass and completed chemoXRT and LAR w/ ileal resection with loop ileostomy for 2 carcinoids on 12/11/17  (neg nodes) on 1/22/18 she underwent pouchagram and then subsequent loop ileostomy takedown on 1/23/18 who presents now with ECF and pSBO now 11 Days Post-Op from Ex lap, ileocolic resection (with fistula) and GIANNI.     - Advance diet, continue TPN  - Stable tachycardia, restarted home blood pressure meds.   - Leukocytosis improving  - Pelvic fluid drained and SS.   - Cultures pending.   - Daily labs, including CRP as well as Prealbumin  - Surgical site infection opened at superior and inferior midline, repacked today  - Continue with antibiotics (zosyn) Abdominal wound growing out ESBL. Estimated end date 3/3/18 per ID and started Vancomycin.  - Continue Wound vac at the RLQ site today changes Monday/thursday  - RAYMOND resolved, Osuna out.  - OOB and ambulating. PT/OT and IS.                   Yoan Reveles MD  Colorectal Surgery  [facility]

## 2018-03-03 NOTE — PT/OT/SLP PROGRESS
Occupational Therapy   Treatment    Name: Coby Marshall  MRN: [id number]  Admitting Diagnosis:  Carcinoid tumor of rectum  11 Days Post-Op    Recommendations:     Discharge Recommendations: home health OT  Discharge Equipment Recommendations:  bedside commode, walker, rolling, shower chair  Barriers to discharge:  None    Subjective     Communicated with: RN prior to session.  Pain/Comfort:  · Pain Rating 1: 0/10  · Pain Rating Post-Intervention 1: 0/10    Patients cultural, spiritual, Confucianist conflicts given the current situation: none reported     Objective:     Patient found with: peripheral IV, wound vac, telemetry    General Precautions: Standard, fall   Orthopedic Precautions:N/A   Braces: N/A     Occupational Performance:    Bed Mobility:    · Patient completed Supine to Sit with contact guard assistance     Functional Mobility/Transfers:  · Patient completed Sit <> Stand Transfer with minimum assistance  with  rolling walker   · Patient completed Bed <> Chair Transfer using Step Transfer technique with contact guard assistance with rolling walker  · Functional Mobility: Pt ambulate 300 ft with SBA using RW    Activities of Daily Living:  · Grooming: setup assistance to wash face while seated UIC  · UB Dressing: minimum assistance to don backward gown 2/2 lines    Patient left up in chair with all lines intact, call button in reach and family present    [facility] 6 Click:  [facility] Total Score: 18    Treatment & Education:   BUE AROM exercises 10 x 1 for shoulder flex, elbow flex/ext while seated UIC  Pt educated on role of OT/POC  White board/communication board updated  Education:    Assessment:     Coby Marshall is a 62 y.o. female with a medical diagnosis of Carcinoid tumor of rectum.  She presents with weakness as limiting factor in safe functional mobility and self care.  Performance deficits affecting function are weakness, impaired endurance, impaired functional mobilty, impaired self care skills, gait  instability.      Rehab Prognosis:  Good; patient would benefit from acute skilled OT services to address these deficits and reach maximum level of function.       Plan:     Patient to be seen 4 x/week to address the above listed problems via self-care/home management, therapeutic activities, therapeutic exercises  · Plan of Care Expires: 03/22/18  · Plan of Care Reviewed with: patient, spouse, family    This Plan of care has been discussed with the patient who was involved in its development and understands and is in agreement with the identified goals and treatment plan    GOALS:    Occupational Therapy Goals        Problem: Occupational Therapy Goal    Goal Priority Disciplines Outcome Interventions   Occupational Therapy Goal     OT, PT/OT Ongoing (interventions implemented as appropriate)    Description:  Goals to be met by: 03/15/2018    Patient will increase functional independence with ADLs by performing:    Feeding with Modified [place].  UE Dressing with Supervision sitting upright EOB.  LE Dressing with Stand-by Assistance.  Grooming while standing with Supervision.  Toileting from bedside commode with Supervision for hygiene and clothing management.   Supine to sit with Modified [place].  Toilet transfer to bedside commode with Supervision.                      Time Tracking:     OT Date of Treatment: 03/03/18  OT Start Time: 0845  OT Stop Time: 0910  OT Total Time (min): 25 min    Billable Minutes:Therapeutic Activity 15  Therapeutic Exercise 10    Mary Tabares OT  3/3/2018

## 2018-03-04 LAB
ANION GAP SERPL CALC-SCNC: 5 MMOL/L
BACTERIA SPEC AEROBE CULT: NORMAL
BASOPHILS # BLD AUTO: 0.03 K/UL
BASOPHILS NFR BLD: 0.2 %
BILIRUB UR QL STRIP: NEGATIVE
BUN SERPL-MCNC: 12 MG/DL
CALCIUM SERPL-MCNC: 8.3 MG/DL
CHLORIDE SERPL-SCNC: 100 MMOL/L
CLARITY UR REFRACT.AUTO: CLEAR
CO2 SERPL-SCNC: 30 MMOL/L
COLOR UR AUTO: NORMAL
CREAT SERPL-MCNC: 0.8 MG/DL
DIFFERENTIAL METHOD: ABNORMAL
EOSINOPHIL # BLD AUTO: 0.3 K/UL
EOSINOPHIL NFR BLD: 2.2 %
ERYTHROCYTE [DISTWIDTH] IN BLOOD BY AUTOMATED COUNT: 16.4 %
EST. GFR  (AFRICAN AMERICAN): >60 ML/MIN/1.73 M^2
EST. GFR  (NON AFRICAN AMERICAN): >60 ML/MIN/1.73 M^2
GLUCOSE SERPL-MCNC: 131 MG/DL
GLUCOSE UR QL STRIP: NEGATIVE
HCT VFR BLD AUTO: 22.8 %
HGB BLD-MCNC: 7.2 G/DL
HGB UR QL STRIP: NEGATIVE
IMM GRANULOCYTES # BLD AUTO: 0.1 K/UL
IMM GRANULOCYTES NFR BLD AUTO: 0.7 %
KETONES UR QL STRIP: NEGATIVE
LEUKOCYTE ESTERASE UR QL STRIP: NEGATIVE
LYMPHOCYTES # BLD AUTO: 0.7 K/UL
LYMPHOCYTES NFR BLD: 5 %
MAGNESIUM SERPL-MCNC: 1.7 MG/DL
MCH RBC QN AUTO: 26 PG
MCHC RBC AUTO-ENTMCNC: 31.6 G/DL
MCV RBC AUTO: 82 FL
MONOCYTES # BLD AUTO: 0.8 K/UL
MONOCYTES NFR BLD: 5.9 %
NEUTROPHILS # BLD AUTO: 11.6 K/UL
NEUTROPHILS NFR BLD: 86 %
NITRITE UR QL STRIP: NEGATIVE
NRBC BLD-RTO: 0 /100 WBC
PH UR STRIP: 7 [PH] (ref 5–8)
PHOSPHATE SERPL-MCNC: 3.8 MG/DL
PLATELET # BLD AUTO: 641 K/UL
PMV BLD AUTO: 9.3 FL
POTASSIUM SERPL-SCNC: 4.3 MMOL/L
PREALB SERPL-MCNC: 8 MG/DL
PROT UR QL STRIP: NEGATIVE
RBC # BLD AUTO: 2.77 M/UL
SODIUM SERPL-SCNC: 135 MMOL/L
SP GR UR STRIP: 1 (ref 1–1.03)
URN SPEC COLLECT METH UR: NORMAL
UROBILINOGEN UR STRIP-ACNC: NEGATIVE EU/DL
VANCOMYCIN TROUGH SERPL-MCNC: 15.2 UG/ML
WBC # BLD AUTO: 13.49 K/UL

## 2018-03-04 PROCEDURE — 20600001 HC STEP DOWN PRIVATE ROOM

## 2018-03-04 PROCEDURE — 85025 COMPLETE CBC W/AUTO DIFF WBC: CPT

## 2018-03-04 PROCEDURE — A4216 STERILE WATER/SALINE, 10 ML: HCPCS | Performed by: STUDENT IN AN ORGANIZED HEALTH CARE EDUCATION/TRAINING PROGRAM

## 2018-03-04 PROCEDURE — B4185 PARENTERAL SOL 10 GM LIPIDS: HCPCS | Performed by: STUDENT IN AN ORGANIZED HEALTH CARE EDUCATION/TRAINING PROGRAM

## 2018-03-04 PROCEDURE — 63600175 PHARM REV CODE 636 W HCPCS: Performed by: STUDENT IN AN ORGANIZED HEALTH CARE EDUCATION/TRAINING PROGRAM

## 2018-03-04 PROCEDURE — 80202 ASSAY OF VANCOMYCIN: CPT

## 2018-03-04 PROCEDURE — 25000003 PHARM REV CODE 250: Performed by: STUDENT IN AN ORGANIZED HEALTH CARE EDUCATION/TRAINING PROGRAM

## 2018-03-04 PROCEDURE — 83735 ASSAY OF MAGNESIUM: CPT

## 2018-03-04 PROCEDURE — 25000003 PHARM REV CODE 250: Performed by: NURSE PRACTITIONER

## 2018-03-04 PROCEDURE — 84134 ASSAY OF PREALBUMIN: CPT

## 2018-03-04 PROCEDURE — 84100 ASSAY OF PHOSPHORUS: CPT

## 2018-03-04 PROCEDURE — A4217 STERILE WATER/SALINE, 500 ML: HCPCS | Performed by: STUDENT IN AN ORGANIZED HEALTH CARE EDUCATION/TRAINING PROGRAM

## 2018-03-04 PROCEDURE — 81003 URINALYSIS AUTO W/O SCOPE: CPT

## 2018-03-04 PROCEDURE — 80048 BASIC METABOLIC PNL TOTAL CA: CPT

## 2018-03-04 PROCEDURE — 63600175 PHARM REV CODE 636 W HCPCS: Performed by: NURSE PRACTITIONER

## 2018-03-04 RX ORDER — MICONAZOLE NITRATE 2 %
POWDER (GRAM) TOPICAL 2 TIMES DAILY
Status: DISCONTINUED | OUTPATIENT
Start: 2018-03-04 | End: 2018-03-08 | Stop reason: HOSPADM

## 2018-03-04 RX ORDER — PHENAZOPYRIDINE HYDROCHLORIDE 100 MG/1
100 TABLET, FILM COATED ORAL
Status: DISCONTINUED | OUTPATIENT
Start: 2018-03-04 | End: 2018-03-08 | Stop reason: HOSPADM

## 2018-03-04 RX ADMIN — PIPERACILLIN AND TAZOBACTAM 4.5 G: 4; .5 INJECTION, POWDER, LYOPHILIZED, FOR SOLUTION INTRAVENOUS; PARENTERAL at 06:03

## 2018-03-04 RX ADMIN — OXYCODONE HYDROCHLORIDE 5 MG: 5 TABLET ORAL at 09:03

## 2018-03-04 RX ADMIN — HEPARIN SODIUM 5000 UNITS: 5000 INJECTION, SOLUTION INTRAVENOUS; SUBCUTANEOUS at 05:03

## 2018-03-04 RX ADMIN — CALCIUM GLUCONATE: 94 INJECTION, SOLUTION INTRAVENOUS at 10:03

## 2018-03-04 RX ADMIN — HEPARIN SODIUM 5000 UNITS: 5000 INJECTION, SOLUTION INTRAVENOUS; SUBCUTANEOUS at 09:03

## 2018-03-04 RX ADMIN — Medication 1 G: at 06:03

## 2018-03-04 RX ADMIN — CLONIDINE HYDROCHLORIDE 0.1 MG: 0.1 TABLET ORAL at 09:03

## 2018-03-04 RX ADMIN — Medication 3 ML: at 02:03

## 2018-03-04 RX ADMIN — OXYCODONE HYDROCHLORIDE 10 MG: 5 TABLET ORAL at 06:03

## 2018-03-04 RX ADMIN — PIPERACILLIN AND TAZOBACTAM 4.5 G: 4; .5 INJECTION, POWDER, LYOPHILIZED, FOR SOLUTION INTRAVENOUS; PARENTERAL at 11:03

## 2018-03-04 RX ADMIN — SOYBEAN OIL 250 ML: 20 INJECTION, SOLUTION INTRAVENOUS at 09:03

## 2018-03-04 RX ADMIN — DULOXETINE 60 MG: 60 CAPSULE, DELAYED RELEASE ORAL at 09:03

## 2018-03-04 RX ADMIN — FLUTICASONE PROPIONATE 50 MCG: 50 SPRAY, METERED NASAL at 09:03

## 2018-03-04 RX ADMIN — CYCLOBENZAPRINE HYDROCHLORIDE 5 MG: 5 TABLET, FILM COATED ORAL at 05:03

## 2018-03-04 RX ADMIN — HYDROCHLOROTHIAZIDE 12.5 MG: 12.5 TABLET ORAL at 09:03

## 2018-03-04 RX ADMIN — FENTANYL CITRATE 25 MCG: 50 INJECTION, SOLUTION INTRAMUSCULAR; INTRAVENOUS at 05:03

## 2018-03-04 RX ADMIN — Medication 3 ML: at 06:03

## 2018-03-04 RX ADMIN — SPIRONOLACTONE 25 MG: 25 TABLET, FILM COATED ORAL at 09:03

## 2018-03-04 RX ADMIN — CYCLOBENZAPRINE HYDROCHLORIDE 5 MG: 5 TABLET, FILM COATED ORAL at 02:03

## 2018-03-04 RX ADMIN — PHENAZOPYRIDINE HYDROCHLORIDE 100 MG: 100 TABLET ORAL at 04:03

## 2018-03-04 RX ADMIN — HEPARIN SODIUM 5000 UNITS: 5000 INJECTION, SOLUTION INTRAVENOUS; SUBCUTANEOUS at 02:03

## 2018-03-04 RX ADMIN — FAMOTIDINE 20 MG: 20 TABLET, FILM COATED ORAL at 09:03

## 2018-03-04 RX ADMIN — PIPERACILLIN AND TAZOBACTAM 4.5 G: 4; .5 INJECTION, POWDER, LYOPHILIZED, FOR SOLUTION INTRAVENOUS; PARENTERAL at 03:03

## 2018-03-04 RX ADMIN — CYCLOBENZAPRINE HYDROCHLORIDE 5 MG: 5 TABLET, FILM COATED ORAL at 09:03

## 2018-03-04 RX ADMIN — MICONAZOLE NITRATE: 20 POWDER TOPICAL at 09:03

## 2018-03-04 RX ADMIN — SODIUM CHLORIDE: 0.9 INJECTION, SOLUTION INTRAVENOUS at 06:03

## 2018-03-04 RX ADMIN — OXYCODONE HYDROCHLORIDE 5 MG: 5 TABLET ORAL at 02:03

## 2018-03-04 RX ADMIN — PANTOPRAZOLE SODIUM 40 MG: 40 TABLET, DELAYED RELEASE ORAL at 09:03

## 2018-03-04 RX ADMIN — Medication 1 G: at 05:03

## 2018-03-04 NOTE — PLAN OF CARE
Problem: Patient Care Overview  Goal: Plan of Care Review  Outcome: Ongoing (interventions implemented as appropriate)  Plan of care discussed with pt. Pt verbalizes understanding. Pt tolerating regular diet with no complaints of nausea. Pain managed with PRN pain medications. Utilized heat packs to help with pain. Pt ambulated in gunter once with PT/OT. Pt positions independently. Changed and re accessed mignon cath. Changed and re packed ML abd wound. Creamy, yellow/tan drainage. Pt remains free of falls and injury. Will continue to monitor.

## 2018-03-04 NOTE — SUBJECTIVE & OBJECTIVE
Subjective:     Interval History:     No acute events overnight, patient having BM, distention improving. Had an IR drain placement with SS fluid drainage. WBC down to 13. On Zosyn and Vancomycin.  Some burning with urination    Post-Op Info:  Procedure(s) (LRB):  ileocoloc resection, lysis of adhesions (N/A)   12 Days Post-Op      Medications:  Continuous Infusions:   sodium chloride 0.9% 50 mL/hr at 03/03/18 1925    TPN ADULT CENTRAL LINE CUSTOM 75 mL/hr at 03/03/18 2255     Scheduled Meds:   cloNIDine  0.1 mg Oral BID    cyclobenzaprine  5 mg Oral TID    DULoxetine  60 mg Oral Daily    famotidine  20 mg Oral Daily    fluticasone  1 spray Each Nare Daily    heparin (porcine)  5,000 Units Subcutaneous Q8H    hydroCHLOROthiazide  12.5 mg Oral Daily    pantoprazole  40 mg Oral Daily    piperacillin-tazobactam (ZOSYN) IVPB  4.5 g Intravenous Q8H    sodium chloride 0.9%  3 mL Intravenous Q8H    spironolactone  25 mg Oral Daily    vancomycin (VANCOCIN) IVPB  1,000 mg Intravenous Q12H     PRN Meds:   diphenhydrAMINE    fentaNYL    ondansetron    oxyCODONE    oxyCODONE    promethazine (PHENERGAN) IVPB    sodium chloride 0.9%        Objective:     Vital Signs (Most Recent):  Temp: 98.7 °F (37.1 °C) (03/04/18 0918)  Pulse: 96 (03/04/18 0918)  Resp: 16 (03/04/18 0918)  BP: 109/60 (03/04/18 0918)  SpO2: 98 % (03/04/18 0918) Vital Signs (24h Range):  Temp:  [96.4 °F (35.8 °C)-98.9 °F (37.2 °C)] 98.7 °F (37.1 °C)  Pulse:  [89-98] 96  Resp:  [14-20] 16  SpO2:  [95 %-100 %] 98 %  BP: ()/(53-73) 109/60     Intake/Output - Last 3 Shifts       03/02 0700 - 03/03 0659 03/03 0700 - 03/04 0659 03/04 0700 - 03/05 0659    P.O. 1260      I.V. (mL/kg) 3283.7 (39.8) 1745 (21.2)     IV Piggyback 100 1250     TPN  2121.5     Total Intake(mL/kg) 4643.7 (56.3) 5116.5 (62)     Urine (mL/kg/hr) 965 (0.5) 3700 (1.9)     Drains 30 (0) 42 (0)     Other 30 (0) 20 (0)     Stool 0 (0)      Total Output 1025 9872      Net  +3618.7 +1354.5             Urine Occurrence 2 x      Stool Occurrence 3 x            Physical Exam       General: In no acute distress, well appearance.   CV: RRR, S1, S2 no murmur or gallops   Pulm: non labored breathing  Abd: soft, less distended, appropiately tender. Incision with glue. Wound vac in place in RLQ, midline with some serous drainage, opened and packed, still distended IR drain in place with SS fluid.   Ext: wwp      Significant Labs:    BMP (Last 3 Results):     Recent Labs  Lab 03/02/18 0223 03/03/18 0600 03/04/18  0530   GLU 90 152* 131*   * 133* 135*   K 4.3 4.1 4.3   CL 98 99 100   CO2 28 27 30*   BUN 11 9 12   CREATININE 0.8 0.8 0.8   CALCIUM 8.3* 8.4* 8.3*   MG 1.6 2.0 1.7     CBC (Last 3 Results):     Recent Labs  Lab 03/02/18 0223 03/03/18 0600 03/04/18  0530   WBC 18.23* 14.68* 13.49*   RBC 2.99* 2.76* 2.77*   HGB 7.7* 7.3* 7.2*   HCT 23.7* 22.2* 22.8*   * 585* 641*   MCV 79* 80* 82   MCH 25.8* 26.4* 26.0*   MCHC 32.5 32.9 31.6*       Significant Diagnostics:    IR drain    Versed was given by the registered nurse for sedation.  Total dose to the patient was 345.22 cGy/cm squared.    After informed consent was obtained, the patient was placed supine on the CT table.  After initial images were acquired an appropriate entry site was marked and confirmed with CT guidance.  Subsequently, the patient was prepped and draped in sterile fashion.  1% lidocaine was utilized for local anesthesia.  A 19-gauge Harjeet guide needle was advanced using CT guidance into the fluid collection in lower abdomen.  A wire was introduced into the collection and the tract was sequentially dilated to 8-Icelandic.  An 8-Icelandic APD was introduced over the wire, the wire was removed, the pigtail was formed and locked, and 2-0 silk drain stitch was placed.  Imaging confirms location of the pigtail within the fluid collection.  10 cc of dark red fluid was aspirated and sent to the laboratory for further  analysis. Postprocedure images reveal no evidence of postprocedural complications.  The drainage catheter was left to bulb suction.

## 2018-03-04 NOTE — PROGRESS NOTES
[facility]  Colorectal Surgery  Progress Note    Patient Name: Coby Marshall  MRN: [id number]  Admission Date: 2/19/2018  Hospital Length of Stay: 13 days  Attending Physician: Suhas Manjarrez MD    Subjective:     Interval History:     No acute events overnight, patient having BM, distention improving. Had an IR drain placement with SS fluid drainage. WBC down to 13. On Zosyn and Vancomycin.  Some burning with urination    Post-Op Info:  Procedure(s) (LRB):  ileocoloc resection, lysis of adhesions (N/A)   12 Days Post-Op      Medications:  Continuous Infusions:   sodium chloride 0.9% 50 mL/hr at 03/03/18 1925    TPN ADULT CENTRAL LINE CUSTOM 75 mL/hr at 03/03/18 2255     Scheduled Meds:   cloNIDine  0.1 mg Oral BID    cyclobenzaprine  5 mg Oral TID    DULoxetine  60 mg Oral Daily    famotidine  20 mg Oral Daily    fluticasone  1 spray Each Nare Daily    heparin (porcine)  5,000 Units Subcutaneous Q8H    hydroCHLOROthiazide  12.5 mg Oral Daily    pantoprazole  40 mg Oral Daily    piperacillin-tazobactam (ZOSYN) IVPB  4.5 g Intravenous Q8H    sodium chloride 0.9%  3 mL Intravenous Q8H    spironolactone  25 mg Oral Daily    vancomycin (VANCOCIN) IVPB  1,000 mg Intravenous Q12H     PRN Meds:   diphenhydrAMINE    fentaNYL    ondansetron    oxyCODONE    oxyCODONE    promethazine (PHENERGAN) IVPB    sodium chloride 0.9%        Objective:     Vital Signs (Most Recent):  Temp: 98.7 °F (37.1 °C) (03/04/18 0918)  Pulse: 96 (03/04/18 0918)  Resp: 16 (03/04/18 0918)  BP: 109/60 (03/04/18 0918)  SpO2: 98 % (03/04/18 0918) Vital Signs (24h Range):  Temp:  [96.4 °F (35.8 °C)-98.9 °F (37.2 °C)] 98.7 °F (37.1 °C)  Pulse:  [89-98] 96  Resp:  [14-20] 16  SpO2:  [95 %-100 %] 98 %  BP: ()/(53-73) 109/60     Intake/Output - Last 3 Shifts       03/02 0700 - 03/03 0659 03/03 0700 - 03/04 0659 03/04 0700 - 03/05 0659    P.O. 1260      I.V. (mL/kg) 3283.7 (39.8) 1745 (21.2)     IV Piggyback 100  1250     TPN  2121.5     Total Intake(mL/kg) 4643.7 (56.3) 5116.5 (62)     Urine (mL/kg/hr) 965 (0.5) 3700 (1.9)     Drains 30 (0) 42 (0)     Other 30 (0) 20 (0)     Stool 0 (0)      Total Output 1025 3762      Net +3618.7 +1354.5             Urine Occurrence 2 x      Stool Occurrence 3 x            Physical Exam       General: In no acute distress, well appearance.   CV: RRR, S1, S2 no murmur or gallops   Pulm: non labored breathing  Abd: soft, less distended, appropiately tender. Incision with glue. Wound vac in place in RLQ, midline with some serous drainage, opened and packed, still distended IR drain in place with SS fluid.   Ext: wwp      Significant Labs:    BMP (Last 3 Results):     Recent Labs  Lab 03/02/18 0223 03/03/18  0600 03/04/18  0530   GLU 90 152* 131*   * 133* 135*   K 4.3 4.1 4.3   CL 98 99 100   CO2 28 27 30*   BUN 11 9 12   CREATININE 0.8 0.8 0.8   CALCIUM 8.3* 8.4* 8.3*   MG 1.6 2.0 1.7     CBC (Last 3 Results):     Recent Labs  Lab 03/02/18 0223 03/03/18  0600 03/04/18  0530   WBC 18.23* 14.68* 13.49*   RBC 2.99* 2.76* 2.77*   HGB 7.7* 7.3* 7.2*   HCT 23.7* 22.2* 22.8*   * 585* 641*   MCV 79* 80* 82   MCH 25.8* 26.4* 26.0*   MCHC 32.5 32.9 31.6*       Significant Diagnostics:    IR drain    Versed was given by the registered nurse for sedation.  Total dose to the patient was 345.22 cGy/cm squared.    After informed consent was obtained, the patient was placed supine on the CT table.  After initial images were acquired an appropriate entry site was marked and confirmed with CT guidance.  Subsequently, the patient was prepped and draped in sterile fashion.  1% lidocaine was utilized for local anesthesia.  A 19-gauge Harjeet guide needle was advanced using CT guidance into the fluid collection in lower abdomen.  A wire was introduced into the collection and the tract was sequentially dilated to 8-Togolese.  An 8-Togolese APD was introduced over the wire, the wire was removed, the pigtail  was formed and locked, and 2-0 silk drain stitch was placed.  Imaging confirms location of the pigtail within the fluid collection.  10 cc of dark red fluid was aspirated and sent to the laboratory for further analysis. Postprocedure images reveal no evidence of postprocedural complications.  The drainage catheter was left to bulb suction.      Assessment/Plan:     * Carcinoid tumor of rectum    Ms. Marshall is a 63 y/o F who was found to have a rectal mass and completed chemoXRT and LAR w/ ileal resection with loop ileostomy for 2 carcinoids on 12/11/17  (neg nodes) on 1/22/18 she underwent pouchagram and then subsequent loop ileostomy takedown on 1/23/18 who presents now with ECF and pSBO now 12 Days Post-Op from Ex lap, ileocolic resection (with fistula) and GIANNI.     - Advance diet, decrease TPN  - Stable tachycardia, restarted home blood pressure meds.   - Leukocytosis improving  - Pelvic fluid drained and SS.   - Cultures pending.   - Daily labs, including CRP as well as Prealbumin  - Surgical site infection opened at superior and inferior midline, repacked today  - Continue with antibiotics (zosyn) Abdominal wound growing out ESBL. Estimated end date 3/3/18 per ID and started Vancomycin.  - Continue Wound vac at the RLQ site today changes Monday/thursday  - RAYMOND resolved, Osuna out.  - OOB and ambulating. PT/OT and IS.                   Yoan Reveles MD  Colorectal Surgery  [facility]

## 2018-03-04 NOTE — ASSESSMENT & PLAN NOTE
Ms. Marshall is a 61 y/o F who was found to have a rectal mass and completed chemoXRT and LAR w/ ileal resection with loop ileostomy for 2 carcinoids on 12/11/17  (neg nodes) on 1/22/18 she underwent pouchagram and then subsequent loop ileostomy takedown on 1/23/18 who presents now with ECF and pSBO now 12 Days Post-Op from Ex lap, ileocolic resection (with fistula) and GIANNI.     - Advance diet, decrease TPN  - Stable tachycardia, restarted home blood pressure meds.   - Leukocytosis improving  - Pelvic fluid drained and SS.   - Cultures pending.   - Daily labs, including CRP as well as Prealbumin  - Surgical site infection opened at superior and inferior midline, repacked today  - Continue with antibiotics (zosyn) Abdominal wound growing out ESBL. Estimated end date 3/3/18 per ID and started Vancomycin.  - Continue Wound vac at the RLQ site today changes Monday/thursday  - RAYMOND resolved, Osuna out.  - OOB and ambulating. PT/OT and IS.

## 2018-03-05 LAB
ANION GAP SERPL CALC-SCNC: 7 MMOL/L
BASOPHILS # BLD AUTO: 0.06 K/UL
BASOPHILS NFR BLD: 0.5 %
BUN SERPL-MCNC: 13 MG/DL
CALCIUM SERPL-MCNC: 9 MG/DL
CHLORIDE SERPL-SCNC: 101 MMOL/L
CO2 SERPL-SCNC: 29 MMOL/L
CREAT SERPL-MCNC: 0.8 MG/DL
DIFFERENTIAL METHOD: ABNORMAL
EOSINOPHIL # BLD AUTO: 0.3 K/UL
EOSINOPHIL NFR BLD: 2.7 %
ERYTHROCYTE [DISTWIDTH] IN BLOOD BY AUTOMATED COUNT: 16.5 %
EST. GFR  (AFRICAN AMERICAN): >60 ML/MIN/1.73 M^2
EST. GFR  (NON AFRICAN AMERICAN): >60 ML/MIN/1.73 M^2
GLUCOSE SERPL-MCNC: 120 MG/DL
HCT VFR BLD AUTO: 23 %
HGB BLD-MCNC: 7.4 G/DL
IMM GRANULOCYTES # BLD AUTO: 0.11 K/UL
IMM GRANULOCYTES NFR BLD AUTO: 0.9 %
LYMPHOCYTES # BLD AUTO: 0.9 K/UL
LYMPHOCYTES NFR BLD: 7.2 %
MAGNESIUM SERPL-MCNC: 2 MG/DL
MCH RBC QN AUTO: 26.5 PG
MCHC RBC AUTO-ENTMCNC: 32.2 G/DL
MCV RBC AUTO: 82 FL
MONOCYTES # BLD AUTO: 0.9 K/UL
MONOCYTES NFR BLD: 7.3 %
NEUTROPHILS # BLD AUTO: 10.1 K/UL
NEUTROPHILS NFR BLD: 81.4 %
NRBC BLD-RTO: 0 /100 WBC
PHOSPHATE SERPL-MCNC: 4.7 MG/DL
PLATELET # BLD AUTO: 638 K/UL
PMV BLD AUTO: 8.9 FL
POTASSIUM SERPL-SCNC: 4.5 MMOL/L
RBC # BLD AUTO: 2.79 M/UL
SODIUM SERPL-SCNC: 137 MMOL/L
WBC # BLD AUTO: 12.37 K/UL

## 2018-03-05 PROCEDURE — 63600175 PHARM REV CODE 636 W HCPCS: Performed by: NURSE PRACTITIONER

## 2018-03-05 PROCEDURE — 25000003 PHARM REV CODE 250: Performed by: NURSE PRACTITIONER

## 2018-03-05 PROCEDURE — A4216 STERILE WATER/SALINE, 10 ML: HCPCS | Performed by: STUDENT IN AN ORGANIZED HEALTH CARE EDUCATION/TRAINING PROGRAM

## 2018-03-05 PROCEDURE — 97116 GAIT TRAINING THERAPY: CPT

## 2018-03-05 PROCEDURE — 83735 ASSAY OF MAGNESIUM: CPT

## 2018-03-05 PROCEDURE — 97607 NEG PRS WND THR NDME<=50SQCM: CPT

## 2018-03-05 PROCEDURE — 97530 THERAPEUTIC ACTIVITIES: CPT

## 2018-03-05 PROCEDURE — 80048 BASIC METABOLIC PNL TOTAL CA: CPT

## 2018-03-05 PROCEDURE — 63600175 PHARM REV CODE 636 W HCPCS: Performed by: STUDENT IN AN ORGANIZED HEALTH CARE EDUCATION/TRAINING PROGRAM

## 2018-03-05 PROCEDURE — 25000003 PHARM REV CODE 250: Performed by: STUDENT IN AN ORGANIZED HEALTH CARE EDUCATION/TRAINING PROGRAM

## 2018-03-05 PROCEDURE — 97535 SELF CARE MNGMENT TRAINING: CPT

## 2018-03-05 PROCEDURE — 20600001 HC STEP DOWN PRIVATE ROOM

## 2018-03-05 PROCEDURE — 84100 ASSAY OF PHOSPHORUS: CPT

## 2018-03-05 PROCEDURE — 85025 COMPLETE CBC W/AUTO DIFF WBC: CPT

## 2018-03-05 PROCEDURE — 97803 MED NUTRITION INDIV SUBSEQ: CPT | Performed by: DIETITIAN, REGISTERED

## 2018-03-05 RX ADMIN — FAMOTIDINE 20 MG: 20 TABLET, FILM COATED ORAL at 08:03

## 2018-03-05 RX ADMIN — Medication 3 ML: at 02:03

## 2018-03-05 RX ADMIN — PHENAZOPYRIDINE HYDROCHLORIDE 100 MG: 100 TABLET ORAL at 06:03

## 2018-03-05 RX ADMIN — CYCLOBENZAPRINE HYDROCHLORIDE 5 MG: 5 TABLET, FILM COATED ORAL at 06:03

## 2018-03-05 RX ADMIN — Medication 3 ML: at 03:03

## 2018-03-05 RX ADMIN — PIPERACILLIN AND TAZOBACTAM 4.5 G: 4; .5 INJECTION, POWDER, LYOPHILIZED, FOR SOLUTION INTRAVENOUS; PARENTERAL at 06:03

## 2018-03-05 RX ADMIN — HEPARIN SODIUM 5000 UNITS: 5000 INJECTION, SOLUTION INTRAVENOUS; SUBCUTANEOUS at 06:03

## 2018-03-05 RX ADMIN — PIPERACILLIN AND TAZOBACTAM 4.5 G: 4; .5 INJECTION, POWDER, LYOPHILIZED, FOR SOLUTION INTRAVENOUS; PARENTERAL at 11:03

## 2018-03-05 RX ADMIN — DULOXETINE 60 MG: 60 CAPSULE, DELAYED RELEASE ORAL at 08:03

## 2018-03-05 RX ADMIN — CYCLOBENZAPRINE HYDROCHLORIDE 5 MG: 5 TABLET, FILM COATED ORAL at 02:03

## 2018-03-05 RX ADMIN — Medication 3 ML: at 10:03

## 2018-03-05 RX ADMIN — CLONIDINE HYDROCHLORIDE 0.1 MG: 0.1 TABLET ORAL at 08:03

## 2018-03-05 RX ADMIN — PANTOPRAZOLE SODIUM 40 MG: 40 TABLET, DELAYED RELEASE ORAL at 08:03

## 2018-03-05 RX ADMIN — MICONAZOLE NITRATE: 20 POWDER TOPICAL at 08:03

## 2018-03-05 RX ADMIN — CYCLOBENZAPRINE HYDROCHLORIDE 5 MG: 5 TABLET, FILM COATED ORAL at 09:03

## 2018-03-05 RX ADMIN — PHENAZOPYRIDINE HYDROCHLORIDE 100 MG: 100 TABLET ORAL at 08:03

## 2018-03-05 RX ADMIN — CLONIDINE HYDROCHLORIDE 0.1 MG: 0.1 TABLET ORAL at 09:03

## 2018-03-05 RX ADMIN — HEPARIN SODIUM 5000 UNITS: 5000 INJECTION, SOLUTION INTRAVENOUS; SUBCUTANEOUS at 09:03

## 2018-03-05 RX ADMIN — PIPERACILLIN AND TAZOBACTAM 4.5 G: 4; .5 INJECTION, POWDER, LYOPHILIZED, FOR SOLUTION INTRAVENOUS; PARENTERAL at 03:03

## 2018-03-05 RX ADMIN — FLUTICASONE PROPIONATE 50 MCG: 50 SPRAY, METERED NASAL at 08:03

## 2018-03-05 RX ADMIN — HEPARIN SODIUM 5000 UNITS: 5000 INJECTION, SOLUTION INTRAVENOUS; SUBCUTANEOUS at 02:03

## 2018-03-05 RX ADMIN — Medication 3 ML: at 07:03

## 2018-03-05 RX ADMIN — Medication 1 G: at 07:03

## 2018-03-05 RX ADMIN — PHENAZOPYRIDINE HYDROCHLORIDE 100 MG: 100 TABLET ORAL at 12:03

## 2018-03-05 RX ADMIN — HYDROCHLOROTHIAZIDE 12.5 MG: 12.5 TABLET ORAL at 08:03

## 2018-03-05 RX ADMIN — SPIRONOLACTONE 25 MG: 25 TABLET, FILM COATED ORAL at 08:03

## 2018-03-05 NOTE — PLAN OF CARE
Problem: Patient Care Overview  Goal: Plan of Care Review  Outcome: Ongoing (interventions implemented as appropriate)  Plan of care discussed with pt. Pt verbalizes understanding. Pt has little appetite. No c/o nausea. Pain managed with PRN pain medications. Pt uses bedside commode. Pt ambulated with rolling walker in gunter twice this shift. Tolerated well. VS stable. Pt remains free of falls and injury. Changed ML dressing. Draining is creamy yellow/tan drainage. Will continue to monitor.

## 2018-03-05 NOTE — PROGRESS NOTES
Wound care follow up:  Wound dressing changed as directed.  Dr Packer at bedside during vac dressing removal to exam right abdomen wound.  IR drain remains in place to abdomen. Next wound vac change Thursday 3/8     03/05/18 1127       Incision/Site 02/19/18 1143 Abdomen upper   Date First Assessed/Time First Assessed: 02/19/18 1143   Location: Abdomen  Orientation: upper  Closure Method: Other (see comments)   Wound Image    Incision WDL ex   Dressing Appearance Intact   Drainage Amount Small   Drainage Characteristics/Odor Serosanguineous   Appearance Pink;Moist   Periwound Area Intact   Wound Edges Open   Wound Length (cm) 6.5   Wound Width (cm) 1   Depth (cm) 2   Tunneling (depth (cm)/location) 3.0  (superiorly)   Care Cleansed with:;Sterile normal saline   Packing Incision packed with  (aquacel ag)   Dressing Change Due 03/08/18       Incision/Site 03/02/18 1230 Abdomen lower   Date First Assessed/Time First Assessed: 03/02/18 1230   Location: Abdomen  Orientation: lower   Wound Image (see photo above)   Incision WDL ex   Dressing Appearance Intact   Drainage Amount Small   Drainage Characteristics/Odor Serosanguineous   Appearance Pink;Other (see comments)  (unable to visualize base)   Periwound Area Intact   Wound Edges Open   Wound Length (cm) 1   Wound Width (cm) 1   Depth (cm) 4   Tunneling (depth (cm)/location) 5  (superiorly)   Care Cleansed with:;Sterile normal saline   Packing Incision packed with  (aquacel AG)   Dressing Change Due 03/08/18       Incision/Site 01/23/18 1322 Right Abdomen   Date First Assessed/Time First Assessed: 01/23/18 1322   Side: Right  Location: Abdomen   Wound Image    Incision WDL ex   Dressing Appearance Intact   Drainage Amount Moderate   Drainage Characteristics/Odor Serosanguineous   Appearance Pink;Red;Slough;Adipose;Sutures intact   Periwound Area Intact   Wound Edges Open   Wound Length (cm) 2   Wound Width (cm) 10   Depth (cm) 4.5   Tunneling (depth (cm)/location)  6  (@9o'clock, 4 @ 12 o'clock)   Care Cleansed with:;Sterile normal saline   Dressing Other (see comments)  (vac therapy)   Dressing Change Due 03/08/18       Negative Pressure Wound Therapy    Placement Date: 02/22/18   Side: Right  Location: Abdomen   NPWT Type Vacuum Therapy   Therapy Setting NPWT Continuous therapy   Pressure Setting NPWT 75 mmHg   Sponges Inserted NPWT Black;White

## 2018-03-05 NOTE — PLAN OF CARE
Problem: Patient Care Overview  Goal: Plan of Care Review  Outcome: Ongoing (interventions implemented as appropriate)  POC reviewed with pt, verbalized understanding. Pt AAOx4, VSS. Pt ambulating with walker in hallways, standby assist. Pt tolerating regular diet, will d/c TPN when bag is finished. Wound vac to abdomen at 75mmhg continuous therapy, serosanguinous output noted. Midline dressing clean, intact. RLQ IR drain clean and intact, serosanguinous output noted. Pt remains free of any falls/injury. Call light within reach, family at bedside. Will continue to monitor.

## 2018-03-05 NOTE — PT/OT/SLP PROGRESS
Occupational Therapy   Treatment    Name: Coby Marshall  MRN: [id number]  Admitting Diagnosis:  Carcinoid tumor of rectum  13 Days Post-Op    Recommendations:     Discharge Recommendations: home health OT  Discharge Equipment Recommendations:  bedside commode, walker, rolling, shower chair  Barriers to discharge:  None    Subjective     Communicated with: RN prior to session. Pt agreeable to participate with therapy session.   Pain/Comfort:  · Pain Rating 1: 4/10  · Location - Orientation 1: generalized  · Location 1: abdomen  · Pain Addressed 1: Reposition, Distraction    Patients cultural, spiritual, Worship conflicts given the current situation: none reported     Objective:     Patient found with: telemetry, wound vac, peripheral IV    General Precautions: Standard, fall, contact   Orthopedic Precautions:N/A   Braces: N/A     Occupational Performance:    Bed Mobility:    · Patient completed Rolling/Turning to Right with supervision  · Patient completed Supine to Sit with stand by assistance     Functional Mobility/Transfers:  · Patient completed Sit <> Stand Transfer with stand by assistance  with  rolling walker   · Patient completed Bed <> Chair Transfer using Stand Pivot technique with stand by assistance with rolling walker  · Patient completed Toilet Transfer Stand Pivot technique with stand by assistance with  rolling walker  · Functional Mobility: Pt performed functional mobility for household distances with SBA using RW to complete functional tasks.    Activities of Daily Living:  · Grooming: stand by assistance for hand hygiene and oral care standing sinkside   · UB Dressing: stand by assistance to don gown like a jacket sitting EOB  · LB Dressing: maximal assistance to don/doff socks in 4 point position secondary to patient pain and fatigue from previous activity in session  · Toileting: moderate assistance for clothing managment and proper hygiene following bowel movement    Patient left up in chair  with all lines intact and call button in reach    [facility] 6 Click:  [facility] Total Score: 18    Treatment & Education:  · Pt and pt family educated on safety awareness with functional transfers and with completion of ADLS  · Pt and pt family were educated on and instructed on proper use and importance of incentive spirometer, with patient completing 5 trials  · Pt completed ADLS and functional mobility for treatment session as noted above  · Pt and pt family educated on importance of completing OOB with nursing staff assistance to increase pt functional activity tolerance and for the prevention of secondary complications following surgery  · Pt educated on line management in various set-ups (sitting in recliner, supine in bed, functional mobility tasks)  · White board/Communication board updated     Education:    Assessment:     Coby Marshall is a 62 y.o. female with a medical diagnosis of Carcinoid tumor of rectum.  She presents with performance deficits affecting function including weakness, impaired endurance, impaired self care skills, impaired functional mobilty, impaired balance, pain, impaired cardiopulmonary response to activity.  Pt remains motivated to participate with therapy. Pt presented with increased functional activity tolerance and increased independence with completion of ADLs and mobility this treatment session. Pt will continue to benefit from skilled OT service to address the above listed impairments, decrease caregiver burden, and increase pt functional independence level prior to discharge home. Recommend  OT services upon discharge.     Rehab Prognosis:  Good; patient would benefit from acute skilled OT services to address these deficits and reach maximum level of function.       Plan:     Patient to be seen 4 x/week to address the above listed problems via self-care/home management, therapeutic activities, therapeutic exercises  · Plan of Care Expires: 03/22/18  · Plan of Care Reviewed with:  patient, spouse    This Plan of care has been discussed with the patient who was involved in its development and understands and is in agreement with the identified goals and treatment plan    GOALS:    Occupational Therapy Goals        Problem: Occupational Therapy Goal    Goal Priority Disciplines Outcome Interventions   Occupational Therapy Goal     OT, PT/OT Ongoing (interventions implemented as appropriate)    Description:  Goals to be met by: 03/15/2018    Patient will increase functional independence with ADLs by performing:    Feeding with Modified [place].  UE Dressing with Supervision sitting upright EOB. GOAL MET 03/05/2018  REVISED: UE Dressing with mod I sitting upright at EOB.  LE Dressing with Stand-by Assistance.  Grooming while standing with Supervision.  Toileting from bedside commode with Supervision for hygiene and clothing management.   Supine to sit with Modified [place].  Toilet transfer to bedside commode with Supervision.                       Time Tracking:     OT Date of Treatment: 03/05/18  OT Start Time: 0953  OT Stop Time: 1025  OT Total Time (min): 32 min    Billable Minutes:Self Care/Home Management 20  Therapeutic Activity 12    Jason Lundberg OT  3/5/2018

## 2018-03-05 NOTE — PLAN OF CARE
Problem: Patient Care Overview  Goal: Plan of Care Review  Outcome: Ongoing (interventions implemented as appropriate)  Pt is AOx4 and was injury free during night shift.  Breathing is regular equal and unlabored bilaterally on room air.  spent the night.  The pt uses the bedside commode.  The pt did not request pain medication during the night shift. Wound vac @ 75 cont and operated with in normal limits. Dressing on ABD had no drainage and was left intact.

## 2018-03-05 NOTE — PLAN OF CARE
03/05/18 1224   Discharge Reassessment   Assessment Type Discharge Planning Reassessment   Provided patient/caregiver education on the expected discharge date and the discharge plan Yes   Do you have any problems affording any of your prescribed medications? No   Discharge Plan A Home with family;Home Health   Discharge Plan B Home with family;Home Health

## 2018-03-05 NOTE — SUBJECTIVE & OBJECTIVE
Subjective:     Interval History:     NAEON. Pain controlled. Ambulating, voiding. Denies vomiting but did have some nausea. Doing better with Po intake.    Post-Op Info:  Procedure(s) (LRB):  ileocoloc resection, lysis of adhesions (N/A)   13 Days Post-Op      Medications:  Continuous Infusions:   sodium chloride 0.9% 50 mL/hr at 03/04/18 1844     Scheduled Meds:   cloNIDine  0.1 mg Oral BID    cyclobenzaprine  5 mg Oral TID    DULoxetine  60 mg Oral Daily    famotidine  20 mg Oral Daily    fluticasone  1 spray Each Nare Daily    heparin (porcine)  5,000 Units Subcutaneous Q8H    hydroCHLOROthiazide  12.5 mg Oral Daily    miconazole NITRATE 2 %   Topical (Top) BID    pantoprazole  40 mg Oral Daily    phenazopyridine  100 mg Oral TID WM    piperacillin-tazobactam (ZOSYN) IVPB  4.5 g Intravenous Q8H    sodium chloride 0.9%  3 mL Intravenous Q8H    spironolactone  25 mg Oral Daily    vancomycin (VANCOCIN) IVPB  1,000 mg Intravenous Q12H     PRN Meds:   diphenhydrAMINE    fentaNYL    ondansetron    oxyCODONE    oxyCODONE    promethazine (PHENERGAN) IVPB    sodium chloride 0.9%        Objective:     Vital Signs (Most Recent):  Temp: 98.2 °F (36.8 °C) (03/05/18 0752)  Pulse: 95 (03/05/18 0752)  Resp: 16 (03/05/18 0752)  BP: 138/66 (03/05/18 0752)  SpO2: 96 % (03/05/18 0752) Vital Signs (24h Range):  Temp:  [97.7 °F (36.5 °C)-98.4 °F (36.9 °C)] 98.2 °F (36.8 °C)  Pulse:  [92-97] 95  Resp:  [16] 16  SpO2:  [93 %-99 %] 96 %  BP: (105-138)/(58-72) 138/66     Intake/Output - Last 3 Shifts       03/03 0700 - 03/04 0659 03/04 0700 - 03/05 0659 03/05 0700 - 03/06 0659    P.O.  720     I.V. (mL/kg) 1745 (21.2) 1181.7 (14.3)     IV Piggyback 1250 550     TPN 2121.5 2147.3     Total Intake(mL/kg) 5116.5 (62) 4598.9 (55.7)     Urine (mL/kg/hr) 3700 (1.9) 3675 (1.9)     Drains 42 (0)      Other 20 (0)      Stool  0 (0)     Total Output 3762 3675      Net +1354.5 +923.9             Urine Occurrence  1 x      Stool Occurrence  2 x           Physical Exam   Constitutional: She is oriented to person, place, and time. She appears well-developed and well-nourished. No distress.   HENT:   Head: Normocephalic and atraumatic.   Eyes: EOM are normal.   Neck: Normal range of motion.   Cardiovascular: Normal rate and intact distal pulses.    Pulmonary/Chest: Effort normal. No respiratory distress.   Abdominal: Soft. There is no rebound and no guarding.   Appropriately TTP at incisions, WV in place to suction, IR drain with serosang/tan drainage.    Musculoskeletal: Normal range of motion.   Neurological: She is alert and oriented to person, place, and time.   Skin: Skin is warm and dry.   Psychiatric: She has a normal mood and affect.     Significant Labs:  BMP (Last 3 Results):   Recent Labs  Lab 03/03/18  0600 03/04/18  0530 03/05/18  0500   * 131* 120*   * 135* 137   K 4.1 4.3 4.5   CL 99 100 101   CO2 27 30* 29   BUN 9 12 13   CREATININE 0.8 0.8 0.8   CALCIUM 8.4* 8.3* 9.0   MG 2.0 1.7 2.0     CBC (Last 3 Results):   Recent Labs  Lab 03/03/18  0600 03/04/18  0530 03/05/18  0500   WBC 14.68* 13.49* 12.37   RBC 2.76* 2.77* 2.79*   HGB 7.3* 7.2* 7.4*   HCT 22.2* 22.8* 23.0*   * 641* 638*   MCV 80* 82 82   MCH 26.4* 26.0* 26.5*   MCHC 32.9 31.6* 32.2     CRP (Last 3 Results):   Recent Labs  Lab 02/27/18  0516 02/28/18  0346   .1* 158.3*     Prealbumin (Last 3 Results):   Recent Labs  Lab 02/27/18  0832 03/01/18  0843 03/04/18  0530   PREALBUMIN 12* 12* 8*

## 2018-03-05 NOTE — PLAN OF CARE
Problem: Occupational Therapy Goal  Goal: Occupational Therapy Goal  Goals to be met by: 03/15/2018    Patient will increase functional independence with ADLs by performing:    Feeding with Modified [place].  UE Dressing with Supervision sitting upright EOB. GOAL MET 03/05/2018  REVISED: UE Dressing with mod I sitting upright at EOB.  LE Dressing with Stand-by Assistance.  Grooming while standing with Supervision.  Toileting from bedside commode with Supervision for hygiene and clothing management.   Supine to sit with Modified [place].  Toilet transfer to bedside commode with Supervision.     Outcome: Ongoing (interventions implemented as appropriate)  Goals updated. Pt progressing towards all goals at this time. All goals remain appropriate. Continue to revise goals as needed.    Jason Lundberg, OT  3/5/2018

## 2018-03-05 NOTE — PLAN OF CARE
Problem: Physical Therapy Goal  Goal: Physical Therapy Goal  Goals to be met by: 18, extended to 3/8/2018    Patient will increase functional independence with mobility by performin. Supine to sit with Modified [place]-not met  2. Sit to supine with Modified [place]-not met  3. Sit to stand transfer with Supervision-not met  4. Bed to chair transfer with Supervision using no AD-not met  5. Gait  x 150 feet with Stand by Assistance with/without AD-met        Revised: Gait x 200 feet with supervision with/without AD.  6. Lower extremity exercise program x20 reps per handout, with independence-not met       Outcome: Ongoing (interventions implemented as appropriate)  1 goal revised today.

## 2018-03-05 NOTE — PROGRESS NOTES
[facility]  Adult Nutrition  Progress Note    SUMMARY     Recommendations    Recommendation/Intervention: PO intake increases.  Continue to wean TPN as oral intake improves. Will modify Boost order to meet patient preferences. Encourage intake of meals and snacks. Pt familiar with low fiber diet education. RD following.    Goals: Pt to continue receiving >85% EEN and EPN  Nutrition Goal Status: goal met  Communication of RD Recs: reviewed with RN    Reason for Assessment    Reason for Assessment: RD follow-up  Diagnosis: gastrointestinal disease  Relevent Medical History: rectal mass, LAR with ileal resection         General Information Comments: Diet advanced to regular, pt tolerating diet much better in past few days, eating 50% of meals, TPN continues at 40ml/hr but not reordered for tomorrow - per pt to be d/c    Nutrition Discharge Planning: Adequate PO nutrition    Nutrition Prescription Ordered    Current Diet Order: Regular     Current Nutrition Support Formula Ordered: Other (Comment) (Custom TPN 5/15)  Current Nutrition Support Rate Ordered: 40 (ml)  Current Nutrition Support Frequency Ordered: mL/hr  Oral Nutrition Supplement: with lipids     Evaluation of Received Nutrients/Fluid Intake     Parenteral Calories (kcal): 682  Parenteral Protein (gm): 48  Parenteral Fluid (mL): 960      Total Calories (kcal): 1182     Energy Calories Required: meeting needs  % Kcal Needs: -      Protein Required: meeting needs  % Protein Needs: -     IV Fluid (mL): 1800     GIR (Glucose Infusion Rate) (mg/kg/min): 1.21 mg/kg/min  Lipid Calories (kcals): 500 kcals  I/O: +28.3L since admit       Fluid Required: meeting needs     Tolerance: tolerating  % Intake of Estimated Energy Needs: 50 - 75 %  % Meal Intake: 25%     Nutrition Risk Screen     Nutrition Risk Screen: no indicators present    Nutrition/Diet History       Typical Food/Fluid Intake: TPN + 50% PO diet  Food Preferences: No Synagogue/cultural  "food preferences at this time      Factors Affecting Nutritional Intake: altered gastrointestinal function, abdominal pain      Labs/Tests/Procedures/Meds     Pertinent Labs Reviewed: reviewed  Pertinent Labs Comments: noted  Pertinent Medications Reviewed: reviewed  Pertinent Medications Comments: TPN, lipids, heparin, pantoprazole    Physical Findings    Overall Physical Appearance: loss of muscle mass, obese  Tubes:  (-)  Oral/Mouth Cavity: tooth/teeth missing  Skin: incision    Anthropometrics    Temp: 96.7 °F (35.9 °C)   Height: 5' 6" (167.6 cm)  Weight Method: Bed Scale  Weight: 82.5 kg (181 lb 14.1 oz)   Ideal Body Weight (IBW), Female: 130 lb     % Ideal Body Weight, Female (lb): 139.91 lb  BMI (Calculated): 29.4  BMI Grade: 25 - 29.9 - overweight  Weight Loss: unintentional  Usual Body Weight (UBW), k.1 kg     % Usual Body Weight: 88.8  % Weight Change From Usual Weight: -11.39 %   Estimated/Assessed Needs    Weight Used For Calorie Calculations: 82.6 kg (182 lb 1.6 oz)      Energy Calorie Requirements (kcal): 1670- 1948  Energy Need Method: [place]-St Jeor (PAL 1.2-1.4)      RMR ([place]-St. Jeor Equation): 1392.75      Weight Used For Protein Calculations: 82.5 kg (181 lb 14.1 oz)  Protein Requirements: 99-116g (1.2-1.4g/kg)    Fluid Requirements (mL): 1mL/kcal or per MD       RDA Method (mL): 1670       Assessment and Plan    Severe malnutrition    Nutrition Problem:  Malnutrition    Related to (etiology):   Malabsorption following ileal resection, inadequate intake     Signs and Symptoms (as evidenced by):   11% weight loss in 1 month, delayed wound healing and  requiring TPN for nutrition needs.     Interventions/Recommendations (treatment strategy):  Please see RD recs above.    Nutrition Diagnosis Status:   Continues              Monitor and Evaluation    Food and Nutrient Intake: energy intake, food and beverage intake, parenteral nutrition intake  Food and Nutrient Adminstration: diet order, " enteral and parenteral nutrition administration        Anthropometric Measurements: weight, weight change, body mass index  Biochemical Data, Medical Tests and Procedures: electrolyte and renal panel, gastrointestinal profile, glucose/endocrine profile, inflammatory profile, lipid profile  Nutrition-Focused Physical Findings: overall appearance    Nutrition Risk    Level of Risk: other (see comments) (f/u 1x/week)    Nutrition Follow-Up    RD Follow-up?: Yes

## 2018-03-05 NOTE — ASSESSMENT & PLAN NOTE
Ms. Marshall is a 61 y/o F who was found to have a rectal mass and completed chemoXRT and LAR w/ ileal resection with loop ileostomy for 2 carcinoids on 12/11/17  (neg nodes) on 1/22/18 she underwent pouchagram and then subsequent loop ileostomy takedown on 1/23/18 who presents now with ECF and pSBO now 13 Days Post-Op from Ex lap, ileocolic resection (with fistula) and GIANNI.     -Regular diet, discontinue TPN. Prealbumin still poor at 8 yesterday.  -Afebrile, WBC 12 from 13. Pelvic fluid cx growing ESBL E coli. Currently on vanc/zosyn. Sensitive to zosyn. Will discontinue vanc today. Superior and inferior aspects of midline incision opened over weekend. Per ID notes prior to sign off, anticipated discontinuing abx on 3/3.   -At bedside for wound vac change today, healthy granulation tissue, no evidence of recurrent fistula or undrained infection, continue WOCN for WV changes.  -OOB and ambulating. PT/OT and IS.

## 2018-03-05 NOTE — PT/OT/SLP PROGRESS
Physical Therapy Treatment    Patient Name:  Coby Marshall   MRN:  [id number]    Recommendations:     Discharge Recommendations:  home health PT   Discharge Equipment Recommendations: bedside commode, walker, rolling, shower chair   Barriers to discharge: None    Assessment:     Coby Marshall is a 62 y.o. female admitted with a medical diagnosis of Carcinoid tumor of rectum.  She presents with the following impairments/functional limitations:  weakness, impaired endurance, gait instability, impaired balance. Pt cooperative and agreeable to treatment. Pt tolerated treatment well with no c/o of increased pain or SOB.    Rehab Prognosis:  good; patient would benefit from acute skilled PT services to address these deficits and reach maximum level of function.      Recent Surgery: Procedure(s) (LRB):  ileocoloc resection, lysis of adhesions (N/A) 13 Days Post-Op    Plan:     During this hospitalization, patient to be seen 4 x/week to address the above listed problems via gait training, therapeutic activities, therapeutic exercises  · Plan of Care Expires:  03/22/18   Plan of Care Reviewed with: patient, spouse    Subjective     Communicated with nursing prior to session.  Patient found supine in bed, with  in room, upon PT entry to room, agreeable to treatment.      Chief Complaint: weakness  Patient comments/goals: to go home, to go use the bathroom  Pain/Comfort:  Pain Rating 1:  (not rated)    Patients cultural, spiritual, Latter day conflicts given the current situation: none    Objective:     Patient found with: peripheral IV, RANDELL drain, telemetry, wound vac     General Precautions: Standard, fall   Orthopedic Precautions:N/A   Braces: N/A     Functional Mobility:  · Bed Mobility:     · Rolling Right: supervision  · Transfers:     · Sit to Stand:  stand by assistance and contact guard assistance with rolling walker  · Gait: 150' with RW and SBA-CGA with no LOB, decreased eric and step length, increased  time spent in double support  · Balance: good static sitting balance on EOB during line management in preparation for gait activity; good static/dynamic standing balance during toileting and gait activity      AM-PAC 6 CLICK MOBILITY  Turning over in bed (including adjusting bedclothes, sheets and blankets)?: 4  Sitting down on and standing up from a chair with arms (e.g., wheelchair, bedside commode, etc.): 3  Moving from lying on back to sitting on the side of the bed?: 4  Moving to and from a bed to a chair (including a wheelchair)?: 3  Need to walk in hospital room?: 3  Climbing 3-5 steps with a railing?: 2  Total Score: 19       Therapeutic Activities and Exercises:   After gait activity, pt expressed need to go to the bathroom. Pt performed sit-to-stand from toilet with CGA and RW, was able to stand SBA with RW during self diana-area cleaning and during hand hygiene.     Patient left seated EOB with lunch tray with all lines intact, call button in reach and  present..    GOALS:    Physical Therapy Goals        Problem: Physical Therapy Goal    Goal Priority Disciplines Outcome Goal Variances Interventions   Physical Therapy Goal     PT/OT, PT Ongoing (interventions implemented as appropriate)     Description:  Goals to be met by: 18, extended to 3/8/2018    Patient will increase functional independence with mobility by performin. Supine to sit with Modified [place]-not met  2. Sit to supine with Modified [place]-not met  3. Sit to stand transfer with Supervision-not met  4. Bed to chair transfer with Supervision using no AD-not met  5. Gait  x 150 feet with Stand by Assistance with/without AD-met        Revised: Gait x 200 feet with supervision with/without AD.  6. Lower extremity exercise program x20 reps per handout, with independence-not met                         Time Tracking:     PT Received On: 18  PT Start Time: 1313     PT Stop Time: 1332  PT Total Time (min): 19 min      Billable Minutes: Gait Training 19 mins    Treatment Type: 6th Visit  PT/PTA: PT         Paola Hair, SPT  03/05/2018

## 2018-03-05 NOTE — PROGRESS NOTES
[facility]  Colorectal Surgery  Progress Note    Patient Name: Coby Marshall  MRN: [id number]  Admission Date: 2/19/2018  Hospital Length of Stay: 14 days  Attending Physician: Suahs Manjarrez MD    Subjective:     Interval History:     NAEON. Pain controlled. Ambulating, voiding. Denies vomiting but did have some nausea. Doing better with Po intake.    Post-Op Info:  Procedure(s) (LRB):  ileocoloc resection, lysis of adhesions (N/A)   13 Days Post-Op      Medications:  Continuous Infusions:   sodium chloride 0.9% 50 mL/hr at 03/04/18 1844     Scheduled Meds:   cloNIDine  0.1 mg Oral BID    cyclobenzaprine  5 mg Oral TID    DULoxetine  60 mg Oral Daily    famotidine  20 mg Oral Daily    fluticasone  1 spray Each Nare Daily    heparin (porcine)  5,000 Units Subcutaneous Q8H    hydroCHLOROthiazide  12.5 mg Oral Daily    miconazole NITRATE 2 %   Topical (Top) BID    pantoprazole  40 mg Oral Daily    phenazopyridine  100 mg Oral TID WM    piperacillin-tazobactam (ZOSYN) IVPB  4.5 g Intravenous Q8H    sodium chloride 0.9%  3 mL Intravenous Q8H    spironolactone  25 mg Oral Daily    vancomycin (VANCOCIN) IVPB  1,000 mg Intravenous Q12H     PRN Meds:   diphenhydrAMINE    fentaNYL    ondansetron    oxyCODONE    oxyCODONE    promethazine (PHENERGAN) IVPB    sodium chloride 0.9%        Objective:     Vital Signs (Most Recent):  Temp: 98.2 °F (36.8 °C) (03/05/18 0752)  Pulse: 95 (03/05/18 0752)  Resp: 16 (03/05/18 0752)  BP: 138/66 (03/05/18 0752)  SpO2: 96 % (03/05/18 0752) Vital Signs (24h Range):  Temp:  [97.7 °F (36.5 °C)-98.4 °F (36.9 °C)] 98.2 °F (36.8 °C)  Pulse:  [92-97] 95  Resp:  [16] 16  SpO2:  [93 %-99 %] 96 %  BP: (105-138)/(58-72) 138/66     Intake/Output - Last 3 Shifts       03/03 0700 - 03/04 0659 03/04 0700 - 03/05 0659 03/05 0700 - 03/06 0659    P.O.  720     I.V. (mL/kg) 1745 (21.2) 1181.7 (14.3)     IV Piggyback 1250 550     TPN 2121.5 2147.3     Total Intake(mL/kg)  5116.5 (62) 4598.9 (55.7)     Urine (mL/kg/hr) 3700 (1.9) 3675 (1.9)     Drains 42 (0)      Other 20 (0)      Stool  0 (0)     Total Output 3762 3675      Net +1354.5 +923.9             Urine Occurrence  1 x     Stool Occurrence  2 x           Physical Exam   Constitutional: She is oriented to person, place, and time. She appears well-developed and well-nourished. No distress.   HENT:   Head: Normocephalic and atraumatic.   Eyes: EOM are normal.   Neck: Normal range of motion.   Cardiovascular: Normal rate and intact distal pulses.    Pulmonary/Chest: Effort normal. No respiratory distress.   Abdominal: Soft. There is no rebound and no guarding.   Appropriately TTP at incisions, WV in place to suction, IR drain with serosang/tan drainage.    Musculoskeletal: Normal range of motion.   Neurological: She is alert and oriented to person, place, and time.   Skin: Skin is warm and dry.   Psychiatric: She has a normal mood and affect.     Significant Labs:  BMP (Last 3 Results):   Recent Labs  Lab 03/03/18  0600 03/04/18  0530 03/05/18  0500   * 131* 120*   * 135* 137   K 4.1 4.3 4.5   CL 99 100 101   CO2 27 30* 29   BUN 9 12 13   CREATININE 0.8 0.8 0.8   CALCIUM 8.4* 8.3* 9.0   MG 2.0 1.7 2.0     CBC (Last 3 Results):   Recent Labs  Lab 03/03/18  0600 03/04/18  0530 03/05/18  0500   WBC 14.68* 13.49* 12.37   RBC 2.76* 2.77* 2.79*   HGB 7.3* 7.2* 7.4*   HCT 22.2* 22.8* 23.0*   * 641* 638*   MCV 80* 82 82   MCH 26.4* 26.0* 26.5*   MCHC 32.9 31.6* 32.2     CRP (Last 3 Results):   Recent Labs  Lab 02/27/18  0516 02/28/18  0346   .1* 158.3*     Prealbumin (Last 3 Results):   Recent Labs  Lab 02/27/18  0832 03/01/18  0843 03/04/18  0530   PREALBUMIN 12* 12* 8*     Assessment/Plan:     * Carcinoid tumor of rectum    Ms. Marshall is a 61 y/o F who was found to have a rectal mass and completed chemoXRT and LAR w/ ileal resection with loop ileostomy for 2 carcinoids on 12/11/17  (neg nodes) on 1/22/18 she  underwent pouchagram and then subsequent loop ileostomy takedown on 1/23/18 who presents now with ECF and pSBO now 13 Days Post-Op from Ex lap, ileocolic resection (with fistula) and GIANNI.     -Regular diet, discontinue TPN. Prealbumin still poor at 8 yesterday.  -Afebrile, WBC 12 from 13. Pelvic fluid cx growing ESBL E coli. Currently on vanc/zosyn. Sensitive to zosyn. Will discontinue vanc today. Superior and inferior aspects of midline incision opened over weekend. Per ID notes prior to sign off, anticipated discontinuing abx on 3/3.   -At bedside for wound vac change today, healthy granulation tissue, no evidence of recurrent fistula or undrained infection, continue WOCN for WV changes.  -OOB and ambulating. PT/OT and IS.                   Dimitrios Bautista MD  Colorectal Surgery  [facility]

## 2018-03-06 LAB
ANION GAP SERPL CALC-SCNC: 10 MMOL/L
BASOPHILS # BLD AUTO: 0.05 K/UL
BASOPHILS NFR BLD: 0.4 %
BUN SERPL-MCNC: 8 MG/DL
CALCIUM SERPL-MCNC: 9 MG/DL
CHLORIDE SERPL-SCNC: 100 MMOL/L
CO2 SERPL-SCNC: 27 MMOL/L
CREAT SERPL-MCNC: 0.8 MG/DL
DIFFERENTIAL METHOD: ABNORMAL
EOSINOPHIL # BLD AUTO: 0.2 K/UL
EOSINOPHIL NFR BLD: 1.9 %
ERYTHROCYTE [DISTWIDTH] IN BLOOD BY AUTOMATED COUNT: 16.7 %
EST. GFR  (AFRICAN AMERICAN): >60 ML/MIN/1.73 M^2
EST. GFR  (NON AFRICAN AMERICAN): >60 ML/MIN/1.73 M^2
GLUCOSE SERPL-MCNC: 94 MG/DL
HCT VFR BLD AUTO: 23.1 %
HGB BLD-MCNC: 7.3 G/DL
IMM GRANULOCYTES # BLD AUTO: 0.1 K/UL
IMM GRANULOCYTES NFR BLD AUTO: 0.8 %
LYMPHOCYTES # BLD AUTO: 1.1 K/UL
LYMPHOCYTES NFR BLD: 9.2 %
MAGNESIUM SERPL-MCNC: 1.6 MG/DL
MCH RBC QN AUTO: 25.6 PG
MCHC RBC AUTO-ENTMCNC: 31.6 G/DL
MCV RBC AUTO: 81 FL
MONOCYTES # BLD AUTO: 1 K/UL
MONOCYTES NFR BLD: 8 %
NEUTROPHILS # BLD AUTO: 9.6 K/UL
NEUTROPHILS NFR BLD: 79.7 %
NRBC BLD-RTO: 0 /100 WBC
PHOSPHATE SERPL-MCNC: 4.8 MG/DL
PLATELET # BLD AUTO: 673 K/UL
PMV BLD AUTO: 9.1 FL
POTASSIUM SERPL-SCNC: 4.2 MMOL/L
PREALB SERPL-MCNC: 11 MG/DL
RBC # BLD AUTO: 2.85 M/UL
SODIUM SERPL-SCNC: 137 MMOL/L
WBC # BLD AUTO: 12.01 K/UL

## 2018-03-06 PROCEDURE — 25000003 PHARM REV CODE 250: Performed by: NURSE PRACTITIONER

## 2018-03-06 PROCEDURE — 84134 ASSAY OF PREALBUMIN: CPT

## 2018-03-06 PROCEDURE — A4216 STERILE WATER/SALINE, 10 ML: HCPCS | Performed by: STUDENT IN AN ORGANIZED HEALTH CARE EDUCATION/TRAINING PROGRAM

## 2018-03-06 PROCEDURE — 63600175 PHARM REV CODE 636 W HCPCS: Performed by: STUDENT IN AN ORGANIZED HEALTH CARE EDUCATION/TRAINING PROGRAM

## 2018-03-06 PROCEDURE — 97535 SELF CARE MNGMENT TRAINING: CPT

## 2018-03-06 PROCEDURE — 97116 GAIT TRAINING THERAPY: CPT

## 2018-03-06 PROCEDURE — 80048 BASIC METABOLIC PNL TOTAL CA: CPT

## 2018-03-06 PROCEDURE — 63600175 PHARM REV CODE 636 W HCPCS: Performed by: NURSE PRACTITIONER

## 2018-03-06 PROCEDURE — 20600001 HC STEP DOWN PRIVATE ROOM

## 2018-03-06 PROCEDURE — 84100 ASSAY OF PHOSPHORUS: CPT

## 2018-03-06 PROCEDURE — 25000003 PHARM REV CODE 250: Performed by: STUDENT IN AN ORGANIZED HEALTH CARE EDUCATION/TRAINING PROGRAM

## 2018-03-06 PROCEDURE — 97530 THERAPEUTIC ACTIVITIES: CPT

## 2018-03-06 PROCEDURE — 85025 COMPLETE CBC W/AUTO DIFF WBC: CPT

## 2018-03-06 PROCEDURE — 83735 ASSAY OF MAGNESIUM: CPT

## 2018-03-06 RX ADMIN — CLONIDINE HYDROCHLORIDE 0.1 MG: 0.1 TABLET ORAL at 09:03

## 2018-03-06 RX ADMIN — SPIRONOLACTONE 25 MG: 25 TABLET, FILM COATED ORAL at 09:03

## 2018-03-06 RX ADMIN — PROMETHAZINE HYDROCHLORIDE 6.25 MG: 25 INJECTION INTRAMUSCULAR; INTRAVENOUS at 01:03

## 2018-03-06 RX ADMIN — HYDROCHLOROTHIAZIDE 12.5 MG: 12.5 TABLET ORAL at 09:03

## 2018-03-06 RX ADMIN — Medication 3 ML: at 06:03

## 2018-03-06 RX ADMIN — DULOXETINE 60 MG: 60 CAPSULE, DELAYED RELEASE ORAL at 09:03

## 2018-03-06 RX ADMIN — CYCLOBENZAPRINE HYDROCHLORIDE 5 MG: 5 TABLET, FILM COATED ORAL at 02:03

## 2018-03-06 RX ADMIN — PHENAZOPYRIDINE HYDROCHLORIDE 100 MG: 100 TABLET ORAL at 05:03

## 2018-03-06 RX ADMIN — Medication 3 ML: at 10:03

## 2018-03-06 RX ADMIN — MICONAZOLE NITRATE: 20 POWDER TOPICAL at 09:03

## 2018-03-06 RX ADMIN — FENTANYL CITRATE 25 MCG: 50 INJECTION, SOLUTION INTRAMUSCULAR; INTRAVENOUS at 11:03

## 2018-03-06 RX ADMIN — CYCLOBENZAPRINE HYDROCHLORIDE 5 MG: 5 TABLET, FILM COATED ORAL at 05:03

## 2018-03-06 RX ADMIN — OXYCODONE HYDROCHLORIDE 10 MG: 5 TABLET ORAL at 05:03

## 2018-03-06 RX ADMIN — HEPARIN SODIUM 5000 UNITS: 5000 INJECTION, SOLUTION INTRAVENOUS; SUBCUTANEOUS at 05:03

## 2018-03-06 RX ADMIN — HEPARIN SODIUM 5000 UNITS: 5000 INJECTION, SOLUTION INTRAVENOUS; SUBCUTANEOUS at 02:03

## 2018-03-06 RX ADMIN — ONDANSETRON HYDROCHLORIDE 4 MG: 2 INJECTION, SOLUTION INTRAMUSCULAR; INTRAVENOUS at 09:03

## 2018-03-06 RX ADMIN — PANTOPRAZOLE SODIUM 40 MG: 40 TABLET, DELAYED RELEASE ORAL at 09:03

## 2018-03-06 RX ADMIN — PIPERACILLIN AND TAZOBACTAM 4.5 G: 4; .5 INJECTION, POWDER, LYOPHILIZED, FOR SOLUTION INTRAVENOUS; PARENTERAL at 03:03

## 2018-03-06 RX ADMIN — PHENAZOPYRIDINE HYDROCHLORIDE 100 MG: 100 TABLET ORAL at 09:03

## 2018-03-06 RX ADMIN — FLUTICASONE PROPIONATE 50 MCG: 50 SPRAY, METERED NASAL at 09:03

## 2018-03-06 RX ADMIN — CYCLOBENZAPRINE HYDROCHLORIDE 5 MG: 5 TABLET, FILM COATED ORAL at 09:03

## 2018-03-06 RX ADMIN — FAMOTIDINE 20 MG: 20 TABLET, FILM COATED ORAL at 09:03

## 2018-03-06 RX ADMIN — HEPARIN SODIUM 5000 UNITS: 5000 INJECTION, SOLUTION INTRAVENOUS; SUBCUTANEOUS at 09:03

## 2018-03-06 RX ADMIN — PIPERACILLIN AND TAZOBACTAM 4.5 G: 4; .5 INJECTION, POWDER, LYOPHILIZED, FOR SOLUTION INTRAVENOUS; PARENTERAL at 06:03

## 2018-03-06 RX ADMIN — OXYCODONE HYDROCHLORIDE 10 MG: 5 TABLET ORAL at 09:03

## 2018-03-06 RX ADMIN — Medication 3 ML: at 02:03

## 2018-03-06 RX ADMIN — PIPERACILLIN AND TAZOBACTAM 4.5 G: 4; .5 INJECTION, POWDER, LYOPHILIZED, FOR SOLUTION INTRAVENOUS; PARENTERAL at 11:03

## 2018-03-06 NOTE — PLAN OF CARE
Problem: Occupational Therapy Goal  Goal: Occupational Therapy Goal  Goals to be met by: 03/15/2018    Patient will increase functional independence with ADLs by performing:    Feeding with Modified [place].  UE Dressing with Supervision sitting upright EOB. GOAL MET 03/05/2018  REVISED: UE Dressing with mod I sitting upright at EOB.  LE Dressing with Stand-by Assistance.  Grooming while standing with Supervision.  Toileting from bedside commode with Supervision for hygiene and clothing management.   Supine to sit with Modified [place].  Toilet transfer to bedside commode with Supervision.      Outcome: Ongoing (interventions implemented as appropriate)  Pt progressing towards all goals at this time. All goals remain appropriate. Revise goals as needed.    Jason Lundberg OT  3/6/2018

## 2018-03-06 NOTE — PLAN OF CARE
Patient need HHC for Vac changes, OOB and ambulating. TPN d/c'd.  Discharge plan: HH/PT       03/06/18 1102   Discharge Reassessment   Assessment Type Discharge Planning Reassessment   Provided patient/caregiver education on the expected discharge date and the discharge plan No   Do you have any problems affording any of your prescribed medications? No     Justine Marshall RN/BSN/GRETCHEN  [phone]  [facility]

## 2018-03-06 NOTE — PT/OT/SLP PROGRESS
Occupational Therapy   Treatment    Name: Coby Marshall  MRN: [id number]  Admitting Diagnosis:  Carcinoid tumor of rectum  14 Days Post-Op    Recommendations:     Discharge Recommendations: home health OT  Discharge Equipment Recommendations:  bedside commode, walker, rolling, shower chair  Barriers to discharge:  None    Subjective     Communicated with: RN prior to session. Pt agreeable to participate in OT treatment session.  Pain/Comfort:  · Pain Rating 1: 4/10  · Location - Orientation 1: generalized  · Location 1: abdomen  · Pain Addressed 1: Reposition, Distraction  · Pain Rating Post-Intervention 1: 0/10    Patients cultural, spiritual, Advent conflicts given the current situation: none noted or reported     Objective:     Patient found with: wound vac, RANDELL drain, telemetry    General Precautions: Standard, fall   Orthopedic Precautions:N/A   Braces: N/A     Occupational Performance:    Bed Mobility:    · Pt found sitting upright in bedside chair upon therapist entry into room.     Functional Mobility/Transfers:  · Patient completed Sit <> Stand Transfer with contact guard assistance  with  no assistive device   · Patient completed Bed <> Chair Transfer using Stand Pivot technique with stand by assistance with rolling walker  · Functional Mobility: Did not complete secondary to pt noted and reported increased pain and fatigue.    Activities of Daily Living:  · Feeding:  modified independence eating lunch sitting unsupported at EOB     Patient left sitting upright at EOB eating lunch  with all lines intact, call button in reach and  present    [facility] 6 Click:  [facility] Total Score: 18    Treatment & Education:  · Pt and pt family educated on safety awareness with functional transfers and with completion of ADLS  · Pt completed ADLS and functional mobility for treatment session as noted above  · Pt and pt family educated on importance of completing OOB with nursing staff assistance to increase pt  functional activity tolerance and for the prevention of secondary complications following surgery  · Pt completed BUE bimanual multi-directional reaching tasks at EOB with SBA for balance to assist with increasing pt unsupported sitting functional activity tolerance for increased core strengthening  · White board/Communication board updated     Education:    Assessment:     Coby Marshall is a 62 y.o. female with a medical diagnosis of Carcinoid tumor of rectum.  She presents with performance deficits affecting function including weakness, impaired endurance, impaired self care skills, gait instability, impaired functional mobilty, impaired balance, decreased lower extremity function, pain. Pt noted and reported pain and fatigue this treatment session were a limiting factor for increasing pt functional independence level. Pt will continue to benefit from skilled OT services to address the above listed impairments, decrease caregiver burden, and increase pt functional independence level. Recommend HHOT services upon discharge from hospital.     Rehab Prognosis:  Good; patient would benefit from acute skilled OT services to address these deficits and reach maximum level of function.       Plan:     Patient to be seen 4 x/week to address the above listed problems via self-care/home management, therapeutic activities, therapeutic exercises  · Plan of Care Expires: 03/22/18  · Plan of Care Reviewed with: patient, spouse    This Plan of care has been discussed with the patient who was involved in its development and understands and is in agreement with the identified goals and treatment plan    GOALS:    Occupational Therapy Goals        Problem: Occupational Therapy Goal    Goal Priority Disciplines Outcome Interventions   Occupational Therapy Goal     OT, PT/OT Ongoing (interventions implemented as appropriate)    Description:  Goals to be met by: 03/15/2018    Patient will increase functional independence with ADLs by  performing:    Feeding with Modified [place].  UE Dressing with Supervision sitting upright EOB. GOAL MET 03/05/2018  REVISED: UE Dressing with mod I sitting upright at EOB.  LE Dressing with Stand-by Assistance.  Grooming while standing with Supervision.  Toileting from bedside commode with Supervision for hygiene and clothing management.   Supine to sit with Modified [place].  Toilet transfer to bedside commode with Supervision.                       Time Tracking:     OT Date of Treatment: 03/06/18  OT Start Time: 1322  OT Stop Time: 1336  OT Total Time (min): 14 min    Billable Minutes:Self Care/Home Management 14    Jason Lundberg OT  3/6/2018

## 2018-03-06 NOTE — PROGRESS NOTES
[facility]  Colorectal Surgery  Progress Note    Patient Name: Coby Marshall  MRN: [id number]  Admission Date: 2/19/2018  Hospital Length of Stay: 15 days  Attending Physician: Suhas Manjarrez MD    Subjective:     Interval History:     NAEON. Tolerating PO intake, came off TPN yesterday. Having bowel movements. Voiding. Pain controlled. Ambulating.    Post-Op Info:  Procedure(s) (LRB):  ileocoloc resection, lysis of adhesions (N/A)   14 Days Post-Op      Medications:  Continuous Infusions:  Scheduled Meds:   cloNIDine  0.1 mg Oral BID    cyclobenzaprine  5 mg Oral TID    DULoxetine  60 mg Oral Daily    famotidine  20 mg Oral Daily    fluticasone  1 spray Each Nare Daily    heparin (porcine)  5,000 Units Subcutaneous Q8H    hydroCHLOROthiazide  12.5 mg Oral Daily    miconazole NITRATE 2 %   Topical (Top) BID    pantoprazole  40 mg Oral Daily    phenazopyridine  100 mg Oral TID WM    piperacillin-tazobactam (ZOSYN) IVPB  4.5 g Intravenous Q8H    sodium chloride 0.9%  3 mL Intravenous Q8H    spironolactone  25 mg Oral Daily     PRN Meds:   diphenhydrAMINE    fentaNYL    ondansetron    oxyCODONE    oxyCODONE    promethazine (PHENERGAN) IVPB    sodium chloride 0.9%        Objective:     Vital Signs (Most Recent):  Temp: 97.6 °F (36.4 °C) (03/06/18 0749)  Pulse: 96 (03/06/18 0749)  Resp: 18 (03/06/18 0749)  BP: (!) 156/80 (03/06/18 0749)  SpO2: 99 % (03/06/18 0749) Vital Signs (24h Range):  Temp:  [96.7 °F (35.9 °C)-98.9 °F (37.2 °C)] 97.6 °F (36.4 °C)  Pulse:  [] 96  Resp:  [16-18] 18  SpO2:  [89 %-100 %] 99 %  BP: (110-156)/(55-80) 156/80     Intake/Output - Last 3 Shifts       03/04 0700 - 03/05 0659 03/05 0700 - 03/06 0659 03/06 0700 - 03/07 0659    P.O. 720 450     I.V. (mL/kg) 1181.7 (14.3)      IV Piggyback 550 300     TPN 2147.3 0     Total Intake(mL/kg) 4598.9 (55.7) 750 (9.1)     Urine (mL/kg/hr) 3675 (1.9) 1950 (1) 700 (2.5)    Drains  15 (0)     Other  25 (0)      Stool 0 (0)      Total Output 3675 1990 700    Net +923.9 -1240 -700           Urine Occurrence 1 x      Stool Occurrence 2 x            Physical Exam   Constitutional: She is oriented to person, place, and time. She appears well-developed and well-nourished. No distress.   HENT:   Head: Normocephalic and atraumatic.   Eyes: EOM are normal.   Neck: Normal range of motion.   Cardiovascular: Normal rate and intact distal pulses.    Pulmonary/Chest: Effort normal. No respiratory distress.   Abdominal: Soft. There is no rebound and no guarding.   Appropriately TTP at incisions, WV in place to suction, IR drain with serosang/tan drainage.    Musculoskeletal: Normal range of motion.   Neurological: She is alert and oriented to person, place, and time.   Skin: Skin is warm and dry.   Psychiatric: She has a normal mood and affect.     Significant Labs:  BMP (Last 3 Results):   Recent Labs  Lab 03/04/18  0530 03/05/18  0500 03/06/18  0330   * 120* 94   * 137 137   K 4.3 4.5 4.2    101 100   CO2 30* 29 27   BUN 12 13 8   CREATININE 0.8 0.8 0.8   CALCIUM 8.3* 9.0 9.0   MG 1.7 2.0 1.6     CBC (Last 3 Results):   Recent Labs  Lab 03/04/18  0530 03/05/18  0500 03/06/18  0330   WBC 13.49* 12.37 12.01   RBC 2.77* 2.79* 2.85*   HGB 7.2* 7.4* 7.3*   HCT 22.8* 23.0* 23.1*   * 638* 673*   MCV 82 82 81*   MCH 26.0* 26.5* 25.6*   MCHC 31.6* 32.2 31.6*     Microbiology Results (last 7 days)     Procedure Component Value Units Date/Time    Blood culture [[id number]] Collected:  03/02/18 1837    Order Status:  Completed Specimen:  Blood from Line, Port A Cath Updated:  03/05/18 2012     Blood Culture, Routine No Growth to date     Blood Culture, Routine No Growth to date     Blood Culture, Routine No Growth to date     Blood Culture, Routine No Growth to date    Blood culture [[id number]] Collected:  03/02/18 1535    Order Status:  Completed Specimen:  Blood Updated:  03/05/18 1812     Blood Culture, Routine No  Growth to date     Blood Culture, Routine No Growth to date     Blood Culture, Routine No Growth to date     Blood Culture, Routine No Growth to date    Narrative:       Peripheral    Aerobic culture [[id number]]  (Susceptibility) Collected:  03/01/18 1700    Order Status:  Completed Specimen:  Abscess from Abdomen Updated:  03/04/18 1343     Aerobic Bacterial Culture --     ESCHERICHIA COLI ESBL  Rare      Urine culture [[id number]] Collected:  03/01/18 0902    Order Status:  Completed Specimen:  Urine from Urine, Clean Catch Updated:  03/02/18 1235     Urine Culture, Routine No growth    Culture, Anaerobe [[id number]] Collected:  03/01/18 1700    Order Status:  Completed Specimen:  Abscess from Abdomen Updated:  03/02/18 1056     Anaerobic Culture Culture in progress    Blood culture [[id number]]     Order Status:  Canceled Specimen:  Blood     Gram stain [[id number]] Collected:  03/01/18 1700    Order Status:  Completed Specimen:  Abscess from Abdomen Updated:  03/01/18 2245     Gram Stain Result Moderate WBC's      No organisms seen        Assessment/Plan:     * Carcinoid tumor of rectum    Ms. Marshall is a 61 y/o F who was found to have a rectal mass and completed chemoXRT and LAR w/ ileal resection with loop ileostomy for 2 carcinoids on 12/11/17  (neg nodes) on 1/22/18 she underwent pouchagram and then subsequent loop ileostomy takedown on 1/23/18 who presents now with ECF and pSBO now 14 Days Post-Op from Ex lap, ileocolic resection (with fistula) and GIANIN.     -Continue diet, TPN DC'd yesterday. Check nutrition labs weekly. Having bowel function.  -WV changed yesterday with healthy granulation tissue. Will need [facility] for continue vac changes.  -Pelvic abscess with ESBL E coli, on IV zosyn, WBC stable at 12, afebrile. May transition to PO Augmentin tomorrow.  -OOB and ambulating. PT/OT and IS.                   Dimitrios Bautista MD  Colorectal Surgery  [facility]

## 2018-03-06 NOTE — ASSESSMENT & PLAN NOTE
Ms. Marshall is a 63 y/o F who was found to have a rectal mass and completed chemoXRT and LAR w/ ileal resection with loop ileostomy for 2 carcinoids on 12/11/17  (neg nodes) on 1/22/18 she underwent pouchagram and then subsequent loop ileostomy takedown on 1/23/18 who presents now with ECF and pSBO now 14 Days Post-Op from Ex lap, ileocolic resection (with fistula) and GIANNI.     -Continue diet, TPN DC'd yesterday. Check nutrition labs weekly. Having bowel function.  -WV changed yesterday with healthy granulation tissue. Will need [facility] for continue vac changes.  -Pelvic abscess with ESBL E coli, on IV zosyn, WBC stable at 12, afebrile. May transition to PO Augmentin tomorrow.  -OOB and ambulating. PT/OT and IS.

## 2018-03-06 NOTE — PLAN OF CARE
Wound vac form left in pt's chart.  EM Ingram Np, to let her know that form was on chart but there was no return call.        Diana Mckeon LMSW

## 2018-03-06 NOTE — PLAN OF CARE
Problem: Patient Care Overview  Goal: Plan of Care Review  Outcome: Ongoing (interventions implemented as appropriate)  Pt AAOx4.  at bedside. Pt tolerating regular diet with no complaints of discomfort or nausea. Pain managed with PRN pain meds. VSS on RA. R LQ IR drain intact and patent. R LQ wound vac @ 75 cont, intact with no leakage. Pt slpet between care. Call light in reach and bed in lowest position. Pt remains free of falls and injury. No acute events this shift. Will continue to monitor.

## 2018-03-06 NOTE — SUBJECTIVE & OBJECTIVE
Subjective:     Interval History:     NAEON. Tolerating PO intake, came off TPN yesterday. Having bowel movements. Voiding. Pain controlled. Ambulating.    Post-Op Info:  Procedure(s) (LRB):  ileocoloc resection, lysis of adhesions (N/A)   14 Days Post-Op      Medications:  Continuous Infusions:  Scheduled Meds:   cloNIDine  0.1 mg Oral BID    cyclobenzaprine  5 mg Oral TID    DULoxetine  60 mg Oral Daily    famotidine  20 mg Oral Daily    fluticasone  1 spray Each Nare Daily    heparin (porcine)  5,000 Units Subcutaneous Q8H    hydroCHLOROthiazide  12.5 mg Oral Daily    miconazole NITRATE 2 %   Topical (Top) BID    pantoprazole  40 mg Oral Daily    phenazopyridine  100 mg Oral TID WM    piperacillin-tazobactam (ZOSYN) IVPB  4.5 g Intravenous Q8H    sodium chloride 0.9%  3 mL Intravenous Q8H    spironolactone  25 mg Oral Daily     PRN Meds:   diphenhydrAMINE    fentaNYL    ondansetron    oxyCODONE    oxyCODONE    promethazine (PHENERGAN) IVPB    sodium chloride 0.9%        Objective:     Vital Signs (Most Recent):  Temp: 97.6 °F (36.4 °C) (03/06/18 0749)  Pulse: 96 (03/06/18 0749)  Resp: 18 (03/06/18 0749)  BP: (!) 156/80 (03/06/18 0749)  SpO2: 99 % (03/06/18 0749) Vital Signs (24h Range):  Temp:  [96.7 °F (35.9 °C)-98.9 °F (37.2 °C)] 97.6 °F (36.4 °C)  Pulse:  [] 96  Resp:  [16-18] 18  SpO2:  [89 %-100 %] 99 %  BP: (110-156)/(55-80) 156/80     Intake/Output - Last 3 Shifts       03/04 0700 - 03/05 0659 03/05 0700 - 03/06 0659 03/06 0700 - 03/07 0659    P.O. 720 450     I.V. (mL/kg) 1181.7 (14.3)      IV Piggyback 550 300     TPN 2147.3 0     Total Intake(mL/kg) 4598.9 (55.7) 750 (9.1)     Urine (mL/kg/hr) 3675 (1.9) 1950 (1) 700 (2.5)    Drains  15 (0)     Other  25 (0)     Stool 0 (0)      Total Output 3675 1990 700    Net +923.9 -1240 -700           Urine Occurrence 1 x      Stool Occurrence 2 x            Physical Exam   Constitutional: She is oriented to person, place, and time. She  appears well-developed and well-nourished. No distress.   HENT:   Head: Normocephalic and atraumatic.   Eyes: EOM are normal.   Neck: Normal range of motion.   Cardiovascular: Normal rate and intact distal pulses.    Pulmonary/Chest: Effort normal. No respiratory distress.   Abdominal: Soft. There is no rebound and no guarding.   Appropriately TTP at incisions, WV in place to suction, IR drain with serosang/tan drainage.    Musculoskeletal: Normal range of motion.   Neurological: She is alert and oriented to person, place, and time.   Skin: Skin is warm and dry.   Psychiatric: She has a normal mood and affect.     Significant Labs:  BMP (Last 3 Results):   Recent Labs  Lab 03/04/18  0530 03/05/18  0500 03/06/18  0330   * 120* 94   * 137 137   K 4.3 4.5 4.2    101 100   CO2 30* 29 27   BUN 12 13 8   CREATININE 0.8 0.8 0.8   CALCIUM 8.3* 9.0 9.0   MG 1.7 2.0 1.6     CBC (Last 3 Results):   Recent Labs  Lab 03/04/18  0530 03/05/18  0500 03/06/18  0330   WBC 13.49* 12.37 12.01   RBC 2.77* 2.79* 2.85*   HGB 7.2* 7.4* 7.3*   HCT 22.8* 23.0* 23.1*   * 638* 673*   MCV 82 82 81*   MCH 26.0* 26.5* 25.6*   MCHC 31.6* 32.2 31.6*     Microbiology Results (last 7 days)     Procedure Component Value Units Date/Time    Blood culture [[id number]] Collected:  03/02/18 1837    Order Status:  Completed Specimen:  Blood from Line, Port A Cath Updated:  03/05/18 2012     Blood Culture, Routine No Growth to date     Blood Culture, Routine No Growth to date     Blood Culture, Routine No Growth to date     Blood Culture, Routine No Growth to date    Blood culture [[id number]] Collected:  03/02/18 1535    Order Status:  Completed Specimen:  Blood Updated:  03/05/18 1812     Blood Culture, Routine No Growth to date     Blood Culture, Routine No Growth to date     Blood Culture, Routine No Growth to date     Blood Culture, Routine No Growth to date    Narrative:       Peripheral    Aerobic culture [[id number]]   (Susceptibility) Collected:  03/01/18 1700    Order Status:  Completed Specimen:  Abscess from Abdomen Updated:  03/04/18 1343     Aerobic Bacterial Culture --     ESCHERICHIA COLI ESBL  Rare      Urine culture [[id number]] Collected:  03/01/18 0902    Order Status:  Completed Specimen:  Urine from Urine, Clean Catch Updated:  03/02/18 1235     Urine Culture, Routine No growth    Culture, Anaerobe [[id number]] Collected:  03/01/18 1700    Order Status:  Completed Specimen:  Abscess from Abdomen Updated:  03/02/18 1056     Anaerobic Culture Culture in progress    Blood culture [[id number]]     Order Status:  Canceled Specimen:  Blood     Gram stain [[id number]] Collected:  03/01/18 1700    Order Status:  Completed Specimen:  Abscess from Abdomen Updated:  03/01/18 2245     Gram Stain Result Moderate WBC's      No organisms seen

## 2018-03-06 NOTE — PT/OT/SLP PROGRESS
Physical Therapy Treatment    Patient Name:  Coby Marshall   MRN:  [id number]    Recommendations:     Discharge Recommendations:  home health PT   Discharge Equipment Recommendations: bedside commode, walker, rolling, shower chair   Barriers to discharge: None    Assessment:     Coby Marshall is a 62 y.o. female admitted with a medical diagnosis of Carcinoid tumor of rectum.  She presents with the following impairments/functional limitations:  weakness, impaired endurance, impaired self care skills, gait instability, impaired functional mobilty, impaired balance .Pt tolerated treatment fairly well and is progressing  with mobility. Pt limited due to c/o of pain and nuasea. Pt would continue to benefit from skilled PT to address overall functional mobility and goals. Goals remain appropriate       Rehab Prognosis:  good; patient would benefit from acute skilled PT services to address these deficits and reach maximum level of function.      Recent Surgery: Procedure(s) (LRB):  ileocoloc resection, lysis of adhesions (N/A) 14 Days Post-Op    Plan:     During this hospitalization, patient to be seen 4 x/week to address the above listed problems via gait training, therapeutic activities, therapeutic exercises  · Plan of Care Expires:  03/22/18   Plan of Care Reviewed with: patient    Subjective     Communicated with RN prior to session.  Patient found supine upon PT entry to room, agreeable to treatment.      Chief Complaint: I need to go to the bathroom before we walk  Pain/Comfort:  · Pain Rating 1:  (not rated)  · Location - Side 1: Right  · Location - Orientation 1: generalized  · Location 1: abdomen  · Pain Addressed 1: Reposition, Distraction  · Pain Rating Post-Intervention 1: did not rate    Patients cultural, spiritual, Rastafarian conflicts given the current situation: none noted    Objective:     Patient found with: peripheral IV, RANDELL drain, telemetry, wound vac     General Precautions: Standard, fall    Orthopedic Precautions:N/A   Braces:       Functional Mobility:  · Bed Mobility:     · Rolling Right: supervision  · Supine to Sit: stand by assistance  · Transfers:     · Sit to Stand:  stand by assistance with rolling walker  · Toilet transfer with  SBA and RW, performed  self diana-area cleaning and during hand hygiene with SBA.   · Gait: 300' with RW and SBA-CGA with no LOB, decreased eric and step length, increased time spent in double support    AM-PAC 6 CLICK MOBILITY  Turning over in bed (including adjusting bedclothes, sheets and blankets)?: 4  Sitting down on and standing up from a chair with arms (e.g., wheelchair, bedside commode, etc.): 3  Moving from lying on back to sitting on the side of the bed?: 4  Moving to and from a bed to a chair (including a wheelchair)?: 3  Need to walk in hospital room?: 3  Climbing 3-5 steps with a railing?: 2  Total Score: 19       Therapeutic Activities and Exercises:   Discussed/educated patient on progress, safety,  d/c, PT POC   White board updated   Donned an extra gown  Pt encouraged to ambulate in hallways 3x/day with nursing or family assistance to improve endurance.   Pt safe to ambulate in hallway with RN or PCT assistance    Patient left up in chair with all lines intact, call button in reach and nsg notified..    GOALS:    Physical Therapy Goals        Problem: Physical Therapy Goal    Goal Priority Disciplines Outcome Goal Variances Interventions   Physical Therapy Goal     PT/OT, PT Ongoing (interventions implemented as appropriate)     Description:  Goals to be met by: 18, extended to 3/8/2018    Patient will increase functional independence with mobility by performin. Supine to sit with Modified [place]-not met  2. Sit to supine with Modified [place]-not met  3. Sit to stand transfer with Supervision-not met  4. Bed to chair transfer with Supervision using no AD-not met  5. Gait  x 150 feet with Stand by Assistance with/without  AD-met        Revised: Gait x 200 feet with supervision with/without AD.  6. Lower extremity exercise program x20 reps per handout, with independence-not met                         Time Tracking:     PT Received On: 03/06/18  PT Start Time: 1011     PT Stop Time: 1034  PT Total Time (min): 23 min     Billable Minutes: Gait Training 15 and Therapeutic Activity 8    Treatment Type: Treatment  PT/PTA: PTA     PTA Visit Number: 1     Cachorro Pritchard, DEISY  03/06/2018

## 2018-03-06 NOTE — PLAN OF CARE
Problem: Patient Care Overview  Goal: Plan of Care Review  Plan of care reviewed with patient and pt. Spouse. AAOX4. Contact precautions maintained. Vitals stable. ML incision with telfa island dressing intact with scant dried serosanguineous drainage. Wound vac intact to right abdomen, continuous at 75mmhg draining scant  serosanguineous fluid. Right chest port intact. IR drain to right abdomen intact draining scant serosanguineous fluid. Regular diet maintained. Pt. Complains of pain,nausea medicated with prn meds. Pt. Ambulated in halls with physical therapy using walker. Pt. Up in chair throughout shift. Will continue to monitor.

## 2018-03-06 NOTE — PLAN OF CARE
Problem: Physical Therapy Goal  Goal: Physical Therapy Goal  Goals to be met by: 18, extended to 3/8/2018    Patient will increase functional independence with mobility by performin. Supine to sit with Modified [place]-not met  2. Sit to supine with Modified [place]-not met  3. Sit to stand transfer with Supervision-not met  4. Bed to chair transfer with Supervision using no AD-not met  5. Gait  x 150 feet with Stand by Assistance with/without AD-met        Revised: Gait x 200 feet with supervision with/without AD.  6. Lower extremity exercise program x20 reps per handout, with independence-not met        Pt progressing towards goals. continue with PT POC.Goals remain appropriate.  Cachorro Pritchard PTA  3/6/2018

## 2018-03-07 LAB
ANION GAP SERPL CALC-SCNC: 8 MMOL/L
BACTERIA BLD CULT: NORMAL
BACTERIA BLD CULT: NORMAL
BASOPHILS # BLD AUTO: 0.06 K/UL
BASOPHILS NFR BLD: 0.5 %
BUN SERPL-MCNC: 7 MG/DL
CALCIUM SERPL-MCNC: 8.9 MG/DL
CHLORIDE SERPL-SCNC: 101 MMOL/L
CO2 SERPL-SCNC: 29 MMOL/L
CREAT SERPL-MCNC: 0.9 MG/DL
DIFFERENTIAL METHOD: ABNORMAL
EOSINOPHIL # BLD AUTO: 0.3 K/UL
EOSINOPHIL NFR BLD: 2.5 %
ERYTHROCYTE [DISTWIDTH] IN BLOOD BY AUTOMATED COUNT: 16.4 %
EST. GFR  (AFRICAN AMERICAN): >60 ML/MIN/1.73 M^2
EST. GFR  (NON AFRICAN AMERICAN): >60 ML/MIN/1.73 M^2
GLUCOSE SERPL-MCNC: 105 MG/DL
HCT VFR BLD AUTO: 24.3 %
HGB BLD-MCNC: 7.5 G/DL
IMM GRANULOCYTES # BLD AUTO: 0.11 K/UL
IMM GRANULOCYTES NFR BLD AUTO: 1 %
LYMPHOCYTES # BLD AUTO: 1.1 K/UL
LYMPHOCYTES NFR BLD: 9.3 %
MAGNESIUM SERPL-MCNC: 1.3 MG/DL
MCH RBC QN AUTO: 25.2 PG
MCHC RBC AUTO-ENTMCNC: 30.9 G/DL
MCV RBC AUTO: 82 FL
MONOCYTES # BLD AUTO: 1 K/UL
MONOCYTES NFR BLD: 8.4 %
NEUTROPHILS # BLD AUTO: 8.9 K/UL
NEUTROPHILS NFR BLD: 78.3 %
NRBC BLD-RTO: 0 /100 WBC
PHOSPHATE SERPL-MCNC: 4.2 MG/DL
PLATELET # BLD AUTO: 661 K/UL
PMV BLD AUTO: 8.8 FL
POTASSIUM SERPL-SCNC: 4.2 MMOL/L
RBC # BLD AUTO: 2.98 M/UL
SODIUM SERPL-SCNC: 138 MMOL/L
WBC # BLD AUTO: 11.3 K/UL

## 2018-03-07 PROCEDURE — 63600175 PHARM REV CODE 636 W HCPCS: Performed by: STUDENT IN AN ORGANIZED HEALTH CARE EDUCATION/TRAINING PROGRAM

## 2018-03-07 PROCEDURE — 63600175 PHARM REV CODE 636 W HCPCS: Performed by: NURSE PRACTITIONER

## 2018-03-07 PROCEDURE — 25000003 PHARM REV CODE 250: Performed by: COLON & RECTAL SURGERY

## 2018-03-07 PROCEDURE — 25000003 PHARM REV CODE 250: Performed by: STUDENT IN AN ORGANIZED HEALTH CARE EDUCATION/TRAINING PROGRAM

## 2018-03-07 PROCEDURE — 83735 ASSAY OF MAGNESIUM: CPT

## 2018-03-07 PROCEDURE — 25000003 PHARM REV CODE 250: Performed by: NURSE PRACTITIONER

## 2018-03-07 PROCEDURE — 84100 ASSAY OF PHOSPHORUS: CPT

## 2018-03-07 PROCEDURE — 20600001 HC STEP DOWN PRIVATE ROOM

## 2018-03-07 PROCEDURE — 85025 COMPLETE CBC W/AUTO DIFF WBC: CPT

## 2018-03-07 PROCEDURE — 97530 THERAPEUTIC ACTIVITIES: CPT

## 2018-03-07 PROCEDURE — 80048 BASIC METABOLIC PNL TOTAL CA: CPT

## 2018-03-07 PROCEDURE — A4216 STERILE WATER/SALINE, 10 ML: HCPCS | Performed by: STUDENT IN AN ORGANIZED HEALTH CARE EDUCATION/TRAINING PROGRAM

## 2018-03-07 PROCEDURE — 97110 THERAPEUTIC EXERCISES: CPT

## 2018-03-07 RX ORDER — LANOLIN ALCOHOL/MO/W.PET/CERES
400 CREAM (GRAM) TOPICAL ONCE
Status: COMPLETED | OUTPATIENT
Start: 2018-03-07 | End: 2018-03-07

## 2018-03-07 RX ORDER — AMOXICILLIN AND CLAVULANATE POTASSIUM 875; 125 MG/1; MG/1
1 TABLET, FILM COATED ORAL EVERY 12 HOURS
Status: DISCONTINUED | OUTPATIENT
Start: 2018-03-07 | End: 2018-03-08 | Stop reason: HOSPADM

## 2018-03-07 RX ADMIN — HEPARIN SODIUM 5000 UNITS: 5000 INJECTION, SOLUTION INTRAVENOUS; SUBCUTANEOUS at 09:03

## 2018-03-07 RX ADMIN — PIPERACILLIN AND TAZOBACTAM 4.5 G: 4; .5 INJECTION, POWDER, LYOPHILIZED, FOR SOLUTION INTRAVENOUS; PARENTERAL at 02:03

## 2018-03-07 RX ADMIN — CYCLOBENZAPRINE HYDROCHLORIDE 5 MG: 5 TABLET, FILM COATED ORAL at 09:03

## 2018-03-07 RX ADMIN — Medication 3 ML: at 02:03

## 2018-03-07 RX ADMIN — AMOXICILLIN AND CLAVULANATE POTASSIUM 1 TABLET: 875; 125 TABLET, FILM COATED ORAL at 09:03

## 2018-03-07 RX ADMIN — FAMOTIDINE 20 MG: 20 TABLET, FILM COATED ORAL at 09:03

## 2018-03-07 RX ADMIN — PANTOPRAZOLE SODIUM 40 MG: 40 TABLET, DELAYED RELEASE ORAL at 09:03

## 2018-03-07 RX ADMIN — HEPARIN SODIUM 5000 UNITS: 5000 INJECTION, SOLUTION INTRAVENOUS; SUBCUTANEOUS at 02:03

## 2018-03-07 RX ADMIN — FENTANYL CITRATE 25 MCG: 50 INJECTION, SOLUTION INTRAMUSCULAR; INTRAVENOUS at 08:03

## 2018-03-07 RX ADMIN — MICONAZOLE NITRATE: 20 POWDER TOPICAL at 09:03

## 2018-03-07 RX ADMIN — OXYCODONE HYDROCHLORIDE 10 MG: 5 TABLET ORAL at 04:03

## 2018-03-07 RX ADMIN — DULOXETINE 60 MG: 60 CAPSULE, DELAYED RELEASE ORAL at 09:03

## 2018-03-07 RX ADMIN — HYDROCHLOROTHIAZIDE 12.5 MG: 12.5 TABLET ORAL at 09:03

## 2018-03-07 RX ADMIN — SPIRONOLACTONE 25 MG: 25 TABLET, FILM COATED ORAL at 09:03

## 2018-03-07 RX ADMIN — PHENAZOPYRIDINE HYDROCHLORIDE 100 MG: 100 TABLET ORAL at 09:03

## 2018-03-07 RX ADMIN — CYCLOBENZAPRINE HYDROCHLORIDE 5 MG: 5 TABLET, FILM COATED ORAL at 04:03

## 2018-03-07 RX ADMIN — MAGNESIUM OXIDE TAB 400 MG (241.3 MG ELEMENTAL MG) 400 MG: 400 (241.3 MG) TAB at 09:03

## 2018-03-07 RX ADMIN — PHENAZOPYRIDINE HYDROCHLORIDE 100 MG: 100 TABLET ORAL at 12:03

## 2018-03-07 RX ADMIN — FLUTICASONE PROPIONATE 50 MCG: 50 SPRAY, METERED NASAL at 09:03

## 2018-03-07 RX ADMIN — OXYCODONE HYDROCHLORIDE 10 MG: 5 TABLET ORAL at 02:03

## 2018-03-07 RX ADMIN — OXYCODONE HYDROCHLORIDE 10 MG: 5 TABLET ORAL at 07:03

## 2018-03-07 RX ADMIN — CYCLOBENZAPRINE HYDROCHLORIDE 5 MG: 5 TABLET, FILM COATED ORAL at 02:03

## 2018-03-07 RX ADMIN — Medication 3 ML: at 09:03

## 2018-03-07 RX ADMIN — PHENAZOPYRIDINE HYDROCHLORIDE 100 MG: 100 TABLET ORAL at 05:03

## 2018-03-07 RX ADMIN — HEPARIN SODIUM 5000 UNITS: 5000 INJECTION, SOLUTION INTRAVENOUS; SUBCUTANEOUS at 04:03

## 2018-03-07 NOTE — PLAN OF CARE
Problem: Patient Care Overview  Goal: Plan of Care Review  Outcome: Ongoing (interventions implemented as appropriate)  Pt AAOx4.  at bedside. Pt tolerating regular diet with no complaints of discomfort or nausea. Pain managed with PRN pain meds. VSS on RA. R LQ IR drain intact and patent. R LQ wound vac @ 75 cont, intact with no leakage. MDL dressing changed. Contact precaution maintained. Pt slep between care. Call light in reach and bed in lowest position. Pt remains free of falls and injury. No acute events this shift. Will continue to monitor.

## 2018-03-07 NOTE — PROGRESS NOTES
[facility]  Colorectal Surgery  Progress Note    Patient Name: Coby Marshall  MRN: [id number]  Admission Date: 2/19/2018  Hospital Length of Stay: 16 days  Attending Physician: Suhas Manjarrez MD    Subjective:     Interval History:     NAEON. Tolerating diet, no nausea or vomiting. PAin controlled. Ambulating. Voiding.    Post-Op Info:  Procedure(s) (LRB):  ileocoloc resection, lysis of adhesions (N/A)   15 Days Post-Op      Medications:  Continuous Infusions:  Scheduled Meds:   amoxicillin-clavulanate 875-125mg  1 tablet Oral Q12H    cloNIDine  0.1 mg Oral BID    cyclobenzaprine  5 mg Oral TID    DULoxetine  60 mg Oral Daily    famotidine  20 mg Oral Daily    fluticasone  1 spray Each Nare Daily    heparin (porcine)  5,000 Units Subcutaneous Q8H    hydroCHLOROthiazide  12.5 mg Oral Daily    miconazole NITRATE 2 %   Topical (Top) BID    pantoprazole  40 mg Oral Daily    phenazopyridine  100 mg Oral TID WM    sodium chloride 0.9%  3 mL Intravenous Q8H    spironolactone  25 mg Oral Daily     PRN Meds:   diphenhydrAMINE    fentaNYL    ondansetron    oxyCODONE    oxyCODONE    promethazine (PHENERGAN) IVPB    sodium chloride 0.9%        Objective:     Vital Signs (Most Recent):  Temp: 98.1 °F (36.7 °C) (03/07/18 0742)  Pulse: 99 (03/07/18 0742)  Resp: 14 (03/07/18 0742)  BP: 119/75 (03/07/18 0742)  SpO2: 98 % (03/07/18 0742) Vital Signs (24h Range):  Temp:  [97.6 °F (36.4 °C)-98.8 °F (37.1 °C)] 98.1 °F (36.7 °C)  Pulse:  [] 99  Resp:  [14-20] 14  SpO2:  [97 %-100 %] 98 %  BP: ()/(59-76) 119/75     Intake/Output - Last 3 Shifts       03/05 0700 - 03/06 0659 03/06 0700 - 03/07 0659 03/07 0700 - 03/08 0659    P.O. 450 550     I.V. (mL/kg)       IV Piggyback 300 350     TPN 0      Total Intake(mL/kg) 750 (9.1) 900 (10.9)     Urine (mL/kg/hr) 1950 (1) 2550 (1.3)     Drains 15 (0) 25 (0)     Other 25 (0) 25 (0)     Stool  0 (0)     Total Output 1990 2600      Net -1240 -1700              Urine Occurrence  2 x     Stool Occurrence  3 x           Physical Exam   Constitutional: She is oriented to person, place, and time. She appears well-developed and well-nourished. No distress.   HENT:   Head: Normocephalic and atraumatic.   Eyes: EOM are normal.   Neck: Normal range of motion.   Cardiovascular: Normal rate and intact distal pulses.    Pulmonary/Chest: Effort normal. No respiratory distress.   Abdominal: Soft. There is no rebound and no guarding.   Appropriately TTP at incisions, WV in place to suction, IR drain with serosang/tan drainage.    Musculoskeletal: Normal range of motion.   Neurological: She is alert and oriented to person, place, and time.   Skin: Skin is warm and dry.   Psychiatric: She has a normal mood and affect.     Significant Labs:  BMP (Last 3 Results):   Recent Labs  Lab 03/05/18  0500 03/06/18  0330 03/07/18  0430   * 94 105  105  105    137 138  138  138   K 4.5 4.2 4.2  4.2  4.2    100 101  101  101   CO2 29 27 29  29  29   BUN 13 8 7*  7*  7*   CREATININE 0.8 0.8 0.9  0.9  0.9   CALCIUM 9.0 9.0 8.9  8.9  8.9   MG 2.0 1.6 1.3*  1.3*  1.3*     CBC (Last 3 Results):   Recent Labs  Lab 03/05/18  0500 03/06/18  0330 03/07/18  0430   WBC 12.37 12.01 11.30  11.30  11.30   RBC 2.79* 2.85* 2.98*  2.98*  2.98*   HGB 7.4* 7.3* 7.5*  7.5*  7.5*   HCT 23.0* 23.1* 24.3*  24.3*  24.3*   * 673* 661*  661*  661*   MCV 82 81* 82  82  82   MCH 26.5* 25.6* 25.2*  25.2*  25.2*   MCHC 32.2 31.6* 30.9*  30.9*  30.9*     Microbiology Results (last 7 days)     Procedure Component Value Units Date/Time    Culture, Anaerobe [[id number]] Collected:  03/01/18 1700    Order Status:  Completed Specimen:  Abscess from Abdomen Updated:  03/07/18 0815     Anaerobic Culture Culture in progress    Blood culture [[id number]] Collected:  03/02/18 1837    Order Status:  Completed Specimen:  Blood from Line, Port A Cath Updated:  03/06/18 2012      Blood Culture, Routine No Growth to date     Blood Culture, Routine No Growth to date     Blood Culture, Routine No Growth to date     Blood Culture, Routine No Growth to date     Blood Culture, Routine No Growth to date    Blood culture [[id number]] Collected:  03/02/18 1535    Order Status:  Completed Specimen:  Blood Updated:  03/06/18 1812     Blood Culture, Routine No Growth to date     Blood Culture, Routine No Growth to date     Blood Culture, Routine No Growth to date     Blood Culture, Routine No Growth to date     Blood Culture, Routine No Growth to date    Narrative:       Peripheral    Aerobic culture [[id number]]  (Susceptibility) Collected:  03/01/18 1700    Order Status:  Completed Specimen:  Abscess from Abdomen Updated:  03/04/18 1343     Aerobic Bacterial Culture --     ESCHERICHIA COLI ESBL  Rare      Urine culture [[id number]] Collected:  03/01/18 0902    Order Status:  Completed Specimen:  Urine from Urine, Clean Catch Updated:  03/02/18 1235     Urine Culture, Routine No growth    Blood culture [[id number]]     Order Status:  Canceled Specimen:  Blood     Gram stain [[id number]] Collected:  03/01/18 1700    Order Status:  Completed Specimen:  Abscess from Abdomen Updated:  03/01/18 2245     Gram Stain Result Moderate WBC's      No organisms seen        Prealbumin (Last 3 Results):   Recent Labs  Lab 03/01/18  0843 03/04/18  0530 03/06/18  1252   PREALBUMIN 12* 8* 11*     Assessment/Plan:     * Carcinoid tumor of rectum    Ms. Marshall is a 63 y/o F who was found to have a rectal mass and completed chemoXRT and LAR w/ ileal resection with loop ileostomy for 2 carcinoids on 12/11/17  (neg nodes) on 1/22/18 she underwent pouchagram and then subsequent loop ileostomy takedown on 1/23/18 who presents now with ECF and pSBO now 15 Days Post-Op from Ex lap, ileocolic resection (with fistula) and GIANNI.     -Continue diet, supplement with boost.  -Continue WV changes to RUQ incision.  -Pelvic abscess with ESBL E  coli, on IV zosyn, afebrile, WBC down to 11. Will transition to PO augmentin.  -Replete lytes as needed.  -Consider removal of IR drain.  -OOB and ambulating. PT/OT and IS.                   Dimitrios Bautista MD  Colorectal Surgery  [facility]

## 2018-03-07 NOTE — PT/OT/SLP PROGRESS
Occupational Therapy   Treatment    Name: Coby Marshall  MRN: [id number]  Admitting Diagnosis:  Carcinoid tumor of rectum  15 Days Post-Op    Recommendations:     Discharge Recommendations: home health OT  Discharge Equipment Recommendations:  bedside commode, walker, rolling, shower chair  Barriers to discharge:  None    Subjective     Communicated with: RN prior to session. Pt agreeable to participate with therapy.   Pain/Comfort:  · Pain Rating 1:  (Pt with no c/o pain. Just pressure in abdomen. )    Patients cultural, spiritual, Gnosticism conflicts given the current situation: none noted     Objective:     Patient found with: RANDELL drain, wound vac, telemetry    General Precautions: Standard, fall, contact   Orthopedic Precautions:N/A   Braces: N/A     Occupational Performance:    Bed Mobility:    · Pt found sitting EOB with family present upon therapist entry into room.      Functional Mobility/Transfers:  · Patient completed Sit <> Stand Transfer with supervision  with  rolling walker   · Patient completed Bed <> Chair Transfer using Stand Pivot technique with stand by assistance with rolling walker  · Functional Mobility: Pt performed functional mobility for household distances with SBA/SPV using RW to complete functional tasks.     Activities of Daily Living:  · Grooming: supervision standing at sinkside for hand hygiene  · UB Dressing: stand by assistance to don/doff robe like a jacket     Patient left up in chair with all lines intact and call button in reach    [facility] 6 Click:  [facility] Total Score: 20    Treatment & Education:  · Pt and pt family educated on safety awareness with functional transfers and with completion of ADLS  · Pt instructed on and completed BUE HEP: Shoulder flexion/extension, bicep curls, and chest press with no resistance sitting EOB x 10 reps x 2 sets with pt demo understanding  · Pt completed ADLS and functional mobility for treatment session as noted above  · Pt and pt family educated  on importance of completing OOB with nursing staff assistance to increase pt functional activity tolerance and for the prevention of secondary complications following surgery  · White board/Communication board updated     Education:    Assessment:     Coby Marshall is a 62 y.o. female with a medical diagnosis of Carcinoid tumor of rectum.  She presents with performance deficits affecting function including weakness, impaired endurance, impaired self care skills, impaired functional mobilty, impaired balance. Pt presented with increased functional activity tolerance with completion of ADLs and functional mobility this session, requiring average rest breaks. Pt will continue to benefit from skilled OT services to address the above listed impairments, decrease caregiver burden, increase pt functional independence level. Recommend HH OT services upon discharge from hospital.     Rehab Prognosis:  Good; patient would benefit from acute skilled OT services to address these deficits and reach maximum level of function.       Plan:     Patient to be seen 4 x/week to address the above listed problems via self-care/home management, therapeutic activities, therapeutic exercises  · Plan of Care Expires: 03/22/18  · Plan of Care Reviewed with: patient, spouse    This Plan of care has been discussed with the patient who was involved in its development and understands and is in agreement with the identified goals and treatment plan    GOALS:    Occupational Therapy Goals        Problem: Occupational Therapy Goal    Goal Priority Disciplines Outcome Interventions   Occupational Therapy Goal     OT, PT/OT Ongoing (interventions implemented as appropriate)    Description:  Goals to be met by: 03/15/2018    Patient will increase functional independence with ADLs by performing:    Feeding with Modified [place].  UE Dressing with Supervision sitting upright EOB. GOAL MET 03/05/2018  REVISED: UE Dressing with mod I sitting upright  at EOB.  LE Dressing with Stand-by Assistance.  Grooming while standing with Supervision. GOAL MET  REVISED: Grooming while standing with mod I.   Toileting from bedside commode with Supervision for hygiene and clothing management.   Supine to sit with Modified [place].  Toilet transfer to bedside commode with Supervision.                        Time Tracking:     OT Date of Treatment: 03/07/18  OT Start Time: 1032  OT Stop Time: 1057  OT Total Time (min): 25 min    Billable Minutes:Therapeutic Activity 15  Therapeutic Exercise 10    Jason Lundberg OT  3/7/2018

## 2018-03-07 NOTE — PLAN OF CARE
EM sent w/vac form to [facility] for authorization. EM will f/u on this auth.           Angeles Ellis, LMSW [facility]  [id number]

## 2018-03-07 NOTE — PLAN OF CARE
Problem: Patient Care Overview  Goal: Plan of Care Review  Plan of care reviewed with patient and pt. Spouse. AAOX4, Vitals stable. Contact precautions maintained. Complaints of pain, medicated with prn meds. ML incision to abdomen with telfa island dressing intact. Wound vac to right abdomen intact, continuous at 75mmhg with scant serosanguineous fluid draining. IR drain intact to lower r. Abdomen. Right chest port intact, flushed saline locked. Regular diet maintained, no complaints of nausea. Pt. Ambulates with walker in halls with spouse. Pt. Uses toilet with adequate urine output. 2 bowel movements during shift. Will continue to monitor.

## 2018-03-07 NOTE — PLAN OF CARE
[facility]    HOME HEALTH ORDERS  FACE TO FACE ENCOUNTER    Patient Name: Coby Marshall  YOB: 1955    PCP: Randal Casillas MD   PCP Address: [address] [place] [address] / [place] [address]  PCP Phone Number: [phone]  PCP Fax: [phone]    Encounter Date: 03/07/2018    Admit to Home Health    Diagnoses:  Active Hospital Problems    Diagnosis  POA    *Carcinoid tumor of rectum [D3A.026]  Unknown    Dehiscence of incision [T81.31XA]  Yes    Severe malnutrition [E43]  Yes    Abdominal abscess [K65.1]  Yes    ESBL (extended spectrum beta-lactamase) producing bacteria infection [A49.9, Z16.12]  Yes    Delayed surgical wound healing [T81.89XA]  Yes      Resolved Hospital Problems    Diagnosis Date Resolved POA   No resolved problems to display.       No future appointments.        I have seen and examined this patient face to face today. My clinical findings that support the need for the home health skilled services and home bound status are the following:  Weakness/numbness causing balance and gait disturbance due to Surgery making it taxing to leave home.    Allergies:  Review of patient's allergies indicates:   Allergen Reactions    Lisinopril-hydrochlorothiazide Swelling     Mouth Swelling    Sulfa (sulfonamide antibiotics) Swelling     Swelling of lips       Diet: regular diet    Activities: no lifting, Driving, or Strenuous exercise for 6 weeks    Nursing:   SN to complete comprehensive assessment including routine vital signs. Instruct on disease process and s/s of complications to report to MD. Review/verify medication list sent home with the patient at time of discharge  and instruct patient/caregiver as needed. Frequency may be adjusted depending on start of care date.    Notify MD if SBP > 160 or < 90; DBP > 90 or < 50; HR > 120 or < 50; Temp > 101; Other:         CONSULTS:    Physical Therapy to evaluate and treat. Evaluate for home safety and  equipment needs; Establish/upgrade home exercise program. Perform / instruct on therapeutic exercises, gait training, transfer training, and Range of Motion.  Occupational Therapy to evaluate and treat. Evaluate home environment for safety and equipment needs. Perform/Instruct on transfers, ADL training, ROM, and therapeutic exercises.   to evaluate for community resources/long-range planning.    Midline wound pack with Acquacel dsg daily    Lower abd wound-wound vac to 125mm, change twice a week     Medications: Review discharge medications with patient and family and provide education.      Current Discharge Medication List      CONTINUE these medications which have NOT CHANGED    Details   ALBUTEROL INHL Inhale into the lungs.      aspirin (ECOTRIN) 81 MG EC tablet Take 81 mg by mouth once daily.      atorvastatin (LIPITOR) 20 MG tablet Take 20 mg by mouth once daily.      cloNIDine (CATAPRES) 0.1 MG tablet Take 0.1 mg by mouth 2 (two) times daily.      diclofenac (VOLTAREN) 75 MG EC tablet Take 75 mg by mouth 2 (two) times daily.      DULoxetine (CYMBALTA) 60 MG capsule Take 60 mg by mouth once daily.      famotidine (PEPCID) 20 MG tablet Take 20 mg by mouth 2 (two) times daily.      fluticasone (FLONASE) 50 mcg/actuation nasal spray 1 spray by Each Nare route once daily.      hydroCHLOROthiazide (MICROZIDE) 12.5 mg capsule Take 12.5 mg by mouth once daily.      hydrOXYzine HCl (ATARAX) 25 MG tablet Take 25 mg by mouth 3 (three) times daily.      omeprazole (PRILOSEC) 20 MG capsule Take 20 mg by mouth once daily.      oxyCODONE (ROXICODONE) 5 MG immediate release tablet Take 1 tablet (5 mg total) by mouth every 4 (four) hours as needed.  Qty: 30 tablet, Refills: 0      silver sulfADIAZINE 1% (SILVADENE) 1 % cream Apply topically 2 (two) times daily.      spironolactone (ALDACTONE) 25 MG tablet Take 25 mg by mouth once daily.             I certify that this patient is confined to her home and needs  intermittent skilled nursing care, physical therapy and occupational therapy.

## 2018-03-07 NOTE — ASSESSMENT & PLAN NOTE
Ms. Marshall is a 61 y/o F who was found to have a rectal mass and completed chemoXRT and LAR w/ ileal resection with loop ileostomy for 2 carcinoids on 12/11/17  (neg nodes) on 1/22/18 she underwent pouchagram and then subsequent loop ileostomy takedown on 1/23/18 who presents now with ECF and pSBO now 15 Days Post-Op from Ex lap, ileocolic resection (with fistula) and GIANNI.     -Continue diet, supplement with boost.  -Continue WV changes to RUQ incision.  -Pelvic abscess with ESBL E coli, on IV zosyn, afebrile, WBC down to 11. Will transition to PO augmentin.  -Replete lytes as needed.  -Consider removal of IR drain.  -OOB and ambulating. PT/OT and IS.

## 2018-03-07 NOTE — PLAN OF CARE
Problem: Occupational Therapy Goal  Goal: Occupational Therapy Goal  Goals to be met by: 03/15/2018    Patient will increase functional independence with ADLs by performing:    Feeding with Modified [place].  UE Dressing with Supervision sitting upright EOB. GOAL MET 03/05/2018  REVISED: UE Dressing with mod I sitting upright at EOB.  LE Dressing with Stand-by Assistance.  Grooming while standing with Supervision. GOAL MET  REVISED: Grooming while standing with mod I.   Toileting from bedside commode with Supervision for hygiene and clothing management.   Supine to sit with Modified [place].  Toilet transfer to bedside commode with Supervision.      Outcome: Ongoing (interventions implemented as appropriate)  Goals revised. Pt progressing towards all goals at this time. All goals remain appropriate. Continue to revise goals as needed.    Jason Lundberg OT  3/7/2018

## 2018-03-07 NOTE — PLAN OF CARE
SW met with pt and pt spouse to discuss d/c planning.  SW discussed the HH reecommendation with the pt.  Pt informed the SW that she [facility] and would like to use this agency again.  SW will send referral to [facility] for HH services.           Angeles Ellis, LMSW [facility]  [phone]

## 2018-03-07 NOTE — SUBJECTIVE & OBJECTIVE
Subjective:     Interval History:     NAEON. Tolerating diet, no nausea or vomiting. PAin controlled. Ambulating. Voiding.    Post-Op Info:  Procedure(s) (LRB):  ileocoloc resection, lysis of adhesions (N/A)   15 Days Post-Op      Medications:  Continuous Infusions:  Scheduled Meds:   amoxicillin-clavulanate 875-125mg  1 tablet Oral Q12H    cloNIDine  0.1 mg Oral BID    cyclobenzaprine  5 mg Oral TID    DULoxetine  60 mg Oral Daily    famotidine  20 mg Oral Daily    fluticasone  1 spray Each Nare Daily    heparin (porcine)  5,000 Units Subcutaneous Q8H    hydroCHLOROthiazide  12.5 mg Oral Daily    miconazole NITRATE 2 %   Topical (Top) BID    pantoprazole  40 mg Oral Daily    phenazopyridine  100 mg Oral TID WM    sodium chloride 0.9%  3 mL Intravenous Q8H    spironolactone  25 mg Oral Daily     PRN Meds:   diphenhydrAMINE    fentaNYL    ondansetron    oxyCODONE    oxyCODONE    promethazine (PHENERGAN) IVPB    sodium chloride 0.9%        Objective:     Vital Signs (Most Recent):  Temp: 98.1 °F (36.7 °C) (03/07/18 0742)  Pulse: 99 (03/07/18 0742)  Resp: 14 (03/07/18 0742)  BP: 119/75 (03/07/18 0742)  SpO2: 98 % (03/07/18 0742) Vital Signs (24h Range):  Temp:  [97.6 °F (36.4 °C)-98.8 °F (37.1 °C)] 98.1 °F (36.7 °C)  Pulse:  [] 99  Resp:  [14-20] 14  SpO2:  [97 %-100 %] 98 %  BP: ()/(59-76) 119/75     Intake/Output - Last 3 Shifts       03/05 0700 - 03/06 0659 03/06 0700 - 03/07 0659 03/07 0700 - 03/08 0659    P.O. 450 550     I.V. (mL/kg)       IV Piggyback 300 350     TPN 0      Total Intake(mL/kg) 750 (9.1) 900 (10.9)     Urine (mL/kg/hr) 1950 (1) 2550 (1.3)     Drains 15 (0) 25 (0)     Other 25 (0) 25 (0)     Stool  0 (0)     Total Output 1990 2600      Net -1240 -1700             Urine Occurrence  2 x     Stool Occurrence  3 x           Physical Exam   Constitutional: She is oriented to person, place, and time. She appears well-developed and well-nourished. No distress.   HENT:    Head: Normocephalic and atraumatic.   Eyes: EOM are normal.   Neck: Normal range of motion.   Cardiovascular: Normal rate and intact distal pulses.    Pulmonary/Chest: Effort normal. No respiratory distress.   Abdominal: Soft. There is no rebound and no guarding.   Appropriately TTP at incisions, WV in place to suction, IR drain with serosang/tan drainage.    Musculoskeletal: Normal range of motion.   Neurological: She is alert and oriented to person, place, and time.   Skin: Skin is warm and dry.   Psychiatric: She has a normal mood and affect.     Significant Labs:  BMP (Last 3 Results):   Recent Labs  Lab 03/05/18  0500 03/06/18  0330 03/07/18  0430   * 94 105  105  105    137 138  138  138   K 4.5 4.2 4.2  4.2  4.2    100 101  101  101   CO2 29 27 29  29  29   BUN 13 8 7*  7*  7*   CREATININE 0.8 0.8 0.9  0.9  0.9   CALCIUM 9.0 9.0 8.9  8.9  8.9   MG 2.0 1.6 1.3*  1.3*  1.3*     CBC (Last 3 Results):   Recent Labs  Lab 03/05/18  0500 03/06/18  0330 03/07/18  0430   WBC 12.37 12.01 11.30  11.30  11.30   RBC 2.79* 2.85* 2.98*  2.98*  2.98*   HGB 7.4* 7.3* 7.5*  7.5*  7.5*   HCT 23.0* 23.1* 24.3*  24.3*  24.3*   * 673* 661*  661*  661*   MCV 82 81* 82  82  82   MCH 26.5* 25.6* 25.2*  25.2*  25.2*   MCHC 32.2 31.6* 30.9*  30.9*  30.9*     Microbiology Results (last 7 days)     Procedure Component Value Units Date/Time    Culture, Anaerobe [[id number]] Collected:  03/01/18 1700    Order Status:  Completed Specimen:  Abscess from Abdomen Updated:  03/07/18 0815     Anaerobic Culture Culture in progress    Blood culture [[id number]] Collected:  03/02/18 1837    Order Status:  Completed Specimen:  Blood from Line, Port A Cath Updated:  03/06/18 2012     Blood Culture, Routine No Growth to date     Blood Culture, Routine No Growth to date     Blood Culture, Routine No Growth to date     Blood Culture, Routine No Growth to date     Blood Culture, Routine No  Growth to date    Blood culture [[id number]] Collected:  03/02/18 1535    Order Status:  Completed Specimen:  Blood Updated:  03/06/18 1812     Blood Culture, Routine No Growth to date     Blood Culture, Routine No Growth to date     Blood Culture, Routine No Growth to date     Blood Culture, Routine No Growth to date     Blood Culture, Routine No Growth to date    Narrative:       Peripheral    Aerobic culture [[id number]]  (Susceptibility) Collected:  03/01/18 1700    Order Status:  Completed Specimen:  Abscess from Abdomen Updated:  03/04/18 1343     Aerobic Bacterial Culture --     ESCHERICHIA COLI ESBL  Rare      Urine culture [[id number]] Collected:  03/01/18 0902    Order Status:  Completed Specimen:  Urine from Urine, Clean Catch Updated:  03/02/18 1235     Urine Culture, Routine No growth    Blood culture [[id number]]     Order Status:  Canceled Specimen:  Blood     Gram stain [[id number]] Collected:  03/01/18 1700    Order Status:  Completed Specimen:  Abscess from Abdomen Updated:  03/01/18 2245     Gram Stain Result Moderate WBC's      No organisms seen        Prealbumin (Last 3 Results):   Recent Labs  Lab 03/01/18  0843 03/04/18  0530 03/06/18  1252   PREALBUMIN 12* 8* 11*

## 2018-03-08 ENCOUNTER — TELEPHONE (OUTPATIENT)
Dept: SURGERY | Facility: HOSPITAL | Age: 63
End: 2018-03-08

## 2018-03-08 VITALS
TEMPERATURE: 99 F | DIASTOLIC BLOOD PRESSURE: 56 MMHG | OXYGEN SATURATION: 94 % | BODY MASS INDEX: 29.23 KG/M2 | RESPIRATION RATE: 17 BRPM | SYSTOLIC BLOOD PRESSURE: 114 MMHG | HEART RATE: 106 BPM | HEIGHT: 66 IN | WEIGHT: 181.88 LBS

## 2018-03-08 LAB
ANION GAP SERPL CALC-SCNC: 9 MMOL/L
BASOPHILS # BLD AUTO: 0.05 K/UL
BASOPHILS NFR BLD: 0.6 %
BUN SERPL-MCNC: 6 MG/DL
CALCIUM SERPL-MCNC: 8.8 MG/DL
CHLORIDE SERPL-SCNC: 100 MMOL/L
CO2 SERPL-SCNC: 29 MMOL/L
CREAT SERPL-MCNC: 0.8 MG/DL
DIFFERENTIAL METHOD: ABNORMAL
EOSINOPHIL # BLD AUTO: 0.3 K/UL
EOSINOPHIL NFR BLD: 3.4 %
ERYTHROCYTE [DISTWIDTH] IN BLOOD BY AUTOMATED COUNT: 16.4 %
EST. GFR  (AFRICAN AMERICAN): >60 ML/MIN/1.73 M^2
EST. GFR  (NON AFRICAN AMERICAN): >60 ML/MIN/1.73 M^2
GLUCOSE SERPL-MCNC: 78 MG/DL
HCT VFR BLD AUTO: 23.7 %
HGB BLD-MCNC: 7.4 G/DL
IMM GRANULOCYTES # BLD AUTO: 0.07 K/UL
IMM GRANULOCYTES NFR BLD AUTO: 0.8 %
LYMPHOCYTES # BLD AUTO: 1.1 K/UL
LYMPHOCYTES NFR BLD: 12.4 %
MAGNESIUM SERPL-MCNC: 1.4 MG/DL
MCH RBC QN AUTO: 25.9 PG
MCHC RBC AUTO-ENTMCNC: 31.2 G/DL
MCV RBC AUTO: 83 FL
MONOCYTES # BLD AUTO: 1 K/UL
MONOCYTES NFR BLD: 11.3 %
NEUTROPHILS # BLD AUTO: 6.5 K/UL
NEUTROPHILS NFR BLD: 71.5 %
NRBC BLD-RTO: 0 /100 WBC
PHOSPHATE SERPL-MCNC: 3.7 MG/DL
PLATELET # BLD AUTO: 583 K/UL
PMV BLD AUTO: 8.7 FL
POTASSIUM SERPL-SCNC: 3.9 MMOL/L
RBC # BLD AUTO: 2.86 M/UL
SODIUM SERPL-SCNC: 138 MMOL/L
WBC # BLD AUTO: 9.05 K/UL

## 2018-03-08 PROCEDURE — 85025 COMPLETE CBC W/AUTO DIFF WBC: CPT

## 2018-03-08 PROCEDURE — 63600175 PHARM REV CODE 636 W HCPCS: Performed by: STUDENT IN AN ORGANIZED HEALTH CARE EDUCATION/TRAINING PROGRAM

## 2018-03-08 PROCEDURE — 83735 ASSAY OF MAGNESIUM: CPT

## 2018-03-08 PROCEDURE — 63600175 PHARM REV CODE 636 W HCPCS: Performed by: NURSE PRACTITIONER

## 2018-03-08 PROCEDURE — 25000003 PHARM REV CODE 250: Performed by: STUDENT IN AN ORGANIZED HEALTH CARE EDUCATION/TRAINING PROGRAM

## 2018-03-08 PROCEDURE — 80048 BASIC METABOLIC PNL TOTAL CA: CPT

## 2018-03-08 PROCEDURE — 97607 NEG PRS WND THR NDME<=50SQCM: CPT

## 2018-03-08 PROCEDURE — 84100 ASSAY OF PHOSPHORUS: CPT

## 2018-03-08 PROCEDURE — A4216 STERILE WATER/SALINE, 10 ML: HCPCS | Performed by: STUDENT IN AN ORGANIZED HEALTH CARE EDUCATION/TRAINING PROGRAM

## 2018-03-08 PROCEDURE — 25000003 PHARM REV CODE 250: Performed by: NURSE PRACTITIONER

## 2018-03-08 RX ORDER — HEPARIN 100 UNIT/ML
5 SYRINGE INTRAVENOUS ONCE
Status: DISCONTINUED | OUTPATIENT
Start: 2018-03-08 | End: 2018-03-08 | Stop reason: HOSPADM

## 2018-03-08 RX ORDER — OXYCODONE HYDROCHLORIDE 5 MG/1
5 TABLET ORAL EVERY 4 HOURS PRN
Qty: 30 TABLET | Refills: 0 | Status: SHIPPED | OUTPATIENT
Start: 2018-03-08 | End: 2018-05-10

## 2018-03-08 RX ORDER — HEPARIN 100 UNIT/ML
100 SYRINGE INTRAVENOUS ONCE
Status: COMPLETED | OUTPATIENT
Start: 2018-03-08 | End: 2018-03-08

## 2018-03-08 RX ORDER — HEPARIN 100 UNIT/ML
SYRINGE INTRAVENOUS
Status: DISPENSED
Start: 2018-03-08 | End: 2018-03-09

## 2018-03-08 RX ORDER — AMOXICILLIN AND CLAVULANATE POTASSIUM 875; 125 MG/1; MG/1
1 TABLET, FILM COATED ORAL EVERY 12 HOURS
Qty: 20 TABLET | Refills: 0 | Status: SHIPPED | OUTPATIENT
Start: 2018-03-08 | End: 2018-07-11

## 2018-03-08 RX ORDER — LANOLIN ALCOHOL/MO/W.PET/CERES
400 CREAM (GRAM) TOPICAL DAILY
Qty: 7 TABLET | Refills: 0 | Status: SHIPPED | OUTPATIENT
Start: 2018-03-08 | End: 2018-03-15

## 2018-03-08 RX ADMIN — SPIRONOLACTONE 25 MG: 25 TABLET, FILM COATED ORAL at 09:03

## 2018-03-08 RX ADMIN — AMOXICILLIN AND CLAVULANATE POTASSIUM 1 TABLET: 875; 125 TABLET, FILM COATED ORAL at 09:03

## 2018-03-08 RX ADMIN — DULOXETINE 60 MG: 60 CAPSULE, DELAYED RELEASE ORAL at 09:03

## 2018-03-08 RX ADMIN — PHENAZOPYRIDINE HYDROCHLORIDE 100 MG: 100 TABLET ORAL at 09:03

## 2018-03-08 RX ADMIN — HYDROCHLOROTHIAZIDE 12.5 MG: 12.5 TABLET ORAL at 09:03

## 2018-03-08 RX ADMIN — FLUTICASONE PROPIONATE 50 MCG: 50 SPRAY, METERED NASAL at 09:03

## 2018-03-08 RX ADMIN — HEPARIN SODIUM 5000 UNITS: 5000 INJECTION, SOLUTION INTRAVENOUS; SUBCUTANEOUS at 06:03

## 2018-03-08 RX ADMIN — CLONIDINE HYDROCHLORIDE 0.1 MG: 0.1 TABLET ORAL at 09:03

## 2018-03-08 RX ADMIN — MICONAZOLE NITRATE: 20 POWDER TOPICAL at 09:03

## 2018-03-08 RX ADMIN — FAMOTIDINE 20 MG: 20 TABLET, FILM COATED ORAL at 09:03

## 2018-03-08 RX ADMIN — PANTOPRAZOLE SODIUM 40 MG: 40 TABLET, DELAYED RELEASE ORAL at 09:03

## 2018-03-08 RX ADMIN — HEPARIN 100 UNITS: 100 SYRINGE at 01:03

## 2018-03-08 RX ADMIN — CYCLOBENZAPRINE HYDROCHLORIDE 5 MG: 5 TABLET, FILM COATED ORAL at 06:03

## 2018-03-08 RX ADMIN — PHENAZOPYRIDINE HYDROCHLORIDE 100 MG: 100 TABLET ORAL at 01:03

## 2018-03-08 RX ADMIN — Medication 3 ML: at 06:03

## 2018-03-08 NOTE — NURSING
Discharge instructions discussed with pt. Verbalizes understanding. Paperwork and prescription given to pt. Medications reconciled. Deaccessed and heparin flushed port  a cath. Tolerated well. Paperwork on low residue diet provided. Pt informed that sponge bathing is best due to wound vac and ML incision. Wound care nurse and bedside nurse connected and educated pt on the portable wound vac. Instructed against strenuous activity, and not to lift anything over 5 lbs. Vital signs stable. No acute distress noted. Awaiting transport.

## 2018-03-08 NOTE — PROGRESS NOTES
Pt ready for discharge. Nurse Burton at bedside.  Instructed pt and spouse on home vac care, charging, and changing cannister.  Spouse returned demonstration. Pt to have home health visits in addition to visit to outpt wound care.

## 2018-03-08 NOTE — PLAN OF CARE
Problem: Patient Care Overview  Goal: Plan of Care Review  Outcome: Ongoing (interventions implemented as appropriate)  POC reviewed with patient and spouse who both verbalized understanding. AAOx4. VSS. Midline incision noted with dressing CDI. Right abdominal incision noted with wound vac in place @ 75cc, scant output overnight. Right IR drain noted with no output overnight. Pain controlled with PRN medications per MAR. Tolerating regular diet, denies any N/V. Up with standby assist to bathroom to void. Safety precautions maintained, call light within reach. BLE TEDS in place, refusing SCDS. Remains free from falls and injury. No acute events. No distress noted. Will continue to monitor. Spouse at bedside.

## 2018-03-08 NOTE — PROGRESS NOTES
Wound vac dressing and midline incision dressing changed as directed.  Wounds improving. No purulent drainage or odor to wounds. No redness. Pt tolerated dressing change well.  Preparing for discharge this afternoon, awaiting for [employer] home vac. Nurse Burton to call wound care nurse when home vac arrives to disconnect from hospital vac and connect to home vac leaving dressing intact. [id number]       03/08/18 1110       Incision/Site 02/19/18 1143 Abdomen upper   Date First Assessed/Time First Assessed: 02/19/18 1143   Location: Abdomen  Orientation: upper  Closure Method: Other (see comments)   Wound Image    Incision WDL ex   Dressing Appearance Moist drainage   Drainage Amount Small   Drainage Characteristics/Odor Serosanguineous   Appearance Moist;[place]   Periwound Area Intact   Wound Edges Open   Wound Length (cm) 5   Wound Width (cm) 1   Depth (cm) 1   Tunneling (depth (cm)/location) 2.0  (11 o'clock)   Care Cleansed with:;Sterile normal saline   Packing Incision packed with  (Aquacel AG)   Dressing Change Due 03/12/18       Incision/Site 03/02/18 1230 Abdomen lower   Date First Assessed/Time First Assessed: 03/02/18 1230   Location: Abdomen  Orientation: lower   Wound Image    Incision WDL ex   Dressing Appearance Intact   Drainage Amount Small   Drainage Characteristics/Odor Serosanguineous   Appearance Moist;[place]   Periwound Area Intact   Wound Edges Open   Wound Length (cm) 1   Wound Width (cm) 0.5   Depth (cm) 3   Tunneling (depth (cm)/location) 4   Care Cleansed with:;Sterile normal saline   Packing Incision packed with  (Aquacel AG)   Dressing Change Due 03/12/18       Incision/Site 01/23/18 1322 Right Abdomen   Date First Assessed/Time First Assessed: 01/23/18 1322   Side: Right  Location: Abdomen   Wound Image    Incision WDL ex   Dressing Appearance Intact   Drainage Amount Moderate   Drainage Characteristics/Odor Serosanguineous   Appearance Pink;Red;Slough;Sutures intact;Moist;Adipose   Periwound Area  Intact   Wound Edges Open   Wound Length (cm) 2   Wound Width (cm) 9.5   Depth (cm) 4   Tunneling (depth (cm)/location) 6  (at 9 o'clock and 3 @ 11 o'clock)   Care Cleansed with:;Sterile normal saline   Dressing Other (see comments)  (Vac therapy)       Negative Pressure Wound Therapy    Placement Date: 02/22/18   Side: Right  Location: Abdomen   NPWT Type Vacuum Therapy   Therapy Setting NPWT Continuous therapy   Pressure Setting NPWT 75 mmHg   Therapy Interventions NPWT Seal intact   Sponges Inserted NPWT Black;White

## 2018-03-08 NOTE — TELEPHONE ENCOUNTER
----- Message from Parviz Schaefer sent at 3/8/2018 12:27 PM CST -----  Contact: [employer] :[phone] ext:[phone]  [employer]  called and states he would like to speak with provider Nataly CARY in regards to the pt.     [employer] :[phone] ext:[phone]

## 2018-03-08 NOTE — PLAN OF CARE
EM contacted [facility]  about pt referral.  EM informed by ( Erika ) with [facility] that the pt was accepted.             Angeles Ellis, LMSW [facility]  [id number]

## 2018-03-09 ENCOUNTER — TELEPHONE (OUTPATIENT)
Dept: SURGERY | Facility: CLINIC | Age: 63
End: 2018-03-09

## 2018-03-09 LAB — BACTERIA SPEC ANAEROBE CULT: NORMAL

## 2018-03-09 NOTE — TELEPHONE ENCOUNTER
----- Message from Lanie Jamie sent at 3/9/2018  2:45 PM CST -----  Contact:  - tejas ma - [phone]  Loki - is asking for appt with dr servin the week of 3/19 - please call penelope simmson  - [phone]

## 2018-03-12 ENCOUNTER — TELEPHONE (OUTPATIENT)
Dept: SURGERY | Facility: CLINIC | Age: 63
End: 2018-03-12

## 2018-03-12 PROBLEM — E43 SEVERE MALNUTRITION: Status: RESOLVED | Noted: 2018-02-22 | Resolved: 2018-03-12

## 2018-03-12 PROBLEM — Z16.12 ESBL (EXTENDED SPECTRUM BETA-LACTAMASE) PRODUCING BACTERIA INFECTION: Status: RESOLVED | Noted: 2018-02-22 | Resolved: 2018-03-12

## 2018-03-12 PROBLEM — A49.9 ESBL (EXTENDED SPECTRUM BETA-LACTAMASE) PRODUCING BACTERIA INFECTION: Status: RESOLVED | Noted: 2018-02-22 | Resolved: 2018-03-12

## 2018-03-12 NOTE — TELEPHONE ENCOUNTER
----- Message from Dayami Umana MA sent at 3/12/2018  4:28 PM CDT -----  Contact: Mr. Marshall  spouse  [phone]  States he is calling for his spouses paper work FMLA.  States he needs him to sign it and he needs it back today so she can get paid.

## 2018-03-12 NOTE — DISCHARGE SUMMARY
[facility]  Colorectal Surgery  Discharge Summary      Patient Name: Coby Marshall  MRN: [id number]  Admission Date: 2/19/2018  Hospital Length of Stay: 17 days  Discharge Date and Time: 3/8/2018  2:21 PM  Attending Physician: No att. providers found   Discharging Provider: Nataly An NP  Primary Care Provider: Randal Casillas MD     HPI:  Ms. Marshall is a 63 y/o F who was found to have a rectal mass and completed chemoXRT and LAR w/ ileal resection with loop ileostomy for 2 carcinoids on 12/11/17   (neg nodes) On 1/22/18 she underwent pouchagram and then subsequent loop ileostomy takedown on 1/23/18 post op she presented with late return of bowel function and was discharged on 2/9/2018 having bowel movements and tolerating a regular diet.      Since discharge patient states she has been having decreased amount of bowel movements as well as nausea, no emesis. She took an enema and a suppository on Saturday and had a small amount of stool. Otherwise states she hasn't passed any gas. On Saturday she started noticing a bulge on the RLQ were her old ileostomy was which got worse yesterday and very tender. Has been having fevers and chills as well.     Procedure(s) (LRB):  ileocoloc resection, lysis of adhesions (N/A)     Hospital Course:  She was placed on bowel rest and CEM, Or performed 2/19/18 of debridement of abd wall for an abscess and necrotic tissue.  2/20/18 she began to have succous type drainage from inc line and again brought to surgery for exp lap, GIANNI, repair of ECF from ileostomy closure.    PO1 PCA, NGT, PT OT, no flatus, abd distended.  PO2 continue PCA, NGT, decreased urinary output, increase in BUN, Cr, likely ATN, bolus given.  PO 3 postiive for flatus, NGT remvoed, BUN, Cr normal, begin TPN, continue to work with PT, OT, .  PO 4 mid line abd inc line with purulent drainage, opened at bedside, packing began, lower abd inc wound vac applied, abd distended, N/V, NGT  replaced, cont TPN for prolonged malnutrition and zosyn.  PO 6 positive for flatus, abd less distended, NGT out.  PO 7 cld, cont TPN, PT, Ot and wound care.  PO 8 low fiber diet, PCA weaned off, continue TPN and local wound care.  Slowly she continued to improve. IV course of Zosyn completed and d/umer.  On discharge day she is kali low fiber diet, adequate pain control with oral meds, home with vac in place (home vac ordered), continue local wound care to midline abd inc line, amb in [place] with assistance of PT, VS stable and AF, discharge home with home health and wound vac, fu Dr. Hernandez and Francisca Garduno          Consults         Status Ordering Provider     Inpatient consult to Infectious Diseases  Once     Provider:  (Not yet assigned)    Completed ADÁN MEIER     Inpatient consult to Interventional Radiology  Once     Provider:  (Not yet assigned)    Completed NICOLÁS COOK     [facility]  Once     Provider:  (Not yet assigned)    Completed NICOLÁS COOK     Inpatient [facility] team [facility]  Once     Provider:  (Not yet assigned)    Completed GAIL HERNANDEZ     Inpatient consult to Registered Dietitian/Nutritionist  Once     Provider:  (Not yet assigned)    Completed GAIL HERNANDEZ          Significant Diagnostic Studies: Labs: BMP: No results for input(s): GLU, NA, K, CL, CO2, BUN, CREATININE, CALCIUM, MG in the last 48 hours. and CBC No results for input(s): WBC, HGB, HCT, PLT in the last 48 hours.    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    Specialties:  Colon and Rectal Surgery, Wound Care  Contact information:  [address] [id number]  [phone]             Suhas Manjarrez MD In 2 weeks.    Specialty:  Colon and Rectal Surgery  Contact information:  [address] [facility] [id number] [phone]                 Patient Instructions:     Diet Adult Regular   Order Comments: Low fiber diet, no fresh fruit or fresh vegetables. No nuts, popcorn, grapes or raisins for approx 6 weeks     Lifting restrictions   Order Comments: No lifting anything greater than 5 pounds     Call MD for:  persistent nausea and vomiting or diarrhea     Call MD for:  severe uncontrolled pain     Call MD for:  redness, tenderness, or signs of infection (pain, swelling, redness, odor or green/yellow discharge around incision site)     Call MD for:  difficulty breathing or increased cough     Call MD for:  severe persistent headache     Call MD for:  worsening rash     Call MD for:  persistent dizziness, light-headedness, or visual disturbances     Call MD for:  increased confusion or weakness     Call MD for:   Order Comments: Call for temp above 101     Change dressing (specify)   Order Comments: Wound care per home health       Medications:  Reconciled Home Medications:   Discharge Medication List as of 3/8/2018 12:49 PM      START taking these medications    Details   amoxicillin-clavulanate 875-125mg (AUGMENTIN) 875-125 mg per tablet Take 1 tablet by mouth every 12 (twelve) hours., Starting Thu  3/8/2018, Normal      magnesium oxide (MAG-OX) 400 mg tablet Take 1 tablet (400 mg total) by mouth once daily., Starting Thu 3/8/2018, Until u 3/15/2018, Print         CONTINUE these medications which have CHANGED    Details   oxyCODONE (ROXICODONE) 5 MG immediate release tablet Take 1 tablet (5 mg total) by mouth every 4 (four) hours as needed., Starting Thu 3/8/2018, Print         CONTINUE these medications which have NOT CHANGED    Details   ALBUTEROL INHL Inhale into the lungs., Historical Med      aspirin (ECOTRIN) 81 MG EC tablet Take 81 mg by mouth once daily., Historical Med      atorvastatin (LIPITOR) 20 MG tablet Take 20 mg by mouth once daily., Historical Med      cloNIDine (CATAPRES) 0.1 MG tablet Take 0.1 mg by mouth 2 (two) times daily., Historical Med      diclofenac (VOLTAREN) 75 MG EC tablet Take 75 mg by mouth 2 (two) times daily., Historical Med      DULoxetine (CYMBALTA) 60 MG capsule Take 60 mg by mouth once daily., Historical Med      famotidine (PEPCID) 20 MG tablet Take 20 mg by mouth 2 (two) times daily., Historical Med      fluticasone (FLONASE) 50 mcg/actuation nasal spray 1 spray by Each Nare route once daily., Historical Med      hydroCHLOROthiazide (MICROZIDE) 12.5 mg capsule Take 12.5 mg by mouth once daily., Historical Med      hydrOXYzine HCl (ATARAX) 25 MG tablet Take 25 mg by mouth 3 (three) times daily., Historical Med      omeprazole (PRILOSEC) 20 MG capsule Take 20 mg by mouth once daily., Historical Med      silver sulfADIAZINE 1% (SILVADENE) 1 % cream Apply topically 2 (two) times daily., Historical Med      spironolactone (ALDACTONE) 25 MG tablet Take 25 mg by mouth once daily., Historical Med             Nataly An NP  Colorectal Surgery  [facility]

## 2018-03-12 NOTE — HOSPITAL COURSE
She was placed on bowel rest and CEM, Or performed 2/19/18 of debridement of abd wall for an abscess and necrotic tissue.  2/20/18 she began to have succous type drainage from inc line and again brought to surgery for exp lap, GIANNI, repair of ECF from ileostomy closure.    PO1 PCA, NGT, PT OT, no flatus, abd distended.  PO2 continue PCA, NGT, decreased urinary output, increase in BUN, Cr, likely ATN, bolus given.  PO 3 postiive for flatus, NGT remvoed, BUN, Cr normal, begin TPN, continue to work with PT, OT, .  PO 4 mid line abd inc line with purulent drainage, opened at bedside, packing began, lower abd inc wound vac applied, abd distended, N/V, NGT replaced, cont TPN for prolonged malnutrition and zosyn.  PO 6 positive for flatus, abd less distended, NGT out.  PO 7 cld, cont TPN, PT, Ot and wound care.  PO 8 low fiber diet, PCA weaned off, continue TPN and local wound care.  Slowly she continued to improve. IV course of Zosyn completed and d/umer.  On discharge day she is kali low fiber diet, adequate pain control with oral meds, home with vac in place (home vac ordered), continue local wound care to midline abd inc line, amb in [place] with assistance of PT, VS stable and AF, discharge home with home health and wound vac, fu Dr. Manjarrez and Francisca Garduno

## 2018-03-13 ENCOUNTER — TELEPHONE (OUTPATIENT)
Dept: SURGERY | Facility: CLINIC | Age: 63
End: 2018-03-13

## 2018-03-13 NOTE — TELEPHONE ENCOUNTER
Spoke to spouse and informed him FMLA paperwork was completed by Dr. Manjarrez and sent to our disability department today.

## 2018-03-13 NOTE — TELEPHONE ENCOUNTER
----- Message from Latoya Montgomery RN sent at 3/12/2018  5:25 PM CDT -----  Contact: Mr. Marshall  spouse  [phone]  Check FMLA w/[employer].   ----- Message -----  From: Dayami Umana MA  Sent: 3/12/2018   4:28 PM  To: Loki WORTH[employer] Staff    States he is calling for his spouses paper work FMLA.  States he needs him to sign it and he needs it back today so she can get paid.

## 2018-03-14 ENCOUNTER — TELEPHONE (OUTPATIENT)
Dept: SURGERY | Facility: CLINIC | Age: 63
End: 2018-03-14

## 2018-03-14 NOTE — TELEPHONE ENCOUNTER
Spoke with pt  and informed him that per disability department paperwork was faxed over to [employer] on 3/13. He is to call back if they have not received it.

## 2018-03-14 NOTE — TELEPHONE ENCOUNTER
----- Message from Radha Alcazar sent at 3/14/2018  4:33 PM CDT -----  Contact: Daughter- Natividad- [phone]  Loki- pts daughter called with another fax number to send the pts FMLA paperwork to- the number is [phone]- please call Natividad back at [phone]

## 2018-03-14 NOTE — TELEPHONE ENCOUNTER
----- Message from Parviz Schaefer sent at 3/14/2018 12:26 PM CDT -----  Contact: Pt :[phone] or Pt:[phone]  Pt  called and states he would like to speak with the nurse in regards to getting some paper work signed. Pt  states that it has to be done before there coverage is denied.

## 2018-03-14 NOTE — TELEPHONE ENCOUNTER
----- Message from Britni Agee sent at 3/14/2018  4:16 PM CDT -----  Contact: Sree Marshall (Spouse)[phone]  He states that they did not received the fax for pt leave.  Fax#[phone]

## 2018-03-14 NOTE — TELEPHONE ENCOUNTER
Spoke with pt and informed her I confirmed the fax number with [place] in our disability office. [place] stated she will re fax it to them again.

## 2018-03-15 NOTE — PLAN OF CARE
Scheduled an appt with Dr. Manjarrez and Francisca on 3/21/2018 @ 1300       03/14/18 9028   Final Note   Assessment Type Final Discharge Note   Discharge Disposition Home-Health

## 2018-03-15 NOTE — PLAN OF CARE
Scheduled an appt with Francisca and   On 3/21/2018       03/14/18 6614   Final Note   Assessment Type Final Discharge Note   Discharge Disposition Home-Health

## 2018-03-21 ENCOUNTER — OFFICE VISIT (OUTPATIENT)
Dept: SURGERY | Facility: CLINIC | Age: 63
End: 2018-03-21
Payer: COMMERCIAL

## 2018-03-21 ENCOUNTER — OFFICE VISIT (OUTPATIENT)
Dept: WOUND CARE | Facility: CLINIC | Age: 63
End: 2018-03-21
Payer: COMMERCIAL

## 2018-03-21 VITALS
WEIGHT: 184.06 LBS | HEIGHT: 66 IN | BODY MASS INDEX: 29.58 KG/M2 | HEART RATE: 115 BPM | DIASTOLIC BLOOD PRESSURE: 65 MMHG | SYSTOLIC BLOOD PRESSURE: 135 MMHG

## 2018-03-21 DIAGNOSIS — K56.600 PARTIAL SMALL BOWEL OBSTRUCTION: ICD-10-CM

## 2018-03-21 DIAGNOSIS — T81.89XA NON-HEALING SURGICAL WOUND, INITIAL ENCOUNTER: Primary | ICD-10-CM

## 2018-03-21 DIAGNOSIS — T81.31XD DEHISCENCE OF OPERATIVE WOUND, SUBSEQUENT ENCOUNTER: Primary | ICD-10-CM

## 2018-03-21 DIAGNOSIS — T81.89XD DELAYED SURGICAL WOUND HEALING, SUBSEQUENT ENCOUNTER: ICD-10-CM

## 2018-03-21 PROCEDURE — 99999 PR PBB SHADOW E&M-EST. PATIENT-LVL II: CPT | Mod: PBBFAC,,, | Performed by: CLINICAL NURSE SPECIALIST

## 2018-03-21 PROCEDURE — 99024 POSTOP FOLLOW-UP VISIT: CPT | Mod: S$GLB,,, | Performed by: COLON & RECTAL SURGERY

## 2018-03-21 PROCEDURE — 99024 POSTOP FOLLOW-UP VISIT: CPT | Mod: S$GLB,,, | Performed by: CLINICAL NURSE SPECIALIST

## 2018-03-21 PROCEDURE — 99999 PR PBB SHADOW E&M-EST. PATIENT-LVL III: CPT | Mod: PBBFAC,,, | Performed by: COLON & RECTAL SURGERY

## 2018-03-21 NOTE — PROGRESS NOTES
Pt here for post op visit and Dr Manjarrez has removed VAC and does not want it replaced, he feels that daily packing loosely with guaze is adequate, so I reviewed with her what to do and gave her Medihoney to add to wound to speed healing   New orders sent to [email or web address] regarding stopping VAC today

## 2018-03-21 NOTE — PROGRESS NOTES
Coby Marshall is a 62 y.o. female      Chief Complaint:       Chief Complaint   Patient presents with    Follow-up    Ileostomy    Hx carcinoid         HPI:     2/20/18 Ileocolic resection lysis of adhesions. Had wound infection at old ileostomy site RX with VAC. Eating OK bowels working    1/23/18 Ileostomy closure,     12/11/17 LAR  and ileal resection with loop ileostomy for 2 carcinoids.  (neg nodes)      Patient with rectal cancer. Completed chemoXRT      4 weeks ago      Procto Nov 11, 2017 mass at 4-5 cm from verge  Ct scan  (11/3/17) with improvement (nodes reduced in size) Thickening rectal wall 7 cm from anal verge     ROS:        Constitutional: No fever, chills, activity or appetite change.      HENT: No hearing loss, facial swelling, neck pain or stiffness.       Eyes: No discharge, itching and visual disturbance.      Respiratory: No apnea, cough, choking or shortness of breath.       Cardiovascular: No leg swelling or chest pain      Gastrointestinal: No abdominal distention or change in bowel habbits     Genitourinary: No dysuria, frequency or flank pain.      Musculoskeletal: No arthralgias or gait problem.      Neurological: No dizziness, seizures or weakness.      Hematological: No adenopathy.      Psychiatric/Behavioral: No hallucinations or behavioral problems.         PE:    APPEARANCE: Well nourished, well developed, in no acute distress.   CHEST: Lungs clear. Normal respiratory effort.  CARDIOVASCULAR: Normal rhythm. No edema.  ABDOMEN: Healing incision at old ioelstomy site. Well granulated. VAC removed   Rectum:  Normal skin,    Musculoskeletal: Symmetric, normal range of motion and strength.   Neurological: Alert and oriented to person, place, and time. Normal reflexes.   Skin: Skin is warm and dry.   Psychiatric: Normal mood and affect. Behavior is normal and appropriate.      Impression: Hx carcinoid of ileum and rectum, wound care  PLAN:  d/c VAC RTc early MAy, FMLA until MAy 15,  2018.

## 2018-03-22 LAB — FUNGUS SPEC CULT: NORMAL

## 2018-04-16 ENCOUNTER — TELEPHONE (OUTPATIENT)
Dept: SURGERY | Facility: CLINIC | Age: 63
End: 2018-04-16

## 2018-04-23 LAB
ACID FAST MOD KINY STN SPEC: NORMAL
MYCOBACTERIUM SPEC QL CULT: NORMAL

## 2018-05-10 ENCOUNTER — OFFICE VISIT (OUTPATIENT)
Dept: SURGERY | Facility: CLINIC | Age: 63
End: 2018-05-10
Payer: COMMERCIAL

## 2018-05-10 VITALS
HEART RATE: 86 BPM | DIASTOLIC BLOOD PRESSURE: 72 MMHG | BODY MASS INDEX: 29.87 KG/M2 | SYSTOLIC BLOOD PRESSURE: 142 MMHG | WEIGHT: 184.94 LBS

## 2018-05-10 DIAGNOSIS — D3A.012 CARCINOID TUMOR OF ILEUM: Primary | ICD-10-CM

## 2018-05-10 DIAGNOSIS — D3A.026 CARCINOID TUMOR OF RECTUM: ICD-10-CM

## 2018-05-10 PROBLEM — T81.89XA DELAYED SURGICAL WOUND HEALING: Status: RESOLVED | Noted: 2018-02-21 | Resolved: 2018-05-10

## 2018-05-10 PROBLEM — T81.31XA DEHISCENCE OF INCISION: Status: RESOLVED | Noted: 2018-03-02 | Resolved: 2018-05-10

## 2018-05-10 PROCEDURE — 3008F BODY MASS INDEX DOCD: CPT | Mod: CPTII,S$GLB,, | Performed by: COLON & RECTAL SURGERY

## 2018-05-10 PROCEDURE — 99024 POSTOP FOLLOW-UP VISIT: CPT | Mod: S$GLB,,, | Performed by: COLON & RECTAL SURGERY

## 2018-05-10 PROCEDURE — 99999 PR PBB SHADOW E&M-EST. PATIENT-LVL III: CPT | Mod: PBBFAC,,, | Performed by: COLON & RECTAL SURGERY

## 2018-05-10 NOTE — PROGRESS NOTES
Coby Marshall is a 62 y.o. female      Chief Complaint:       Chief Complaint   Patient presents with    Follow-up    Ileostomy    Hx carcinoid         HPI:     2/20/18 Ileocolic resection lysis of adhesions. Had post op wound infection that has healed. Has urgency after eating.    1/23/18 Ileostomy closure,     12/11/17 LAR  and ileal resection with loop ileostomy for 2 carcinoids.  (neg nodes)      Patient with rectal cancer. Completed chemoXRT      4 weeks ago      Procto Nov 11, 2017 mass at 4-5 cm from verge  Ct scan  (11/3/17) with improvement (nodes reduced in size) Thickening rectal wall 7 cm from anal verge     ROS:        Constitutional: No fever, chills, activity or appetite change.      HENT: No hearing loss, facial swelling, neck pain or stiffness.       Eyes: No discharge, itching and visual disturbance.      Respiratory: No apnea, cough, choking or shortness of breath.       Cardiovascular: No leg swelling or chest pain      Gastrointestinal: No abdominal distention or change in bowel habbits     Genitourinary: No dysuria, frequency or flank pain.      Musculoskeletal: No arthralgias or gait problem.      Neurological: No dizziness, seizures or weakness.      Hematological: No adenopathy.      Psychiatric/Behavioral: No hallucinations or behavioral problems.         PE:    APPEARANCE: Well nourished, well developed, in no acute distress.   CHEST: Lungs clear. Normal respiratory effort.  CARDIOVASCULAR: Normal rhythm. No edema.  ABDOMEN: Healing incision at old ioelstomy site. Well granulated. VAC removed   Rectum:  Normal skin,    Musculoskeletal: Symmetric, normal range of motion and strength.   Neurological: Alert and oriented to person, place, and time. Normal reflexes.   Skin: Skin is warm and dry.   Psychiatric: Normal mood and affect. Behavior is normal and appropriate.      Impression: Hx carcinoid of ileum and rectum,  PLAN:  may return to work with access to bathroom. RTC 3 months.

## 2018-05-10 NOTE — LETTER
May 10, 2018      [facility]  [address]  [facility] [address]  Phone: [phone]       Patient: Coby Marshall   YOB: 1955  Date of Visit: 05/10/2018    To Whom It May Concern:    Siva Marshall  was at [facility] on 05/10/2018. She has been under my care since December 11, 2017.She may return to work/school on May 15, 2018 with restrictions. She must be able to use the restroom when needed. If you have any questions or concerns, or if I can be of further assistance, please do not hesitate to contact me.    Sincerely,    Dr. Suhas Manjarrez

## 2018-07-09 ENCOUNTER — TELEPHONE (OUTPATIENT)
Dept: SURGERY | Facility: CLINIC | Age: 63
End: 2018-07-09

## 2018-07-09 NOTE — TELEPHONE ENCOUNTER
----- Message from Britni Agee sent at 7/9/2018 10:10 AM CDT -----  Contact: Sree child  #[phone] or [phone]  Patient Requesting Sooner Appointment.     Reason for sooner appt.:f/u   When is the first available appointment?08/08/18  Communication Preference:callback   Additional Information:

## 2018-07-11 ENCOUNTER — OFFICE VISIT (OUTPATIENT)
Dept: SURGERY | Facility: CLINIC | Age: 63
End: 2018-07-11
Payer: COMMERCIAL

## 2018-07-11 VITALS
HEIGHT: 66 IN | WEIGHT: 190.94 LBS | SYSTOLIC BLOOD PRESSURE: 136 MMHG | DIASTOLIC BLOOD PRESSURE: 69 MMHG | BODY MASS INDEX: 30.69 KG/M2 | HEART RATE: 72 BPM

## 2018-07-11 DIAGNOSIS — D49.0 COLORECTAL NEOPLASM: ICD-10-CM

## 2018-07-11 DIAGNOSIS — D3A.026 CARCINOID TUMOR OF RECTUM: ICD-10-CM

## 2018-07-11 DIAGNOSIS — D3A.012 CARCINOID TUMOR OF ILEUM: Primary | ICD-10-CM

## 2018-07-11 PROCEDURE — 99999 PR PBB SHADOW E&M-EST. PATIENT-LVL III: CPT | Mod: PBBFAC,,, | Performed by: COLON & RECTAL SURGERY

## 2018-07-11 PROCEDURE — 3008F BODY MASS INDEX DOCD: CPT | Mod: CPTII,S$GLB,, | Performed by: COLON & RECTAL SURGERY

## 2018-07-11 PROCEDURE — 99214 OFFICE O/P EST MOD 30 MIN: CPT | Mod: S$GLB,,, | Performed by: COLON & RECTAL SURGERY

## 2018-07-11 RX ORDER — CHOLESTYRAMINE 4 G/9G
2 POWDER, FOR SUSPENSION ORAL 2 TIMES DAILY WITH MEALS
Qty: 378 G | Refills: 5 | Status: ON HOLD | OUTPATIENT
Start: 2018-07-11 | End: 2023-04-25 | Stop reason: HOSPADM

## 2018-07-11 NOTE — LETTER
July 11, 2018      Randal Casillas MD  [address] [place] [address] [place] [address]           [facility]  [address]  [facility] [address]  Phone: [phone]          Patient: Coby Marshall   MR Number: [id number]   YOB: 1955   Date of Visit: 7/11/2018       Dear Dr. Randal Casillas:    Thank you for referring Coby Marshall to me for evaluation. Attached you will find relevant portions of my assessment and plan of care.    If you have questions, please do not hesitate to call me. I look forward to following Coby Marshall along with you.    Sincerely,    Suhas Manjarrez MD    Enclosure  CC:  No Recipients    If you would like to receive this communication electronically, please contact [email or web address] or [phone] to request more information on [facility] access.    For providers and/or their staff who would like to refer a patient to [facility], please contact us through our one-stop-shop provider referral line, [facility], at [phone].    If you feel you have received this communication in error or would no longer like to receive these types of communications, please e-mail [email or web address]

## 2018-07-11 NOTE — PROGRESS NOTES
Coby Marshall is a 62 y.o. female      Chief Complaint:       Chief Complaint   Patient presents with    Follow-up    Ileostomy    Hx carcinoid         HPI:     2/20/18 Ileocolic resection lysis of adhesions.  Has urgency after eating  Despite taking Imodium.Also Hot flashes.    1/23/18 Ileostomy closure,     12/11/17 LAR  and ileal resection with loop ileostomy for 2 carcinoids.  (neg nodes)      Patient with rectal cancer. Completed chemoXRT      4 weeks ago      Procto Nov 11, 2017 mass at 4-5 cm from verge  Ct scan  (11/3/17) with improvement (nodes reduced in size) Thickening rectal wall 7 cm from anal verge     ROS:        Constitutional: No fever, chills, activity or appetite change.      HENT: No hearing loss, facial swelling, neck pain or stiffness.       Eyes: No discharge, itching and visual disturbance.      Respiratory: No apnea, cough, choking or shortness of breath.       Cardiovascular: No leg swelling or chest pain      Gastrointestinal: No abdominal distention or change in bowel habbits     Genitourinary: No dysuria, frequency or flank pain.      Musculoskeletal: No arthralgias or gait problem.      Neurological: No dizziness, seizures or weakness.      Hematological: No adenopathy.      Psychiatric/Behavioral: No hallucinations or behavioral problems.         PE:    APPEARANCE: Well nourished, well developed, in no acute distress.   CHEST: Lungs clear. Normal respiratory effort.  CARDIOVASCULAR: Normal rhythm. No edema.  ABDOMEN: Healing incision at old ioelstomy site. Well granulated. VAC removed   Rectum:  Normal skin,    Musculoskeletal: Symmetric, normal range of motion and strength.   Neurological: Alert and oriented to person, place, and time. Normal reflexes.   Skin: Skin is warm and dry.   Psychiatric: Normal mood and affect. Behavior is normal and appropriate.      Impression: Hx carcinoid of ileum and rectum,  PLAN:   Trial of Questran. Se PCP for restrting premarin. RTC 3 months.  CT scan.

## 2018-07-25 ENCOUNTER — TELEPHONE (OUTPATIENT)
Dept: SURGERY | Facility: CLINIC | Age: 63
End: 2018-07-25

## 2018-07-25 DIAGNOSIS — D49.0 COLORECTAL NEOPLASM: ICD-10-CM

## 2018-07-25 DIAGNOSIS — Z04.9 OBSERVATION FOR SUSPECTED CONDITION: Primary | ICD-10-CM

## 2018-07-25 NOTE — TELEPHONE ENCOUNTER
----- Message from Parviz Schaefer sent at 7/25/2018  3:46 PM CDT -----  Contact: [facility] Raul:[phone]  .Needs Advice    Reason for call:Raul with [facility] called and states she needs a cover diagnosis code.   Communication Preference:[facility] Raul:[phone]  Additional Information:

## 2018-07-30 ENCOUNTER — TELEPHONE (OUTPATIENT)
Dept: SURGERY | Facility: CLINIC | Age: 63
End: 2018-07-30

## 2018-07-30 NOTE — TELEPHONE ENCOUNTER
----- Message from Parviz Schaefer sent at 7/30/2018  1:41 PM CDT -----  Contact: Pt daughter Constinance:[phone]  .Needs Advice    Reason for call:[facility] Raul called and states she would like to speak to someone about having his orders sent over for the pt CT Scan.    Communication Preference:Pt daughter Constinance:[phone]  Additional Information:

## 2018-07-31 ENCOUNTER — TELEPHONE (OUTPATIENT)
Dept: SURGERY | Facility: CLINIC | Age: 63
End: 2018-07-31

## 2018-07-31 DIAGNOSIS — Z85.048 HISTORY OF RECTAL CANCER: Primary | ICD-10-CM

## 2018-07-31 NOTE — TELEPHONE ENCOUNTER
----- Message from Parviz Schaefer sent at 7/31/2018 10:41 AM CDT -----  Contact: [facility] Raul: [phone]  .Needs Advice    Reason for call: Raul with [facility] called and states she needs a cover diagnosis code. Raul states if she does not receive a call today she will have to discontinue the pt orders.    Communication Preference:[facility] Raul: [phone]  Additional Information:

## 2018-07-31 NOTE — TELEPHONE ENCOUNTER
Spoke with Raul and she would like the order to be changed to icd z03.89 in order for [employer] to cover it.  New CT scan order put in with the information put into [employer].

## 2018-08-08 NOTE — PROGRESS NOTES
Continues with loose stools with eating despite Imodium    CT scan no acute changes  Informed patient.     Will discuss with Neuroendocrine folks at [facility].

## 2018-09-26 NOTE — NURSING TRANSFER
Nursing Transfer Note      1/23/2018     Transfer To: [id number]    Transfer via stretcher    Transfer with   Transported by [facility]    Medicines sent: LR    Chart send with patient: Yes    Notified: spouse    Patient reassessed at: 01/23/18 1600    Upon arrival to floor:   not applicable

## 2018-11-29 ENCOUNTER — OFFICE VISIT (OUTPATIENT)
Dept: SURGERY | Facility: CLINIC | Age: 63
End: 2018-11-29
Payer: MEDICARE

## 2018-11-29 VITALS
SYSTOLIC BLOOD PRESSURE: 129 MMHG | DIASTOLIC BLOOD PRESSURE: 66 MMHG | HEART RATE: 55 BPM | HEIGHT: 65 IN | WEIGHT: 199.31 LBS | BODY MASS INDEX: 33.21 KG/M2

## 2018-11-29 DIAGNOSIS — D3A.012 CARCINOID TUMOR OF ILEUM: Primary | ICD-10-CM

## 2018-11-29 DIAGNOSIS — D3A.026 CARCINOID TUMOR OF RECTUM: ICD-10-CM

## 2018-11-29 PROCEDURE — 99213 OFFICE O/P EST LOW 20 MIN: CPT | Mod: S$PBB,,, | Performed by: COLON & RECTAL SURGERY

## 2018-11-29 PROCEDURE — 99213 OFFICE O/P EST LOW 20 MIN: CPT | Mod: PBBFAC | Performed by: COLON & RECTAL SURGERY

## 2018-11-29 PROCEDURE — 99999 PR PBB SHADOW E&M-EST. PATIENT-LVL III: CPT | Mod: PBBFAC,,, | Performed by: COLON & RECTAL SURGERY

## 2018-11-29 RX ORDER — DIPHENOXYLATE HYDROCHLORIDE AND ATROPINE SULFATE 2.5; .025 MG/1; MG/1
1-2 TABLET ORAL 4 TIMES DAILY PRN
Qty: 120 TABLET | Refills: 6 | Status: SHIPPED | OUTPATIENT
Start: 2018-11-29 | End: 2018-12-29

## 2018-11-29 NOTE — PROGRESS NOTES
Coby Marshall is a 62 y.o. female      Chief Complaint:       Chief Complaint   Patient presents with    Follow-up    Ileostomy    Hx carcinoid         HPI:     2/20/18 Ileocolic resection lysis of adhesions.  Has urgency after eating  Despite taking Imodium. Not helped with Questran    Had CT scan in [facility] with no acute changes    1/23/18 Ileostomy closure,     12/11/17 LAR  and ileal resection with loop ileostomy for 2 carcinoids.  (neg nodes)      Patient with rectal cancer. Completed chemoXRT      4 weeks ago      Procto Nov 11, 2017 mass at 4-5 cm from verge  Ct scan  (11/3/17) with improvement (nodes reduced in size) Thickening rectal wall 7 cm from anal verge     ROS:        Constitutional: No fever, chills, activity or appetite change.      HENT: No hearing loss, facial swelling, neck pain or stiffness.       Eyes: No discharge, itching and visual disturbance.      Respiratory: No apnea, cough, choking or shortness of breath.       Cardiovascular: No leg swelling or chest pain      Gastrointestinal: No abdominal distention or change in bowel habbits     Genitourinary: No dysuria, frequency or flank pain.      Musculoskeletal: No arthralgias or gait problem.      Neurological: No dizziness, seizures or weakness.      Hematological: No adenopathy.      Psychiatric/Behavioral: No hallucinations or behavioral problems.         PE:    APPEARANCE: Well nourished, well developed, in no acute distress.   CHEST: Lungs clear. Normal respiratory effort.  CARDIOVASCULAR: Normal rhythm. No edema.  ABDOMEN: Healing incision at old ioelstomy site. Well granulated. VAC removed   Rectum:  Normal skin,    Musculoskeletal: Symmetric, normal range of motion and strength.   Neurological: Alert and oriented to person, place, and time. Normal reflexes.   Skin: Skin is warm and dry.   Psychiatric: Normal mood and affect. Behavior is normal and appropriate.      Impression: Hx carcinoid of ileum and rectum,  PLAN:     Referral to [place] [facility]. Trial of Lomotil and fiber

## 2018-11-29 NOTE — PATIENT INSTRUCTIONS
Fiber on a daily basis 2-3 capsules in morning (Metamuci, citrucil or Gummy Fiber) by mouth    ILomotil 1-2 before meals and at bedtime in addition to Imodium    CAll me if you have not heqrd from [facility] by next Friday

## 2018-12-27 ENCOUNTER — TELEPHONE (OUTPATIENT)
Dept: SURGERY | Facility: CLINIC | Age: 63
End: 2018-12-27

## 2018-12-27 NOTE — TELEPHONE ENCOUNTER
----- Message from Britni Agee sent at 12/27/2018 11:55 AM CST -----  Contact: Sree Marshall (Spouse)[phone] or [phone]  Needs Advice    Reason for call:He states that  referred her to a doctor in [place] and they never receive a call       Communication Preference:    Additional Information:

## 2019-01-09 ENCOUNTER — TELEPHONE (OUTPATIENT)
Dept: NEUROLOGY | Facility: HOSPITAL | Age: 64
End: 2019-01-09

## 2019-01-09 DIAGNOSIS — C7A.026 CARCINOID TUMOR, RECTAL, MALIGNANT: Primary | ICD-10-CM

## 2019-01-09 NOTE — TELEPHONE ENCOUNTER
New NET ref from Dr. Manjarrez.  Pt with rectal and ileum carcinoid dx 12/2017.  Left message for patient to return call on voice mail to schedule testing and md batsheva.  Ordered labs, ga 68 scan, ct, mri and echo.

## 2019-01-09 NOTE — TELEPHONE ENCOUNTER
----- Message from Lanie Guillory sent at 12/27/2018  4:12 PM CST -----  Regarding: NEW NET  New patient - Referred by: Dr. Manjarrez      Why do you need to be seen?  NET Rectal & Ileum      Which doctor are you requesting?  n/a

## 2019-01-18 ENCOUNTER — TELEPHONE (OUTPATIENT)
Dept: NEUROLOGY | Facility: HOSPITAL | Age: 64
End: 2019-01-18

## 2019-01-18 NOTE — TELEPHONE ENCOUNTER
----- Message from Lanie Guillory sent at 1/18/2019 10:20 AM CST -----  All scheduled and notified  ----- Message -----  From: Britney Silva RN  Sent: 1/9/2019   3:53 PM  To: Lanie Guillory    Called this new pt and left msg to call us.  I dropped orders for ct, mri, ga, and echo .  Can you reach out to her and sched along with md batsheva with [facility] if she does not call back by end of week?    thanks

## 2019-02-07 ENCOUNTER — HOSPITAL ENCOUNTER (OUTPATIENT)
Dept: RADIOLOGY | Facility: HOSPITAL | Age: 64
Discharge: HOME | End: 2019-02-07
Attending: INTERNAL MEDICINE
Payer: MEDICARE

## 2019-02-07 ENCOUNTER — HOSPITAL ENCOUNTER (OUTPATIENT)
Dept: CARDIOLOGY | Facility: HOSPITAL | Age: 64
Discharge: HOME | End: 2019-02-07
Attending: INTERNAL MEDICINE
Payer: COMMERCIAL

## 2019-02-07 DIAGNOSIS — C7A.026 CARCINOID TUMOR, RECTAL, MALIGNANT: ICD-10-CM

## 2019-02-07 LAB
AORTIC ROOT ANNULUS: 2.7 CM
AORTIC VALVE CUSP SEPERATION: 1.53 CM
AV INDEX (PROSTH): 0.8
AV MEAN GRADIENT: 3.96 MMHG
AV PEAK GRADIENT: 8.41 MMHG
AV VALVE AREA: 2.18 CM2
AV VELOCITY RATIO: 0.73
CV ECHO LV RWT: 0.43 CM
DOP CALC AO PEAK VEL: 1.45 M/S
DOP CALC AO VTI: 33.25 CM
DOP CALC LVOT AREA: 2.75 CM2
DOP CALC LVOT DIAMETER: 1.87 CM
DOP CALC LVOT PEAK VEL: 1.06 M/S
DOP CALC LVOT STROKE VOLUME: 72.63 CM3
DOP CALCLVOT PEAK VEL VTI: 26.46 CM
E WAVE DECELERATION TIME: 201.56 MSEC
E/A RATIO: 0.82
ECHO LV POSTERIOR WALL: 0.95 CM (ref 0.6–1.1)
FRACTIONAL SHORTENING: 36 % (ref 28–44)
INTERVENTRICULAR SEPTUM: 1.4 CM (ref 0.6–1.1)
IVRT: 0.09 MSEC
LA MAJOR: 4.93 CM
LA MINOR: 4.2 CM
LA WIDTH: 3.67 CM
LEFT ATRIUM SIZE: 3.9 CM
LEFT ATRIUM VOLUME: 55.18 CM3
LEFT INTERNAL DIMENSION IN SYSTOLE: 2.81 CM (ref 2.1–4)
LEFT VENTRICLE DIASTOLIC VOLUME: 87.35 ML
LEFT VENTRICLE SYSTOLIC VOLUME: 29.73 ML
LEFT VENTRICULAR INTERNAL DIMENSION IN DIASTOLE: 4.39 CM (ref 3.5–6)
LEFT VENTRICULAR MASS: 184.95 G
LV LATERAL E/E' RATIO: 9.75
MV PEAK A VEL: 0.95 M/S
MV PEAK E VEL: 0.78 M/S
PISA TR MAX VEL: 2.34 M/S
PULM VEIN S/D RATIO: 1.25
PV PEAK D VEL: 0.36 M/S
PV PEAK S VEL: 0.45 M/S
RA MAJOR: 3.91 CM
RA PRESSURE: 3 MMHG
RIGHT VENTRICULAR END-DIASTOLIC DIMENSION: 2.47 CM
TDI LATERAL: 0.08
TR MAX PG: 21.9 MMHG
TV REST PULMONARY ARTERY PRESSURE: 25 MMHG

## 2019-02-07 PROCEDURE — 74178 CT ABDOMEN PELVIS W WO CONTRAST: ICD-10-PCS | Mod: 26,,, | Performed by: RADIOLOGY

## 2019-02-07 PROCEDURE — 93306 TRANSTHORACIC ECHO (TTE) COMPLETE (CUPID ONLY): ICD-10-PCS | Mod: 26,,, | Performed by: INTERNAL MEDICINE

## 2019-02-07 PROCEDURE — 74178 CT ABD&PLV WO CNTR FLWD CNTR: CPT | Mod: TC

## 2019-02-07 PROCEDURE — A9585 GADOBUTROL INJECTION: HCPCS | Performed by: INTERNAL MEDICINE

## 2019-02-07 PROCEDURE — 93306 TTE W/DOPPLER COMPLETE: CPT

## 2019-02-07 PROCEDURE — 25500020 PHARM REV CODE 255: Performed by: INTERNAL MEDICINE

## 2019-02-07 PROCEDURE — 74183 MRI ABD W/O CNTR FLWD CNTR: CPT | Mod: TC

## 2019-02-07 PROCEDURE — 74183 MRI ABDOMEN W WO CONTRAST: ICD-10-PCS | Mod: 26,,, | Performed by: RADIOLOGY

## 2019-02-07 PROCEDURE — 74178 CT ABD&PLV WO CNTR FLWD CNTR: CPT | Mod: 26,,, | Performed by: RADIOLOGY

## 2019-02-07 PROCEDURE — 93306 TTE W/DOPPLER COMPLETE: CPT | Mod: 26,,, | Performed by: INTERNAL MEDICINE

## 2019-02-07 PROCEDURE — 74183 MRI ABD W/O CNTR FLWD CNTR: CPT | Mod: 26,,, | Performed by: RADIOLOGY

## 2019-02-07 RX ORDER — GADOBUTROL 604.72 MG/ML
10 INJECTION INTRAVENOUS
Status: COMPLETED | OUTPATIENT
Start: 2019-02-07 | End: 2019-02-07

## 2019-02-07 RX ADMIN — IOHEXOL 30 ML: 350 INJECTION, SOLUTION INTRAVENOUS at 10:02

## 2019-02-07 RX ADMIN — GADOBUTROL 10 ML: 604.72 INJECTION INTRAVENOUS at 10:02

## 2019-02-07 RX ADMIN — IOHEXOL 100 ML: 350 INJECTION, SOLUTION INTRAVENOUS at 11:02

## 2019-02-08 ENCOUNTER — OFFICE VISIT (OUTPATIENT)
Dept: NEUROLOGY | Facility: HOSPITAL | Age: 64
End: 2019-02-08
Attending: INTERNAL MEDICINE
Payer: MEDICARE

## 2019-02-08 ENCOUNTER — HOSPITAL ENCOUNTER (OUTPATIENT)
Dept: RADIOLOGY | Facility: HOSPITAL | Age: 64
Discharge: HOME | End: 2019-02-08
Attending: INTERNAL MEDICINE
Payer: MEDICARE

## 2019-02-08 VITALS
HEIGHT: 66 IN | DIASTOLIC BLOOD PRESSURE: 83 MMHG | HEART RATE: 88 BPM | WEIGHT: 200.31 LBS | SYSTOLIC BLOOD PRESSURE: 135 MMHG | TEMPERATURE: 99 F | OXYGEN SATURATION: 100 % | BODY MASS INDEX: 32.19 KG/M2

## 2019-02-08 DIAGNOSIS — C7A.026 CARCINOID TUMOR, RECTAL, MALIGNANT: ICD-10-CM

## 2019-02-08 DIAGNOSIS — C20 RECTAL CANCER: ICD-10-CM

## 2019-02-08 DIAGNOSIS — D3A.012 CARCINOID TUMOR OF ILEUM: ICD-10-CM

## 2019-02-08 DIAGNOSIS — D3A.026 CARCINOID TUMOR OF RECTUM: Primary | ICD-10-CM

## 2019-02-08 PROCEDURE — 78815 PET IMAGE W/CT SKULL-THIGH: CPT | Mod: TC,PI

## 2019-02-08 PROCEDURE — 78815 PET IMAGE W/CT SKULL-THIGH: CPT | Mod: 26,PS,, | Performed by: RADIOLOGY

## 2019-02-08 PROCEDURE — 99205 OFFICE O/P NEW HI 60 MIN: CPT | Mod: ,,, | Performed by: INTERNAL MEDICINE

## 2019-02-08 PROCEDURE — A9587 GALLIUM GA-68: HCPCS | Mod: TB

## 2019-02-08 PROCEDURE — 99205 PR OFFICE/OUTPT VISIT, NEW, LEVL V, 60-74 MIN: ICD-10-PCS | Mod: ,,, | Performed by: INTERNAL MEDICINE

## 2019-02-08 PROCEDURE — 99215 OFFICE O/P EST HI 40 MIN: CPT | Mod: 25 | Performed by: INTERNAL MEDICINE

## 2019-02-08 PROCEDURE — 78815 NM PET 68GA DOTATATE WHOLE BODY: ICD-10-PCS | Mod: 26,PS,, | Performed by: RADIOLOGY

## 2019-02-08 NOTE — PATIENT INSTRUCTIONS
We will speak about you in tumor board next week and I'll call you at the end of next week    Follow up in 6 months- we will send you a reminder.     Will have Candy the dietician call you next week.

## 2019-02-08 NOTE — PROGRESS NOTES
NOLANETS:  [facility]  A collaboration between [facility] and [facility]    PATIENT: Coby Marshall  MRN: [id number]  DATE: 2/8/2019      Diagnosis:   1. Carcinoid tumor of rectum    2. Carcinoid tumor of ileum    3. 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 Manjarrez)    Pathology 08/2017 moderately differentiated adenocarcinoma (diagnosis revised 02/2018)  01/2018:  Ileum mass, well-differentiated neuroendocrine tumor, grade 1, rectal mass well-differentiated neuroendocrine tumor grade 2          Subjective:    Interval History: Ms. Marshall is a 63 y.o. female who is seen as an initial visit for a neuroendocrine tumor of the small intestine rectum.  Her history dates to 8/2017 while on a cruise she developed rectal bleeding. This led to a colonoscopy which had shown a friable and ulcerated mass in the rectum.  Biopsies were taken showing a moderately differentiated adenocarcinoma.  She did not have any evidence of distant disease and underwent neoadjuvant chemotherapy and radiation.  In 12/2017 she underwent a low anterior resection and at the time was also found to have a terminal ileal mass.  Pathology from the ileal mass had revealed a well-differentiated neuroendocrine tumor, grade 1 with Ki-67 less than 3% and was pathologically staged as a T2, NX.  Her rectal lesion was also resected and was found to be a well-differentiated neuroendocrine tumor, grade 2 with mitotic rate 4/10 HPF and Ki-67 of 16%.  This was staged as a T3, N0.  In 01/2018 she  underwent colostomy reversal but subsequently developed multiple postoperative complications requiring further surgeries.  She eventually recovered from these.  She had a gallium 68 PET-CT in 02/2019 which did not show any evidence recurrent disease.  CT in 02/2019 had shown no definitive evidence of recurrent but multiple subcentimeter lymph nodes.    She states that her main complaints are related to ongoing diarrhea which has been present ever since her surgery.  She was tried on cholestyramine and Imodium without much relief.  She is able to eat but has to watch her diet because of the diarrhea.  Her weight has been stable.  She occasionally has some abdominal cramping.  She is otherwise fully active and without complaints.    Past Medical History:   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    Ki 67 -3-20%    Hyperlipidemia     Hypertension     Malignant carcinoid tumor of ileum 12/2017    Ki 67 < 3%    Rectal cancer 2/8/2019       Past Surgical HIstory:   Past Surgical History:   Procedure Laterality Date    CLOSURE-ILEOSTOMY, loop Bilateral 1/23/2018    Performed by Suhas [facility]rez MD at [facility] OR [address] [place] [address]    COLON SURGERY      DEBRIDEMENT-WOUND, ABDOMINAL WALL ABSCESS N/A 2/19/2018    Performed by Suhas Manjarrez MD at [facility] OR [address] [place] [address]    EXPLORATION-WOUND, ABDOMINAL WALL ABSCESS N/A 2/19/2018    Performed by Suhas Manjarrez MD at [facility] OR [address] [place] [address]    HYSTERECTOMY      ileocoloc resection, lysis of adhesions N/A 2/20/2018    Performed by Suhas Manjarrez MD at [facility] OR [address] [place] [address]    ILEOSTOMY CLOSURE  01/2018    Lower Anterior Resection  12/2017    NET- Dr. Manjarrez - [facility]    port a cath Right     R chest wall    RESECTION-COLON-LOW ANTERIOR, loop ileostomy, Colonic J Pouch, Ileo Resection, Ileocolic Anastomosis N/A 12/11/2017    Performed by Suhas Manjarrez MD at [facility] OR [address] [place] [address]       Family History:   Family  History   Problem Relation Age of Onset    Cancer Sister         leukemia       Social History:  reports that  has never smoked. she has never used smokeless tobacco. She reports that she does not drink alcohol or use drugs.    Allergies:  Review of patient's allergies indicates:   Allergen Reactions    Arb-angiotensin receptor antagonist Swelling     Had angioedema of lips with ACE inhibitor, so this is a contraindication    Lisinopril Swelling    Lisinopril-hydrochlorothiazide Swelling     Mouth Swelling    Sulfa (sulfonamide antibiotics) Swelling     Swelling of lips    Sulfasalazine Swelling     Swelling of lips    Epinephrine      Neuroendocrine Tumor patient      Hydrocodone-acetaminophen Other (See Comments)     stomach pain (even with food)    Amlodipine Other (See Comments)     edema of ankles and legs.       Medications:  Current Outpatient Medications   Medication Sig Dispense Refill    ALBUTEROL INHL Inhale into the lungs as needed.       aspirin (ECOTRIN) 81 MG EC tablet Take 81 mg by mouth once daily.      atorvastatin (LIPITOR) 20 MG tablet Take 20 mg by mouth once daily.      cloNIDine (CATAPRES) 0.1 MG tablet Take 0.1 mg by mouth 2 (two) times daily.      fluticasone (FLONASE) 50 mcg/actuation nasal spray 1 spray by Each Nare route once daily.      hydroCHLOROthiazide (MICROZIDE) 12.5 mg capsule Take 12.5 mg by mouth once daily.      ranitidine (ZANTAC) 150 MG tablet Take 150 mg by mouth nightly.       cholestyramine, with sugar, 4 gram Powd Take 2 g by mouth 2 (two) times daily with meals. 378 g 5     No current facility-administered medications for this visit.        Review of Systems   Constitutional: Negative for activity change, appetite change, chills, fever and unexpected weight change.   HENT: Negative for congestion, hearing loss and nosebleeds.    Eyes: Negative for visual disturbance.   Respiratory: Negative for cough and shortness of breath.    Cardiovascular: Negative  "for chest pain and palpitations.   Gastrointestinal: Positive for abdominal pain and diarrhea. Negative for blood in stool, constipation, nausea and vomiting.   Genitourinary: Negative for dysuria.   Musculoskeletal: Negative for back pain and gait problem.   Skin: Negative for color change and rash.   Neurological: Negative for dizziness, weakness and headaches.   Hematological: Negative for adenopathy. Does not bruise/bleed easily.   Psychiatric/Behavioral: Negative for confusion.       ECOG Performance Status: 0   Objective:      Vitals:   Vitals:    02/08/19 1522   BP: 135/83   Pulse: 88   Temp: 99.3 °F (37.4 °C)   TempSrc: Oral   SpO2: 100%   Weight: 90.9 kg (200 lb 4.6 oz)   Height: 5' 6" (1.676 m)     BMI: Body mass index is 32.33 kg/m².    Physical Exam   Constitutional: She is oriented to person, place, and time. She appears well-developed and well-nourished. No distress.   HENT:   Head: Normocephalic.   Mouth/Throat: No oropharyngeal exudate.   Eyes: EOM are normal. No scleral icterus.   Neck: Neck supple. No tracheal deviation present. No thyromegaly present.   Cardiovascular: Normal rate and regular rhythm.   Pulmonary/Chest: Effort normal and breath sounds normal. No respiratory distress. She has no wheezes. She has no rales.   Abdominal: Soft. She exhibits no distension and no mass. There is no tenderness. There is no rebound and no guarding.   Musculoskeletal: Normal range of motion. She exhibits no edema.   Lymphadenopathy:     She has no cervical adenopathy.   Neurological: She is alert and oriented to person, place, and time. No cranial nerve deficit.   Skin: Skin is warm and dry.   Psychiatric: She has a normal mood and affect.       Laboratory Data:  Hospital Outpatient Visit on 02/07/2019   Component Date Value Ref Range Status    LA WIDTH 02/07/2019 3.67  cm Final    AORTIC VALVE CUSP SEPERATION 02/07/2019 1.53  cm Final    LVIDD 02/07/2019 4.39  3.5 - 6.0 cm Final    IVS 02/07/2019 1.40* " 0.6 - 1.1 cm Final    PW 02/07/2019 0.95  0.6 - 1.1 cm Final    Ao root annulus 02/07/2019 2.70  cm Final    LVIDS 02/07/2019 2.81  2.1 - 4.0 cm Final    FS 02/07/2019 36  28 - 44 % Final    LA volume 02/07/2019 55.18  cm3 Final    LV mass 02/07/2019 184.95  g Final    LA size 02/07/2019 3.90  cm Final    RVDD 02/07/2019 2.47  cm Final    Left Ventricle Relative Wall Thick* 02/07/2019 0.43  cm Final    AV mean gradient 02/07/2019 3.96  mmHg Final    AV valve area 02/07/2019 2.18  cm2 Final    AV Velocity Ratio 02/07/2019 0.73   Final    AV index (prosthetic) 02/07/2019 0.80   Final    E/A ratio 02/07/2019 0.82   Final    E wave decelartion time 02/07/2019 201.56  msec Final    IVRT 02/07/2019 0.09  msec Final    Pulm vein S/D ratio 02/07/2019 1.25   Final    LVOT diameter 02/07/2019 1.87  cm Final    LVOT area 02/07/2019 2.75  cm2 Final    LVOT peak rodrick 02/07/2019 [id number]  m/s Final    LVOT peak VTI 02/07/2019 26.46  cm Final    Ao peak rodrick 02/07/2019 1.45  m/s Final    Ao VTI 02/07/2019 33.25  cm Final    LVOT stroke volume 02/07/2019 72.63  cm3 Final    AV peak gradient 02/07/2019 8.41  mmHg Final    MV Peak E Rodrick 02/07/2019 0.78  m/s Final    TR Max Rodrick 02/07/2019 2.34  m/s Final    MV Peak A Rodrick 02/07/2019 0.95  m/s Final    PV Peak S Rodrick 02/07/2019 0.45  m/s Final    PV Peak D Rodrick 02/07/2019 0.36  m/s Final    LV Systolic Volume 02/07/2019 29.73  mL Final    LV Diastolic Volume 02/07/2019 87.35  mL Final    RA Major Axis 02/07/2019 3.91  cm Final    Left Atrium Minor Axis 02/07/2019 4.20  cm Final    Left Atrium Major Axis 02/07/2019 4.93  cm Final    Triscuspid Valve Regurgitation Pea* 02/07/2019 21.90  mmHg Final    LV LATERAL E/E' RATIO 02/07/2019 9.75   Final    Right Atrial Pressure (from IVC) 02/07/2019 3  mmHg Final    TDI LATERAL 02/07/2019 0.08   Final    TV rest pulmonary artery pressure 02/07/2019 25  mmHg Final   Lab Visit on 02/07/2019   Component Date  "Value Ref Range Status    Creatinine 2019 1.0  0.5 - 1.4 mg/dL Final    eGFR if African American 2019 >60  >60 mL/min/1.73 m^2 Final    eGFR if non African American 2019 59* >60 mL/min/1.73 m^2 Final    Comment: Calculation used to obtain the estimated glomerular filtration  rate (eGFR) is the CKD-EPI equation.          PATIENT NAME: COBY KIDD  HISTORY NUMBER: [id number]  : [id number]  SEX: F  SERVICE:  2017  RECEIVED:  2017  REPORTED:  2018  PHYSICIAN: WILEY [id number]), M.D.  LOCATION: [facility]  DEPT #: [id number]  CLINICAL DATA  Specimen A: Rectum - Tissue - cold bx rectal mass inked with florin ink    Pre-op diagnosis:  Rectal bleeding [K62.5]  Periumbilical abdominal pain [R10.33]  GROSS DESCRIPTION  A. Received in formalin labeled "Coby Kidd, 1" are multiple soft tissues aggregating 7 x 4 x 2 mm.  TS1.  Inked with hematoxylin.  mp.  cr/graciela  MICROSCOPIC DIAGNOSIS  A - RECTUM - TISSUE - COLD BX RECTAL MASS INKED WITH FLORIN INK  INVASIVE MODERATELY DIFFERENTIATED ADENOCARCINOMA  CASE NOTE  An adenocarcinoma infiltrates the lamina propria of the rectal mucosa and extends into the submucosa in the biopsy sections.  I suspect this lesion is near the anal verge, although no squamous mucosa is present, because the tumor itself has features of   transitional epithelium.  It is mostly adenocarcinoma, but the tumor cells are rather small and have a basaloid appearance.    SUPPLEMENTAL REPORT:  2018   I re-evaluated this case material after discussing the case with Dr. Christensen who is seeing the patient in follow-up and understands that the tumor was designated a carcinoid at [facility].  Review of the H+E stain slides does show a well   differentiated tumor with gland formation in areas and also ribbon-like trabecular arrangements of cells that appear consistent with carcinoid.  We retrieved the original biopsy block and added a synaptophysin stain which is " "strongly positive supporting   an impression of carcinoid tumor. Oddly, our biopsy has not been requested for review by [facility] which is generally the case with patients we refer there.  We are attempting to obtain the [facility] report which for some technical reason is not available   [facility], and if we can get that data we will see that it is scanned into our Epic system as an outside pathology.    Unless "gross only" is specified, the final diagnosis for each specimen is based on a microscopic examination of representative sections of the tissue.    Electronically signed by: MARTHA REID M.D.    FINAL PATHOLOGIC DIAGNOSIS  1. Ileum, mass, biopsy:  - Well-differentiated neuroendocrine tumor, grade 1.  - See CAP synoptic report for small bowel.  - See comment.  2. Colon, sigmoid and rectum, resection:  - Well-differentiated neuroendocrine tumor, grade 2.  - See [facility] synoptic report for colon.  - See comment.  3. Colon, distal anastomotic donut, biopsy:  - Benign colon with no significant histologic abnormality.  4. Colon, proximal anastomotic donut, biopsy:  - Benign colon with no significant histologic abnormality.  5. Ileum, terminal, resection:  - Small bowel with surgical defect and no significant histologic abnormality.  - See comment.  CAP Synoptic Report Colon  Specimen: Large intestine (sigmoid and rectum)  Procedure: Rectal/rectosigmoid colon (low anterior resection)  Tumor Site: Rectum  Tumor Location: Tumor in colon is located below the peritoneal reflection  Tumor Size: 1.7 cm in greatest dimension macroscopically  Tumor Focality: Single focus  Histologic Type and Grade: Well-differentiated neuroendocrine tumor; G2: Intermediate grade  Mitotic Rate: 4/10 high-power fields (HPF)  Microscopic Tumor Extension: Tumor invades through the muscularis propria into the perirectal soft tissues but  does not extend to the serosal surface (visceral peritoneum)  Margins: all margins uninvolved by " neuroendocrine tumor:  Distance of tumor from closest margin: 5 mm from the radial margin  Lymph-Vascular Invasion: Present  Pathologic Staging (pTNM): y p T3 N0  Lymph nodes:  Number of Lymph Nodes Examined: 9  Number of Lymph Nodes Involved: 0  Ancillary Studies: Ki-67 labeling index - 3% to 20% (approximately 16%)  CAP Synoptic Report Ileum  Procedure: Segmental resection, small intestine  Tumor Site: Ileum  Tumor Size: 1.5 cm in greatest dimension macroscopically  Tumor Focality: Unifocal  Histologic Type and Grade: G1: Well-differentiated neuroendocrine tumor  Ki-67 Labeling Index: <3%  Tumor Extension: at least tumor invades the muscularis propria, see comment  Margins: All margins are uninvolved by tumor, see comment  Lymphovascular Invasion: Present  Large Mesenteric Masses (>2 cm): Not identified  Regional Lymph Nodes: No lymph nodes submitted or found  Pathologic Stage Classification (pTNM, AJCC 8th Edition): at least p T2 NX, see comment  COMMENT: The sigmoid and rectum (specimen 2) shows a 1.7 centimeter lesion involving all layers of the bowel.  On microscopic examination, the tumor cells have a trabecular and nested pattern. Immunostains show the tumor  cells to be strongly positive for synaptophysin and weakly positive for chromogranin. Immunostain for Ki-67  proliferation index between 3 and 20%. This is consistent with a well- differentiated neuroendocrine tumor, grade 2.  The pericolonic adipose tissue is thoroughly searched for lymph nodes twice and multiple sections of adipose tissue  are submitted to find additional lymph nodes. The ileum mass (specimen 1) shows tumor cells with a trabecular  nesting pattern that are strongly positive for synaptophysin and chromogranin. Immunostain for Ki-67 shows a  proliferation index of less than 3% consistent with a well- differentiated neuroendocrine tumor, grade 1. The ileum  tumor was submitted separately from the ileum resection (specimen 5) therefore it  is difficult to assess the full depth  of invasion however there is tumor present within muscularis propria, cannot rule out further invasion into  Report continued on next page Page 2 of 4  subserosal fat. The proximal and distal surgical margins of the ileum resection are negative for neuroendocrine  tumor. There is no lymph nodes submitted or found within the ileum resection. Select slides from parts 1 and 2 are  reviewed by Dr. VINCENT Allen who agrees with the diagnosis of well differentiated neuroendocrine tumor in the ileum and  colon. Appropriate positive and negative controls are examined.  Diagnosed by: Francisca Al M.D.  (Electronically Signed: 2017-12-19 13:50:40)      Imaging:   Gallium 68 PET-CT 02/08/2019  TECHNIQUE:  5.9 of GA68 Dotatate was administered intravenously    COMPARISON:  None    FINDINGS:  Quality of the study: Adequate.    No abnormal foci of increased tracer uptake are present.  The presacral soft tissue of concern does not show increased tracer uptake.    Physiologic uptake of the tracer is seen within the pituitary, liver, spleen, kidneys, adrenal glands and bowel.    Incidental CT findings: None.      Impression       No PET/CT findings to suggest somatostatin receptor avid disease.     CT 02/07/2019    Although listed is an IV enhanced study, there is no imaging evidence of the use of IV contrast, markedly limiting evaluation for the presence of residual/recurrent tumor or metastases.    COMPARISON:  MRI abdomen performed earlier the same date and CT abdomen 03/01/2018    FINDINGS:  Heart: Heart is not enlarged. No significant pericardial thickening in the field of view.    Lung Bases: Imaged portions of the lung bases are clear.    Hepatobiliary: Normal in size, attenuation and morphology without appreciable surface nodularity. No sizable suspicious mass on this limited non-enhanced exam.  Gallbladder is grossly unremarkable without radiodense stones, mural thickening, pericholecystic  fluid or pericholecystic fat stranding.  No biliary dilatation.    Pancreas: Pancreas is normal in size and morphology without appreciable surrounding fat stranding, sizable fluid or sizable suspicious mass on this partially limited non-enhanced exam.    Spleen: Spleen is normal in size and morphology without appreciable sizable parenchymal mass on this non-enhanced exam.    Adrenals: Adrenal glands are symmetric and normal in size and morphology without appreciable nodularity.    Kidneys: Kidneys are normal in location, size, attenuation and morphology. No obstructing nephrolith or hydroureteronephrosis bilaterally.  No appreciable sizable suspicious mass on this limited non-enhanced exam.    Bladder: Urinary bladder is completely collapsed, limiting evaluation.    GI Tract: Postsurgical changes compatible with partial rectosigmoid resection and subsequent reanastomosis and appendectomy.  We could majority of the small bowel is well opacified and grossly unremarkable.  Colon is unopacified, limiting evaluation.  No free air. No free fluid. Appendix appears to be surgically absent, unchanged.    Mesentery: Multiple nonenlarged central mesenteric lymph nodes.  Otherwise, the mesentery is grossly unremarkable without free fluid or sizable suspicious mass on this non-enhanced exam.    Retroperitoneum: Aorta is normal in course and caliber without evidence of aneurysmal degeneration.  Innumerable nonenlarged retroperitoneal lymph nodes, not significantly changed when compared to March 2018.  No sizable suspicious retroperitoneal mass, fluid collection or l bala ymphadenopathy.    Pelvis: Status post hysterectomy.  Smooth presacral soft tissue thickening with a single 6 mm soft tissue density nodule projecting in the presacral fat (series 2, image 108), unchanged.  Otherwise, no appreciable sizable pelvic mass on this partially limited nonenhanced exam.  No appreciable free fluid or organized/drainable fluid collection.   Single enlarged right iliac chain lymph node measuring up to 1.6-cm in maximal short axis diameter (series 2, image 92) was poorly visualized on prior CT but appears mildly enlarged.  Multiple additional iliac chain prominent lymph nodes are not enlarged by radiologic size criteria, not significantly changed..    Soft tissues: Midline right lower quadrant surgical incisions and periumbilical hernia containing loops of small bowel, unchanged.  No evidence of bowel obstruction.    Osseous structures: Multilevel degenerative changes of the imaged spine.  No acute displaced fracture, dislocation or suspicious lytic/blastic osseous lesions.      Impression       1. No emergent/acute abdominopelvic findings appreciated.  2. Single enlarged right iliac chain lymph node measuring up to 1.6-cm was poorly visualized on prior CT but appears mildly enlarged.  Multiple additional prominent but nonenlarged retroperitoneal and iliac chain lymph nodes, not significantly changed.  3. Smooth presacral soft tissue thickening with a single 6 mm soft tissue nodule projecting in the presacral fat, unchanged.       MRI 02/07/2019  COMPARISON:  None    FINDINGS:  The liver demonstrate 2 subcentimeter cyst in the segment 7.  No abnormally enhancing liver lesions.  There is a geographic area of enhancement within the segment 7 most suggestive of transient hepatic attenuation difference.  The gallbladder is present.  No evidence of hepatic steatosis.  The the the lung bases appear normal.  The pancreas, stomach, spleen, bilateral adrenal glands and kidneys appear normal.  The aorta tapers normally, no para-aortic lymphadenopathy.  Normal size nodes are seen in the para-aortic region.  The mesentery appears normal.  There is a bowel containing anterior mid abdominal wall hernia.  Cranial to this hernia is another small fat containing anterior midline abdominal wall hernia.  No ascites.  The visualized osseous structures demonstrate no osseous  lesions.      Impression       No metastatic disease seen.    Two small subcentimeter liver cysts.    Small fat containing anterior mid abdominal wall hernia.    Small bowel containing anterior mid abdominal wall hernia.    There are no measurable lesions per RECIST criteria.              Assessment:       1. Carcinoid tumor of rectum    2. Carcinoid tumor of ileum    3. Rectal cancer           Plan:       I have had a long conversation with Mrs. Marshall and her  today concerning her disease.  She was originally diagnosed with a rectal adenocarcinoma and underwent curative intent neoadjuvant chemotherapy and radiation followed by surgery.  Review of her pathology from her surgery indicated 2 separate neuroendocrine tumors, 1 in the rectum and 1 in the small intestine.  Subsequent review of her original pathology indicated that this was not an adenocarcinoma but rather a neuroendocrine tumor.  She has ongoing diarrhea since her surgery which is likely postoperative in nature rather than related to the presence of any neuroendocrine tumor.  Review of her gallium 68 PET-CT shows no evidence of disease.  She will need ongoing surveillance and would recommend repeat CT in another 6 months.  I will have our dietitian reach out to her as well as diet restrictions may be beneficial in controlling her symptoms.  Will also discuss her case in our tumor Board next week if she does have multiple lymph nodes seen on CT and will need to be monitored.  I have told her that she should have her port removed.  Multiple questions were answered and she is agreeable with this plan.    Mahad Celestin DO, FACP  Hematology & Oncology, [facility]  [address]  [place]  [address]  ph. [phone]; [phone]  fax. [phone]      60 minutes were spent in coordination of patient's care, record review and counseling.  More than 50% of the time was face-to-face.

## 2019-02-15 ENCOUNTER — TELEPHONE (OUTPATIENT)
Dept: NEUROLOGY | Facility: HOSPITAL | Age: 64
End: 2019-02-15

## 2019-02-15 NOTE — TELEPHONE ENCOUNTER
----- Message from Mary Ibrahim RN sent at 2/8/2019  5:07 PM CST -----  Please call patient of Dr. Celestin's     Has dumping when she eats

## 2019-02-22 ENCOUNTER — TELEPHONE (OUTPATIENT)
Dept: NEUROLOGY | Facility: HOSPITAL | Age: 64
End: 2019-02-22

## 2019-02-22 NOTE — TELEPHONE ENCOUNTER
Referring Physician:  Mahad Celestin DO, [facility]     Reason for Call: Nutrition Consult for Diarrhea    Pertinent Social History: Patient is independent with activities of daily living. She is able to prepare meals. Patient reports dec po intake due to diarrhea.     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    Ki 67 -3-20%    Hyperlipidemia     Hypertension     Malignant carcinoid tumor of ileum 12/2017    Ki 67 < 3%    Rectal cancer 2/8/2019       Previous Surgical History:  Past Surgical History:   Procedure Laterality Date    CLOSURE-ILEOSTOMY, loop Bilateral 1/23/2018    Performed by Suhas Manjarrez MD at [facility] OR [address] [place] [address]    COLON SURGERY      DEBRIDEMENT-WOUND, ABDOMINAL WALL ABSCESS N/A 2/19/2018    Performed by Suhas Manjarrez MD at [facility] OR [address] [place] [address]    EXPLORATION-WOUND, ABDOMINAL WALL ABSCESS N/A 2/19/2018    Performed by Suhas Manjarrez MD at [facility] OR [address] [place] [address]    HYSTERECTOMY      ileocoloc resection, lysis of adhesions N/A 2/20/2018    Performed by Suhas Manjarrez MD at [facility] OR [address] [place] [address]    ILEOSTOMY CLOSURE  01/2018    Lower Anterior Resection  12/2017    [facility]- Dr. Manjarrez - [facility]    port a cath Right     R chest wall    RESECTION-COLON-LOW ANTERIOR, loop ileostomy, Colonic J Pouch, Ileo Resection, Ileocolic Anastomosis N/A 12/11/2017    Performed by Suhas Manjarrez MD at [facility] OR [address] [place] [address]       Medication:    ALBUTEROL INHL, Inhale into the lungs as needed. , Disp: , Rfl:     aspirin (ECOTRIN) 81 MG EC tablet, Take 81 mg by mouth once daily., Disp: , Rfl:     atorvastatin (LIPITOR) 20 MG tablet, Take 20 mg by mouth once daily., Disp: , Rfl:     cholestyramine, with sugar, 4 gram Powd, Take 2 g by mouth 2 (two) times daily with meals., Disp: 378 g, Rfl: 5    cloNIDine (CATAPRES) 0.1 MG tablet, Take 0.1 mg by mouth 2 (two) times daily., Disp: , Rfl:     fluticasone (FLONASE) 50  "mcg/actuation nasal spray, 1 spray by Each Nare route once daily., Disp: , Rfl:     hydroCHLOROthiazide (MICROZIDE) 12.5 mg capsule, Take 12.5 mg by mouth once daily., Disp: , Rfl:     ranitidine (ZANTAC) 150 MG tablet, Take 150 mg by mouth nightly. , Disp: , Rfl:       Vitamin/Supplements/Herbs: none     Allergies:  Review of patient's allergies indicates:   Allergen Reactions    Arb-angiotensin receptor antagonist Swelling     Had angioedema of lips with ACE inhibitor, so this is a contraindication    Lisinopril Swelling    Lisinopril-hydrochlorothiazide Swelling     Mouth Swelling    Sulfa (sulfonamide antibiotics) Swelling     Swelling of lips    Sulfasalazine Swelling     Swelling of lips    Epinephrine      Neuroendocrine Tumor patient    Hydrocodone-acetaminophen Other (See Comments)     stomach pain (even with food)    Amlodipine Other (See Comments)     edema of ankles and legs.       Labs: pending    : 1955  Age: 63 y.o.    Anthropometrics  Weight: 90.9 kg (200 lb 4.6 oz)  Height: 5' 6" (1.676 m)    Estimated body mass index is 32.33 kg/m²     BMI Weight Status   Below 18.5 Underweight   18.6-24.9 Normal/Healthy   25.0-29.9 Overweight   30.0-34.9 Obesity Class I   35.0-39.9 Obesity Class II   >40 Extreme Obesity   Class III     Ideal Weight Range for Your Height: 5'6" = 118 - 154 lbs    Weight History:  Weight has decreased 5 pounds over last year     Wt Readings from Last 12 Encounters:   19 90.9 kg (200 lb 4.6 oz)   18 90.4 kg (199 lb 4.7 oz)   18 86.6 kg (190 lb 14.7 oz)   05/10/18 83.9 kg (184 lb 15.5 oz)   18 83.5 kg (184 lb 1.4 oz)   18 82.5 kg (181 lb 14.1 oz)   18 93 kg (205 lb 0.4 oz)       Estimated Nutrition Needs:   Energy Needs:  (MSJ x  PAL)  Protein Needs:  ( gm Protein/kg)  Fluid Needs:  (1 mL/calorie)    Malnutrition Assessment: n/a Telephone Conference    Gastrointestinal Habits: 4-5 times per day and loose Described as Type 4 to 6 on " [place] Stool Form Scale.     Nutrition Symptoms: diarrhea; avoids eating     Nutrition History    Meal Pattern: 1 meal per day  Breakfast: skips  Lunch: bk meat( fish or chic) with potato, green beans.   Dinner: skips  Snacks: none  Beverage Intake:     Food Intolerance:     Meal Preparation:   Dining Out:     Dentition: normal dentition for age Difficulty chewing or swallowing?   Exercise: Physical ability to complete tasks for meal preparation, Physical ability to self-feed, Cognitive ability to complete tasks for meal preparation and Type of physical activity      Motivation to Change:       Nutrition Diagnosis  Nutrition Problem  Limited food acceptance       Related to (etiology):       Signs and Symptoms (as evidenced by):       Interventions:  General/healthful diet        Nutrition Diagnosis Status:   New        Recommendations:  1.   2.   3.  RD contact information provided for questions. RD added to Care Team.     Goals:       Routed to   MD LILIA Gill MD Robert A. Ramirez, DO, FACP Ramcharan Thiagarajan, MD      Consultation Time: 30 minutes.      Follow Up: 1 months.        ----- Message from Mary Ibrahim RN sent at 2/18/2019  5:02 PM CST -----  She has not called back.  Will you reach out to her again? She will ask a lot of questions about pancreatic enzymes I am unable to answer.   ----- Message -----  From: Tracie M. Weil-Cavalier, RD  Sent: 2/15/2019   2:27 PM  To: Mary Ibrahim RN    [facility],  I attempted to call Ms Marshall but had to leave a voice message. If she is not able to wait until next week and needs to start on Pancreatic Enzymes, we can start her on 24,000 U Lipase x2 per meal and x1 per snack. I'm out until Thursday.     Candy     ----- Message -----  From: Mary Ibrahim RN  Sent: 2/8/2019   5:07 PM  To: Tracie M. Weil-Cavalier, RD    Please call patient of Dr. Celestin's     Has dumping when she eats
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
## 2020-02-28 ENCOUNTER — TELEPHONE (OUTPATIENT)
Dept: NEUROLOGY | Facility: HOSPITAL | Age: 65
End: 2020-02-28

## 2020-02-28 DIAGNOSIS — C7A.012 MALIGNANT CARCINOID TUMOR OF ILEUM: Primary | ICD-10-CM

## 2020-02-28 NOTE — TELEPHONE ENCOUNTER
LVM for patient to call back to follow up on what surveillance she has had since last visit and to make a follow up at our clinic with CT a/p.

## 2020-02-28 NOTE — TELEPHONE ENCOUNTER
----- Message from Sharri Wheeler sent at 2/28/2020  9:38 AM CST -----  Contact: Lennie woodson/ Dr. Keith Hernández [phone]  RR - Lennie is calling to speak with you about removing patients port or do the doctor want her to keep it longer she is scheduled to go to their office at 12:00 today. Please call

## 2021-03-18 ENCOUNTER — TELEPHONE (OUTPATIENT)
Dept: NEUROLOGY | Facility: HOSPITAL | Age: 66
End: 2021-03-18

## 2021-10-29 ENCOUNTER — TELEPHONE (OUTPATIENT)
Dept: SURGERY | Facility: CLINIC | Age: 66
End: 2021-10-29
Payer: COMMERCIAL

## 2021-10-29 DIAGNOSIS — C20 RECTAL CANCER: ICD-10-CM

## 2021-10-29 DIAGNOSIS — C7A.026 MALIGNANT CARCINOID TUMOR OF RECTUM: Primary | ICD-10-CM

## 2021-10-29 DIAGNOSIS — C78.7 LIVER METASTASES: ICD-10-CM

## 2021-11-01 ENCOUNTER — TELEPHONE (OUTPATIENT)
Dept: SURGERY | Facility: CLINIC | Age: 66
End: 2021-11-01
Payer: COMMERCIAL

## 2021-11-10 ENCOUNTER — HOSPITAL ENCOUNTER (OUTPATIENT)
Dept: RADIOLOGY | Facility: HOSPITAL | Age: 66
Discharge: HOME | End: 2021-11-10
Attending: SURGERY
Payer: COMMERCIAL

## 2021-11-10 DIAGNOSIS — C78.7 LIVER METASTASES: ICD-10-CM

## 2021-11-10 DIAGNOSIS — C7A.026 MALIGNANT CARCINOID TUMOR OF RECTUM: ICD-10-CM

## 2021-11-10 DIAGNOSIS — C20 RECTAL CANCER: ICD-10-CM

## 2021-11-10 PROCEDURE — 78815 NM PET CU64 DOTATATE, SKULL TO MID THIGH: ICD-10-PCS | Mod: 26,PI,, | Performed by: RADIOLOGY

## 2021-11-10 PROCEDURE — 78815 PET IMAGE W/CT SKULL-THIGH: CPT | Mod: 26,PI,, | Performed by: RADIOLOGY

## 2021-11-10 PROCEDURE — A9698 NON-RAD CONTRAST MATERIALNOC: HCPCS | Performed by: SURGERY

## 2021-11-10 PROCEDURE — 25500020 PHARM REV CODE 255: Performed by: SURGERY

## 2021-11-10 PROCEDURE — 78815 PET IMAGE W/CT SKULL-THIGH: CPT | Mod: TC

## 2021-11-10 RX ADMIN — IOHEXOL 1000 ML: 9 SOLUTION ORAL at 12:11

## 2021-11-11 ENCOUNTER — HOSPITAL ENCOUNTER (OUTPATIENT)
Dept: CARDIOLOGY | Facility: HOSPITAL | Age: 66
Discharge: HOME | End: 2021-11-11
Attending: SURGERY
Payer: COMMERCIAL

## 2021-11-11 ENCOUNTER — HOSPITAL ENCOUNTER (OUTPATIENT)
Dept: RADIOLOGY | Facility: HOSPITAL | Age: 66
Discharge: HOME | End: 2021-11-11
Attending: SURGERY
Payer: COMMERCIAL

## 2021-11-11 ENCOUNTER — OFFICE VISIT (OUTPATIENT)
Dept: SURGERY | Facility: CLINIC | Age: 66
End: 2021-11-11
Payer: COMMERCIAL

## 2021-11-11 VITALS
HEART RATE: 80 BPM | DIASTOLIC BLOOD PRESSURE: 76 MMHG | BODY MASS INDEX: 32.95 KG/M2 | SYSTOLIC BLOOD PRESSURE: 130 MMHG | WEIGHT: 205 LBS | HEIGHT: 66 IN

## 2021-11-11 VITALS
WEIGHT: 205 LBS | HEART RATE: 87 BPM | DIASTOLIC BLOOD PRESSURE: 76 MMHG | SYSTOLIC BLOOD PRESSURE: 129 MMHG | HEIGHT: 66 IN | TEMPERATURE: 98 F | BODY MASS INDEX: 32.95 KG/M2

## 2021-11-11 DIAGNOSIS — C20 RECTAL CANCER: Primary | ICD-10-CM

## 2021-11-11 DIAGNOSIS — C7A.026 MALIGNANT CARCINOID TUMOR OF RECTUM: ICD-10-CM

## 2021-11-11 DIAGNOSIS — C20 RECTAL CANCER: ICD-10-CM

## 2021-11-11 DIAGNOSIS — C78.7 LIVER METASTASES: ICD-10-CM

## 2021-11-11 LAB
ASCENDING AORTA: 3.06 CM
AV INDEX (PROSTH): 0.74
AV MEAN GRADIENT: 6 MMHG
AV PEAK GRADIENT: 9 MMHG
AV VALVE AREA: 2.41 CM2
AV VELOCITY RATIO: 0.81
BSA FOR ECHO PROCEDURE: 2.08 M2
CV ECHO LV RWT: 0.46 CM
DOP CALC AO PEAK VEL: 1.54 M/S
DOP CALC AO VTI: 33.3 CM
DOP CALC LVOT AREA: 3.3 CM2
DOP CALC LVOT DIAMETER: 2.04 CM
DOP CALC LVOT PEAK VEL: 1.25 M/S
DOP CALC LVOT STROKE VOLUME: 80.17 CM3
DOP CALCLVOT PEAK VEL VTI: 24.54 CM
E WAVE DECELERATION TIME: 229.38 MSEC
E/A RATIO: 0.83
E/E' RATIO: 12 M/S
ECHO LV POSTERIOR WALL: 0.96 CM (ref 0.6–1.1)
EJECTION FRACTION: 65 %
FRACTIONAL SHORTENING: 28 % (ref 28–44)
INTERVENTRICULAR SEPTUM: 0.96 CM (ref 0.6–1.1)
LA MAJOR: 5.02 CM
LA MINOR: 5.43 CM
LA WIDTH: 3.51 CM
LEFT ATRIUM SIZE: 3.22 CM
LEFT ATRIUM VOLUME INDEX MOD: 31 ML/M2
LEFT ATRIUM VOLUME INDEX: 24.8 ML/M2
LEFT ATRIUM VOLUME MOD: 62.55 CM3
LEFT ATRIUM VOLUME: 50.12 CM3
LEFT INTERNAL DIMENSION IN SYSTOLE: 2.99 CM (ref 2.1–4)
LEFT VENTRICLE DIASTOLIC VOLUME INDEX: 37.97 ML/M2
LEFT VENTRICLE DIASTOLIC VOLUME: 76.7 ML
LEFT VENTRICLE MASS INDEX: 63 G/M2
LEFT VENTRICLE SYSTOLIC VOLUME INDEX: 17.2 ML/M2
LEFT VENTRICLE SYSTOLIC VOLUME: 34.73 ML
LEFT VENTRICULAR INTERNAL DIMENSION IN DIASTOLE: 4.16 CM (ref 3.5–6)
LEFT VENTRICULAR MASS: 127.7 G
LV LATERAL E/E' RATIO: 9.33 M/S
LV SEPTAL E/E' RATIO: 16.8 M/S
MV A" WAVE DURATION": 11.8 MSEC
MV PEAK A VEL: 1.01 M/S
MV PEAK E VEL: 0.84 M/S
MV STENOSIS PRESSURE HALF TIME: 66.52 MS
MV VALVE AREA P 1/2 METHOD: 3.31 CM2
PISA TR MAX VEL: 2.46 M/S
PULM VEIN S/D RATIO: 1.56
PV PEAK D VEL: 0.36 M/S
PV PEAK S VEL: 0.56 M/S
QEF: 63 %
RA MAJOR: 3.42 CM
RA PRESSURE: 3 MMHG
RA WIDTH: 3.73 CM
RIGHT VENTRICULAR END-DIASTOLIC DIMENSION: 3.65 CM
SINUS: 3.19 CM
STJ: 2.99 CM
TDI LATERAL: 0.09 M/S
TDI SEPTAL: 0.05 M/S
TDI: 0.07 M/S
TR MAX PG: 24 MMHG
TRICUSPID ANNULAR PLANE SYSTOLIC EXCURSION: 1.4 CM
TV REST PULMONARY ARTERY PRESSURE: 27 MMHG

## 2021-11-11 PROCEDURE — 74183 MRI ABDOMEN W WO CONTRAST: ICD-10-PCS | Mod: 26,,, | Performed by: RADIOLOGY

## 2021-11-11 PROCEDURE — 93356 MYOCRD STRAIN IMG SPCKL TRCK: CPT | Mod: ,,, | Performed by: INTERNAL MEDICINE

## 2021-11-11 PROCEDURE — 74183 MRI ABD W/O CNTR FLWD CNTR: CPT | Mod: 26,,, | Performed by: RADIOLOGY

## 2021-11-11 PROCEDURE — 99204 OFFICE O/P NEW MOD 45 MIN: CPT | Mod: S$GLB,,, | Performed by: SURGERY

## 2021-11-11 PROCEDURE — 93306 TTE W/DOPPLER COMPLETE: CPT

## 2021-11-11 PROCEDURE — 93306 TTE W/DOPPLER COMPLETE: CPT | Mod: 26,,, | Performed by: INTERNAL MEDICINE

## 2021-11-11 PROCEDURE — 93306 ECHO (CUPID ONLY): ICD-10-PCS | Mod: 26,,, | Performed by: INTERNAL MEDICINE

## 2021-11-11 PROCEDURE — 99204 PR OFFICE/OUTPT VISIT, NEW, LEVL IV, 45-59 MIN: ICD-10-PCS | Mod: S$GLB,,, | Performed by: SURGERY

## 2021-11-11 PROCEDURE — 99999 PR PBB SHADOW E&M-EST. PATIENT-LVL IV: ICD-10-PCS | Mod: PBBFAC,,, | Performed by: SURGERY

## 2021-11-11 PROCEDURE — 25500020 PHARM REV CODE 255: Performed by: SURGERY

## 2021-11-11 PROCEDURE — 99999 PR PBB SHADOW E&M-EST. PATIENT-LVL IV: CPT | Mod: PBBFAC,,, | Performed by: SURGERY

## 2021-11-11 PROCEDURE — 93356 ECHO (CUPID ONLY): ICD-10-PCS | Mod: ,,, | Performed by: INTERNAL MEDICINE

## 2021-11-11 PROCEDURE — A9585 GADOBUTROL INJECTION: HCPCS | Performed by: SURGERY

## 2021-11-11 PROCEDURE — 74183 MRI ABD W/O CNTR FLWD CNTR: CPT | Mod: TC

## 2021-11-11 RX ORDER — GADOBUTROL 604.72 MG/ML
10 INJECTION INTRAVENOUS
Status: COMPLETED | OUTPATIENT
Start: 2021-11-11 | End: 2021-11-11

## 2021-11-11 RX ADMIN — GADOBUTROL 10 ML: 604.72 INJECTION INTRAVENOUS at 08:11

## 2021-11-12 ENCOUNTER — TELEPHONE (OUTPATIENT)
Dept: ENDOSCOPY | Facility: HOSPITAL | Age: 66
End: 2021-11-12
Payer: COMMERCIAL

## 2021-11-16 ENCOUNTER — TELEPHONE (OUTPATIENT)
Dept: ENDOSCOPY | Facility: HOSPITAL | Age: 66
End: 2021-11-16
Payer: COMMERCIAL

## 2021-11-16 DIAGNOSIS — R93.89 ABNORMAL FINDING ON IMAGING: Primary | ICD-10-CM

## 2021-11-17 ENCOUNTER — TELEPHONE (OUTPATIENT)
Dept: ENDOSCOPY | Facility: HOSPITAL | Age: 66
End: 2021-11-17
Payer: COMMERCIAL

## 2021-11-17 ENCOUNTER — TELEPHONE (OUTPATIENT)
Dept: SURGERY | Facility: CLINIC | Age: 66
End: 2021-11-17
Payer: COMMERCIAL

## 2021-11-22 ENCOUNTER — TELEPHONE (OUTPATIENT)
Dept: SURGERY | Facility: CLINIC | Age: 66
End: 2021-11-22
Payer: COMMERCIAL

## 2021-11-22 ENCOUNTER — TUMOR BOARD CONFERENCE (OUTPATIENT)
Dept: SURGERY | Facility: CLINIC | Age: 66
End: 2021-11-22
Payer: COMMERCIAL

## 2021-11-29 ENCOUNTER — TELEPHONE (OUTPATIENT)
Dept: SURGERY | Facility: CLINIC | Age: 66
End: 2021-11-29
Payer: COMMERCIAL

## 2021-12-01 ENCOUNTER — TELEPHONE (OUTPATIENT)
Dept: GASTROENTEROLOGY | Facility: CLINIC | Age: 66
End: 2021-12-01
Payer: COMMERCIAL

## 2021-12-02 ENCOUNTER — OFFICE VISIT (OUTPATIENT)
Dept: GASTROENTEROLOGY | Facility: CLINIC | Age: 66
End: 2021-12-02
Payer: COMMERCIAL

## 2021-12-02 ENCOUNTER — OFFICE VISIT (OUTPATIENT)
Dept: SURGERY | Facility: CLINIC | Age: 66
End: 2021-12-02
Payer: COMMERCIAL

## 2021-12-02 VITALS
HEIGHT: 66 IN | DIASTOLIC BLOOD PRESSURE: 75 MMHG | WEIGHT: 201 LBS | BODY MASS INDEX: 32.3 KG/M2 | HEART RATE: 61 BPM | SYSTOLIC BLOOD PRESSURE: 125 MMHG

## 2021-12-02 VITALS
SYSTOLIC BLOOD PRESSURE: 120 MMHG | WEIGHT: 201.06 LBS | BODY MASS INDEX: 32.31 KG/M2 | HEIGHT: 66 IN | HEART RATE: 70 BPM | DIASTOLIC BLOOD PRESSURE: 66 MMHG

## 2021-12-02 DIAGNOSIS — R19.7 DIARRHEA, UNSPECIFIED TYPE: Primary | ICD-10-CM

## 2021-12-02 DIAGNOSIS — R19.8 LOW ANTERIOR RESECTION SYNDROME: Primary | ICD-10-CM

## 2021-12-02 DIAGNOSIS — C20 RECTAL CANCER: ICD-10-CM

## 2021-12-02 PROCEDURE — 99205 PR OFFICE/OUTPT VISIT, NEW, LEVL V, 60-74 MIN: ICD-10-PCS | Mod: S$GLB,,, | Performed by: SURGERY

## 2021-12-02 PROCEDURE — 99205 OFFICE O/P NEW HI 60 MIN: CPT | Mod: S$GLB,,, | Performed by: SURGERY

## 2021-12-02 PROCEDURE — 99999 PR PBB SHADOW E&M-EST. PATIENT-LVL III: CPT | Mod: PBBFAC,,, | Performed by: STUDENT IN AN ORGANIZED HEALTH CARE EDUCATION/TRAINING PROGRAM

## 2021-12-02 PROCEDURE — 99204 PR OFFICE/OUTPT VISIT, NEW, LEVL IV, 45-59 MIN: ICD-10-PCS | Mod: S$GLB,,, | Performed by: STUDENT IN AN ORGANIZED HEALTH CARE EDUCATION/TRAINING PROGRAM

## 2021-12-02 PROCEDURE — 99999 PR PBB SHADOW E&M-EST. PATIENT-LVL III: ICD-10-PCS | Mod: PBBFAC,,, | Performed by: SURGERY

## 2021-12-02 PROCEDURE — 99999 PR PBB SHADOW E&M-EST. PATIENT-LVL III: CPT | Mod: PBBFAC,,, | Performed by: SURGERY

## 2021-12-02 PROCEDURE — 99999 PR PBB SHADOW E&M-EST. PATIENT-LVL III: ICD-10-PCS | Mod: PBBFAC,,, | Performed by: STUDENT IN AN ORGANIZED HEALTH CARE EDUCATION/TRAINING PROGRAM

## 2021-12-02 PROCEDURE — 99204 OFFICE O/P NEW MOD 45 MIN: CPT | Mod: S$GLB,,, | Performed by: STUDENT IN AN ORGANIZED HEALTH CARE EDUCATION/TRAINING PROGRAM

## 2021-12-02 RX ORDER — AMOXICILLIN AND CLAVULANATE POTASSIUM 875; 125 MG/1; MG/1
1 TABLET, FILM COATED ORAL 2 TIMES DAILY
Qty: 20 TABLET | Refills: 0 | Status: SHIPPED | OUTPATIENT
Start: 2021-12-02 | End: 2021-12-26

## 2021-12-10 ENCOUNTER — TELEPHONE (OUTPATIENT)
Dept: ENDOSCOPY | Facility: HOSPITAL | Age: 66
End: 2021-12-10
Payer: COMMERCIAL

## 2021-12-13 ENCOUNTER — CLINICAL SUPPORT (OUTPATIENT)
Dept: REHABILITATION | Facility: HOSPITAL | Age: 66
End: 2021-12-13
Attending: SURGERY
Payer: COMMERCIAL

## 2021-12-13 DIAGNOSIS — R19.8 LOW ANTERIOR RESECTION SYNDROME: ICD-10-CM

## 2021-12-13 DIAGNOSIS — R15.9 INCONTINENCE OF FECES WITH FECAL URGENCY: ICD-10-CM

## 2021-12-13 DIAGNOSIS — M62.81 MUSCLE WEAKNESS: ICD-10-CM

## 2021-12-13 DIAGNOSIS — R15.2 INCONTINENCE OF FECES WITH FECAL URGENCY: ICD-10-CM

## 2021-12-13 DIAGNOSIS — M62.89 PELVIC FLOOR DYSFUNCTION: ICD-10-CM

## 2021-12-13 PROCEDURE — 97162 PT EVAL MOD COMPLEX 30 MIN: CPT | Mod: PN

## 2021-12-13 PROCEDURE — 97530 THERAPEUTIC ACTIVITIES: CPT | Mod: PN

## 2021-12-16 ENCOUNTER — ANESTHESIA EVENT (OUTPATIENT)
Dept: ENDOSCOPY | Facility: HOSPITAL | Age: 66
End: 2021-12-16
Payer: COMMERCIAL

## 2021-12-17 ENCOUNTER — ANESTHESIA (OUTPATIENT)
Dept: ENDOSCOPY | Facility: HOSPITAL | Age: 66
End: 2021-12-17
Payer: COMMERCIAL

## 2021-12-17 ENCOUNTER — HOSPITAL ENCOUNTER (OUTPATIENT)
Facility: HOSPITAL | Age: 66
Discharge: HOME | End: 2021-12-17
Attending: INTERNAL MEDICINE | Admitting: INTERNAL MEDICINE
Payer: COMMERCIAL

## 2021-12-17 VITALS
DIASTOLIC BLOOD PRESSURE: 62 MMHG | HEART RATE: 77 BPM | SYSTOLIC BLOOD PRESSURE: 127 MMHG | OXYGEN SATURATION: 97 % | RESPIRATION RATE: 19 BRPM

## 2021-12-17 DIAGNOSIS — C7A.012 MALIGNANT CARCINOID TUMOR OF ILEUM: Primary | ICD-10-CM

## 2021-12-17 LAB
GLUCOSE SERPL-MCNC: 93 MG/DL (ref 70–110)
POCT GLUCOSE: 119 MG/DL (ref 70–110)
POCT GLUCOSE: 93 MG/DL (ref 70–110)

## 2021-12-17 PROCEDURE — D9220A PRA ANESTHESIA: ICD-10-PCS | Mod: ANES,,, | Performed by: STUDENT IN AN ORGANIZED HEALTH CARE EDUCATION/TRAINING PROGRAM

## 2021-12-17 PROCEDURE — 88173 PR  INTERPRETATION OF FNA SMEAR: ICD-10-PCS | Mod: 26,,, | Performed by: PATHOLOGY

## 2021-12-17 PROCEDURE — 63600175 PHARM REV CODE 636 W HCPCS: Performed by: NURSE ANESTHETIST, CERTIFIED REGISTERED

## 2021-12-17 PROCEDURE — 27202059 HC NEEDLE, FNA (ANY): Performed by: INTERNAL MEDICINE

## 2021-12-17 PROCEDURE — 43259 EGD US EXAM DUODENUM/JEJUNUM: CPT | Mod: ,,, | Performed by: INTERNAL MEDICINE

## 2021-12-17 PROCEDURE — 25000003 PHARM REV CODE 250: Performed by: INTERNAL MEDICINE

## 2021-12-17 PROCEDURE — 88342 IMHCHEM/IMCYTCHM 1ST ANTB: CPT | Mod: 59 | Performed by: PATHOLOGY

## 2021-12-17 PROCEDURE — 43259 PR ENDOSCOPIC ULTRASOUND EXAM: ICD-10-PCS | Mod: ,,, | Performed by: INTERNAL MEDICINE

## 2021-12-17 PROCEDURE — D9220A PRA ANESTHESIA: Mod: ANES,,, | Performed by: STUDENT IN AN ORGANIZED HEALTH CARE EDUCATION/TRAINING PROGRAM

## 2021-12-17 PROCEDURE — 88341 IMHCHEM/IMCYTCHM EA ADD ANTB: CPT | Mod: 59 | Performed by: PATHOLOGY

## 2021-12-17 PROCEDURE — 88173 CYTOPATH EVAL FNA REPORT: CPT | Mod: 26,,, | Performed by: PATHOLOGY

## 2021-12-17 PROCEDURE — 88360 PR  TUMOR IMMUNOHISTOCHEM/MANUAL: ICD-10-PCS | Mod: 26,59,, | Performed by: PATHOLOGY

## 2021-12-17 PROCEDURE — D9220A PRA ANESTHESIA: Mod: CRNA,,, | Performed by: NURSE ANESTHETIST, CERTIFIED REGISTERED

## 2021-12-17 PROCEDURE — 88172 CYTP DX EVAL FNA 1ST EA SITE: CPT | Mod: 26,,, | Performed by: PATHOLOGY

## 2021-12-17 PROCEDURE — 43259 EGD US EXAM DUODENUM/JEJUNUM: CPT | Performed by: INTERNAL MEDICINE

## 2021-12-17 PROCEDURE — 88341 PR IHC OR ICC EACH ADD'L SINGLE ANTIBODY  STAINPR: ICD-10-PCS | Mod: 26,,, | Performed by: PATHOLOGY

## 2021-12-17 PROCEDURE — 25000003 PHARM REV CODE 250: Performed by: NURSE ANESTHETIST, CERTIFIED REGISTERED

## 2021-12-17 PROCEDURE — 88360 TUMOR IMMUNOHISTOCHEM/MANUAL: CPT | Performed by: PATHOLOGY

## 2021-12-17 PROCEDURE — 37000009 HC ANESTHESIA EA ADD 15 MINS: Performed by: INTERNAL MEDICINE

## 2021-12-17 PROCEDURE — 88172 PR  EVALUATION OF FNA SMEAR TO DETERMINE ADEQUACY, FIRST EVAL: ICD-10-PCS | Mod: 26,,, | Performed by: PATHOLOGY

## 2021-12-17 PROCEDURE — 88342 IMHCHEM/IMCYTCHM 1ST ANTB: CPT | Mod: 26,59,, | Performed by: PATHOLOGY

## 2021-12-17 PROCEDURE — 88177 CYTP FNA EVAL EA ADDL: CPT | Mod: 59 | Performed by: PATHOLOGY

## 2021-12-17 PROCEDURE — 82962 GLUCOSE BLOOD TEST: CPT | Performed by: INTERNAL MEDICINE

## 2021-12-17 PROCEDURE — 88305 TISSUE EXAM BY PATHOLOGIST: ICD-10-PCS | Mod: 26,,, | Performed by: PATHOLOGY

## 2021-12-17 PROCEDURE — 88305 TISSUE EXAM BY PATHOLOGIST: CPT | Performed by: PATHOLOGY

## 2021-12-17 PROCEDURE — 88341 IMHCHEM/IMCYTCHM EA ADD ANTB: CPT | Mod: 26,,, | Performed by: PATHOLOGY

## 2021-12-17 PROCEDURE — 37000008 HC ANESTHESIA 1ST 15 MINUTES: Performed by: INTERNAL MEDICINE

## 2021-12-17 PROCEDURE — 88172 CYTP DX EVAL FNA 1ST EA SITE: CPT | Performed by: PATHOLOGY

## 2021-12-17 PROCEDURE — 88342 CHG IMMUNOCYTOCHEMISTRY: ICD-10-PCS | Mod: 26,59,, | Performed by: PATHOLOGY

## 2021-12-17 PROCEDURE — 88360 TUMOR IMMUNOHISTOCHEM/MANUAL: CPT | Mod: 26,59,, | Performed by: PATHOLOGY

## 2021-12-17 PROCEDURE — 88173 CYTOPATH EVAL FNA REPORT: CPT | Performed by: PATHOLOGY

## 2021-12-17 PROCEDURE — D9220A PRA ANESTHESIA: ICD-10-PCS | Mod: CRNA,,, | Performed by: NURSE ANESTHETIST, CERTIFIED REGISTERED

## 2021-12-17 PROCEDURE — 63600175 PHARM REV CODE 636 W HCPCS: Mod: JA | Performed by: INTERNAL MEDICINE

## 2021-12-17 PROCEDURE — 88305 TISSUE EXAM BY PATHOLOGIST: CPT | Mod: 26,,, | Performed by: PATHOLOGY

## 2021-12-17 RX ORDER — LIDOCAINE HYDROCHLORIDE 20 MG/ML
INJECTION INTRAVENOUS
Status: DISCONTINUED | OUTPATIENT
Start: 2021-12-17 | End: 2021-12-17

## 2021-12-17 RX ORDER — FENTANYL CITRATE 50 UG/ML
25 INJECTION, SOLUTION INTRAMUSCULAR; INTRAVENOUS EVERY 5 MIN PRN
Status: DISCONTINUED | OUTPATIENT
Start: 2021-12-17 | End: 2021-12-17 | Stop reason: HOSPADM

## 2021-12-17 RX ORDER — PROPOFOL 10 MG/ML
VIAL (ML) INTRAVENOUS
Status: DISCONTINUED | OUTPATIENT
Start: 2021-12-17 | End: 2021-12-17

## 2021-12-17 RX ORDER — OXYCODONE HYDROCHLORIDE 5 MG/1
5 TABLET ORAL
Status: DISCONTINUED | OUTPATIENT
Start: 2021-12-17 | End: 2021-12-17 | Stop reason: HOSPADM

## 2021-12-17 RX ORDER — FENTANYL CITRATE 50 UG/ML
INJECTION, SOLUTION INTRAMUSCULAR; INTRAVENOUS
Status: DISCONTINUED | OUTPATIENT
Start: 2021-12-17 | End: 2021-12-17

## 2021-12-17 RX ORDER — HYDROMORPHONE HYDROCHLORIDE 1 MG/ML
0.2 INJECTION, SOLUTION INTRAMUSCULAR; INTRAVENOUS; SUBCUTANEOUS EVERY 5 MIN PRN
Status: DISCONTINUED | OUTPATIENT
Start: 2021-12-17 | End: 2021-12-17 | Stop reason: HOSPADM

## 2021-12-17 RX ORDER — SODIUM CHLORIDE 9 MG/ML
INJECTION, SOLUTION INTRAVENOUS CONTINUOUS
Status: DISCONTINUED | OUTPATIENT
Start: 2021-12-17 | End: 2021-12-17 | Stop reason: HOSPADM

## 2021-12-17 RX ORDER — PROPOFOL 10 MG/ML
VIAL (ML) INTRAVENOUS CONTINUOUS PRN
Status: DISCONTINUED | OUTPATIENT
Start: 2021-12-17 | End: 2021-12-17

## 2021-12-17 RX ORDER — ONDANSETRON 2 MG/ML
4 INJECTION INTRAMUSCULAR; INTRAVENOUS DAILY PRN
Status: DISCONTINUED | OUTPATIENT
Start: 2021-12-17 | End: 2021-12-17 | Stop reason: HOSPADM

## 2021-12-17 RX ORDER — ONDANSETRON 2 MG/ML
8 INJECTION INTRAMUSCULAR; INTRAVENOUS ONCE
Status: COMPLETED | OUTPATIENT
Start: 2021-12-17 | End: 2021-12-17

## 2021-12-17 RX ADMIN — SODIUM CHLORIDE: 0.9 INJECTION, SOLUTION INTRAVENOUS at 07:12

## 2021-12-17 RX ADMIN — Medication 50 MG: at 09:12

## 2021-12-17 RX ADMIN — OCTREOTIDE ACETATE 50 MCG/HR: 500 INJECTION, SOLUTION INTRAVENOUS; SUBCUTANEOUS at 08:12

## 2021-12-17 RX ADMIN — GLYCOPYRROLATE 0.2 MG: 0.2 INJECTION, SOLUTION INTRAMUSCULAR; INTRAVITREAL at 09:12

## 2021-12-17 RX ADMIN — PROPOFOL 150 MCG/KG/MIN: 10 INJECTION, EMULSION INTRAVENOUS at 09:12

## 2021-12-17 RX ADMIN — FENTANYL CITRATE 25 MCG: 50 INJECTION INTRAMUSCULAR; INTRAVENOUS at 09:12

## 2021-12-17 RX ADMIN — ONDANSETRON 8 MG: 2 INJECTION INTRAMUSCULAR; INTRAVENOUS at 08:12

## 2021-12-17 RX ADMIN — LIDOCAINE HYDROCHLORIDE 100 MG: 20 INJECTION, SOLUTION INTRAVENOUS at 09:12

## 2021-12-21 ENCOUNTER — TELEPHONE (OUTPATIENT)
Dept: SURGERY | Facility: CLINIC | Age: 66
End: 2021-12-21
Payer: COMMERCIAL

## 2021-12-23 ENCOUNTER — OFFICE VISIT (OUTPATIENT)
Dept: HEMATOLOGY/ONCOLOGY | Facility: CLINIC | Age: 66
End: 2021-12-23
Payer: COMMERCIAL

## 2021-12-23 ENCOUNTER — TELEPHONE (OUTPATIENT)
Dept: SURGERY | Facility: CLINIC | Age: 66
End: 2021-12-23
Payer: COMMERCIAL

## 2021-12-23 ENCOUNTER — OFFICE VISIT (OUTPATIENT)
Dept: SURGERY | Facility: CLINIC | Age: 66
End: 2021-12-23
Payer: COMMERCIAL

## 2021-12-23 VITALS
TEMPERATURE: 97 F | WEIGHT: 200.31 LBS | SYSTOLIC BLOOD PRESSURE: 128 MMHG | DIASTOLIC BLOOD PRESSURE: 72 MMHG | BODY MASS INDEX: 32.33 KG/M2 | HEART RATE: 72 BPM

## 2021-12-23 VITALS
HEIGHT: 66 IN | HEART RATE: 72 BPM | DIASTOLIC BLOOD PRESSURE: 72 MMHG | TEMPERATURE: 97 F | BODY MASS INDEX: 32.14 KG/M2 | SYSTOLIC BLOOD PRESSURE: 128 MMHG | WEIGHT: 200 LBS

## 2021-12-23 DIAGNOSIS — C7A.026 MALIGNANT CARCINOID TUMOR OF RECTUM: ICD-10-CM

## 2021-12-23 DIAGNOSIS — R19.8 LOW ANTERIOR RESECTION SYNDROME: ICD-10-CM

## 2021-12-23 DIAGNOSIS — C7A.012 MALIGNANT CARCINOID TUMOR OF ILEUM: ICD-10-CM

## 2021-12-23 DIAGNOSIS — I10 ESSENTIAL HYPERTENSION: ICD-10-CM

## 2021-12-23 DIAGNOSIS — C7B.8 NEUROENDOCRINE CARCINOMA METASTATIC TO MULTIPLE SITES: Primary | ICD-10-CM

## 2021-12-23 DIAGNOSIS — C7B.8 NEUROENDOCRINE CARCINOMA METASTATIC TO MULTIPLE SITES: ICD-10-CM

## 2021-12-23 DIAGNOSIS — C7A.8 NEUROENDOCRINE CARCINOMA METASTATIC TO MULTIPLE SITES: ICD-10-CM

## 2021-12-23 DIAGNOSIS — C7A.8 NEUROENDOCRINE CARCINOMA METASTATIC TO MULTIPLE SITES: Primary | ICD-10-CM

## 2021-12-23 PROCEDURE — 99999 PR PBB SHADOW E&M-EST. PATIENT-LVL III: CPT | Mod: PBBFAC,,, | Performed by: INTERNAL MEDICINE

## 2021-12-23 PROCEDURE — 99203 PR OFFICE/OUTPT VISIT, NEW, LEVL III, 30-44 MIN: ICD-10-PCS | Mod: S$GLB,,, | Performed by: NURSE PRACTITIONER

## 2021-12-23 PROCEDURE — 99203 OFFICE O/P NEW LOW 30 MIN: CPT | Mod: S$GLB,,, | Performed by: NURSE PRACTITIONER

## 2021-12-23 PROCEDURE — 99999 PR PBB SHADOW E&M-EST. PATIENT-LVL III: ICD-10-PCS | Mod: PBBFAC,,, | Performed by: INTERNAL MEDICINE

## 2021-12-23 PROCEDURE — 99999 PR PBB SHADOW E&M-EST. PATIENT-LVL III: CPT | Mod: PBBFAC,,, | Performed by: NURSE PRACTITIONER

## 2021-12-23 PROCEDURE — 99215 PR OFFICE/OUTPT VISIT, EST, LEVL V, 40-54 MIN: ICD-10-PCS | Mod: S$GLB,,, | Performed by: INTERNAL MEDICINE

## 2021-12-23 PROCEDURE — 99999 PR PBB SHADOW E&M-EST. PATIENT-LVL III: ICD-10-PCS | Mod: PBBFAC,,, | Performed by: NURSE PRACTITIONER

## 2021-12-23 PROCEDURE — 99215 OFFICE O/P EST HI 40 MIN: CPT | Mod: S$GLB,,, | Performed by: INTERNAL MEDICINE

## 2021-12-27 LAB
ADEQUACY: ABNORMAL
FINAL PATHOLOGIC DIAGNOSIS: ABNORMAL
Lab: ABNORMAL

## 2021-12-28 ENCOUNTER — TELEPHONE (OUTPATIENT)
Dept: SURGERY | Facility: CLINIC | Age: 66
End: 2021-12-28
Payer: COMMERCIAL

## 2022-01-06 ENCOUNTER — TELEPHONE (OUTPATIENT)
Dept: SURGERY | Facility: CLINIC | Age: 67
End: 2022-01-06
Payer: COMMERCIAL

## 2022-01-06 NOTE — TELEPHONE ENCOUNTER
----- Message from Swathi Darby MA sent at 1/6/2022 10:45 AM CST -----  Pt is asking if she can be scheduled for a virtual visit, she lives 3 hrs away and can not get anyone to bring her anytime soon. Pt# [phone]

## 2022-01-06 NOTE — TELEPHONE ENCOUNTER
Left voicemail with callback number in reference to appointment (appointment for ARM, cannot be virtual). Instructed To call back to discuss.

## 2022-01-07 ENCOUNTER — TELEPHONE (OUTPATIENT)
Dept: SURGERY | Facility: CLINIC | Age: 67
End: 2022-01-07
Payer: COMMERCIAL

## 2022-01-07 NOTE — TELEPHONE ENCOUNTER
Spoke with patient regarding rescheduling ARM. Appointment details confirmed verbally. Reminder slip placed in mail.

## 2022-01-18 ENCOUNTER — TELEPHONE (OUTPATIENT)
Dept: SURGERY | Facility: CLINIC | Age: 67
End: 2022-01-18

## 2022-01-18 ENCOUNTER — OFFICE VISIT (OUTPATIENT)
Dept: SURGERY | Facility: CLINIC | Age: 67
End: 2022-01-18
Payer: COMMERCIAL

## 2022-01-18 VITALS
SYSTOLIC BLOOD PRESSURE: 147 MMHG | DIASTOLIC BLOOD PRESSURE: 72 MMHG | BODY MASS INDEX: 33.38 KG/M2 | HEART RATE: 75 BPM | WEIGHT: 206.81 LBS

## 2022-01-18 DIAGNOSIS — R19.8 LOW ANTERIOR RESECTION SYNDROME: Primary | ICD-10-CM

## 2022-01-18 PROCEDURE — 99999 PR PBB SHADOW E&M-EST. PATIENT-LVL II: ICD-10-PCS | Mod: PBBFAC,,, | Performed by: NURSE PRACTITIONER

## 2022-01-18 PROCEDURE — 91122 PR ANAL PRESSURE RECORD: CPT | Mod: S$GLB,,, | Performed by: NURSE PRACTITIONER

## 2022-01-18 PROCEDURE — 91122 PR ANAL PRESSURE RECORD: ICD-10-PCS | Mod: S$GLB,,, | Performed by: NURSE PRACTITIONER

## 2022-01-18 PROCEDURE — 99999 PR PBB SHADOW E&M-EST. PATIENT-LVL II: CPT | Mod: PBBFAC,,, | Performed by: NURSE PRACTITIONER

## 2022-01-18 NOTE — TELEPHONE ENCOUNTER
Called patient. No answer. Left message audio visit scheduled 1/24 @ 8:00 am with Dr. Turner to discuss results of testing today. Requested a return call to confirm date and time of appointment.

## 2022-01-18 NOTE — PROGRESS NOTES
Procedure:  Anorectal manometry  Indication:Fecal Incontinence    Interpreting : Jacqueline Cole NP     Date:1/18/2022     Mean Resting Sphincter Pressure: 11  mmHg (normal: 40-70 mm Hg)  Maximum Sphincter Pressure: 74 mmHg (normal: 100-180 mm Hg)     RAIR:absent  First sensation: 20cc (normal: 10-30 cc)  Desire sensation: 30 cc (normal: 50-70cc)   Urgency sensation:  40  cc (normal: 80-100cc)      Balloon expulsion: Passed 60cc air filled balloon in less than 5 min     Summary:  Significantly decreased  resting pressure  Decreased squeeze pressure -- majority is with use of accessory muscles  20cc volume at 1st sensation  Passed balloon expulsion with no issues

## 2022-01-21 ENCOUNTER — TELEPHONE (OUTPATIENT)
Dept: SURGERY | Facility: CLINIC | Age: 67
End: 2022-01-21
Payer: COMMERCIAL

## 2022-01-24 ENCOUNTER — CLINICAL SUPPORT (OUTPATIENT)
Dept: REHABILITATION | Facility: HOSPITAL | Age: 67
End: 2022-01-24
Attending: SURGERY
Payer: COMMERCIAL

## 2022-01-24 PROCEDURE — 97140 MANUAL THERAPY 1/> REGIONS: CPT | Mod: PN

## 2022-01-24 NOTE — PROGRESS NOTES
Pelvic Health Physical Therapy   Treatment Note     Name: Coby Marshall  Clinic Number: [id number]    Therapy Diagnosis: No diagnosis found.  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 She's been doing the colon massage and she uses her trashcan to elevate her feet when she's going to the bathroom. She's taking Metamucil at least twice/day; states her stool is easy to pass, not too loose or too hard.    Her back does not hurt as much, the metamucil helps to decrease pain with BMs and makes her not have to go as much.    Bladder leakage has gotten better, but she was told the injections she's getting will cause her to have more frequent urination. Every once in a while she doesn't get to the bathroom in time but usually she does. Uses 2-3 pads/day - mainly for urinary leakage, every now and then bowel leakage.    Did have biopsy on her liver and pancreas, she does have some areas of cancer, going to get injections for 6 months and then they'll reassess.   She was compliant with home exercise program.  Response to previous treatment: tolerated well  Functional change: improving bowel function    Pain: 0/10  Location: N/A     Objective         Coby received the following manual therapy techniques: to develop extensibility for 60 minutes including: scar mobilization of abdominal wall        Home Exercises Provided and Patient Education Provided     Education provided:   - anatomy/physiology of pelvic floor and diaphragmatic breathing  Discussed progression of plan of care with  patient; educated pt in activity modification; reviewed HEP with pt. Pt demonstrated and verbalized understanding of all instruction and was provided with a handout of HEP (see Patient Instructions).      Written Home Exercises Provided: Patient instructed to cont prior HEP.  Exercises were reviewed and Coby was able to demonstrate them prior to the end of the session.  Coby demonstrated good  understanding of the education provided.     See EMR under Patient Instructions for exercises provided 1/24/2022.    Assessment     Session focused on scar tissue mobilization for abdominal wall; mild TTP noted at interior aspect of mid-line scar over bladder, tolerated tx without increased pain.   Coby Is progressing well towards her goals.   Pt prognosis is Good.     Pt will continue to benefit from skilled outpatient physical therapy to address the deficits listed in the problem list box on initial evaluation, provide pt/family education and to maximize pt's level of independence in the home and community environment.     Pt's spiritual, cultural and educational needs considered and pt agreeable to plan of care and goals.     Anticipated barriers to physical therapy: none    Goals:  Short Term Goals: 6 weeks   - Pt will demonstrate excellent knowledge and adherence to HEP to facilitate optimal recovery.  - Pt will demonstrate proper PFM contraction, relaxation, and lengthening coordinated with TA and breath for improved muscle coordination needed for functional activity.     Long Term Goals: 12 weeks   - Pt will demonstrate excellent knowledge and adherence to HEP for continued self-maintenance of symptoms.  - Pt will report ability to delay urinary and fecal urge for at least 15 minutes to maintain continence with ADL/IADLs.   - Pt will report little (drops) to no incidence of urinary or fecal incontinence 6/7 days for improved hygiene and ADL/IADL tolerance.   - Pt will report needing </= 2 pad/day indicating  improved PFM function needed to maintain continence  - Pt will demonstrate PFM strength of at least 3/5 MMT for improved strength needed to maintain continence.   - Pt will report bearing down appropriately 100% of the time for improved bowel function and decreased stress on adjacent pelvic structures.     Plan     Continue per POC; reassess PFM next session.     Lauren Stafford, PT

## 2023-01-12 ENCOUNTER — DOCUMENTATION ONLY (OUTPATIENT)
Dept: TRANSPLANT | Facility: CLINIC | Age: 68
End: 2023-01-12
Payer: MEDICARE

## 2023-01-12 NOTE — NURSING
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 REFERRAL from Dr Tres [facility].  Message to Dr Allen for review.

## 2023-01-19 ENCOUNTER — DOCUMENTATION ONLY (OUTPATIENT)
Dept: TRANSPLANT | Facility: CLINIC | Age: 68
End: 2023-01-19
Payer: MEDICARE

## 2023-01-19 NOTE — NURSING
"Referral received from Dr Olson's office for "possible liver directed treatment". Pt with hx of cancer and mets to liver. Hx of resection at [facility] and followed by Dr Thai Berger, Oncology. Spoke with the referring office. They stated the treatment the pt is on is not working and are seeking possible IR treatment. Message sent to Dr. Thai Berger re if referral would be more appropriate to his office for treatment.   Pt is not a liver txp candidate and does not need Hepatology.    "

## 2023-01-25 ENCOUNTER — TELEPHONE (OUTPATIENT)
Dept: TRANSPLANT | Facility: CLINIC | Age: 68
End: 2023-01-25
Payer: MEDICARE

## 2023-01-25 NOTE — TELEPHONE ENCOUNTER
"Patient: Coby Marshall       MRN: [id number]      : 1955     Age: 67 y.o.  [address] [facility] [place] [address]    Presenting Radiologists:     Providers  Hepatologists:   Surgeons: Cachorro Allen MD  Radiologists:   Advanced Practice providers:     Priority of review: Cancer    Patient Transplant Status: other. Question if Transplant candidate    Reason for presentation: Other   Referral received from Dr Olson's office for "possible liver directed treatment". Pt with hx of cancer and mets to liver. Hx of resection at [facility] and followed by Dr Thai Berger, Oncology. Spoke with the referring office. They stated the treatment the pt is on is not working and are seeking possible IR treatment.    Clinical Summary: DIAGNOSIS:  1. 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 Initially diagnosed as adenocarcinoma and later changed to carcinoid tumor at time of resection, diagnosed 2017  2. <metastasis to liver> on liver CT for hernia repair, Dx 2021     TREATMENT:  Well differentiated carcinoid tumor of the rectum and terminal ileum  1. Initially treated with concurrent chemoradiotherapy with infusional 5-FU under the diagnosis of adenocarcinoma, 2017  2. Resection of rectum and terminal ileum, 2018  3. Observation  4. <recurrence in liver> liver directed therapy at [facility], [facility] (EUS showed metastatic unresectable disease)  5. Lanreotide, 1/3/22 to present     INITIAL HISTORY:  HPI: Coby Marshall is a 66 y.o. female who presents to clinic for initial oncological evaluation for liver mets with know history of colorectal cancer. Patient has an interesting history. She was found to have some rectal bleeding while on vacation. Subsequent colonoscopy showed evidence of a rectal mass. A biopsy was performed that showed evidence of adenocarcinoma. Patient underwent neoadjuvant concurrent chemoradiotherapy " with infusional 5-FU. Subsequently, she proceeded to surgical resection at [facility]. Pathology on surgical resection showed a neuroendocrine tumor consistent with carcinoid tumor. There was an additional lesion found in the terminal ileum also consistent with carcinoid tumor. Both lesions were resected. Patient had some postoperative complications to include left lower quadrant abscess formation resulting in antibiotics and surgical debridement and surgical repair. She had been doing well until recently when she underwent surgical evaluation of abdominal hernias. CT scan of the abdomen and pelvis obtained for that evaluation showed evidence of hypodense lesions in the liver. Needle biopsy was performed that showed much necrosis in the biopsy specimen but there was some evidence of ribbonlike structures consistent with her previous diagnosis. She has been and remains asymptomatic from her liver metastasis.  [facility] note:    66 y.o. female with HTN, HLD and history of well differentiated NET of the rectum and ileum who presents for further management recommendations.  She was initially diagnosed with rectal adenocarcinoma in 2017, underwent chemoradiation and then LAR with simultaneous small bowel resection by Dr. Manjarrez.  Her pathology instead showed two separate well-differentiated NETs.  She was set to undergo ventral hernia repair and her imaging showed new liver lesions.  She was referred to Dr. Mykel Hair and copper dotatate scan showed a pancreatic mass and multifocal liver metastases.  Underwent EUS on 12/17 with findings of a pancretic body mass and liver masses. 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  current treatment by local oncologist not working    Imaging to be reviewed: Ct 1/4/23  CT 6/30/22 imported to [facility]    HCC Treatment History: see above    ABO: A POS    Platelets:   Lab Results   Component Value  Date/Time     (H) 03/08/2018 04:15 AM     Creatinine:   Lab Results   Component Value Date/Time    CREATININE 1.0 02/07/2019 08:12 AM     Bilirubin: No results found for: BILITOT, EXTBILITOTAL, EXTBILIRUBIN  AFP Last 3 each if available: No results found for: AFP, EXTAFP    MELD: Computed MELD-Na score unavailable. Necessary lab results were not found in the last year.  Computed MELD score unavailable. Necessary lab results were not found in the last year.    Plan:     Follow-up Provider: Heike

## 2023-01-27 ENCOUNTER — TELEPHONE (OUTPATIENT)
Dept: HEMATOLOGY/ONCOLOGY | Facility: CLINIC | Age: 68
End: 2023-01-27
Payer: MEDICARE

## 2023-01-27 NOTE — TELEPHONE ENCOUNTER
----- Message from Lennie Gray sent at 1/27/2023 11:20 AM CST -----  Regarding: Appt  Contact: Pt [phone]  [facility] called on behalf of patient to schedule appt for as soon as possible The are in Care Everywhere Please call to discuss further

## 2023-01-30 ENCOUNTER — TELEPHONE (OUTPATIENT)
Dept: HEMATOLOGY/ONCOLOGY | Facility: CLINIC | Age: 68
End: 2023-01-30
Payer: MEDICARE

## 2023-01-30 DIAGNOSIS — C7B.8 NEUROENDOCRINE CARCINOMA METASTATIC TO MULTIPLE SITES: Primary | ICD-10-CM

## 2023-01-30 DIAGNOSIS — C7A.8 NEUROENDOCRINE CARCINOMA METASTATIC TO MULTIPLE SITES: Primary | ICD-10-CM

## 2023-01-30 DIAGNOSIS — C7A.026 MALIGNANT CARCINOID TUMOR OF RECTUM: ICD-10-CM

## 2023-01-30 DIAGNOSIS — C7A.012 MALIGNANT CARCINOID TUMOR OF ILEUM: ICD-10-CM

## 2023-01-30 NOTE — TELEPHONE ENCOUNTER
----- Message from Charisse Wheeler PA-C sent at 1/30/2023 10:07 AM CST -----  Regarding: FW: Appt  Contact: Pt [phone]  Thank you. Will do    Hi team. Can we please get this patient on Dr. Smart's schedule as a new consult this Wednesday with lab work? I am placing the orders for her. Thank you  MRN:  [id number]    She will be a new NET patient. Dr Smart would like for her to be seen at [facility]. Thank you so much    Monique  ----- Message -----  From: Thai Berger MD  Sent: 1/29/2023   8:50 PM CST  To: Charisse Wheeler PA-C  Subject: RE: Appsaurabh                                         Thanks.  Let's have her see Berry in [place].  ----- Message -----  From: Charisse Wheeler PA-C  Sent: 1/27/2023  11:31 AM CST  To: Thai Berger MD  Subject: FW: Appt                                         Hi boss. You saw this patient about a year ago for pancreatic neuroendocrine tumor back in 12/23/2021. It appears that she is progressing.     Do you want to see her or should I have her see Dr. Smart in the NET clinic? Thoughts?     Monique  ----- Message -----  From: Lennie Isaac  Sent: 1/27/2023  11:24 AM CST  To: Ab Andre Staff  Subject: Appt                                             [facility] called on behalf of patient to schedule appt for as soon as possible The are in Care Everywhere Please call to discuss further

## 2023-01-30 NOTE — TELEPHONE ENCOUNTER
Called patient regarding referral from [facility] and next steps. Told patient Dr. Smart reviewed her case and would like to see her Wednesday if possible with labs before. Scheduled for labs at 9am and consult at 10am. Reviewed the location of the cancer center on [address] and provided my direct number. Encouraged patient to call if she needs to reschedule or has any questions. Verbalized understanding of all information

## 2023-01-31 ENCOUNTER — CONFERENCE (OUTPATIENT)
Dept: TRANSPLANT | Facility: CLINIC | Age: 68
End: 2023-01-31
Payer: MEDICARE

## 2023-02-01 ENCOUNTER — TELEPHONE (OUTPATIENT)
Dept: HEMATOLOGY/ONCOLOGY | Facility: CLINIC | Age: 68
End: 2023-02-01

## 2023-02-01 ENCOUNTER — LAB VISIT (OUTPATIENT)
Dept: LAB | Facility: HOSPITAL | Age: 68
End: 2023-02-01
Attending: INTERNAL MEDICINE
Payer: MEDICARE

## 2023-02-01 ENCOUNTER — OFFICE VISIT (OUTPATIENT)
Dept: HEMATOLOGY/ONCOLOGY | Facility: CLINIC | Age: 68
End: 2023-02-01
Payer: COMMERCIAL

## 2023-02-01 VITALS
DIASTOLIC BLOOD PRESSURE: 68 MMHG | OXYGEN SATURATION: 100 % | SYSTOLIC BLOOD PRESSURE: 130 MMHG | RESPIRATION RATE: 18 BRPM | HEART RATE: 60 BPM | TEMPERATURE: 98 F | HEIGHT: 66 IN | WEIGHT: 198.19 LBS | BODY MASS INDEX: 31.85 KG/M2

## 2023-02-01 DIAGNOSIS — C7A.8 NEUROENDOCRINE CARCINOMA METASTATIC TO MULTIPLE SITES: ICD-10-CM

## 2023-02-01 DIAGNOSIS — C78.7 SECONDARY MALIGNANT NEOPLASM OF LIVER: ICD-10-CM

## 2023-02-01 DIAGNOSIS — C7B.8 NEUROENDOCRINE CARCINOMA METASTATIC TO MULTIPLE SITES: ICD-10-CM

## 2023-02-01 DIAGNOSIS — C7A.026 MALIGNANT CARCINOID TUMOR OF RECTUM: ICD-10-CM

## 2023-02-01 DIAGNOSIS — C7A.012 MALIGNANT CARCINOID TUMOR OF ILEUM: ICD-10-CM

## 2023-02-01 DIAGNOSIS — D3A.8 NEUROENDOCRINE TUMOR: Primary | ICD-10-CM

## 2023-02-01 DIAGNOSIS — D3A.8 NEUROENDOCRINE TUMOR OF PANCREAS: Primary | ICD-10-CM

## 2023-02-01 DIAGNOSIS — R19.7 DIARRHEA, UNSPECIFIED TYPE: ICD-10-CM

## 2023-02-01 DIAGNOSIS — I10 HYPERTENSION, UNSPECIFIED TYPE: ICD-10-CM

## 2023-02-01 LAB
ALBUMIN SERPL BCP-MCNC: 4 G/DL (ref 3.5–5.2)
ALP SERPL-CCNC: 73 U/L (ref 55–135)
ALT SERPL W/O P-5'-P-CCNC: 16 U/L (ref 10–44)
ANION GAP SERPL CALC-SCNC: 10 MMOL/L (ref 8–16)
AST SERPL-CCNC: 19 U/L (ref 10–40)
BASOPHILS # BLD AUTO: 0.03 K/UL (ref 0–0.2)
BASOPHILS NFR BLD: 0.5 % (ref 0–1.9)
BILIRUB SERPL-MCNC: 0.8 MG/DL (ref 0.1–1)
BUN SERPL-MCNC: 16 MG/DL (ref 8–23)
CALCIUM SERPL-MCNC: 10.4 MG/DL (ref 8.7–10.5)
CHLORIDE SERPL-SCNC: 101 MMOL/L (ref 95–110)
CO2 SERPL-SCNC: 31 MMOL/L (ref 23–29)
CREAT SERPL-MCNC: 1.2 MG/DL (ref 0.5–1.4)
DIFFERENTIAL METHOD: ABNORMAL
EOSINOPHIL # BLD AUTO: 0.1 K/UL (ref 0–0.5)
EOSINOPHIL NFR BLD: 1.8 % (ref 0–8)
ERYTHROCYTE [DISTWIDTH] IN BLOOD BY AUTOMATED COUNT: 14.2 % (ref 11.5–14.5)
EST. GFR  (NO RACE VARIABLE): 49 ML/MIN/1.73 M^2
GLUCOSE SERPL-MCNC: 119 MG/DL (ref 70–110)
HCT VFR BLD AUTO: 36.5 % (ref 37–48.5)
HGB BLD-MCNC: 10.9 G/DL (ref 12–16)
IMM GRANULOCYTES # BLD AUTO: 0.01 K/UL (ref 0–0.04)
IMM GRANULOCYTES NFR BLD AUTO: 0.2 % (ref 0–0.5)
LYMPHOCYTES # BLD AUTO: 1.5 K/UL (ref 1–4.8)
LYMPHOCYTES NFR BLD: 24.5 % (ref 18–48)
MCH RBC QN AUTO: 26.6 PG (ref 27–31)
MCHC RBC AUTO-ENTMCNC: 29.9 G/DL (ref 32–36)
MCV RBC AUTO: 89 FL (ref 82–98)
MONOCYTES # BLD AUTO: 0.5 K/UL (ref 0.3–1)
MONOCYTES NFR BLD: 7.7 % (ref 4–15)
NEUTROPHILS # BLD AUTO: 4.1 K/UL (ref 1.8–7.7)
NEUTROPHILS NFR BLD: 65.3 % (ref 38–73)
NRBC BLD-RTO: 0 /100 WBC
PLATELET # BLD AUTO: 313 K/UL (ref 150–450)
PMV BLD AUTO: 9 FL (ref 9.2–12.9)
POTASSIUM SERPL-SCNC: 4.1 MMOL/L (ref 3.5–5.1)
PROT SERPL-MCNC: 8.1 G/DL (ref 6–8.4)
RBC # BLD AUTO: 4.1 M/UL (ref 4–5.4)
SODIUM SERPL-SCNC: 142 MMOL/L (ref 136–145)
WBC # BLD AUTO: 6.2 K/UL (ref 3.9–12.7)

## 2023-02-01 PROCEDURE — 99999 PR PBB SHADOW E&M-EST. PATIENT-LVL III: CPT | Mod: PBBFAC,,, | Performed by: INTERNAL MEDICINE

## 2023-02-01 PROCEDURE — 86316 IMMUNOASSAY TUMOR OTHER: CPT | Performed by: PHYSICIAN ASSISTANT

## 2023-02-01 PROCEDURE — 82542 COL CHROMOTOGRAPHY QUAL/QUAN: CPT | Performed by: PHYSICIAN ASSISTANT

## 2023-02-01 PROCEDURE — 99999 PR PBB SHADOW E&M-EST. PATIENT-LVL III: ICD-10-PCS | Mod: PBBFAC,,, | Performed by: INTERNAL MEDICINE

## 2023-02-01 PROCEDURE — 83519 RIA NONANTIBODY: CPT | Performed by: PHYSICIAN ASSISTANT

## 2023-02-01 PROCEDURE — 84260 ASSAY OF SEROTONIN: CPT | Performed by: PHYSICIAN ASSISTANT

## 2023-02-01 PROCEDURE — 99215 OFFICE O/P EST HI 40 MIN: CPT | Mod: S$GLB,,, | Performed by: INTERNAL MEDICINE

## 2023-02-01 PROCEDURE — 99215 PR OFFICE/OUTPT VISIT, EST, LEVL V, 40-54 MIN: ICD-10-PCS | Mod: S$GLB,,, | Performed by: INTERNAL MEDICINE

## 2023-02-01 PROCEDURE — 85025 COMPLETE CBC W/AUTO DIFF WBC: CPT | Performed by: PHYSICIAN ASSISTANT

## 2023-02-01 PROCEDURE — 80053 COMPREHEN METABOLIC PANEL: CPT | Performed by: PHYSICIAN ASSISTANT

## 2023-02-01 RX ORDER — BRIMONIDINE TARTRATE AND TIMOLOL MALEATE 2; 5 MG/ML; MG/ML
1 SOLUTION OPHTHALMIC 2 TIMES DAILY
COMMUNITY
Start: 2023-01-26

## 2023-02-01 NOTE — PROGRESS NOTES
CC: Neuroendocrine tumor    HPI:    Ms. 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 Her primary oncologist is Dr. Olson.     Her oncology history is detailed below. She was initially diagnosed with rectal adenocarcinoma and she was treated with neoadjuvant chemoRT. Surgical pathology resulted as well-differentiated carcinoid and she was initially observed. A CT obtained for hernia revealed liver metastases in July 2021, and these were confirmed to be neuroendocrine on pathology. She underwent EUS with biopsy in December 2021 which showed grade 3 NET in the pancreas with Ki67 25%. She was started on lanreotide. Her liver lesions have since grown in size, and her oncologist referred her for consideration of liver-directed therapy.     Ms. Marshall reports ongoing diarrhea since the time of her initial resection. She has about 6 bowel movements per day. She does not have any flushing, palpitations, or diaphoresis. She otherwise feels well.     Oncology History:   8/2017: Well-differentiated carcinoid tumor in the rectum, grade 2, and in the terminal ileum, grade 1. Initially diagnosed as adenocarcinoma and later changed to carcinoid tumor at time of resection  Treated with concurrent chemoRT with infusional 5-FU under the diagnosis of adenocarcinoma  1/2018: resection of rectum and terminal ileum. Pathology showed a ypT3N0 1.7 cm rectal NET, well differentiated, grade 2, Ki 67 was 16%. 1.5 cm T2Nx small bowel NET, well diff, Ki 67 was less than 3%.  7/22/2021: Metastasis to liver on CT for hernia repair.   11/10/2021: Copper PET:    In this patient with rectal and ileal neuroendocrine tumor status post low anterior resection/small bowel resection, there are multiple somatostatin receptor avid hepatic lesions which appear increased in size as compared to outside CT 07/19/2021.  There is also tracer avid focus  "in the pancreatic body suspicious for malignancy.  All these findings are new since prior Dotatate PET-CT.  2021: EUS with biopsies:  "1. PANCREAS MASS, ENDOSCOPIC ULTRASOUND-GUIDED FNA:   Well-differentiated neuroendocrine tumor, favor WHO grade 3 (G3).   Comment:  Ki-67 labeling index is approximately 25%.  Average mitotic rate is   9 per 2 mm^2.  Tumor cells are positive with synaptophysin and chromogranin,   supporting the diagnosis.   2. LIVER, LEFT LOWER LOBE, ENDOSCOPIC ULTRASOUND-GUIDED FNA:   Well-differentiated neuroendocrine tumor.   3. LIVER, RIGHT LOWER LOBE, ENDOSCOPIC ULTRASOUND-GUIDED FNA:   Well-differentiated neuroendocrine tumor, favor WHO grade 2 (G2).   Comment (2,3):  Tumor in parts 2 and 3 are histologically identical.  Only   part 3 contains enough cell block lesional material to render a meaningful   labeling index and mitotic count.  Ki-67 labeling index is approximately 15%.    Average mitotic rate is 3 per 2 mm^2.  Tumor cells are positive with   synaptophysin but negative for chromogranin.  The overall findings are still   consistent with metastatic well-differentiated neuroendocrine tumor."  1/3/2022: Lanreotide started  2023: repeat CT abd/pelvis: worsening hepatic metastases. Changed Lanreotide to Sandostatin    ECO. Presents with     Past Medical History:   Diagnosis Date    Asthma     Cancer     rectal cancer in remission    Carcinoid tumor, rectal, malignant 2017    Ki 67 -3-20%    Hyperlipidemia     Hypertension     Malignant carcinoid tumor of ileum 2017    Ki 67 < 3%    Rectal cancer 2019        Past Surgical History:   Procedure Laterality Date    COLON SURGERY      ENDOSCOPIC ULTRASOUND OF UPPER GASTROINTESTINAL TRACT N/A 2021    Procedure: ULTRASOUND, UPPER GI TRACT, ENDOSCOPIC;  Surgeon: Jeffery Clemons MD;  Location: [facility] ([address] [place] [address]);  Service: Endoscopy;  Laterality: N/A;  instructions emailed to pt-BB  fully vaccinated-BB   " 12/13 arrival time confirmed with pt-rb    HYSTERECTOMY      ILEOSTOMY CLOSURE  01/2018    Lower Anterior Resection  12/2017    LETITIA- Dr. Beck - [facility]    port a cath Right     R chest wall       Family History   Problem Relation Age of Onset    Cancer Sister         leukemia    Colon cancer Neg Hx     Esophageal cancer Neg Hx        Social History     Socioeconomic History    Marital status:    Tobacco Use    Smoking status: Never    Smokeless tobacco: Never   Substance and Sexual Activity    Alcohol use: No    Drug use: No       Review of Systems   Constitutional:  Negative for chills and fever.   HENT:  Negative for rhinorrhea and sore throat.    Eyes:  Negative for pain and redness.   Respiratory:  Negative for cough and shortness of breath.    Cardiovascular:  Negative for chest pain, palpitations and leg swelling.   Gastrointestinal:  Negative for abdominal pain, constipation, diarrhea, nausea and vomiting.   Endocrine: Negative for polydipsia and polyuria.   Genitourinary:  Negative for dysuria and hematuria.   Musculoskeletal:  Negative for back pain and neck pain.   Skin:  Negative for color change and rash.   Neurological:  Negative for syncope, light-headedness and headaches.   Hematological:  Negative for adenopathy. Does not bruise/bleed easily.   Psychiatric/Behavioral:  Negative for confusion. The patient is not nervous/anxious.      Objective:  Physical Exam  Constitutional:       General: She is not in acute distress.     Appearance: Normal appearance. She is not ill-appearing.   HENT:      Head: Normocephalic and atraumatic.      Right Ear: External ear normal.      Left Ear: External ear normal.      Nose: Nose normal.      Mouth/Throat:      Mouth: Mucous membranes are moist.   Eyes:      General: No scleral icterus.     Conjunctiva/sclera: Conjunctivae normal.   Cardiovascular:      Rate and Rhythm: Normal rate and regular rhythm.      Heart sounds: No murmur heard.  Pulmonary:       Effort: Pulmonary effort is normal. No respiratory distress.      Breath sounds: Normal breath sounds. No wheezing or rales.   Abdominal:      General: Abdomen is flat. Bowel sounds are normal. There is no distension.      Palpations: Abdomen is soft.      Tenderness: There is no abdominal tenderness.   Musculoskeletal:         General: No swelling or deformity. Normal range of motion.      Cervical back: Normal range of motion and neck supple.   Skin:     General: Skin is warm and dry.      Findings: No rash.   Neurological:      General: No focal deficit present.      Mental Status: She is alert and oriented to person, place, and time.   Psychiatric:         Mood and Affect: Mood normal.         Behavior: Behavior normal.       Labs:  Reviewed, ordered updated labs    Imaging Data:  Reviewed outside imaging reports.     GA 68 DOTATATE PET-CT 02/08/2019     FINDINGS:  Quality of the study: Adequate.     SUV measurements may not be directly comparable with those made on Ga-68 DOTATATE PET/CT.     In the head and neck, there is physiologic distribution in the pituitary, salivary, and thyroid glands, and there are no tracer avid lesions suspicious for malignancy.     In the chest, there are no tracer avid lesions suspicious for malignancy.     In the abdomen and pelvis, there is physiologic uptake in the pancreatic uncinate process and body, liver, spleen, adrenal glands and  tract.  Postoperative change of low anterior resection and small bowel resection.  There is focal uptake the pancreatic body without corresponding CT abnormality (axial fused image 152) with SUV max 38.  There are multiple hepatic hypodensities with tracer uptake, overall increased in size as compared to outside CT 07/19/2021.  Index lesions as follows:     Hepatic dome lesion measuring 4.8 x 6.7 cm (axial series 2, image 131) with SUV max 81.     Left hepatic lesion measuring 2.4 x 3.6 cm (axial series 2, image 139) with SUV max 52.     In the  bones, there are no tracer avid lesions suspicious for malignancy.     Additional findings: Aortic and coronary artery calcific atherosclerosis.     Impression:     In this patient with rectal and ileal neuroendocrine tumor status post low anterior resection/small bowel resection, there are multiple somatostatin receptor avid hepatic lesions which appear increased in size as compared to outside CT 07/19/2021.  There is also tracer avid focus in the pancreatic body suspicious for malignancy.  All these findings are new since prior Dotatate PET-CT.    MRI abd 11/11/2021:   COMPARISON:  PET-CT 11/10/2021 and 02/08/2019.  CT 08/17/2021.  MRI 02/07/2019.     FINDINGS:  Inferior Thorax: Unremarkable.     Liver: Numerous scattered enhancing hepatic lesions (at least 8) with corresponding restricted diffusion compatible with known neuroendocrine tumor metastatic disease.  Lesions correlate with the somatostatin receptor avid foci identified on prior PET-CT.  Largest lesion within the right hepatic lobe measures 5.0 x 4.1 cm (series 7, image 27).  Largest lesion within the left hepatic lobe measures 4.4 x 4.2 cm (series 7, image 34).  Hepatic and portal veins are patent.     Gallbladder: Unremarkable.     Bile Ducts: No dilatation.     Pancreas: Focus of restricted diffusion within the pancreatic body (series 8, image 64) corresponding with the focus of somatostatin receptor avidity identified on prior PET-CT.  No discrete measurable lesion identified on contrast phase.  Irregular distal pancreatic duct dilatation, new compared to 02/07/2019.     Spleen: Unremarkable.     Adrenals: Unremarkable.     Kidneys/Ureters: Few subcentimeter T2 hyperintense renal lesions likely representing cysts.  No enhancing solid renal mass.  No hydronephrosis.     GI Tract/Mesentery: No evidence of bowel obstruction or inflammation.     Peritoneal Space: No ascites.     Lymph nodes: Prominent mignon hepatic lymph node measuring 1.4 cm (series  11, image 46).  Nonenlarged retroperitoneal lymph nodes.     Abdominal wall: Postoperative changes of the ventral abdominal wall with midline surgical incision.     Vasculature: No aneurysm.     Bones: No significant abnormality.     Impression:     1. Numerous scattered enhancing hepatic lesions compatible with known neuroendocrine tumor metastatic disease.  2. Focal restricted diffusion within the pancreatic body corresponding with the focus of somatostatin receptor avidity identified on prior PET-CT.  No discrete measurable lesion identified however there is secondary findings including irregular distal pancreatic ductal dilatation.  3. Prominent mignon hepatic lymph node.  4. Additional findings as above.  Assessment and plan:    1. Neuroendocrine tumor of pancreas    2. Neuroendocrine carcinoma metastatic to multiple sites    3. Secondary malignant neoplasm of liver    4. Malignant carcinoid tumor of rectum    5. Malignant carcinoid tumor of ileum    6. Diarrhea, unspecified type    7. Hypertension, unspecified type        1-5.   Her primary is unclear- may be rectal vs pancreatic  WHO grade 3, Ki67 25%, with metastases to liver  - will restage with copper PET and MRI liver  - will discuss her case at tumor board after imaging is done  - depending on imaging results, will plan on TACE followed by temozolomide/capecitabine vs PRRT  - CBC, CMP, 5-HIAA, pancreastatin, and serotonin  - continue sandostatin    6.   - Continue prn imodium  - Will check serotonin    7.   - BP well managed    Will follow up with her next Friday after imaging and labs have resulted    Discussed with Dr. Palomino,   PGY-V  Hematology/Oncology Fellow    ATTESTATION:    I have personally seen, examined and talked to the patient. I have reviewed Dr Dill's note and agreed with the findings, assessment and plan.   Images reviewed at the liver conference. Disease has progressed in the liver. I have personally reviewed her CT  abdomen/pelvis. I am concerned about her pancreas NET with a Ki 67 of 25%, grade 3. Liver metastases are growing fast as well. She needs systemic treatment. Will get copper PET for full body restaging. Depending on how much disease she has outside of her liver, we may recc captem plus TACE, or PRRT. Get PET, review at tumor board next Thursday, see me next Friday. Send tumor for [facility] molecular profiling. Long discussion with patient and  re the diagnosis, natural history, prognosis and plan. They understand and agree with the plan.     I spent 70 minutes reviewing the chart, interpreting laboratory result and imaging data, coordinating patient's care, with at least 50% of the time on face-to-face counseling.       Ariel Smart MD  Hematology and Medical Oncology  [facility]    Route Chart for Scheduling    Med Onc Chart Routing      Follow up with physician 1 week. see Dr. Smart next friday   Follow up with ELLY    Infusion scheduling note    Injection scheduling note    Labs CBC and CMP   Lab interval:  sandostatin, 5-HIAA, pancreastatin   Imaging PET scan and MRI      Pharmacy appointment    Other referrals

## 2023-02-01 NOTE — TELEPHONE ENCOUNTER
Called patient and discussed PET scan appt options. Patient requested Tuesday at 1pm. Reviewed location of the [facility], patient verbalized understanding of all information

## 2023-02-02 LAB — CGA SERPL-MCNC: 27 NG/ML

## 2023-02-06 ENCOUNTER — TELEPHONE (OUTPATIENT)
Dept: HEMATOLOGY/ONCOLOGY | Facility: CLINIC | Age: 68
End: 2023-02-06
Payer: MEDICARE

## 2023-02-06 DIAGNOSIS — D3A.8 NEUROENDOCRINE TUMOR OF PANCREAS: Primary | ICD-10-CM

## 2023-02-06 LAB — SEROTONIN: <30 NG/ML

## 2023-02-07 ENCOUNTER — HOSPITAL ENCOUNTER (OUTPATIENT)
Dept: RADIOLOGY | Facility: HOSPITAL | Age: 68
Discharge: HOME | End: 2023-02-07
Attending: INTERNAL MEDICINE
Payer: COMMERCIAL

## 2023-02-07 DIAGNOSIS — D3A.8 NEUROENDOCRINE TUMOR: ICD-10-CM

## 2023-02-07 PROCEDURE — A9587 GALLIUM GA-68: HCPCS

## 2023-02-07 PROCEDURE — 78815 NM PET 68GA DOTATATE, VERTEX TO THIGH: ICD-10-PCS | Mod: 26,PS,, | Performed by: STUDENT IN AN ORGANIZED HEALTH CARE EDUCATION/TRAINING PROGRAM

## 2023-02-07 PROCEDURE — 78815 PET IMAGE W/CT SKULL-THIGH: CPT | Mod: TC

## 2023-02-07 PROCEDURE — 78815 PET IMAGE W/CT SKULL-THIGH: CPT | Mod: 26,PS,, | Performed by: STUDENT IN AN ORGANIZED HEALTH CARE EDUCATION/TRAINING PROGRAM

## 2023-02-09 ENCOUNTER — TUMOR BOARD CONFERENCE (OUTPATIENT)
Dept: NEUROLOGY | Facility: CLINIC | Age: 68
End: 2023-02-09
Payer: MEDICARE

## 2023-02-09 ENCOUNTER — TELEPHONE (OUTPATIENT)
Dept: HEMATOLOGY/ONCOLOGY | Facility: CLINIC | Age: 68
End: 2023-02-09
Payer: MEDICARE

## 2023-02-10 ENCOUNTER — OFFICE VISIT (OUTPATIENT)
Dept: HEMATOLOGY/ONCOLOGY | Facility: CLINIC | Age: 68
End: 2023-02-10
Payer: COMMERCIAL

## 2023-02-10 ENCOUNTER — LAB VISIT (OUTPATIENT)
Dept: LAB | Facility: HOSPITAL | Age: 68
End: 2023-02-10
Attending: STUDENT IN AN ORGANIZED HEALTH CARE EDUCATION/TRAINING PROGRAM
Payer: MEDICARE

## 2023-02-10 VITALS
BODY MASS INDEX: 31.66 KG/M2 | HEIGHT: 66 IN | DIASTOLIC BLOOD PRESSURE: 69 MMHG | SYSTOLIC BLOOD PRESSURE: 128 MMHG | RESPIRATION RATE: 18 BRPM | OXYGEN SATURATION: 100 % | TEMPERATURE: 98 F | HEART RATE: 60 BPM | WEIGHT: 197 LBS

## 2023-02-10 DIAGNOSIS — C7A.026 MALIGNANT CARCINOID TUMOR OF RECTUM: Primary | ICD-10-CM

## 2023-02-10 DIAGNOSIS — C7B.8 NEUROENDOCRINE CARCINOMA METASTATIC TO MULTIPLE SITES: ICD-10-CM

## 2023-02-10 DIAGNOSIS — C7A.8 NEUROENDOCRINE CARCINOMA METASTATIC TO MULTIPLE SITES: ICD-10-CM

## 2023-02-10 DIAGNOSIS — D84.81 IMMUNODEFICIENCY SECONDARY TO NEOPLASM: ICD-10-CM

## 2023-02-10 DIAGNOSIS — D49.9 IMMUNODEFICIENCY SECONDARY TO NEOPLASM: ICD-10-CM

## 2023-02-10 DIAGNOSIS — D3A.8 NEUROENDOCRINE TUMOR OF PANCREAS: ICD-10-CM

## 2023-02-10 DIAGNOSIS — I10 ESSENTIAL HYPERTENSION: ICD-10-CM

## 2023-02-10 DIAGNOSIS — C78.7 SECONDARY MALIGNANT NEOPLASM OF LIVER: ICD-10-CM

## 2023-02-10 LAB
ALBUMIN SERPL BCP-MCNC: 4 G/DL (ref 3.5–5.2)
ALP SERPL-CCNC: 78 U/L (ref 55–135)
ALT SERPL W/O P-5'-P-CCNC: 13 U/L (ref 10–44)
ANION GAP SERPL CALC-SCNC: 14 MMOL/L (ref 8–16)
AST SERPL-CCNC: 20 U/L (ref 10–40)
BASOPHILS # BLD AUTO: 0.04 K/UL (ref 0–0.2)
BASOPHILS NFR BLD: 0.6 % (ref 0–1.9)
BILIRUB SERPL-MCNC: 0.7 MG/DL (ref 0.1–1)
BUN SERPL-MCNC: 14 MG/DL (ref 8–23)
CALCIUM SERPL-MCNC: 10.3 MG/DL (ref 8.7–10.5)
CHLORIDE SERPL-SCNC: 102 MMOL/L (ref 95–110)
CO2 SERPL-SCNC: 27 MMOL/L (ref 23–29)
CREAT SERPL-MCNC: 1.2 MG/DL (ref 0.5–1.4)
DIFFERENTIAL METHOD: ABNORMAL
EOSINOPHIL # BLD AUTO: 0.1 K/UL (ref 0–0.5)
EOSINOPHIL NFR BLD: 2.1 % (ref 0–8)
ERYTHROCYTE [DISTWIDTH] IN BLOOD BY AUTOMATED COUNT: 14.2 % (ref 11.5–14.5)
EST. GFR  (NO RACE VARIABLE): 49 ML/MIN/1.73 M^2
GLUCOSE SERPL-MCNC: 100 MG/DL (ref 70–110)
HCT VFR BLD AUTO: 38 % (ref 37–48.5)
HGB BLD-MCNC: 11.2 G/DL (ref 12–16)
IMM GRANULOCYTES # BLD AUTO: 0.01 K/UL (ref 0–0.04)
IMM GRANULOCYTES NFR BLD AUTO: 0.2 % (ref 0–0.5)
LYMPHOCYTES # BLD AUTO: 1.4 K/UL (ref 1–4.8)
LYMPHOCYTES NFR BLD: 22.8 % (ref 18–48)
MCH RBC QN AUTO: 26.6 PG (ref 27–31)
MCHC RBC AUTO-ENTMCNC: 29.5 G/DL (ref 32–36)
MCV RBC AUTO: 90 FL (ref 82–98)
MONOCYTES # BLD AUTO: 0.5 K/UL (ref 0.3–1)
MONOCYTES NFR BLD: 8.3 % (ref 4–15)
NEUTROPHILS # BLD AUTO: 4.1 K/UL (ref 1.8–7.7)
NEUTROPHILS NFR BLD: 66 % (ref 38–73)
NRBC BLD-RTO: 0 /100 WBC
PLATELET # BLD AUTO: 301 K/UL (ref 150–450)
PMV BLD AUTO: 9.6 FL (ref 9.2–12.9)
POTASSIUM SERPL-SCNC: 3.7 MMOL/L (ref 3.5–5.1)
PROT SERPL-MCNC: 8 G/DL (ref 6–8.4)
RBC # BLD AUTO: 4.21 M/UL (ref 4–5.4)
SODIUM SERPL-SCNC: 143 MMOL/L (ref 136–145)
WBC # BLD AUTO: 6.27 K/UL (ref 3.9–12.7)

## 2023-02-10 PROCEDURE — 36415 COLL VENOUS BLD VENIPUNCTURE: CPT | Performed by: STUDENT IN AN ORGANIZED HEALTH CARE EDUCATION/TRAINING PROGRAM

## 2023-02-10 PROCEDURE — 82542 COL CHROMOTOGRAPHY QUAL/QUAN: CPT | Performed by: STUDENT IN AN ORGANIZED HEALTH CARE EDUCATION/TRAINING PROGRAM

## 2023-02-10 PROCEDURE — 84260 ASSAY OF SEROTONIN: CPT | Performed by: STUDENT IN AN ORGANIZED HEALTH CARE EDUCATION/TRAINING PROGRAM

## 2023-02-10 PROCEDURE — 83519 RIA NONANTIBODY: CPT | Performed by: STUDENT IN AN ORGANIZED HEALTH CARE EDUCATION/TRAINING PROGRAM

## 2023-02-10 PROCEDURE — 99999 PR PBB SHADOW E&M-EST. PATIENT-LVL IV: CPT | Mod: PBBFAC,,, | Performed by: INTERNAL MEDICINE

## 2023-02-10 PROCEDURE — 80053 COMPREHEN METABOLIC PANEL: CPT | Performed by: STUDENT IN AN ORGANIZED HEALTH CARE EDUCATION/TRAINING PROGRAM

## 2023-02-10 PROCEDURE — 99215 OFFICE O/P EST HI 40 MIN: CPT | Mod: S$GLB,,, | Performed by: INTERNAL MEDICINE

## 2023-02-10 PROCEDURE — 85025 COMPLETE CBC W/AUTO DIFF WBC: CPT | Performed by: STUDENT IN AN ORGANIZED HEALTH CARE EDUCATION/TRAINING PROGRAM

## 2023-02-10 PROCEDURE — 99215 PR OFFICE/OUTPT VISIT, EST, LEVL V, 40-54 MIN: ICD-10-PCS | Mod: S$GLB,,, | Performed by: INTERNAL MEDICINE

## 2023-02-10 PROCEDURE — 99999 PR PBB SHADOW E&M-EST. PATIENT-LVL IV: ICD-10-PCS | Mod: PBBFAC,,, | Performed by: INTERNAL MEDICINE

## 2023-02-10 NOTE — TELEPHONE ENCOUNTER
"Patient: Coby Marshall       MRN: [id number]      : 1955     Age: 67 y.o.  [address]  [place] [address]     Presenting Radiologists: Michael Cunha MD     Providers  Hepatologists:   Surgeons: Cachorro Allen MD  Radiologists:   Advanced Practice providers:      Priority of review: Cancer     Patient Transplant Status: other. Question if Transplant candidate     Reason for presentation: Other   Referral received from Dr Olson's office for "possible liver directed treatment". Pt with hx of cancer and mets to liver. Hx of resection at [facility] and followed by Dr Thai Berger, Oncology. Spoke with the referring office. They stated the treatment the pt is on is not working and are seeking possible IR treatment.     Clinical Summary: DIAGNOSIS:  1. 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 Initially diagnosed as adenocarcinoma and later changed to carcinoid tumor at time of resection, diagnosed 2017  2. <metastasis to liver> on liver CT for hernia repair, Dx 2021     TREATMENT:  Well differentiated carcinoid tumor of the rectum and terminal ileum  1. Initially treated with concurrent chemoradiotherapy with infusional 5-FU under the diagnosis of adenocarcinoma, 2017  2. Resection of rectum and terminal ileum, 2018  3. Observation  4. <recurrence in liver> liver directed therapy at [facility] (EUS showed metastatic unresectable disease)  5. Lanreotide, 1/3/22 to present     INITIAL HISTORY:  HPI: Coby Marshall is a 66 y.o. female who presents to clinic for initial oncological evaluation for liver mets with know history of colorectal cancer. Patient has an interesting history. She was found to have some rectal bleeding while on vacation. Subsequent colonoscopy showed evidence of a rectal mass. A biopsy was performed that showed evidence of adenocarcinoma. Patient underwent neoadjuvant " concurrent chemoradiotherapy with infusional 5-FU. Subsequently, she proceeded to surgical resection at [facility]. Pathology on surgical resection showed a neuroendocrine tumor consistent with carcinoid tumor. There was an additional lesion found in the terminal ileum also consistent with carcinoid tumor. Both lesions were resected. Patient had some postoperative complications to include left lower quadrant abscess formation resulting in antibiotics and surgical debridement and surgical repair. She had been doing well until recently when she underwent surgical evaluation of abdominal hernias. CT scan of the abdomen and pelvis obtained for that evaluation showed evidence of hypodense lesions in the liver. Needle biopsy was performed that showed much necrosis in the biopsy specimen but there was some evidence of ribbonlike structures consistent with her previous diagnosis. She has been and remains asymptomatic from her liver metastasis.  [facility] note:    66 y.o. female with HTN, HLD and history of well differentiated NET of the rectum and ileum who presents for further management recommendations.  She was initially diagnosed with rectal adenocarcinoma in 2017, underwent chemoradiation and then LAR with simultaneous small bowel resection by Dr. Manjarrez.  Her pathology instead showed two separate well-differentiated NETs.  She was set to undergo ventral hernia repair and her imaging showed new liver lesions.  She was referred to Dr. Mykel Hair and copper dotatate scan showed a pancreatic mass and multifocal liver metastases.  Underwent EUS on 12/17 with findings of a pancretic body mass and liver masses. 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  current treatment by local oncologist not working     Imaging to be reviewed: Ct 1/4/23  CT 6/30/22 imported to [facility]     HCC Treatment History: see above     ABO: A POS     Platelets:          Lab Results   Component Value Date/Time      (H) 03/08/2018 04:15 AM      Creatinine:         Lab Results   Component Value Date/Time     CREATININE 1.0 02/07/2019 08:12 AM      Bilirubin: No results found for: BILITOT, EXTBILITOTAL, EXTBILIRUBIN  AFP Last 3 each if available: No results found for: AFP, EXTAFP     MELD: Computed MELD-Na score unavailable. Necessary lab results were not found in the last year.  Computed MELD score unavailable. Necessary lab results were not found in the last year.     Plan: Imaging to be reviewed: Ct 1/4/23   Portalvenous phase CT demonstrates interval growth of multiple bilateral heterogenous hypodense liver lesions (index lesions measures 5.1 x 5.9 cm previously 3.7 x 3.9 in June). Prior MR demonstrates the lesions to have avid arterial phase enhancement.    Staged cTACE bilaterally        Discussion: CT comparison from January to June , innumerable bilateral hypodense liver lesions that show interval growth. Disease progression.  PET SCAN and treat systemically.      Follow-up Provider: Heike

## 2023-02-10 NOTE — PROGRESS NOTES
PROGRESS NOTE    Subjective:       Patient ID: Coby Marshall is a 67 y.o. female.    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    Oncologic History:  1. Ms. 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 Her primary oncologist is Dr. Olson at [facility].  Her oncology history is detailed below. She was initially diagnosed with rectal adenocarcinoma and she was treated with neoadjuvant chemoRT. Surgical pathology resulted as well-differentiated carcinoid and she was initially observed. A CT obtained for hernia revealed liver metastases in July 2021, and these were confirmed to be neuroendocrine on pathology. She underwent EUS with biopsy in December 2021 which showed grade 3 NET in the pancreas with Ki67 25%. She was started on lanreotide. Her liver lesions have since grown in size, and her oncologist referred her for consideration of liver-directed therapy. Ms. Marshall reports ongoing diarrhea since the time of her initial resection. She has about 6 bowel movements per day. She does not have any flushing, palpitations, or diaphoresis. She otherwise feels well.   Oncology History:   8/2017: Well-differentiated carcinoid tumor in the rectum, grade 2, and in the terminal ileum, grade 1. Initially diagnosed as adenocarcinoma and later changed to carcinoid tumor at time of resection  Treated with concurrent chemoRT with infusional 5-FU under the diagnosis of adenocarcinoma  1/2018: resection of rectum and terminal ileum. Pathology showed a ypT3N0 1.7 cm rectal NET, well differentiated, grade 2, Ki 67 was 16%. 1.5 cm T2Nx small bowel NET, well diff, Ki  "67 was less than 3%.  2021: Metastasis to liver on CT for hernia repair.   11/10/2021: Copper PET:    In this patient with rectal and ileal neuroendocrine tumor status post low anterior resection/small bowel resection, there are multiple somatostatin receptor avid hepatic lesions which appear increased in size as compared to outside CT 2021.  There is also tracer avid focus in the pancreatic body suspicious for malignancy.  All these findings are new since prior Dotatate PET-CT.  2021: EUS with biopsies:  "1. PANCREAS MASS, ENDOSCOPIC ULTRASOUND-GUIDED FNA:   Well-differentiated neuroendocrine tumor, favor WHO grade 3 (G3).   Comment:  Ki-67 labeling index is approximately 25%.  Average mitotic rate is   9 per 2 mm^2.  Tumor cells are positive with synaptophysin and chromogranin,   supporting the diagnosis.   2. LIVER, LEFT LOWER LOBE, ENDOSCOPIC ULTRASOUND-GUIDED FNA:   Well-differentiated neuroendocrine tumor.   3. LIVER, RIGHT LOWER LOBE, ENDOSCOPIC ULTRASOUND-GUIDED FNA:   Well-differentiated neuroendocrine tumor, favor WHO grade 2 (G2).   Comment (2,3):  Tumor in parts 2 and 3 are histologically identical.  Only   part 3 contains enough cell block lesional material to render a meaningful   labeling index and mitotic count.  Ki-67 labeling index is approximately 15%.    Average mitotic rate is 3 per 2 mm^2.  Tumor cells are positive with   synaptophysin but negative for chromogranin.  The overall findings are still   consistent with metastatic well-differentiated neuroendocrine tumor."  1/3/2022: Lanreotide started  2023: repeat CT abd/pelvis: worsening hepatic metastases. Changed Lanreotide to Sandostatin  2. Case reviewed at tumor board. Recc serial TACE.      Interval History:   Ms Marshall returns today to review PET scan results and plan of care. She is feeling well. Chronic diarrhea since surgery. But seems to be better since sandostatin started.     ECO    ROS:   A ten-point system " review is obtained and negative except for what was stated in the Interval History.     Physical Examination:   Vital signs reviewed.   General: well hydrated, well developed, in no acute distress  HEENT: normocephalic, PERRLA, EOMI, anicteric sclerae, oropharynx clear  Neck: supple, no JVD, thyromegaly, cervical or supraclavicular lymphadenopathy  Lungs: clear breath sounds bilaterally, no wheezing, rales, or rhonchi  Heart: RRR, no M/R/G  Abdomen: soft, no tenderness, non-distended, no hepatosplenomegaly, mass, or hernia. BS present  Extremities: no clubbing, cyanosis, or edema  Skin: no rash, ulcer, or open wounds  Neuro: alert and oriented x 4, no focal neuro deficit  Psych: pleasant and appropriate mood and affect    Objective:     Laboratory Data:  Labs reviewed. Bilirubin normal    Imaging Data:  Gallium PET 2/7/23:  Similar number and distribution of radiotracer avid lesions within the liver and pancreatic body.  Several liver lesions demonstrate central PET photopenia and hypodensity on CT consistent with necrosis.     New nonspecific mild radiotracer uptake within several small left axillary lymph nodes.  Finding may relate to left upper extremity injection and partial radiotracer extravasation.     Otherwise, no new somatostatin receptor avid lesion.    I have personally reviewed her CT abdomen/pelvis with Dr Soni: Receptor positive pancreatic lesion and liver mets. Liver mets appear enlarged since 6/2022, though this may be due to larger cystic components.     Assessment and Plan:     1. Malignant carcinoid tumor of rectum    2. Secondary malignant neoplasm of liver    3. 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 Neuroendocrine tumor of pancreas    5. Immunodeficiency secondary to neoplasm    6. Essential hypertension      1-5  - Ms Marshall is a 66 yo woman with history of ypT3N0 rectal NET, well differentiated, grade 2 (Ki 67 16%) and small bowel NET, well diff, low grade, s/p surgery  in Jan 2018. She was found to have metastatic disease to the liver and pancreas in July 2021 s/p biopsy. Liver NET is well diff grade 2 (Ki67 15)%. Pancreatic NET is well diff grade 3 (Ki67 25%). She has been on lanreotide since July 2022, changed to sandostatin in Jan 2023 after CT scan showed progression.  - case reviewed at tumor board. Long discussion with patient and  re scan results and plan of care. Disease outside of the liver is stable. Has progressed mainly in the liver. Recc serial TACE. Discussed toxicities. Discussed MRI before each TACE. If disease progresses outside of the liver on MRI, we will add systemic therapy. Patient understands and agrees with the plan.   - messaged Dr Soni's office. Will coordinate return visit with TACE and MRI.   - see me before second TACE  - will call Dr Olson's office. C/w sandostatin with Dr Olson    6.  - BP controlled  - c/w current medication    Follow-up:     Return prior to second TACE  Knows to call in the interval if any problems arise.    I spent 50 minutes reviewing the chart, interpreting laboratory result and imaging data, coordinating patient's care, with at least 50% of the time on face-to-face counseling.       Electronically signed by Ariel Orosco for Scheduling    Med Onc Chart Routing      Follow up with physician 1 month. need serial TACE with MRI. Aracely will schedule   Follow up with ELLY    Infusion scheduling note    Injection scheduling note    Labs    Imaging    Pharmacy appointment    Other referrals

## 2023-02-13 ENCOUNTER — TELEPHONE (OUTPATIENT)
Dept: HEMATOLOGY/ONCOLOGY | Facility: CLINIC | Age: 68
End: 2023-02-13
Payer: MEDICARE

## 2023-02-13 DIAGNOSIS — D3A.8 NEUROENDOCRINE TUMOR: Primary | ICD-10-CM

## 2023-02-13 NOTE — TELEPHONE ENCOUNTER
----- Message from Yun Sandra sent at 2/10/2023  2:56 PM CST -----  The week of Feb 20, Dr. Soni is not schedule to work that week. I can schedule 2/27, 3/1, 3/2, 3/3 1st case.  Week of 3/27, 3/28, 3/29, and 3/30 is available to be schedule with Dr. Soni.  ----- Message -----  From: Aracely Jo RN  Sent: 2/10/2023  11:43 AM CST  To: Yun Sandra, Ariel Smart MD, #    Sure, Yun can you send me some options for the weeks of Feb 20th and March 27th? I know the 27th may not be available yet.     ----- Message -----  From: Ariel Smart MD  Sent: 2/10/2023   9:20 AM CST  To: Aracely Jo RN, Yun Sandra, #    Enoz,     Patient agrees with serial TACE. I think TACE every 5 weeks will be better. She needs MRI before each TACE.    Aracely, they live near [place]. Needs TACE within 1-2 weeks then serial TACE every 5 weeks after. Once Dr Soni has her on the schedule, can you schedule MRI the day before the first TACE. 5 weeks later, get MRI, labs (CBC, CMP, serotonin, pancreastatin, 5-HIAA), see me then TACE the next day? Needs [facility]. Thanks!

## 2023-02-13 NOTE — TELEPHONE ENCOUNTER
Called Dr Olson's office. Dr Olson is seeing patients. Spoke with his nurse, Jena. Discussed case reviewed at tumor board, [facility] serial TACE and close MRI monitoring. Left my number in case Dr Olson has questions.

## 2023-02-13 NOTE — TELEPHONE ENCOUNTER
Spoke with patient about plan for TACE every 5 weeks. Initial TACE scheduled for week of 2/28, with labs and MRI on 2/28 and then TACE on 3/1. Patient will stay in [facility] from 2/28-3/3/23. Also discussed next TACE 5 weeks later, tentatively on 4/5 with labs, MRI, Dr. Smart f/u on 4/4. Will confirm with IR and then book [facility] for 4/4 through 4/6. Patient verbalized understanding to all information/locations.

## 2023-02-14 LAB — SEROTONIN: <30 NG/ML

## 2023-02-16 LAB
5OH-INDOLEACETATE SERPL-MCNC: NORMAL MG/ML
PANCREASTATIN SERPL-MCNC: NORMAL PG/ML

## 2023-02-17 LAB
DNA RANGE(S) EXAMINED NAR: NORMAL
GENE DIS ANL INTERP-IMP: POSITIVE
GENE DIS ASSESSED: NORMAL
MSI CA SPEC-IMP: NOT DETECTED
REASON FOR STUDY: NORMAL
TEMPUS LCA: NORMAL
TEMPUS PORTAL: NORMAL

## 2023-02-22 LAB
5OH-INDOLEACETATE SERPL-MCNC: 6 NG/ML
PANCREASTATIN SERPL-MCNC: 85 PG/ML (ref 10–135)

## 2023-02-28 ENCOUNTER — HOSPITAL ENCOUNTER (OUTPATIENT)
Dept: RADIOLOGY | Facility: HOSPITAL | Age: 68
Discharge: HOME | End: 2023-02-28
Attending: INTERNAL MEDICINE
Payer: MEDICARE

## 2023-02-28 DIAGNOSIS — C78.7 SECONDARY MALIGNANT NEOPLASM OF LIVER: ICD-10-CM

## 2023-02-28 DIAGNOSIS — C7A.8 NEUROENDOCRINE CARCINOMA METASTATIC TO MULTIPLE SITES: Primary | ICD-10-CM

## 2023-02-28 DIAGNOSIS — C7B.8 NEUROENDOCRINE CARCINOMA METASTATIC TO MULTIPLE SITES: Primary | ICD-10-CM

## 2023-02-28 DIAGNOSIS — C7A.026 MALIGNANT CARCINOID TUMOR OF RECTUM: Primary | ICD-10-CM

## 2023-02-28 DIAGNOSIS — C7A.026 MALIGNANT CARCINOID TUMOR OF RECTUM: ICD-10-CM

## 2023-02-28 PROCEDURE — 74183 MRI ABD W/O CNTR FLWD CNTR: CPT | Mod: TC

## 2023-02-28 PROCEDURE — 25500020 PHARM REV CODE 255: Performed by: INTERNAL MEDICINE

## 2023-02-28 PROCEDURE — 74183 MRI ABD W/O CNTR FLWD CNTR: CPT | Mod: 26,,, | Performed by: RADIOLOGY

## 2023-02-28 PROCEDURE — 74183 MRI ABDOMEN W WO CONTRAST: ICD-10-PCS | Mod: 26,,, | Performed by: RADIOLOGY

## 2023-02-28 PROCEDURE — A9585 GADOBUTROL INJECTION: HCPCS | Performed by: INTERNAL MEDICINE

## 2023-02-28 RX ORDER — GADOBUTROL 604.72 MG/ML
10 INJECTION INTRAVENOUS
Status: COMPLETED | OUTPATIENT
Start: 2023-02-28 | End: 2023-02-28

## 2023-02-28 RX ORDER — DEXTROSE MONOHYDRATE, SODIUM CHLORIDE, AND POTASSIUM CHLORIDE 50; 1.49; 4.5 G/1000ML; G/1000ML; G/1000ML
INJECTION, SOLUTION INTRAVENOUS CONTINUOUS
Status: CANCELLED | OUTPATIENT
Start: 2023-02-28

## 2023-02-28 RX ADMIN — GADOBUTROL 10 ML: 604.72 INJECTION INTRAVENOUS at 02:02

## 2023-03-01 ENCOUNTER — HOSPITAL ENCOUNTER (OUTPATIENT)
Dept: INTERVENTIONAL RADIOLOGY/VASCULAR | Facility: HOSPITAL | Age: 68
Discharge: HOME | End: 2023-03-02
Attending: RADIOLOGY | Admitting: HOSPITALIST
Payer: MEDICARE

## 2023-03-01 DIAGNOSIS — C7A.8 NEUROENDOCRINE CARCINOMA METASTATIC TO LIVER: ICD-10-CM

## 2023-03-01 DIAGNOSIS — C7B.8 NEUROENDOCRINE CARCINOMA METASTATIC TO LIVER: ICD-10-CM

## 2023-03-01 DIAGNOSIS — D3A.8 NEUROENDOCRINE TUMOR: ICD-10-CM

## 2023-03-01 PROBLEM — H40.1132 PRIMARY OPEN ANGLE GLAUCOMA (POAG) OF BOTH EYES, MODERATE STAGE: Status: ACTIVE | Noted: 2019-04-23

## 2023-03-01 PROBLEM — H34.8112 CENTRAL RETINAL VEIN OCCLUSION, RIGHT EYE, STABLE: Status: ACTIVE | Noted: 2022-03-21

## 2023-03-01 PROBLEM — K52.9 CHRONIC DIARRHEA: Status: ACTIVE | Noted: 2018-08-13

## 2023-03-01 PROBLEM — E55.9 HYPOVITAMINOSIS D: Status: ACTIVE | Noted: 2019-07-30

## 2023-03-01 PROBLEM — R11.2 NAUSEA AND VOMITING: Status: ACTIVE | Noted: 2023-03-01

## 2023-03-01 LAB
POCT GLUCOSE: 141 MG/DL (ref 70–110)
POCT GLUCOSE: 143 MG/DL (ref 70–110)

## 2023-03-01 PROCEDURE — 75887 PR  PERCUT XHEPATIC PORTOGRAM: ICD-10-PCS | Mod: 26,,, | Performed by: RADIOLOGY

## 2023-03-01 PROCEDURE — 36247 PR PLACE CATH SUBSUBSELECT ART,ABD/PEL: ICD-10-PCS | Mod: ,,, | Performed by: RADIOLOGY

## 2023-03-01 PROCEDURE — 63600175 PHARM REV CODE 636 W HCPCS: Performed by: RADIOLOGY

## 2023-03-01 PROCEDURE — 76937 US GUIDE VASCULAR ACCESS: CPT | Mod: TC | Performed by: RADIOLOGY

## 2023-03-01 PROCEDURE — A4216 STERILE WATER/SALINE, 10 ML: HCPCS | Performed by: HOSPITALIST

## 2023-03-01 PROCEDURE — 75726 ARTERY X-RAYS ABDOMEN: CPT | Mod: TC,59 | Performed by: RADIOLOGY

## 2023-03-01 PROCEDURE — 76377 3D RENDER W/INTRP POSTPROCES: CPT | Mod: 26,,, | Performed by: RADIOLOGY

## 2023-03-01 PROCEDURE — 25500020 PHARM REV CODE 255: Performed by: RADIOLOGY

## 2023-03-01 PROCEDURE — 36248 PR PR INS CATH ABD/L-EXT ART ADDL 2ND ORD/3RD ORD/BYD: ICD-10-PCS | Mod: ,,, | Performed by: RADIOLOGY

## 2023-03-01 PROCEDURE — 36247 INS CATH ABD/L-EXT ART 3RD: CPT | Mod: ,,, | Performed by: RADIOLOGY

## 2023-03-01 PROCEDURE — 76380 PR  CT SCAN,LIMITED/LOCALIZED F/U STUDY: ICD-10-PCS | Mod: 26,59,, | Performed by: RADIOLOGY

## 2023-03-01 PROCEDURE — 36248 INS CATH ABD/L-EXT ART ADDL: CPT | Mod: ,,, | Performed by: RADIOLOGY

## 2023-03-01 PROCEDURE — 99152 MOD SED SAME PHYS/QHP 5/>YRS: CPT | Performed by: RADIOLOGY

## 2023-03-01 PROCEDURE — 99152 PR MOD CONSCIOUS SEDATION, SAME PHYS, 5+ YRS, FIRST 15 MIN: ICD-10-PCS | Mod: ,,, | Performed by: RADIOLOGY

## 2023-03-01 PROCEDURE — 76377 PR  3D RENDERING W/ IMAGE POSTPROCESS: ICD-10-PCS | Mod: 26,,, | Performed by: RADIOLOGY

## 2023-03-01 PROCEDURE — 99153 MOD SED SAME PHYS/QHP EA: CPT | Performed by: RADIOLOGY

## 2023-03-01 PROCEDURE — 37243 VASC EMBOLIZE/OCCLUDE ORGAN: CPT | Performed by: RADIOLOGY

## 2023-03-01 PROCEDURE — 75774 ARTERY X-RAY EACH VESSEL: CPT | Mod: TC | Performed by: RADIOLOGY

## 2023-03-01 PROCEDURE — 25000003 PHARM REV CODE 250: Performed by: HOSPITALIST

## 2023-03-01 PROCEDURE — 75887 VEIN X-RAY LIVER W/O HEMODYN: CPT | Mod: 26,,, | Performed by: RADIOLOGY

## 2023-03-01 PROCEDURE — 75774 ARTERY X-RAY EACH VESSEL: CPT | Mod: 26,59,, | Performed by: RADIOLOGY

## 2023-03-01 PROCEDURE — 75887 VEIN X-RAY LIVER W/O HEMODYN: CPT | Mod: TC | Performed by: RADIOLOGY

## 2023-03-01 PROCEDURE — 75774 PR  ANGIO EA ADDNL SELECTV VESSEL: ICD-10-PCS | Mod: 26,59,, | Performed by: RADIOLOGY

## 2023-03-01 PROCEDURE — 76380 CAT SCAN FOLLOW-UP STUDY: CPT | Mod: 26,59,, | Performed by: RADIOLOGY

## 2023-03-01 PROCEDURE — 76377 3D RENDER W/INTRP POSTPROCES: CPT | Mod: TC | Performed by: RADIOLOGY

## 2023-03-01 PROCEDURE — 36247 INS CATH ABD/L-EXT ART 3RD: CPT | Performed by: RADIOLOGY

## 2023-03-01 PROCEDURE — 25000003 PHARM REV CODE 250: Performed by: RADIOLOGY

## 2023-03-01 PROCEDURE — A4550 SURGICAL TRAYS: HCPCS

## 2023-03-01 PROCEDURE — 36248 INS CATH ABD/L-EXT ART ADDL: CPT | Performed by: RADIOLOGY

## 2023-03-01 PROCEDURE — 37243 IR EMBOLIZATION COMP FOR TUMOR_ORGAN ISCHEMIA_INFARC: ICD-10-PCS | Mod: ,,, | Performed by: RADIOLOGY

## 2023-03-01 PROCEDURE — 94760 N-INVAS EAR/PLS OXIMETRY 1: CPT

## 2023-03-01 PROCEDURE — 75726 CHG ANGIO VISCERAL SELECTV/SUBSELEC: ICD-10-PCS | Mod: 26,59,, | Performed by: RADIOLOGY

## 2023-03-01 PROCEDURE — 76380 CAT SCAN FOLLOW-UP STUDY: CPT | Mod: TC | Performed by: RADIOLOGY

## 2023-03-01 PROCEDURE — 76937 PR  US GUIDE, VASCULAR ACCESS: ICD-10-PCS | Mod: 26,,, | Performed by: RADIOLOGY

## 2023-03-01 PROCEDURE — 96420 CHEMO IA PUSH TECNIQUE: CPT | Performed by: RADIOLOGY

## 2023-03-01 PROCEDURE — 99152 MOD SED SAME PHYS/QHP 5/>YRS: CPT | Mod: ,,, | Performed by: RADIOLOGY

## 2023-03-01 PROCEDURE — 99152 MOD SED SAME PHYS/QHP 5/>YRS: CPT

## 2023-03-01 PROCEDURE — 63600175 PHARM REV CODE 636 W HCPCS: Performed by: HOSPITALIST

## 2023-03-01 PROCEDURE — 99153 MOD SED SAME PHYS/QHP EA: CPT

## 2023-03-01 PROCEDURE — 76937 US GUIDE VASCULAR ACCESS: CPT | Mod: 26,,, | Performed by: RADIOLOGY

## 2023-03-01 PROCEDURE — 75726 ARTERY X-RAYS ABDOMEN: CPT | Mod: 26,59,, | Performed by: RADIOLOGY

## 2023-03-01 RX ORDER — ONDANSETRON 2 MG/ML
4 INJECTION INTRAMUSCULAR; INTRAVENOUS EVERY 8 HOURS PRN
Status: DISCONTINUED | OUTPATIENT
Start: 2023-03-01 | End: 2023-03-02 | Stop reason: HOSPADM

## 2023-03-01 RX ORDER — POLYETHYLENE GLYCOL 3350 17 G/17G
17 POWDER, FOR SOLUTION ORAL DAILY
Status: DISCONTINUED | OUTPATIENT
Start: 2023-03-01 | End: 2023-03-02 | Stop reason: HOSPADM

## 2023-03-01 RX ORDER — GLUCAGON 1 MG
1 KIT INJECTION
Status: DISCONTINUED | OUTPATIENT
Start: 2023-03-01 | End: 2023-03-02 | Stop reason: HOSPADM

## 2023-03-01 RX ORDER — ONDANSETRON 8 MG/1
8 TABLET, ORALLY DISINTEGRATING ORAL EVERY 8 HOURS PRN
Status: DISCONTINUED | OUTPATIENT
Start: 2023-03-01 | End: 2023-03-02 | Stop reason: HOSPADM

## 2023-03-01 RX ORDER — SODIUM CHLORIDE 9 MG/ML
INJECTION, SOLUTION INTRAVENOUS
Status: COMPLETED | OUTPATIENT
Start: 2023-03-01 | End: 2023-03-01

## 2023-03-01 RX ORDER — HEPARIN SOD,PORCINE/0.9 % NACL 1000/500ML
INTRAVENOUS SOLUTION INTRAVENOUS
Status: COMPLETED | OUTPATIENT
Start: 2023-03-01 | End: 2023-03-01

## 2023-03-01 RX ORDER — PROMETHAZINE HYDROCHLORIDE 25 MG/ML
INJECTION, SOLUTION INTRAMUSCULAR; INTRAVENOUS
Status: DISCONTINUED | OUTPATIENT
Start: 2023-03-01 | End: 2023-03-02 | Stop reason: HOSPADM

## 2023-03-01 RX ORDER — BRIMONIDINE TARTRATE AND TIMOLOL MALEATE 2; 5 MG/ML; MG/ML
1 SOLUTION OPHTHALMIC 2 TIMES DAILY
Status: DISCONTINUED | OUTPATIENT
Start: 2023-03-01 | End: 2023-03-01

## 2023-03-01 RX ORDER — CHOLESTYRAMINE 4 G/9G
1 POWDER, FOR SUSPENSION ORAL 2 TIMES DAILY
Status: DISCONTINUED | OUTPATIENT
Start: 2023-03-01 | End: 2023-03-02 | Stop reason: HOSPADM

## 2023-03-01 RX ORDER — LIDOCAINE HYDROCHLORIDE 10 MG/ML
INJECTION INFILTRATION; PERINEURAL
Status: COMPLETED | OUTPATIENT
Start: 2023-03-01 | End: 2023-03-01

## 2023-03-01 RX ORDER — SODIUM CHLORIDE 0.9 % (FLUSH) 0.9 %
10 SYRINGE (ML) INJECTION EVERY 8 HOURS
Status: DISCONTINUED | OUTPATIENT
Start: 2023-03-01 | End: 2023-03-02 | Stop reason: HOSPADM

## 2023-03-01 RX ORDER — TALC
6 POWDER (GRAM) TOPICAL NIGHTLY PRN
Status: DISCONTINUED | OUTPATIENT
Start: 2023-03-01 | End: 2023-03-02 | Stop reason: HOSPADM

## 2023-03-01 RX ORDER — PROMETHAZINE HYDROCHLORIDE 25 MG/ML
INJECTION, SOLUTION INTRAMUSCULAR; INTRAVENOUS
Status: COMPLETED | OUTPATIENT
Start: 2023-03-01 | End: 2023-03-01

## 2023-03-01 RX ORDER — HYDROCHLOROTHIAZIDE 12.5 MG/1
12.5 TABLET ORAL DAILY
Status: DISCONTINUED | OUTPATIENT
Start: 2023-03-02 | End: 2023-03-02 | Stop reason: HOSPADM

## 2023-03-01 RX ORDER — OXYCODONE HYDROCHLORIDE 5 MG/1
5 TABLET ORAL EVERY 6 HOURS PRN
Status: DISCONTINUED | OUTPATIENT
Start: 2023-03-01 | End: 2023-03-02 | Stop reason: HOSPADM

## 2023-03-01 RX ORDER — NALOXONE HCL 0.4 MG/ML
0.02 VIAL (ML) INJECTION
Status: DISCONTINUED | OUTPATIENT
Start: 2023-03-01 | End: 2023-03-02 | Stop reason: HOSPADM

## 2023-03-01 RX ORDER — ACETAMINOPHEN 325 MG/1
650 TABLET ORAL EVERY 4 HOURS PRN
Status: DISCONTINUED | OUTPATIENT
Start: 2023-03-01 | End: 2023-03-02 | Stop reason: HOSPADM

## 2023-03-01 RX ORDER — DEXTROSE MONOHYDRATE, SODIUM CHLORIDE, AND POTASSIUM CHLORIDE 50; 1.49; 4.5 G/1000ML; G/1000ML; G/1000ML
INJECTION, SOLUTION INTRAVENOUS CONTINUOUS
Status: DISCONTINUED | OUTPATIENT
Start: 2023-03-01 | End: 2023-03-02 | Stop reason: HOSPADM

## 2023-03-01 RX ORDER — FENTANYL CITRATE 50 UG/ML
INJECTION, SOLUTION INTRAMUSCULAR; INTRAVENOUS
Status: COMPLETED | OUTPATIENT
Start: 2023-03-01 | End: 2023-03-01

## 2023-03-01 RX ORDER — CLONIDINE HYDROCHLORIDE 0.1 MG/1
0.1 TABLET ORAL DAILY
Status: DISCONTINUED | OUTPATIENT
Start: 2023-03-02 | End: 2023-03-02 | Stop reason: HOSPADM

## 2023-03-01 RX ORDER — BRIMONIDINE TARTRATE 2 MG/ML
1 SOLUTION/ DROPS OPHTHALMIC 2 TIMES DAILY
Status: DISCONTINUED | OUTPATIENT
Start: 2023-03-01 | End: 2023-03-02 | Stop reason: HOSPADM

## 2023-03-01 RX ORDER — ENOXAPARIN SODIUM 100 MG/ML
40 INJECTION SUBCUTANEOUS EVERY 24 HOURS
Status: DISCONTINUED | OUTPATIENT
Start: 2023-03-01 | End: 2023-03-02 | Stop reason: HOSPADM

## 2023-03-01 RX ORDER — DEXTROSE 40 %
30 GEL (GRAM) ORAL
Status: DISCONTINUED | OUTPATIENT
Start: 2023-03-01 | End: 2023-03-02 | Stop reason: HOSPADM

## 2023-03-01 RX ORDER — MORPHINE SULFATE 4 MG/ML
4 INJECTION, SOLUTION INTRAMUSCULAR; INTRAVENOUS EVERY 4 HOURS PRN
Status: DISCONTINUED | OUTPATIENT
Start: 2023-03-01 | End: 2023-03-02 | Stop reason: HOSPADM

## 2023-03-01 RX ORDER — HYDROMORPHONE HYDROCHLORIDE 1 MG/ML
INJECTION, SOLUTION INTRAMUSCULAR; INTRAVENOUS; SUBCUTANEOUS
Status: DISCONTINUED | OUTPATIENT
Start: 2023-03-01 | End: 2023-03-02 | Stop reason: HOSPADM

## 2023-03-01 RX ORDER — MAGNESIUM SULFATE HEPTAHYDRATE 40 MG/ML
2 INJECTION, SOLUTION INTRAVENOUS ONCE
Status: COMPLETED | OUTPATIENT
Start: 2023-03-01 | End: 2023-03-01

## 2023-03-01 RX ORDER — MIDAZOLAM HYDROCHLORIDE 1 MG/ML
INJECTION INTRAMUSCULAR; INTRAVENOUS
Status: COMPLETED | OUTPATIENT
Start: 2023-03-01 | End: 2023-03-01

## 2023-03-01 RX ORDER — DEXTROSE 40 %
15 GEL (GRAM) ORAL
Status: DISCONTINUED | OUTPATIENT
Start: 2023-03-01 | End: 2023-03-02 | Stop reason: HOSPADM

## 2023-03-01 RX ORDER — ATORVASTATIN CALCIUM 20 MG/1
20 TABLET, FILM COATED ORAL DAILY
Status: DISCONTINUED | OUTPATIENT
Start: 2023-03-02 | End: 2023-03-02 | Stop reason: HOSPADM

## 2023-03-01 RX ORDER — DIPHENHYDRAMINE HYDROCHLORIDE 50 MG/ML
50 INJECTION INTRAMUSCULAR; INTRAVENOUS ONCE
Status: COMPLETED | OUTPATIENT
Start: 2023-03-01 | End: 2023-03-01

## 2023-03-01 RX ORDER — TIMOLOL MALEATE 5 MG/ML
1 SOLUTION/ DROPS OPHTHALMIC 2 TIMES DAILY
Status: DISCONTINUED | OUTPATIENT
Start: 2023-03-01 | End: 2023-03-02 | Stop reason: HOSPADM

## 2023-03-01 RX ADMIN — MAGNESIUM SULFATE 2 G: 2 INJECTION INTRAVENOUS at 08:03

## 2023-03-01 RX ADMIN — ETHIODIZED OIL 20 ML: 480 INJECTION INTRA-ARTERIAL; INTRALYMPHATIC; INTRAUTERINE at 09:03

## 2023-03-01 RX ADMIN — AMPICILLIN SODIUM AND SULBACTAM SODIUM 3 G: 2; 1 INJECTION, POWDER, FOR SOLUTION INTRAMUSCULAR; INTRAVENOUS at 08:03

## 2023-03-01 RX ADMIN — Medication 500 ML: at 09:03

## 2023-03-01 RX ADMIN — BRIMONIDINE TARTRATE 1 DROP: 2 SOLUTION OPHTHALMIC at 08:03

## 2023-03-01 RX ADMIN — Medication 10 ML: at 08:03

## 2023-03-01 RX ADMIN — PROMETHAZINE HYDROCHLORIDE 12.5 MG: 25 INJECTION INTRAMUSCULAR; INTRAVENOUS at 10:03

## 2023-03-01 RX ADMIN — OCTREOTIDE ACETATE 250 MCG: 500 INJECTION, SOLUTION INTRAVENOUS; SUBCUTANEOUS at 09:03

## 2023-03-01 RX ADMIN — ONDANSETRON 8 MG: 8 TABLET, ORALLY DISINTEGRATING ORAL at 04:03

## 2023-03-01 RX ADMIN — DEXTROSE, SODIUM CHLORIDE, AND POTASSIUM CHLORIDE: 5; .45; .15 INJECTION INTRAVENOUS at 06:03

## 2023-03-01 RX ADMIN — DIPHENHYDRAMINE HYDROCHLORIDE 50 MG: 50 INJECTION INTRAMUSCULAR; INTRAVENOUS at 09:03

## 2023-03-01 RX ADMIN — OXYCODONE HYDROCHLORIDE 5 MG: 5 TABLET ORAL at 04:03

## 2023-03-01 RX ADMIN — MORPHINE SULFATE 4 MG: 4 INJECTION INTRAVENOUS at 06:03

## 2023-03-01 RX ADMIN — ONDANSETRON 4 MG: 2 INJECTION INTRAMUSCULAR; INTRAVENOUS at 08:03

## 2023-03-01 RX ADMIN — FENTANYL CITRATE 50 MCG: 50 INJECTION, SOLUTION INTRAMUSCULAR; INTRAVENOUS at 10:03

## 2023-03-01 RX ADMIN — HYDROMORPHONE HYDROCHLORIDE 0.5 MG: 1 INJECTION, SOLUTION INTRAMUSCULAR; INTRAVENOUS; SUBCUTANEOUS at 10:03

## 2023-03-01 RX ADMIN — DEXTROSE, SODIUM CHLORIDE, AND POTASSIUM CHLORIDE: 5; .45; .15 INJECTION INTRAVENOUS at 08:03

## 2023-03-01 RX ADMIN — LIDOCAINE HYDROCHLORIDE 1 ML: 10 INJECTION, SOLUTION INFILTRATION; PERINEURAL at 09:03

## 2023-03-01 RX ADMIN — FENTANYL CITRATE 50 MCG: 50 INJECTION, SOLUTION INTRAMUSCULAR; INTRAVENOUS at 09:03

## 2023-03-01 RX ADMIN — TIMOLOL MALEATE 1 DROP: 5 SOLUTION/ DROPS OPHTHALMIC at 08:03

## 2023-03-01 RX ADMIN — MIDAZOLAM HYDROCHLORIDE 1 MG: 1 INJECTION, SOLUTION INTRAMUSCULAR; INTRAVENOUS at 09:03

## 2023-03-01 RX ADMIN — ONDANSETRON 16 MG: 2 INJECTION INTRAMUSCULAR; INTRAVENOUS at 08:03

## 2023-03-01 RX ADMIN — IOHEXOL 50 MG: 350 INJECTION, SOLUTION INTRAVENOUS at 09:03

## 2023-03-01 RX ADMIN — SODIUM CHLORIDE 500 ML: 0.9 INJECTION, SOLUTION INTRAVENOUS at 09:03

## 2023-03-01 RX ADMIN — OCTREOTIDE ACETATE 250 MCG/HR: 1000 INJECTION, SOLUTION INTRAVENOUS; SUBCUTANEOUS at 09:03

## 2023-03-01 RX ADMIN — ENOXAPARIN SODIUM 40 MG: 40 INJECTION SUBCUTANEOUS at 04:03

## 2023-03-01 NOTE — SEDATION DOCUMENTATION
Family updated as to patient's status. Attempted to update spouse, Sree, no answer on phone and voicemail not set up, will try again.

## 2023-03-01 NOTE — ASSESSMENT & PLAN NOTE
-p.r.n.  Antiemetics and opioids for pain control following her procedure this a.m.   -monitor overnight; if symptoms resolve will likely be able to discharge in the morning

## 2023-03-01 NOTE — PLAN OF CARE
03/01/23 1613   Admission   Initial VN Admission Questions Complete   Communication Issues? None   Shift   Virtual Nurse - Patient Verbalized Approval Of Camera Use   Safety/Activity   Patient Rounds visualized patient;placement of personal items at bedside;bed in low position;bed wheels locked;call light in patient/parent reach;clutter free environment maintained;ID band on

## 2023-03-01 NOTE — H&P
Interventional Radiology Pre-Procedure History & Physical      Chief Complaint/Reason for Referral: Liver mets    History of Present Illness:  Coby Marshall is a 67 y.o. female who presents with NET met to liver. Discussed at NET board. Referred for cTACE.    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    Ki 67 -3-20%    Hyperlipidemia     Hypertension     Malignant carcinoid tumor of ileum 12/2017    Ki 67 < 3%    Rectal cancer 2/8/2019     Past Surgical History:   Procedure Laterality Date    COLON SURGERY      ENDOSCOPIC ULTRASOUND OF UPPER GASTROINTESTINAL TRACT N/A 12/17/2021    Procedure: ULTRASOUND, UPPER GI TRACT, ENDOSCOPIC;  Surgeon: Jeffery Clemons MD;  Location: [facility] ([address] [place] [address]);  Service: Endoscopy;  Laterality: N/A;  instructions emailed to pt-BB  fully vaccinated-BB   12/13 arrival time confirmed with pt-rb    HYSTERECTOMY      ILEOSTOMY CLOSURE  01/2018    Lower Anterior Resection  12/2017    NET- Dr. Beck - [facility]    port a cath Right     R chest wall       Allergies:   Review of patient's allergies indicates:   Allergen Reactions    Arb-angiotensin receptor antagonist Swelling     Had angioedema of lips with ACE inhibitor, so this is a contraindication    Lisinopril Swelling    Lisinopril-hydrochlorothiazide Swelling     Mouth Swelling    Sulfa (sulfonamide antibiotics) Swelling     Swelling of lips    Sulfasalazine Swelling     Swelling of lips    Epinephrine      Neuroendocrine Tumor patient      Hydrocodone-acetaminophen Other (See Comments)     stomach pain (even with food)    Amlodipine Other (See Comments)     edema of ankles and legs.        Home Meds:   Prior to Admission medications    Medication Sig Start Date End Date Taking? Authorizing Provider   atorvastatin (LIPITOR) 20 MG tablet Take 20 mg by mouth once daily.   Yes Historical Provider   brimonidine-timoloL (COMBIGAN) 0.2-0.5 % Drop  Place 1 drop into the right eye 2 (two) times daily. 1/26/23  Yes Historical Provider   cholestyramine, with sugar, 4 gram Powd Take 2 g by mouth 2 (two) times daily with meals. 7/11/18 3/1/23 Yes Suhas Manjarrez MD   cloNIDine (CATAPRES) 0.1 MG tablet Take 0.1 mg by mouth 2 (two) times daily.   Yes Historical Provider   hydroCHLOROthiazide (MICROZIDE) 12.5 mg capsule Take 12.5 mg by mouth once daily.   Yes Historical Provider   ALBUTEROL INHL Inhale into the lungs as needed.     Historical Provider   aspirin (ECOTRIN) 81 MG EC tablet Take 81 mg by mouth once daily.    Historical Provider   fluticasone (FLONASE) 50 mcg/actuation nasal spray 1 spray by Each Nare route once daily.    Historical Provider   ranitidine (ZANTAC) 150 MG tablet Take 150 mg by mouth nightly.  11/13/18 12/23/21  Historical Provider       Anticoagulation/Antiplatelet Meds: no anticoagulation    Review of Systems:   Hematological: no known coagulopathies  Respiratory: no shortness of breath  Cardiovascular: no chest pain  Gastrointestinal: no abdominal pain  Genitourinary: no dysuria  Musculoskeletal: negative  Neurological: no TIA or stroke symptoms     Physical Exam:  Temp: 97.7 °F (36.5 °C) (03/01/23 0738)  Pulse: 62 (03/01/23 0914)  Resp: 14 (03/01/23 0914)  BP: (!) 142/80 (03/01/23 0914)  SpO2: 100 % (03/01/23 0914)    General: WNWD, NAD  HEENT: Normocephalic, sclera anicteric, oropharynx clear  Heart: RRR  Lungs: Symmetric excursions, breathing unlabored  Abd: NTND, soft  Extremities: JENSEN  Pulses: 2+ DP b/l  Neuro: Gross nonfocal    Laboratory:  Lab Results   Component Value Date    INR 1.1 02/28/2023       Lab Results   Component Value Date    WBC 7.10 02/28/2023    HGB 10.5 (L) 02/28/2023    HCT 34.7 (L) 02/28/2023    MCV 88 02/28/2023     02/28/2023      Lab Results   Component Value Date    GLU 85 02/28/2023     02/28/2023    K 3.9 02/28/2023     02/28/2023    CO2 32 (H) 02/28/2023    BUN 13 02/28/2023    CREATININE  1.2 02/28/2023    CALCIUM 9.5 02/28/2023    MG 1.4 (L) 03/08/2018    ALT 16 02/28/2023    AST 21 02/28/2023    ALBUMIN 4.0 02/28/2023    BILITOT 0.5 02/28/2023       Imaging:  MRI abd 2/28/23 reviewed.    Assessment/Plan:  67 y.o. female with liver mets. Will undergo cTACE today.    Sedation:  Sedation history: have not been any systemic reactions  ASA: 3 / Mallampati: 3  Sedation plan: Up to moderate (Versed, fentanyl)     Risks (including, but not limited to, pain, bleeding, infection, damage to nearby structures, liver/renal injury, treatment failure/recurrence, the need for additional procedures, death), potential benefits, and alternatives were discussed with the patient. All questions were answered to the best of my abilities. The patient wishes to proceed. Written informed consent was obtained.      Enzo Soni MD  [facility]  Pager [phone]

## 2023-03-01 NOTE — ASSESSMENT & PLAN NOTE
-status post tace  -now with nausea, abdominal pain  -prn antiemetics and opioids for pain control   -continue Sandostatin  -will observe overnight

## 2023-03-01 NOTE — H&P
[facility] Medicine  History & Physical    Patient Name: Coby Marshall  MRN: [id number]  Patient Class: OP- Outpatient Recovery  Admission Date: 3/1/2023  Attending Physician: Juve Steiner MD  Primary Care Provider: Randal Casillas MD         Patient information was obtained from patient, past medical records and ER records.     Subjective:     Principal Problem:Nausea and vomiting    Chief Complaint:   Chief Complaint   Patient presents with    Nausea        HPI: Ms. 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  She denies any other symptoms and states she was in her normal state of health prior to the procedure today but is feeling a little nauseous and having epigastric abdominal pain.  No fevers or chills.      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    Ki 67 -3-20%    Hyperlipidemia     Hypertension     Malignant carcinoid tumor of ileum 12/2017    Ki 67 < 3%    Rectal cancer 2/8/2019       Past Surgical History:   Procedure Laterality Date    COLON SURGERY      ENDOSCOPIC ULTRASOUND OF UPPER GASTROINTESTINAL TRACT N/A 12/17/2021    Procedure: ULTRASOUND, UPPER GI TRACT, ENDOSCOPIC;  Surgeon: Jeffery Clemons MD;  Location: [facility] ([address] [place] [address]);  Service: Endoscopy;  Laterality: N/A;  instructions emailed to pt-BB  fully vaccinated-BB   12/13 arrival time confirmed with pt-rb    HYSTERECTOMY      ILEOSTOMY CLOSURE  01/2018    Lower Anterior Resection  12/2017    NET- Dr. Beck - [facility]    port a cath Right     R chest wall       Review of patient's allergies indicates:   Allergen Reactions    Arb-angiotensin receptor antagonist Swelling     Had angioedema of lips with ACE inhibitor, so  this is a contraindication    Lisinopril Swelling    Lisinopril-hydrochlorothiazide Swelling     Mouth Swelling    Sulfa (sulfonamide antibiotics) Swelling     Swelling of lips    Sulfasalazine Swelling     Swelling of lips    Epinephrine      Neuroendocrine Tumor patient      Hydrocodone-acetaminophen Other (See Comments)     stomach pain (even with food)    Amlodipine Other (See Comments)     edema of ankles and legs.       No current facility-administered medications on file prior to encounter.     Current Outpatient Medications on File Prior to Encounter   Medication Sig    atorvastatin (LIPITOR) 20 MG tablet Take 20 mg by mouth once daily.    brimonidine-timoloL (COMBIGAN) 0.2-0.5 % Drop Place 1 drop into the right eye 2 (two) times daily.    cholestyramine, with sugar, 4 gram Powd Take 2 g by mouth 2 (two) times daily with meals.    cloNIDine (CATAPRES) 0.1 MG tablet Take 0.1 mg by mouth 2 (two) times daily.    hydroCHLOROthiazide (MICROZIDE) 12.5 mg capsule Take 12.5 mg by mouth once daily.    ALBUTEROL INHL Inhale into the lungs as needed.     fluticasone (FLONASE) 50 mcg/actuation nasal spray 1 spray by Each Nare route once daily.    ranitidine (ZANTAC) 150 MG tablet Take 150 mg by mouth nightly.     [DISCONTINUED] aspirin (ECOTRIN) 81 MG EC tablet Take 81 mg by mouth once daily.     Family History       Problem Relation (Age of Onset)    Cancer Sister          Tobacco Use    Smoking status: Never    Smokeless tobacco: Never   Substance and Sexual Activity    Alcohol use: No    Drug use: No    Sexual activity: Not on file     Review of Systems   Constitutional:  Negative for chills, diaphoresis and fever.   HENT:  Negative for congestion and sore throat.    Eyes:  Negative for discharge and visual disturbance.   Respiratory:  Negative for cough and shortness of breath.    Cardiovascular:  Negative for chest pain and leg swelling.   Gastrointestinal:  Positive for abdominal pain, nausea  and vomiting.   Genitourinary:  Negative for difficulty urinating.   Musculoskeletal:  Negative for arthralgias and joint swelling.   Skin:  Negative for rash and wound.   Allergic/Immunologic: Negative for immunocompromised state.   Neurological:  Negative for light-headedness and headaches.   Psychiatric/Behavioral:  Negative for agitation and confusion.    Objective:     Vital Signs (Most Recent):  Temp: 97.4 °F (36.3 °C) (03/01/23 1537)  Pulse: 60 (03/01/23 1537)  Resp: 16 (03/01/23 1500)  BP: (!) 168/77 (03/01/23 1537)  SpO2: 99 % (03/01/23 1537)   Vital Signs (24h Range):  Temp:  [97.4 °F (36.3 °C)-97.7 °F (36.5 °C)] 97.4 °F (36.3 °C)  Pulse:  [60-75] 60  Resp:  [9-16] 16  SpO2:  [92 %-100 %] 99 %  BP: (130-175)/(64-92) 168/77     Weight: 89.9 kg (198 lb 3.1 oz)  Body mass index is 31.99 kg/m².    Physical Exam  Vitals reviewed.   Constitutional:       General: She is not in acute distress.     Appearance: She is well-developed. She is not diaphoretic.   HENT:      Head: Normocephalic and atraumatic.      Nose: Nose normal.   Eyes:      General: No scleral icterus.     Pupils: Pupils are equal, round, and reactive to light.   Neck:      Vascular: No JVD.      Trachea: No tracheal deviation.   Cardiovascular:      Rate and Rhythm: Normal rate and regular rhythm.      Heart sounds: Normal heart sounds.   Pulmonary:      Effort: Pulmonary effort is normal. No respiratory distress.      Breath sounds: Normal breath sounds.   Abdominal:      General: There is no distension.      Palpations: Abdomen is soft.      Tenderness: There is abdominal tenderness (Epigastric).   Musculoskeletal:         General: No deformity.      Cervical back: Normal range of motion.   Skin:     General: Skin is warm and dry.      Findings: No rash.   Neurological:      Mental Status: She is alert and oriented to person, place, and time.   Psychiatric:         Behavior: Behavior normal.         CRANIAL NERVES     CN III, IV, VI   Pupils  are equal, round, and reactive to light.     Significant Labs: All pertinent labs within the past 24 hours have been reviewed.    Significant Imaging: I have reviewed all pertinent imaging results/findings within the past 24 hours.    Assessment/Plan:     * Nausea and vomiting  -p.r.n.  Antiemetics and opioids for pain control following her procedure this a.m.   -monitor overnight; if symptoms resolve will likely be able to discharge in the morning      Primary open angle glaucoma (POAG) of both eyes, moderate stage  -continue home eye drops      Hypovitaminosis D  -continue home vitamin-D      Benign essential hypertension  -continue home clonidine and hydrochlorothiazide      Neuroendocrine carcinoma metastatic to multiple sites  -status post tace  -now with nausea, abdominal pain  -prn antiemetics and opioids for pain control   -continue Sandostatin  -will observe overnight        VTE Risk Mitigation (From admission, onward)         Ordered     enoxaparin injection 40 mg  Daily         03/01/23 1543     IP VTE HIGH RISK PATIENT  Once         03/01/23 1543     Place sequential compression device  Until discontinued         03/01/23 1543                   Juve Steiner MD  Department of Hospital Medicine   [facility]

## 2023-03-01 NOTE — NURSING
Report given to Elle, the nurse assigned to the patient's care. Patient will be transferred to room 534. Patient's  advised.

## 2023-03-01 NOTE — PROCEDURES
Interventional Radiology Immediate Post-Procedure Note    Pre-Op Diagnosis: Metastatic neuroendocrine cancer   Post-Op Diagnosis: Same    Procedure: Conventional trans-arterial chemoembolization (lipiodol-based TACE)    Procedure performed by: Enzo Soni MD  Assistants: None    Estimated Blood Loss: Less than 50 mL  Specimen Removed: None sent    Findings/description of procedure:  Successful chemoembolization of the anterior right and caudate lobes. Expected retention of chemoembolic material within the treatment area on post-embolization cone-beam CT. Right CFA closed with VASCADE.    Chemoembolic:  Agent: Doxorubicin, 50 mg reconstituted in 8 mL Omnipaque-350  Emulsion: 8 mL Doxorubicin (50 mg) into 20 mL lipiodol     Chemoembolization #1:  Catheter position: Segment 1 hepatic artery  Volume of emulsion administered: 2 mL  Additional embolic administered: 0.1 vial 200 micron Terumo HydroPearl Microspheres     Chemoembolization #2:  Catheter position: Anterior division of the right hepatic artery  Volume of emulsion administered: 10 mL  Additional embolic administered: 0.65 vial 200 micron Terumo HydroPearl Microspheres     Totals administered:  Doxorubicin: 21.4 mg  Lipiodol: 8.6 mL  Additional embolic: 0.75 vial 200 micron Terumo HydroPearl Microspheres    No immediate complications. Patient tolerated procedure well.     Recommendations:  MRI in 4-6 wks, followed by additional cTACE of any tumor that remains.    Please see full dictated procedure report for additional details.      Enzo Soni MD  [facility]  Pager [phone]

## 2023-03-01 NOTE — HPI
Ms. 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  She denies any other symptoms and states she was in her normal state of health prior to the procedure today but is feeling a little nauseous and having epigastric abdominal pain.  No fevers or chills.

## 2023-03-01 NOTE — SUBJECTIVE & OBJECTIVE
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    Ki 67 -3-20%    Hyperlipidemia     Hypertension     Malignant carcinoid tumor of ileum 12/2017    Ki 67 < 3%    Rectal cancer 2/8/2019       Past Surgical History:   Procedure Laterality Date    COLON SURGERY      ENDOSCOPIC ULTRASOUND OF UPPER GASTROINTESTINAL TRACT N/A 12/17/2021    Procedure: ULTRASOUND, UPPER GI TRACT, ENDOSCOPIC;  Surgeon: Jeffery Clemons MD;  Location: [facility] ([address] [place] [address]);  Service: Endoscopy;  Laterality: N/A;  instructions emailed to pt-BB  fully vaccinated-BB   12/13 arrival time confirmed with pt-rb    HYSTERECTOMY      ILEOSTOMY CLOSURE  01/2018    Lower Anterior Resection  12/2017    LETITIA- Dr. Beck - [facility]    port a cath Right     R chest wall       Review of patient's allergies indicates:   Allergen Reactions    Arb-angiotensin receptor antagonist Swelling     Had angioedema of lips with ACE inhibitor, so this is a contraindication    Lisinopril Swelling    Lisinopril-hydrochlorothiazide Swelling     Mouth Swelling    Sulfa (sulfonamide antibiotics) Swelling     Swelling of lips    Sulfasalazine Swelling     Swelling of lips    Epinephrine      Neuroendocrine Tumor patient      Hydrocodone-acetaminophen Other (See Comments)     stomach pain (even with food)    Amlodipine Other (See Comments)     edema of ankles and legs.       No current facility-administered medications on file prior to encounter.     Current Outpatient Medications on File Prior to Encounter   Medication Sig    atorvastatin (LIPITOR) 20 MG tablet Take 20 mg by mouth once daily.    brimonidine-timoloL (COMBIGAN) 0.2-0.5 % Drop Place 1 drop into the right eye 2 (two) times daily.    cholestyramine, with sugar, 4 gram Powd Take 2 g by mouth 2 (two) times daily with meals.    cloNIDine (CATAPRES) 0.1 MG tablet Take 0.1 mg by mouth 2 (two) times daily.    hydroCHLOROthiazide  (MICROZIDE) 12.5 mg capsule Take 12.5 mg by mouth once daily.    ALBUTEROL INHL Inhale into the lungs as needed.     fluticasone (FLONASE) 50 mcg/actuation nasal spray 1 spray by Each Nare route once daily.    ranitidine (ZANTAC) 150 MG tablet Take 150 mg by mouth nightly.     [DISCONTINUED] aspirin (ECOTRIN) 81 MG EC tablet Take 81 mg by mouth once daily.     Family History       Problem Relation (Age of Onset)    Cancer Sister          Tobacco Use    Smoking status: Never    Smokeless tobacco: Never   Substance and Sexual Activity    Alcohol use: No    Drug use: No    Sexual activity: Not on file     Review of Systems   Constitutional:  Negative for chills, diaphoresis and fever.   HENT:  Negative for congestion and sore throat.    Eyes:  Negative for discharge and visual disturbance.   Respiratory:  Negative for cough and shortness of breath.    Cardiovascular:  Negative for chest pain and leg swelling.   Gastrointestinal:  Positive for abdominal pain, nausea and vomiting.   Genitourinary:  Negative for difficulty urinating.   Musculoskeletal:  Negative for arthralgias and joint swelling.   Skin:  Negative for rash and wound.   Allergic/Immunologic: Negative for immunocompromised state.   Neurological:  Negative for light-headedness and headaches.   Psychiatric/Behavioral:  Negative for agitation and confusion.    Objective:     Vital Signs (Most Recent):  Temp: 97.4 °F (36.3 °C) (03/01/23 1537)  Pulse: 60 (03/01/23 1537)  Resp: 16 (03/01/23 1500)  BP: (!) 168/77 (03/01/23 1537)  SpO2: 99 % (03/01/23 1537)   Vital Signs (24h Range):  Temp:  [97.4 °F (36.3 °C)-97.7 °F (36.5 °C)] 97.4 °F (36.3 °C)  Pulse:  [60-75] 60  Resp:  [9-16] 16  SpO2:  [92 %-100 %] 99 %  BP: (130-175)/(64-92) 168/77     Weight: 89.9 kg (198 lb 3.1 oz)  Body mass index is 31.99 kg/m².    Physical Exam  Vitals reviewed.   Constitutional:       General: She is not in acute distress.     Appearance: She is well-developed. She is not diaphoretic.    HENT:      Head: Normocephalic and atraumatic.      Nose: Nose normal.   Eyes:      General: No scleral icterus.     Pupils: Pupils are equal, round, and reactive to light.   Neck:      Vascular: No JVD.      Trachea: No tracheal deviation.   Cardiovascular:      Rate and Rhythm: Normal rate and regular rhythm.      Heart sounds: Normal heart sounds.   Pulmonary:      Effort: Pulmonary effort is normal. No respiratory distress.      Breath sounds: Normal breath sounds.   Abdominal:      General: There is no distension.      Palpations: Abdomen is soft.      Tenderness: There is abdominal tenderness (Epigastric).   Musculoskeletal:         General: No deformity.      Cervical back: Normal range of motion.   Skin:     General: Skin is warm and dry.      Findings: No rash.   Neurological:      Mental Status: She is alert and oriented to person, place, and time.   Psychiatric:         Behavior: Behavior normal.         CRANIAL NERVES     CN III, IV, VI   Pupils are equal, round, and reactive to light.     Significant Labs: All pertinent labs within the past 24 hours have been reviewed.    Significant Imaging: I have reviewed all pertinent imaging results/findings within the past 24 hours.

## 2023-03-02 VITALS
RESPIRATION RATE: 16 BRPM | HEART RATE: 73 BPM | SYSTOLIC BLOOD PRESSURE: 169 MMHG | HEIGHT: 66 IN | WEIGHT: 210.56 LBS | OXYGEN SATURATION: 100 % | TEMPERATURE: 100 F | BODY MASS INDEX: 33.84 KG/M2 | DIASTOLIC BLOOD PRESSURE: 85 MMHG

## 2023-03-02 LAB
POCT GLUCOSE: 105 MG/DL (ref 70–110)
POCT GLUCOSE: 149 MG/DL (ref 70–110)

## 2023-03-02 PROCEDURE — 94760 N-INVAS EAR/PLS OXIMETRY 1: CPT

## 2023-03-02 PROCEDURE — A4216 STERILE WATER/SALINE, 10 ML: HCPCS | Performed by: HOSPITALIST

## 2023-03-02 PROCEDURE — 25000003 PHARM REV CODE 250: Performed by: HOSPITALIST

## 2023-03-02 PROCEDURE — 63600175 PHARM REV CODE 636 W HCPCS: Performed by: RADIOLOGY

## 2023-03-02 PROCEDURE — 63600175 PHARM REV CODE 636 W HCPCS: Performed by: HOSPITALIST

## 2023-03-02 RX ORDER — ONDANSETRON 8 MG/1
8 TABLET, ORALLY DISINTEGRATING ORAL EVERY 8 HOURS PRN
Qty: 10 TABLET | Refills: 0 | Status: ON HOLD | OUTPATIENT
Start: 2023-03-02 | End: 2024-02-08 | Stop reason: HOSPADM

## 2023-03-02 RX ORDER — OXYCODONE HYDROCHLORIDE 5 MG/1
5 TABLET ORAL EVERY 6 HOURS PRN
Qty: 7 TABLET | Refills: 0 | Status: ON HOLD | OUTPATIENT
Start: 2023-03-02 | End: 2023-11-04 | Stop reason: SDUPTHER

## 2023-03-02 RX ADMIN — DEXTROSE, SODIUM CHLORIDE, AND POTASSIUM CHLORIDE: 5; .45; .15 INJECTION INTRAVENOUS at 09:03

## 2023-03-02 RX ADMIN — HYDROCHLOROTHIAZIDE 12.5 MG: 12.5 TABLET ORAL at 08:03

## 2023-03-02 RX ADMIN — Medication 10 ML: at 04:03

## 2023-03-02 RX ADMIN — CLONIDINE HYDROCHLORIDE 0.1 MG: 0.1 TABLET ORAL at 08:03

## 2023-03-02 RX ADMIN — ATORVASTATIN CALCIUM 20 MG: 20 TABLET, FILM COATED ORAL at 08:03

## 2023-03-02 RX ADMIN — CHOLESTYRAMINE 4 G: 4 POWDER, FOR SUSPENSION ORAL at 08:03

## 2023-03-02 RX ADMIN — TIMOLOL MALEATE 1 DROP: 5 SOLUTION/ DROPS OPHTHALMIC at 08:03

## 2023-03-02 RX ADMIN — DEXTROSE, SODIUM CHLORIDE, AND POTASSIUM CHLORIDE: 5; .45; .15 INJECTION INTRAVENOUS at 01:03

## 2023-03-02 RX ADMIN — MORPHINE SULFATE 4 MG: 4 INJECTION INTRAVENOUS at 04:03

## 2023-03-02 RX ADMIN — OXYCODONE HYDROCHLORIDE 5 MG: 5 TABLET ORAL at 08:03

## 2023-03-02 RX ADMIN — BRIMONIDINE TARTRATE 1 DROP: 2 SOLUTION OPHTHALMIC at 08:03

## 2023-03-02 NOTE — NURSING
Pt meets criteria for discharge. VSS. Tolerating regular diet.  Voiding spontaneously. AVS given to pt, states understanding of discharge instructions. IV removed. Pt to be discharged to home.

## 2023-03-02 NOTE — DISCHARGE SUMMARY
"[facility]  Discharge Summary      Patient Name: Coby Marshall  MRN: [id number]  BRENDA: [id number]  Patient Class: OP- Outpatient Recovery  Admission Date: 3/1/2023  Hospital Length of Stay: 0 days  Discharge Date and Time:  03/02/2023 1:37 PM  Attending Physician: Juve Steiner, *   Discharging Provider: Juve Steiner MD  Primary Care Provider: Randal Casillas MD    Primary Care Team: Networked reference to record PCT     HPI:   Ms. 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  She denies any other symptoms and states she was in her normal state of health prior to the procedure today but is feeling a little nauseous and having epigastric abdominal pain.  No fevers or chills.      * No procedures listed *      Hospital Course:   Ms. Marshall presented following TACE procedure for nausea and pain control. She has been doing well today, pain greatly improved from yesterday. Some mild nausea, but able to tolerate breakfast this am. Stable for discharge; will write script for prn pain/nausea meds.    BP (!) 169/85 (BP Location: Left arm, Patient Position: Sitting)   Pulse 73   Temp 99.8 °F (37.7 °C) (Oral)   Resp 16   Ht 5' 6" (1.676 m)   Wt 95.5 kg (210 lb 8.6 oz)   SpO2 100%   Breastfeeding No   BMI 33.98 kg/m²   Physical Exam  Vitals reviewed.   Constitutional:       General: She is not in acute distress.     Appearance: She is well-developed. She is not diaphoretic.   HENT:      Head: Normocephalic and atraumatic.      Nose: Nose normal.   Eyes:      General: No scleral icterus.     Pupils: Pupils are equal, round, and reactive to light.   Neck:      Vascular: No JVD.      Trachea: No tracheal deviation.   Cardiovascular:      Rate and Rhythm: Normal rate and regular rhythm.      " Heart sounds: Normal heart sounds.   Pulmonary:      Effort: Pulmonary effort is normal. No respiratory distress.      Breath sounds: Normal breath sounds.   Abdominal:      General: There is no distension.      Palpations: Abdomen is soft.      Tenderness: There is abdominal tenderness (Epigastric, mild).   Musculoskeletal:         General: No deformity.      Cervical back: Normal range of motion.   Skin:     General: Skin is warm and dry.      Findings: No rash.   Neurological:      Mental Status: She is alert and oriented to person, place, and time.   Psychiatric:         Behavior: Behavior normal.            CRANIAL NERVES      CN III, IV, VI   Pupils are equal, round, and reactive to light.       Goals of Care Treatment Preferences:  Code Status: Full Code      Consults:     Cardiac/Vascular  Benign essential hypertension  -continue home clonidine and hydrochlorothiazide      Oncology  Neuroendocrine carcinoma metastatic to multiple sites  -status post tace  -now with nausea, abdominal pain  -prn antiemetics and opioids for pain control   -continue Sandostatin  -will observe overnight      Endocrine  Hypovitaminosis D  -continue home vitamin-D      GI  * Nausea and vomiting  -p.r.n.  Antiemetics and opioids for pain control following her procedure this a.m.   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 [C7A.8, C7B.8] 12/23/2021 Yes    Hypovitaminosis D [E55.9] 07/30/2019 Yes    Primary open angle glaucoma (POAG) of both eyes, moderate stage [H40.1132] 04/23/2019 Yes    Benign essential hypertension [I10] 04/27/2015 Yes      Problems Resolved During this Admission:       Discharged  Condition: good    Disposition: Home or Self Care    Follow Up:    Patient Instructions:      Diet Adult Regular     Notify your health care provider if you experience any of the following:  temperature >100.4     Notify your health care provider if you experience any of the following:  persistent nausea and vomiting or diarrhea     Notify your health care provider if you experience any of the following:  severe uncontrolled pain     Notify your health care provider if you experience any of the following:  difficulty breathing or increased cough     Notify your health care provider if you experience any of the following:  severe persistent headache     Notify your health care provider if you experience any of the following:  persistent dizziness, light-headedness, or visual disturbances     Notify your health care provider if you experience any of the following:  increased confusion or weakness     Activity as tolerated       Significant Diagnostic Studies: see above    Pending Diagnostic Studies:     Procedure Component Value Units Date/Time    IR Embolization for Tumor_Organ Ischemia [[id number]] Resulted: 03/01/23 0852    Order Status: Sent Lab Status: In process Updated: 03/01/23 1031         Medications:  Reconciled Home Medications:      Medication List      START taking these medications    ondansetron 8 MG Tbdl  Commonly known as: ZOFRAN-ODT  Dissolve 1 tablet (8 mg total) by mouth every 8 (eight) hours as needed (nausea).     oxyCODONE 5 MG immediate release tablet  Commonly known as: ROXICODONE  Take 1 tablet (5 mg total) by mouth every 6 (six) hours as needed for Pain.        CONTINUE taking these medications    ALBUTEROL INHL  Inhale into the lungs as needed.     atorvastatin 20 MG tablet  Commonly known as: LIPITOR  Take 20 mg by mouth once daily.     brimonidine-timoloL 0.2-0.5 % Drop  Commonly known as: COMBIGAN  Place 1 drop into the right eye 2 (two) times daily.     cholestyramine (with sugar) 4  gram Powd  Take 2 g by mouth 2 (two) times daily with meals.     cloNIDine 0.1 MG tablet  Commonly known as: CATAPRES  Take 0.1 mg by mouth 2 (two) times daily.     fluticasone propionate 50 mcg/actuation nasal spray  Commonly known as: FLONASE  1 spray by Each Nare route once daily.     hydroCHLOROthiazide 12.5 mg capsule  Commonly known as: MICROZIDE  Take 12.5 mg by mouth once daily.     ranitidine 150 MG tablet  Commonly known as: ZANTAC  Take 150 mg by mouth nightly.            Indwelling Lines/Drains at time of discharge:   Lines/Drains/Airways     Drain  Duration           Female External Urinary Catheter 03/01/23 1 day                Time spent on the discharge of patient: 25 minutes         Juve Steiner MD  Department of Hospital Medicine  [facility]

## 2023-03-02 NOTE — HOSPITAL COURSE
"Ms. Marshall presented following TACE procedure for nausea and pain control. She has been doing well today, pain greatly improved from yesterday. Some mild nausea, but able to tolerate breakfast this am. Stable for discharge; will write script for prn pain/nausea meds.    BP (!) 169/85 (BP Location: Left arm, Patient Position: Sitting)   Pulse 73   Temp 99.8 °F (37.7 °C) (Oral)   Resp 16   Ht 5' 6" (1.676 m)   Wt 95.5 kg (210 lb 8.6 oz)   SpO2 100%   Breastfeeding No   BMI 33.98 kg/m²   Physical Exam  Vitals reviewed.   Constitutional:       General: She is not in acute distress.     Appearance: She is well-developed. She is not diaphoretic.   HENT:      Head: Normocephalic and atraumatic.      Nose: Nose normal.   Eyes:      General: No scleral icterus.     Pupils: Pupils are equal, round, and reactive to light.   Neck:      Vascular: No JVD.      Trachea: No tracheal deviation.   Cardiovascular:      Rate and Rhythm: Normal rate and regular rhythm.      Heart sounds: Normal heart sounds.   Pulmonary:      Effort: Pulmonary effort is normal. No respiratory distress.      Breath sounds: Normal breath sounds.   Abdominal:      General: There is no distension.      Palpations: Abdomen is soft.      Tenderness: There is abdominal tenderness (Epigastric, mild).   Musculoskeletal:         General: No deformity.      Cervical back: Normal range of motion.   Skin:     General: Skin is warm and dry.      Findings: No rash.   Neurological:      Mental Status: She is alert and oriented to person, place, and time.   Psychiatric:         Behavior: Behavior normal.            CRANIAL NERVES      CN III, IV, VI   Pupils are equal, round, and reactive to light.  "

## 2023-03-02 NOTE — PLAN OF CARE
AAO,meds and ivf/jil admin as ordered,rt groin dressing cdi,prn given for c/o nausea,poc reviewed w pt/ stated understanding,bed alarm set.

## 2023-03-02 NOTE — PROGRESS NOTES
[facility]                    Pharmacy       Discharge Medication Education    Patient ACCEPTED medication education. Pharmacy has provided education on the name, indication, and possible side effects of the medication(s) prescribed, using teach-back method.     The following medications have also been discussed, during this admission.        Medication List        START taking these medications      ondansetron 8 MG Tbdl  Commonly known as: ZOFRAN-ODT  Dissolve 1 tablet (8 mg total) by mouth every 8 (eight) hours as needed (nausea).     oxyCODONE 5 MG immediate release tablet  Commonly known as: ROXICODONE  Take 1 tablet (5 mg total) by mouth every 6 (six) hours as needed for Pain.            CONTINUE taking these medications      ALBUTEROL INHL     atorvastatin 20 MG tablet  Commonly known as: LIPITOR     brimonidine-timoloL 0.2-0.5 % Drop  Commonly known as: COMBIGAN     cholestyramine (with sugar) 4 gram Powd  Take 2 g by mouth 2 (two) times daily with meals.     cloNIDine 0.1 MG tablet  Commonly known as: CATAPRES     fluticasone propionate 50 mcg/actuation nasal spray  Commonly known as: FLONASE     hydroCHLOROthiazide 12.5 mg capsule  Commonly known as: MICROZIDE     ranitidine 150 MG tablet  Commonly known as: ZANTAC               Where to Get Your Medications        These medications were sent to [facility]  [address] [address]      Hours: Mon-Fri, 8a-5:30p Phone: [phone]   ondansetron 8 MG Tbdl  oxyCODONE 5 MG immediate release tablet          Thank you  Umu Kim, PharmD  [phone]

## 2023-03-05 ENCOUNTER — NURSE TRIAGE (OUTPATIENT)
Dept: ADMINISTRATIVE | Facility: CLINIC | Age: 68
End: 2023-03-05
Payer: MEDICARE

## 2023-03-05 NOTE — TELEPHONE ENCOUNTER
Pt's  states pt recently had a procedure on 3/1/23. Procedure was a Conventional trans-arterial chemoembolization (lipiodol-based TACE) through the groin. He states pt has a headache (she rates at a 7/10) and a swollen, painful shoulder (9/10). She also complains of a sore throat.  palpated shoulder; he does not describe subcutaneous emphysema. States that it feels tight. Contacted on call provider, Dr. Santos, who states it does not sound like a complication from the procedure; sometimes people have referred liver pain. Swelling is abnormal. I advised pt to be seen by HCP within 4 hours. Verbalized understanding.     Reason for Disposition   [1] SEVERE post-op pain (e.g., excruciating, pain scale 8-10) AND [2] not controlled with pain medications    Additional Information   Negative: Sounds like a life-threatening emergency to the triager   Negative: Difficulty breathing   Negative: [1] Widespread rash AND [2] bright red, sunburn-like   Negative: [1] SEVERE headache AND [2] after spinal (epidural) anesthesia   Negative: [1] Vomiting AND [2] persists > 4 hours   Negative: [1] Vomiting AND [2] abdomen looks much more swollen than usual   Negative: [1] Drinking very little AND [2] dehydration suspected (e.g., no urine > 12 hours, very dry mouth, very lightheaded)   Negative: Patient sounds very sick or weak to the triager   Negative: Sounds like a serious complication to the triager   Negative: Fever > 100.4 F (38.0 C)    Protocols used: Post-Op Symptoms and [id number]

## 2023-04-04 ENCOUNTER — OFFICE VISIT (OUTPATIENT)
Dept: HEMATOLOGY/ONCOLOGY | Facility: CLINIC | Age: 68
End: 2023-04-04
Payer: MEDICARE

## 2023-04-04 ENCOUNTER — HOSPITAL ENCOUNTER (OUTPATIENT)
Dept: RADIOLOGY | Facility: HOSPITAL | Age: 68
Discharge: HOME | End: 2023-04-04
Attending: INTERNAL MEDICINE
Payer: MEDICARE

## 2023-04-04 ENCOUNTER — TELEPHONE (OUTPATIENT)
Dept: INTERVENTIONAL RADIOLOGY/VASCULAR | Facility: HOSPITAL | Age: 68
End: 2023-04-04
Payer: MEDICARE

## 2023-04-04 VITALS
HEIGHT: 66 IN | SYSTOLIC BLOOD PRESSURE: 144 MMHG | BODY MASS INDEX: 30.74 KG/M2 | WEIGHT: 191.25 LBS | OXYGEN SATURATION: 100 % | DIASTOLIC BLOOD PRESSURE: 74 MMHG | HEART RATE: 78 BPM | RESPIRATION RATE: 18 BRPM | TEMPERATURE: 99 F

## 2023-04-04 DIAGNOSIS — C7B.8 NEUROENDOCRINE CARCINOMA METASTATIC TO MULTIPLE SITES: ICD-10-CM

## 2023-04-04 DIAGNOSIS — C78.7 SECONDARY MALIGNANT NEOPLASM OF LIVER: ICD-10-CM

## 2023-04-04 DIAGNOSIS — C7A.012 MALIGNANT CARCINOID TUMOR OF ILEUM: ICD-10-CM

## 2023-04-04 DIAGNOSIS — C7A.8 NEUROENDOCRINE CARCINOMA METASTATIC TO MULTIPLE SITES: ICD-10-CM

## 2023-04-04 DIAGNOSIS — C7A.026 MALIGNANT CARCINOID TUMOR OF RECTUM: Primary | ICD-10-CM

## 2023-04-04 DIAGNOSIS — I10 ESSENTIAL HYPERTENSION: ICD-10-CM

## 2023-04-04 DIAGNOSIS — C7A.026 MALIGNANT CARCINOID TUMOR OF RECTUM: ICD-10-CM

## 2023-04-04 DIAGNOSIS — N18.30 STAGE 3 CHRONIC KIDNEY DISEASE, UNSPECIFIED WHETHER STAGE 3A OR 3B CKD: ICD-10-CM

## 2023-04-04 DIAGNOSIS — D49.9 IMMUNODEFICIENCY SECONDARY TO NEOPLASM: ICD-10-CM

## 2023-04-04 DIAGNOSIS — D84.81 IMMUNODEFICIENCY SECONDARY TO NEOPLASM: ICD-10-CM

## 2023-04-04 DIAGNOSIS — D3A.8 NEUROENDOCRINE TUMOR OF PANCREAS: ICD-10-CM

## 2023-04-04 PROCEDURE — 3077F SYST BP >= 140 MM HG: CPT | Mod: CPTII,S$GLB,, | Performed by: INTERNAL MEDICINE

## 2023-04-04 PROCEDURE — 1159F PR MEDICATION LIST DOCUMENTED IN MEDICAL RECORD: ICD-10-PCS | Mod: CPTII,S$GLB,, | Performed by: INTERNAL MEDICINE

## 2023-04-04 PROCEDURE — A9585 GADOBUTROL INJECTION: HCPCS | Performed by: INTERNAL MEDICINE

## 2023-04-04 PROCEDURE — 3077F PR MOST RECENT SYSTOLIC BLOOD PRESSURE >= 140 MM HG: ICD-10-PCS | Mod: CPTII,S$GLB,, | Performed by: INTERNAL MEDICINE

## 2023-04-04 PROCEDURE — 1101F PT FALLS ASSESS-DOCD LE1/YR: CPT | Mod: CPTII,S$GLB,, | Performed by: INTERNAL MEDICINE

## 2023-04-04 PROCEDURE — 1126F AMNT PAIN NOTED NONE PRSNT: CPT | Mod: CPTII,S$GLB,, | Performed by: INTERNAL MEDICINE

## 2023-04-04 PROCEDURE — 3078F PR MOST RECENT DIASTOLIC BLOOD PRESSURE < 80 MM HG: ICD-10-PCS | Mod: CPTII,S$GLB,, | Performed by: INTERNAL MEDICINE

## 2023-04-04 PROCEDURE — 74183 MRI ABD W/O CNTR FLWD CNTR: CPT | Mod: TC

## 2023-04-04 PROCEDURE — 25500020 PHARM REV CODE 255: Performed by: INTERNAL MEDICINE

## 2023-04-04 PROCEDURE — 99215 PR OFFICE/OUTPT VISIT, EST, LEVL V, 40-54 MIN: ICD-10-PCS | Mod: S$GLB,,, | Performed by: INTERNAL MEDICINE

## 2023-04-04 PROCEDURE — 99999 PR PBB SHADOW E&M-EST. PATIENT-LVL IV: ICD-10-PCS | Mod: PBBFAC,,, | Performed by: INTERNAL MEDICINE

## 2023-04-04 PROCEDURE — 99999 PR PBB SHADOW E&M-EST. PATIENT-LVL IV: CPT | Mod: PBBFAC,,, | Performed by: INTERNAL MEDICINE

## 2023-04-04 PROCEDURE — 3078F DIAST BP <80 MM HG: CPT | Mod: CPTII,S$GLB,, | Performed by: INTERNAL MEDICINE

## 2023-04-04 PROCEDURE — 3288F PR FALLS RISK ASSESSMENT DOCUMENTED: ICD-10-PCS | Mod: CPTII,S$GLB,, | Performed by: INTERNAL MEDICINE

## 2023-04-04 PROCEDURE — 1126F PR PAIN SEVERITY QUANTIFIED, NO PAIN PRESENT: ICD-10-PCS | Mod: CPTII,S$GLB,, | Performed by: INTERNAL MEDICINE

## 2023-04-04 PROCEDURE — 1160F RVW MEDS BY RX/DR IN RCRD: CPT | Mod: CPTII,S$GLB,, | Performed by: INTERNAL MEDICINE

## 2023-04-04 PROCEDURE — 1159F MED LIST DOCD IN RCRD: CPT | Mod: CPTII,S$GLB,, | Performed by: INTERNAL MEDICINE

## 2023-04-04 PROCEDURE — 3288F FALL RISK ASSESSMENT DOCD: CPT | Mod: CPTII,S$GLB,, | Performed by: INTERNAL MEDICINE

## 2023-04-04 PROCEDURE — 99215 OFFICE O/P EST HI 40 MIN: CPT | Mod: S$GLB,,, | Performed by: INTERNAL MEDICINE

## 2023-04-04 PROCEDURE — 74183 MRI ABD W/O CNTR FLWD CNTR: CPT | Mod: 26,,, | Performed by: STUDENT IN AN ORGANIZED HEALTH CARE EDUCATION/TRAINING PROGRAM

## 2023-04-04 PROCEDURE — 1160F PR REVIEW ALL MEDS BY PRESCRIBER/CLIN PHARMACIST DOCUMENTED: ICD-10-PCS | Mod: CPTII,S$GLB,, | Performed by: INTERNAL MEDICINE

## 2023-04-04 PROCEDURE — 3008F BODY MASS INDEX DOCD: CPT | Mod: CPTII,S$GLB,, | Performed by: INTERNAL MEDICINE

## 2023-04-04 PROCEDURE — 3008F PR BODY MASS INDEX (BMI) DOCUMENTED: ICD-10-PCS | Mod: CPTII,S$GLB,, | Performed by: INTERNAL MEDICINE

## 2023-04-04 PROCEDURE — 1101F PR PT FALLS ASSESS DOC 0-1 FALLS W/OUT INJ PAST YR: ICD-10-PCS | Mod: CPTII,S$GLB,, | Performed by: INTERNAL MEDICINE

## 2023-04-04 PROCEDURE — 74183 MRI ABDOMEN W WO CONTRAST: ICD-10-PCS | Mod: 26,,, | Performed by: STUDENT IN AN ORGANIZED HEALTH CARE EDUCATION/TRAINING PROGRAM

## 2023-04-04 RX ORDER — GADOBUTROL 604.72 MG/ML
10 INJECTION INTRAVENOUS
Status: COMPLETED | OUTPATIENT
Start: 2023-04-04 | End: 2023-04-04

## 2023-04-04 RX ADMIN — GADOBUTROL 10 ML: 604.72 INJECTION INTRAVENOUS at 01:04

## 2023-04-04 NOTE — PROGRESS NOTES
PROGRESS NOTE    Subjective:       Patient ID: Coby Marshall is a 67 y.o. female.    Chief Complaint: follow up for metastatic rectal NET    Tempus: no reportable pathogenic mutation. MSI-low. PDL1 negative. 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    Oncologic History:  1. Ms. 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 Her primary oncologist is Dr. Olson at [facility].  Her oncology history is detailed below. She was initially diagnosed with rectal adenocarcinoma and she was treated with neoadjuvant chemoRT. Surgical pathology resulted as well-differentiated carcinoid and she was initially observed. A CT obtained for hernia revealed liver metastases in July 2021, and these were confirmed to be neuroendocrine on pathology. She underwent EUS with biopsy in December 2021 which showed grade 3 NET in the pancreas with Ki67 25%. She was started on lanreotide. Her liver lesions have since grown in size, and her oncologist referred her for consideration of liver-directed therapy. Ms. Marshall reports ongoing diarrhea since the time of her initial resection. She has about 6 bowel movements per day. She does not have any flushing, palpitations, or diaphoresis. She otherwise feels well.   Oncology History:   8/2017: Well-differentiated carcinoid tumor in the rectum, grade 2, and in the terminal ileum, grade 1. Initially diagnosed as adenocarcinoma and later changed to carcinoid tumor at time of resection  Treated with concurrent chemoRT with infusional 5-FU under the diagnosis of adenocarcinoma  1/2018: resection of rectum and terminal ileum. Pathology showed a ypT3N0 1.7 cm rectal NET, well  "differentiated, grade 2, Ki 67 was 16%. 1.5 cm T2Nx small bowel NET, well diff, Ki 67 was less than 3%.  2021: Metastasis to liver on CT for hernia repair.   11/10/2021: Copper PET:    In this patient with rectal and ileal neuroendocrine tumor status post low anterior resection/small bowel resection, there are multiple somatostatin receptor avid hepatic lesions which appear increased in size as compared to outside CT 2021.  There is also tracer avid focus in the pancreatic body suspicious for malignancy.  All these findings are new since prior Dotatate PET-CT.  2021: EUS with biopsies:  "1. PANCREAS MASS, ENDOSCOPIC ULTRASOUND-GUIDED FNA:   Well-differentiated neuroendocrine tumor, favor WHO grade 3 (G3).   Comment:  Ki-67 labeling index is approximately 25%.  Average mitotic rate is   9 per 2 mm^2.  Tumor cells are positive with synaptophysin and chromogranin,   supporting the diagnosis.   2. LIVER, LEFT LOWER LOBE, ENDOSCOPIC ULTRASOUND-GUIDED FNA:   Well-differentiated neuroendocrine tumor.   3. LIVER, RIGHT LOWER LOBE, ENDOSCOPIC ULTRASOUND-GUIDED FNA:   Well-differentiated neuroendocrine tumor, favor WHO grade 2 (G2).   Comment (2,3):  Tumor in parts 2 and 3 are histologically identical.  Only   part 3 contains enough cell block lesional material to render a meaningful   labeling index and mitotic count.  Ki-67 labeling index is approximately 15%.    Average mitotic rate is 3 per 2 mm^2.  Tumor cells are positive with   synaptophysin but negative for chromogranin.  The overall findings are still   consistent with metastatic well-differentiated neuroendocrine tumor."  1/3/2022: Lanreotide started  2023: repeat CT abd/pelvis: worsening hepatic metastases. Changed Lanreotide to Sandostatin  2. Case reviewed at tumor board. Recc serial TACE. S/p TACE on 3/1/23     Interval History:   Ms Marshall returns today for serial TACE. Tolerated the first TACE well.    ECO    ROS:   A ten-point system " review is obtained and negative except for what was stated in the Interval History.     Physical Examination:   Vital signs reviewed.   General: well hydrated, well developed, in no acute distress  HEENT: normocephalic, PERRLA, EOMI, anicteric sclerae, oropharynx clear  Neck: supple, no JVD, thyromegaly, cervical or supraclavicular lymphadenopathy  Lungs: clear breath sounds bilaterally, no wheezing, rales, or rhonchi  Heart: RRR, no M/R/G  Abdomen: soft, no tenderness, non-distended, no hepatosplenomegaly, mass, or hernia. BS present  Extremities: no clubbing, cyanosis, or edema  Skin: no rash, ulcer, or open wounds  Neuro: alert and oriented x 4, no focal neuro deficit  Psych: pleasant and appropriate mood and affect    Objective:     Laboratory Data:  Labs reviewed. Bilirubin normal    Imaging Data:  Gallium PET 2/7/23:  Similar number and distribution of radiotracer avid lesions within the liver and pancreatic body.  Several liver lesions demonstrate central PET photopenia and hypodensity on CT consistent with necrosis.     New nonspecific mild radiotracer uptake within several small left axillary lymph nodes.  Finding may relate to left upper extremity injection and partial radiotracer extravasation.     Otherwise, no new somatostatin receptor avid lesion.    I have personally reviewed her CT abdomen/pelvis with Dr Soni: Receptor positive pancreatic lesion and liver mets. Liver mets appear enlarged since 6/2022, though this may be due to larger cystic components.     MRI abdomen 4/4/23:  Redemonstration of multiple hepatic lesions in keeping with known neuroendocrine tumor metastasis.  Dominant lesions within the right lobe and segment 4 demonstrate decreased central enhancement consistent with response to therapy with residual disease likely present at these sites.  Additional stable enhancing lesions within the left and right lobes consistent with residual disease.  No definite new lesion.     Persistent  restricted diffusion within the pancreatic body in keeping with known lesion.     Stable mildly prominent periportal lymph nodes, nonspecific.       Assessment and Plan:     1. Malignant carcinoid tumor of rectum    2. Neuroendocrine carcinoma metastatic to multiple sites    3. Secondary malignant neoplasm of liver    4. Neuroendocrine tumor of pancreas    5. Malignant carcinoid tumor of ileum    6. Immunodeficiency secondary to neoplasm    7. Essential hypertension    8. Stage 3 chronic kidney disease, unspecified whether stage 3a or 3b CKD        1-6  - Ms  is a 68 yo woman with history of ypT3N0 rectal NET, well differentiated, grade 2 (Ki 67 16%) and small bowel NET, well diff, low grade, s/p surgery in Jan 2018. She was found to have metastatic disease to the liver and pancreas in July 2021 s/p biopsy. Liver NET is well diff grade 2 (Ki67 15)%. Pancreatic NET is well diff grade 3 (Ki67 25%). She has been on lanreotide since July 2022, changed to sandostatin in Jan 2023 after CT scan showed progression. S/p TACE to the liver on 3/1/23  - MRI is medically urgent as patient has intermediate to high grade metastatic NET to the liver with high tumor burden in the liver. She is at risk of liver decompensation from tumor  - I have reviewed case and scan with Dr Soni. Good response in treated area. Scheduled for the second TACE on 4/24. Will see her two months after that with MRI and PET scan  - C/w sandostatin with Dr Olson    7.  - BP controlled  - c/w current medication    8.  - reviewed lab results. Cr 1.4. encouraged oral hydration. Discussed at least 64 oz of fluids a day. Patient understands and agrees with the plan.     Follow-up:     RTC at the end of June    Electronically signed by Ariel Orosco for Scheduling    Med Onc Chart Routing      Follow up with physician 3 months. please schedule patient to return at the end of June with CBC, CMP, serotonin, pancreastatin, 5-HIAA, MRI  abdomen, gallium PET scan, see me 1 day later. please asked  Ho Garcia to help with [facility]   Follow up with ELLY    Infusion scheduling note    Injection scheduling note    Labs CBC and CMP   Scheduling:  Preferred lab:  Lab interval:  serotonin, pancreastatin, 5-HIAA   Imaging MRI and PET scan   at the end of June   Pharmacy appointment    Other referrals

## 2023-04-04 NOTE — NURSING
Patient will arrive at 06 on 4/5/23 for IR appointment; NPO after midnight except sip of water with a.m. meds; plan to stay overnight. Patient states she is feeling better after her recent hospitalization at [facility].

## 2023-04-05 ENCOUNTER — TELEPHONE (OUTPATIENT)
Dept: INTERVENTIONAL RADIOLOGY/VASCULAR | Facility: CLINIC | Age: 68
End: 2023-04-05
Payer: MEDICARE

## 2023-04-05 DIAGNOSIS — C7A.8 NEUROENDOCRINE CARCINOMA METASTATIC TO LIVER: Primary | ICD-10-CM

## 2023-04-05 DIAGNOSIS — C7B.8 NEUROENDOCRINE CARCINOMA METASTATIC TO LIVER: Primary | ICD-10-CM

## 2023-04-05 NOTE — TELEPHONE ENCOUNTER
Spoke to pt by phone on 4/5/23. Pt scheduled for TACE #2 on 4/4/23. Underwent TACE #1 on 3/1/23. Had a difficult post-procedure course. Was admitted to an OSH for several days for pain and bacteremia. Source of bacteremia was unclear. Discharged on oral abx. Pt says she is doing much better. Reviewed MRI. Good response in the treated tumors. Comparatively small volume of disease remaining. Has a trace right pleural effusion. Offered to proceed with TACE today or give her additional time to recover. She opted to push back the procedure until 4/24. Given her post procedure bacteremia, will plan to prescribe additional oral pre and post op abx this time.      Enzo Soni MD  [facility]  Pager [phone]

## 2023-04-14 ENCOUNTER — DOCUMENTATION ONLY (OUTPATIENT)
Dept: HEMATOLOGY/ONCOLOGY | Facility: CLINIC | Age: 68
End: 2023-04-14
Payer: MEDICARE

## 2023-04-14 NOTE — PROGRESS NOTES
Social Work Consult    Name:Coby Marshall  : 1955  MRN: [id number]    Referral:Vashti STRICKLAND received consult from Milly Rico. Patient will need a [facility] stay for her appointments on  and .     EM called patient to confirm and take information for lodging request. VERONICA left with patient requesting call back. EM will reach out to patient's  on Monday if no call back is received.    EM notified RonnieBarrera

## 2023-04-17 ENCOUNTER — DOCUMENTATION ONLY (OUTPATIENT)
Dept: HEMATOLOGY/ONCOLOGY | Facility: CLINIC | Age: 68
End: 2023-04-17
Payer: MEDICARE

## 2023-04-17 NOTE — PROGRESS NOTES
SW called and left VM with patient. SW spoke to patient's spouse who request patient call SW back. SW spoke to patient.    Patient confirmed need for [facility] 6.26-6.28. SW completed lodging request and sent to the [facility] who will reach out to patient.

## 2023-04-21 ENCOUNTER — TELEPHONE (OUTPATIENT)
Dept: INTERVENTIONAL RADIOLOGY/VASCULAR | Facility: HOSPITAL | Age: 68
End: 2023-04-21
Payer: MEDICARE

## 2023-04-21 NOTE — NURSING
Spoke with patient. Confirmed TACE on 04/24 at [facility] with an arrival time of 0700, NPO after midnight, bring a list of meds, pack an overnight bag, arrange a ride to and from the procesure, and take morning meds with a small sip of water. No blood thinners, not diabetic, and allergies confirmed.

## 2023-04-24 ENCOUNTER — HOSPITAL ENCOUNTER (OUTPATIENT)
Dept: INTERVENTIONAL RADIOLOGY/VASCULAR | Facility: HOSPITAL | Age: 68
Discharge: HOME | End: 2023-04-25
Attending: INTERNAL MEDICINE | Admitting: FAMILY MEDICINE
Payer: MEDICARE

## 2023-04-24 DIAGNOSIS — C22.0 HCC (HEPATOCELLULAR CARCINOMA): ICD-10-CM

## 2023-04-24 DIAGNOSIS — D3A.8 NEUROENDOCRINE TUMOR: ICD-10-CM

## 2023-04-24 DIAGNOSIS — C7B.8 NEUROENDOCRINE CARCINOMA METASTATIC TO MULTIPLE SITES: Primary | ICD-10-CM

## 2023-04-24 DIAGNOSIS — C7A.8 NEUROENDOCRINE CARCINOMA METASTATIC TO MULTIPLE SITES: Primary | ICD-10-CM

## 2023-04-24 DIAGNOSIS — R07.9 CHEST PAIN: ICD-10-CM

## 2023-04-24 PROBLEM — E78.5 HYPERLIPIDEMIA: Status: ACTIVE | Noted: 2023-04-24

## 2023-04-24 LAB
ALBUMIN SERPL BCP-MCNC: 3.8 G/DL (ref 3.5–5.2)
ALP SERPL-CCNC: 65 U/L (ref 55–135)
ALT SERPL W/O P-5'-P-CCNC: 12 U/L (ref 10–44)
ANION GAP SERPL CALC-SCNC: 9 MMOL/L (ref 8–16)
AST SERPL-CCNC: 18 U/L (ref 10–40)
BASOPHILS # BLD AUTO: 0.02 K/UL (ref 0–0.2)
BASOPHILS NFR BLD: 0.3 % (ref 0–1.9)
BILIRUB SERPL-MCNC: 0.4 MG/DL (ref 0.1–1)
BUN SERPL-MCNC: 23 MG/DL (ref 8–23)
CALCIUM SERPL-MCNC: 9.5 MG/DL (ref 8.7–10.5)
CHLORIDE SERPL-SCNC: 104 MMOL/L (ref 95–110)
CO2 SERPL-SCNC: 31 MMOL/L (ref 23–29)
CREAT SERPL-MCNC: 1.4 MG/DL (ref 0.5–1.4)
DIFFERENTIAL METHOD: ABNORMAL
EOSINOPHIL # BLD AUTO: 0.1 K/UL (ref 0–0.5)
EOSINOPHIL NFR BLD: 2 % (ref 0–8)
ERYTHROCYTE [DISTWIDTH] IN BLOOD BY AUTOMATED COUNT: 16.1 % (ref 11.5–14.5)
EST. GFR  (NO RACE VARIABLE): 41 ML/MIN/1.73 M^2
GLUCOSE SERPL-MCNC: 107 MG/DL (ref 70–110)
HCT VFR BLD AUTO: 31.6 % (ref 37–48.5)
HGB BLD-MCNC: 9.6 G/DL (ref 12–16)
IMM GRANULOCYTES # BLD AUTO: 0.02 K/UL (ref 0–0.04)
IMM GRANULOCYTES NFR BLD AUTO: 0.3 % (ref 0–0.5)
INR PPP: 1.1 (ref 0.8–1.2)
LYMPHOCYTES # BLD AUTO: 1.6 K/UL (ref 1–4.8)
LYMPHOCYTES NFR BLD: 25.7 % (ref 18–48)
MCH RBC QN AUTO: 26 PG (ref 27–31)
MCHC RBC AUTO-ENTMCNC: 30.4 G/DL (ref 32–36)
MCV RBC AUTO: 86 FL (ref 82–98)
MONOCYTES # BLD AUTO: 0.6 K/UL (ref 0.3–1)
MONOCYTES NFR BLD: 9.6 % (ref 4–15)
NEUTROPHILS # BLD AUTO: 3.9 K/UL (ref 1.8–7.7)
NEUTROPHILS NFR BLD: 62.1 % (ref 38–73)
NRBC BLD-RTO: 0 /100 WBC
PLATELET # BLD AUTO: 293 K/UL (ref 150–450)
PMV BLD AUTO: 9.2 FL (ref 9.2–12.9)
POCT GLUCOSE: 137 MG/DL (ref 70–110)
POTASSIUM SERPL-SCNC: 4.2 MMOL/L (ref 3.5–5.1)
PROT SERPL-MCNC: 7.8 G/DL (ref 6–8.4)
PROTHROMBIN TIME: 11.4 SEC (ref 9–12.5)
RBC # BLD AUTO: 3.69 M/UL (ref 4–5.4)
SODIUM SERPL-SCNC: 144 MMOL/L (ref 136–145)
WBC # BLD AUTO: 6.35 K/UL (ref 3.9–12.7)

## 2023-04-24 PROCEDURE — 85025 COMPLETE CBC W/AUTO DIFF WBC: CPT | Performed by: RADIOLOGY

## 2023-04-24 PROCEDURE — 36247 PR PLACE CATH SUBSUBSELECT ART,ABD/PEL: ICD-10-PCS | Mod: ,,, | Performed by: RADIOLOGY

## 2023-04-24 PROCEDURE — 25000003 PHARM REV CODE 250: Performed by: PHYSICIAN ASSISTANT

## 2023-04-24 PROCEDURE — 76377 3D RENDER W/INTRP POSTPROCES: CPT | Mod: TC | Performed by: RADIOLOGY

## 2023-04-24 PROCEDURE — 99153 MOD SED SAME PHYS/QHP EA: CPT

## 2023-04-24 PROCEDURE — 63600175 PHARM REV CODE 636 W HCPCS: Performed by: PHYSICIAN ASSISTANT

## 2023-04-24 PROCEDURE — G0269 OCCLUSIVE DEVICE IN VEIN ART: HCPCS | Performed by: RADIOLOGY

## 2023-04-24 PROCEDURE — 75774 PR  ANGIO EA ADDNL SELECTV VESSEL: ICD-10-PCS | Mod: 26,59,, | Performed by: RADIOLOGY

## 2023-04-24 PROCEDURE — 36248 PR PR INS CATH ABD/L-EXT ART ADDL 2ND ORD/3RD ORD/BYD: ICD-10-PCS | Mod: ,,, | Performed by: RADIOLOGY

## 2023-04-24 PROCEDURE — 76377 PR  3D RENDERING W/ IMAGE POSTPROCESS: ICD-10-PCS | Mod: 26,,, | Performed by: RADIOLOGY

## 2023-04-24 PROCEDURE — 80053 COMPREHEN METABOLIC PANEL: CPT | Performed by: RADIOLOGY

## 2023-04-24 PROCEDURE — 36246 PR INS CATH ABD/L-EXT ART 2ND ORDER: ICD-10-PCS | Mod: 59,,, | Performed by: RADIOLOGY

## 2023-04-24 PROCEDURE — 85610 PROTHROMBIN TIME: CPT | Performed by: RADIOLOGY

## 2023-04-24 PROCEDURE — 36247 INS CATH ABD/L-EXT ART 3RD: CPT | Performed by: RADIOLOGY

## 2023-04-24 PROCEDURE — 36247 INS CATH ABD/L-EXT ART 3RD: CPT | Mod: ,,, | Performed by: RADIOLOGY

## 2023-04-24 PROCEDURE — 99152 PR MOD CONSCIOUS SEDATION, SAME PHYS, 5+ YRS, FIRST 15 MIN: ICD-10-PCS | Mod: ,,, | Performed by: RADIOLOGY

## 2023-04-24 PROCEDURE — 75726 CHG ANGIO VISCERAL SELECTV/SUBSELEC: ICD-10-PCS | Mod: 26,59,, | Performed by: RADIOLOGY

## 2023-04-24 PROCEDURE — 99152 MOD SED SAME PHYS/QHP 5/>YRS: CPT | Performed by: RADIOLOGY

## 2023-04-24 PROCEDURE — 36246 INS CATH ABD/L-EXT ART 2ND: CPT | Mod: 59,RT | Performed by: RADIOLOGY

## 2023-04-24 PROCEDURE — 36248 INS CATH ABD/L-EXT ART ADDL: CPT | Performed by: RADIOLOGY

## 2023-04-24 PROCEDURE — 37243 IR EMBOLIZATION COMP FOR TUMOR_ORGAN ISCHEMIA_INFARC: ICD-10-PCS | Mod: ,,, | Performed by: RADIOLOGY

## 2023-04-24 PROCEDURE — 99152 MOD SED SAME PHYS/QHP 5/>YRS: CPT

## 2023-04-24 PROCEDURE — A4550 SURGICAL TRAYS: HCPCS

## 2023-04-24 PROCEDURE — 76380 PR  CT SCAN,LIMITED/LOCALIZED F/U STUDY: ICD-10-PCS | Mod: 26,59,, | Performed by: RADIOLOGY

## 2023-04-24 PROCEDURE — 75726 ARTERY X-RAYS ABDOMEN: CPT | Mod: TC | Performed by: RADIOLOGY

## 2023-04-24 PROCEDURE — 63600175 PHARM REV CODE 636 W HCPCS: Performed by: RADIOLOGY

## 2023-04-24 PROCEDURE — 75774 ARTERY X-RAY EACH VESSEL: CPT | Mod: TC | Performed by: RADIOLOGY

## 2023-04-24 PROCEDURE — 76377 3D RENDER W/INTRP POSTPROCES: CPT | Mod: 26,,, | Performed by: RADIOLOGY

## 2023-04-24 PROCEDURE — 36248 INS CATH ABD/L-EXT ART ADDL: CPT | Mod: ,,, | Performed by: RADIOLOGY

## 2023-04-24 PROCEDURE — 25000003 PHARM REV CODE 250: Performed by: FAMILY MEDICINE

## 2023-04-24 PROCEDURE — 36246 INS CATH ABD/L-EXT ART 2ND: CPT | Mod: 59,,, | Performed by: RADIOLOGY

## 2023-04-24 PROCEDURE — 75726 ARTERY X-RAYS ABDOMEN: CPT | Mod: 26,59,, | Performed by: RADIOLOGY

## 2023-04-24 PROCEDURE — 25500020 PHARM REV CODE 255: Performed by: PHYSICIAN ASSISTANT

## 2023-04-24 PROCEDURE — 25500020 PHARM REV CODE 255: Performed by: RADIOLOGY

## 2023-04-24 PROCEDURE — 25000003 PHARM REV CODE 250: Performed by: RADIOLOGY

## 2023-04-24 PROCEDURE — 36415 COLL VENOUS BLD VENIPUNCTURE: CPT | Performed by: RADIOLOGY

## 2023-04-24 PROCEDURE — 76380 CAT SCAN FOLLOW-UP STUDY: CPT | Mod: TC | Performed by: RADIOLOGY

## 2023-04-24 PROCEDURE — 75774 ARTERY X-RAY EACH VESSEL: CPT | Mod: 26,59,, | Performed by: RADIOLOGY

## 2023-04-24 PROCEDURE — 37243 VASC EMBOLIZE/OCCLUDE ORGAN: CPT | Performed by: RADIOLOGY

## 2023-04-24 PROCEDURE — 99153 MOD SED SAME PHYS/QHP EA: CPT | Performed by: RADIOLOGY

## 2023-04-24 PROCEDURE — 76380 CAT SCAN FOLLOW-UP STUDY: CPT | Mod: 26,59,, | Performed by: RADIOLOGY

## 2023-04-24 PROCEDURE — 99152 MOD SED SAME PHYS/QHP 5/>YRS: CPT | Mod: ,,, | Performed by: RADIOLOGY

## 2023-04-24 PROCEDURE — 76937 US GUIDE VASCULAR ACCESS: CPT | Mod: TC | Performed by: RADIOLOGY

## 2023-04-24 RX ORDER — MAGNESIUM SULFATE HEPTAHYDRATE 40 MG/ML
2 INJECTION, SOLUTION INTRAVENOUS ONCE
Status: COMPLETED | OUTPATIENT
Start: 2023-04-24 | End: 2023-04-24

## 2023-04-24 RX ORDER — SODIUM CHLORIDE 0.9 % (FLUSH) 0.9 %
10 SYRINGE (ML) INJECTION EVERY 8 HOURS
Status: DISCONTINUED | OUTPATIENT
Start: 2023-04-24 | End: 2023-04-25 | Stop reason: HOSPADM

## 2023-04-24 RX ORDER — SIMETHICONE 80 MG
1 TABLET,CHEWABLE ORAL 4 TIMES DAILY PRN
Status: DISCONTINUED | OUTPATIENT
Start: 2023-04-24 | End: 2023-04-25 | Stop reason: HOSPADM

## 2023-04-24 RX ORDER — SCOLOPAMINE TRANSDERMAL SYSTEM 1 MG/1
1 PATCH, EXTENDED RELEASE TRANSDERMAL ONCE
Status: DISCONTINUED | OUTPATIENT
Start: 2023-04-24 | End: 2023-04-25 | Stop reason: HOSPADM

## 2023-04-24 RX ORDER — FENTANYL CITRATE 50 UG/ML
INJECTION, SOLUTION INTRAMUSCULAR; INTRAVENOUS
Status: COMPLETED | OUTPATIENT
Start: 2023-04-24 | End: 2023-04-24

## 2023-04-24 RX ORDER — LANOLIN ALCOHOL/MO/W.PET/CERES
800 CREAM (GRAM) TOPICAL
Status: DISCONTINUED | OUTPATIENT
Start: 2023-04-24 | End: 2023-04-25 | Stop reason: HOSPADM

## 2023-04-24 RX ORDER — DEXTROSE MONOHYDRATE, SODIUM CHLORIDE, AND POTASSIUM CHLORIDE 50; 1.49; 4.5 G/1000ML; G/1000ML; G/1000ML
INJECTION, SOLUTION INTRAVENOUS CONTINUOUS
Status: DISCONTINUED | OUTPATIENT
Start: 2023-04-24 | End: 2023-04-25 | Stop reason: HOSPADM

## 2023-04-24 RX ORDER — CIPROFLOXACIN 500 MG/1
500 TABLET ORAL EVERY 12 HOURS
Status: COMPLETED | OUTPATIENT
Start: 2023-04-24 | End: 2023-04-24

## 2023-04-24 RX ORDER — GLUCAGON 1 MG
1 KIT INJECTION
Status: DISCONTINUED | OUTPATIENT
Start: 2023-04-24 | End: 2023-04-25 | Stop reason: HOSPADM

## 2023-04-24 RX ORDER — HEPARIN SODIUM 200 [USP'U]/100ML
INJECTION, SOLUTION INTRAVENOUS
Status: COMPLETED | OUTPATIENT
Start: 2023-04-24 | End: 2023-04-24

## 2023-04-24 RX ORDER — MAG HYDROX/ALUMINUM HYD/SIMETH 200-200-20
30 SUSPENSION, ORAL (FINAL DOSE FORM) ORAL 4 TIMES DAILY PRN
Status: DISCONTINUED | OUTPATIENT
Start: 2023-04-24 | End: 2023-04-25 | Stop reason: HOSPADM

## 2023-04-24 RX ORDER — ONDANSETRON 4 MG/1
8 TABLET, ORALLY DISINTEGRATING ORAL EVERY 8 HOURS PRN
Status: DISCONTINUED | OUTPATIENT
Start: 2023-04-24 | End: 2023-04-25 | Stop reason: HOSPADM

## 2023-04-24 RX ORDER — POLYETHYLENE GLYCOL 3350 17 G/17G
17 POWDER, FOR SOLUTION ORAL DAILY
Status: DISCONTINUED | OUTPATIENT
Start: 2023-04-24 | End: 2023-04-25 | Stop reason: HOSPADM

## 2023-04-24 RX ORDER — ACETAMINOPHEN 325 MG/1
650 TABLET ORAL EVERY 4 HOURS PRN
Status: DISCONTINUED | OUTPATIENT
Start: 2023-04-24 | End: 2023-04-25 | Stop reason: HOSPADM

## 2023-04-24 RX ORDER — DEXTROSE 40 %
15 GEL (GRAM) ORAL
Status: DISCONTINUED | OUTPATIENT
Start: 2023-04-24 | End: 2023-04-25 | Stop reason: HOSPADM

## 2023-04-24 RX ORDER — SODIUM,POTASSIUM PHOSPHATES 280-250MG
2 POWDER IN PACKET (EA) ORAL
Status: DISCONTINUED | OUTPATIENT
Start: 2023-04-24 | End: 2023-04-25 | Stop reason: HOSPADM

## 2023-04-24 RX ORDER — LIDOCAINE HYDROCHLORIDE 10 MG/ML
INJECTION INFILTRATION; PERINEURAL
Status: COMPLETED | OUTPATIENT
Start: 2023-04-24 | End: 2023-04-24

## 2023-04-24 RX ORDER — SODIUM CHLORIDE 9 MG/ML
INJECTION, SOLUTION INTRAVENOUS
Status: COMPLETED | OUTPATIENT
Start: 2023-04-24 | End: 2023-04-24

## 2023-04-24 RX ORDER — FLUTICASONE PROPIONATE 50 MCG
1 SPRAY, SUSPENSION (ML) NASAL DAILY
Status: DISCONTINUED | OUTPATIENT
Start: 2023-04-25 | End: 2023-04-25 | Stop reason: HOSPADM

## 2023-04-24 RX ORDER — OXYCODONE HYDROCHLORIDE 5 MG/1
5 TABLET ORAL EVERY 6 HOURS PRN
Status: DISCONTINUED | OUTPATIENT
Start: 2023-04-24 | End: 2023-04-25 | Stop reason: HOSPADM

## 2023-04-24 RX ORDER — DEXTROSE 40 %
30 GEL (GRAM) ORAL
Status: DISCONTINUED | OUTPATIENT
Start: 2023-04-24 | End: 2023-04-25 | Stop reason: HOSPADM

## 2023-04-24 RX ORDER — DIPHENHYDRAMINE HYDROCHLORIDE 50 MG/ML
50 INJECTION INTRAMUSCULAR; INTRAVENOUS ONCE
Status: COMPLETED | OUTPATIENT
Start: 2023-04-24 | End: 2023-04-24

## 2023-04-24 RX ORDER — NALOXONE HCL 0.4 MG/ML
0.02 VIAL (ML) INJECTION
Status: DISCONTINUED | OUTPATIENT
Start: 2023-04-24 | End: 2023-04-25 | Stop reason: HOSPADM

## 2023-04-24 RX ORDER — TALC
6 POWDER (GRAM) TOPICAL NIGHTLY PRN
Status: DISCONTINUED | OUTPATIENT
Start: 2023-04-24 | End: 2023-04-25 | Stop reason: HOSPADM

## 2023-04-24 RX ORDER — SODIUM CHLORIDE 9 MG/ML
INJECTION, SOLUTION INTRAVENOUS CONTINUOUS
Status: DISCONTINUED | OUTPATIENT
Start: 2023-04-24 | End: 2023-04-24

## 2023-04-24 RX ORDER — MOXIFLOXACIN HYDROCHLORIDE 400 MG/1
400 TABLET ORAL DAILY
Status: DISCONTINUED | OUTPATIENT
Start: 2023-04-25 | End: 2023-04-25 | Stop reason: HOSPADM

## 2023-04-24 RX ORDER — MIDAZOLAM HYDROCHLORIDE 1 MG/ML
INJECTION INTRAMUSCULAR; INTRAVENOUS
Status: COMPLETED | OUTPATIENT
Start: 2023-04-24 | End: 2023-04-24

## 2023-04-24 RX ORDER — AMOXICILLIN 250 MG
1 CAPSULE ORAL 2 TIMES DAILY
Status: DISCONTINUED | OUTPATIENT
Start: 2023-04-24 | End: 2023-04-25 | Stop reason: HOSPADM

## 2023-04-24 RX ADMIN — ONDANSETRON 8 MG: 4 TABLET, ORALLY DISINTEGRATING ORAL at 09:04

## 2023-04-24 RX ADMIN — HEPARIN SODIUM 500 UNITS/HR: 200 INJECTION, SOLUTION INTRAVENOUS at 10:04

## 2023-04-24 RX ADMIN — LIDOCAINE HYDROCHLORIDE 5 ML: 10 INJECTION, SOLUTION INFILTRATION; PERINEURAL at 09:04

## 2023-04-24 RX ADMIN — AMPICILLIN SODIUM AND SULBACTAM SODIUM 3 G: 2; 1 INJECTION, POWDER, FOR SOLUTION INTRAMUSCULAR; INTRAVENOUS at 08:04

## 2023-04-24 RX ADMIN — HEPARIN SODIUM 500 UNITS/HR: 200 INJECTION, SOLUTION INTRAVENOUS at 09:04

## 2023-04-24 RX ADMIN — IOHEXOL 60 ML: 350 INJECTION, SOLUTION INTRAVENOUS at 11:04

## 2023-04-24 RX ADMIN — CIPROFLOXACIN 500 MG: 500 TABLET, FILM COATED ORAL at 08:04

## 2023-04-24 RX ADMIN — DEXTROSE, SODIUM CHLORIDE, AND POTASSIUM CHLORIDE: 5; .45; .15 INJECTION INTRAVENOUS at 05:04

## 2023-04-24 RX ADMIN — OCTREOTIDE ACETATE 250 MCG: 500 INJECTION, SOLUTION INTRAVENOUS; SUBCUTANEOUS at 08:04

## 2023-04-24 RX ADMIN — MAGNESIUM SULFATE 2 G: 2 INJECTION INTRAVENOUS at 08:04

## 2023-04-24 RX ADMIN — ETHIODIZED OIL 10 ML: 480 INJECTION INTRA-ARTERIAL; INTRALYMPHATIC; INTRAUTERINE at 10:04

## 2023-04-24 RX ADMIN — FENTANYL CITRATE 50 MCG: 50 INJECTION, SOLUTION INTRAMUSCULAR; INTRAVENOUS at 09:04

## 2023-04-24 RX ADMIN — OCTREOTIDE ACETATE 250 MCG/HR: 1000 INJECTION, SOLUTION INTRAVENOUS; SUBCUTANEOUS at 09:04

## 2023-04-24 RX ADMIN — DEXTROSE, SODIUM CHLORIDE, AND POTASSIUM CHLORIDE: 5; .45; .15 INJECTION INTRAVENOUS at 08:04

## 2023-04-24 RX ADMIN — ONDANSETRON 16 MG: 2 INJECTION INTRAMUSCULAR; INTRAVENOUS at 08:04

## 2023-04-24 RX ADMIN — SODIUM CHLORIDE 500 ML: 0.9 INJECTION, SOLUTION INTRAVENOUS at 09:04

## 2023-04-24 RX ADMIN — MIDAZOLAM HYDROCHLORIDE 1 MG: 1 INJECTION, SOLUTION INTRAMUSCULAR; INTRAVENOUS at 09:04

## 2023-04-24 RX ADMIN — SCOPALAMINE 1 PATCH: 1 PATCH, EXTENDED RELEASE TRANSDERMAL at 08:04

## 2023-04-24 RX ADMIN — DIPHENHYDRAMINE HYDROCHLORIDE 50 MG: 50 INJECTION, SOLUTION INTRAMUSCULAR; INTRAVENOUS at 08:04

## 2023-04-24 RX ADMIN — DOCUSATE SODIUM AND SENNOSIDES 1 TABLET: 8.6; 5 TABLET, FILM COATED ORAL at 08:04

## 2023-04-24 RX ADMIN — IOHEXOL 50 MG: 350 INJECTION, SOLUTION INTRAVENOUS at 10:04

## 2023-04-24 NOTE — HPI
Coby Marshall is a 66 y/o with a PMH of asthma, , hyperlipidemia, hypertension, rectal cancer, NET with met to liver s/p cTACE #1 on 3/1/2023 with subsequent readmission for bacteremia and pain control. Present today for cTACE #2, received pre-procedure Moxifloxacin x 3 days. Reported mild abdominal pain with nausea  In the ED Bun/ Cr  23/1.4,  K 4.2, Cl 104, CO2 31, H/H 9.6 / 31.6. Admit observation under hospital medicine service

## 2023-04-24 NOTE — PLAN OF CARE
04/24/23 1640   Admission   Initial VN Admission Questions Complete   Communication Issues? None   Shift   Virtual Nurse - Patient Verbalized Approval Of Camera Use;VN Rounding   Safety/Activity   Patient Rounds bed in low position;placement of personal items at bedside;call light in patient/parent reach;clutter free environment maintained;visualized patient;toileting offered   Safety Promotion/Fall Prevention assistive device/personal item within reach;bed alarm set;Fall Risk reviewed with patient/family;side rails raised x 2;toileting scheduled   Positioning   Head of Bed (HOB) Positioning HOB at 30 degrees   Pain/Comfort/Sleep   Pain Rating (0-10): Rest 0     VN cued into patient's room for introduction with patient's permission.  VN role explained and informed patient that VN would be working with bedside nurse and rest of care team.  Fall risk and bed alarm protocol education provided.  Instructed patient to call for assistance.  Patient aware and agreeable.  Patient's chart, labs and vital signs reviewed, pt denies any pain, anxiety or dyspnea.   Will continue to be available as needed.

## 2023-04-24 NOTE — CONSULTS
IR Embolization Consult Note  Interventional Radiology    Consults   Reason for Consult: s/p TACE 2    SUBJECTIVE:     Chief Complaint:  NET met to liver    History of Present Illness:  Coby Marshall is a 67 y.o. 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 Pt underwent cTACE #1 on 3/1/23 with excellent response on MRI 4/4/23. Completed cTACE #2 morning of admission to address some small residual in the left hepatic lobe. No complications.    Of note, she did well intraoperatively and stayed one night after the first TACE, but was re-admitted to [facility] with pain and possible positive blood cultures. She does not have a history of biliary instrumentation, but we went ahead and started Moxifloxacin 3 days pre-procedure out an abundance of caution.  Pt has moxifloxacin with her, last dose 4/23/23.    Review of Systems   Constitutional:  Negative for chills, fever, malaise/fatigue and weight loss.   Respiratory:  Negative for cough and shortness of breath.    Cardiovascular:  Negative for chest pain, palpitations and leg swelling.   Gastrointestinal:  Negative for abdominal pain, diarrhea, nausea and vomiting.   Genitourinary:  Negative for dysuria and flank pain.   Musculoskeletal:  Negative for back pain and myalgias.        Mild pain at R groin access site   Neurological:  Negative for dizziness, focal weakness, weakness and headaches.     Scheduled Meds:   ciprofloxacin HCl  500 mg Oral Q12H    [START ON 4/25/2023] fluticasone propionate  1 spray Each Nostril Daily    [START ON 4/25/2023] NON FORMULARY MEDICATION 400 mg  400 mg Oral Daily    polyethylene glycol  17 g Oral Daily    scopolamine  1 patch Transdermal Once    senna-docusate 8.6-50 mg  1 tablet Oral BID    sodium chloride 0.9%  10 mL Intravenous Q8H     Continuous Infusions:   dextrose 5 % and 0.45 % NaCl with KCl 20 mEq 125 mL/hr at 04/24/23 0839    octreotide infusion (50 mcg/mL) 125 mcg/hr (04/24/23 1317)     PRN Meds:acetaminophen,  aluminum-magnesium hydroxide-simethicone, ampicillin-sulbactim (UNASYN) IVPB, dextrose 10%, dextrose 10%, dextrose, dextrose, glucagon (human recombinant), magnesium oxide, magnesium oxide, melatonin, naloxone, ondansetron, oxyCODONE, potassium bicarbonate, potassium bicarbonate, potassium bicarbonate, potassium, sodium phosphates, potassium, sodium phosphates, potassium, sodium phosphates, simethicone    Review of patient's allergies indicates:   Allergen Reactions    Arb-angiotensin receptor antagonist Swelling     Had angioedema of lips with ACE inhibitor, so this is a contraindication    Lisinopril Swelling    Lisinopril-hydrochlorothiazide Swelling     Mouth Swelling    Sulfa (sulfonamide antibiotics) Swelling     Swelling of lips    Sulfasalazine Swelling     Swelling of lips    Epinephrine      Neuroendocrine Tumor patient      Hydrocodone-acetaminophen Other (See Comments)     stomach pain (even with food)    Amlodipine Other (See Comments)     edema of ankles and legs.       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    Ki 67 -3-20%    Hyperlipidemia     Hypertension     Malignant carcinoid tumor of ileum 12/2017    Ki 67 < 3%    Rectal cancer 2/8/2019     Past Surgical History:   Procedure Laterality Date    COLON SURGERY      ENDOSCOPIC ULTRASOUND OF UPPER GASTROINTESTINAL TRACT N/A 12/17/2021    Procedure: ULTRASOUND, UPPER GI TRACT, ENDOSCOPIC;  Surgeon: Jeffery Clemons MD;  Location: [facility] ([address] [place] [address]);  Service: Endoscopy;  Laterality: N/A;  instructions emailed to pt-BB  fully vaccinated-BB   12/13 arrival time confirmed with pt-rb    HYSTERECTOMY      ILEOSTOMY CLOSURE  01/2018    Lower Anterior Resection  12/2017    LETITIA- Dr. Beck - [facility]    port a cath Right     R chest wall     Family History   Problem Relation Age of Onset    Cancer Sister         leukemia    Colon cancer Neg Hx     Esophageal  cancer Neg Hx      Social History     Tobacco Use    Smoking status: Never    Smokeless tobacco: Never   Substance Use Topics    Alcohol use: No    Drug use: No       OBJECTIVE:     Vital Signs (Most Recent)  Temp: 97.8 °F (36.6 °C) (04/24/23 1230)  Pulse: 60 (04/24/23 1400)  Resp: 15 (04/24/23 1430)  BP: (!) 159/72 (04/24/23 1430)  SpO2: 100 % (04/24/23 1400)    Physical Exam:  Physical Exam  Vitals and nursing note reviewed.   Constitutional:       Appearance: Normal appearance.   HENT:      Head: Normocephalic and atraumatic.      Right Ear: External ear normal.      Left Ear: External ear normal.      Nose: Nose normal.      Mouth/Throat:      Mouth: Mucous membranes are moist.      Pharynx: Oropharynx is clear.   Eyes:      Extraocular Movements: Extraocular movements intact.      Pupils: Pupils are equal, round, and reactive to light.   Cardiovascular:      Rate and Rhythm: Normal rate and regular rhythm.      Pulses: Normal pulses.      Heart sounds: Normal heart sounds.   Pulmonary:      Effort: Pulmonary effort is normal.      Breath sounds: Normal breath sounds.   Abdominal:      General: Abdomen is flat.      Palpations: Abdomen is soft.   Musculoskeletal:      Cervical back: Normal range of motion and neck supple.   Skin:     General: Skin is warm and dry.   Neurological:      General: No focal deficit present.      Mental Status: She is alert and oriented to person, place, and time.   Psychiatric:         Mood and Affect: Mood normal.         Behavior: Behavior normal.       Laboratory  I have reviewed all pertinent lab results within the past 24 hours.  CBC:   Recent Labs   Lab 04/24/23  0750   WBC 6.35   RBC 3.69*   HGB 9.6*   HCT 31.6*      MCV 86   MCH 26.0*   MCHC 30.4*     CMP:   Recent Labs   Lab 04/24/23  0750      CALCIUM 9.5   ALBUMIN 3.8   PROT 7.8      K 4.2   CO2 31*      BUN 23   CREATININE 1.4   ALKPHOS 65   ALT 12   AST 18   BILITOT 0.4     Coagulation:   Recent  Labs   Lab 04/24/23  0750   LABPROT 11.4   INR 1.1       ASA/Mallampati  ASA: 3  Mallampati: 3    Imaging:  Recent imaging studies including  MRI  abdomen on 4/4/23 which was independently reviewed by Enzo Soni MD.     ASSESSMENT/PLAN:     Assessment:  Coby Marshall is a 67 y.o. 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 Pt underwent cTACE #1 on 3/1/23 with excellent response on MRI 4/4/23. Completed cTACE #2 morning of admission to address some small residual in the left hepatic lobe. No complications.  She started moxifloxacin 3 days prior to procedure as instructed for prevention of bacteremia as this occurred after cTACE #1 on 3/1/23.  Case discussed with Dr. Soni.    Plan:  Continue scopolamine patch and prn antiemetics for prevention of nausea.  Pt to receive cipro oral this evening and then continue home moxifloxacin starting 4/25 for 17 days post procedure.  Pt has home dose and entered as nonformulary, pt supplied.  Groin recovery complete.  Will follow up and assess access site tomorrow morning.  Discussed with pt no heavy lifting x 1 week and no driving for 3 days post procedure.    Thank you for the consult. Please contact with questions via [employer] secure chat or spectra link    Keara Rojas PA-C  Interventional Radiology

## 2023-04-24 NOTE — ASSESSMENT & PLAN NOTE
NET with met to liver s/p cTACE #1 on 3/1/2023   received pre-procedure Moxifloxacin x 3 days  Consult IR -  S/p cTACE #2 on 4/12  Cipro   MRI abdomen in 4-6 weeks

## 2023-04-24 NOTE — NURSING
Called and gave report to KHADIJAH Braga, pt tolerating oral intake, denies pain/nausea, spouse at bedside, transported pt to Room 510 via stretcher with belongings, bedside handoff performed with MEDARDO Braga complete

## 2023-04-24 NOTE — SEDATION DOCUMENTATION
IR procedure - TACE - is complete. Patient tolerated well. She is drowsy (not an unexpected finding); vital signs are stable. Patient will return to IR pre/post to complete the prescribed recovery time of two hours.

## 2023-04-24 NOTE — SUBJECTIVE & OBJECTIVE
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    Ki 67 -3-20%    Hyperlipidemia     Hypertension     Malignant carcinoid tumor of ileum 12/2017    Ki 67 < 3%    Rectal cancer 2/8/2019       Past Surgical History:   Procedure Laterality Date    COLON SURGERY      ENDOSCOPIC ULTRASOUND OF UPPER GASTROINTESTINAL TRACT N/A 12/17/2021    Procedure: ULTRASOUND, UPPER GI TRACT, ENDOSCOPIC;  Surgeon: Jeffery Clemons MD;  Location: [facility] ([address] [place] [address]);  Service: Endoscopy;  Laterality: N/A;  instructions emailed to pt-BB  fully vaccinated-BB   12/13 arrival time confirmed with pt-rb    HYSTERECTOMY      ILEOSTOMY CLOSURE  01/2018    Lower Anterior Resection  12/2017    LETITIA- Dr. Beck - [facility]    port a cath Right     R chest wall       Review of patient's allergies indicates:   Allergen Reactions    Arb-angiotensin receptor antagonist Swelling     Had angioedema of lips with ACE inhibitor, so this is a contraindication    Lisinopril Swelling    Lisinopril-hydrochlorothiazide Swelling     Mouth Swelling    Sulfa (sulfonamide antibiotics) Swelling     Swelling of lips    Sulfasalazine Swelling     Swelling of lips    Epinephrine      Neuroendocrine Tumor patient      Hydrocodone-acetaminophen Other (See Comments)     stomach pain (even with food)    Amlodipine Other (See Comments)     edema of ankles and legs.       No current facility-administered medications on file prior to encounter.     Current Outpatient Medications on File Prior to Encounter   Medication Sig    atorvastatin (LIPITOR) 20 MG tablet Take 20 mg by mouth once daily.    brimonidine-timoloL (COMBIGAN) 0.2-0.5 % Drop Place 1 drop into the right eye 2 (two) times daily.    cloNIDine (CATAPRES) 0.1 MG tablet Take 0.1 mg by mouth 2 (two) times daily.    fluticasone (FLONASE) 50 mcg/actuation nasal spray 1 spray by Each Nare route once daily.    hydroCHLOROthiazide  (MICROZIDE) 12.5 mg capsule Take 12.5 mg by mouth once daily.    oxyCODONE (ROXICODONE) 5 MG immediate release tablet Take 1 tablet (5 mg total) by mouth every 6 (six) hours as needed for Pain.    ALBUTEROL INHL Inhale into the lungs as needed.     cholestyramine, with sugar, 4 gram Powd Take 2 g by mouth 2 (two) times daily with meals.    ondansetron (ZOFRAN-ODT) 8 MG TbDL Dissolve 1 tablet (8 mg total) by mouth every 8 (eight) hours as needed (nausea).    ranitidine (ZANTAC) 150 MG tablet Take 150 mg by mouth nightly.      Family History       Problem Relation (Age of Onset)    Cancer Sister          Tobacco Use    Smoking status: Never    Smokeless tobacco: Never   Substance and Sexual Activity    Alcohol use: No    Drug use: No    Sexual activity: Not Currently     Partners: Male     Review of Systems   Constitutional:  Negative for activity change.   HENT:  Negative for congestion.    Eyes:  Negative for photophobia and visual disturbance.   Respiratory:  Negative for chest tightness and shortness of breath.    Cardiovascular:  Negative for chest pain.   Gastrointestinal:  Positive for abdominal pain and nausea.   Endocrine: Negative for polyphagia and polyuria.   Genitourinary:  Negative for enuresis, flank pain and urgency.   Musculoskeletal:  Negative for arthralgias and myalgias.   Skin:  Negative for color change and pallor.   Neurological:  Negative for seizures, weakness and headaches.   Psychiatric/Behavioral:  Negative for agitation.    Objective:     Vital Signs (Most Recent):  Temp: 97.8 °F (36.6 °C) (04/24/23 1230)  Pulse: 60 (04/24/23 1400)  Resp: 15 (04/24/23 1430)  BP: (!) 159/72 (04/24/23 1430)  SpO2: 100 % (04/24/23 1400)   Vital Signs (24h Range):  Temp:  [97.8 °F (36.6 °C)] 97.8 °F (36.6 °C)  Pulse:  [59-75] 60  Resp:  [10-16] 15  SpO2:  [95 %-100 %] 100 %  BP: (107-174)/(58-82) 159/72     Weight: 90.3 kg (199 lb)  Body mass index is 32.12 kg/m².    Physical Exam  Constitutional:        Appearance: Normal appearance.   HENT:      Head: Normocephalic and atraumatic.   Eyes:      Extraocular Movements: Extraocular movements intact.      Pupils: Pupils are equal, round, and reactive to light.   Cardiovascular:      Rate and Rhythm: Normal rate.   Abdominal:      General: Bowel sounds are normal. There is no distension.      Palpations: Abdomen is soft. There is no mass.      Tenderness: There is abdominal tenderness. There is no guarding.   Musculoskeletal:         General: Normal range of motion.      Cervical back: Normal range of motion.   Skin:     General: Skin is warm.      Capillary Refill: Capillary refill takes less than 2 seconds.   Neurological:      Mental Status: She is alert and oriented to person, place, and time.   Psychiatric:         Mood and Affect: Mood normal.         CRANIAL NERVES     CN III, IV, VI   Pupils are equal, round, and reactive to light.     Significant Labs: A1C: No results for input(s): HGBA1C in the last 4320 hours.  ABGs: No results for input(s): PH, PCO2, HCO3, POCSATURATED, BE, TOTALHB, COHB, METHB, O2HB, POCFIO2, PO2 in the last 48 hours.  Bilirubin:   Recent Labs   Lab 04/04/23  1450 04/24/23  0750   BILITOT 0.7 0.4     Blood Culture: No results for input(s): LABBLOO in the last 48 hours.  CBC:   Recent Labs   Lab 04/24/23  0750   WBC 6.35   HGB 9.6*   HCT 31.6*        CMP:   Recent Labs   Lab 04/24/23  0750      K 4.2      CO2 31*      BUN 23   CREATININE 1.4   CALCIUM 9.5   PROT 7.8   ALBUMIN 3.8   BILITOT 0.4   ALKPHOS 65   AST 18   ALT 12   ANIONGAP 9     Lactic Acid: No results for input(s): LACTATE in the last 48 hours.  Lipase: No results for input(s): LIPASE in the last 48 hours.  Lipid Panel: No results for input(s): CHOL, HDL, LDLCALC, TRIG, CHOLHDL in the last 48 hours.  Magnesium: No results for input(s): MG in the last 48 hours.  Troponin: No results for input(s): TROPONINI, TROPONINIHS in the last 48 hours.  TSH: No  results for input(s): TSH in the last 4320 hours.  Urine Culture: No results for input(s): LABURIN in the last 48 hours.  Urine Studies: No results for input(s): COLORU, APPEARANCEUA, PHUR, SPECGRAV, PROTEINUA, GLUCUA, KETONESU, BILIRUBINUA, OCCULTUA, NITRITE, UROBILINOGEN, LEUKOCYTESUR, RBCUA, WBCUA, BACTERIA, SQUAMEPITHEL, HYALINECASTS in the last 48 hours.    Invalid input(s): JUAN    Significant Imaging: I have reviewed all pertinent imaging results/findings within the past 24 hours.

## 2023-04-24 NOTE — H&P
Interventional Radiology Pre-Procedure History & Physical      Chief Complaint/Reason for Referral: Liver mets    History of Present Illness:  Coby Marshall is a 67 y.o. 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 Underwent cTACE #1 on 3/1/23 with excellent response on MRI 4/4/23. Here for cTACE #2 to address some small residual in the left hepatic lobe. Of note, she did well intraoperatively and stayed one night after the first TACE, but was re-admitted to and [facility] hospital with pain and possible positive blood cultures. She does not have a history of biliary instrumentation, but we went ahead and started Moxifloxacin 3 days pre-procedure out an abundance of caution.    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    Ki 67 -3-20%    Hyperlipidemia     Hypertension     Malignant carcinoid tumor of ileum 12/2017    Ki 67 < 3%    Rectal cancer 2/8/2019     Past Surgical History:   Procedure Laterality Date    COLON SURGERY      ENDOSCOPIC ULTRASOUND OF UPPER GASTROINTESTINAL TRACT N/A 12/17/2021    Procedure: ULTRASOUND, UPPER GI TRACT, ENDOSCOPIC;  Surgeon: Jeffery Clemons MD;  Location: [facility] ([address] [place] [address]);  Service: Endoscopy;  Laterality: N/A;  instructions emailed to pt-BB  fully vaccinated-BB   12/13 arrival time confirmed with pt-rb    HYSTERECTOMY      ILEOSTOMY CLOSURE  01/2018    Lower Anterior Resection  12/2017    NET- Dr. Beck - [facility]    port a cath Right     R chest wall       Allergies:   Review of patient's allergies indicates:   Allergen Reactions    Arb-angiotensin receptor antagonist Swelling     Had angioedema of lips with ACE inhibitor, so this is a contraindication    Lisinopril Swelling    Lisinopril-hydrochlorothiazide Swelling     Mouth Swelling    Sulfa (sulfonamide antibiotics) Swelling     Swelling of lips    Sulfasalazine Swelling     Swelling of lips    Epinephrine      Neuroendocrine Tumor  patient      Hydrocodone-acetaminophen Other (See Comments)     stomach pain (even with food)    Amlodipine Other (See Comments)     edema of ankles and legs.        Home Meds:   Prior to Admission medications    Medication Sig Start Date End Date Taking? Authorizing Provider   atorvastatin (LIPITOR) 20 MG tablet Take 20 mg by mouth once daily.   Yes Historical Provider   brimonidine-timoloL (COMBIGAN) 0.2-0.5 % Drop Place 1 drop into the right eye 2 (two) times daily. 1/26/23  Yes Historical Provider   cloNIDine (CATAPRES) 0.1 MG tablet Take 0.1 mg by mouth 2 (two) times daily.   Yes Historical Provider   fluticasone (FLONASE) 50 mcg/actuation nasal spray 1 spray by Each Nare route once daily.   Yes Historical Provider   hydroCHLOROthiazide (MICROZIDE) 12.5 mg capsule Take 12.5 mg by mouth once daily.   Yes Historical Provider   oxyCODONE (ROXICODONE) 5 MG immediate release tablet Take 1 tablet (5 mg total) by mouth every 6 (six) hours as needed for Pain. 3/2/23  Yes Juve Steiner MD   ALBUTEROL INHL Inhale into the lungs as needed.     Historical Provider   cholestyramine, with sugar, 4 gram Powd Take 2 g by mouth 2 (two) times daily with meals. 7/11/18 3/1/23  Suhas Manjarrez MD   ondansetron (ZOFRAN-ODT) 8 MG TbDL Dissolve 1 tablet (8 mg total) by mouth every 8 (eight) hours as needed (nausea). 3/2/23   Juve Steiner MD   ranitidine (ZANTAC) 150 MG tablet Take 150 mg by mouth nightly.  11/13/18 12/23/21  Historical Provider       Anticoagulation/Antiplatelet Meds: no anticoagulation    Review of Systems:   Hematological: no known coagulopathies  Respiratory: no shortness of breath  Cardiovascular: no chest pain  Gastrointestinal: no abdominal pain  Genitourinary: no dysuria  Musculoskeletal: negative  Neurological: no TIA or stroke symptoms     Physical Exam:  Temp: 97.8 °F (36.6 °C) (04/24/23 0724)  Pulse: (!) 59 (04/24/23 0724)  Resp: 16 (04/24/23 0724)  BP: (!) 148/66 (04/24/23 0738)  SpO2:  95 % (04/24/23 0724)    General: WNWD, NAD  HEENT: Normocephalic, sclera anicteric, oropharynx clear  Heart: RRR  Lungs: Symmetric excursions, breathing unlabored  Abd: NTND, soft  Extremities: JENSEN  Pulses: 2+ DP b/l  Neuro: Gross nonfocal    Laboratory:  Lab Results   Component Value Date    INR 1.1 04/04/2023       Lab Results   Component Value Date    WBC 6.46 04/04/2023    HGB 9.3 (L) 04/04/2023    HCT 31.1 (L) 04/04/2023    MCV 86 04/04/2023     04/04/2023      Lab Results   Component Value Date     04/24/2023     04/24/2023    K 4.2 04/24/2023     04/24/2023    CO2 31 (H) 04/24/2023    BUN 23 04/24/2023    CREATININE 1.4 04/24/2023    CALCIUM 9.5 04/24/2023    MG 1.4 (L) 03/08/2018    ALT 12 04/24/2023    AST 18 04/24/2023    ALBUMIN 3.8 04/24/2023    BILITOT 0.4 04/24/2023       Imaging:  MRI abd 4/4/23 and 2/28/23, TACE 3/1/23 reviewed.     Assessment/Plan:  67 y.o. female with liver mets. Will undergo cTACE #2 today.     Sedation:  Sedation history: have not been any systemic reactions  ASA: 3 / Mallampati: 3  Sedation plan: Up to moderate (Versed, fentanyl)      Risks (including, but not limited to, pain, bleeding, infection, damage to nearby structures, liver/renal injury, treatment failure/recurrence, the need for additional procedures, death), potential benefits, and alternatives were discussed with the patient. All questions were answered to the best of my abilities. The patient wishes to proceed. Written informed consent was obtained.        Enzo Soni MD  [facility]  Pager [phone]

## 2023-04-24 NOTE — H&P
[facility] Medicine  History & Physical    Patient Name: Coby Marshall  MRN: [id number]  Patient Class: OP- Outpatient Recovery  Admission Date: 4/24/2023  Attending Physician: Radha Meyers MD  Primary Care Provider: Randal Casillas MD         Patient information was obtained from patient and ER records.     Subjective:     Principal Problem:Neuroendocrine carcinoma metastatic to multiple sites    Chief Complaint:   Chief Complaint   Patient presents with    Abdominal Pain        HPI: Coby Marshall is a 66 y/o with a PMH of asthma, , hyperlipidemia, hypertension, rectal cancer, NET with met to liver s/p cTACE #1 on 3/1/2023 with subsequent readmission for bacteremia and pain control. Present today for cTACE #2, received pre-procedure Moxifloxacin x 3 days. Reported mild abdominal pain with nausea  In the ED Bun/ Cr  23/1.4,  K 4.2, Cl 104, CO2 31, H/H 9.6 / 31.6. Admit observation under hospital medicine service      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    Ki 67 -3-20%    Hyperlipidemia     Hypertension     Malignant carcinoid tumor of ileum 12/2017    Ki 67 < 3%    Rectal cancer 2/8/2019       Past Surgical History:   Procedure Laterality Date    COLON SURGERY      ENDOSCOPIC ULTRASOUND OF UPPER GASTROINTESTINAL TRACT N/A 12/17/2021    Procedure: ULTRASOUND, UPPER GI TRACT, ENDOSCOPIC;  Surgeon: Jeffery Clemons MD;  Location: [address];  Service: Endoscopy;  Laterality: N/A;  instructions emailed to pt-BB  fully vaccinated-BB   12/13 arrival time confirmed with pt-rb    HYSTERECTOMY      ILEOSTOMY CLOSURE  01/2018    Lower Anterior Resection  12/2017    NET- Dr. Beck - [facility]    port a cath Right     R chest wall       Review of patient's allergies indicates:   Allergen Reactions    Arb-angiotensin receptor antagonist Swelling     Had angioedema of lips  with ACE inhibitor, so this is a contraindication    Lisinopril Swelling    Lisinopril-hydrochlorothiazide Swelling     Mouth Swelling    Sulfa (sulfonamide antibiotics) Swelling     Swelling of lips    Sulfasalazine Swelling     Swelling of lips    Epinephrine      Neuroendocrine Tumor patient      Hydrocodone-acetaminophen Other (See Comments)     stomach pain (even with food)    Amlodipine Other (See Comments)     edema of ankles and legs.       No current facility-administered medications on file prior to encounter.     Current Outpatient Medications on File Prior to Encounter   Medication Sig    atorvastatin (LIPITOR) 20 MG tablet Take 20 mg by mouth once daily.    brimonidine-timoloL (COMBIGAN) 0.2-0.5 % Drop Place 1 drop into the right eye 2 (two) times daily.    cloNIDine (CATAPRES) 0.1 MG tablet Take 0.1 mg by mouth 2 (two) times daily.    fluticasone (FLONASE) 50 mcg/actuation nasal spray 1 spray by Each Nare route once daily.    hydroCHLOROthiazide (MICROZIDE) 12.5 mg capsule Take 12.5 mg by mouth once daily.    oxyCODONE (ROXICODONE) 5 MG immediate release tablet Take 1 tablet (5 mg total) by mouth every 6 (six) hours as needed for Pain.    ALBUTEROL INHL Inhale into the lungs as needed.     cholestyramine, with sugar, 4 gram Powd Take 2 g by mouth 2 (two) times daily with meals.    ondansetron (ZOFRAN-ODT) 8 MG TbDL Dissolve 1 tablet (8 mg total) by mouth every 8 (eight) hours as needed (nausea).    ranitidine (ZANTAC) 150 MG tablet Take 150 mg by mouth nightly.      Family History       Problem Relation (Age of Onset)    Cancer Sister          Tobacco Use    Smoking status: Never    Smokeless tobacco: Never   Substance and Sexual Activity    Alcohol use: No    Drug use: No    Sexual activity: Not Currently     Partners: Male     Review of Systems   Constitutional:  Negative for activity change.   HENT:  Negative for congestion.    Eyes:  Negative for photophobia and visual  disturbance.   Respiratory:  Negative for chest tightness and shortness of breath.    Cardiovascular:  Negative for chest pain.   Gastrointestinal:  Positive for abdominal pain and nausea.   Endocrine: Negative for polyphagia and polyuria.   Genitourinary:  Negative for enuresis, flank pain and urgency.   Musculoskeletal:  Negative for arthralgias and myalgias.   Skin:  Negative for color change and pallor.   Neurological:  Negative for seizures, weakness and headaches.   Psychiatric/Behavioral:  Negative for agitation.    Objective:     Vital Signs (Most Recent):  Temp: 97.8 °F (36.6 °C) (04/24/23 1230)  Pulse: 60 (04/24/23 1400)  Resp: 15 (04/24/23 1430)  BP: (!) 159/72 (04/24/23 1430)  SpO2: 100 % (04/24/23 1400)   Vital Signs (24h Range):  Temp:  [97.8 °F (36.6 °C)] 97.8 °F (36.6 °C)  Pulse:  [59-75] 60  Resp:  [10-16] 15  SpO2:  [95 %-100 %] 100 %  BP: (107-174)/(58-82) 159/72     Weight: 90.3 kg (199 lb)  Body mass index is 32.12 kg/m².    Physical Exam  Constitutional:       Appearance: Normal appearance.   HENT:      Head: Normocephalic and atraumatic.   Eyes:      Extraocular Movements: Extraocular movements intact.      Pupils: Pupils are equal, round, and reactive to light.   Cardiovascular:      Rate and Rhythm: Normal rate.   Abdominal:      General: Bowel sounds are normal. There is no distension.      Palpations: Abdomen is soft. There is no mass.      Tenderness: There is abdominal tenderness. There is no guarding.   Musculoskeletal:         General: Normal range of motion.      Cervical back: Normal range of motion.   Skin:     General: Skin is warm.      Capillary Refill: Capillary refill takes less than 2 seconds.   Neurological:      Mental Status: She is alert and oriented to person, place, and time.   Psychiatric:         Mood and Affect: Mood normal.         CRANIAL NERVES     CN III, IV, VI   Pupils are equal, round, and reactive to light.     Significant Labs: A1C: No results for input(s):  HGBA1C in the last 4320 hours.  ABGs: No results for input(s): PH, PCO2, HCO3, POCSATURATED, BE, TOTALHB, COHB, METHB, O2HB, POCFIO2, PO2 in the last 48 hours.  Bilirubin:   Recent Labs   Lab 04/04/23  1450 04/24/23  0750   BILITOT 0.7 0.4     Blood Culture: No results for input(s): LABBLOO in the last 48 hours.  CBC:   Recent Labs   Lab 04/24/23  0750   WBC 6.35   HGB 9.6*   HCT 31.6*        CMP:   Recent Labs   Lab 04/24/23  0750      K 4.2      CO2 31*      BUN 23   CREATININE 1.4   CALCIUM 9.5   PROT 7.8   ALBUMIN 3.8   BILITOT 0.4   ALKPHOS 65   AST 18   ALT 12   ANIONGAP 9     Lactic Acid: No results for input(s): LACTATE in the last 48 hours.  Lipase: No results for input(s): LIPASE in the last 48 hours.  Lipid Panel: No results for input(s): CHOL, HDL, LDLCALC, TRIG, CHOLHDL in the last 48 hours.  Magnesium: No results for input(s): MG in the last 48 hours.  Troponin: No results for input(s): TROPONINI, TROPONINIHS in the last 48 hours.  TSH: No results for input(s): TSH in the last 4320 hours.  Urine Culture: No results for input(s): LABURIN in the last 48 hours.  Urine Studies: No results for input(s): COLORU, APPEARANCEUA, PHUR, SPECGRAV, PROTEINUA, GLUCUA, KETONESU, BILIRUBINUA, OCCULTUA, NITRITE, UROBILINOGEN, LEUKOCYTESUR, RBCUA, WBCUA, BACTERIA, SQUAMEPITHEL, HYALINECASTS in the last 48 hours.    Invalid input(s): JUAN    Significant Imaging: I have reviewed all pertinent imaging results/findings within the past 24 hours.    Assessment/Plan:     * Neuroendocrine carcinoma metastatic to multiple sites  NET with met to liver s/p cTACE #1 on 3/1/2023   received pre-procedure Moxifloxacin x 3 days  Consult IR -  S/p cTACE #2 on 4/12  Cipro   MRI abdomen in 4-6 weeks    Hyperlipidemia  Holding statin for now      Benign essential hypertension  Resume home meds.         VTE Risk Mitigation (From admission, onward)         Ordered     IP VTE HIGH RISK PATIENT  Once          04/24/23 1535     Place sequential compression device  Until discontinued         04/24/23 1535                           Radha N MD Luigi  Department of Hospital Medicine  [facility]

## 2023-04-24 NOTE — PROCEDURES
Interventional Radiology Immediate Post-Procedure Note    Pre-Op Diagnosis: Metastatic neuroendocrine cancer   Post-Op Diagnosis: Same    Procedure: Conventional trans-arterial chemoembolization (lipiodol-based TACE)    Procedure performed by: Enzo Soni MD  Assistants: None    Estimated Blood Loss: Less than 50 mL  Specimen Removed: None sent    Findings/description of procedure:  Successful chemoembolization of the left hepatic lobe. Right CFA closed with VASCADE.    Chemoembolic:  Agent: Doxorubicin, 50 mg reconstituted in 8 mL Omnipaque-350  Emulsion: 8 mL Doxorubicin (50 mg) into 10 mL lipiodol     Chemoembolization #1:  Catheter position: Segment 2 hepatic artery  Volume of emulsion administered: 1 mL  Additional embolic administered: 0.2 vial 200 micron Terumo HydroPearl Microspheres     Chemoembolization #2:  Catheter position: Left hepatic artery  Volume of emulsion administered: 2 mL  Additional embolic administered: 0.25 vial 200 micron Terumo HydroPearl Microspheres    Chemoembolization #3:  Catheter position: Segment 4b hepatic artery  Volume of emulsion administered: 1.5 mL  Additional embolic administered: 0.1 vial 200 micron Terumo HydroPearl Microspheres    Totals administered:  Doxorubicin: 12.5 mg  Lipiodol: 2.5 mL  Additional embolic: 0.55 vial 200 micron Terumo HydroPearl Microspheres    No immediate complications. Patient tolerated procedure well.     Recommendations:  MRI abd in 4-6 wks.    Please see full dictated procedure report for additional details.      Enzo Soni MD  [facility]  Pager [phone]

## 2023-04-25 ENCOUNTER — TELEPHONE (OUTPATIENT)
Dept: HEMATOLOGY/ONCOLOGY | Facility: CLINIC | Age: 68
End: 2023-04-25
Payer: MEDICARE

## 2023-04-25 VITALS
HEIGHT: 66 IN | SYSTOLIC BLOOD PRESSURE: 145 MMHG | HEART RATE: 69 BPM | TEMPERATURE: 99 F | BODY MASS INDEX: 31.32 KG/M2 | DIASTOLIC BLOOD PRESSURE: 68 MMHG | RESPIRATION RATE: 19 BRPM | OXYGEN SATURATION: 98 % | WEIGHT: 194.88 LBS

## 2023-04-25 LAB
ALBUMIN SERPL BCP-MCNC: 3.6 G/DL (ref 3.5–5.2)
ALP SERPL-CCNC: 64 U/L (ref 55–135)
ALT SERPL W/O P-5'-P-CCNC: 47 U/L (ref 10–44)
ANION GAP SERPL CALC-SCNC: 10 MMOL/L (ref 8–16)
AST SERPL-CCNC: 70 U/L (ref 10–40)
BASOPHILS # BLD AUTO: 0.02 K/UL (ref 0–0.2)
BASOPHILS NFR BLD: 0.2 % (ref 0–1.9)
BILIRUB SERPL-MCNC: 0.7 MG/DL (ref 0.1–1)
BUN SERPL-MCNC: 12 MG/DL (ref 8–23)
CALCIUM SERPL-MCNC: 8.9 MG/DL (ref 8.7–10.5)
CHLORIDE SERPL-SCNC: 102 MMOL/L (ref 95–110)
CO2 SERPL-SCNC: 27 MMOL/L (ref 23–29)
CREAT SERPL-MCNC: 1.1 MG/DL (ref 0.5–1.4)
DIFFERENTIAL METHOD: ABNORMAL
EOSINOPHIL # BLD AUTO: 0.2 K/UL (ref 0–0.5)
EOSINOPHIL NFR BLD: 2.8 % (ref 0–8)
ERYTHROCYTE [DISTWIDTH] IN BLOOD BY AUTOMATED COUNT: 16 % (ref 11.5–14.5)
EST. GFR  (NO RACE VARIABLE): 54 ML/MIN/1.73 M^2
GLUCOSE SERPL-MCNC: 148 MG/DL (ref 70–110)
HCT VFR BLD AUTO: 32.9 % (ref 37–48.5)
HGB BLD-MCNC: 10.3 G/DL (ref 12–16)
IMM GRANULOCYTES # BLD AUTO: 0.02 K/UL (ref 0–0.04)
IMM GRANULOCYTES NFR BLD AUTO: 0.2 % (ref 0–0.5)
LYMPHOCYTES # BLD AUTO: 0.9 K/UL (ref 1–4.8)
LYMPHOCYTES NFR BLD: 11.5 % (ref 18–48)
MCH RBC QN AUTO: 26.3 PG (ref 27–31)
MCHC RBC AUTO-ENTMCNC: 31.3 G/DL (ref 32–36)
MCV RBC AUTO: 84 FL (ref 82–98)
MONOCYTES # BLD AUTO: 0.6 K/UL (ref 0.3–1)
MONOCYTES NFR BLD: 7.2 % (ref 4–15)
NEUTROPHILS # BLD AUTO: 6.3 K/UL (ref 1.8–7.7)
NEUTROPHILS NFR BLD: 78.1 % (ref 38–73)
NRBC BLD-RTO: 0 /100 WBC
PLATELET # BLD AUTO: 270 K/UL (ref 150–450)
PMV BLD AUTO: 8.8 FL (ref 9.2–12.9)
POTASSIUM SERPL-SCNC: 4.5 MMOL/L (ref 3.5–5.1)
PROT SERPL-MCNC: 7.5 G/DL (ref 6–8.4)
RBC # BLD AUTO: 3.91 M/UL (ref 4–5.4)
SODIUM SERPL-SCNC: 139 MMOL/L (ref 136–145)
WBC # BLD AUTO: 8.09 K/UL (ref 3.9–12.7)

## 2023-04-25 PROCEDURE — A4216 STERILE WATER/SALINE, 10 ML: HCPCS | Performed by: FAMILY MEDICINE

## 2023-04-25 PROCEDURE — 85025 COMPLETE CBC W/AUTO DIFF WBC: CPT | Performed by: FAMILY MEDICINE

## 2023-04-25 PROCEDURE — 94761 N-INVAS EAR/PLS OXIMETRY MLT: CPT

## 2023-04-25 PROCEDURE — 63600175 PHARM REV CODE 636 W HCPCS: Performed by: PHYSICIAN ASSISTANT

## 2023-04-25 PROCEDURE — 36415 COLL VENOUS BLD VENIPUNCTURE: CPT | Performed by: FAMILY MEDICINE

## 2023-04-25 PROCEDURE — 25000003 PHARM REV CODE 250: Performed by: PHYSICIAN ASSISTANT

## 2023-04-25 PROCEDURE — 25000242 PHARM REV CODE 250 ALT 637 W/ HCPCS: Performed by: FAMILY MEDICINE

## 2023-04-25 PROCEDURE — 25000003 PHARM REV CODE 250: Performed by: FAMILY MEDICINE

## 2023-04-25 PROCEDURE — 80053 COMPREHEN METABOLIC PANEL: CPT | Performed by: FAMILY MEDICINE

## 2023-04-25 RX ADMIN — POLYETHYLENE GLYCOL 3350 17 G: 17 POWDER, FOR SOLUTION ORAL at 08:04

## 2023-04-25 RX ADMIN — DOCUSATE SODIUM AND SENNOSIDES 1 TABLET: 8.6; 5 TABLET, FILM COATED ORAL at 08:04

## 2023-04-25 RX ADMIN — DEXTROSE, SODIUM CHLORIDE, AND POTASSIUM CHLORIDE: 5; .45; .15 INJECTION INTRAVENOUS at 01:04

## 2023-04-25 RX ADMIN — FLUTICASONE PROPIONATE 50 MCG: 50 SPRAY, METERED NASAL at 08:04

## 2023-04-25 RX ADMIN — DEXTROSE, SODIUM CHLORIDE, AND POTASSIUM CHLORIDE: 5; .45; .15 INJECTION INTRAVENOUS at 10:04

## 2023-04-25 RX ADMIN — MOXIFLOXACIN HYDROCHLORIDE 400 MG: 400 TABLET, FILM COATED ORAL at 09:04

## 2023-04-25 RX ADMIN — Medication 10 ML: at 08:04

## 2023-04-25 NOTE — HOSPITAL COURSE
Coby Marshall is a 68 y/o with a PMH of asthma, , hyperlipidemia, hypertension, rectal cancer, NET with met to liver s/p cTACE #1 on 3/1/2023 with subsequent readmission for bacteremia and pain control. Present today for cTACE #2, received pre-procedure Moxifloxacin x 3 days. Reported mild abdominal pain with nausea  In the ED Bun/ Cr  23/1.4,  K 4.2, Cl 104, CO2 31, H/H 9.6 / 31.6. Admit observation under hospital medicine service.    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     Patient was examined in her bed, with her  by her side.  She was calm, not in distress.  She denies chest pain, palpitation, shortness of breath, GI/ symptoms.  She states she feels well and tolerated the procedure.  She is requesting to be discharged home.  She is to continue continue moxi for 17 days and ordered mri for outpatient by IR. In 4-6wks.  Patient has also continue scopolamine for nausea.  Her groin site is without hematoma.  The patient is to also get CMP in 2 weeks.  He was also advised that she does not do heavy lifting for 1 week and no driving for 3 days postprocedure.  She is also follow up with the PCP within 3-5 days.

## 2023-04-25 NOTE — PLAN OF CARE
[facility] - Med Surg  Initial Discharge Assessment       Primary Care Provider: Randal Casillas MD    Admission Diagnosis: HCC (hepatocellular carcinoma) [C22.0]    Admission Date: 4/24/2023  Expected Discharge Date: 4/25/2023      Payor: [employer] MANAGED MEDICARE / Plan: [employer] MEDICARE ADVANTAGE / Product Type: Medicare Advantage /     Extended Emergency Contact Information  Primary Emergency Contact: Sree Marshall   [facility]  Home Phone: [phone]  Mobile Phone: [phone]  Relation: Spouse  Secondary Emergency Contact: Arielle Marshall   [facility]  Home Phone: [phone]  Work Phone: [phone]  Mobile Phone: [phone]  Relation: Daughter    Discharge Plan A: (P) Home with family  Discharge Plan B: (P) Home Health      [id number] - [place], [place] - [address]  [address]  [place] [address]  Phone: [phone] Fax: [phone]    [id number] - [place], [place] - [address] [facility] [address]  [address] [place] [address]  Phone: [phone] Fax: [phone]      Initial Assessment (most recent)       Adult Discharge Assessment - 04/25/23 1005          Discharge Assessment    Assessment Type Discharge Planning Assessment     Source of Information patient     Communicated DAVID with patient/caregiver Yes     People in Home spouse   spouse, Sree Marshall ([phone])    Do you expect to return to your current living situation? Yes (P)      Do you have help at home or someone to help you manage your care at home? Yes (P)      Prior to hospitilization cognitive status: Alert/Oriented (P)      Current cognitive status: Alert/Oriented (P)      Equipment Currently Used at Home none (P)      Readmission within 30 days? No (P)      Patient currently being followed by outpatient case management? No (P)      Do you currently have service(s) that help you manage your care at home? No (P)      Do you take prescription medications? Yes  (P)      Do you have prescription coverage? Yes (P)      Do you have any problems affording any of your prescribed medications? No (P)      Is the patient taking medications as prescribed? yes (P)      How do you get to doctors appointments? family or friend will provide;car, drives self (P)      Are you on dialysis? No (P)      Do you take coumadin? No (P)      Discharge Plan A Home with family (P)      Discharge Plan B Home Health (P)      DME Needed Upon Discharge  none (P)      Discharge Plan discussed with: Patient;Spouse/sig other (P)         Physical Activity    On average, how many days per week do you engage in moderate to strenuous exercise (like a brisk walk)? 5 days (P)      On average, how many minutes do you engage in exercise at this level? 20 min (P)         Financial Resource Strain    How hard is it for you to pay for the very basics like food, housing, medical care, and heating? Not hard at all (P)         Housing Stability    In the last 12 months, was there a time when you were not able to pay the mortgage or rent on time? No (P)      In the last 12 months, was there a time when you did not have a steady place to sleep or slept in a shelter (including now)? No (P)         Transportation Needs    In the past 12 months, has lack of transportation kept you from medical appointments or from getting medications? No (P)      In the past 12 months, has lack of transportation kept you from meetings, work, or from getting things needed for daily living? No (P)         Food Insecurity    Within the past 12 months, you worried that your food would run out before you got the money to buy more. Never true (P)      Within the past 12 months, the food you bought just didn't last and you didn't have money to get more. Never true (P)         Stress    Do you feel stress - tense, restless, nervous, or anxious, or unable to sleep at night because your mind is troubled all the time - these days? Not at all (P)          Social Connections    In a typical week, how many times do you talk on the phone with family, friends, or neighbors? More than three times a week (P)      How often do you get together with friends or relatives? More than three times a week (P)      How often do you attend Episcopal or Latter day services? More than 4 times per year (P)      Do you belong to any clubs or organizations such as Episcopal groups, unions, fraternal or athletic groups, or school groups? No (P)      How often do you attend meetings of the clubs or organizations you belong to? Never (P)      Are you , , , , never , or living with a partner?  (P)         Alcohol Use    Q1: How often do you have a drink containing alcohol? Never (P)      Q2: How many drinks containing alcohol do you have on a typical day when you are drinking? Patient does not drink (P)      Q3: How often do you have six or more drinks on one occasion? Never (P)                    1005  Patient resting quietly in bed with spouse, Sree Marshall, at the bedside when CM rounded via [employer]. Patient was admitted for a cTACE #2.    Patient lives with Sree, is independent of all ADLs, & denied the need for assistance with transportation at time of discharge. Pt in agreement with the plan to discharge home today.     Previously scheduled appointment with Dr Ariel Smart ([facility] hem/onc) on 6/28/2023 at 1030 noted. Information added to the pt's discharge paperwork.    8331  Message sent to the schedulers requesting a sooner appt with Dr Smart.     Message sent to nurse Maria Luz & virtual nurse Rojelio informing that the pt is cleared to discharge.       Will continue to follow.

## 2023-04-25 NOTE — TELEPHONE ENCOUNTER
"----- Message from Natividad Ennis sent at 4/25/2023  3:31 PM CDT -----  Regarding: HFU  Patient has discharged from [facility] and a sooner HFU with Hem/Onc has been requested.  Message from discharge is "Pt is s/p cTACE #2 done yesterday. Can you see if the pt can have a sooner appt."  Please schedule and message me back so DC can relay appointment information to patient prior to discharge.    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

## 2023-04-25 NOTE — CONSULTS
Interventional Radiology Consult/Pre-Procedure Note      Chief Complaint/Reason for Consult: s/p TACE 2    History of Present Illness:  Coby Marshall is a 67 y.o. 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 Pt underwent cTACE #1 on 3/1/23 with excellent response on MRI 4/4/23. Completed cTACE #2 morning of admission to address some small residual in the left hepatic lobe. No complications.     Of note, she did well intraoperatively and stayed one night after the first TACE, but was re-admitted to [facility] hospital with pain and possible positive blood cultures. She does not have a history of biliary instrumentation, but we went ahead and started Moxifloxacin 3 days pre-procedure out an abundance of caution.  Pt has moxifloxacin with her, last dose 4/23/23.    This morning nausea resolved and pain controlled.    Admission H&P reviewed.    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    Ki 67 -3-20%    Hyperlipidemia     Hypertension     Malignant carcinoid tumor of ileum 12/2017    Ki 67 < 3%    Rectal cancer 2/8/2019     Past Surgical History:   Procedure Laterality Date    COLON SURGERY      ENDOSCOPIC ULTRASOUND OF UPPER GASTROINTESTINAL TRACT N/A 12/17/2021    Procedure: ULTRASOUND, UPPER GI TRACT, ENDOSCOPIC;  Surgeon: Jeffery Clemons MD;  Location: [facility] ([address] [place] [address]);  Service: Endoscopy;  Laterality: N/A;  instructions emailed to pt-BB  fully vaccinated-BB   12/13 arrival time confirmed with pt-rb    HYSTERECTOMY      ILEOSTOMY CLOSURE  01/2018    Lower Anterior Resection  12/2017    NET- Dr. Beck - [facility]    port a cath Right     R chest wall       Allergies:   Review of patient's allergies indicates:   Allergen Reactions    Arb-angiotensin receptor antagonist Swelling     Had angioedema of lips with ACE inhibitor, so this is a contraindication    Lisinopril Swelling    Lisinopril-hydrochlorothiazide Swelling      Mouth Swelling    Sulfa (sulfonamide antibiotics) Swelling     Swelling of lips    Sulfasalazine Swelling     Swelling of lips    Epinephrine      Neuroendocrine Tumor patient      Hydrocodone-acetaminophen Other (See Comments)     stomach pain (even with food)    Amlodipine Other (See Comments)     edema of ankles and legs.       Scheduled Meds:    fluticasone propionate  1 spray Each Nostril Daily    moxifloxacin  400 mg Oral Daily    polyethylene glycol  17 g Oral Daily    scopolamine  1 patch Transdermal Once    senna-docusate 8.6-50 mg  1 tablet Oral BID    sodium chloride 0.9%  10 mL Intravenous Q8H     Continuous Infusions:    dextrose 5 % and 0.45 % NaCl with KCl 20 mEq 125 mL/hr at 04/25/23 0511    octreotide infusion (50 mcg/mL) 125 mcg/hr (04/25/23 0511)     PRN Meds:acetaminophen, aluminum-magnesium hydroxide-simethicone, ampicillin-sulbactim (UNASYN) IVPB, dextrose 10%, dextrose 10%, dextrose, dextrose, glucagon (human recombinant), magnesium oxide, magnesium oxide, melatonin, naloxone, ondansetron, oxyCODONE, potassium bicarbonate, potassium bicarbonate, potassium bicarbonate, potassium, sodium phosphates, potassium, sodium phosphates, potassium, sodium phosphates, simethicone    Anticoagulation/Antiplatelet Meds: no anticoagulation    Review of Systems:   As documented in admission H&P.    Physical Exam:  Temp: 97.1 °F (36.2 °C) (04/25/23 0753)  Pulse: 64 (04/25/23 0807)  Resp: 19 (04/25/23 0754)  BP: (!) 141/90 (04/25/23 0754)  SpO2: 98 % (04/25/23 0807)     General: WNWD, NAD  HEENT: Normocephalic, sclera anicteric, oropharynx clear  Neck: Supple, no palpable lymphadenopathy  Heart: RRR  Lungs: Symmetric excursions, breathing unlabored  Abd: NTND, soft  Extremities: MAEW, no CCE  Pulses: 2+ DP b/l  Neuro: Gross nonfocal  Skin: groin site without hematoma, no surrounding erythema    Labs:  Recent Labs   Lab 04/24/23  0750   INR 1.1       Recent Labs   Lab 04/25/23  0729   WBC 8.09   HGB 10.3*   HCT  32.9*   MCV 84         Recent Labs   Lab 04/25/23  0729   *      K 4.5      CO2 27   BUN 12   CREATININE 1.1   CALCIUM 8.9   ALT 47*   AST 70*   ALBUMIN 3.6   BILITOT 0.7       Imaging:  MRI 4/4/23 reviewed.    Assessment/Plan:   Coby Marshall is a 67 y.o. 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 Pt underwent cTACE #1 on 3/1/23 with excellent response on MRI 4/4/23. Completed cTACE #2 morning of admission to address some small residual in the left hepatic lobe. No complications.  She started moxifloxacin 3 days prior to procedure as instructed for prevention of bacteremia as this occurred after cTACE #1 on 3/1/23.  Case discussed with Dr. Soni and Dr. Farley.     Plan:  Continue home moxifloxacin starting 4/25 for 17 days post procedure.    Groin site without hematoma, pulses intact.  Discussed with pt no heavy lifting x 1 week and no driving for 3 days post procedure.  Pt stable for discharge from IR standpoint.  CMP in 2 weeks.  MRI in 4-6 weeks followed by clinic visit.  IR will arrange.     Thank you for the consult. Please contact with questions via [employer] secure chat or spectra link     Cindy Chava, PA-C [facility]

## 2023-04-25 NOTE — DISCHARGE SUMMARY
[facility]  Discharge Summary      Patient Name: Coby Marshall  MRN: [id number]  BRENDA: [id number]  Patient Class: OP- Outpatient Recovery  Admission Date: 4/24/2023  Hospital Length of Stay: 0 days  Discharge Date and Time:  04/25/2023 4:32 PM  Attending Physician: Suhas Key MD   Discharging Provider: Suhas Key MD  Primary Care Provider: Randal Casillas MD    Primary Care Team: Networked reference to record PCT     HPI:   Coby Marshall is a 68 y/o with a PMH of asthma, , hyperlipidemia, hypertension, rectal cancer, NET with met to liver s/p cTACE #1 on 3/1/2023 with subsequent readmission for bacteremia and pain control. Present today for cTACE #2, received pre-procedure Moxifloxacin x 3 days. Reported mild abdominal pain with nausea  In the ED Bun/ Cr  23/1.4,  K 4.2, Cl 104, CO2 31, H/H 9.6 / 31.6. Admit observation under hospital medicine service      * No procedures listed *      Hospital Course:   Coby Marshall is a 68 y/o with a PMH of asthma, , hyperlipidemia, hypertension, rectal cancer, NET with met to liver s/p cTACE #1 on 3/1/2023 with subsequent readmission for bacteremia and pain control. Present today for cTACE #2, received pre-procedure Moxifloxacin x 3 days. Reported mild abdominal pain with nausea  In the ED Bun/ Cr  23/1.4,  K 4.2, Cl 104, CO2 31, H/H 9.6 / 31.6. Admit observation under hospital medicine service.    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     Patient was examined in her bed, with her  by her side.  She was calm, not in distress.  She denies chest pain, palpitation, shortness of breath, GI/ symptoms.  She states she feels well and tolerated the procedure.   She is requesting to be discharged home.  She is to continue continue moxi for 17 days and ordered mri for outpatient by IR. In 4-6wks.  Patient has also continue scopolamine for nausea.  Her groin site is without hematoma.  The patient is to also get CMP in 2 weeks.  He was also advised that she does not do heavy lifting for 1 week and no driving for 3 days postprocedure.  She is also follow up with the PCP within 3-5 days.         Goals of Care Treatment Preferences:  Code Status: Full Code      Consults:   Consults (From admission, onward)        Status Ordering Provider     Inpatient consult to Interventional Radiology  Once        Provider:  Enzo Soni II, MD    Completed INNOCENT-ITUAH, YESENIA N.          No new Assessment & Plan notes have been filed under this hospital service since the last note was generated.  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    Specialty: Hematology and Oncology  Why: at 10:30 AM; previously scheduled hem/onc appointment with labs on 6/27/2023  Contact information:  [address] [facility] [address]  [phone]             Randal Casillas MD Follow up in 1 week(s).    Specialty: Internal Medicine  Contact information:  [address] [place] [address]  [phone]                       Patient Instructions:      MRI Abdomen W WO Contrast   Standing Status: Future Standing Exp. Date: 04/25/24     Order Specific Question Answer Comments   Does the patient have a pacemaker or a defibrillator (Note: Some  facilities may not be able to schedule an MRI for patients with pacemakers and defibrillators. You should contact your local radiology department to determine if this is the case.)? No    Does the patient have an aneurysm or surgical clip, pump, nerve/brain stimulator, middle/inner ear prosthesis, or other metal implant or foreign object (bullet, shrapnel)? If they have a card related to their implant, ask them to bring it. Issues related to the implant may cause the MRI to be delayed. No    Is the patient claustrophobic? No    Will the patient require sedation? No    Does the patient have any of the following conditions? Diabetes, History of Renal Disease or Hypertension requiring medical therapy? Yes    May the Radiologist modify the order per protocol to meet the clinical needs of the patient? Yes    Is this part of a Research Study? No    Does the patient have on a skin patch for medication with aluminized backing? No      Comprehensive Metabolic Panel   Standing Status: Future Standing Exp. Date: 05/11/23     Bilirubin, Direct   Standing Status: Future Standing Exp. Date: 05/11/23     Protime-INR   Standing Status: Future Standing Exp. Date: 05/11/23     CBC Auto Differential   Standing Status: Future Standing Exp. Date: 05/11/23     COMPREHENSIVE METABOLIC PANEL   Standing Status: Future Standing Exp. Date: 06/23/24     Follow-up primary physician   Standing Status: Future Standing Exp. Date: 04/24/24       Significant Diagnostic Studies: N/A    Pending Diagnostic Studies:     Procedure Component Value Units Date/Time    IR Embolization for Tumor_Organ Ischemia [[id number]] Resulted: 04/24/23 0924    Order Status: Sent Lab Status: In process Updated: 04/24/23 1123         Medications:  Reconciled Home Medications:      Medication List      CONTINUE taking these medications    ALBUTEROL INHL  Inhale into the lungs as needed.     atorvastatin 20 MG tablet  Commonly known as: LIPITOR  Take 20 mg by mouth once  daily.     brimonidine-timoloL 0.2-0.5 % Drop  Commonly known as: COMBIGAN  Place 1 drop into the right eye 2 (two) times daily.     cloNIDine 0.1 MG tablet  Commonly known as: CATAPRES  Take 0.1 mg by mouth 2 (two) times daily.     fluticasone propionate 50 mcg/actuation nasal spray  Commonly known as: FLONASE  1 spray by Each Nare route once daily.     hydroCHLOROthiazide 12.5 mg capsule  Commonly known as: MICROZIDE  Take 12.5 mg by mouth once daily.     ondansetron 8 MG Tbdl  Commonly known as: ZOFRAN-ODT  Dissolve 1 tablet (8 mg total) by mouth every 8 (eight) hours as needed (nausea).     oxyCODONE 5 MG immediate release tablet  Commonly known as: ROXICODONE  Take 1 tablet (5 mg total) by mouth every 6 (six) hours as needed for Pain.     ranitidine 150 MG tablet  Commonly known as: ZANTAC  Take 150 mg by mouth nightly.        STOP taking these medications    cholestyramine (with sugar) 4 gram Powd            Indwelling Lines/Drains at time of discharge:   Lines/Drains/Airways     None                 Time spent on the discharge of patient: 32 minutes         Suhas Key MD  Department of Hospital Medicine  [facility]

## 2023-04-25 NOTE — PLAN OF CARE
VN reviewed discharge instructions with pt. Using teach back method.  AVS printed and handed to pt by bedside nurse.  Reviewed follow-up appointments, medications, diet, and importance of medication compliance.  Reviewed home care instructions, treatment plan, self-management, and when to seek medical attention.  Allowed time for questions.  All questions answered.  Patient verbalized complete understanding of discharge instructions and voices no concerns.     Discharge instructions complete. .  Transport/wheelchair requested.  Bedside nurse notified.

## 2023-04-25 NOTE — PLAN OF CARE
Pt AAOx4. No complaints of pain. PRN zofran given x1 for N/V. Medications administered per MAR. IVFs and continuous Mike infusing. On RA. Cardiac monitoring maintained. R groin site CDI. Bed alarm set, side rails raised, and call light in reach.

## 2023-04-26 NOTE — PLAN OF CARE
[facility] - Med Surg  Discharge Final Note    Primary Care Provider: Randal Casillas MD    Expected Discharge Date: 4/25/2023    Final Discharge Note (most recent)       Final Note - 04/26/23 0832          Final Note    Assessment Type Final Discharge Note (P)      Anticipated Discharge Disposition Home or Self Care (P)      Hospital Resources/Appts/Education Provided Appointments scheduled and added to AVS (P)                        Contact Info       Ariel Smart MD   Specialty: Hematology and Oncology    [address] [place] [address]   Phone: [phone]       Next Steps: Follow up on 6/28/2023    Instructions: at 10:30 AM; previously scheduled hem/onc appointment with labs on 6/27/2023    Randal Casillas MD   Specialty: Internal Medicine   Relationship: PCP - [facility] and [address] [address]   Phone: [phone]       Next Steps: Follow up in 1 week(s)          CM informed the pt via phone of a hospfu appt scheduled with Dr Smart (hem/onc) on 5/9/2023 at 1400 with labs on the same day at 1300. Pt verbalized understanding & appreciation.     Discharge summary faxed to Dr Randal Casillas (PCP) f [phone] & Dr Tres Olson (hem/onc) f [phone].

## 2023-05-15 ENCOUNTER — LAB VISIT (OUTPATIENT)
Dept: LAB | Facility: HOSPITAL | Age: 68
End: 2023-05-15
Attending: INTERNAL MEDICINE
Payer: MEDICARE

## 2023-05-15 ENCOUNTER — OFFICE VISIT (OUTPATIENT)
Dept: HEMATOLOGY/ONCOLOGY | Facility: CLINIC | Age: 68
End: 2023-05-15
Payer: MEDICARE

## 2023-05-15 VITALS
BODY MASS INDEX: 30.16 KG/M2 | RESPIRATION RATE: 18 BRPM | SYSTOLIC BLOOD PRESSURE: 177 MMHG | HEART RATE: 51 BPM | OXYGEN SATURATION: 100 % | DIASTOLIC BLOOD PRESSURE: 75 MMHG | TEMPERATURE: 98 F | WEIGHT: 187.63 LBS | HEIGHT: 66 IN

## 2023-05-15 DIAGNOSIS — C7A.026 MALIGNANT CARCINOID TUMOR OF RECTUM: ICD-10-CM

## 2023-05-15 DIAGNOSIS — N18.30 STAGE 3 CHRONIC KIDNEY DISEASE, UNSPECIFIED WHETHER STAGE 3A OR 3B CKD: ICD-10-CM

## 2023-05-15 DIAGNOSIS — C7A.012 MALIGNANT CARCINOID TUMOR OF ILEUM: Primary | ICD-10-CM

## 2023-05-15 DIAGNOSIS — D84.81 IMMUNODEFICIENCY SECONDARY TO NEOPLASM: ICD-10-CM

## 2023-05-15 DIAGNOSIS — D49.9 IMMUNODEFICIENCY SECONDARY TO NEOPLASM: ICD-10-CM

## 2023-05-15 DIAGNOSIS — D84.821 IMMUNODEFICIENCY DUE TO DRUGS: ICD-10-CM

## 2023-05-15 DIAGNOSIS — Z79.899 IMMUNODEFICIENCY DUE TO DRUGS: ICD-10-CM

## 2023-05-15 DIAGNOSIS — I10 ESSENTIAL HYPERTENSION: ICD-10-CM

## 2023-05-15 DIAGNOSIS — C7B.8 NEUROENDOCRINE CARCINOMA METASTATIC TO MULTIPLE SITES: ICD-10-CM

## 2023-05-15 DIAGNOSIS — C7A.8 NEUROENDOCRINE CARCINOMA METASTATIC TO MULTIPLE SITES: ICD-10-CM

## 2023-05-15 LAB
ALBUMIN SERPL BCP-MCNC: 3.8 G/DL (ref 3.5–5.2)
ALP SERPL-CCNC: 78 U/L (ref 55–135)
ALT SERPL W/O P-5'-P-CCNC: 15 U/L (ref 10–44)
ANION GAP SERPL CALC-SCNC: 12 MMOL/L (ref 8–16)
AST SERPL-CCNC: 21 U/L (ref 10–40)
BASOPHILS # BLD AUTO: 0.06 K/UL (ref 0–0.2)
BASOPHILS NFR BLD: 0.8 % (ref 0–1.9)
BILIRUB SERPL-MCNC: 0.5 MG/DL (ref 0.1–1)
BUN SERPL-MCNC: 16 MG/DL (ref 8–23)
CALCIUM SERPL-MCNC: 9.8 MG/DL (ref 8.7–10.5)
CHLORIDE SERPL-SCNC: 101 MMOL/L (ref 95–110)
CO2 SERPL-SCNC: 30 MMOL/L (ref 23–29)
CREAT SERPL-MCNC: 1.2 MG/DL (ref 0.5–1.4)
DIFFERENTIAL METHOD: ABNORMAL
EOSINOPHIL # BLD AUTO: 0.2 K/UL (ref 0–0.5)
EOSINOPHIL NFR BLD: 2.7 % (ref 0–8)
ERYTHROCYTE [DISTWIDTH] IN BLOOD BY AUTOMATED COUNT: 15.9 % (ref 11.5–14.5)
EST. GFR  (NO RACE VARIABLE): 49 ML/MIN/1.73 M^2
GLUCOSE SERPL-MCNC: 95 MG/DL (ref 70–110)
HCT VFR BLD AUTO: 33.4 % (ref 37–48.5)
HGB BLD-MCNC: 9.8 G/DL (ref 12–16)
IMM GRANULOCYTES # BLD AUTO: 0.02 K/UL (ref 0–0.04)
IMM GRANULOCYTES NFR BLD AUTO: 0.3 % (ref 0–0.5)
LYMPHOCYTES # BLD AUTO: 1.8 K/UL (ref 1–4.8)
LYMPHOCYTES NFR BLD: 23.7 % (ref 18–48)
MCH RBC QN AUTO: 25.5 PG (ref 27–31)
MCHC RBC AUTO-ENTMCNC: 29.3 G/DL (ref 32–36)
MCV RBC AUTO: 87 FL (ref 82–98)
MONOCYTES # BLD AUTO: 0.5 K/UL (ref 0.3–1)
MONOCYTES NFR BLD: 6.9 % (ref 4–15)
NEUTROPHILS # BLD AUTO: 5 K/UL (ref 1.8–7.7)
NEUTROPHILS NFR BLD: 65.6 % (ref 38–73)
NRBC BLD-RTO: 0 /100 WBC
PLATELET # BLD AUTO: 362 K/UL (ref 150–450)
PMV BLD AUTO: 9.5 FL (ref 9.2–12.9)
POTASSIUM SERPL-SCNC: 3.8 MMOL/L (ref 3.5–5.1)
PROT SERPL-MCNC: 7.9 G/DL (ref 6–8.4)
RBC # BLD AUTO: 3.85 M/UL (ref 4–5.4)
SODIUM SERPL-SCNC: 143 MMOL/L (ref 136–145)
WBC # BLD AUTO: 7.64 K/UL (ref 3.9–12.7)

## 2023-05-15 PROCEDURE — 99999 PR PBB SHADOW E&M-EST. PATIENT-LVL III: ICD-10-PCS | Mod: PBBFAC,,, | Performed by: INTERNAL MEDICINE

## 2023-05-15 PROCEDURE — 99214 PR OFFICE/OUTPT VISIT, EST, LEVL IV, 30-39 MIN: ICD-10-PCS | Mod: S$GLB,,, | Performed by: INTERNAL MEDICINE

## 2023-05-15 PROCEDURE — 3077F PR MOST RECENT SYSTOLIC BLOOD PRESSURE >= 140 MM HG: ICD-10-PCS | Mod: CPTII,S$GLB,, | Performed by: INTERNAL MEDICINE

## 2023-05-15 PROCEDURE — 83519 RIA NONANTIBODY: CPT | Performed by: INTERNAL MEDICINE

## 2023-05-15 PROCEDURE — 3008F BODY MASS INDEX DOCD: CPT | Mod: CPTII,S$GLB,, | Performed by: INTERNAL MEDICINE

## 2023-05-15 PROCEDURE — 3288F PR FALLS RISK ASSESSMENT DOCUMENTED: ICD-10-PCS | Mod: CPTII,S$GLB,, | Performed by: INTERNAL MEDICINE

## 2023-05-15 PROCEDURE — 3077F SYST BP >= 140 MM HG: CPT | Mod: CPTII,S$GLB,, | Performed by: INTERNAL MEDICINE

## 2023-05-15 PROCEDURE — 3008F PR BODY MASS INDEX (BMI) DOCUMENTED: ICD-10-PCS | Mod: CPTII,S$GLB,, | Performed by: INTERNAL MEDICINE

## 2023-05-15 PROCEDURE — 1126F AMNT PAIN NOTED NONE PRSNT: CPT | Mod: CPTII,S$GLB,, | Performed by: INTERNAL MEDICINE

## 2023-05-15 PROCEDURE — 80053 COMPREHEN METABOLIC PANEL: CPT | Performed by: INTERNAL MEDICINE

## 2023-05-15 PROCEDURE — 1159F MED LIST DOCD IN RCRD: CPT | Mod: CPTII,S$GLB,, | Performed by: INTERNAL MEDICINE

## 2023-05-15 PROCEDURE — 85025 COMPLETE CBC W/AUTO DIFF WBC: CPT | Performed by: INTERNAL MEDICINE

## 2023-05-15 PROCEDURE — 84260 ASSAY OF SEROTONIN: CPT | Performed by: INTERNAL MEDICINE

## 2023-05-15 PROCEDURE — 3288F FALL RISK ASSESSMENT DOCD: CPT | Mod: CPTII,S$GLB,, | Performed by: INTERNAL MEDICINE

## 2023-05-15 PROCEDURE — 1126F PR PAIN SEVERITY QUANTIFIED, NO PAIN PRESENT: ICD-10-PCS | Mod: CPTII,S$GLB,, | Performed by: INTERNAL MEDICINE

## 2023-05-15 PROCEDURE — 1159F PR MEDICATION LIST DOCUMENTED IN MEDICAL RECORD: ICD-10-PCS | Mod: CPTII,S$GLB,, | Performed by: INTERNAL MEDICINE

## 2023-05-15 PROCEDURE — 1160F RVW MEDS BY RX/DR IN RCRD: CPT | Mod: CPTII,S$GLB,, | Performed by: INTERNAL MEDICINE

## 2023-05-15 PROCEDURE — 1101F PT FALLS ASSESS-DOCD LE1/YR: CPT | Mod: CPTII,S$GLB,, | Performed by: INTERNAL MEDICINE

## 2023-05-15 PROCEDURE — 99214 OFFICE O/P EST MOD 30 MIN: CPT | Mod: S$GLB,,, | Performed by: INTERNAL MEDICINE

## 2023-05-15 PROCEDURE — 83497 ASSAY OF 5-HIAA: CPT | Performed by: INTERNAL MEDICINE

## 2023-05-15 PROCEDURE — 99999 PR PBB SHADOW E&M-EST. PATIENT-LVL III: CPT | Mod: PBBFAC,,, | Performed by: INTERNAL MEDICINE

## 2023-05-15 PROCEDURE — 1101F PR PT FALLS ASSESS DOC 0-1 FALLS W/OUT INJ PAST YR: ICD-10-PCS | Mod: CPTII,S$GLB,, | Performed by: INTERNAL MEDICINE

## 2023-05-15 PROCEDURE — 3078F PR MOST RECENT DIASTOLIC BLOOD PRESSURE < 80 MM HG: ICD-10-PCS | Mod: CPTII,S$GLB,, | Performed by: INTERNAL MEDICINE

## 2023-05-15 PROCEDURE — 1160F PR REVIEW ALL MEDS BY PRESCRIBER/CLIN PHARMACIST DOCUMENTED: ICD-10-PCS | Mod: CPTII,S$GLB,, | Performed by: INTERNAL MEDICINE

## 2023-05-15 PROCEDURE — 36415 COLL VENOUS BLD VENIPUNCTURE: CPT | Performed by: INTERNAL MEDICINE

## 2023-05-15 PROCEDURE — 3078F DIAST BP <80 MM HG: CPT | Mod: CPTII,S$GLB,, | Performed by: INTERNAL MEDICINE

## 2023-05-15 NOTE — PROGRESS NOTES
PROGRESS NOTE    Subjective:       Patient ID: Coby Marshall is a 67 y.o. female.    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    Tempus: no reportable pathogenic mutation. MSI-low. PDL1 negative. TMB 3.7    Oncologic History:  1. Ms. 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 Her primary oncologist is Dr. Olson at [facility].  Her oncology history is detailed below. She was initially diagnosed with rectal adenocarcinoma and she was treated with neoadjuvant chemoRT. Surgical pathology resulted as well-differentiated carcinoid and she was initially observed. A CT obtained for hernia revealed liver metastases in July 2021, and these were confirmed to be neuroendocrine on pathology. She underwent EUS with biopsy in December 2021 which showed grade 3 NET in the pancreas with Ki67 25%. She was started on lanreotide. Her liver lesions have since grown in size, and her oncologist referred her for consideration of liver-directed therapy. Ms. Marshall reports ongoing diarrhea since the time of her initial resection. She has about 6 bowel movements per day. She does not have any flushing, palpitations, or diaphoresis. She otherwise feels well.   Oncology History:   8/2017: Well-differentiated carcinoid tumor in the rectum, grade 2, and in the terminal ileum, grade 1. Initially diagnosed as adenocarcinoma and later changed to carcinoid tumor at time of resection  Treated with concurrent chemoRT with infusional 5-FU under the diagnosis of adenocarcinoma  1/2018: resection of rectum and terminal ileum. Pathology showed a ypT3N0 1.7 cm rectal NET, well  "differentiated, grade 2, Ki 67 was 16%. 1.5 cm T2Nx small bowel NET, well diff, Ki 67 was less than 3%.  2021: Metastasis to liver on CT for hernia repair.   11/10/2021: Copper PET:    In this patient with rectal and ileal neuroendocrine tumor status post low anterior resection/small bowel resection, there are multiple somatostatin receptor avid hepatic lesions which appear increased in size as compared to outside CT 2021.  There is also tracer avid focus in the pancreatic body suspicious for malignancy.  All these findings are new since prior Dotatate PET-CT.  2021: EUS with biopsies:  "1. PANCREAS MASS, ENDOSCOPIC ULTRASOUND-GUIDED FNA:   Well-differentiated neuroendocrine tumor, favor WHO grade 3 (G3).   Comment:  Ki-67 labeling index is approximately 25%.  Average mitotic rate is   9 per 2 mm^2.  Tumor cells are positive with synaptophysin and chromogranin,   supporting the diagnosis.   2. LIVER, LEFT LOWER LOBE, ENDOSCOPIC ULTRASOUND-GUIDED FNA:   Well-differentiated neuroendocrine tumor.   3. LIVER, RIGHT LOWER LOBE, ENDOSCOPIC ULTRASOUND-GUIDED FNA:   Well-differentiated neuroendocrine tumor, favor WHO grade 2 (G2).   Comment (2,3):  Tumor in parts 2 and 3 are histologically identical.  Only   part 3 contains enough cell block lesional material to render a meaningful   labeling index and mitotic count.  Ki-67 labeling index is approximately 15%.    Average mitotic rate is 3 per 2 mm^2.  Tumor cells are positive with   synaptophysin but negative for chromogranin.  The overall findings are still   consistent with metastatic well-differentiated neuroendocrine tumor."  1/3/2022: Lanreotide started  2023: repeat CT abd/pelvis: worsening hepatic metastases. Changed Lanreotide to Sandostatin  2. Case reviewed at tumor board. Recc serial TACE. S/p TACE on 3/1/23 and 23.      Interval History:   Ms Marshall returns today for follow up. Feeling well. Had TACE on 23.     ECO    ROS:   A " ten-point system review is obtained and negative except for what was stated in the Interval History.     Physical Examination:   Vital signs reviewed.   General: well hydrated, well developed, in no acute distress  HEENT: normocephalic, PERRLA, EOMI, anicteric sclerae, oropharynx clear  Neck: supple, no JVD, thyromegaly, cervical or supraclavicular lymphadenopathy  Lungs: clear breath sounds bilaterally, no wheezing, rales, or rhonchi  Heart: RRR, no M/R/G  Abdomen: soft, no tenderness, non-distended, no hepatosplenomegaly, mass, or hernia. BS present  Extremities: no clubbing, cyanosis, or edema  Skin: no rash, ulcer, or open wounds  Neuro: alert and oriented x 4, no focal neuro deficit  Psych: pleasant and appropriate mood and affect    Objective:     Laboratory Data:  Labs reviewed. CBC, CMP unremarkable.     Imaging Data:  Gallium PET 2/7/23:  Similar number and distribution of radiotracer avid lesions within the liver and pancreatic body.  Several liver lesions demonstrate central PET photopenia and hypodensity on CT consistent with necrosis.     New nonspecific mild radiotracer uptake within several small left axillary lymph nodes.  Finding may relate to left upper extremity injection and partial radiotracer extravasation.     Otherwise, no new somatostatin receptor avid lesion.    I have personally reviewed her CT abdomen/pelvis with Dr Soni: Receptor positive pancreatic lesion and liver mets. Liver mets appear enlarged since 6/2022, though this may be due to larger cystic components.     MRI abdomen 4/4/23:  Redemonstration of multiple hepatic lesions in keeping with known neuroendocrine tumor metastasis.  Dominant lesions within the right lobe and segment 4 demonstrate decreased central enhancement consistent with response to therapy with residual disease likely present at these sites.  Additional stable enhancing lesions within the left and right lobes consistent with residual disease.  No definite new  lesion.     Persistent restricted diffusion within the pancreatic body in keeping with known lesion.     Stable mildly prominent periportal lymph nodes, nonspecific.       Assessment and Plan:     1. Malignant carcinoid tumor of ileum    2. Malignant carcinoid tumor of rectum    3. 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 Immunodeficiency secondary to neoplasm    5. Immunodeficiency due to drugs    6. Essential hypertension    7. Stage 3 chronic kidney disease, unspecified whether stage 3a or 3b CKD      1-5  - Ms Marshall is a 66 yo woman with history of ypT3N0 rectal NET, well differentiated, grade 2 (Ki 67 16%) and small bowel NET, well diff, low grade, s/p surgery in Jan 2018. She was found to have metastatic disease to the liver and pancreas in July 2021 s/p biopsy. Liver NET is well diff grade 2 (Ki67 15)%. Pancreatic NET is well diff grade 3 (Ki67 25%). She has been on lanreotide since July 2022, changed to sandostatin in Jan 2023 after CT scan showed progression. S/p TACE to the liver on 3/1/23 and 4/24/23  - doing well. Just received lanreotide on 5/2/23.   - scheduled for MRI and restaging PET scan on 6/27. Seeing me on 6/28.   - C/w lanreotide with Dr Olson    6.  - BP elevated  - f/u/ with PCP    7.  - monitor Cr  Follow-up:     RTC at the end of June    Electronically signed by Ariel Orosco for Scheduling    Med Onc Chart Routing      Follow up with physician 1 month. keep scheduled appointments   Follow up with E[facility]    Infusion scheduling note    Injection scheduling note    Labs    Imaging    Pharmacy appointment    Other referrals

## 2023-05-17 LAB
DNA RANGE(S) EXAMINED NAR: NORMAL
GENE DIS ANL INTERP-IMP: POSITIVE
GENE DIS ASSESSED: NORMAL
GENE MUT TESTED BLD/T: 3.7 M/MB
MLH1+MSH2+MSH6+PMS2 GN DEL+DUP+FUL M: NORMAL
MMR ENDO PMS2 CA SPEC QL IMSTN: PRESENT
MMR PROT MLH1 CA SPEC QL IMSTN: PRESENT
MMR PROT MSH2 CA SPEC QL IMSTN: PRESENT
MMR PROT MSH6 CA SPEC QL IMSTN: PRESENT
MSI CA SPEC-IMP: NORMAL
PD-L1 BY 22C3 TISS IMSTN DOC: NEGATIVE
REASON FOR STUDY: NORMAL
TEMPUS AMENDMENTNOTE1: NORMAL
TEMPUS FUSIONADDENDUM: NORMAL
TEMPUS LCA: NORMAL
TEMPUS PD-L1 (22C3) COMBINED POSITIVE SCORE: <1
TEMPUS PD-L1 (22C3) TUMOR PROPORTION SCORE: <1 %
TEMPUS PORTAL: NORMAL

## 2023-05-18 LAB — SEROTONIN: <30 NG/ML

## 2023-05-23 LAB
5OH-INDOLEACETATE SERPL-MCNC: 7 NG/ML
PANCREASTATIN SERPL-MCNC: 87 PG/ML (ref 10–135)

## 2023-06-07 ENCOUNTER — HOSPITAL ENCOUNTER (OUTPATIENT)
Dept: RADIOLOGY | Facility: HOSPITAL | Age: 68
Discharge: HOME | End: 2023-06-07
Attending: INTERNAL MEDICINE
Payer: MEDICARE

## 2023-06-07 DIAGNOSIS — C7A.026 MALIGNANT CARCINOID TUMOR OF RECTUM: ICD-10-CM

## 2023-06-07 DIAGNOSIS — C78.7 SECONDARY MALIGNANT NEOPLASM OF LIVER: ICD-10-CM

## 2023-06-07 DIAGNOSIS — D3A.8 NEUROENDOCRINE TUMOR OF PANCREAS: ICD-10-CM

## 2023-06-07 DIAGNOSIS — C7A.8 NEUROENDOCRINE CARCINOMA METASTATIC TO MULTIPLE SITES: ICD-10-CM

## 2023-06-07 DIAGNOSIS — C7B.8 NEUROENDOCRINE CARCINOMA METASTATIC TO MULTIPLE SITES: ICD-10-CM

## 2023-06-07 PROCEDURE — 25500020 PHARM REV CODE 255: Performed by: INTERNAL MEDICINE

## 2023-06-07 PROCEDURE — A9585 GADOBUTROL INJECTION: HCPCS | Performed by: INTERNAL MEDICINE

## 2023-06-07 PROCEDURE — 74183 MRI ABD W/O CNTR FLWD CNTR: CPT | Mod: 26,,, | Performed by: STUDENT IN AN ORGANIZED HEALTH CARE EDUCATION/TRAINING PROGRAM

## 2023-06-07 PROCEDURE — 74183 MRI ABDOMEN W WO CONTRAST: ICD-10-PCS | Mod: 26,,, | Performed by: STUDENT IN AN ORGANIZED HEALTH CARE EDUCATION/TRAINING PROGRAM

## 2023-06-07 PROCEDURE — 74183 MRI ABD W/O CNTR FLWD CNTR: CPT | Mod: TC

## 2023-06-07 RX ORDER — GADOBUTROL 604.72 MG/ML
10 INJECTION INTRAVENOUS
Status: COMPLETED | OUTPATIENT
Start: 2023-06-07 | End: 2023-06-07

## 2023-06-07 RX ADMIN — GADOBUTROL 10 ML: 604.72 INJECTION INTRAVENOUS at 07:06

## 2023-06-20 NOTE — PROGRESS NOTES
[facility]      NEUROENDOCRINE MULTIDISCIPLINARY TUMOR BOARD  _____________________________________________________________________    PRESENTER:   Ariel Smart MD    REASON FOR PRESENTATION:  Scan Review    ATTENDEES:   Gastroenterology - Not Present  Interventional Radiology - Dhaval Valdez MD  Nuclear Medicine - Arsenio Wesley MD  Nursing - Latoya Montalvo, RN & Mariam Perry RN  Oncology - Ariel Smart MD and Ricardo Mcginnis MD  Palliative Medicine - Not Present  Pathology -  Crystal Porter DO , Anny Dent MD, and Rai Coles MD  Research - Not Present  Surgery - MD Suhas Mireles MD Nathan Bolton, MD William Kethman, MD Peter Kimble MD    PATIENT STATUS:  Established Patient    PATIENT SUMMARY:  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    Ki 67 -3-20%    Hyperlipidemia     Hypertension     Malignant carcinoid tumor of ileum 12/2017    Ki 67 < 3%    Rectal cancer 2/8/2019       Past Surgical History:   Procedure Laterality Date    COLON SURGERY      ENDOSCOPIC ULTRASOUND OF UPPER GASTROINTESTINAL TRACT N/A 12/17/2021    Procedure: ULTRASOUND, UPPER GI TRACT, ENDOSCOPIC;  Surgeon: Jeffery Clemons MD;  Location: [address]);  Service: Endoscopy;  Laterality: N/A;  instructions emailed to pt-BB  fully vaccinated-BB   12/13 arrival time confirmed with pt-rb    HYSTERECTOMY      ILEOSTOMY CLOSURE  01/2018    Lower Anterior Resection  12/2017    NET- Dr. Manjarrez - [facility]    port a cath Right     R chest wall       TUMOR MARKERS:  5-HIAA, Plasma Ref Range: up to 22 ng/mL  Recent Labs   Lab 11/11/21  1016 02/01/23  0913 02/10/23  0840 04/04/23  1450 05/15/23  1254   5-HIAA, Plasma (Neuroend) 7 Test Not Performed 6 7 7     Chromogranin A Ref Range: <93 ng/mL  Recent Labs   Lab 11/11/21  1016 02/01/23  0913   Chromogranin A 41 27     Gastrin Ref  Range: <100 pg/mL      Neurokinin A Ref Range: 0 - 40 pg/mL      Pancreastatin Ref Range: 10 - 135 pg/mL  Recent Labs   Lab 11/11/21  1016 02/01/23  0913 02/10/23  0840 04/04/23  1450 05/15/23  1254   Pancreastatin 139 Test Not Performed 85 80 87     Pancreatic Polypeptide Ref Range: >314 pg/mL  Recent Labs   Lab 11/11/21  1016   Pancreatic Polypeptide 225     Serotonin Ref Range: <=230 ng/mL  Recent Labs   Lab 02/01/23  0913 02/10/23  0840 04/04/23  1450 05/15/23  1254   Serotonin <30 <30 <30 <30         ____________________________________________________________________    DISCUSSION: 66 yo woman with history of ypT3N0 rectal NET, well differentiated, grade 2 (Ki 67 16%) and small bowel NET, well diff, low grade, s/p  surgery in Jan 2018. She was found to have metastatic disease to the liver and pancreas in July 2021 s/p biopsy. Liver NET is well diff  grade 2 (Ki67 15)%. Pancreatic NET is well diff grade 3 (Ki67 25%). She has been on lanreotide since July 2022, changed to sandostatin in  Jan 2023 after CT scan showed progression. S/p TACE to the liver on 3/1/23 and 4/24/23. Copper PET 6/27/23 showed progression.  Reviewed scan. Evaluate for PRRT.      IR: Most recent MRI shows good treatment response to TACE. Theres residual disease in lesions that could be addressed with a combination of MWA for smaller lesions and new lesion in segment 5. TACE   would be preferred for residual of larger lesions. Areas of residual disease and new lesion demonstrate PET uptake. Pancreastic lesion noted which appears unchanged in size on MRI.  NM: Mild progression on PET with new liver lesion, enlarging pancreatic lesion, and new uptake in right hilum. Post treatment change within multiple liver lesions noting decreased tracer uptake. Nonspecific lymph nodes.      BOARD RECOMMENDATIONS: PRRT not recommended at this time, consider local/regional treatment to liver, PET in 3 months for pancreas

## 2023-06-22 ENCOUNTER — OFFICE VISIT (OUTPATIENT)
Dept: INTERVENTIONAL RADIOLOGY/VASCULAR | Facility: CLINIC | Age: 68
End: 2023-06-22
Payer: MEDICARE

## 2023-06-22 VITALS
SYSTOLIC BLOOD PRESSURE: 114 MMHG | BODY MASS INDEX: 30.28 KG/M2 | DIASTOLIC BLOOD PRESSURE: 74 MMHG | HEART RATE: 59 BPM | WEIGHT: 187.63 LBS

## 2023-06-22 DIAGNOSIS — C7A.8 NEUROENDOCRINE CARCINOMA METASTATIC TO LIVER: Primary | ICD-10-CM

## 2023-06-22 DIAGNOSIS — C7B.8 NEUROENDOCRINE CARCINOMA METASTATIC TO LIVER: Primary | ICD-10-CM

## 2023-06-22 PROCEDURE — 99214 OFFICE O/P EST MOD 30 MIN: CPT | Mod: S$GLB,,, | Performed by: PHYSICIAN ASSISTANT

## 2023-06-22 PROCEDURE — 1160F PR REVIEW ALL MEDS BY PRESCRIBER/CLIN PHARMACIST DOCUMENTED: ICD-10-PCS | Mod: CPTII,S$GLB,, | Performed by: PHYSICIAN ASSISTANT

## 2023-06-22 PROCEDURE — 99999 PR PBB SHADOW E&M-EST. PATIENT-LVL III: ICD-10-PCS | Mod: PBBFAC,,, | Performed by: PHYSICIAN ASSISTANT

## 2023-06-22 PROCEDURE — 3078F PR MOST RECENT DIASTOLIC BLOOD PRESSURE < 80 MM HG: ICD-10-PCS | Mod: CPTII,S$GLB,, | Performed by: PHYSICIAN ASSISTANT

## 2023-06-22 PROCEDURE — 1160F RVW MEDS BY RX/DR IN RCRD: CPT | Mod: CPTII,S$GLB,, | Performed by: PHYSICIAN ASSISTANT

## 2023-06-22 PROCEDURE — 3078F DIAST BP <80 MM HG: CPT | Mod: CPTII,S$GLB,, | Performed by: PHYSICIAN ASSISTANT

## 2023-06-22 PROCEDURE — 3074F SYST BP LT 130 MM HG: CPT | Mod: CPTII,S$GLB,, | Performed by: PHYSICIAN ASSISTANT

## 2023-06-22 PROCEDURE — 1159F PR MEDICATION LIST DOCUMENTED IN MEDICAL RECORD: ICD-10-PCS | Mod: CPTII,S$GLB,, | Performed by: PHYSICIAN ASSISTANT

## 2023-06-22 PROCEDURE — 99214 PR OFFICE/OUTPT VISIT, EST, LEVL IV, 30-39 MIN: ICD-10-PCS | Mod: S$GLB,,, | Performed by: PHYSICIAN ASSISTANT

## 2023-06-22 PROCEDURE — 99999 PR PBB SHADOW E&M-EST. PATIENT-LVL III: CPT | Mod: PBBFAC,,, | Performed by: PHYSICIAN ASSISTANT

## 2023-06-22 PROCEDURE — 1159F MED LIST DOCD IN RCRD: CPT | Mod: CPTII,S$GLB,, | Performed by: PHYSICIAN ASSISTANT

## 2023-06-22 PROCEDURE — 3074F PR MOST RECENT SYSTOLIC BLOOD PRESSURE < 130 MM HG: ICD-10-PCS | Mod: CPTII,S$GLB,, | Performed by: PHYSICIAN ASSISTANT

## 2023-06-22 PROCEDURE — 3008F BODY MASS INDEX DOCD: CPT | Mod: CPTII,S$GLB,, | Performed by: PHYSICIAN ASSISTANT

## 2023-06-22 PROCEDURE — 3008F PR BODY MASS INDEX (BMI) DOCUMENTED: ICD-10-PCS | Mod: CPTII,S$GLB,, | Performed by: PHYSICIAN ASSISTANT

## 2023-06-22 NOTE — PROGRESS NOTES
Subjective     Patient ID: Coby Marshall is a 67 y.o. female.    Chief Complaint: Consult    Patient referred to Interventional Radiology by Dr. Smart. Coby Marshall is a 67 y.o. 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  Pt underwent cTACE #1 on 3/1/23 with excellent response on MRI 4/4/23. She did well intraoperatively and stayed one night after TACE #1, but was re-admitted to [facility] with pain and bacteremia. She does not have a history of biliary instrumentation, however prior to TACE #2 she started Moxifloxacin 3 days pre-procedure out an abundance of caution.  She completed cTACE #2 4/24/23 to address some small residual in the left hepatic lobe.  She was discharged with moxifloxacin for 17 days post procedure.  No complications, successful chemoembolization of segments 2, 3 and 4B.  Almost all of the patient's hepatic metastatic disease has now been treated over 2 sessions.     Interval History: Patient reports feeling well.  She denies any post embolization syndrome symptoms.  MRI 6/7/23 reviewed noting decreased size of multiple hepatic lesions consistent with known metastasis, several lesions demonstrate persistent enhancement consistent with residual disease.  She denies any fever, chills, unexpected wt change, fatigue, CP, SOB, cough, abdominal pain, N/V/D, leg pain or swelling, flank pain,confusion, sleep disturbance.        Review of Systems   Constitutional:  Negative for activity change, appetite change, chills, fatigue, fever and unexpected weight change.   Respiratory:  Negative for cough and shortness of breath.    Cardiovascular:  Negative for chest pain and leg swelling.   Gastrointestinal:  Negative for abdominal distention, abdominal pain, constipation, diarrhea, nausea and vomiting.   Genitourinary:  Negative for difficulty urinating and flank pain.    Musculoskeletal:  Negative for back pain and gait problem.   Neurological:  Negative for weakness and memory loss.   Psychiatric/Behavioral:  Negative for confusion and sleep disturbance.         Objective     Physical Exam  Constitutional:       General: She is not in acute distress.     Appearance: She is well-developed. She is not diaphoretic.   HENT:      Head: Normocephalic and atraumatic.   Pulmonary:      Effort: Pulmonary effort is normal. No respiratory distress.   Neurological:      Mental Status: She is alert and oriented to person, place, and time.   Psychiatric:         Behavior: Behavior normal.         Thought Content: Thought content normal.         Judgment: Judgment normal.     IMAGING  MRI pre TACE#2 (4/4/23) reviewed along with MRI post TACE #2 on 4/24/23.     Assessment and Plan     1. Neuroendocrine carcinoma metastatic to liver    Coby Marshall is a 67 y.o. female with metastatic well differentiated, intermediate to high grade NET of the rectum, with mets to the liver and pancreas (Ki67 of liver met 15%, Ki67 of pancreas 25%) s/p TACE #1 on 3/1/23 and TACE #2 4/24/23 (successful chemoembolization of segments 2, 3 and 4B).  Almost all of the patient's hepatic metastatic disease has now been treated over 2 sessions.    Pt to continue to follow with Oncology.  Will continue to review patient at Tumor Board.            No follow-ups on file.

## 2023-06-27 ENCOUNTER — HOSPITAL ENCOUNTER (OUTPATIENT)
Dept: RADIOLOGY | Facility: HOSPITAL | Age: 68
Discharge: HOME | End: 2023-06-27
Attending: INTERNAL MEDICINE
Payer: MEDICARE

## 2023-06-27 DIAGNOSIS — C7B.8 NEUROENDOCRINE CARCINOMA METASTATIC TO MULTIPLE SITES: ICD-10-CM

## 2023-06-27 DIAGNOSIS — C7A.8 NEUROENDOCRINE CARCINOMA METASTATIC TO MULTIPLE SITES: ICD-10-CM

## 2023-06-27 DIAGNOSIS — C7A.026 MALIGNANT CARCINOID TUMOR OF RECTUM: ICD-10-CM

## 2023-06-27 PROCEDURE — A9587 GALLIUM GA-68: HCPCS

## 2023-06-27 PROCEDURE — 78815 PET IMAGE W/CT SKULL-THIGH: CPT | Mod: 26,PS,, | Performed by: STUDENT IN AN ORGANIZED HEALTH CARE EDUCATION/TRAINING PROGRAM

## 2023-06-27 PROCEDURE — 78815 PET IMAGE W/CT SKULL-THIGH: CPT | Mod: TC,PS

## 2023-06-27 PROCEDURE — 78815 NM PET 68GA DOTATATE, VERTEX TO THIGH: ICD-10-PCS | Mod: 26,PS,, | Performed by: STUDENT IN AN ORGANIZED HEALTH CARE EDUCATION/TRAINING PROGRAM

## 2023-06-27 NOTE — PROGRESS NOTES
PROGRESS NOTE    Subjective:       Patient ID: Coby Marshall is a 67 y.o. female.    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    Tempus: no reportable pathogenic mutation. MSI-low. PDL1 negative. TMB 3.7    Oncologic History:  1. Ms. 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 Her primary oncologist is Dr. Olson at [facility].  Her oncology history is detailed below. She was initially diagnosed with rectal adenocarcinoma and she was treated with neoadjuvant chemoRT. Surgical pathology resulted as well-differentiated carcinoid and she was initially observed. A CT obtained for hernia revealed liver metastases in July 2021, and these were confirmed to be neuroendocrine on pathology. She underwent EUS with biopsy in December 2021 which showed grade 3 NET in the pancreas with Ki67 25%. She was started on lanreotide. Her liver lesions have since grown in size, and her oncologist referred her for consideration of liver-directed therapy. Ms. Marshall reports ongoing diarrhea since the time of her initial resection. She has about 6 bowel movements per day. She does not have any flushing, palpitations, or diaphoresis. She otherwise feels well.   Oncology History:   8/2017: Well-differentiated carcinoid tumor in the rectum, grade 2, and in the terminal ileum, grade 1. Initially diagnosed as adenocarcinoma and later changed to carcinoid tumor at time of resection  Treated with concurrent chemoRT with infusional 5-FU under the diagnosis of adenocarcinoma  1/2018: resection of rectum and terminal ileum. Pathology showed a ypT3N0 1.7 cm rectal NET, well  "differentiated, grade 2, Ki 67 was 16%. 1.5 cm T2Nx small bowel NET, well diff, Ki 67 was less than 3%.  7/22/2021: Metastasis to liver on CT for hernia repair.   11/10/2021: Copper PET:    In this patient with rectal and ileal neuroendocrine tumor status post low anterior resection/small bowel resection, there are multiple somatostatin receptor avid hepatic lesions which appear increased in size as compared to outside CT 07/19/2021.  There is also tracer avid focus in the pancreatic body suspicious for malignancy.  All these findings are new since prior Dotatate PET-CT.  12/17/2021: EUS with biopsies:  "1. PANCREAS MASS, ENDOSCOPIC ULTRASOUND-GUIDED FNA:   Well-differentiated neuroendocrine tumor, favor WHO grade 3 (G3).   Comment:  Ki-67 labeling index is approximately 25%.  Average mitotic rate is   9 per 2 mm^2.  Tumor cells are positive with synaptophysin and chromogranin,   supporting the diagnosis.   2. LIVER, LEFT LOWER LOBE, ENDOSCOPIC ULTRASOUND-GUIDED FNA:   Well-differentiated neuroendocrine tumor.   3. LIVER, RIGHT LOWER LOBE, ENDOSCOPIC ULTRASOUND-GUIDED FNA:   Well-differentiated neuroendocrine tumor, favor WHO grade 2 (G2).   Comment (2,3):  Tumor in parts 2 and 3 are histologically identical.  Only   part 3 contains enough cell block lesional material to render a meaningful   labeling index and mitotic count.  Ki-67 labeling index is approximately 15%.    Average mitotic rate is 3 per 2 mm^2.  Tumor cells are positive with   synaptophysin but negative for chromogranin.  The overall findings are still   consistent with metastatic well-differentiated neuroendocrine tumor."  1/3/2022: Lanreotide started  1/4/2023: repeat CT abd/pelvis: worsening hepatic metastases. Changed Lanreotide to Sandostatin  2. [facility]p [facility] on 3/1/23 and 4/24/23.      Interval History:   Ms Marshall returns today for follow up. Feeling well. Had TACE on 4/24/23. She reports ongoing " diarrhea with meals, this has lead to decreased PO intake; she reports that she has one good meal a day. Her recent MRI and PET imaging with findings for mild progression of disease. She denies any other new systemic symptoms.    ECO    ROS:   A ten-point system review is obtained and negative except for what was stated in the Interval History.     Physical Examination:   Vital signs reviewed.   General: well hydrated, well developed, in no acute distress  HEENT: normocephalic, PERRLA, EOMI, anicteric sclerae, oropharynx clear  Neck: supple, no JVD, thyromegaly, cervical or supraclavicular lymphadenopathy  Lungs: clear breath sounds bilaterally, no wheezing, rales, or rhonchi  Heart: RRR, no M/R/G  Abdomen: soft, no tenderness, non-distended, no hepatosplenomegaly, mass, or hernia. BS present  Extremities: no clubbing, cyanosis, or edema  Skin: no rash, ulcer, or open wounds  Neuro: alert and oriented x 4, no focal neuro deficit  Psych: pleasant and appropriate mood and affect    Objective:     Laboratory Data:  Labs reviewed. CBC, CMP unremarkable. Neuroendocrine studies pending    Imaging Data:  Gallium PET 23:  Similar number and distribution of radiotracer avid lesions within the liver and pancreatic body.  Several liver lesions demonstrate central PET photopenia and hypodensity on CT consistent with necrosis.     New nonspecific mild radiotracer uptake within several small left axillary lymph nodes.  Finding may relate to left upper extremity injection and partial radiotracer extravasation.     Otherwise, no new somatostatin receptor avid lesion.    I have personally reviewed her CT abdomen/pelvis with Dr Soni: Receptor positive pancreatic lesion and liver mets. Liver mets appear enlarged since 2022, though this may be due to larger cystic components.     MRI abdomen 23:  Redemonstration of multiple hepatic lesions in keeping with known neuroendocrine tumor metastasis.  Dominant lesions within  the right lobe and segment 4 demonstrate decreased central enhancement consistent with response to therapy with residual disease likely present at these sites.  Additional stable enhancing lesions within the left and right lobes consistent with residual disease.  No definite new lesion.     Persistent restricted diffusion within the pancreatic body in keeping with known lesion.     Stable mildly prominent periportal lymph nodes, nonspecific.    MRI abdomen 6/07/23  Impression:  1. Interval hepatic chemoembolization.  Decreased size of multiple hepatic lesions consistent with known metastasis.  Several lesions demonstrate persistent enhancement consistent with residual disease.  New arterial enhancement within a right hepatic lobe lesion consistent with tumor.  2. Persistent restricted diffusion within the pancreatic body in keeping with known lesion.    NM PET 6/27/23  Impression:  Overall findings concerning for mild disease progression with new hepatic lesion, enlarging pancreatic body lesion and new focus of radiotracer uptake within the right hilum.  Post treatment changes of several liver lesions with decreased radiotracer uptake.  Additional nonspecific low level radiotracer uptake within axillary, mediastinal, and bilateral inguinal lymph nodes.  Attention on follow-up.     Assessment and Plan:     1. Malignant carcinoid tumor of ileum    2. Malignant carcinoid tumor of rectum    3. 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 Immunodeficiency secondary to neoplasm    5. Immunodeficiency due to drugs    6. Essential hypertension    7. Stage 3 chronic kidney disease, unspecified whether stage 3a or 3b CKD        1-5  - Ms  is a 68 yo woman with history of ypT3N0 rectal NET, well differentiated, grade 2 (Ki 67 16%) and small bowel NET, well diff, low grade, s/p surgery in Jan 2018. She was found to have metastatic disease to the liver and pancreas in July 2021 s/p biopsy. Liver NET is well  diff grade 2 (Ki67 15)%. Pancreatic NET is well diff grade 3 (Ki67 25%). She has been on lanreotide since July 2022, changed to sandostatin in Jan 2023 after CT scan showed progression. S/p TACE to the liver on 3/1/23 and 4/24/23  - received lanreotide on 5/2/23, was scheduled for next on 7/5/23  - MRI and PET imaging concerning for mild progression of disease. Will discuss her case at tumor board. Options for treatment include PRRT with [address]-dotate, or chemotherapy with capecitabine and temozolomide. We briefly discussed potential side effects but will hold off on full discussion until after tumor board.  - Follow up in 1 week to review tumor board recommendations     6.  - BP controlled  - f/u/ with PCP    7.  - monitor Cr, stable   Follow-up:     RTC in week to discuss tumor board recommendations    Electronically signed by Shaun Guerra    ATTESTATION:    I have personally seen, examined and talked to the patient. I have reviewed Dr Guerra's note and agreed with the findings, assessment and plan.   I have personally reviewed her PET scan and compared it to prior PET from Feb 2023. Mild disease progression. One new mediastinal LN, one new liver lesion, pancreatic lesion got bigger. Discussed with patient and  the next option would be oral chemo (capetem) vs PRRT. Will discuss at tumor board tomorrow. Favor PRRT if she is a candidate. No sandostatin within 4 weeks of PRRT but it usually takes us longer than that to start PRRT. Will let her know if she needs to hold sandostatin next Wed. She has not gotten it for 2 months.   See me next Thursday to review treatment detail and consent.     Ariel Smart MD  Hematology and Medical Oncology  [facility]    Route Chart for Scheduling    Med Onc Chart Routing  Urgent    Follow up with physician 1 week. see me at [place] 7/6 at 11:30   Follow up with ELLY    Infusion scheduling note    Injection scheduling note    Labs    Imaging    Pharmacy  appointment    Other referrals

## 2023-06-28 ENCOUNTER — OFFICE VISIT (OUTPATIENT)
Dept: HEMATOLOGY/ONCOLOGY | Facility: CLINIC | Age: 68
End: 2023-06-28
Payer: MEDICARE

## 2023-06-28 VITALS
RESPIRATION RATE: 18 BRPM | BODY MASS INDEX: 30.65 KG/M2 | TEMPERATURE: 99 F | HEIGHT: 66 IN | HEART RATE: 64 BPM | WEIGHT: 190.69 LBS | SYSTOLIC BLOOD PRESSURE: 121 MMHG | DIASTOLIC BLOOD PRESSURE: 59 MMHG | OXYGEN SATURATION: 100 %

## 2023-06-28 DIAGNOSIS — D84.81 IMMUNODEFICIENCY SECONDARY TO NEOPLASM: ICD-10-CM

## 2023-06-28 DIAGNOSIS — C7B.8 NEUROENDOCRINE CARCINOMA METASTATIC TO MULTIPLE SITES: ICD-10-CM

## 2023-06-28 DIAGNOSIS — C7A.026 MALIGNANT CARCINOID TUMOR OF RECTUM: ICD-10-CM

## 2023-06-28 DIAGNOSIS — D84.821 IMMUNODEFICIENCY DUE TO DRUGS: ICD-10-CM

## 2023-06-28 DIAGNOSIS — C7A.012 MALIGNANT CARCINOID TUMOR OF ILEUM: Primary | ICD-10-CM

## 2023-06-28 DIAGNOSIS — C7A.8 NEUROENDOCRINE CARCINOMA METASTATIC TO MULTIPLE SITES: ICD-10-CM

## 2023-06-28 DIAGNOSIS — D49.9 IMMUNODEFICIENCY SECONDARY TO NEOPLASM: ICD-10-CM

## 2023-06-28 DIAGNOSIS — I10 ESSENTIAL HYPERTENSION: ICD-10-CM

## 2023-06-28 DIAGNOSIS — Z79.899 IMMUNODEFICIENCY DUE TO DRUGS: ICD-10-CM

## 2023-06-28 PROCEDURE — 3008F PR BODY MASS INDEX (BMI) DOCUMENTED: ICD-10-PCS | Mod: CPTII,S$GLB,, | Performed by: INTERNAL MEDICINE

## 2023-06-28 PROCEDURE — 1126F PR PAIN SEVERITY QUANTIFIED, NO PAIN PRESENT: ICD-10-PCS | Mod: CPTII,S$GLB,, | Performed by: INTERNAL MEDICINE

## 2023-06-28 PROCEDURE — 99999 PR PBB SHADOW E&M-EST. PATIENT-LVL III: CPT | Mod: PBBFAC,,, | Performed by: INTERNAL MEDICINE

## 2023-06-28 PROCEDURE — 1126F AMNT PAIN NOTED NONE PRSNT: CPT | Mod: CPTII,S$GLB,, | Performed by: INTERNAL MEDICINE

## 2023-06-28 PROCEDURE — 1101F PR PT FALLS ASSESS DOC 0-1 FALLS W/OUT INJ PAST YR: ICD-10-PCS | Mod: CPTII,S$GLB,, | Performed by: INTERNAL MEDICINE

## 2023-06-28 PROCEDURE — 1159F PR MEDICATION LIST DOCUMENTED IN MEDICAL RECORD: ICD-10-PCS | Mod: CPTII,S$GLB,, | Performed by: INTERNAL MEDICINE

## 2023-06-28 PROCEDURE — 3008F BODY MASS INDEX DOCD: CPT | Mod: CPTII,S$GLB,, | Performed by: INTERNAL MEDICINE

## 2023-06-28 PROCEDURE — 3074F PR MOST RECENT SYSTOLIC BLOOD PRESSURE < 130 MM HG: ICD-10-PCS | Mod: CPTII,S$GLB,, | Performed by: INTERNAL MEDICINE

## 2023-06-28 PROCEDURE — 3078F PR MOST RECENT DIASTOLIC BLOOD PRESSURE < 80 MM HG: ICD-10-PCS | Mod: CPTII,S$GLB,, | Performed by: INTERNAL MEDICINE

## 2023-06-28 PROCEDURE — 1159F MED LIST DOCD IN RCRD: CPT | Mod: CPTII,S$GLB,, | Performed by: INTERNAL MEDICINE

## 2023-06-28 PROCEDURE — 99215 PR OFFICE/OUTPT VISIT, EST, LEVL V, 40-54 MIN: ICD-10-PCS | Mod: S$GLB,,, | Performed by: INTERNAL MEDICINE

## 2023-06-28 PROCEDURE — 3074F SYST BP LT 130 MM HG: CPT | Mod: CPTII,S$GLB,, | Performed by: INTERNAL MEDICINE

## 2023-06-28 PROCEDURE — 1101F PT FALLS ASSESS-DOCD LE1/YR: CPT | Mod: CPTII,S$GLB,, | Performed by: INTERNAL MEDICINE

## 2023-06-28 PROCEDURE — 99215 OFFICE O/P EST HI 40 MIN: CPT | Mod: S$GLB,,, | Performed by: INTERNAL MEDICINE

## 2023-06-28 PROCEDURE — 3078F DIAST BP <80 MM HG: CPT | Mod: CPTII,S$GLB,, | Performed by: INTERNAL MEDICINE

## 2023-06-28 PROCEDURE — 3288F PR FALLS RISK ASSESSMENT DOCUMENTED: ICD-10-PCS | Mod: CPTII,S$GLB,, | Performed by: INTERNAL MEDICINE

## 2023-06-28 PROCEDURE — 1160F PR REVIEW ALL MEDS BY PRESCRIBER/CLIN PHARMACIST DOCUMENTED: ICD-10-PCS | Mod: CPTII,S$GLB,, | Performed by: INTERNAL MEDICINE

## 2023-06-28 PROCEDURE — 99999 PR PBB SHADOW E&M-EST. PATIENT-LVL III: ICD-10-PCS | Mod: PBBFAC,,, | Performed by: INTERNAL MEDICINE

## 2023-06-28 PROCEDURE — 1160F RVW MEDS BY RX/DR IN RCRD: CPT | Mod: CPTII,S$GLB,, | Performed by: INTERNAL MEDICINE

## 2023-06-28 PROCEDURE — 3288F FALL RISK ASSESSMENT DOCD: CPT | Mod: CPTII,S$GLB,, | Performed by: INTERNAL MEDICINE

## 2023-06-29 ENCOUNTER — TELEPHONE (OUTPATIENT)
Dept: HEMATOLOGY/ONCOLOGY | Facility: CLINIC | Age: 68
End: 2023-06-29
Payer: MEDICARE

## 2023-06-29 ENCOUNTER — TUMOR BOARD CONFERENCE (OUTPATIENT)
Dept: UROLOGY | Facility: CLINIC | Age: 68
End: 2023-06-29
Payer: MEDICARE

## 2023-06-29 ENCOUNTER — TELEPHONE (OUTPATIENT)
Dept: INTERVENTIONAL RADIOLOGY/VASCULAR | Facility: CLINIC | Age: 68
End: 2023-06-29
Payer: MEDICARE

## 2023-06-29 DIAGNOSIS — C7A.8 NEUROENDOCRINE CARCINOMA METASTATIC TO LIVER: Primary | ICD-10-CM

## 2023-06-29 DIAGNOSIS — C7A.026 MALIGNANT CARCINOID TUMOR OF RECTUM: Primary | ICD-10-CM

## 2023-06-29 DIAGNOSIS — C7B.8 NEUROENDOCRINE CARCINOMA METASTATIC TO LIVER: Primary | ICD-10-CM

## 2023-06-29 NOTE — TELEPHONE ENCOUNTER
Case reviewed at tumor board. Very mild progression. One new lesion in the liver. Met in the pancreas got bigger. Other lymph nodes are nonspecific. IR can ablate three liver lesions. Plan is to resume sandostatin with Dr Olson. IR ablation of liver lesions. Then restaging in 3 months.   Called and spoke with patient re the above plan. Patient understands and agrees with the plan.   Called Dr Olson's office. Spoke with his nurse. Recc resuming sandostatin and increase to 40 mg every 4 weeks. I will see patient in 3 months for restaging. Left my phone number.

## 2023-06-29 NOTE — TELEPHONE ENCOUNTER
Spoke to pt on phone,  Pt is scheduled on 7/21/2023 for IR procedure at  location.  Preop instructions given (NPO after midnight, MUST have a ride home, Nurse will call 1-2  days before to go over instructions and medications),  pt verbally understood. Pt aware and confirmed, Thanks

## 2023-07-13 NOTE — TELEPHONE ENCOUNTER
Submitted for review at liver conference  1/31/2023     Inflammation Suggestive Of Cancer Camouflage Histology Text: There was a dense lymphocytic infiltrate which prevented adequate histologic evaluation of adjacent structures.

## 2023-07-20 ENCOUNTER — DOCUMENTATION ONLY (OUTPATIENT)
Dept: PREADMISSION TESTING | Facility: HOSPITAL | Age: 68
End: 2023-07-20
Payer: MEDICARE

## 2023-07-21 ENCOUNTER — ANESTHESIA (OUTPATIENT)
Dept: INTERVENTIONAL RADIOLOGY/VASCULAR | Facility: HOSPITAL | Age: 68
End: 2023-07-21
Payer: MEDICARE

## 2023-07-21 ENCOUNTER — HOSPITAL ENCOUNTER (OUTPATIENT)
Dept: INTERVENTIONAL RADIOLOGY/VASCULAR | Facility: HOSPITAL | Age: 68
Discharge: HOME | End: 2023-07-21
Attending: PHYSICIAN ASSISTANT
Payer: MEDICARE

## 2023-07-21 VITALS
HEART RATE: 53 BPM | DIASTOLIC BLOOD PRESSURE: 88 MMHG | BODY MASS INDEX: 30.67 KG/M2 | WEIGHT: 190 LBS | OXYGEN SATURATION: 97 % | RESPIRATION RATE: 18 BRPM | SYSTOLIC BLOOD PRESSURE: 168 MMHG | TEMPERATURE: 98 F

## 2023-07-21 DIAGNOSIS — C7B.8 NEUROENDOCRINE CARCINOMA METASTATIC TO LIVER: ICD-10-CM

## 2023-07-21 DIAGNOSIS — C7A.8 NEUROENDOCRINE CARCINOMA METASTATIC TO LIVER: ICD-10-CM

## 2023-07-21 PROCEDURE — 77013 CT GUIDE FOR TISSUE ABLATION: CPT | Mod: TC | Performed by: RADIOLOGY

## 2023-07-21 PROCEDURE — 25000003 PHARM REV CODE 250: Performed by: INTERNAL MEDICINE

## 2023-07-21 PROCEDURE — D9220A PRA ANESTHESIA: Mod: ANES,,, | Performed by: STUDENT IN AN ORGANIZED HEALTH CARE EDUCATION/TRAINING PROGRAM

## 2023-07-21 PROCEDURE — 47382 IR RF ABLATION LIVER TUMORS: ICD-10-PCS | Mod: ,,, | Performed by: RADIOLOGY

## 2023-07-21 PROCEDURE — 25000003 PHARM REV CODE 250: Performed by: REGISTERED NURSE

## 2023-07-21 PROCEDURE — 63600175 PHARM REV CODE 636 W HCPCS: Performed by: REGISTERED NURSE

## 2023-07-21 PROCEDURE — 77013 IR RF ABLATION LIVER TUMORS: ICD-10-PCS | Mod: 26,,, | Performed by: RADIOLOGY

## 2023-07-21 PROCEDURE — 27200940 IR RF ABLATION LIVER TUMORS

## 2023-07-21 PROCEDURE — 37000009 HC ANESTHESIA EA ADD 15 MINS

## 2023-07-21 PROCEDURE — 77013 CT GUIDE FOR TISSUE ABLATION: CPT | Mod: 26,,, | Performed by: RADIOLOGY

## 2023-07-21 PROCEDURE — 63600175 PHARM REV CODE 636 W HCPCS: Performed by: STUDENT IN AN ORGANIZED HEALTH CARE EDUCATION/TRAINING PROGRAM

## 2023-07-21 PROCEDURE — D9220A PRA ANESTHESIA: ICD-10-PCS | Mod: ANES,,, | Performed by: STUDENT IN AN ORGANIZED HEALTH CARE EDUCATION/TRAINING PROGRAM

## 2023-07-21 PROCEDURE — 37000008 HC ANESTHESIA 1ST 15 MINUTES

## 2023-07-21 PROCEDURE — D9220A PRA ANESTHESIA: ICD-10-PCS | Mod: CRNA,,, | Performed by: REGISTERED NURSE

## 2023-07-21 PROCEDURE — D9220A PRA ANESTHESIA: Mod: CRNA,,, | Performed by: REGISTERED NURSE

## 2023-07-21 PROCEDURE — C1733 CATH, EP, OTHR THAN COOL-TIP: HCPCS

## 2023-07-21 PROCEDURE — 47382 PERCUT ABLATE LIVER RF: CPT | Performed by: RADIOLOGY

## 2023-07-21 RX ORDER — LIDOCAINE HYDROCHLORIDE 10 MG/ML
1 INJECTION, SOLUTION EPIDURAL; INFILTRATION; INTRACAUDAL; PERINEURAL ONCE
Status: DISCONTINUED | OUTPATIENT
Start: 2023-07-21 | End: 2023-07-22 | Stop reason: HOSPADM

## 2023-07-21 RX ORDER — FENTANYL CITRATE 50 UG/ML
INJECTION, SOLUTION INTRAMUSCULAR; INTRAVENOUS
Status: DISCONTINUED | OUTPATIENT
Start: 2023-07-21 | End: 2023-07-21

## 2023-07-21 RX ORDER — SODIUM CHLORIDE 9 MG/ML
INJECTION, SOLUTION INTRAVENOUS CONTINUOUS PRN
Status: DISCONTINUED | OUTPATIENT
Start: 2023-07-21 | End: 2023-07-21

## 2023-07-21 RX ORDER — HYDROMORPHONE HYDROCHLORIDE 1 MG/ML
0.2 INJECTION, SOLUTION INTRAMUSCULAR; INTRAVENOUS; SUBCUTANEOUS EVERY 5 MIN PRN
Status: DISCONTINUED | OUTPATIENT
Start: 2023-07-21 | End: 2023-07-22 | Stop reason: HOSPADM

## 2023-07-21 RX ORDER — SODIUM CHLORIDE 0.9 % (FLUSH) 0.9 %
10 SYRINGE (ML) INJECTION
Status: DISCONTINUED | OUTPATIENT
Start: 2023-07-21 | End: 2023-07-22 | Stop reason: HOSPADM

## 2023-07-21 RX ORDER — ONDANSETRON 2 MG/ML
INJECTION INTRAMUSCULAR; INTRAVENOUS
Status: DISCONTINUED | OUTPATIENT
Start: 2023-07-21 | End: 2023-07-21

## 2023-07-21 RX ORDER — ONDANSETRON 2 MG/ML
4 INJECTION INTRAMUSCULAR; INTRAVENOUS DAILY PRN
Status: DISCONTINUED | OUTPATIENT
Start: 2023-07-21 | End: 2023-07-22 | Stop reason: HOSPADM

## 2023-07-21 RX ORDER — DEXAMETHASONE SODIUM PHOSPHATE 4 MG/ML
INJECTION, SOLUTION INTRA-ARTICULAR; INTRALESIONAL; INTRAMUSCULAR; INTRAVENOUS; SOFT TISSUE
Status: DISCONTINUED | OUTPATIENT
Start: 2023-07-21 | End: 2023-07-21

## 2023-07-21 RX ORDER — DEXMEDETOMIDINE HYDROCHLORIDE 100 UG/ML
INJECTION, SOLUTION INTRAVENOUS
Status: DISCONTINUED | OUTPATIENT
Start: 2023-07-21 | End: 2023-07-21

## 2023-07-21 RX ORDER — DIPHENHYDRAMINE HYDROCHLORIDE 50 MG/ML
INJECTION INTRAMUSCULAR; INTRAVENOUS
Status: DISCONTINUED | OUTPATIENT
Start: 2023-07-21 | End: 2023-07-21

## 2023-07-21 RX ORDER — FENTANYL CITRATE 50 UG/ML
25 INJECTION, SOLUTION INTRAMUSCULAR; INTRAVENOUS EVERY 5 MIN PRN
Status: COMPLETED | OUTPATIENT
Start: 2023-07-21 | End: 2023-07-21

## 2023-07-21 RX ORDER — PROPOFOL 10 MG/ML
VIAL (ML) INTRAVENOUS
Status: DISCONTINUED | OUTPATIENT
Start: 2023-07-21 | End: 2023-07-21

## 2023-07-21 RX ORDER — CEFAZOLIN SODIUM 1 G/3ML
INJECTION, POWDER, FOR SOLUTION INTRAMUSCULAR; INTRAVENOUS
Status: DISCONTINUED | OUTPATIENT
Start: 2023-07-21 | End: 2023-07-21

## 2023-07-21 RX ORDER — LIDOCAINE HYDROCHLORIDE 10 MG/ML
INJECTION INFILTRATION; PERINEURAL
Status: COMPLETED | OUTPATIENT
Start: 2023-07-21 | End: 2023-07-21

## 2023-07-21 RX ORDER — LIDOCAINE HYDROCHLORIDE 20 MG/ML
INJECTION INTRAVENOUS
Status: DISCONTINUED | OUTPATIENT
Start: 2023-07-21 | End: 2023-07-21

## 2023-07-21 RX ORDER — ROCURONIUM BROMIDE 10 MG/ML
INJECTION, SOLUTION INTRAVENOUS
Status: DISCONTINUED | OUTPATIENT
Start: 2023-07-21 | End: 2023-07-21

## 2023-07-21 RX ORDER — SODIUM CHLORIDE 9 MG/ML
INJECTION, SOLUTION INTRAVENOUS CONTINUOUS
Status: DISCONTINUED | OUTPATIENT
Start: 2023-07-21 | End: 2023-07-22 | Stop reason: HOSPADM

## 2023-07-21 RX ADMIN — DIPHENHYDRAMINE HYDROCHLORIDE 12.5 MG: 50 INJECTION, SOLUTION INTRAMUSCULAR; INTRAVENOUS at 11:07

## 2023-07-21 RX ADMIN — DEXAMETHASONE SODIUM PHOSPHATE 8 MG: 4 INJECTION, SOLUTION INTRAMUSCULAR; INTRAVENOUS at 11:07

## 2023-07-21 RX ADMIN — LIDOCAINE HYDROCHLORIDE 20 MG: 20 INJECTION INTRAVENOUS at 10:07

## 2023-07-21 RX ADMIN — PROPOFOL 30 MG: 10 INJECTION, EMULSION INTRAVENOUS at 11:07

## 2023-07-21 RX ADMIN — DEXMEDETOMIDINE 12 MCG: 100 INJECTION, SOLUTION, CONCENTRATE INTRAVENOUS at 11:07

## 2023-07-21 RX ADMIN — CEFAZOLIN 1 G: 330 INJECTION, POWDER, FOR SOLUTION INTRAMUSCULAR; INTRAVENOUS at 11:07

## 2023-07-21 RX ADMIN — FENTANYL CITRATE 25 MCG: 50 INJECTION INTRAMUSCULAR; INTRAVENOUS at 02:07

## 2023-07-21 RX ADMIN — SODIUM CHLORIDE: 9 INJECTION, SOLUTION INTRAVENOUS at 10:07

## 2023-07-21 RX ADMIN — PROPOFOL 130 MG: 10 INJECTION, EMULSION INTRAVENOUS at 10:07

## 2023-07-21 RX ADMIN — LIDOCAINE HYDROCHLORIDE 5 ML: 10 INJECTION, SOLUTION INFILTRATION; PERINEURAL at 11:07

## 2023-07-21 RX ADMIN — ONDANSETRON 4 MG: 2 INJECTION INTRAMUSCULAR; INTRAVENOUS at 12:07

## 2023-07-21 RX ADMIN — ROCURONIUM BROMIDE 50 MG: 10 INJECTION, SOLUTION INTRAVENOUS at 11:07

## 2023-07-21 RX ADMIN — FENTANYL CITRATE 100 MCG: 50 INJECTION, SOLUTION INTRAMUSCULAR; INTRAVENOUS at 10:07

## 2023-07-21 RX ADMIN — FENTANYL CITRATE 25 MCG: 50 INJECTION INTRAMUSCULAR; INTRAVENOUS at 01:07

## 2023-07-21 RX ADMIN — SUGAMMADEX 200 MG: 100 INJECTION, SOLUTION INTRAVENOUS at 12:07

## 2023-07-21 NOTE — PLAN OF CARE
Pt arrived to IR room 173 for MWA of liver with anesthesia. Pt oriented to unit and staff. Plan of care reviewed with patient, patient verbalizes understanding. Comfort measures utilized. Pt safely transferred from stretcher to procedural table. Fall risk reviewed with patient, fall risk interventions maintained. Safety strap applied, positioner pillows utilized to minimize pressure points. Blankets applied. Pt prepped and draped utilizing standard sterile technique. Patient placed on continuous monitoring, as required by sedation policy. Timeouts completed utilizing standard universal time-out, per department and facility policy. RN to remain at bedside, continuous monitoring maintained per CRNA. Pt resting comfortably. Denies pain/discomfort. Will continue to monitor. See flow sheets for monitoring, medication administration, and updates.

## 2023-07-21 NOTE — ANESTHESIA PROCEDURE NOTES
Intubation    Date/Time: 7/21/2023 11:02 AM  Performed by: Hong Umaña CRNA  Authorized by: Brice Glass MD     Intubation:     Induction:  Intravenous    Intubated:  Postinduction    Mask Ventilation:  Easy with oral airway    Attempted By:  CRNA    Method of Intubation:  Direct    Blade:  Vero 3    Laryngeal View Grade: Grade I - full view of cords      Difficult Airway Encountered?: No      Complications:  None    Airway Device:  Oral endotracheal tube    Airway Device Size:  7.0    Style/Cuff Inflation:  Cuffed (inflated to minimal occlusive pressure)    Inflation Amount (mL):  6    Tube secured:  21    Secured at:  The lips    Placement Verified By:  Capnometry    Complicating Factors:  None    Findings Post-Intubation:  BS equal bilateral and atraumatic/condition of teeth unchanged

## 2023-07-21 NOTE — H&P
Vascular and Interventional Radiology   Consult History & Physical          History of Present Illness:  Coby Marshall is a 67 y.o. female with a history of NET with metastatic disease to the liver, s/p TACE to the liver on 3/1/23 and 4/24/23. There is residual disease in the liver possibly amenable to treatment with MWA.     Patient presents for MWA.     Past Medical History:  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    Ki 67 -3-20%    Hyperlipidemia     Hypertension     Malignant carcinoid tumor of ileum 12/2017    Ki 67 < 3%    Rectal cancer 2/8/2019       Past Surgical History:  Past Surgical History:   Procedure Laterality Date    COLON SURGERY      ENDOSCOPIC ULTRASOUND OF UPPER GASTROINTESTINAL TRACT N/A 12/17/2021    Procedure: ULTRASOUND, UPPER GI TRACT, ENDOSCOPIC;  Surgeon: Jeffery Clemons MD;  Location: [facility] ([address] [place] [address]);  Service: Endoscopy;  Laterality: N/A;  instructions emailed to pt-BB  fully vaccinated-BB   12/13 arrival time confirmed with pt-rb    HYSTERECTOMY      ILEOSTOMY CLOSURE  01/2018    Lower Anterior Resection  12/2017    NET- Dr. Beck - [facility]    port a cath Right     R chest wall        Sedation History:    No known adverse reactions.     Social History:  Social History     Tobacco Use    Smoking status: Never    Smokeless tobacco: Never   Substance Use Topics    Alcohol use: No    Drug use: No        Home Medications:   Prior to Admission medications    Medication Sig Start Date End Date Taking? Authorizing Provider   ALBUTEROL INHL Inhale into the lungs as needed.    Yes Historical Provider   atorvastatin (LIPITOR) 20 MG tablet Take 20 mg by mouth every evening.   Yes Historical Provider   brimonidine-timoloL (COMBIGAN) 0.2-0.5 % Drop Place 1 drop into the right eye 2 (two) times daily. 1/26/23  Yes Historical Provider   cloNIDine (CATAPRES) 0.1 MG tablet Take 0.1 mg by mouth 2  (two) times daily.   Yes Historical Provider   hydroCHLOROthiazide (MICROZIDE) 12.5 mg capsule Take 12.5 mg by mouth once daily.   Yes Historical Provider   fluticasone (FLONASE) 50 mcg/actuation nasal spray 1 spray by Each Nare route once daily.    Historical Provider   ondansetron (ZOFRAN-ODT) 8 MG TbDL Dissolve 1 tablet (8 mg total) by mouth every 8 (eight) hours as needed (nausea).  Patient not taking: Reported on 6/28/2023 3/2/23   Juve Setiner MD   oxyCODONE (ROXICODONE) 5 MG immediate release tablet Take 1 tablet (5 mg total) by mouth every 6 (six) hours as needed for Pain.  Patient not taking: Reported on 6/28/2023 3/2/23   Juve Steiner MD   ranitidine (ZANTAC) 150 MG tablet Take 150 mg by mouth nightly.  11/13/18 12/23/21  Historical Provider       Inpatient Medications:    Current Outpatient Medications:     ALBUTEROL INHL, Inhale into the lungs as needed. , Disp: , Rfl:     atorvastatin (LIPITOR) 20 MG tablet, Take 20 mg by mouth every evening., Disp: , Rfl:     brimonidine-timoloL (COMBIGAN) 0.2-0.5 % Drop, Place 1 drop into the right eye 2 (two) times daily., Disp: , Rfl:     cloNIDine (CATAPRES) 0.1 MG tablet, Take 0.1 mg by mouth 2 (two) times daily., Disp: , Rfl:     hydroCHLOROthiazide (MICROZIDE) 12.5 mg capsule, Take 12.5 mg by mouth once daily., Disp: , Rfl:     fluticasone (FLONASE) 50 mcg/actuation nasal spray, 1 spray by Each Nare route once daily., Disp: , Rfl:     ondansetron (ZOFRAN-ODT) 8 MG TbDL, Dissolve 1 tablet (8 mg total) by mouth every 8 (eight) hours as needed (nausea). (Patient not taking: Reported on 6/28/2023), Disp: 10 tablet, Rfl: 0    oxyCODONE (ROXICODONE) 5 MG immediate release tablet, Take 1 tablet (5 mg total) by mouth every 6 (six) hours as needed for Pain. (Patient not taking: Reported on 6/28/2023), Disp: 7 tablet, Rfl: 0    ranitidine (ZANTAC) 150 MG tablet, Take 150 mg by mouth nightly. , Disp: , Rfl:     Current Facility-Administered  Medications:     0.9%  NaCl infusion, , Intravenous, Continuous, Pily Murcia PA-C    LIDOcaine (PF) 10 mg/ml (1%) injection 10 mg, 1 mL, Other, Once, Pily Murcia PA-C     Anticoagulants/Antiplatelets:   No anticoagulation    Allergies:   Review of patient's allergies indicates:   Allergen Reactions    Arb-angiotensin receptor antagonist Swelling     Had angioedema of lips with ACE inhibitor, so this is a contraindication    Lisinopril Swelling    Lisinopril-hydrochlorothiazide Swelling     Mouth Swelling    Sulfa (sulfonamide antibiotics) Swelling     Swelling of lips    Sulfasalazine Swelling     Swelling of lips    Epinephrine      Neuroendocrine Tumor patient      Hydrocodone-acetaminophen Other (See Comments)     stomach pain (even with food)    Amlodipine Other (See Comments)     edema of ankles and legs.       Review of Systems:   As documented in primary provider H&P.    Vital Signs:  Temp: 97.9 °F (36.6 °C) (07/21/23 0944)  Pulse: 61 (07/21/23 0944)  Resp: 18 (07/21/23 0944)  BP: (!) 172/76 (07/21/23 0944)  SpO2: 100 % (07/21/23 0944)    Temp:  [97.9 °F (36.6 °C)]   Pulse:  [61]   Resp:  [18]   BP: (172)/(76)   SpO2:  [100 %]      Physical Exam:  No acute distress, laying comfortably in bed, pleasant and cooperative  Regular rate and rhythm  Breathing unlabored  Abdomen benign  Extremities warm and well perfused    Sedation Exam:  ASA: III - Patient appears to have severe systemic disease not posing a constant threat to life  Mallampati score: per anesthesia     Laboratory:  Lab Results   Component Value Date    INR 1.0 07/21/2023       Lab Results   Component Value Date    WBC 6.82 07/21/2023    HGB 11.1 (L) 07/21/2023    HCT 36.2 (L) 07/21/2023    MCV 85 07/21/2023     07/21/2023      Lab Results   Component Value Date    GLU 83 06/27/2023     06/27/2023    K 3.5 06/27/2023     06/27/2023    CO2 28 06/27/2023    BUN 13 06/27/2023    CREATININE 1.1 06/27/2023    CALCIUM 9.9  06/27/2023    MG 1.4 (L) 03/08/2018    ALT 14 06/27/2023    AST 21 06/27/2023    ALBUMIN 3.9 06/27/2023    BILITOT 0.5 06/27/2023       Imaging:   Reviewed.      ASSESSMENT/PLAN/RECOMMENDATIONS:       Sedation Plan: per anesthesia   Patient will undergo: MWA of liver lesions       Mirela Alcantar MD  PGY-III  Diagnostic and Interventional Radiology  [facility]

## 2023-07-21 NOTE — PROCEDURES
VIR procedure note      Pre Op Diagnosis: Metastic NET to liver   Post Op Diagnosis: Same    Procedure: Microwave liver ablation    Procedure performed by:  Nella Us MD / Bj Reynolds MD    Written Informed Consent Obtained: Yes  Specimen Removed: NO  Estimated Blood Loss: Minimal    Findings:     General anesthesia     Successful microwave ablation of 3 liver lesions (segments 2, 4b and 5)    Patient tolerated procedure well.         Nella Us MD  Vascular/Interventional Radiology Fellow  Pager: [phone]

## 2023-07-21 NOTE — TRANSFER OF CARE
Anesthesia Transfer of Care Note    Patient: Coby Marshall    Procedure(s) Performed: * No procedures listed *    Patient location: PACU    Anesthesia Type: general    Transport from OR: Transported from OR on 6-10 L/min O2 by face mask with adequate spontaneous ventilation    Post pain: adequate analgesia    Post assessment: no apparent anesthetic complications    Post vital signs: stable    Level of consciousness: awake and alert    Nausea/Vomiting: no nausea/vomiting    Complications: none    Transfer of care protocol was followed      Last vitals:   Visit Vitals  BP (!) 176/86 (BP Location: Left arm, Patient Position: Lying)   Pulse (!) 53   Temp 36.3 °C (97.4 °F) (Temporal)   Resp 14   Wt 86.2 kg (190 lb)   SpO2 99%   Breastfeeding No   BMI 30.67 kg/m²

## 2023-07-21 NOTE — DISCHARGE INSTRUCTIONS
Questions or Concerns   - [facility] [phone] (MON-FRI 8 AM- 5PM).   - Radiology Resident on call [phone] (Weekends and After hours).  - IR Clinic [phone]    Take it easy for next 24 hours, relax and lounge around at home.  Keep dressing clean, dry and intact for 24 hours then may remove and gently wash in the shower.  Showers only. No tub baths or submersion underwater.  No straining, heavy lifting or excessive stretching of the surgical area.  If bleeding occurs, hold pressure and go to the nearest emergency room.

## 2023-07-21 NOTE — ANESTHESIA PREPROCEDURE EVALUATION
07/21/2023  Coby Marshall is a 67 y.o., female c Hx/o carcinoid tumor presenting for ablation    2021 TTE   The left ventricle is normal in size with concentric remodeling and normal systolic function.   The estimated ejection fraction is 65%.   Normal left ventricular diastolic function.   Normal right ventricular size with normal right ventricular systolic function.   Mild tricuspid regurgitation.   The quantitatively derived ejection fraction is 63%.   The left ventricular global longitudinal strain is -20%.   Normal central venous pressure (3 mmHg).   The estimated PA systolic pressure is 27 mmHg.      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    Benign essential hypertension    Central retinal vein occlusion, right eye, stable    Chronic diarrhea    Hypovitaminosis D    Primary open angle glaucoma (POAG) of both eyes, moderate stage    Nausea and vomiting    Hyperlipidemia            (Not in a hospital admission)      Review of patient's allergies indicates:   Allergen Reactions    Arb-angiotensin receptor antagonist Swelling     Had angioedema of lips with ACE inhibitor, so this is a contraindication    Lisinopril Swelling    Lisinopril-hydrochlorothiazide Swelling     Mouth Swelling    Sulfa (sulfonamide antibiotics) Swelling     Swelling of lips    Sulfasalazine Swelling     Swelling of lips    Epinephrine      Neuroendocrine Tumor patient      Hydrocodone-acetaminophen Other (See Comments)     stomach pain (even with food)    Amlodipine Other (See Comments)      edema of ankles and legs.       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    Ki 67 -3-20%    Hyperlipidemia     Hypertension     Malignant carcinoid tumor of ileum 12/2017    Ki 67 < 3%    Rectal cancer 2/8/2019     Past Surgical History:   Procedure Laterality Date    COLON SURGERY      ENDOSCOPIC ULTRASOUND OF UPPER GASTROINTESTINAL TRACT N/A 12/17/2021    Procedure: ULTRASOUND, UPPER GI TRACT, ENDOSCOPIC;  Surgeon: Jeffery Clemons MD;  Location: [facility] ([address] [place] [address]);  Service: Endoscopy;  Laterality: N/A;  instructions emailed to pt-BB  fully vaccinated-BB   12/13 arrival time confirmed with pt-rb    HYSTERECTOMY      ILEOSTOMY CLOSURE  01/2018    Lower Anterior Resection  12/2017    [facility]- Dr. Beck - [facility]    port a cath Right     R chest wall     Tobacco Use    Smoking status: Never    Smokeless tobacco: Never   Substance and Sexual Activity    Alcohol use: No    Drug use: No    Sexual activity: Not Currently     Partners: Male       Objective:     Vital Signs (Most Recent):    Vital Signs (24h Range):           There is no height or weight on file to calculate BMI.        Significant Labs:  All pertinent labs from the last 24 hours have been reviewed.    CBC:   Recent Labs     07/21/23  0754   WBC 6.82   RBC 4.28   HGB 11.1*   HCT 36.2*      MCV 85   MCH 25.9*   MCHC 30.7*       CMP: No results for input(s): NA, K, CL, CO2, BUN, CREATININE, GLU, MG, PHOS, CALCIUM, ALBUMIN, PROT, ALKPHOS, ALT, AST, BILITOT in the last 72 hours.    INR  Recent Labs     07/21/23  0754   INR 1.0         Pre-op Assessment    I have reviewed the Patient Summary Reports.     I have reviewed the Nursing Notes. I have reviewed the NPO Status.   I have reviewed the Medications.     Review of Systems  Anesthesia Hx:  Denies Family Hx of Anesthesia complications.   Denies Personal Hx of Anesthesia complications.        Physical Exam  General: Well nourished    Airway:  Mallampati: II   Mouth Opening: Normal  TM Distance: Normal  Tongue: Normal  Neck ROM: Normal ROM    Dental:Any loose and/or missing teeth verified with patient   Chest/Lungs:  Clear to auscultation    Heart:  Rate: Normal  Rhythm: Regular Rhythm  Sounds: Normal    Abdomen:  Normal        Anesthesia Plan  Type of Anesthesia, risks & benefits discussed:    Anesthesia Type: Gen ETT, Gen Supraglottic Airway, Gen Natural Airway, MAC  Intra-op Monitoring Plan: Standard ASA Monitors  Post Op Pain Control Plan: multimodal analgesia and IV/PO Opioids PRN  Induction:  IV  Informed Consent: Informed consent signed with the Patient and all parties understand the risks and agree with anesthesia plan.  All questions answered.   ASA Score: 3    Ready For Surgery From Anesthesia Perspective.     .

## 2023-07-21 NOTE — PLAN OF CARE
Procedure complete. Pt tolerated well. Recovery for 4hours. VSS. Site CDI. Pt transferred to PACU16 and report to be given bedside.

## 2023-07-24 NOTE — ANESTHESIA POSTPROCEDURE EVALUATION
Anesthesia Post Evaluation    Patient: Coby Marshall    Procedure(s) Performed: * No procedures listed *    Final Anesthesia Type: general      Patient location during evaluation: PACU  Patient participation: Yes- Able to Participate  Level of consciousness: awake and alert  Post-procedure vital signs: reviewed and stable  Pain management: adequate  Airway patency: patent  SHANICE mitigation strategies: Extubation while patient is awake  PONV status at discharge: No PONV  Anesthetic complications: no      Cardiovascular status: stable  Respiratory status: spontaneous ventilation and face mask  Hydration status: euvolemic  Follow-up not needed.          Vitals Value Taken Time   /88 07/21/23 1517   Temp 36.7 °C (98 °F) 07/21/23 1515   Pulse 56 07/21/23 1523   Resp 18 07/21/23 1515   SpO2 99 % 07/21/23 1523   Vitals shown include unvalidated device data.      Event Time   Out of Recovery 14:02:00         Pain/Mikey Score: No data recorded

## 2023-07-25 DIAGNOSIS — C7A.8 NEUROENDOCRINE CARCINOMA METASTATIC TO LIVER: Primary | ICD-10-CM

## 2023-07-25 DIAGNOSIS — C7B.8 NEUROENDOCRINE CARCINOMA METASTATIC TO LIVER: Primary | ICD-10-CM

## 2023-09-16 ENCOUNTER — HOSPITAL ENCOUNTER (OUTPATIENT)
Dept: RADIOLOGY | Facility: HOSPITAL | Age: 68
Discharge: HOME | End: 2023-09-16
Attending: PHYSICIAN ASSISTANT
Payer: MEDICARE

## 2023-09-16 DIAGNOSIS — C7A.8 NEUROENDOCRINE CARCINOMA METASTATIC TO LIVER: ICD-10-CM

## 2023-09-16 DIAGNOSIS — C7B.8 NEUROENDOCRINE CARCINOMA METASTATIC TO LIVER: ICD-10-CM

## 2023-09-16 PROCEDURE — 25500020 PHARM REV CODE 255: Performed by: PHYSICIAN ASSISTANT

## 2023-09-16 PROCEDURE — 74183 MRI ABD W/O CNTR FLWD CNTR: CPT | Mod: 26,,, | Performed by: RADIOLOGY

## 2023-09-16 PROCEDURE — A9585 GADOBUTROL INJECTION: HCPCS | Performed by: PHYSICIAN ASSISTANT

## 2023-09-16 PROCEDURE — 74183 MRI ABD W/O CNTR FLWD CNTR: CPT | Mod: TC

## 2023-09-16 PROCEDURE — 74183 MRI ABDOMEN W WO CONTRAST: ICD-10-PCS | Mod: 26,,, | Performed by: RADIOLOGY

## 2023-09-16 RX ORDER — GADOBUTROL 604.72 MG/ML
10 INJECTION INTRAVENOUS
Status: COMPLETED | OUTPATIENT
Start: 2023-09-16 | End: 2023-09-16

## 2023-09-16 RX ADMIN — GADOBUTROL 10 ML: 604.72 INJECTION INTRAVENOUS at 10:09

## 2023-09-21 ENCOUNTER — TELEPHONE (OUTPATIENT)
Dept: HEMATOLOGY/ONCOLOGY | Facility: CLINIC | Age: 68
End: 2023-09-21
Payer: MEDICARE

## 2023-09-26 NOTE — PROGRESS NOTES
[facility]      NEUROENDOCRINE MULTIDISCIPLINARY TUMOR BOARD  _____________________________________________________________________    PRESENTER:   Ariel Smart MD    REASON FOR PRESENTATION:  Scan Review    ATTENDEES:   Gastroenterology - Not Present  Interventional Radiology - Dhaval Valdez MD  Nuclear Medicine - Arsenio Wesley MD  Nursing - NET TB Nursing: Latoya Montalvo, RN and Mariam Perry RN  Oncology - Ariel Smart MD and Ricardo Mcginnis MD  Palliative Medicine - Not Present  Pathology -  Anny Dent MD and Rai Coles MD  Research - Not Present  Surgery - MD Mykel Mireles MD Dr. William Morano    PATIENT STATUS:  Established Patient    PATIENT SUMMARY:  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    Ki 67 -3-20%    Hyperlipidemia     Hypertension     Malignant carcinoid tumor of ileum 12/2017    Ki 67 < 3%    Rectal cancer 2/8/2019       Past Surgical History:   Procedure Laterality Date    COLON SURGERY      ENDOSCOPIC ULTRASOUND OF UPPER GASTROINTESTINAL TRACT N/A 12/17/2021    Procedure: ULTRASOUND, UPPER GI TRACT, ENDOSCOPIC;  Surgeon: Jeffery Clemons MD;  Location: [address]);  Service: Endoscopy;  Laterality: N/A;  instructions emailed to pt-BB  fully vaccinated-BB   12/13 arrival time confirmed with pt-rb    HYSTERECTOMY      ILEOSTOMY CLOSURE  01/2018    Lower Anterior Resection  12/2017    [facility]- Dr. Manjarrez - [facility]    port a cath Right     R chest wall       TUMOR MARKERS:  5-HIAA, Plasma Ref Range: up to 22 ng/mL  Recent Labs   Lab 11/11/21  1016 02/01/23  0913 02/10/23  0840 04/04/23  1450 05/15/23  1254 06/27/23  1300   5-HIAA, Plasma (Neuroend) 7 Test Not Performed 6 7 7 9     Chromogranin A Ref Range: <93 ng/mL  Recent Labs   Lab 11/11/21  1016 02/01/23  0913   Chromogranin A 41 27     Gastrin Ref Range: <100 pg/mL      Neurokinin A Ref Range: 0 - 40  pg/mL      Pancreastatin Ref Range: 10 - 135 pg/mL  Recent Labs   Lab 11/11/21  1016 02/01/23  0913 02/10/23  0840 04/04/23  1450 05/15/23  1254 06/27/23  1300   Pancreastatin 139 Test Not Performed 85 80 87 59     Pancreatic Polypeptide Ref Range: >314 pg/mL  Recent Labs   Lab 11/11/21  1016   Pancreatic Polypeptide 225     Serotonin Ref Range: <=230 ng/mL  Recent Labs   Lab 02/01/23  0913 02/10/23  0840 04/04/23  1450 05/15/23  1254 06/27/23  1300   Serotonin <30 <30 <30 <30 <30         ____________________________________________________________________    DISCUSSION: 68 yo woman with history rectal NET, well differentiated, grade 2 (Ki 67 16%) and small bowel NET, well diff, low grade, s/p surgery in Jan 2018. She was found to have metastatic disease to the liver and pancreas in July 2021 s/p biopsy. Liver NET is well diff grade 2 (Ki67 15)%. Pancreatic NET is well diff grade 3 (Ki67 25%). S/p TACE to the liver on 3/1/23 and 4/24/23. Now s/p liver ablation July 2023. On Sandostatin    INT RAD: Ablation cavities look good. Theres residual in dome lesions which has progressed. This can be touched up with TACEd.    NUC MED: Overall Mild progression on most recent PET  Post treatment change in liver with improvement of multiple lesions noting residual at dome. New right lobe lesion. Other similar lesions.  New uptake in right hilum  Mild increased size/uptake of pancreatic lesion    BOARD RECOMMENDATIONS: Good response to ablation, plan to proceed with TACE to right. Start chemo. Present again next week after copper PET scan on 10/4/23.

## 2023-09-28 ENCOUNTER — TUMOR BOARD CONFERENCE (OUTPATIENT)
Dept: HEMATOLOGY/ONCOLOGY | Facility: CLINIC | Age: 68
End: 2023-09-28
Payer: MEDICARE

## 2023-09-29 ENCOUNTER — TELEPHONE (OUTPATIENT)
Dept: HEMATOLOGY/ONCOLOGY | Facility: CLINIC | Age: 68
End: 2023-09-29
Payer: MEDICARE

## 2023-09-29 NOTE — TELEPHONE ENCOUNTER
At tumor board conference on 9/28/23, Dr. Smart asked me to canced pt's MRI that was scheduled for 10/9/23. I cancelled and called today to notify patient. She verbalized understanding.

## 2023-09-29 NOTE — PROGRESS NOTES
Case reviewed with Billy Farley and Maryjane. Will discuss at tumor board after PET scan next week to discuss PRRT vs chemo + TACE.

## 2023-10-02 NOTE — PROGRESS NOTES
[facility]      NEUROENDOCRINE MULTIDISCIPLINARY TUMOR BOARD  _____________________________________________________________________    PRESENTER:   Ariel Smart MD    REASON FOR PRESENTATION:  Scan Review    ATTENDEES:   Gastroenterology - Not Present  Interventional Radiology - Dhaval Valdez MD and MERVIN Guajardo  Nuclear Medicine - Arsenio Wesley MD  Nursing - NET  Nursing: Latoya Montalvo, RN, Mariam Perry, RN, and Maty Cole, KHADIJAH  Oncology - Ariel Smart MD and Ricardo Mcginnis MD, Dr. Socorro Sandhu  Palliative Medicine - Not Present  Pathology -  Rai Coles MD  Research - Not Present  Surgery - MD Brice Mireles MD William Kethman, MD    PATIENT STATUS:  Established Patient    PATIENT SUMMARY:  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    Ki 67 -3-20%    Hyperlipidemia     Hypertension     Malignant carcinoid tumor of ileum 12/2017    Ki 67 < 3%    Rectal cancer 2/8/2019       Past Surgical History:   Procedure Laterality Date    COLON SURGERY      ENDOSCOPIC ULTRASOUND OF UPPER GASTROINTESTINAL TRACT N/A 12/17/2021    Procedure: ULTRASOUND, UPPER GI TRACT, ENDOSCOPIC;  Surgeon: Jeffery Clemons MD;  Location: [address]);  Service: Endoscopy;  Laterality: N/A;  instructions emailed to pt-BB  fully vaccinated-BB   12/13 arrival time confirmed with pt-rb    HYSTERECTOMY      ILEOSTOMY CLOSURE  01/2018    Lower [facility]terior [facility]  12[facility]    port a cath Right     R chest wall       TUMOR MARKERS:  5-HIAA, Plasma Ref Range: up to 22 ng/mL  Recent Labs   Lab 11/11/21  1016 02/01/23  0913 02/10/23  0840 04/04/23  1450 05/15/23  1254 06/27/23  1300   5-HIAA, Plasma (Neuroend) 7 Test Not Performed 6 7 7 9     Chromogranin A Ref Range: <93 ng/mL  Recent Labs   Lab 11/11/21  1016 02/01/23  0913   Chromogranin A 41 27     Gastrin Ref Range: <100 pg/mL       Neurokinin A Ref Range: 0 - 40 pg/mL      Pancreastatin Ref Range: 10 - 135 pg/mL  Recent Labs   Lab 11/11/21  1016 02/01/23  0913 02/10/23  0840 04/04/23  1450 05/15/23  1254 06/27/23  1300   Pancreastatin 139 Test Not Performed 85 80 87 59     Pancreatic Polypeptide Ref Range: >314 pg/mL  Recent Labs   Lab 11/11/21  1016   Pancreatic Polypeptide 225     Serotonin Ref Range: <=230 ng/mL  Recent Labs   Lab 02/01/23  0913 02/10/23  0840 04/04/23  1450 05/15/23  1254 06/27/23  1300   Serotonin <30 <30 <30 <30 <30         ____________________________________________________________________    DISCUSSION:66 yo woman with history rectal NET, well differentiated, grade 2 (Ki 67 16%) and small bowel NET, well diff, low grade, s/p surgery in Jan 2018. She was found to have metastatic disease to the liver and pancreas in July 2021 s/p biopsy. Liver NET is well diff grade 2 (Ki67 15)%. Pancreatic NET is well diff grade 3 (Ki67 25%). S/p TACE to the liver on 3/1/23 and 4/24/23. Now s/p liver ablation July 2023. On Sandostatin. Review recent copper PET 10/4.    INT RAD:IR notes 9/28: Ablation cavities look good. There's residual in dome lesions which has progressed. This can be touched up with TACEd.    NUC MED: Most recent PET demonstrates mixed changes in liver noting ablation of segment 2,4B,5 lesions and increased uptake throughout additional lesions. Continued tracer avid lesions in right hilum and pancreas noting increased fullness of pancreatic lesion. No new lesion.    BOARD RECOMMENDATIONS:  offer TACE and systemic chemo

## 2023-10-04 ENCOUNTER — HOSPITAL ENCOUNTER (OUTPATIENT)
Dept: RADIOLOGY | Facility: HOSPITAL | Age: 68
Discharge: HOME | End: 2023-10-04
Attending: INTERNAL MEDICINE
Payer: MEDICARE

## 2023-10-04 DIAGNOSIS — C7A.026 MALIGNANT CARCINOID TUMOR OF RECTUM: ICD-10-CM

## 2023-10-04 PROCEDURE — 78815 PET IMAGE W/CT SKULL-THIGH: CPT | Mod: 26,PS,, | Performed by: STUDENT IN AN ORGANIZED HEALTH CARE EDUCATION/TRAINING PROGRAM

## 2023-10-04 PROCEDURE — A9587 GALLIUM GA-68: HCPCS

## 2023-10-04 PROCEDURE — 78815 NM PET 68GA DOTATATE, VERTEX TO THIGH: ICD-10-PCS | Mod: 26,PS,, | Performed by: STUDENT IN AN ORGANIZED HEALTH CARE EDUCATION/TRAINING PROGRAM

## 2023-10-05 ENCOUNTER — TUMOR BOARD CONFERENCE (OUTPATIENT)
Dept: HEMATOLOGY/ONCOLOGY | Facility: CLINIC | Age: 68
End: 2023-10-05
Payer: MEDICARE

## 2023-10-05 ENCOUNTER — TELEPHONE (OUTPATIENT)
Dept: INTERVENTIONAL RADIOLOGY/VASCULAR | Facility: CLINIC | Age: 68
End: 2023-10-05
Payer: MEDICARE

## 2023-10-05 NOTE — TELEPHONE ENCOUNTER
Called patient to discuss tumor board recommendations.  IR and oncology in agreement to proceed with TACE.  Pt agreeable.  Pt will keep her appt at [facility] for clinic on 10/10 to schedule TACE.    Keara Rojas PA-C  Interventional Radiology

## 2023-10-10 ENCOUNTER — OFFICE VISIT (OUTPATIENT)
Dept: HEMATOLOGY/ONCOLOGY | Facility: CLINIC | Age: 68
End: 2023-10-10
Payer: MEDICARE

## 2023-10-10 ENCOUNTER — OFFICE VISIT (OUTPATIENT)
Dept: INTERVENTIONAL RADIOLOGY/VASCULAR | Facility: CLINIC | Age: 68
End: 2023-10-10
Payer: MEDICARE

## 2023-10-10 ENCOUNTER — LAB VISIT (OUTPATIENT)
Dept: LAB | Facility: HOSPITAL | Age: 68
End: 2023-10-10
Attending: INTERNAL MEDICINE
Payer: MEDICARE

## 2023-10-10 VITALS
HEIGHT: 66 IN | HEIGHT: 66 IN | WEIGHT: 187.81 LBS | SYSTOLIC BLOOD PRESSURE: 151 MMHG | BODY MASS INDEX: 30.18 KG/M2 | HEART RATE: 63 BPM | DIASTOLIC BLOOD PRESSURE: 78 MMHG | WEIGHT: 187.81 LBS | HEART RATE: 63 BPM | DIASTOLIC BLOOD PRESSURE: 78 MMHG | SYSTOLIC BLOOD PRESSURE: 151 MMHG | BODY MASS INDEX: 30.18 KG/M2

## 2023-10-10 DIAGNOSIS — D84.81 IMMUNODEFICIENCY SECONDARY TO NEOPLASM: ICD-10-CM

## 2023-10-10 DIAGNOSIS — C7B.8 NEUROENDOCRINE CARCINOMA METASTATIC TO MULTIPLE SITES: ICD-10-CM

## 2023-10-10 DIAGNOSIS — D49.9 IMMUNODEFICIENCY SECONDARY TO NEOPLASM: ICD-10-CM

## 2023-10-10 DIAGNOSIS — C7A.026 MALIGNANT CARCINOID TUMOR OF RECTUM: ICD-10-CM

## 2023-10-10 DIAGNOSIS — Z79.899 IMMUNODEFICIENCY DUE TO DRUGS: ICD-10-CM

## 2023-10-10 DIAGNOSIS — C7A.026 MALIGNANT CARCINOID TUMOR OF RECTUM: Primary | ICD-10-CM

## 2023-10-10 DIAGNOSIS — D84.821 IMMUNODEFICIENCY DUE TO DRUGS: ICD-10-CM

## 2023-10-10 DIAGNOSIS — C7A.8 NEUROENDOCRINE CARCINOMA METASTATIC TO MULTIPLE SITES: ICD-10-CM

## 2023-10-10 DIAGNOSIS — C7A.8 NEUROENDOCRINE CARCINOMA METASTATIC TO LIVER: Primary | ICD-10-CM

## 2023-10-10 DIAGNOSIS — C7A.012 MALIGNANT CARCINOID TUMOR OF ILEUM: ICD-10-CM

## 2023-10-10 DIAGNOSIS — C7B.8 NEUROENDOCRINE CARCINOMA METASTATIC TO LIVER: Primary | ICD-10-CM

## 2023-10-10 PROCEDURE — 99215 PR OFFICE/OUTPT VISIT, EST, LEVL V, 40-54 MIN: ICD-10-PCS | Mod: S$GLB,,, | Performed by: INTERNAL MEDICINE

## 2023-10-10 PROCEDURE — 1101F PT FALLS ASSESS-DOCD LE1/YR: CPT | Mod: CPTII,S$GLB,, | Performed by: INTERNAL MEDICINE

## 2023-10-10 PROCEDURE — 1160F RVW MEDS BY RX/DR IN RCRD: CPT | Mod: CPTII,S$GLB,, | Performed by: INTERNAL MEDICINE

## 2023-10-10 PROCEDURE — 3008F BODY MASS INDEX DOCD: CPT | Mod: CPTII,S$GLB,, | Performed by: INTERNAL MEDICINE

## 2023-10-10 PROCEDURE — 1126F AMNT PAIN NOTED NONE PRSNT: CPT | Mod: CPTII,S$GLB,, | Performed by: FAMILY MEDICINE

## 2023-10-10 PROCEDURE — 99214 OFFICE O/P EST MOD 30 MIN: CPT | Mod: S$GLB,,, | Performed by: FAMILY MEDICINE

## 2023-10-10 PROCEDURE — 99999 PR PBB SHADOW E&M-EST. PATIENT-LVL III: CPT | Mod: PBBFAC,,, | Performed by: FAMILY MEDICINE

## 2023-10-10 PROCEDURE — 3044F HG A1C LEVEL LT 7.0%: CPT | Mod: CPTII,S$GLB,, | Performed by: FAMILY MEDICINE

## 2023-10-10 PROCEDURE — 1126F AMNT PAIN NOTED NONE PRSNT: CPT | Mod: CPTII,S$GLB,, | Performed by: INTERNAL MEDICINE

## 2023-10-10 PROCEDURE — 3288F FALL RISK ASSESSMENT DOCD: CPT | Mod: CPTII,S$GLB,, | Performed by: INTERNAL MEDICINE

## 2023-10-10 PROCEDURE — 99999 PR PBB SHADOW E&M-EST. PATIENT-LVL III: ICD-10-PCS | Mod: PBBFAC,,, | Performed by: FAMILY MEDICINE

## 2023-10-10 PROCEDURE — 3077F SYST BP >= 140 MM HG: CPT | Mod: CPTII,S$GLB,, | Performed by: INTERNAL MEDICINE

## 2023-10-10 PROCEDURE — 99215 OFFICE O/P EST HI 40 MIN: CPT | Mod: S$GLB,,, | Performed by: INTERNAL MEDICINE

## 2023-10-10 PROCEDURE — 1126F PR PAIN SEVERITY QUANTIFIED, NO PAIN PRESENT: ICD-10-PCS | Mod: CPTII,S$GLB,, | Performed by: FAMILY MEDICINE

## 2023-10-10 PROCEDURE — 99999 PR PBB SHADOW E&M-EST. PATIENT-LVL III: ICD-10-PCS | Mod: PBBFAC,,, | Performed by: INTERNAL MEDICINE

## 2023-10-10 PROCEDURE — 3077F SYST BP >= 140 MM HG: CPT | Mod: CPTII,S$GLB,, | Performed by: FAMILY MEDICINE

## 2023-10-10 PROCEDURE — 3078F PR MOST RECENT DIASTOLIC BLOOD PRESSURE < 80 MM HG: ICD-10-PCS | Mod: CPTII,S$GLB,, | Performed by: FAMILY MEDICINE

## 2023-10-10 PROCEDURE — 3044F PR MOST RECENT HEMOGLOBIN A1C LEVEL <7.0%: ICD-10-PCS | Mod: CPTII,S$GLB,, | Performed by: INTERNAL MEDICINE

## 2023-10-10 PROCEDURE — 3288F PR FALLS RISK ASSESSMENT DOCUMENTED: ICD-10-PCS | Mod: CPTII,S$GLB,, | Performed by: INTERNAL MEDICINE

## 2023-10-10 PROCEDURE — 3077F PR MOST RECENT SYSTOLIC BLOOD PRESSURE >= 140 MM HG: ICD-10-PCS | Mod: CPTII,S$GLB,, | Performed by: FAMILY MEDICINE

## 2023-10-10 PROCEDURE — 3078F PR MOST RECENT DIASTOLIC BLOOD PRESSURE < 80 MM HG: ICD-10-PCS | Mod: CPTII,S$GLB,, | Performed by: INTERNAL MEDICINE

## 2023-10-10 PROCEDURE — 99214 PR OFFICE/OUTPT VISIT, EST, LEVL IV, 30-39 MIN: ICD-10-PCS | Mod: S$GLB,,, | Performed by: FAMILY MEDICINE

## 2023-10-10 PROCEDURE — 3044F HG A1C LEVEL LT 7.0%: CPT | Mod: CPTII,S$GLB,, | Performed by: INTERNAL MEDICINE

## 2023-10-10 PROCEDURE — 3078F DIAST BP <80 MM HG: CPT | Mod: CPTII,S$GLB,, | Performed by: INTERNAL MEDICINE

## 2023-10-10 PROCEDURE — 3078F DIAST BP <80 MM HG: CPT | Mod: CPTII,S$GLB,, | Performed by: FAMILY MEDICINE

## 2023-10-10 PROCEDURE — 1159F PR MEDICATION LIST DOCUMENTED IN MEDICAL RECORD: ICD-10-PCS | Mod: CPTII,S$GLB,, | Performed by: INTERNAL MEDICINE

## 2023-10-10 PROCEDURE — 3008F BODY MASS INDEX DOCD: CPT | Mod: CPTII,S$GLB,, | Performed by: FAMILY MEDICINE

## 2023-10-10 PROCEDURE — 99999 PR PBB SHADOW E&M-EST. PATIENT-LVL III: CPT | Mod: PBBFAC,,, | Performed by: INTERNAL MEDICINE

## 2023-10-10 PROCEDURE — 1101F PR PT FALLS ASSESS DOC 0-1 FALLS W/OUT INJ PAST YR: ICD-10-PCS | Mod: CPTII,S$GLB,, | Performed by: INTERNAL MEDICINE

## 2023-10-10 PROCEDURE — 1126F PR PAIN SEVERITY QUANTIFIED, NO PAIN PRESENT: ICD-10-PCS | Mod: CPTII,S$GLB,, | Performed by: INTERNAL MEDICINE

## 2023-10-10 PROCEDURE — 1159F MED LIST DOCD IN RCRD: CPT | Mod: CPTII,S$GLB,, | Performed by: INTERNAL MEDICINE

## 2023-10-10 PROCEDURE — 3008F PR BODY MASS INDEX (BMI) DOCUMENTED: ICD-10-PCS | Mod: CPTII,S$GLB,, | Performed by: FAMILY MEDICINE

## 2023-10-10 PROCEDURE — 3044F PR MOST RECENT HEMOGLOBIN A1C LEVEL <7.0%: ICD-10-PCS | Mod: CPTII,S$GLB,, | Performed by: FAMILY MEDICINE

## 2023-10-10 PROCEDURE — 3008F PR BODY MASS INDEX (BMI) DOCUMENTED: ICD-10-PCS | Mod: CPTII,S$GLB,, | Performed by: INTERNAL MEDICINE

## 2023-10-10 PROCEDURE — 36415 COLL VENOUS BLD VENIPUNCTURE: CPT | Performed by: INTERNAL MEDICINE

## 2023-10-10 PROCEDURE — 3077F PR MOST RECENT SYSTOLIC BLOOD PRESSURE >= 140 MM HG: ICD-10-PCS | Mod: CPTII,S$GLB,, | Performed by: INTERNAL MEDICINE

## 2023-10-10 PROCEDURE — 1160F PR REVIEW ALL MEDS BY PRESCRIBER/CLIN PHARMACIST DOCUMENTED: ICD-10-PCS | Mod: CPTII,S$GLB,, | Performed by: INTERNAL MEDICINE

## 2023-10-10 RX ORDER — CAPECITABINE 500 MG/1
1500 TABLET, FILM COATED ORAL 2 TIMES DAILY
Qty: 84 TABLET | Refills: 11 | Status: ACTIVE | OUTPATIENT
Start: 2023-10-10 | End: 2023-12-15

## 2023-10-10 RX ORDER — ONDANSETRON 8 MG/1
8 TABLET, ORALLY DISINTEGRATING ORAL EVERY 8 HOURS PRN
Qty: 30 TABLET | Refills: 2 | Status: SHIPPED | OUTPATIENT
Start: 2023-10-10

## 2023-10-10 RX ORDER — TEMOZOLOMIDE 100 MG/1
400 CAPSULE ORAL DAILY
Qty: 20 CAPSULE | Refills: 11 | Status: ACTIVE | OUTPATIENT
Start: 2023-10-10 | End: 2023-12-15

## 2023-10-10 RX ORDER — CAPECITABINE 500 MG/1
1500 TABLET, FILM COATED ORAL 2 TIMES DAILY
Qty: 84 TABLET | Refills: 11 | Status: SHIPPED | OUTPATIENT
Start: 2023-10-10 | End: 2023-10-10

## 2023-10-10 NOTE — PROGRESS NOTES
PROGRESS NOTE    Subjective:       Patient ID: Coby Marshall is a 68 y.o. female.    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    Tempus: no reportable pathogenic mutation. MSI-low. PDL1 negative. TMB 3.7    Oncologic History:  1. Ms. 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 Her primary oncologist is Dr. Olson at [facility].  Her oncology history is detailed below. She was initially diagnosed with rectal adenocarcinoma and she was treated with neoadjuvant chemoRT. Surgical pathology resulted as well-differentiated carcinoid and she was initially observed. A CT obtained for hernia revealed liver metastases in July 2021, and these were confirmed to be neuroendocrine on pathology. She underwent EUS with biopsy in December 2021 which showed grade 3 NET in the pancreas with Ki67 25%. She was started on lanreotide. Her liver lesions have since grown in size, and her oncologist referred her for consideration of liver-directed therapy. Ms. Marshall reports ongoing diarrhea since the time of her initial resection. She has about 6 bowel movements per day. She does not have any flushing, palpitations, or diaphoresis. She otherwise feels well.   Oncology History:   8/2017: Well-differentiated carcinoid tumor in the rectum, grade 2, and in the terminal ileum, grade 1. Initially diagnosed as adenocarcinoma and later changed to carcinoid tumor at time of resection  Treated with concurrent chemoRT with infusional 5-FU under the diagnosis of adenocarcinoma  1/2018: resection of rectum and terminal ileum. Pathology showed a ypT3N0 1.7 cm rectal NET, well  "differentiated, grade 2, Ki 67 was 16%. 1.5 cm T2Nx small bowel NET, well diff, Ki 67 was less than 3%.  7/22/2021: Metastasis to liver on CT for hernia repair.   11/10/2021: Copper PET:    In this patient with rectal and ileal neuroendocrine tumor status post low anterior resection/small bowel resection, there are multiple somatostatin receptor avid hepatic lesions which appear increased in size as compared to outside CT 07/19/2021.  There is also tracer avid focus in the pancreatic body suspicious for malignancy.  All these findings are new since prior Dotatate PET-CT.  12/17/2021: EUS with biopsies:  "1. PANCREAS MASS, ENDOSCOPIC ULTRASOUND-GUIDED FNA:   Well-differentiated neuroendocrine tumor, favor WHO grade 3 (G3).   Comment:  Ki-67 labeling index is approximately 25%.  Average mitotic rate is   9 per 2 mm^2.  Tumor cells are positive with synaptophysin and chromogranin,   supporting the diagnosis.   2. LIVER, LEFT LOWER LOBE, ENDOSCOPIC ULTRASOUND-GUIDED FNA:   Well-differentiated neuroendocrine tumor.   3. LIVER, RIGHT LOWER LOBE, ENDOSCOPIC ULTRASOUND-GUIDED FNA:   Well-differentiated neuroendocrine tumor, favor WHO grade 2 (G2).   Comment (2,3):  Tumor in parts 2 and 3 are histologically identical.  Only   part 3 contains enough cell block lesional material to render a meaningful   labeling index and mitotic count.  Ki-67 labeling index is approximately 15%.    Average mitotic rate is 3 per 2 mm^2.  Tumor cells are positive with   synaptophysin but negative for chromogranin.  The overall findings are still   consistent with metastatic well-differentiated neuroendocrine tumor."  1/3/2022: Lanreotide started  1/4/2023: repeat CT abd/pelvis: worsening hepatic metastases. Changed Lanreotide to Sandostatin  2. Case reviewed at tumor board. [facility] serial TACE. S/p TACE on 3/1/23, 4/24/23, 7/21/23     Interval History:   Ms Marshall returns today for follow up. Feeling well. Case reviewed at tumor board. MRI showed " response in ablated cavities. Residual in dome lesions has progressed. Pancreatic lesion increased to 2.3 x 1.6 cm. Copper PET showed stable distribution of disease outside of liver. Recc TACE  and systemic chemo.     ECO    ROS:   A ten-point system review is obtained and negative except for what was stated in the Interval History.     Physical Examination:   Vital signs reviewed.   General: well hydrated, well developed, in no acute distress  HEENT: normocephalic, PERRLA, EOMI, anicteric sclerae  Neck: supple, no JVD, thyromegaly, cervical or supraclavicular lymphadenopathy  Lungs: clear breath sounds bilaterally, no wheezing, rales, or rhonchi  Heart: RRR, no M/R/G  Abdomen: soft, no tenderness, non-distended, no hepatosplenomegaly, mass, or hernia. BS present  Extremities: no clubbing, cyanosis, or edema  Skin: no rash, ulcer, or open wounds  Neuro: alert and oriented x 4, no focal neuro deficit  Psych: pleasant and appropriate mood and affect    Objective:     Laboratory Data:  Labs reviewed. CBC, CMP unremarkable. Cr 1.2    Imaging Data:  Gallium PET 23:  Similar number and distribution of radiotracer avid lesions within the liver and pancreatic body.  Several liver lesions demonstrate central PET photopenia and hypodensity on CT consistent with necrosis.     New nonspecific mild radiotracer uptake within several small left axillary lymph nodes.  Finding may relate to left upper extremity injection and partial radiotracer extravasation.     Otherwise, no new somatostatin receptor avid lesion.    I have personally reviewed her CT abdomen/pelvis with Dr Soni: Receptor positive pancreatic lesion and liver mets. Liver mets appear enlarged since 2022, though this may be due to larger cystic components.     MRI abdomen 23:  Redemonstration of multiple hepatic lesions in keeping with known neuroendocrine tumor metastasis.  Dominant lesions within the right lobe and segment 4 demonstrate decreased  central enhancement consistent with response to therapy with residual disease likely present at these sites.  Additional stable enhancing lesions within the left and right lobes consistent with residual disease.  No definite new lesion.     Persistent restricted diffusion within the pancreatic body in keeping with known lesion.     Stable mildly prominent periportal lymph nodes, nonspecific.    MRI abdomen 6/07/23  Impression:  1. Interval hepatic chemoembolization.  Decreased size of multiple hepatic lesions consistent with known metastasis.  Several lesions demonstrate persistent enhancement consistent with residual disease.  New arterial enhancement within a right hepatic lobe lesion consistent with tumor.  2. Persistent restricted diffusion within the pancreatic body in keeping with known lesion.    NM PET 6/27/23  Impression:  Overall findings concerning for mild disease progression with new hepatic lesion, enlarging pancreatic body lesion and new focus of radiotracer uptake within the right hilum.  Post treatment changes of several liver lesions with decreased radiotracer uptake.  Additional nonspecific low level radiotracer uptake within axillary, mediastinal, and bilateral inguinal lymph nodes.  Attention on follow-up.    MRI 9/16/23:  Impression:     Patient with neuroendocrine tumor with hepatic metastases and interval microwave ablation of multiple hepatic lesions.     Small amount of diffusion restriction at the superior aspect of segment 4B treatment cavity raises question of residual tumor.  Attention on follow-up.     Interval decrease in size of segment 5/8 lesion.     Interval mildly increased size of segment 6 lesion with persistent internal enhancement and nodular diffusion restriction which may reflect residual disease.     No definite new hepatic lesion.     Mildly increased size of pancreatic body mass.    PET scan 10/4/23:  Impression:     Multiple radiotracer avid hepatic lesions with mixed  interval changes compared to prior exam, please see above for additional details.     Mild increased uptake within a right hilar lesion/lymph node.  Radiotracer avid pancreatic body lesion appears mildly enlarged.     No definite new lesion elsewhere.     Persistent mild nonspecific radiotracer uptake within small bilateral axillary lymph nodes     Assessment and Plan:     1. Malignant carcinoid tumor of rectum    2. Malignant carcinoid tumor of ileum    3. 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 Immunodeficiency secondary to neoplasm    5. Immunodeficiency due to drugs        1-5  - Ms Marshall is a 68 yo woman with history of ypT3N0 rectal NET, well differentiated, grade 2 (Ki 67 16%) and small bowel NET, well diff, low grade, s/p surgery in Jan 2018. She was found to have metastatic disease to the liver and pancreas in July 2021 s/p biopsy. Liver NET is well diff grade 2 (Ki67 15)%. Pancreatic NET is well diff grade 3 (Ki67 25%). She has been on lanreotide since July 2022, changed to sandostatin in Jan 2023 after CT scan showed progression. S/p TACE to the liver on 3/1/23, 4/24/23, 7/21/23  - last MRI and PET reviewed at tumor board. MRI showed response in ablated cavities. Residual in dome lesions has progressed. Pancreatic lesion increased to 2.3 x 1.6 cm. Copper PET showed stable distribution of disease outside of liver.   - discussed the above with patient and . Discussed TACE will take care of hepatic met. Her pancreatic NET has gotten bigger, but not systemic progression for us to pull the trigger on PRRT as PRRT is once in a lifetime thing. I recc TACE and captem  - discussed xeloda and temodar in detail. The side effects of chemotherapy were discussed with patient, which include but are not limited to hair loss, fatigue, decreased appetite, weight loss, weakness, bone marrow suppression, increased risk of infection, sepsis, anemia that may require blood transfusion, low platelet  counts with increased risk of bleeding that may require platelet transfusion, diarrhea, constipation, mucositis, damage to the brain, heart, liver, kidney, damage to the nerves that may not be reversible, severe allergic reaction, damage at the injection site, sterility, secondary cancer, death. Handouts provided to patient.  - Consented the patient to the treatment plan and the patient was educated on the planned duration of the treatment and schedule of the treatment administration.  - they will get TACE with IR  - c/w lanreotide with Dr Olson  - see me in 5 weeks to assess toxicities on captem.     Follow-up:     RTC in 5 weeks      Ariel Smart MD  Hematology and Medical Oncology  [facility]    Route Chart for Scheduling    Med Onc Chart Routing      Follow up with physician Other. see me in 5 weeks with CBC, CMP, serotonin, pancreastatin, 5-HIAA   Follow up with ELLY    Infusion scheduling note    Injection scheduling note    Labs    Imaging    Pharmacy appointment    Other referrals

## 2023-10-10 NOTE — PROGRESS NOTES
Subjective     Patient ID: Coby Marshall is a 68 y.o. female.    Chief Complaint: Cancer    Patient here for follow up of neuroendocrine cancer recently treated with ablation on 7/21/2023. 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 Today she reports feeling well. She has some intermittent abdominal pain. She had an MRI on 9/16/2023 and a PET on 10/4/2023. She was reviewed in tumor board on 10/2/2023.      Review of Systems   Constitutional:  Negative for activity change, appetite change, chills, fatigue and fever.   Respiratory:  Negative for cough, shortness of breath, wheezing and stridor.    Cardiovascular:  Negative for chest pain, palpitations and leg swelling.   Gastrointestinal:  Positive for abdominal pain. Negative for abdominal distention, constipation, diarrhea, nausea and vomiting.          Objective     Physical Exam  Constitutional:       General: She is not in acute distress.     Appearance: She is well-developed. She is not diaphoretic.   HENT:      Head: Normocephalic and atraumatic.   Pulmonary:      Effort: Pulmonary effort is normal. No respiratory distress.   Neurological:      Mental Status: She is alert and oriented to person, place, and time.   Psychiatric:         Behavior: Behavior normal.         Thought Content: Thought content normal.         Judgment: Judgment normal.     Reviewed oncology progress note.    MRI 9/16/2023  Impression:     Patient with neuroendocrine tumor with hepatic metastases and interval microwave ablation of multiple hepatic lesions.     Small amount of diffusion restriction at the superior aspect of segment 4B treatment cavity raises question of residual tumor.  Attention on follow-up.     Interval decrease in size of segment 5/8 lesion.     Interval mildly increased size of segment 6 lesion with persistent internal enhancement and nodular diffusion restriction which may  reflect residual disease.     No definite new hepatic lesion.     Mildly increased size of pancreatic body mass.    PET 10/4/2023  Impression:     Multiple radiotracer avid hepatic lesions with mixed interval changes compared to prior exam, please see above for additional details.     Mild increased uptake within a right hilar lesion/lymph node.  Radiotracer avid pancreatic body lesion appears mildly enlarged.     No definite new lesion elsewhere.     Persistent mild nonspecific radiotracer uptake within small bilateral axillary lymph nodes     Assessment and Plan     1. Neuroendocrine carcinoma metastatic to liver  -     IR Embolization for Tumor_Organ Ischemia; Future; Expected date: 10/10/2023  -     Comprehensive Metabolic Panel; Future; Expected date: 10/10/2023  -     Bilirubin, Direct; Future; Expected date: 10/10/2023  -     Protime-INR; Future; Expected date: 10/10/2023  -     CBC Auto Differential; Future; Expected date: 10/10/2023        Explained to patient recommendation is to treat residual with repeat chemoembolization. Chemoembolization procedure discussed in detail with the patient including risks (including, but not limited to, pain, bleeding, infection, damage to nearby structures, and the need for additional procedures), benefits, potential complications, usual pre and post procedure course, as well as potential to develop post-embolization syndrome. Discussed risk of carcinoid crises and administration of octreotide. Utilized illustrations to help explain procedure. The patient voices understanding and all questions have been answered.  The patient agrees to proceed as planned. We will call to schedule. Clinic phone number provided.

## 2023-10-13 ENCOUNTER — TELEPHONE (OUTPATIENT)
Dept: HEMATOLOGY/ONCOLOGY | Facility: CLINIC | Age: 68
End: 2023-10-13
Payer: MEDICARE

## 2023-10-13 NOTE — TELEPHONE ENCOUNTER
Spoke with pt & spouse and reviewed directions on taking Xeloda and Temodar pills.  They are aware of side effects.  Pt plans to start 10/15 based on their schedules.  Changed the appointments to 3 week follow up.      ----- Message from Ariel Smart MD sent at 10/13/2023  8:37 AM CDT -----  Regarding: RE: captjud    Yes. Start it now and see me in 3 weeks. If there is too much side effects, stop and let us know. Please let patient know    ----- Message -----  From: Selina Bahena RN  Sent: 10/12/2023   6:02 PM CDT  To: Ariel Smart MD; Charisse Wheeler PA-C  Subject: captem                                           Is patient to start right away?  I plan to call patient tomorrow    - Selina  ----- Message -----  From: Natividad Baptiste  Sent: 10/12/2023   1:39 PM CDT  To: [facility] Staff  Subject: Consult/Advisory                                 Name Of Caller:     Coby     Contact Preference:    [phone]    Nature of call:    Pt requesting to speak w/ Dr Ariel Smart. She received her Xeloda and Temodar in mail today. Pt has questions about their directions.

## 2023-10-20 LAB
ONEOME COMMENT: NORMAL
ONEOME METHOD: NORMAL

## 2023-10-24 ENCOUNTER — LAB VISIT (OUTPATIENT)
Dept: LAB | Facility: HOSPITAL | Age: 68
End: 2023-10-24
Attending: FAMILY MEDICINE
Payer: MEDICARE

## 2023-10-24 DIAGNOSIS — C7A.8 NEUROENDOCRINE CARCINOMA METASTATIC TO LIVER: ICD-10-CM

## 2023-10-24 DIAGNOSIS — C7B.8 NEUROENDOCRINE CARCINOMA METASTATIC TO LIVER: ICD-10-CM

## 2023-10-24 LAB
ALBUMIN SERPL BCP-MCNC: 4.2 G/DL (ref 3.5–5.2)
ALP SERPL-CCNC: 75 U/L (ref 55–135)
ALT SERPL W/O P-5'-P-CCNC: 14 U/L (ref 10–44)
ANION GAP SERPL CALC-SCNC: 7 MMOL/L (ref 8–16)
AST SERPL-CCNC: 19 U/L (ref 10–40)
BASOPHILS # BLD AUTO: 0.05 K/UL (ref 0–0.2)
BASOPHILS NFR BLD: 0.7 % (ref 0–1.9)
BILIRUB DIRECT SERPL-MCNC: 0.1 MG/DL (ref 0.1–0.3)
BILIRUB SERPL-MCNC: 0.5 MG/DL (ref 0.1–1)
BUN SERPL-MCNC: 17 MG/DL (ref 8–23)
CALCIUM SERPL-MCNC: 10 MG/DL (ref 8.7–10.5)
CHLORIDE SERPL-SCNC: 101 MMOL/L (ref 95–110)
CO2 SERPL-SCNC: 32 MMOL/L (ref 23–29)
CREAT SERPL-MCNC: 1.1 MG/DL (ref 0.5–1.4)
DIFFERENTIAL METHOD: ABNORMAL
EOSINOPHIL # BLD AUTO: 0.3 K/UL (ref 0–0.5)
EOSINOPHIL NFR BLD: 3.5 % (ref 0–8)
ERYTHROCYTE [DISTWIDTH] IN BLOOD BY AUTOMATED COUNT: 15 % (ref 11.5–14.5)
EST. GFR  (NO RACE VARIABLE): 54.1 ML/MIN/1.73 M^2
GLUCOSE SERPL-MCNC: 115 MG/DL (ref 70–110)
HCT VFR BLD AUTO: 35 % (ref 37–48.5)
HGB BLD-MCNC: 10.5 G/DL (ref 12–16)
IMM GRANULOCYTES # BLD AUTO: 0.02 K/UL (ref 0–0.04)
IMM GRANULOCYTES NFR BLD AUTO: 0.3 % (ref 0–0.5)
INR PPP: 1 (ref 0.8–1.2)
LYMPHOCYTES # BLD AUTO: 1.3 K/UL (ref 1–4.8)
LYMPHOCYTES NFR BLD: 18 % (ref 18–48)
MCH RBC QN AUTO: 26.8 PG (ref 27–31)
MCHC RBC AUTO-ENTMCNC: 30 G/DL (ref 32–36)
MCV RBC AUTO: 89 FL (ref 82–98)
MONOCYTES # BLD AUTO: 0.4 K/UL (ref 0.3–1)
MONOCYTES NFR BLD: 5.6 % (ref 4–15)
NEUTROPHILS # BLD AUTO: 5.1 K/UL (ref 1.8–7.7)
NEUTROPHILS NFR BLD: 71.9 % (ref 38–73)
NRBC BLD-RTO: 0 /100 WBC
PLATELET # BLD AUTO: 321 K/UL (ref 150–450)
PMV BLD AUTO: 9.1 FL (ref 9.2–12.9)
POTASSIUM SERPL-SCNC: 3.4 MMOL/L (ref 3.5–5.1)
PROT SERPL-MCNC: 8.4 G/DL (ref 6–8.4)
PROTHROMBIN TIME: 11.4 SEC (ref 9–12.5)
RBC # BLD AUTO: 3.92 M/UL (ref 4–5.4)
SODIUM SERPL-SCNC: 140 MMOL/L (ref 136–145)
WBC # BLD AUTO: 7.13 K/UL (ref 3.9–12.7)

## 2023-10-24 PROCEDURE — 36415 COLL VENOUS BLD VENIPUNCTURE: CPT | Performed by: FAMILY MEDICINE

## 2023-10-24 PROCEDURE — 85025 COMPLETE CBC W/AUTO DIFF WBC: CPT | Performed by: FAMILY MEDICINE

## 2023-10-24 PROCEDURE — 80053 COMPREHEN METABOLIC PANEL: CPT | Performed by: FAMILY MEDICINE

## 2023-10-24 PROCEDURE — 85610 PROTHROMBIN TIME: CPT | Performed by: FAMILY MEDICINE

## 2023-10-24 PROCEDURE — 82248 BILIRUBIN DIRECT: CPT | Performed by: FAMILY MEDICINE

## 2023-10-31 ENCOUNTER — DOCUMENTATION ONLY (OUTPATIENT)
Dept: HEMATOLOGY/ONCOLOGY | Facility: CLINIC | Age: 68
End: 2023-10-31
Payer: MEDICARE

## 2023-10-31 ENCOUNTER — TELEPHONE (OUTPATIENT)
Dept: HEMATOLOGY/ONCOLOGY | Facility: CLINIC | Age: 68
End: 2023-10-31
Payer: MEDICARE

## 2023-10-31 NOTE — TELEPHONE ENCOUNTER
"----- Message from Eugenio Brock sent at 10/31/2023  1:04 PM CDT -----  Consult/Advisory:        Name Of Caller: Self      Contact Preference?: [phone]       Provider Name: Du      Does patient feel the need to be seen today? No      What is the nature of the call?: Calling to speak w/ Ho about making arrangements to stay at the [facility] on 11/2      Additional Notes:  "Thank you for all that you do for our patients"       "

## 2023-10-31 NOTE — PROGRESS NOTES
Received notice that patient will be needing lodging at the [facility] for upcoming treatments on 11/2-11/3/23. Spoke with patient to confirm dates needed and treatments being received. Application completed and submitted to [facility] at 13:46.     Radha Issa MSW, [facility]  Oncology Social Worker  [facility]   [phone]

## 2023-11-02 ENCOUNTER — TELEPHONE (OUTPATIENT)
Dept: INTERVENTIONAL RADIOLOGY/VASCULAR | Facility: HOSPITAL | Age: 68
End: 2023-11-02
Payer: MEDICARE

## 2023-11-02 NOTE — NURSING
Arrive at 7:00, where to check in, have nothing eat/drink past midnight, have ride to/from procedure, take a.m. meds with small sip of water, bring current list of home medications. Confirmed eight allergies and no blood thinners. Prep for overnight stay.

## 2023-11-03 ENCOUNTER — ANESTHESIA EVENT (OUTPATIENT)
Dept: INTERVENTIONAL RADIOLOGY/VASCULAR | Facility: HOSPITAL | Age: 68
End: 2023-11-03
Payer: MEDICARE

## 2023-11-03 ENCOUNTER — HOSPITAL ENCOUNTER (OUTPATIENT)
Dept: INTERVENTIONAL RADIOLOGY/VASCULAR | Facility: HOSPITAL | Age: 68
Discharge: HOME | End: 2023-11-04
Attending: STUDENT IN AN ORGANIZED HEALTH CARE EDUCATION/TRAINING PROGRAM | Admitting: STUDENT IN AN ORGANIZED HEALTH CARE EDUCATION/TRAINING PROGRAM
Payer: MEDICARE

## 2023-11-03 DIAGNOSIS — C7B.8 NEUROENDOCRINE CARCINOMA METASTATIC TO LIVER: Primary | ICD-10-CM

## 2023-11-03 DIAGNOSIS — C7A.8 NEUROENDOCRINE CARCINOMA METASTATIC TO LIVER: ICD-10-CM

## 2023-11-03 DIAGNOSIS — C7A.8 NEUROENDOCRINE CARCINOMA METASTATIC TO LIVER: Primary | ICD-10-CM

## 2023-11-03 DIAGNOSIS — C7B.8 NEUROENDOCRINE CARCINOMA METASTATIC TO MULTIPLE SITES: Primary | ICD-10-CM

## 2023-11-03 DIAGNOSIS — C7A.8 NEUROENDOCRINE CANCER: ICD-10-CM

## 2023-11-03 DIAGNOSIS — C7A.8 NEUROENDOCRINE CARCINOMA METASTATIC TO MULTIPLE SITES: Primary | ICD-10-CM

## 2023-11-03 DIAGNOSIS — R07.9 CHEST PAIN: ICD-10-CM

## 2023-11-03 DIAGNOSIS — C7B.8 NEUROENDOCRINE CARCINOMA METASTATIC TO LIVER: ICD-10-CM

## 2023-11-03 LAB
ANION GAP SERPL CALC-SCNC: 11 MMOL/L (ref 8–16)
BASOPHILS # BLD AUTO: 0.01 K/UL (ref 0–0.2)
BASOPHILS NFR BLD: 0.1 % (ref 0–1.9)
BUN SERPL-MCNC: 12 MG/DL (ref 8–23)
CALCIUM SERPL-MCNC: 9.1 MG/DL (ref 8.7–10.5)
CHLORIDE SERPL-SCNC: 103 MMOL/L (ref 95–110)
CO2 SERPL-SCNC: 25 MMOL/L (ref 23–29)
CREAT SERPL-MCNC: 1.2 MG/DL (ref 0.5–1.4)
DIFFERENTIAL METHOD: ABNORMAL
EOSINOPHIL # BLD AUTO: 0 K/UL (ref 0–0.5)
EOSINOPHIL NFR BLD: 0.1 % (ref 0–8)
ERYTHROCYTE [DISTWIDTH] IN BLOOD BY AUTOMATED COUNT: 16.4 % (ref 11.5–14.5)
EST. GFR  (NO RACE VARIABLE): 49 ML/MIN/1.73 M^2
GLUCOSE SERPL-MCNC: 165 MG/DL (ref 70–110)
HCT VFR BLD AUTO: 34.7 % (ref 37–48.5)
HGB BLD-MCNC: 10.9 G/DL (ref 12–16)
IMM GRANULOCYTES # BLD AUTO: 0.03 K/UL (ref 0–0.04)
IMM GRANULOCYTES NFR BLD AUTO: 0.4 % (ref 0–0.5)
LYMPHOCYTES # BLD AUTO: 0.6 K/UL (ref 1–4.8)
LYMPHOCYTES NFR BLD: 7.8 % (ref 18–48)
MCH RBC QN AUTO: 27.2 PG (ref 27–31)
MCHC RBC AUTO-ENTMCNC: 31.4 G/DL (ref 32–36)
MCV RBC AUTO: 87 FL (ref 82–98)
MONOCYTES # BLD AUTO: 0.1 K/UL (ref 0.3–1)
MONOCYTES NFR BLD: 0.7 % (ref 4–15)
NEUTROPHILS # BLD AUTO: 7.3 K/UL (ref 1.8–7.7)
NEUTROPHILS NFR BLD: 91.3 % (ref 38–73)
NRBC BLD-RTO: 0 /100 WBC
PLATELET # BLD AUTO: 292 K/UL (ref 150–450)
PMV BLD AUTO: 9 FL (ref 9.2–12.9)
POTASSIUM SERPL-SCNC: 4.1 MMOL/L (ref 3.5–5.1)
RBC # BLD AUTO: 4.01 M/UL (ref 4–5.4)
SODIUM SERPL-SCNC: 139 MMOL/L (ref 136–145)
WBC # BLD AUTO: 8.05 K/UL (ref 3.9–12.7)

## 2023-11-03 PROCEDURE — 36247 INS CATH ABD/L-EXT ART 3RD: CPT | Mod: 51,,, | Performed by: STUDENT IN AN ORGANIZED HEALTH CARE EDUCATION/TRAINING PROGRAM

## 2023-11-03 PROCEDURE — 76937 PR  US GUIDE, VASCULAR ACCESS: ICD-10-PCS | Mod: 26,,, | Performed by: STUDENT IN AN ORGANIZED HEALTH CARE EDUCATION/TRAINING PROGRAM

## 2023-11-03 PROCEDURE — 36248 INS CATH ABD/L-EXT ART ADDL: CPT | Performed by: STUDENT IN AN ORGANIZED HEALTH CARE EDUCATION/TRAINING PROGRAM

## 2023-11-03 PROCEDURE — 37243 IR EMBOLIZATION COMP FOR TUMOR_ORGAN ISCHEMIA_INFARC: ICD-10-PCS | Mod: ,,, | Performed by: STUDENT IN AN ORGANIZED HEALTH CARE EDUCATION/TRAINING PROGRAM

## 2023-11-03 PROCEDURE — 75887 VEIN X-RAY LIVER W/O HEMODYN: CPT | Mod: 26,,, | Performed by: STUDENT IN AN ORGANIZED HEALTH CARE EDUCATION/TRAINING PROGRAM

## 2023-11-03 PROCEDURE — C1894 INTRO/SHEATH, NON-LASER: HCPCS

## 2023-11-03 PROCEDURE — D9220A PRA ANESTHESIA: ICD-10-PCS | Mod: ANES,,, | Performed by: ANESTHESIOLOGY

## 2023-11-03 PROCEDURE — 63600175 PHARM REV CODE 636 W HCPCS: Performed by: STUDENT IN AN ORGANIZED HEALTH CARE EDUCATION/TRAINING PROGRAM

## 2023-11-03 PROCEDURE — 37243 VASC EMBOLIZE/OCCLUDE ORGAN: CPT | Performed by: STUDENT IN AN ORGANIZED HEALTH CARE EDUCATION/TRAINING PROGRAM

## 2023-11-03 PROCEDURE — 36248 INS CATH ABD/L-EXT ART ADDL: CPT | Mod: ,,, | Performed by: STUDENT IN AN ORGANIZED HEALTH CARE EDUCATION/TRAINING PROGRAM

## 2023-11-03 PROCEDURE — 76937 US GUIDE VASCULAR ACCESS: CPT | Mod: TC | Performed by: STUDENT IN AN ORGANIZED HEALTH CARE EDUCATION/TRAINING PROGRAM

## 2023-11-03 PROCEDURE — 75726 CHG ANGIO VISCERAL SELECTV/SUBSELEC: ICD-10-PCS | Mod: 26,59,, | Performed by: STUDENT IN AN ORGANIZED HEALTH CARE EDUCATION/TRAINING PROGRAM

## 2023-11-03 PROCEDURE — 75887 VEIN X-RAY LIVER W/O HEMODYN: CPT | Mod: TC | Performed by: STUDENT IN AN ORGANIZED HEALTH CARE EDUCATION/TRAINING PROGRAM

## 2023-11-03 PROCEDURE — 36415 COLL VENOUS BLD VENIPUNCTURE: CPT | Performed by: STUDENT IN AN ORGANIZED HEALTH CARE EDUCATION/TRAINING PROGRAM

## 2023-11-03 PROCEDURE — 96420 CHEMO IA PUSH TECNIQUE: CPT | Performed by: STUDENT IN AN ORGANIZED HEALTH CARE EDUCATION/TRAINING PROGRAM

## 2023-11-03 PROCEDURE — 85025 COMPLETE CBC W/AUTO DIFF WBC: CPT | Performed by: STUDENT IN AN ORGANIZED HEALTH CARE EDUCATION/TRAINING PROGRAM

## 2023-11-03 PROCEDURE — 25000003 PHARM REV CODE 250: Performed by: NURSE ANESTHETIST, CERTIFIED REGISTERED

## 2023-11-03 PROCEDURE — D9220A PRA ANESTHESIA: Mod: ANES,,, | Performed by: ANESTHESIOLOGY

## 2023-11-03 PROCEDURE — 80048 BASIC METABOLIC PNL TOTAL CA: CPT | Performed by: STUDENT IN AN ORGANIZED HEALTH CARE EDUCATION/TRAINING PROGRAM

## 2023-11-03 PROCEDURE — 37000009 HC ANESTHESIA EA ADD 15 MINS

## 2023-11-03 PROCEDURE — 63600175 PHARM REV CODE 636 W HCPCS: Performed by: NURSE ANESTHETIST, CERTIFIED REGISTERED

## 2023-11-03 PROCEDURE — 75887 PR  PERCUT XHEPATIC PORTOGRAM: ICD-10-PCS | Mod: 26,,, | Performed by: STUDENT IN AN ORGANIZED HEALTH CARE EDUCATION/TRAINING PROGRAM

## 2023-11-03 PROCEDURE — D9220A PRA ANESTHESIA: ICD-10-PCS | Mod: CRNA,,, | Performed by: NURSE ANESTHETIST, CERTIFIED REGISTERED

## 2023-11-03 PROCEDURE — 99233 PR SUBSEQUENT HOSPITAL CARE,LEVL III: ICD-10-PCS | Mod: NSCH,25,GC, | Performed by: PHYSICIAN ASSISTANT

## 2023-11-03 PROCEDURE — 25500020 PHARM REV CODE 255: Performed by: PHYSICIAN ASSISTANT

## 2023-11-03 PROCEDURE — 63600175 PHARM REV CODE 636 W HCPCS: Mod: JA | Performed by: PHYSICIAN ASSISTANT

## 2023-11-03 PROCEDURE — 27201045 IR EMBOLIZATION COMP FOR TUMOR_ORGAN ISCHEMIA_INFARC

## 2023-11-03 PROCEDURE — 99233 SBSQ HOSP IP/OBS HIGH 50: CPT | Mod: NSCH,25,GC, | Performed by: PHYSICIAN ASSISTANT

## 2023-11-03 PROCEDURE — 76937 US GUIDE VASCULAR ACCESS: CPT | Mod: 26,,, | Performed by: STUDENT IN AN ORGANIZED HEALTH CARE EDUCATION/TRAINING PROGRAM

## 2023-11-03 PROCEDURE — D9220A PRA ANESTHESIA: Mod: CRNA,,, | Performed by: NURSE ANESTHETIST, CERTIFIED REGISTERED

## 2023-11-03 PROCEDURE — 37000008 HC ANESTHESIA 1ST 15 MINUTES

## 2023-11-03 PROCEDURE — 63600175 PHARM REV CODE 636 W HCPCS: Performed by: PHYSICIAN ASSISTANT

## 2023-11-03 PROCEDURE — 75774 PR  ANGIO EA ADDNL SELECTV VESSEL: ICD-10-PCS | Mod: 26,59,, | Performed by: STUDENT IN AN ORGANIZED HEALTH CARE EDUCATION/TRAINING PROGRAM

## 2023-11-03 PROCEDURE — 75774 ARTERY X-RAY EACH VESSEL: CPT | Mod: TC | Performed by: STUDENT IN AN ORGANIZED HEALTH CARE EDUCATION/TRAINING PROGRAM

## 2023-11-03 PROCEDURE — 36248 PR PR INS CATH ABD/L-EXT ART ADDL 2ND ORD/3RD ORD/BYD: ICD-10-PCS | Mod: ,,, | Performed by: STUDENT IN AN ORGANIZED HEALTH CARE EDUCATION/TRAINING PROGRAM

## 2023-11-03 PROCEDURE — 75774 ARTERY X-RAY EACH VESSEL: CPT | Mod: 26,59,, | Performed by: STUDENT IN AN ORGANIZED HEALTH CARE EDUCATION/TRAINING PROGRAM

## 2023-11-03 PROCEDURE — 36247 INS CATH ABD/L-EXT ART 3RD: CPT | Performed by: STUDENT IN AN ORGANIZED HEALTH CARE EDUCATION/TRAINING PROGRAM

## 2023-11-03 PROCEDURE — 75726 ARTERY X-RAYS ABDOMEN: CPT | Mod: TC,59 | Performed by: STUDENT IN AN ORGANIZED HEALTH CARE EDUCATION/TRAINING PROGRAM

## 2023-11-03 PROCEDURE — 25000003 PHARM REV CODE 250: Performed by: STUDENT IN AN ORGANIZED HEALTH CARE EDUCATION/TRAINING PROGRAM

## 2023-11-03 PROCEDURE — 36247 PR PLACE CATH SUBSUBSELECT ART,ABD/PEL: ICD-10-PCS | Mod: 51,,, | Performed by: STUDENT IN AN ORGANIZED HEALTH CARE EDUCATION/TRAINING PROGRAM

## 2023-11-03 PROCEDURE — G0269 OCCLUSIVE DEVICE IN VEIN ART: HCPCS | Performed by: STUDENT IN AN ORGANIZED HEALTH CARE EDUCATION/TRAINING PROGRAM

## 2023-11-03 PROCEDURE — 25000003 PHARM REV CODE 250: Performed by: PHYSICIAN ASSISTANT

## 2023-11-03 PROCEDURE — 75726 ARTERY X-RAYS ABDOMEN: CPT | Mod: 26,59,, | Performed by: STUDENT IN AN ORGANIZED HEALTH CARE EDUCATION/TRAINING PROGRAM

## 2023-11-03 RX ORDER — DEXTROSE MONOHYDRATE, SODIUM CHLORIDE, AND POTASSIUM CHLORIDE 50; 1.49; 4.5 G/1000ML; G/1000ML; G/1000ML
INJECTION, SOLUTION INTRAVENOUS CONTINUOUS
Status: DISCONTINUED | OUTPATIENT
Start: 2023-11-03 | End: 2023-11-04

## 2023-11-03 RX ORDER — DIPHENHYDRAMINE HCL 25 MG
25 CAPSULE ORAL EVERY 6 HOURS PRN
Status: DISCONTINUED | OUTPATIENT
Start: 2023-11-03 | End: 2023-11-04 | Stop reason: HOSPADM

## 2023-11-03 RX ORDER — GLUCAGON 1 MG
1 KIT INJECTION
Status: DISCONTINUED | OUTPATIENT
Start: 2023-11-03 | End: 2023-11-04 | Stop reason: HOSPADM

## 2023-11-03 RX ORDER — HYDROMORPHONE HYDROCHLORIDE 2 MG/ML
0.5 INJECTION, SOLUTION INTRAMUSCULAR; INTRAVENOUS; SUBCUTANEOUS EVERY 5 MIN PRN
Status: CANCELLED | OUTPATIENT
Start: 2023-11-03

## 2023-11-03 RX ORDER — DEXAMETHASONE SODIUM PHOSPHATE 4 MG/ML
INJECTION, SOLUTION INTRA-ARTICULAR; INTRALESIONAL; INTRAMUSCULAR; INTRAVENOUS; SOFT TISSUE
Status: DISCONTINUED | OUTPATIENT
Start: 2023-11-03 | End: 2023-11-03

## 2023-11-03 RX ORDER — CLONIDINE HYDROCHLORIDE 0.1 MG/1
0.1 TABLET ORAL 2 TIMES DAILY
Status: DISCONTINUED | OUTPATIENT
Start: 2023-11-03 | End: 2023-11-04 | Stop reason: HOSPADM

## 2023-11-03 RX ORDER — PROPOFOL 10 MG/ML
VIAL (ML) INTRAVENOUS
Status: DISCONTINUED | OUTPATIENT
Start: 2023-11-03 | End: 2023-11-03

## 2023-11-03 RX ORDER — LIDOCAINE HYDROCHLORIDE 20 MG/ML
INJECTION INTRAVENOUS
Status: DISCONTINUED | OUTPATIENT
Start: 2023-11-03 | End: 2023-11-03

## 2023-11-03 RX ORDER — SODIUM CHLORIDE 9 MG/ML
INJECTION, SOLUTION INTRAVENOUS CONTINUOUS
Status: CANCELLED | OUTPATIENT
Start: 2023-11-03

## 2023-11-03 RX ORDER — IBUPROFEN 200 MG
16 TABLET ORAL
Status: DISCONTINUED | OUTPATIENT
Start: 2023-11-03 | End: 2023-11-04 | Stop reason: HOSPADM

## 2023-11-03 RX ORDER — SCOLOPAMINE TRANSDERMAL SYSTEM 1 MG/1
1 PATCH, EXTENDED RELEASE TRANSDERMAL
Status: DISCONTINUED | OUTPATIENT
Start: 2023-11-03 | End: 2023-11-04 | Stop reason: HOSPADM

## 2023-11-03 RX ORDER — ONDANSETRON 2 MG/ML
4 INJECTION INTRAMUSCULAR; INTRAVENOUS ONCE AS NEEDED
Status: CANCELLED | OUTPATIENT
Start: 2023-11-03 | End: 2035-04-01

## 2023-11-03 RX ORDER — DIPHENOXYLATE HYDROCHLORIDE AND ATROPINE SULFATE 2.5; .025 MG/1; MG/1
1 TABLET ORAL 4 TIMES DAILY PRN
Status: DISCONTINUED | OUTPATIENT
Start: 2023-11-03 | End: 2023-11-04 | Stop reason: HOSPADM

## 2023-11-03 RX ORDER — CIPROFLOXACIN 500 MG/1
500 TABLET ORAL EVERY 12 HOURS
Status: DISCONTINUED | OUTPATIENT
Start: 2023-11-03 | End: 2023-11-04 | Stop reason: HOSPADM

## 2023-11-03 RX ORDER — IBUPROFEN 200 MG
24 TABLET ORAL
Status: DISCONTINUED | OUTPATIENT
Start: 2023-11-03 | End: 2023-11-04 | Stop reason: HOSPADM

## 2023-11-03 RX ORDER — DIPHENHYDRAMINE HYDROCHLORIDE 50 MG/ML
50 INJECTION INTRAMUSCULAR; INTRAVENOUS ONCE
Status: COMPLETED | OUTPATIENT
Start: 2023-11-03 | End: 2023-11-03

## 2023-11-03 RX ORDER — SODIUM CHLORIDE 9 MG/ML
INJECTION, SOLUTION INTRAVENOUS CONTINUOUS
Status: DISCONTINUED | OUTPATIENT
Start: 2023-11-03 | End: 2023-11-03

## 2023-11-03 RX ORDER — HYDROCHLOROTHIAZIDE 12.5 MG/1
12.5 TABLET ORAL DAILY
Status: DISCONTINUED | OUTPATIENT
Start: 2023-11-04 | End: 2023-11-04 | Stop reason: HOSPADM

## 2023-11-03 RX ORDER — CAPECITABINE 500 MG/1
1500 TABLET, FILM COATED ORAL 2 TIMES DAILY
Status: DISCONTINUED | OUTPATIENT
Start: 2023-11-03 | End: 2023-11-03

## 2023-11-03 RX ORDER — ONDANSETRON 2 MG/ML
4 INJECTION INTRAMUSCULAR; INTRAVENOUS EVERY 8 HOURS PRN
Status: DISCONTINUED | OUTPATIENT
Start: 2023-11-03 | End: 2023-11-04 | Stop reason: HOSPADM

## 2023-11-03 RX ORDER — SODIUM CHLORIDE 0.9 % (FLUSH) 0.9 %
10 SYRINGE (ML) INJECTION
Status: CANCELLED | OUTPATIENT
Start: 2023-11-03

## 2023-11-03 RX ORDER — NALOXONE HCL 0.4 MG/ML
0.02 VIAL (ML) INJECTION
Status: DISCONTINUED | OUTPATIENT
Start: 2023-11-03 | End: 2023-11-04 | Stop reason: HOSPADM

## 2023-11-03 RX ORDER — POLYETHYLENE GLYCOL 3350 17 G/17G
17 POWDER, FOR SOLUTION ORAL 2 TIMES DAILY PRN
Status: DISCONTINUED | OUTPATIENT
Start: 2023-11-03 | End: 2023-11-04 | Stop reason: HOSPADM

## 2023-11-03 RX ORDER — MAGNESIUM SULFATE HEPTAHYDRATE 40 MG/ML
2 INJECTION, SOLUTION INTRAVENOUS ONCE
Status: COMPLETED | OUTPATIENT
Start: 2023-11-03 | End: 2023-11-03

## 2023-11-03 RX ORDER — OXYCODONE HYDROCHLORIDE 5 MG/1
5 TABLET ORAL EVERY 4 HOURS PRN
Status: DISCONTINUED | OUTPATIENT
Start: 2023-11-03 | End: 2023-11-04 | Stop reason: HOSPADM

## 2023-11-03 RX ORDER — MORPHINE SULFATE 2 MG/ML
2 INJECTION, SOLUTION INTRAMUSCULAR; INTRAVENOUS EVERY 6 HOURS PRN
Status: DISCONTINUED | OUTPATIENT
Start: 2023-11-03 | End: 2023-11-04 | Stop reason: HOSPADM

## 2023-11-03 RX ORDER — SODIUM CHLORIDE 0.9 % (FLUSH) 0.9 %
10 SYRINGE (ML) INJECTION EVERY 12 HOURS PRN
Status: DISCONTINUED | OUTPATIENT
Start: 2023-11-03 | End: 2023-11-04 | Stop reason: HOSPADM

## 2023-11-03 RX ORDER — FENTANYL CITRATE 50 UG/ML
INJECTION, SOLUTION INTRAMUSCULAR; INTRAVENOUS
Status: DISCONTINUED | OUTPATIENT
Start: 2023-11-03 | End: 2023-11-03

## 2023-11-03 RX ORDER — ENOXAPARIN SODIUM 100 MG/ML
40 INJECTION SUBCUTANEOUS EVERY 24 HOURS
Status: DISCONTINUED | OUTPATIENT
Start: 2023-11-03 | End: 2023-11-04 | Stop reason: HOSPADM

## 2023-11-03 RX ORDER — TEMOZOLOMIDE 180 MG/1
400 CAPSULE ORAL DAILY
Status: DISCONTINUED | OUTPATIENT
Start: 2023-11-03 | End: 2023-11-03

## 2023-11-03 RX ORDER — ROCURONIUM BROMIDE 10 MG/ML
INJECTION, SOLUTION INTRAVENOUS
Status: DISCONTINUED | OUTPATIENT
Start: 2023-11-03 | End: 2023-11-03

## 2023-11-03 RX ADMIN — CLONIDINE HYDROCHLORIDE 0.1 MG: 0.1 TABLET ORAL at 09:11

## 2023-11-03 RX ADMIN — ROCURONIUM BROMIDE 40 MG: 10 INJECTION, SOLUTION INTRAVENOUS at 08:11

## 2023-11-03 RX ADMIN — CIPROFLOXACIN 500 MG: 500 TABLET, FILM COATED ORAL at 09:11

## 2023-11-03 RX ADMIN — IOHEXOL 50 MG: 350 INJECTION, SOLUTION INTRAVENOUS at 09:11

## 2023-11-03 RX ADMIN — DIPHENHYDRAMINE HYDROCHLORIDE 50 MG: 50 INJECTION INTRAMUSCULAR; INTRAVENOUS at 08:11

## 2023-11-03 RX ADMIN — DIPHENHYDRAMINE HYDROCHLORIDE 25 MG: 25 CAPSULE ORAL at 04:11

## 2023-11-03 RX ADMIN — SCOPALAMINE 1 PATCH: 1 PATCH, EXTENDED RELEASE TRANSDERMAL at 08:11

## 2023-11-03 RX ADMIN — LIDOCAINE HYDROCHLORIDE 75 MG: 20 INJECTION, SOLUTION INTRAVENOUS at 08:11

## 2023-11-03 RX ADMIN — FENTANYL CITRATE 100 MCG: 0.05 INJECTION, SOLUTION INTRAMUSCULAR; INTRAVENOUS at 08:11

## 2023-11-03 RX ADMIN — PROPOFOL 30 MG: 10 INJECTION, EMULSION INTRAVENOUS at 09:11

## 2023-11-03 RX ADMIN — ENOXAPARIN SODIUM 40 MG: 40 INJECTION SUBCUTANEOUS at 04:11

## 2023-11-03 RX ADMIN — PROPOFOL 70 MG: 10 INJECTION, EMULSION INTRAVENOUS at 08:11

## 2023-11-03 RX ADMIN — OXYCODONE HYDROCHLORIDE 5 MG: 5 TABLET ORAL at 09:11

## 2023-11-03 RX ADMIN — AMPICILLIN SODIUM AND SULBACTAM SODIUM 3 G: 2; 1 INJECTION, POWDER, FOR SOLUTION INTRAMUSCULAR; INTRAVENOUS at 08:11

## 2023-11-03 RX ADMIN — DEXTROSE, SODIUM CHLORIDE, AND POTASSIUM CHLORIDE: 5; .45; .15 INJECTION INTRAVENOUS at 04:11

## 2023-11-03 RX ADMIN — OXYCODONE HYDROCHLORIDE 5 MG: 5 TABLET ORAL at 12:11

## 2023-11-03 RX ADMIN — OCTREOTIDE ACETATE 250 MCG/HR: 1000 INJECTION, SOLUTION INTRAVENOUS; SUBCUTANEOUS at 08:11

## 2023-11-03 RX ADMIN — ONDANSETRON 16 MG: 2 INJECTION INTRAMUSCULAR; INTRAVENOUS at 08:11

## 2023-11-03 RX ADMIN — DEXTROSE, SODIUM CHLORIDE, AND POTASSIUM CHLORIDE: 5; .45; .15 INJECTION INTRAVENOUS at 08:11

## 2023-11-03 RX ADMIN — DEXAMETHASONE SODIUM PHOSPHATE 8 MG: 4 INJECTION, SOLUTION INTRA-ARTICULAR; INTRALESIONAL; INTRAMUSCULAR; INTRAVENOUS; SOFT TISSUE at 08:11

## 2023-11-03 RX ADMIN — MAGNESIUM SULFATE HEPTAHYDRATE 2 G: 40 INJECTION, SOLUTION INTRAVENOUS at 08:11

## 2023-11-03 RX ADMIN — SUGAMMADEX 170 MG: 100 INJECTION, SOLUTION INTRAVENOUS at 09:11

## 2023-11-03 RX ADMIN — OCTREOTIDE ACETATE 250 MCG: 500 INJECTION, SOLUTION INTRAVENOUS; SUBCUTANEOUS at 08:11

## 2023-11-03 RX ADMIN — ETHIODIZED OIL 20 ML: 480 INJECTION INTRA-ARTERIAL; INTRALYMPHATIC; INTRAUTERINE at 09:11

## 2023-11-03 NOTE — PROGRESS NOTES
Interventional Radiology Progress Note      SUBJECTIVE:     History of Present Illness:  Coby Marshall is a 68 y.o. 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  Pt s/p TACE #3 today with general anesthesia.  Pt tolerated the procedure well.     Review of Systems   Constitutional:  Negative for chills, fever, malaise/fatigue and weight loss.   Respiratory:  Negative for cough and shortness of breath.    Cardiovascular:  Negative for chest pain, palpitations and leg swelling.   Gastrointestinal:  Negative for abdominal pain, diarrhea, nausea and vomiting.   Genitourinary:  Negative for dysuria and flank pain.   Musculoskeletal:  Negative for back pain and myalgias.   Neurological:  Negative for weakness and headaches.       Scheduled Meds:   enoxparin  40 mg Subcutaneous Daily    scopolamine  1 patch Transdermal Q3 Days     Continuous Infusions:   sodium chloride 0.9%      dextrose 5 % and 0.45 % NaCl with KCl 20 mEq 125 mL/hr at 11/03/23 0813    octreotide (SANDOSTATIN) 5,000 mcg in sodium chloride 0.9% 100 mL infusion 250 mcg/hr (11/03/23 0851)     PRN Meds:ampicillin-sulbactim (UNASYN) IVPB, dextrose 10%, dextrose 10%, glucagon (human recombinant), glucose, glucose, naloxone, oxyCODONE, sodium chloride 0.9%    Review of patient's allergies indicates:   Allergen Reactions    Arb-angiotensin receptor antagonist Swelling     Had angioedema of lips with ACE inhibitor, so this is a contraindication    Lisinopril Swelling    Lisinopril-hydrochlorothiazide Swelling     Mouth Swelling    Sulfa (sulfonamide antibiotics) Swelling     Swelling of lips    Sulfasalazine Swelling     Swelling of lips    Epinephrine      Neuroendocrine Tumor patient      Hydrocodone-acetaminophen Other (See Comments)     stomach pain (even with food)    Amlodipine Other (See Comments)     edema of ankles and  legs.       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    Ki 67 -3-20%    Hyperlipidemia     Hypertension     Malignant carcinoid tumor of ileum 12/2017    Ki 67 < 3%    Rectal cancer 2/8/2019     Past Surgical History:   Procedure Laterality Date    COLON SURGERY      ENDOSCOPIC ULTRASOUND OF UPPER GASTROINTESTINAL TRACT N/A 12/17/2021    Procedure: ULTRASOUND, UPPER GI TRACT, ENDOSCOPIC;  Surgeon: Jeffery Clemons MD;  Location: [facility] ([address] [place] [address]);  Service: Endoscopy;  Laterality: N/A;  instructions emailed to pt-BB  fully vaccinated-BB   12/13 arrival time confirmed with pt-rb    HYSTERECTOMY      ILEOSTOMY CLOSURE  01/2018    Lower Anterior Resection  12/2017    LETITIA- Dr. Beck - [facility]    port a cath Right     R chest wall     Family History   Problem Relation Age of Onset    Cancer Sister         leukemia    Colon cancer Neg Hx     Esophageal cancer Neg Hx      Social History     Tobacco Use    Smoking status: Never    Smokeless tobacco: Never   Substance Use Topics    Alcohol use: No    Drug use: No       OBJECTIVE:     Vital Signs (Most Recent)  Temp: 98.2 °F (36.8 °C) (11/03/23 1206)  Pulse: 71 (11/03/23 1206)  Resp: 16 (11/03/23 1206)  BP: (!) 160/72 (11/03/23 1206)  SpO2: 100 % (11/03/23 1206)    Physical Exam:  Physical Exam  Vitals and nursing note reviewed.   Constitutional:       Appearance: Normal appearance.   HENT:      Head: Normocephalic and atraumatic.      Right Ear: External ear normal.      Left Ear: External ear normal.      Nose: Nose normal.   Eyes:      Extraocular Movements: Extraocular movements intact.   Cardiovascular:      Rate and Rhythm: Normal rate.      Pulses: Normal pulses.   Pulmonary:      Effort: Pulmonary effort is normal.   Abdominal:      General: Abdomen is flat.   Musculoskeletal:      Cervical back: Normal range of motion and neck supple.   Skin:     General: Skin is warm  "and dry.   Neurological:      General: No focal deficit present.      Mental Status: She is alert and oriented to person, place, and time.   Psychiatric:         Mood and Affect: Mood normal.         Behavior: Behavior normal.         Laboratory  I have reviewed all pertinent lab results within the past 24 hours.  CBC: No results for input(s): "WBC", "RBC", "HGB", "HCT", "PLT", "MCV", "MCH", "MCHC" in the last 168 hours.  CMP: No results for input(s): "GLU", "CALCIUM", "ALBUMIN", "PROT", "NA", "K", "CO2", "CL", "BUN", "CREATININE", "ALKPHOS", "ALT", "AST", "BILITOT" in the last 168 hours.  Coagulation: No results for input(s): "LABPROT", "INR", "APTT" in the last 168 hours.      ASSESSMENT/PLAN:     Assessment:  68 y.o. female with a PMHx of metastatic well differentiated, intermediate to high grade NET of the rectum, with mets to the liver and pancreas s/p IR TACE #3 11/3/23.    Zofran prn for nausea and oxy 5 mg prn for pain control.  Can escalate pain control as needed.  Avoid tylenol or tylenol containing products.  Schedule stool softener.    Cipro bid to start this evening, if not tolerating po can give 400 mg IV.  At DC, recommend Cipro 500 mg BID x 5 days.  Octreotide 125 mcg/hr (2.5 ml/hr) IV for 1 day post op (24 hrs post procedure).  At NH give 5 day supply of PRN Zofran ODT for nausea, and PRN Oxycodone 5 mg for pain.  Avoid acetaminophen-containing products and EtOH for 2 weeks post procedure.  IR will arrange follow up imaging and clinic visit in 1 month (12/1/2023).    IR will continue to follow. Please contact with questions via [employer] secure chat.    Keara Rojas PA-C  Interventional Radiology    "

## 2023-11-03 NOTE — PROCEDURES
"  Pre Op Diagnosis: NET   Post Op Diagnosis: Same    Procedure: TACE    Procedure performed by: Tom    Written Informed Consent Obtained: Yes  Specimen Removed: NO  Estimated Blood Loss: Minimal    Findings:   Successful TACE of right lobe lesion in segment 8.     Patient tolerated procedure well. Patient will need to have 5 day course of Ciprofloxin, PRN pain medication, stool stoftener and zofran.    Will repeat imaging in 1 month.    Jesus Santos MD ([facility])  Interventional Radiology  [phone]      "

## 2023-11-03 NOTE — NURSING
Report is given to KHADIJAH Breaux. Pertinent details of the case are shared and the opportunity to ask questions is provided.

## 2023-11-03 NOTE — H&P
Radiology History & Physical      SUBJECTIVE:     Chief Complaint: NET of liver    History of Present Illness:  Coby Marshall is a 68 y.o. female who presents for cTACE of Liver.    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    Ki 67 -3-20%    Hyperlipidemia     Hypertension     Malignant carcinoid tumor of ileum 12/2017    Ki 67 < 3%    Rectal cancer 2/8/2019     Past Surgical History:   Procedure Laterality Date    COLON SURGERY      ENDOSCOPIC ULTRASOUND OF UPPER GASTROINTESTINAL TRACT N/A 12/17/2021    Procedure: ULTRASOUND, UPPER GI TRACT, ENDOSCOPIC;  Surgeon: Jeffery Clemons MD;  Location: [facility] ([address] [place] [address]);  Service: Endoscopy;  Laterality: N/A;  instructions emailed to pt-BB  fully vaccinated-BB   12/13 arrival time confirmed with pt-rb    HYSTERECTOMY      ILEOSTOMY CLOSURE  01/2018    Lower Anterior Resection  12/2017    NET- Dr. Beck - [facility]    port a cath Right     R chest wall       Home Meds:   Prior to Admission medications    Medication Sig Start Date End Date Taking? Authorizing Provider   ALBUTEROL INHL Inhale into the lungs as needed.     Provider, Historical   atorvastatin (LIPITOR) 20 MG tablet Take 20 mg by mouth every evening.    Provider, Historical   brimonidine-timoloL (COMBIGAN) 0.2-0.5 % Drop Place 1 drop into the right eye 2 (two) times daily. 1/26/23   Provider, Historical   capecitabine (XELODA) 500 MG Tab Take 3 tablets (1,500 mg total) by mouth 2 (two) times daily Take 1500 mg (3 tab) 2 times daily on days 1-14 of every 28 day cycle. 10/10/23 10/9/24  Ariel Smart MD   cloNIDine (CATAPRES) 0.1 MG tablet Take 0.1 mg by mouth 2 (two) times daily.    Provider, Historical   fluticasone (FLONASE) 50 mcg/actuation nasal spray 1 spray by Each Nare route once daily.    Provider, Historical   hydroCHLOROthiazide (MICROZIDE) 12.5 mg capsule Take 12.5 mg by mouth once daily.    Provider,  Historical   ondansetron (ZOFRAN-ODT) 8 MG TbDL Dissolve 1 tablet (8 mg total) by mouth every 8 (eight) hours as needed (nausea). 3/2/23   Juve Steiner MD   ondansetron (ZOFRAN-ODT) 8 MG TbDL Take 1 tablet (8 mg total) by mouth every 8 (eight) hours as needed (nausea). Take one tab 30 min before taking temodar 10/10/23   Ariel Smart MD   oxyCODONE (ROXICODONE) 5 MG immediate release tablet Take 1 tablet (5 mg total) by mouth every 6 (six) hours as needed for Pain. 3/2/23   Juve Steiner MD   ranitidine (ZANTAC) 150 MG tablet Take 150 mg by mouth nightly.  11/13/18 12/23/21  Provider, Historical   temozolomide (TEMODAR) 100 MG capsule Take 4 capsules (400 mg total) by mouth once daily Take 4 capsules (400 mg) once daily on days 10-14 of every 28-day cycle. 10/10/23 10/9/24  Ariel Smart MD     Anticoagulants/Antiplatelets: no anticoagulation    Allergies:   Review of patient's allergies indicates:   Allergen Reactions    Arb-angiotensin receptor antagonist Swelling     Had angioedema of lips with ACE inhibitor, so this is a contraindication    Lisinopril Swelling    Lisinopril-hydrochlorothiazide Swelling     Mouth Swelling    Sulfa (sulfonamide antibiotics) Swelling     Swelling of lips    Sulfasalazine Swelling     Swelling of lips    Epinephrine      Neuroendocrine Tumor patient      Hydrocodone-acetaminophen Other (See Comments)     stomach pain (even with food)    Amlodipine Other (See Comments)     edema of ankles and legs.     Sedation History:  no adverse reactions    Review of Systems:   Hematological: no known coagulopathies  Respiratory: no shortness of breath  Cardiovascular: no chest pain  Gastrointestinal: no abdominal pain  Genito-Urinary: no dysuria  Musculoskeletal: negative  Neurological: no TIA or stroke symptoms         OBJECTIVE:     Vital Signs (Most Recent)       Physical Exam:  ASA: 3  Mallampati: per anesthesia    General: no acute distress  Mental Status: alert and  "oriented to person, place and time  HEENT: normocephalic, atraumatic  Chest: unlabored breathing  Heart: regular heart rate  Abdomen: nondistended  Extremity: moves all extremities    Laboratory  Lab Results   Component Value Date    INR 1.0 10/24/2023       Lab Results   Component Value Date    WBC 7.13 10/24/2023    HGB 10.5 (L) 10/24/2023    HCT 35.0 (L) 10/24/2023    MCV 89 10/24/2023     10/24/2023      Lab Results   Component Value Date     (H) 10/24/2023     10/24/2023    K 3.4 (L) 10/24/2023     10/24/2023    CO2 32 (H) 10/24/2023    BUN 17 10/24/2023    CREATININE 1.1 10/24/2023    CALCIUM 10.0 10/24/2023    MG 1.4 (L) 03/08/2018    ALT 14 10/24/2023    AST 19 10/24/2023    ALBUMIN 4.2 10/24/2023    BILITOT 0.5 10/24/2023    BILIDIR 0.1 10/24/2023       ASSESSMENT/PLAN:     Sedation Plan: general  Patient will undergo cTACE.    Jesus Santos MD ([facility])  Interventional Radiology          "

## 2023-11-03 NOTE — ANESTHESIA PREPROCEDURE EVALUATION
11/03/2023  Coby Marshall is a 68 y.o., female.      Pre-op Assessment     I have reviewed the Nursing Notes.    I have reviewed the Medications.     Review of Systems  Anesthesia Hx:  No problems with previous Anesthesia             Denies Family Hx of Anesthesia complications.     Social:  Non-Smoker, No Alcohol Use       Hematology/Oncology:  Hematology Normal   Oncology Normal                                   EENT/Dental:  EENT/Dental Normal           Cardiovascular:  Exercise tolerance: good   Hypertension                                  Hypertension         Pulmonary:    Asthma       Asthma:               Renal/:  Renal/ Normal                 Hepatic/GI:  Hepatic/GI Normal                 Musculoskeletal:  Musculoskeletal Normal                Neurological:    Neuromuscular Disease,                                 Neuromuscular Disease   Endocrine:  Endocrine Normal                Physical Exam  General: Well nourished    Airway:  Mallampati: II / II  Mouth Opening: Normal  TM Distance: Normal  Tongue: Normal  Neck ROM: Normal ROM    Dental:  Intact        Anesthesia Plan  Type of Anesthesia, risks & benefits discussed:    Anesthesia Type: Gen ETT, Gen Natural Airway  Intra-op Monitoring Plan: Standard ASA Monitors  Post Op Pain Control Plan: multimodal analgesia  Induction:  IV  Airway Plan: Direct, Post-Induction  Informed Consent: Informed consent signed with the Patient and all parties understand the risks and agree with anesthesia plan.  All questions answered.   ASA Score: 3    Ready For Surgery From Anesthesia Perspective.     .

## 2023-11-03 NOTE — PLAN OF CARE
[facility] - Med Surg  Initial Discharge Assessment       Primary Care Provider: Randal Casillas MD    Admission Diagnosis: Neuroendocrine cancer [C7A.8]    Admission Date: 11/3/2023  Expected Discharge Date: 11/4/2023    Payor: [employer] [place] MANAGED MEDICARE / Plan: [employer] MEDICARE ADVANTAGE / Product Type: Medicare Advantage /     Extended Emergency Contact Information  Primary Emergency Contact: Sree Marshall   [facility]  Home Phone: [phone]  Mobile Phone: [phone]  Relation: Spouse  Secondary Emergency Contact: Arielle Marshall   [facility]  Home Phone: [phone]  Work Phone: [phone]  Mobile Phone: [phone]  Relation: Daughter    Discharge Plan A: (P) Home with family  Discharge Plan B: (P) Home Health      [id number] [place] [place] [facility] [address] [facility] [address]  [address] [address]  Phone: [phone] Fax: [phone]      Initial Assessment (most recent)       Adult Discharge Assessment - 11/03/23 1450          Discharge Assessment    Assessment Type Discharge Planning Assessment     Confirmed/corrected address, phone number and insurance Yes     Confirmed Demographics Correct on Facesheet     Source of Information patient     Communicated DAVID with patient/caregiver Yes     People in Home spouse   spouseSree ([phone])    Do you expect to return to your current living situation? Yes     Do you have help at home or someone to help you manage your care at home? Yes     Prior to hospitilization cognitive status: Alert/Oriented     Current cognitive status: Alert/Oriented     Equipment Currently Used at Home none     Readmission within 30 days? No     Patient currently being followed by outpatient case management? No     Do you currently have service(s) that help you manage your care at home? No     Do you take prescription medications? Yes     Do you have prescription coverage? Yes     Do you have any  problems affording any of your prescribed medications? No     Is the patient taking medications as prescribed? yes     How do you get to doctors appointments? family or friend will provide;car, drives self     Are you on dialysis? No     Do you take coumadin? No     DME Needed Upon Discharge  none     Discharge Plan discussed with: Patient (P)      Discharge Plan A Home with family (P)      Discharge Plan B Home Health (P)         Physical Activity    On average, how many days per week do you engage in moderate to strenuous exercise (like a brisk walk)? 5 days (P)      On average, how many minutes do you engage in exercise at this level? 20 min (P)         Financial Resource Strain    How hard is it for you to pay for the very basics like food, housing, medical care, and heating? Not hard at all (P)         Housing Stability    In the last 12 months, was there a time when you were not able to pay the mortgage or rent on time? No (P)      In the last 12 months, was there a time when you did not have a steady place to sleep or slept in a shelter (including now)? No (P)         Transportation Needs    In the past 12 months, has lack of transportation kept you from medical appointments or from getting medications? No (P)      In the past 12 months, has lack of transportation kept you from meetings, work, or from getting things needed for daily living? No (P)         Food Insecurity    Within the past 12 months, you worried that your food would run out before you got the money to buy more. Never true (P)      Within the past 12 months, the food you bought just didn't last and you didn't have money to get more. Never true (P)         Stress    Do you feel stress - tense, restless, nervous, or anxious, or unable to sleep at night because your mind is troubled all the time - these days? Only a little (P)         Social Connections    In a typical week, how many times do you talk on the phone with family, friends, or  neighbors? More than three times a week (P)      How often do you get together with friends or relatives? More than three times a week (P)      How often do you attend Tenriism or Anabaptism services? More than 4 times per year (P)      Do you belong to any clubs or organizations such as Tenriism groups, unions, fraternal or athletic groups, or school groups? Yes (P)      How often do you attend meetings of the clubs or organizations you belong to? Never (P)      Are you , , , , never , or living with a partner?  (P)         Alcohol Use    Q1: How often do you have a drink containing alcohol? Never (P)      Q2: How many drinks containing alcohol do you have on a typical day when you are drinking? Patient does not drink (P)      Q3: How often do you have six or more drinks on one occasion? Never (P)                    1450  Patient resting quietly in bed when CM rounded. No family present. Patient was admitted for a scheduled TACE procedure for neuro-endo tumor done today in IR    Patient lives with her spouse, Sree Marshall, is independent of all ADLs, & denied the need for assistance with transportation at time of discharge.     Previously scheduled hem/onc hospital follow up appointment with Dr Ariel Smart on 11/10/2023 at 1100 noted with labs at 1000. Information added to the pt's discharge paperwork.    CM updated patient's whiteboard with CM name & contact information.       Will continue to follow.

## 2023-11-03 NOTE — ASSESSMENT & PLAN NOTE
Chronic, controlled. Latest blood pressure and vitals reviewed-     Temp:  [97.9 °F (36.6 °C)-98.2 °F (36.8 °C)]   Pulse:  [60-76]   Resp:  [11-18]   BP: (115-161)/(55-75)   SpO2:  [10 %-100 %] .   Home meds for hypertension were reviewed and noted below.   Hypertension Medications             cloNIDine (CATAPRES) 0.1 MG tablet Take 0.1 mg by mouth 2 (two) times daily.    hydroCHLOROthiazide (MICROZIDE) 12.5 mg capsule Take 12.5 mg by mouth once daily.          While in the hospital, will manage blood pressure as follows; Continue home antihypertensive regimen    Will utilize p.r.n. blood pressure medication only if patient's blood pressure greater than 180/110 and she develops symptoms such as worsening chest pain or shortness of breath.

## 2023-11-03 NOTE — PROCEDURES
"  Pre Op Diagnosis: NET  Post Op Diagnosis: Same    Procedure: TACE    Procedure performed by: Tom    Written Informed Consent Obtained: Yes  Specimen Removed: NO  Estimated Blood Loss: Minimal    Findings:   Successful cTACE of segment 8 lesions.    Patient tolerated procedure well.    Jesus Santos MD ([facility])  Interventional Radiology  [phone]      "

## 2023-11-03 NOTE — TRANSFER OF CARE
"Anesthesia Transfer of Care Note    Patient: Coby Marshall    Procedure(s) Performed: * No procedures listed *    Patient location: PACU    Anesthesia Type: general    Transport from OR: Transported from OR on room air with adequate spontaneous ventilation    Post pain: adequate analgesia    Post assessment: no apparent anesthetic complications and tolerated procedure well    Post vital signs: stable    Level of consciousness: awake and alert    Nausea/Vomiting: no nausea/vomiting    Complications: none    Transfer of care protocol was followed    Last vitals: Visit Vitals  BP (!) 115/55 (BP Location: Right arm, Patient Position: Lying)   Pulse 60   Temp 36.7 °C (98 °F) (Skin)   Resp 16   Ht 5' 6" (1.676 m)   Wt 84.8 kg (187 lb)   SpO2 100%   Breastfeeding No   BMI 30.18 kg/m²     "

## 2023-11-03 NOTE — ANESTHESIA POSTPROCEDURE EVALUATION
Anesthesia Post Evaluation    Patient: Coby Marshall    Procedure(s) Performed: * No procedures listed *    Final Anesthesia Type: general      Patient location during evaluation: PACU  Patient participation: Yes- Able to Participate  Level of consciousness: awake and alert  Post-procedure vital signs: reviewed and stable  Pain management: adequate  Airway patency: patent    PONV status at discharge: No PONV  Anesthetic complications: no      Cardiovascular status: blood pressure returned to baseline and hemodynamically stable  Respiratory status: unassisted  Hydration status: euvolemic  Follow-up not needed.          Vitals Value Taken Time   /69 11/03/23 1037   Temp 36.6 °C (97.9 °F) 11/03/23 0957   Pulse 76 11/03/23 1042   Resp 14 11/03/23 1042   SpO2 100 % 11/03/23 1042   Vitals shown include unvalidated device data.      Event Time   Out of Recovery 10:43:00         Pain/Mikey Score: Mikey Score: 9 (11/3/2023 10:34 AM)

## 2023-11-03 NOTE — H&P
[facility] Medicine  History & Physical    Patient Name: Coby Marshall  MRN: [id number]  Patient Class: OP- Outpatient Recovery  Admission Date: 11/3/2023  Attending Physician: Kasia Boston MD   Primary Care Provider: Randal Casillas MD         Patient information was obtained from patient, past medical records and ER records.     Subjective:     Principal Problem:Neuroendocrine carcinoma metastatic to multiple sites    Chief Complaint:   Chief Complaint   Patient presents with    s/p TACE procedure for neuroendocrine tumor        HPI: 68-year-old female with PMH of well-differentiated carcinoid tumor in the rectum initially diagnosed as adenocarcinoma, later changed to carcinoid tumor at the time of resection with metastasis to the liver, hypertension admitted to hospital medicine after TACE procedure by IR for symptom management.  Patient lives in [place] and follows with oncologist  in [place] and  with [facility]. S/p TACE to the liver on 3/1/23, 4/24/23, 7/21/23.    During my encounter, patient is having lunch and denies any abdominal pain or nausea.  At baseline she is able to ambulate without any assistance but her appetite is poor.  Denies any other acute issues.     at bedside. Updated on plan of care and questions answered.         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    Ki 67 -3-20%    Hyperlipidemia     Hypertension     Malignant carcinoid tumor of ileum 12/2017    Ki 67 < 3%    Rectal cancer 2/8/2019       Past Surgical History:   Procedure Laterality Date    COLON SURGERY      ENDOSCOPIC ULTRASOUND OF UPPER GASTROINTESTINAL TRACT N/A 12/17/2021    Procedure: ULTRASOUND, UPPER GI TRACT, ENDOSCOPIC;  Surgeon: Jeffery Clemons MD;  Location: [facility] ([address] [place] [address]);  Service: Endoscopy;  Laterality: N/A;  instructions emailed to pt-BB  fully  vaccinated-BB   12/13 arrival time confirmed with pt-rb    HYSTERECTOMY      ILEOSTOMY CLOSURE  01/2018    Lower Anterior Resection  12/2017    [place]- Dr. Beck - [facility]    port a cath Right     R chest wall       Review of patient's allergies indicates:   Allergen Reactions    Arb-angiotensin receptor antagonist Swelling     Had angioedema of lips with ACE inhibitor, so this is a contraindication    Lisinopril Swelling    Lisinopril-hydrochlorothiazide Swelling     Mouth Swelling    Sulfa (sulfonamide antibiotics) Swelling     Swelling of lips    Sulfasalazine Swelling     Swelling of lips    Epinephrine      Neuroendocrine Tumor patient      Hydrocodone-acetaminophen Other (See Comments)     stomach pain (even with food)    Amlodipine Other (See Comments)     edema of ankles and legs.       No current facility-administered medications on file prior to encounter.     Current Outpatient Medications on File Prior to Encounter   Medication Sig    ALBUTEROL INHL Inhale into the lungs as needed.     atorvastatin (LIPITOR) 20 MG tablet Take 20 mg by mouth every evening.    brimonidine-timoloL (COMBIGAN) 0.2-0.5 % Drop Place 1 drop into the right eye 2 (two) times daily.    capecitabine (XELODA) 500 MG Tab Take 3 tablets (1,500 mg total) by mouth 2 (two) times daily Take 1500 mg (3 tab) 2 times daily on days 1-14 of every 28 day cycle.    cloNIDine (CATAPRES) 0.1 MG tablet Take 0.1 mg by mouth 2 (two) times daily.    fluticasone (FLONASE) 50 mcg/actuation nasal spray 1 spray by Each Nare route once daily.    hydroCHLOROthiazide (MICROZIDE) 12.5 mg capsule Take 12.5 mg by mouth once daily.    ondansetron (ZOFRAN-ODT) 8 MG TbDL Take 1 tablet (8 mg total) by mouth every 8 (eight) hours as needed (nausea). Take one tab 30 min before taking temodar    temozolomide (TEMODAR) 100 MG capsule Take 4 capsules (400 mg total) by mouth once daily Take 4 capsules (400 mg) once daily on days 10-14 of every 28-day  cycle.    ondansetron (ZOFRAN-ODT) 8 MG TbDL Dissolve 1 tablet (8 mg total) by mouth every 8 (eight) hours as needed (nausea).    oxyCODONE (ROXICODONE) 5 MG immediate release tablet Take 1 tablet (5 mg total) by mouth every 6 (six) hours as needed for Pain.    ranitidine (ZANTAC) 150 MG tablet Take 150 mg by mouth nightly.      Family History       Problem Relation (Age of Onset)    Cancer Sister          Tobacco Use    Smoking status: Never    Smokeless tobacco: Never   Substance and Sexual Activity    Alcohol use: No    Drug use: No    Sexual activity: Not Currently     Partners: Male     Review of Systems   Constitutional:  Positive for appetite change.   HENT:  Negative for congestion.    Respiratory:  Negative for cough and shortness of breath.    Cardiovascular:  Negative for chest pain and leg swelling.   Gastrointestinal:  Positive for diarrhea (chronic). Negative for abdominal pain, nausea and vomiting.   Neurological:  Negative for light-headedness and headaches.   Psychiatric/Behavioral:  Negative for confusion.      Objective:     Vital Signs (Most Recent):  Temp: 98.2 °F (36.8 °C) (11/03/23 1206)  Pulse: 71 (11/03/23 1206)  Resp: 18 (11/03/23 1249)  BP: (!) 160/72 (11/03/23 1206)  SpO2: 100 % (11/03/23 1206) Vital Signs (24h Range):  Temp:  [97.9 °F (36.6 °C)-98.2 °F (36.8 °C)] 98.2 °F (36.8 °C)  Pulse:  [60-76] 71  Resp:  [11-18] 18  SpO2:  [10 %-100 %] 100 %  BP: (115-161)/(55-75) 160/72     Weight: 84.8 kg (187 lb)  Body mass index is 30.18 kg/m².     Physical Exam  Vitals and nursing note reviewed.   Constitutional:       General: She is not in acute distress.  Cardiovascular:      Rate and Rhythm: Normal rate and regular rhythm.      Heart sounds: Normal heart sounds. No murmur heard.  Pulmonary:      Effort: Pulmonary effort is normal. No respiratory distress.      Breath sounds: Normal breath sounds. No wheezing.   Abdominal:      Palpations: Abdomen is soft.      Tenderness: There is no  abdominal tenderness.   Musculoskeletal:      Right lower leg: No edema.      Left lower leg: No edema.   Skin:     General: Skin is warm and dry.      Findings: No rash.   Neurological:      General: No focal deficit present.      Mental Status: She is alert and oriented to person, place, and time.   Psychiatric:         Mood and Affect: Mood normal.                Significant Labs: All pertinent labs within the past 24 hours have been reviewed.    Significant Imaging: I have reviewed all pertinent imaging results/findings within the past 24 hours.    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 Tolerated procedure well.      Nausea/ pain control. Avoid tylenol containing products   Ciprofloxacin BID X 5 days starting tonight   Octreotide for 24 hrs post procedure     On po capecitabine, temozolomide (ok to continue if patient has their own supply)      Hyperlipidemia  Hold statin for now      Chronic diarrhea  PRN lomotil      Benign essential hypertension  Chronic, controlled. Latest blood pressure and vitals reviewed-     Temp:  [97.9 °F (36.6 °C)-98.2 °F (36.8 °C)]   Pulse:  [60-76]   Resp:  [11-18]   BP: (115-161)/(55-75)   SpO2:  [10 %-100 %] .   Home meds for hypertension were reviewed and noted below.   Hypertension Medications             cloNIDine (CATAPRES) 0.1 MG tablet Take 0.1 mg by mouth 2 (two) times daily.    hydroCHLOROthiazide (MICROZIDE) 12.5 mg capsule Take 12.5 mg by mouth once daily.          While in the hospital, will manage blood pressure as follows; Continue home antihypertensive regimen    Will utilize p.r.n. blood pressure medication only if patient's blood pressure greater than 180/110 and she develops symptoms such as worsening chest pain or shortness of breath.    Carcinoid tumor of rectum          VTE Risk Mitigation (From admission, onward)         Ordered     enoxaparin injection 40 mg  Daily          11/03/23 1122     IP VTE HIGH RISK PATIENT  Once         11/03/23 1122     Place sequential compression device  Until discontinued         11/03/23 1122                   Kasia Boston MD  Department of Hospital Medicine  [facility]

## 2023-11-03 NOTE — HPI
68-year-old female with PMH of well-differentiated carcinoid tumor in the rectum initially diagnosed as adenocarcinoma, later changed to carcinoid tumor at the time of resection with metastasis to the liver, hypertension admitted to hospital medicine after TACE procedure by IR for symptom management.  Patient lives in [place] and follows with oncologist  in [place] and  with [facility]. S/p TACE to the liver on 3/1/23, 4/24/23, 7/21/23.    During my encounter, patient is having lunch and denies any abdominal pain or nausea.  At baseline she is able to ambulate without any assistance but her appetite is poor.  Denies any other acute issues.     at bedside. Updated on plan of care and questions answered.

## 2023-11-03 NOTE — SUBJECTIVE & OBJECTIVE
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    Ki 67 -3-20%    Hyperlipidemia     Hypertension     Malignant carcinoid tumor of ileum 12/2017    Ki 67 < 3%    Rectal cancer 2/8/2019       Past Surgical History:   Procedure Laterality Date    COLON SURGERY      ENDOSCOPIC ULTRASOUND OF UPPER GASTROINTESTINAL TRACT N/A 12/17/2021    Procedure: ULTRASOUND, UPPER GI TRACT, ENDOSCOPIC;  Surgeon: Jeffery Clemons MD;  Location: [facility] ([address] [place] [address]);  Service: Endoscopy;  Laterality: N/A;  instructions emailed to pt-BB  fully vaccinated-BB   12/13 arrival time confirmed with pt-rb    HYSTERECTOMY      ILEOSTOMY CLOSURE  01/2018    Lower Anterior Resection  12/2017    LETITIA- Dr. Beck - [facility]    port a cath Right     R chest wall       Review of patient's allergies indicates:   Allergen Reactions    Arb-angiotensin receptor antagonist Swelling     Had angioedema of lips with ACE inhibitor, so this is a contraindication    Lisinopril Swelling    Lisinopril-hydrochlorothiazide Swelling     Mouth Swelling    Sulfa (sulfonamide antibiotics) Swelling     Swelling of lips    Sulfasalazine Swelling     Swelling of lips    Epinephrine      Neuroendocrine Tumor patient      Hydrocodone-acetaminophen Other (See Comments)     stomach pain (even with food)    Amlodipine Other (See Comments)     edema of ankles and legs.       No current facility-administered medications on file prior to encounter.     Current Outpatient Medications on File Prior to Encounter   Medication Sig    ALBUTEROL INHL Inhale into the lungs as needed.     atorvastatin (LIPITOR) 20 MG tablet Take 20 mg by mouth every evening.    brimonidine-timoloL (COMBIGAN) 0.2-0.5 % Drop Place 1 drop into the right eye 2 (two) times daily.    capecitabine (XELODA) 500 MG Tab Take 3 tablets (1,500 mg total) by mouth 2 (two) times daily Take 1500 mg (3 tab) 2 times daily on days 1-14 of  every 28 day cycle.    cloNIDine (CATAPRES) 0.1 MG tablet Take 0.1 mg by mouth 2 (two) times daily.    fluticasone (FLONASE) 50 mcg/actuation nasal spray 1 spray by Each Nare route once daily.    hydroCHLOROthiazide (MICROZIDE) 12.5 mg capsule Take 12.5 mg by mouth once daily.    ondansetron (ZOFRAN-ODT) 8 MG TbDL Take 1 tablet (8 mg total) by mouth every 8 (eight) hours as needed (nausea). Take one tab 30 min before taking temodar    temozolomide (TEMODAR) 100 MG capsule Take 4 capsules (400 mg total) by mouth once daily Take 4 capsules (400 mg) once daily on days 10-14 of every 28-day cycle.    ondansetron (ZOFRAN-ODT) 8 MG TbDL Dissolve 1 tablet (8 mg total) by mouth every 8 (eight) hours as needed (nausea).    oxyCODONE (ROXICODONE) 5 MG immediate release tablet Take 1 tablet (5 mg total) by mouth every 6 (six) hours as needed for Pain.    ranitidine (ZANTAC) 150 MG tablet Take 150 mg by mouth nightly.      Family History       Problem Relation (Age of Onset)    Cancer Sister          Tobacco Use    Smoking status: Never    Smokeless tobacco: Never   Substance and Sexual Activity    Alcohol use: No    Drug use: No    Sexual activity: Not Currently     Partners: Male     Review of Systems   Constitutional:  Positive for appetite change.   HENT:  Negative for congestion.    Respiratory:  Negative for cough and shortness of breath.    Cardiovascular:  Negative for chest pain and leg swelling.   Gastrointestinal:  Positive for diarrhea (chronic). Negative for abdominal pain, nausea and vomiting.   Neurological:  Negative for light-headedness and headaches.   Psychiatric/Behavioral:  Negative for confusion.      Objective:     Vital Signs (Most Recent):  Temp: 98.2 °F (36.8 °C) (11/03/23 1206)  Pulse: 71 (11/03/23 1206)  Resp: 18 (11/03/23 1249)  BP: (!) 160/72 (11/03/23 1206)  SpO2: 100 % (11/03/23 1206) Vital Signs (24h Range):  Temp:  [97.9 °F (36.6 °C)-98.2 °F (36.8 °C)] 98.2 °F (36.8 °C)  Pulse:  [60-76]  71  Resp:  [11-18] 18  SpO2:  [10 %-100 %] 100 %  BP: (115-161)/(55-75) 160/72     Weight: 84.8 kg (187 lb)  Body mass index is 30.18 kg/m².     Physical Exam  Vitals and nursing note reviewed.   Constitutional:       General: She is not in acute distress.  Cardiovascular:      Rate and Rhythm: Normal rate and regular rhythm.      Heart sounds: Normal heart sounds. No murmur heard.  Pulmonary:      Effort: Pulmonary effort is normal. No respiratory distress.      Breath sounds: Normal breath sounds. No wheezing.   Abdominal:      Palpations: Abdomen is soft.      Tenderness: There is no abdominal tenderness.   Musculoskeletal:      Right lower leg: No edema.      Left lower leg: No edema.   Skin:     General: Skin is warm and dry.      Findings: No rash.   Neurological:      General: No focal deficit present.      Mental Status: She is alert and oriented to person, place, and time.   Psychiatric:         Mood and Affect: Mood normal.                Significant Labs: All pertinent labs within the past 24 hours have been reviewed.    Significant Imaging: I have reviewed all pertinent imaging results/findings within the past 24 hours.

## 2023-11-03 NOTE — DISCHARGE INSTRUCTIONS
TACE GROIN RECOVERY INSTRUCTIONS:    You have had a procedure called a TACE - Trans-Arterial Chemo-Embolization. This procedure is usually performed by a specially trained doctor called an interventional radiologist. The procedure allows the doctor to treat tumors in the liver by directly injecting chemotherapy spheres into the tumors. A catheter - a thin, flexible tube - was inserted into one of your blood vessels through a small incision in your groin and guided to the liver to deliver the therapy. Please follow the following instructions while recovering:    Monitor the groin site for bleeding. If bleeding occurs, apply firm, manual pressure and seek medical assistance immediately!  Do not shower/bathe tonight. Remove the groin dressing 24 hours post-procedure. You may shower at that time.  Cleanse the groin site with gentle soap and water; do not pick at any scab formation.  Monitor the groin site for signs and symptoms of infection (See below).    Recovering at Home:  Follow your doctor's recommendations on when it is safe to drive after the procedure.  Rest according to your doctor's instructions post-procedure. Most people are able to resume normal activity within a few days.  Do not lift anything heavier than 10 pounds for three to four days.  Avoid strenuous activity for two weeks post-procedure.   Exercise according to your doctors recommendations.  Ask your doctor when it is safe to swim or take a bath.  Take your medications exactly as directed. Do not skip doses. Note: some medications should not be resumed after this procedure to prevent any adverse interaction with the contrast dye, which may be harmful to your kidneys. Be sure to ask your healthcare team about any potential reactions and for guidance on what medications to hold.  Unless directed otherwise, drink six to eight glasses of water a day to prevent dehydration and to help flush your body of the contrast dye used during your procedure. This  may take up to 24 to 48 hours.  Take your temperature and check the groin site for signs of infection - redness, swelling, drainage, or warmth - every day for one week.    Follow-up care:  Make a follow-up appointment as directed by your health care team.   If you have stitches or staples, see your doctor to have them removed seven to 10 days post-procedure.  Ask your doctor when you may return to work.    When to call your doctor:  Fever of 100.4°F (38°C) or higher, or as directed by your health care provider.  Sudden shortness breath or any bleeding, bruising, or a large area of swelling at the groin site.  Signs of an allergic reaction to the contrast dye: rash, nausea, vomiting, or trouble breathing.  Signs of infection at the groin site: increased pain, redness, swelling, warmth, or foul-smelling drainage.   Constant or increasing pain or numbness in your leg.  Your leg feels cold, looks blue; numbness and/or tingling in the leg, especially near the catheter insertion site.   Rapid, pounding, or irregular heartbeat.  Blood in your urine or black, tarry stools.

## 2023-11-03 NOTE — ASSESSMENT & PLAN NOTE
S/p TACE procedure on 11/3/23 under general anaesthesia. Tolerated procedure well.      Nausea/ pain control. Avoid tylenol containing products   Ciprofloxacin BID X 5 days starting tonight   Octreotide for 24 hrs post procedure     On po capecitabine, temozolomide (ok to continue if patient has their own supply)

## 2023-11-04 VITALS
DIASTOLIC BLOOD PRESSURE: 59 MMHG | OXYGEN SATURATION: 100 % | TEMPERATURE: 98 F | SYSTOLIC BLOOD PRESSURE: 108 MMHG | HEART RATE: 64 BPM | WEIGHT: 189.81 LBS | HEIGHT: 65 IN | RESPIRATION RATE: 20 BRPM | BODY MASS INDEX: 31.63 KG/M2

## 2023-11-04 LAB
ANION GAP SERPL CALC-SCNC: 10 MMOL/L (ref 8–16)
BASOPHILS # BLD AUTO: 0.01 K/UL (ref 0–0.2)
BASOPHILS NFR BLD: 0.1 % (ref 0–1.9)
BUN SERPL-MCNC: 12 MG/DL (ref 8–23)
CALCIUM SERPL-MCNC: 8.7 MG/DL (ref 8.7–10.5)
CHLORIDE SERPL-SCNC: 101 MMOL/L (ref 95–110)
CO2 SERPL-SCNC: 25 MMOL/L (ref 23–29)
CREAT SERPL-MCNC: 1.1 MG/DL (ref 0.5–1.4)
DIFFERENTIAL METHOD: ABNORMAL
EOSINOPHIL # BLD AUTO: 0 K/UL (ref 0–0.5)
EOSINOPHIL NFR BLD: 0 % (ref 0–8)
ERYTHROCYTE [DISTWIDTH] IN BLOOD BY AUTOMATED COUNT: 16 % (ref 11.5–14.5)
EST. GFR  (NO RACE VARIABLE): 54 ML/MIN/1.73 M^2
GLUCOSE SERPL-MCNC: 162 MG/DL (ref 70–110)
HCT VFR BLD AUTO: 32.9 % (ref 37–48.5)
HGB BLD-MCNC: 10.3 G/DL (ref 12–16)
IMM GRANULOCYTES # BLD AUTO: 0.04 K/UL (ref 0–0.04)
IMM GRANULOCYTES NFR BLD AUTO: 0.4 % (ref 0–0.5)
LYMPHOCYTES # BLD AUTO: 0.8 K/UL (ref 1–4.8)
LYMPHOCYTES NFR BLD: 7.1 % (ref 18–48)
MCH RBC QN AUTO: 27.4 PG (ref 27–31)
MCHC RBC AUTO-ENTMCNC: 31.3 G/DL (ref 32–36)
MCV RBC AUTO: 88 FL (ref 82–98)
MONOCYTES # BLD AUTO: 0.4 K/UL (ref 0.3–1)
MONOCYTES NFR BLD: 3.6 % (ref 4–15)
NEUTROPHILS # BLD AUTO: 9.6 K/UL (ref 1.8–7.7)
NEUTROPHILS NFR BLD: 89.2 % (ref 38–73)
NRBC BLD-RTO: 0 /100 WBC
PLATELET # BLD AUTO: 251 K/UL (ref 150–450)
PMV BLD AUTO: 8.8 FL (ref 9.2–12.9)
POTASSIUM SERPL-SCNC: 4.7 MMOL/L (ref 3.5–5.1)
RBC # BLD AUTO: 3.76 M/UL (ref 4–5.4)
SODIUM SERPL-SCNC: 136 MMOL/L (ref 136–145)
WBC # BLD AUTO: 10.78 K/UL (ref 3.9–12.7)

## 2023-11-04 PROCEDURE — 63600175 PHARM REV CODE 636 W HCPCS: Mod: JA | Performed by: STUDENT IN AN ORGANIZED HEALTH CARE EDUCATION/TRAINING PROGRAM

## 2023-11-04 PROCEDURE — 25000003 PHARM REV CODE 250: Performed by: STUDENT IN AN ORGANIZED HEALTH CARE EDUCATION/TRAINING PROGRAM

## 2023-11-04 PROCEDURE — 85025 COMPLETE CBC W/AUTO DIFF WBC: CPT | Performed by: STUDENT IN AN ORGANIZED HEALTH CARE EDUCATION/TRAINING PROGRAM

## 2023-11-04 PROCEDURE — 80048 BASIC METABOLIC PNL TOTAL CA: CPT | Performed by: STUDENT IN AN ORGANIZED HEALTH CARE EDUCATION/TRAINING PROGRAM

## 2023-11-04 PROCEDURE — 36415 COLL VENOUS BLD VENIPUNCTURE: CPT | Performed by: STUDENT IN AN ORGANIZED HEALTH CARE EDUCATION/TRAINING PROGRAM

## 2023-11-04 RX ORDER — CIPROFLOXACIN 500 MG/1
500 TABLET ORAL EVERY 12 HOURS
Qty: 8 TABLET | Refills: 0 | Status: SHIPPED | OUTPATIENT
Start: 2023-11-04 | End: 2023-11-08

## 2023-11-04 RX ORDER — OXYCODONE HYDROCHLORIDE 5 MG/1
5 TABLET ORAL EVERY 8 HOURS PRN
Qty: 10 TABLET | Refills: 0 | Status: SHIPPED | OUTPATIENT
Start: 2023-11-04 | End: 2024-02-19

## 2023-11-04 RX ADMIN — HYDROCHLOROTHIAZIDE 12.5 MG: 12.5 TABLET ORAL at 08:11

## 2023-11-04 RX ADMIN — OCTREOTIDE ACETATE 250 MCG/HR: 1000 INJECTION, SOLUTION INTRAVENOUS; SUBCUTANEOUS at 03:11

## 2023-11-04 RX ADMIN — DIPHENHYDRAMINE HYDROCHLORIDE 25 MG: 25 CAPSULE ORAL at 01:11

## 2023-11-04 RX ADMIN — CIPROFLOXACIN 500 MG: 500 TABLET, FILM COATED ORAL at 08:11

## 2023-11-04 RX ADMIN — DEXTROSE, SODIUM CHLORIDE, AND POTASSIUM CHLORIDE: 5; .45; .15 INJECTION INTRAVENOUS at 01:11

## 2023-11-04 RX ADMIN — CLONIDINE HYDROCHLORIDE 0.1 MG: 0.1 TABLET ORAL at 08:11

## 2023-11-04 NOTE — PLAN OF CARE
0828  CM was informed by Dr Boston that the pt might discharge home today.     0935  DC order noted.     1020  Patient awake & alert sitting in recliner with spouse, Sree Marshall ([phone]), at the bedside when CM rounded. Patient & spouse in agreement with plan to discharge home today, denied the need for assistance with transportation at time of discharge, & verbalized understanding regarding the hospital follow up appointment with Dr Smart ([facility] hem/onc) on 11/10/2023.     1230  Message sent to nurse Namita & virtual nurse Hailey informing that the pt is cleared to discharge.       Will continue to follow.

## 2023-11-04 NOTE — PLAN OF CARE
[facility]  Discharge Final Note    Primary Care Provider: Randal Casillas MD    Expected Discharge Date: 11/4/2023    Final Discharge Note (most recent)       Final Note - 11/04/23 1628          Final Note    Assessment Type Final Discharge Note (P)      Anticipated Discharge Disposition Home or Self Care (P)      Hospital Resources/Appts/Education Provided Appointments scheduled and added to AVS (P)                      Contact Info       Ariel Smart MD   Specialty: Hematology and Oncology    [address] [place] [id number]   Phone: [phone]       Next Steps: Follow up on 11/10/2023    Instructions: at 11:00 AM; previously scheduled hem/onc appointment with labs at 10:00 AM          Discharge summary faxed to Dr Randal Casillas (PCP).

## 2023-11-04 NOTE — PLAN OF CARE
Pt AAOx4. PRN oxycodone given x1 for c/o pain. No c/o N/V. VSS. Medications administered per MAR. IVFs and Mike infusing. On RA. Cardiac monitoring maintained, SB/NSR on monitor. Dressing to R groin site CDI. Bed alarm set, side rails raised, and call light in reach. Encouraged to call for assistance with needs.

## 2023-11-04 NOTE — HOSPITAL COURSE
"68-year-old female with PMH of well-differentiated carcinoid tumor in the rectum initially diagnosed as adenocarcinoma, later changed to carcinoid tumor at the time of resection with metastasis to the liver, hypertension admitted to hospital medicine after TACE procedure by IR for symptom management.  Patient had minimal abdominal pain and nausea postprocedure, was able to tolerate diet.  No other acute issues, agreeable for discharge with outpatient oncology follow-up.  Borderline soft blood pressures noted prior to discharge about which I was not informed.  Spoke with patient after she was discharged -discussed holding HCTZ and decreasing dose of clonidine to 0.1 mg daily until PCP follow-up.  Patient does not have a blood pressure cuff at home.  Emphasized patient must follow up with PCP within a week to follow up on blood pressure and make medication adjustments as deemed necessary.    As per IR recommendations, discharged on ciprofloxacin b.i.d. for 5 days and pain control medications.  Patient has nausea medications at home.  Has chronic diarrhea and takes p.r.n. Lomotil, therefore bowel regimen not prescribed.    BP (!) 108/59 (BP Location: Right arm, Patient Position: Standing)   Pulse 64   Temp 97.5 °F (36.4 °C)   Resp 20   Ht 5' 5" (1.651 m)   Wt 86.1 kg (189 lb 13.1 oz)   SpO2 100%   Breastfeeding No   BMI 31.59 kg/m²     Physical Exam  Vitals and nursing note reviewed.   Constitutional:       General: She is not in acute distress.  Cardiovascular:      Rate and Rhythm: Normal rate and regular rhythm.      Heart sounds: Normal heart sounds. No murmur heard.  Pulmonary:      Effort: Pulmonary effort is normal. No respiratory distress.      Breath sounds: Normal breath sounds. No wheezing.   Abdominal:      Palpations: Abdomen is soft.      Tenderness: There is no abdominal tenderness.   Musculoskeletal:      Right lower leg: No edema.      Left lower leg: No edema.   Skin:     General: Skin is warm " and dry.      Findings: No rash.   Neurological:      General: No focal deficit present.      Mental Status: She is alert and oriented to person, place, and time.   Psychiatric:         Mood and Affect: Mood normal.

## 2023-11-04 NOTE — PROGRESS NOTES
Patient AAOx4, VSS, patient denies any pain. Heart monitor removed. AVS given to patient. Preparing for d/c

## 2023-11-04 NOTE — DISCHARGE SUMMARY
[facility] Medicine  Discharge Summary      Patient Name: Coby Marshall  MRN: [id number]  BRENDA: [id number]  Patient Class: OP- Outpatient Recovery  Admission Date: 11/3/2023  Hospital Length of Stay: 0 days  Discharge Date and Time: 11/4/2023  1:31 PM  Attending Physician: No att. providers found   Discharging Provider: Kasia Boston MD  Primary Care Provider: Randal Casillas MD    Primary Care Team: [facility] PHYSICIAN TEAM 2    HPI:   68-year-old female with PMH of well-differentiated carcinoid tumor in the rectum initially diagnosed as adenocarcinoma, later changed to carcinoid tumor at the time of resection with metastasis to the liver, hypertension admitted to hospital medicine after TACE procedure by IR for symptom management.  Patient lives in [place] and follows with oncologist  in [place] and  with [facility]. S/p TACE to the liver on 3/1/23, 4/24/23, 7/21/23.    During my encounter, patient is having lunch and denies any abdominal pain or nausea.  At baseline she is able to ambulate without any assistance but her appetite is poor.  Denies any other acute issues.     at bedside. Updated on plan of care and questions answered.         * No procedures listed *      Hospital Course:   68-year-old female with PMH of well-differentiated carcinoid tumor in the rectum initially diagnosed as adenocarcinoma, later changed to carcinoid tumor at the time of resection with metastasis to the liver, hypertension admitted to hospital medicine after TACE procedure by IR for symptom management.  Patient had minimal abdominal pain and nausea postprocedure, was able to tolerate diet.  No other acute issues, agreeable for discharge with outpatient oncology follow-up.  Borderline soft blood pressures noted prior to discharge about which I was not informed.  Spoke with patient after she was discharged -discussed holding HCTZ and decreasing dose of clonidine to  "0.1 mg daily until PCP follow-up.  Patient does not have a blood pressure cuff at home.  Emphasized patient must follow up with PCP within a week to follow up on blood pressure and make medication adjustments as deemed necessary.    As per IR recommendations, discharged on ciprofloxacin b.i.d. for 5 days and pain control medications.  Patient has nausea medications at home.  Has chronic diarrhea and takes p.r.n. Lomotil, therefore bowel regimen not prescribed.    BP (!) 108/59 (BP Location: Right arm, Patient Position: Standing)   Pulse 64   Temp 97.5 °F (36.4 °C)   Resp 20   Ht 5' 5" (1.651 m)   Wt 86.1 kg (189 lb 13.1 oz)   SpO2 100%   Breastfeeding No   BMI 31.59 kg/m²     Physical Exam  Vitals and nursing note reviewed.   Constitutional:       General: She is not in acute distress.  Cardiovascular:      Rate and Rhythm: Normal rate and regular rhythm.      Heart sounds: Normal heart sounds. No murmur heard.  Pulmonary:      Effort: Pulmonary effort is normal. No respiratory distress.      Breath sounds: Normal breath sounds. No wheezing.   Abdominal:      Palpations: Abdomen is soft.      Tenderness: There is no abdominal tenderness.   Musculoskeletal:      Right lower leg: No edema.      Left lower leg: No edema.   Skin:     General: Skin is warm and dry.      Findings: No rash.   Neurological:      General: No focal deficit present.      Mental Status: She is alert and oriented to person, place, and time.   Psychiatric:         Mood and Affect: Mood normal.        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    Specialty: Hematology and Oncology  Why: at 11:00 AM; previously scheduled hem/onc appointment with labs at 10:00 AM  Contact information:  [address]  [place] [address]  [phone]                       Patient Instructions:   No discharge procedures on file.    Significant Diagnostic Studies: Labs:   BMP:   Recent Labs   Lab 11/03/23  1329 11/04/23  0323   * 162*    136   K 4.1 4.7    101   CO2 25 25   BUN 12 12   CREATININE 1.2 1.1   CALCIUM 9.1 8.7    and CBC   Recent Labs   Lab 11/03/23  1328 11/04/23  0323   WBC 8.05 10.78   HGB 10.9* 10.3*   HCT 34.7* 32.9*    251       Pending Diagnostic Studies:     None         Medications:  Reconciled Home Medications:      Medication List      START taking these medications    ciprofloxacin HCl 500 MG tablet  Commonly known as: CIPRO  Take 1 tablet (500 mg total) by mouth every 12 (twelve) hours. for 4 days        CHANGE how you take these medications    oxyCODONE 5 MG immediate release tablet  Commonly known as: ROXICODONE  Take 1 tablet (5 mg total) by mouth every 8 (eight) hours as needed for Pain.  What changed: when to take this        CONTINUE taking these medications    ALBUTEROL INHL  Inhale into the lungs as needed.     atorvastatin 20 MG tablet  Commonly known as: LIPITOR  Take 20 mg by mouth every evening.     brimonidine-timoloL 0.2-0.5 % Drop  Commonly known as: COMBIGAN  Place 1 drop into the right eye 2 (two) times daily.     capecitabine 500 MG Tab  Commonly known as: XELODA  Take 3 tablets (1,500 mg total) by mouth 2 (two) times daily Take 1500 mg (3 tab) 2 times daily on days 1-14 of every 28 day cycle.     cloNIDine 0.1 MG tablet  Commonly known as: CATAPRES  Take 0.1 mg by mouth 2 (two) times daily.     fluticasone propionate 50  mcg/actuation nasal spray  Commonly known as: FLONASE  1 spray by Each Nare route once daily.     hydroCHLOROthiazide 12.5 mg capsule  Commonly known as: MICROZIDE  Take 12.5 mg by mouth once daily.     * ondansetron 8 MG Tbdl  Commonly known as: ZOFRAN-ODT  Dissolve 1 tablet (8 mg total) by mouth every 8 (eight) hours as needed (nausea).     * ondansetron 8 MG Tbdl  Commonly known as: ZOFRAN-ODT  Take 1 tablet (8 mg total) by mouth every 8 (eight) hours as needed (nausea). Take one tab 30 min before taking temodar     ranitidine 150 MG tablet  Commonly known as: ZANTAC  Take 150 mg by mouth nightly.     temozolomide 100 MG capsule  Commonly known as: TEMODAR  Take 4 capsules (400 mg total) by mouth once daily Take 4 capsules (400 mg) once daily on days 10-14 of every 28-day cycle.         * This list has 2 medication(s) that are the same as other medications prescribed for you. Read the directions carefully, and ask your doctor or other care provider to review them with you.                Indwelling Lines/Drains at time of discharge:   Lines/Drains/Airways     None                 Time spent on the discharge of patient: 38 minutes         Kasia Boston MD  Department of Hospital Medicine  [facility]

## 2023-11-04 NOTE — PLAN OF CARE
Discharge orders noted. Additional clinical references attached.    Patient's discharge instructions given by bedside RN and reviewed via this VN.  Education provided on new medication, diagnosis, and follow-up appointments.  Teach back method used. Patient verbalized understanding. All questions answered. Transport to [facility] requested. Floor nurse notified.      11/04/23 1307   AVS Confirmation   Discharge instructions and AVS given to and reviewed with patient and/or significant other. Yes

## 2023-11-10 ENCOUNTER — OFFICE VISIT (OUTPATIENT)
Dept: HEMATOLOGY/ONCOLOGY | Facility: CLINIC | Age: 68
End: 2023-11-10
Payer: MEDICARE

## 2023-11-10 ENCOUNTER — LAB VISIT (OUTPATIENT)
Dept: LAB | Facility: HOSPITAL | Age: 68
End: 2023-11-10
Attending: INTERNAL MEDICINE
Payer: MEDICARE

## 2023-11-10 VITALS
OXYGEN SATURATION: 100 % | RESPIRATION RATE: 18 BRPM | WEIGHT: 184.88 LBS | BODY MASS INDEX: 30.8 KG/M2 | HEART RATE: 74 BPM | SYSTOLIC BLOOD PRESSURE: 118 MMHG | DIASTOLIC BLOOD PRESSURE: 59 MMHG | HEIGHT: 65 IN

## 2023-11-10 DIAGNOSIS — C7A.026 MALIGNANT CARCINOID TUMOR OF RECTUM: ICD-10-CM

## 2023-11-10 DIAGNOSIS — D3A.8 NEUROENDOCRINE TUMOR: ICD-10-CM

## 2023-11-10 DIAGNOSIS — D3A.8 NEUROENDOCRINE TUMOR OF PANCREAS: ICD-10-CM

## 2023-11-10 DIAGNOSIS — D84.821 IMMUNODEFICIENCY DUE TO DRUGS: ICD-10-CM

## 2023-11-10 DIAGNOSIS — I10 ESSENTIAL HYPERTENSION: ICD-10-CM

## 2023-11-10 DIAGNOSIS — C7A.026 MALIGNANT CARCINOID TUMOR OF RECTUM: Primary | ICD-10-CM

## 2023-11-10 DIAGNOSIS — N18.30 STAGE 3 CHRONIC KIDNEY DISEASE, UNSPECIFIED WHETHER STAGE 3A OR 3B CKD: ICD-10-CM

## 2023-11-10 DIAGNOSIS — C7A.012 MALIGNANT CARCINOID TUMOR OF ILEUM: ICD-10-CM

## 2023-11-10 DIAGNOSIS — D49.9 IMMUNODEFICIENCY SECONDARY TO NEOPLASM: ICD-10-CM

## 2023-11-10 DIAGNOSIS — D84.81 IMMUNODEFICIENCY SECONDARY TO NEOPLASM: ICD-10-CM

## 2023-11-10 DIAGNOSIS — E87.6 HYPOKALEMIA: ICD-10-CM

## 2023-11-10 DIAGNOSIS — Z79.899 IMMUNODEFICIENCY DUE TO DRUGS: ICD-10-CM

## 2023-11-10 DIAGNOSIS — C78.7 SECONDARY MALIGNANT NEOPLASM OF LIVER: ICD-10-CM

## 2023-11-10 LAB
ALBUMIN SERPL BCP-MCNC: 3.3 G/DL (ref 3.5–5.2)
ALP SERPL-CCNC: 114 U/L (ref 55–135)
ALT SERPL W/O P-5'-P-CCNC: 88 U/L (ref 10–44)
ANION GAP SERPL CALC-SCNC: 13 MMOL/L (ref 8–16)
AST SERPL-CCNC: 33 U/L (ref 10–40)
BASOPHILS # BLD AUTO: 0.01 K/UL (ref 0–0.2)
BASOPHILS NFR BLD: 0.1 % (ref 0–1.9)
BILIRUB SERPL-MCNC: 1 MG/DL (ref 0.1–1)
BUN SERPL-MCNC: 15 MG/DL (ref 8–23)
CALCIUM SERPL-MCNC: 9.6 MG/DL (ref 8.7–10.5)
CHLORIDE SERPL-SCNC: 97 MMOL/L (ref 95–110)
CO2 SERPL-SCNC: 30 MMOL/L (ref 23–29)
CREAT SERPL-MCNC: 1.4 MG/DL (ref 0.5–1.4)
DIFFERENTIAL METHOD: ABNORMAL
EOSINOPHIL # BLD AUTO: 0.2 K/UL (ref 0–0.5)
EOSINOPHIL NFR BLD: 1.9 % (ref 0–8)
ERYTHROCYTE [DISTWIDTH] IN BLOOD BY AUTOMATED COUNT: 16.3 % (ref 11.5–14.5)
EST. GFR  (NO RACE VARIABLE): 40.5 ML/MIN/1.73 M^2
GLUCOSE SERPL-MCNC: 127 MG/DL (ref 70–110)
HCT VFR BLD AUTO: 31.5 % (ref 37–48.5)
HGB BLD-MCNC: 9.9 G/DL (ref 12–16)
IMM GRANULOCYTES # BLD AUTO: 0.04 K/UL (ref 0–0.04)
IMM GRANULOCYTES NFR BLD AUTO: 0.5 % (ref 0–0.5)
LYMPHOCYTES # BLD AUTO: 0.9 K/UL (ref 1–4.8)
LYMPHOCYTES NFR BLD: 11.2 % (ref 18–48)
MCH RBC QN AUTO: 26.9 PG (ref 27–31)
MCHC RBC AUTO-ENTMCNC: 31.4 G/DL (ref 32–36)
MCV RBC AUTO: 86 FL (ref 82–98)
MONOCYTES # BLD AUTO: 0.9 K/UL (ref 0.3–1)
MONOCYTES NFR BLD: 10.6 % (ref 4–15)
NEUTROPHILS # BLD AUTO: 6.2 K/UL (ref 1.8–7.7)
NEUTROPHILS NFR BLD: 75.7 % (ref 38–73)
NRBC BLD-RTO: 0 /100 WBC
PLATELET # BLD AUTO: 227 K/UL (ref 150–450)
PMV BLD AUTO: 9.2 FL (ref 9.2–12.9)
POTASSIUM SERPL-SCNC: 3.4 MMOL/L (ref 3.5–5.1)
PROT SERPL-MCNC: 7.7 G/DL (ref 6–8.4)
RBC # BLD AUTO: 3.68 M/UL (ref 4–5.4)
SODIUM SERPL-SCNC: 140 MMOL/L (ref 136–145)
WBC # BLD AUTO: 8.22 K/UL (ref 3.9–12.7)

## 2023-11-10 PROCEDURE — 1160F RVW MEDS BY RX/DR IN RCRD: CPT | Mod: CPTII,S$GLB,, | Performed by: INTERNAL MEDICINE

## 2023-11-10 PROCEDURE — 1101F PT FALLS ASSESS-DOCD LE1/YR: CPT | Mod: CPTII,S$GLB,, | Performed by: INTERNAL MEDICINE

## 2023-11-10 PROCEDURE — 3288F FALL RISK ASSESSMENT DOCD: CPT | Mod: CPTII,S$GLB,, | Performed by: INTERNAL MEDICINE

## 2023-11-10 PROCEDURE — 1160F PR REVIEW ALL MEDS BY PRESCRIBER/CLIN PHARMACIST DOCUMENTED: ICD-10-PCS | Mod: CPTII,S$GLB,, | Performed by: INTERNAL MEDICINE

## 2023-11-10 PROCEDURE — 1101F PR PT FALLS ASSESS DOC 0-1 FALLS W/OUT INJ PAST YR: ICD-10-PCS | Mod: CPTII,S$GLB,, | Performed by: INTERNAL MEDICINE

## 2023-11-10 PROCEDURE — 1159F MED LIST DOCD IN RCRD: CPT | Mod: CPTII,S$GLB,, | Performed by: INTERNAL MEDICINE

## 2023-11-10 PROCEDURE — 3078F PR MOST RECENT DIASTOLIC BLOOD PRESSURE < 80 MM HG: ICD-10-PCS | Mod: CPTII,S$GLB,, | Performed by: INTERNAL MEDICINE

## 2023-11-10 PROCEDURE — 3044F HG A1C LEVEL LT 7.0%: CPT | Mod: CPTII,S$GLB,, | Performed by: INTERNAL MEDICINE

## 2023-11-10 PROCEDURE — 99999 PR PBB SHADOW E&M-EST. PATIENT-LVL IV: ICD-10-PCS | Mod: PBBFAC,,, | Performed by: INTERNAL MEDICINE

## 2023-11-10 PROCEDURE — 3008F PR BODY MASS INDEX (BMI) DOCUMENTED: ICD-10-PCS | Mod: CPTII,S$GLB,, | Performed by: INTERNAL MEDICINE

## 2023-11-10 PROCEDURE — 3008F BODY MASS INDEX DOCD: CPT | Mod: CPTII,S$GLB,, | Performed by: INTERNAL MEDICINE

## 2023-11-10 PROCEDURE — 1126F AMNT PAIN NOTED NONE PRSNT: CPT | Mod: CPTII,S$GLB,, | Performed by: INTERNAL MEDICINE

## 2023-11-10 PROCEDURE — 3074F SYST BP LT 130 MM HG: CPT | Mod: CPTII,S$GLB,, | Performed by: INTERNAL MEDICINE

## 2023-11-10 PROCEDURE — 3288F PR FALLS RISK ASSESSMENT DOCUMENTED: ICD-10-PCS | Mod: CPTII,S$GLB,, | Performed by: INTERNAL MEDICINE

## 2023-11-10 PROCEDURE — 3074F PR MOST RECENT SYSTOLIC BLOOD PRESSURE < 130 MM HG: ICD-10-PCS | Mod: CPTII,S$GLB,, | Performed by: INTERNAL MEDICINE

## 2023-11-10 PROCEDURE — 84260 ASSAY OF SEROTONIN: CPT | Performed by: INTERNAL MEDICINE

## 2023-11-10 PROCEDURE — 83497 ASSAY OF 5-HIAA: CPT | Performed by: INTERNAL MEDICINE

## 2023-11-10 PROCEDURE — 80053 COMPREHEN METABOLIC PANEL: CPT | Performed by: INTERNAL MEDICINE

## 2023-11-10 PROCEDURE — 3078F DIAST BP <80 MM HG: CPT | Mod: CPTII,S$GLB,, | Performed by: INTERNAL MEDICINE

## 2023-11-10 PROCEDURE — 1126F PR PAIN SEVERITY QUANTIFIED, NO PAIN PRESENT: ICD-10-PCS | Mod: CPTII,S$GLB,, | Performed by: INTERNAL MEDICINE

## 2023-11-10 PROCEDURE — 99215 PR OFFICE/OUTPT VISIT, EST, LEVL V, 40-54 MIN: ICD-10-PCS | Mod: S$GLB,,, | Performed by: INTERNAL MEDICINE

## 2023-11-10 PROCEDURE — 1159F PR MEDICATION LIST DOCUMENTED IN MEDICAL RECORD: ICD-10-PCS | Mod: CPTII,S$GLB,, | Performed by: INTERNAL MEDICINE

## 2023-11-10 PROCEDURE — 3044F PR MOST RECENT HEMOGLOBIN A1C LEVEL <7.0%: ICD-10-PCS | Mod: CPTII,S$GLB,, | Performed by: INTERNAL MEDICINE

## 2023-11-10 PROCEDURE — 99999 PR PBB SHADOW E&M-EST. PATIENT-LVL IV: CPT | Mod: PBBFAC,,, | Performed by: INTERNAL MEDICINE

## 2023-11-10 PROCEDURE — 99215 OFFICE O/P EST HI 40 MIN: CPT | Mod: S$GLB,,, | Performed by: INTERNAL MEDICINE

## 2023-11-10 PROCEDURE — 83519 RIA NONANTIBODY: CPT | Performed by: INTERNAL MEDICINE

## 2023-11-10 PROCEDURE — 85025 COMPLETE CBC W/AUTO DIFF WBC: CPT | Performed by: INTERNAL MEDICINE

## 2023-11-10 RX ORDER — POTASSIUM CHLORIDE 20 MEQ/1
20 TABLET, EXTENDED RELEASE ORAL ONCE
Qty: 1 TABLET | Refills: 0 | Status: SHIPPED | OUTPATIENT
Start: 2023-11-10 | End: 2023-11-10

## 2023-11-10 NOTE — PROGRESS NOTES
PROGRESS NOTE    Subjective:       Patient ID: Coby Marshall is a 68 y.o. female.    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    Tempus: no reportable pathogenic mutation. MSI-low. PDL1 negative. TMB 3.7    Oncologic History:  1. Ms. 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 Her primary oncologist is Dr. Olson at [place].  Her oncology history is detailed below. She was initially diagnosed with rectal adenocarcinoma and she was treated with neoadjuvant chemoRT. Surgical pathology resulted as well-differentiated carcinoid and she was initially observed. A CT obtained for hernia revealed liver metastases in July 2021, and these were confirmed to be neuroendocrine on pathology. She underwent EUS with biopsy in December 2021 which showed grade 3 NET in the pancreas with Ki67 25%. She was started on lanreotide. Her liver lesions have since grown in size, and her oncologist referred her for consideration of liver-directed therapy. Ms. Marshall reports ongoing diarrhea since the time of her initial resection. She has about 6 bowel movements per day. She does not have any flushing, palpitations, or diaphoresis. She otherwise feels well.   Oncology History:   8/2017: Well-differentiated carcinoid tumor in the rectum, grade 2, and in the terminal ileum, grade 1. Initially diagnosed as adenocarcinoma and later changed to carcinoid tumor at time of resection  Treated with concurrent chemoRT with infusional 5-FU under the diagnosis of adenocarcinoma  1/2018: resection of rectum and terminal ileum. Pathology showed a ypT3N0 1.7 cm rectal NET, well  "differentiated, grade 2, Ki 67 was 16%. 1.5 cm T2Nx small bowel NET, well diff, Ki 67 was less than 3%.  7/22/2021: Metastasis to liver on CT for hernia repair.   11/10/2021: Copper PET:    In this patient with rectal and ileal neuroendocrine tumor status post low anterior resection/small bowel resection, there are multiple somatostatin receptor avid hepatic lesions which appear increased in size as compared to outside CT 07/19/2021.  There is also tracer avid focus in the pancreatic body suspicious for malignancy.  All these findings are new since prior Dotatate PET-CT.  12/17/2021: EUS with biopsies:  "1. PANCREAS MASS, ENDOSCOPIC ULTRASOUND-GUIDED FNA:   Well-differentiated neuroendocrine tumor, favor WHO grade 3 (G3).   Comment:  Ki-67 labeling index is approximately 25%.  Average mitotic rate is   9 per 2 mm^2.  Tumor cells are positive with synaptophysin and chromogranin,   supporting the diagnosis.   2. LIVER, LEFT LOWER LOBE, ENDOSCOPIC ULTRASOUND-GUIDED FNA:   Well-differentiated neuroendocrine tumor.   3. LIVER, RIGHT LOWER LOBE, ENDOSCOPIC ULTRASOUND-GUIDED FNA:   Well-differentiated neuroendocrine tumor, favor WHO grade 2 (G2).   Comment (2,3):  Tumor in parts 2 and 3 are histologically identical.  Only   part 3 contains enough cell block lesional material to render a meaningful   labeling index and mitotic count.  Ki-67 labeling index is approximately 15%.    Average mitotic rate is 3 per 2 mm^2.  Tumor cells are positive with   synaptophysin but negative for chromogranin.  The overall findings are still   consistent with metastatic well-differentiated neuroendocrine tumor."  1/3/2022: Lanreotide started  1/4/2023: repeat CT abd/pelvis: worsening hepatic metastases. Changed Lanreotide to Sandostatin  2. Case reviewed at tumor board. [facility] serial TACE. S/p TACE on 3/1/23, 4/24/23, 7/21/23, 11/3/23  3. Scan in Sep/Oct 2023 showed residual in dome lesions progressed. Pancreatic lesion increased to 2.3 x 1.6 " cm. Started captem 10/14/23 for progression of the pancreatic lesion. Had TACE on 11/3/23     Interval History:   Ms Marshall returns today for follow up. Feeling well today. Due to restart captem tomorrow. Had a lot of fatigue from xeloda and nausea from temodar.     ECO    ROS:   A ten-point system review is obtained and negative except for what was stated in the Interval History.     Physical Examination:   Vital signs reviewed.   General: well hydrated, well developed, in no acute distress  HEENT: normocephalic, PERRLA, EOMI, anicteric sclerae  Neck: supple, no JVD, thyromegaly, cervical or supraclavicular lymphadenopathy  Lungs: clear breath sounds bilaterally, no wheezing, rales, or rhonchi  Heart: RRR, no M/R/G  Abdomen: soft, no tenderness, non-distended, no hepatosplenomegaly, mass, or hernia. BS present  Extremities: no clubbing, cyanosis, or edema  Skin: no rash, ulcer, or open wounds  Neuro: alert and oriented x 4, no focal neuro deficit  Psych: pleasant and appropriate mood and affect    Objective:     Laboratory Data:  Labs reviewed. CBC, CMP unremarkable. Cr 1.4    Imaging Data:  Gallium PET 23:  Similar number and distribution of radiotracer avid lesions within the liver and pancreatic body.  Several liver lesions demonstrate central PET photopenia and hypodensity on CT consistent with necrosis.     New nonspecific mild radiotracer uptake within several small left axillary lymph nodes.  Finding may relate to left upper extremity injection and partial radiotracer extravasation.     Otherwise, no new somatostatin receptor avid lesion.    I have personally reviewed her CT abdomen/pelvis with Dr Soni: Receptor positive pancreatic lesion and liver mets. Liver mets appear enlarged since 2022, though this may be due to larger cystic components.     MRI abdomen 23:  Redemonstration of multiple hepatic lesions in keeping with known neuroendocrine tumor metastasis.  Dominant lesions within the  right lobe and segment 4 demonstrate decreased central enhancement consistent with response to therapy with residual disease likely present at these sites.  Additional stable enhancing lesions within the left and right lobes consistent with residual disease.  No definite new lesion.     Persistent restricted diffusion within the pancreatic body in keeping with known lesion.     Stable mildly prominent periportal lymph nodes, nonspecific.    MRI abdomen 6/07/23  Impression:  1. Interval hepatic chemoembolization.  Decreased size of multiple hepatic lesions consistent with known metastasis.  Several lesions demonstrate persistent enhancement consistent with residual disease.  New arterial enhancement within a right hepatic lobe lesion consistent with tumor.  2. Persistent restricted diffusion within the pancreatic body in keeping with known lesion.    NM PET 6/27/23  Impression:  Overall findings concerning for mild disease progression with new hepatic lesion, enlarging pancreatic body lesion and new focus of radiotracer uptake within the right hilum.  Post treatment changes of several liver lesions with decreased radiotracer uptake.  Additional nonspecific low level radiotracer uptake within axillary, mediastinal, and bilateral inguinal lymph nodes.  Attention on follow-up.    MRI 9/16/23:  Impression:     Patient with neuroendocrine tumor with hepatic metastases and interval microwave ablation of multiple hepatic lesions.     Small amount of diffusion restriction at the superior aspect of segment 4B treatment cavity raises question of residual tumor.  Attention on follow-up.     Interval decrease in size of segment 5/8 lesion.     Interval mildly increased size of segment 6 lesion with persistent internal enhancement and nodular diffusion restriction which may reflect residual disease.     No definite new hepatic lesion.     Mildly increased size of pancreatic body mass.    PET scan 10/4/23:  Impression:      Multiple radiotracer avid hepatic lesions with mixed interval changes compared to prior exam, please see above for additional details.     Mild increased uptake within a right hilar lesion/lymph node.  Radiotracer avid pancreatic body lesion appears mildly enlarged.     No definite new lesion elsewhere.     Persistent mild nonspecific radiotracer uptake within small bilateral axillary lymph nodes     Assessment and Plan:     1. Malignant carcinoid tumor of rectum    2. Malignant carcinoid tumor of ileum    3. Neuroendocrine tumor of pancreas    4. Neuroendocrine tumor    5. Secondary malignant neoplasm of liver    6. Immunodeficiency secondary to neoplasm    7. Immunodeficiency due to drugs    8. Hypokalemia    9. Stage 3 chronic kidney disease, unspecified whether stage 3a or 3b CKD    10. Essential hypertension        1-7  - Ms Marshall is a 69 yo woman with history of ypT3N0 rectal NET, well differentiated, grade 2 (Ki 67 16%) and small bowel NET, well diff, low grade, s/p surgery in Jan 2018. She was found to have metastatic disease to the liver and pancreas in July 2021 s/p biopsy. Liver NET is well diff grade 2 (Ki67 15)%. Pancreatic NET is well diff grade 3 (Ki67 25%). She has been on lanreotide since July 2022, changed to sandostatin in Jan 2023 after CT scan showed progression. S/p TACE to the liver on 3/1/23, 4/24/23, 7/21/23  - last MRI and PET reviewed at tumor board. MRI showed response in ablated cavities. Residual in dome lesions has progressed. Pancreatic lesion increased to 2.3 x 1.6 cm. Copper PET showed stable distribution of disease outside of liver.   - started captem on 10/14/23  - tolerated cycle 1 captem okay but did have fatigue and nausea. Given CrCl of 49 mL/min today, will decrease xeloda to 1000 mg bid days 1-14 with cycle 2. Keep temodar at current dose  - RTC in 4 weeks prior to cycle 3  - had TACE on 11/3/23. Will have MRI when she comes back  - c/w lanreotide with Dr Olson    8.  -  K-Dur 20 mEq once    9.  - Cr 1.4. encouraged oral hydration. Xeloda dose reduced    10.  - BP controlled  - c/w current medication    Follow-up:     RTC in 4 weeks      Ariel Smart MD  Hematology and Medical Oncology  [facility]    Route Chart for Scheduling    Med Onc Chart Routing  Urgent    Follow up with physician 4 weeks. see me on 12/6 with CBC, CMP, serotonin, pancreastatin, 5-HIAA, MRI abdomen   Follow up with ELLY    Infusion scheduling note    Injection scheduling note    Labs CBC and CMP   Scheduling:  Preferred lab:  Lab interval:  serotonin, pancreastatin, 5-HIAA   Imaging MRI      Pharmacy appointment    Other referrals

## 2023-11-13 LAB — 5OH-INDOLEACETATE SERPL-MCNC: 5 NG/ML

## 2023-11-16 LAB — SEROTONIN: <30 NG/ML

## 2023-11-18 LAB — PANCREASTATIN SERPL-MCNC: 82 PG/ML (ref 10–135)

## 2023-12-12 ENCOUNTER — TELEPHONE (OUTPATIENT)
Dept: HEMATOLOGY/ONCOLOGY | Facility: CLINIC | Age: 68
End: 2023-12-12
Payer: MEDICARE

## 2023-12-15 ENCOUNTER — LAB VISIT (OUTPATIENT)
Dept: LAB | Facility: HOSPITAL | Age: 68
End: 2023-12-15
Attending: INTERNAL MEDICINE
Payer: MEDICARE

## 2023-12-15 ENCOUNTER — OFFICE VISIT (OUTPATIENT)
Dept: HEMATOLOGY/ONCOLOGY | Facility: CLINIC | Age: 68
End: 2023-12-15
Payer: MEDICARE

## 2023-12-15 ENCOUNTER — HOSPITAL ENCOUNTER (OUTPATIENT)
Dept: RADIOLOGY | Facility: HOSPITAL | Age: 68
Discharge: HOME | End: 2023-12-15
Attending: INTERNAL MEDICINE
Payer: MEDICARE

## 2023-12-15 VITALS
BODY MASS INDEX: 31.04 KG/M2 | RESPIRATION RATE: 18 BRPM | SYSTOLIC BLOOD PRESSURE: 138 MMHG | WEIGHT: 186.31 LBS | HEART RATE: 65 BPM | DIASTOLIC BLOOD PRESSURE: 61 MMHG | HEIGHT: 65 IN | OXYGEN SATURATION: 100 %

## 2023-12-15 DIAGNOSIS — D49.9 IMMUNODEFICIENCY SECONDARY TO NEOPLASM: ICD-10-CM

## 2023-12-15 DIAGNOSIS — C7A.026 MALIGNANT CARCINOID TUMOR OF RECTUM: ICD-10-CM

## 2023-12-15 DIAGNOSIS — D84.821 IMMUNODEFICIENCY DUE TO DRUGS: ICD-10-CM

## 2023-12-15 DIAGNOSIS — Z79.899 IMMUNODEFICIENCY DUE TO DRUGS: ICD-10-CM

## 2023-12-15 DIAGNOSIS — N18.30 STAGE 3 CHRONIC KIDNEY DISEASE, UNSPECIFIED WHETHER STAGE 3A OR 3B CKD: ICD-10-CM

## 2023-12-15 DIAGNOSIS — D3A.8 NEUROENDOCRINE TUMOR OF PANCREAS: ICD-10-CM

## 2023-12-15 DIAGNOSIS — D3A.8 NEUROENDOCRINE TUMOR: ICD-10-CM

## 2023-12-15 DIAGNOSIS — D84.81 IMMUNODEFICIENCY SECONDARY TO NEOPLASM: ICD-10-CM

## 2023-12-15 DIAGNOSIS — I10 ESSENTIAL HYPERTENSION: ICD-10-CM

## 2023-12-15 DIAGNOSIS — D69.59 SECONDARY THROMBOCYTOPENIA: ICD-10-CM

## 2023-12-15 DIAGNOSIS — C78.7 SECONDARY MALIGNANT NEOPLASM OF LIVER: ICD-10-CM

## 2023-12-15 DIAGNOSIS — C7A.012 MALIGNANT CARCINOID TUMOR OF ILEUM: Primary | ICD-10-CM

## 2023-12-15 LAB
ALBUMIN SERPL BCP-MCNC: 3.4 G/DL (ref 3.5–5.2)
ALP SERPL-CCNC: 108 U/L (ref 55–135)
ALT SERPL W/O P-5'-P-CCNC: 13 U/L (ref 10–44)
ANION GAP SERPL CALC-SCNC: 11 MMOL/L (ref 8–16)
AST SERPL-CCNC: 23 U/L (ref 10–40)
BASOPHILS # BLD AUTO: 0.01 K/UL (ref 0–0.2)
BASOPHILS NFR BLD: 0.2 % (ref 0–1.9)
BILIRUB SERPL-MCNC: 0.6 MG/DL (ref 0.1–1)
BUN SERPL-MCNC: 15 MG/DL (ref 8–23)
CALCIUM SERPL-MCNC: 9.2 MG/DL (ref 8.7–10.5)
CHLORIDE SERPL-SCNC: 103 MMOL/L (ref 95–110)
CO2 SERPL-SCNC: 27 MMOL/L (ref 23–29)
CREAT SERPL-MCNC: 1.3 MG/DL (ref 0.5–1.4)
DIFFERENTIAL METHOD: ABNORMAL
EOSINOPHIL # BLD AUTO: 0.2 K/UL (ref 0–0.5)
EOSINOPHIL NFR BLD: 3.2 % (ref 0–8)
ERYTHROCYTE [DISTWIDTH] IN BLOOD BY AUTOMATED COUNT: 18.6 % (ref 11.5–14.5)
EST. GFR  (NO RACE VARIABLE): 44.2 ML/MIN/1.73 M^2
GLUCOSE SERPL-MCNC: 162 MG/DL (ref 70–110)
HCT VFR BLD AUTO: 31.1 % (ref 37–48.5)
HGB BLD-MCNC: 9.7 G/DL (ref 12–16)
IMM GRANULOCYTES # BLD AUTO: 0 K/UL (ref 0–0.04)
IMM GRANULOCYTES NFR BLD AUTO: 0 % (ref 0–0.5)
LYMPHOCYTES # BLD AUTO: 1 K/UL (ref 1–4.8)
LYMPHOCYTES NFR BLD: 19.1 % (ref 18–48)
MCH RBC QN AUTO: 27.9 PG (ref 27–31)
MCHC RBC AUTO-ENTMCNC: 31.2 G/DL (ref 32–36)
MCV RBC AUTO: 89 FL (ref 82–98)
MONOCYTES # BLD AUTO: 0.4 K/UL (ref 0.3–1)
MONOCYTES NFR BLD: 7.6 % (ref 4–15)
NEUTROPHILS # BLD AUTO: 3.7 K/UL (ref 1.8–7.7)
NEUTROPHILS NFR BLD: 69.9 % (ref 38–73)
NRBC BLD-RTO: 0 /100 WBC
PLATELET # BLD AUTO: 40 K/UL (ref 150–450)
PMV BLD AUTO: 9 FL (ref 9.2–12.9)
POTASSIUM SERPL-SCNC: 3.8 MMOL/L (ref 3.5–5.1)
PROT SERPL-MCNC: 7.1 G/DL (ref 6–8.4)
RBC # BLD AUTO: 3.48 M/UL (ref 4–5.4)
SODIUM SERPL-SCNC: 141 MMOL/L (ref 136–145)
WBC # BLD AUTO: 5.24 K/UL (ref 3.9–12.7)

## 2023-12-15 PROCEDURE — 1101F PT FALLS ASSESS-DOCD LE1/YR: CPT | Mod: CPTII,S$GLB,, | Performed by: INTERNAL MEDICINE

## 2023-12-15 PROCEDURE — 3044F PR MOST RECENT HEMOGLOBIN A1C LEVEL <7.0%: ICD-10-PCS | Mod: CPTII,S$GLB,, | Performed by: INTERNAL MEDICINE

## 2023-12-15 PROCEDURE — 99215 PR OFFICE/OUTPT VISIT, EST, LEVL V, 40-54 MIN: ICD-10-PCS | Mod: S$GLB,,, | Performed by: INTERNAL MEDICINE

## 2023-12-15 PROCEDURE — 3075F PR MOST RECENT SYSTOLIC BLOOD PRESS GE 130-139MM HG: ICD-10-PCS | Mod: CPTII,S$GLB,, | Performed by: INTERNAL MEDICINE

## 2023-12-15 PROCEDURE — 85025 COMPLETE CBC W/AUTO DIFF WBC: CPT | Performed by: INTERNAL MEDICINE

## 2023-12-15 PROCEDURE — 1160F PR REVIEW ALL MEDS BY PRESCRIBER/CLIN PHARMACIST DOCUMENTED: ICD-10-PCS | Mod: CPTII,S$GLB,, | Performed by: INTERNAL MEDICINE

## 2023-12-15 PROCEDURE — 1101F PR PT FALLS ASSESS DOC 0-1 FALLS W/OUT INJ PAST YR: ICD-10-PCS | Mod: CPTII,S$GLB,, | Performed by: INTERNAL MEDICINE

## 2023-12-15 PROCEDURE — 1126F AMNT PAIN NOTED NONE PRSNT: CPT | Mod: CPTII,S$GLB,, | Performed by: INTERNAL MEDICINE

## 2023-12-15 PROCEDURE — 3078F PR MOST RECENT DIASTOLIC BLOOD PRESSURE < 80 MM HG: ICD-10-PCS | Mod: CPTII,S$GLB,, | Performed by: INTERNAL MEDICINE

## 2023-12-15 PROCEDURE — 99999 PR PBB SHADOW E&M-EST. PATIENT-LVL III: CPT | Mod: PBBFAC,,, | Performed by: INTERNAL MEDICINE

## 2023-12-15 PROCEDURE — 36415 COLL VENOUS BLD VENIPUNCTURE: CPT | Performed by: INTERNAL MEDICINE

## 2023-12-15 PROCEDURE — 99215 OFFICE O/P EST HI 40 MIN: CPT | Mod: S$GLB,,, | Performed by: INTERNAL MEDICINE

## 2023-12-15 PROCEDURE — 80053 COMPREHEN METABOLIC PANEL: CPT | Performed by: INTERNAL MEDICINE

## 2023-12-15 PROCEDURE — 1160F RVW MEDS BY RX/DR IN RCRD: CPT | Mod: CPTII,S$GLB,, | Performed by: INTERNAL MEDICINE

## 2023-12-15 PROCEDURE — 1159F MED LIST DOCD IN RCRD: CPT | Mod: CPTII,S$GLB,, | Performed by: INTERNAL MEDICINE

## 2023-12-15 PROCEDURE — 84260 ASSAY OF SEROTONIN: CPT | Performed by: INTERNAL MEDICINE

## 2023-12-15 PROCEDURE — 83497 ASSAY OF 5-HIAA: CPT | Performed by: INTERNAL MEDICINE

## 2023-12-15 PROCEDURE — 83519 RIA NONANTIBODY: CPT | Performed by: INTERNAL MEDICINE

## 2023-12-15 PROCEDURE — 3288F FALL RISK ASSESSMENT DOCD: CPT | Mod: CPTII,S$GLB,, | Performed by: INTERNAL MEDICINE

## 2023-12-15 PROCEDURE — 99999 PR PBB SHADOW E&M-EST. PATIENT-LVL III: ICD-10-PCS | Mod: PBBFAC,,, | Performed by: INTERNAL MEDICINE

## 2023-12-15 PROCEDURE — 1126F PR PAIN SEVERITY QUANTIFIED, NO PAIN PRESENT: ICD-10-PCS | Mod: CPTII,S$GLB,, | Performed by: INTERNAL MEDICINE

## 2023-12-15 PROCEDURE — 3075F SYST BP GE 130 - 139MM HG: CPT | Mod: CPTII,S$GLB,, | Performed by: INTERNAL MEDICINE

## 2023-12-15 PROCEDURE — 3044F HG A1C LEVEL LT 7.0%: CPT | Mod: CPTII,S$GLB,, | Performed by: INTERNAL MEDICINE

## 2023-12-15 PROCEDURE — 3008F BODY MASS INDEX DOCD: CPT | Mod: CPTII,S$GLB,, | Performed by: INTERNAL MEDICINE

## 2023-12-15 PROCEDURE — 3008F PR BODY MASS INDEX (BMI) DOCUMENTED: ICD-10-PCS | Mod: CPTII,S$GLB,, | Performed by: INTERNAL MEDICINE

## 2023-12-15 PROCEDURE — 3288F PR FALLS RISK ASSESSMENT DOCUMENTED: ICD-10-PCS | Mod: CPTII,S$GLB,, | Performed by: INTERNAL MEDICINE

## 2023-12-15 PROCEDURE — 3078F DIAST BP <80 MM HG: CPT | Mod: CPTII,S$GLB,, | Performed by: INTERNAL MEDICINE

## 2023-12-15 PROCEDURE — 1159F PR MEDICATION LIST DOCUMENTED IN MEDICAL RECORD: ICD-10-PCS | Mod: CPTII,S$GLB,, | Performed by: INTERNAL MEDICINE

## 2023-12-15 RX ORDER — CAPECITABINE 500 MG/1
1000 TABLET, FILM COATED ORAL 2 TIMES DAILY
Qty: 56 TABLET | Refills: 11 | Status: SHIPPED | OUTPATIENT
Start: 2023-12-15 | End: 2023-12-21

## 2023-12-15 RX ORDER — CAPECITABINE 500 MG/1
1000 TABLET, FILM COATED ORAL 2 TIMES DAILY
Qty: 56 TABLET | Refills: 11 | Status: SHIPPED | OUTPATIENT
Start: 2023-12-15 | End: 2023-12-15

## 2023-12-15 RX ORDER — TEMOZOLOMIDE 100 MG/1
300 CAPSULE ORAL DAILY
Qty: 15 CAPSULE | Refills: 0 | Status: SHIPPED | OUTPATIENT
Start: 2023-12-15 | End: 2023-12-21 | Stop reason: SDUPTHER

## 2023-12-15 RX ORDER — TEMOZOLOMIDE 100 MG/1
300 CAPSULE ORAL DAILY
Qty: 15 CAPSULE | Refills: 0 | Status: SHIPPED | OUTPATIENT
Start: 2023-12-15 | End: 2023-12-15

## 2023-12-15 NOTE — PROGRESS NOTES
PROGRESS NOTE    Subjective:       Patient ID: Coby Marshall is a 68 y.o. female.    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    Tempus: no reportable pathogenic mutation. MSI-low. PDL1 negative. TMB 3.7    Oncologic History:  1. Ms. 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 Her primary oncologist is Dr. Olson at [place].  Her oncology history is detailed below. She was initially diagnosed with rectal adenocarcinoma and she was treated with neoadjuvant chemoRT. Surgical pathology resulted as well-differentiated carcinoid and she was initially observed. A CT obtained for hernia revealed liver metastases in July 2021, and these were confirmed to be neuroendocrine on pathology. She underwent EUS with biopsy in December 2021 which showed grade 3 NET in the pancreas with Ki67 25%. She was started on lanreotide. Her liver lesions have since grown in size, and her oncologist referred her for consideration of liver-directed therapy. Ms. Marshall reports ongoing diarrhea since the time of her initial resection. She has about 6 bowel movements per day. She does not have any flushing, palpitations, or diaphoresis. She otherwise feels well.   Oncology History:   8/2017: Well-differentiated carcinoid tumor in the rectum, grade 2, and in the terminal ileum, grade 1. Initially diagnosed as adenocarcinoma and later changed to carcinoid tumor at time of resection  Treated with concurrent chemoRT with infusional 5-FU under the diagnosis of adenocarcinoma  1/2018: resection of rectum and terminal ileum. Pathology showed a ypT3N0 1.7 cm rectal NET, well  "differentiated, grade 2, Ki 67 was 16%. 1.5 cm T2Nx small bowel NET, well diff, Ki 67 was less than 3%.  7/22/2021: Metastasis to liver on CT for hernia repair.   11/10/2021: Copper PET:    In this patient with rectal and ileal neuroendocrine tumor status post low anterior resection/small bowel resection, there are multiple somatostatin receptor avid hepatic lesions which appear increased in size as compared to outside CT 07/19/2021.  There is also tracer avid focus in the pancreatic body suspicious for malignancy.  All these findings are new since prior Dotatate PET-CT.  12/17/2021: EUS with biopsies:  "1. PANCREAS MASS, ENDOSCOPIC ULTRASOUND-GUIDED FNA:   Well-differentiated neuroendocrine tumor, favor WHO grade 3 (G3).   Comment:  Ki-67 labeling index is approximately 25%.  Average mitotic rate is   9 per 2 mm^2.  Tumor cells are positive with synaptophysin and chromogranin,   supporting the diagnosis.   2. LIVER, LEFT LOWER LOBE, ENDOSCOPIC ULTRASOUND-GUIDED FNA:   Well-differentiated neuroendocrine tumor.   3. LIVER, RIGHT LOWER LOBE, ENDOSCOPIC ULTRASOUND-GUIDED FNA:   Well-differentiated neuroendocrine tumor, favor WHO grade 2 (G2).   Comment (2,3):  Tumor in parts 2 and 3 are histologically identical.  Only   part 3 contains enough cell block lesional material to render a meaningful   labeling index and mitotic count.  Ki-67 labeling index is approximately 15%.    Average mitotic rate is 3 per 2 mm^2.  Tumor cells are positive with   synaptophysin but negative for chromogranin.  The overall findings are still   consistent with metastatic well-differentiated neuroendocrine tumor."  1/3/2022: Lanreotide started  1/4/2023: repeat CT abd/pelvis: worsening hepatic metastases. Changed Lanreotide to Sandostatin  2. Case reviewed at tumor board. [facility] serial TACE. S/p TACE on 3/1/23, 4/24/23, 7/21/23, 11/3/23  3. Scan in Sep/Oct 2023 showed residual in dome lesions progressed. Pancreatic lesion increased to 2.3 x 1.6 " cm. Started captem 10/14/23 for progression of the pancreatic lesion. Had TACE on 11/3/23.     Interval History:   Ms Marshall returns today for follow up. Started captem on 23. Took xeloda 1000 mg bid and temodar 400 mg. Feeling well today. Temodar did give her nausea. She took it before bedtime and took zofran 30 min before she took temodar.     ECO    ROS:   A ten-point system review is obtained and negative except for what was stated in the Interval History.     Physical Examination:   Vital signs reviewed.   General: well hydrated, well developed, in no acute distress  HEENT: normocephalic, PERRLA, EOMI, anicteric sclerae  Neck: supple, no JVD, thyromegaly, cervical or supraclavicular lymphadenopathy  Lungs: clear breath sounds bilaterally, no wheezing, rales, or rhonchi  Heart: RRR, no M/R/G  Abdomen: soft, no tenderness, non-distended, no hepatosplenomegaly, mass, or hernia. BS present  Extremities: no clubbing, cyanosis, or edema  Skin: no rash, ulcer, or open wounds  Neuro: alert and oriented x 4, no focal neuro deficit  Psych: pleasant and appropriate mood and affect    Objective:     Laboratory Data:  Labs reviewed. Plt count 40    Imaging Data:  Gallium PET 23:  Similar number and distribution of radiotracer avid lesions within the liver and pancreatic body.  Several liver lesions demonstrate central PET photopenia and hypodensity on CT consistent with necrosis.     New nonspecific mild radiotracer uptake within several small left axillary lymph nodes.  Finding may relate to left upper extremity injection and partial radiotracer extravasation.     Otherwise, no new somatostatin receptor avid lesion.    I have personally reviewed her CT abdomen/pelvis with Dr Soni: Receptor positive pancreatic lesion and liver mets. Liver mets appear enlarged since 2022, though this may be due to larger cystic components.     MRI abdomen 23:  Redemonstration of multiple hepatic lesions in keeping with  known neuroendocrine tumor metastasis.  Dominant lesions within the right lobe and segment 4 demonstrate decreased central enhancement consistent with response to therapy with residual disease likely present at these sites.  Additional stable enhancing lesions within the left and right lobes consistent with residual disease.  No definite new lesion.     Persistent restricted diffusion within the pancreatic body in keeping with known lesion.     Stable mildly prominent periportal lymph nodes, nonspecific.    MRI abdomen 6/07/23  Impression:  1. Interval hepatic chemoembolization.  Decreased size of multiple hepatic lesions consistent with known metastasis.  Several lesions demonstrate persistent enhancement consistent with residual disease.  New arterial enhancement within a right hepatic lobe lesion consistent with tumor.  2. Persistent restricted diffusion within the pancreatic body in keeping with known lesion.    NM PET 6/27/23  Impression:  Overall findings concerning for mild disease progression with new hepatic lesion, enlarging pancreatic body lesion and new focus of radiotracer uptake within the right hilum.  Post treatment changes of several liver lesions with decreased radiotracer uptake.  Additional nonspecific low level radiotracer uptake within axillary, mediastinal, and bilateral inguinal lymph nodes.  Attention on follow-up.    MRI 9/16/23:  Impression:     Patient with neuroendocrine tumor with hepatic metastases and interval microwave ablation of multiple hepatic lesions.     Small amount of diffusion restriction at the superior aspect of segment 4B treatment cavity raises question of residual tumor.  Attention on follow-up.     Interval decrease in size of segment 5/8 lesion.     Interval mildly increased size of segment 6 lesion with persistent internal enhancement and nodular diffusion restriction which may reflect residual disease.     No definite new hepatic lesion.     Mildly increased size of  pancreatic body mass.    PET scan 10/4/23:  Impression:     Multiple radiotracer avid hepatic lesions with mixed interval changes compared to prior exam, please see above for additional details.     Mild increased uptake within a right hilar lesion/lymph node.  Radiotracer avid pancreatic body lesion appears mildly enlarged.     No definite new lesion elsewhere.     Persistent mild nonspecific radiotracer uptake within small bilateral axillary lymph nodes     Assessment and Plan:     1. Malignant carcinoid tumor of ileum    2. Malignant carcinoid tumor of rectum    3. Neuroendocrine tumor of pancreas    4. Neuroendocrine tumor    5. Secondary malignant neoplasm of liver    6. Immunodeficiency secondary to neoplasm    7. Immunodeficiency due to drugs    8. Stage 3 chronic kidney disease, unspecified whether stage 3a or 3b CKD    9. 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 She was found to have metastatic disease to the liver and pancreas in July 2021 s/p biopsy. Liver NET is well diff grade 2 (Ki67 15)%. Pancreatic NET is well diff grade 3 (Ki67 25%). She has been on lanreotide since July 2022, changed to sandostatin in Jan 2023 after CT scan showed progression. S/p TACE to the liver on 3/1/23, 4/24/23, 7/21/23, 11/3/23  - last MRI and PET reviewed at tumor board. MRI showed response in ablated cavities. Residual in dome lesions has progressed. Pancreatic lesion increased to 2.3 x 1.6 cm. Copper PET showed stable distribution of disease outside of liver.   - started captem on 10/14/23  - reviewed lab results. Plt count 40. She took xeloda for 1 week. Asked patient to stop chemo  - monitor counts next Thursday. I will call her with results  - for the next cycle, will c/w xeloda at 1000 mg bid days 1-14, but drop temodar to  300 mg days 10 through 14 every 28 day cycle. New prescription sent  - [facility] is the only place where she can get MRI within network. Rescheduled for next Wed as her IV did not work this morning  - c/w lanreotide with Dr Olson  - see me on 1/5 at [place]. Subject to change depending on counts    8.  - stable. encouraged oral hydration. Xeloda dose reduced    9.  - BP controlled  - c/w current medication    10.  - see above for chemo change    Follow-up:     See me on 1/5 with labs      Ariel Smart MD  Hematology and Medical Oncology  [facility]    Route Chart for Scheduling    Med Onc Chart Routing      Follow up with physician . See me at [place] on 1/5 morning with CBC, CMP, serotonin, pancreastatin, 5-HIAA   Follow up with ELLY    Infusion scheduling note    Injection scheduling note    Labs CBC and CMP   Scheduling:  Preferred lab:  Lab interval:  serotonin, pancreastatiN, 5-HIAA   Imaging    Pharmacy appointment    Other referrals

## 2023-12-18 LAB — 5OH-INDOLEACETATE SERPL-MCNC: 7 NG/ML

## 2023-12-19 LAB — SEROTONIN: <30 NG/ML

## 2023-12-20 ENCOUNTER — HOSPITAL ENCOUNTER (OUTPATIENT)
Dept: RADIOLOGY | Facility: HOSPITAL | Age: 68
Discharge: HOME | End: 2023-12-20
Attending: INTERNAL MEDICINE
Payer: MEDICARE

## 2023-12-20 PROCEDURE — 74183 MRI ABDOMEN W WO CONTRAST: ICD-10-PCS | Mod: 26,,, | Performed by: RADIOLOGY

## 2023-12-20 PROCEDURE — A9585 GADOBUTROL INJECTION: HCPCS | Performed by: INTERNAL MEDICINE

## 2023-12-20 PROCEDURE — 74183 MRI ABD W/O CNTR FLWD CNTR: CPT | Mod: TC

## 2023-12-20 PROCEDURE — 74183 MRI ABD W/O CNTR FLWD CNTR: CPT | Mod: 26,,, | Performed by: RADIOLOGY

## 2023-12-20 PROCEDURE — 25500020 PHARM REV CODE 255: Performed by: INTERNAL MEDICINE

## 2023-12-20 RX ORDER — GADOBUTROL 604.72 MG/ML
8 INJECTION INTRAVENOUS
Status: COMPLETED | OUTPATIENT
Start: 2023-12-20 | End: 2023-12-20

## 2023-12-20 RX ADMIN — GADOBUTROL 8 ML: 604.72 INJECTION INTRAVENOUS at 01:12

## 2023-12-21 ENCOUNTER — OFFICE VISIT (OUTPATIENT)
Dept: INTERVENTIONAL RADIOLOGY/VASCULAR | Facility: CLINIC | Age: 68
End: 2023-12-21
Payer: MEDICARE

## 2023-12-21 ENCOUNTER — TUMOR BOARD CONFERENCE (OUTPATIENT)
Dept: INFUSION THERAPY | Facility: HOSPITAL | Age: 68
End: 2023-12-21
Payer: MEDICARE

## 2023-12-21 ENCOUNTER — LAB VISIT (OUTPATIENT)
Dept: LAB | Facility: HOSPITAL | Age: 68
End: 2023-12-21
Attending: INTERNAL MEDICINE
Payer: MEDICARE

## 2023-12-21 VITALS
SYSTOLIC BLOOD PRESSURE: 119 MMHG | HEART RATE: 69 BPM | WEIGHT: 191.56 LBS | BODY MASS INDEX: 31.88 KG/M2 | DIASTOLIC BLOOD PRESSURE: 72 MMHG

## 2023-12-21 DIAGNOSIS — C7B.8 NEUROENDOCRINE CARCINOMA METASTATIC TO LIVER: Primary | ICD-10-CM

## 2023-12-21 DIAGNOSIS — C7A.8 NEUROENDOCRINE CARCINOMA METASTATIC TO LIVER: Primary | ICD-10-CM

## 2023-12-21 DIAGNOSIS — C7A.026 MALIGNANT CARCINOID TUMOR OF RECTUM: ICD-10-CM

## 2023-12-21 DIAGNOSIS — C7A.012 MALIGNANT CARCINOID TUMOR OF ILEUM: ICD-10-CM

## 2023-12-21 LAB
ALBUMIN SERPL BCP-MCNC: 3.7 G/DL (ref 3.5–5.2)
ALP SERPL-CCNC: 101 U/L (ref 55–135)
ALT SERPL W/O P-5'-P-CCNC: 15 U/L (ref 10–44)
ANION GAP SERPL CALC-SCNC: 12 MMOL/L (ref 8–16)
ANISOCYTOSIS BLD QL SMEAR: SLIGHT
AST SERPL-CCNC: 24 U/L (ref 10–40)
BASOPHILS # BLD AUTO: 0.01 K/UL (ref 0–0.2)
BASOPHILS NFR BLD: 0.2 % (ref 0–1.9)
BILIRUB SERPL-MCNC: 0.4 MG/DL (ref 0.1–1)
BUN SERPL-MCNC: 15 MG/DL (ref 8–23)
CALCIUM SERPL-MCNC: 9.4 MG/DL (ref 8.7–10.5)
CHLORIDE SERPL-SCNC: 101 MMOL/L (ref 95–110)
CO2 SERPL-SCNC: 28 MMOL/L (ref 23–29)
CREAT SERPL-MCNC: 1.2 MG/DL (ref 0.5–1.4)
DIFFERENTIAL METHOD: ABNORMAL
EOSINOPHIL # BLD AUTO: 0.1 K/UL (ref 0–0.5)
EOSINOPHIL NFR BLD: 2.5 % (ref 0–8)
ERYTHROCYTE [DISTWIDTH] IN BLOOD BY AUTOMATED COUNT: 20 % (ref 11.5–14.5)
EST. GFR  (NO RACE VARIABLE): 49 ML/MIN/1.73 M^2
GLUCOSE SERPL-MCNC: 55 MG/DL (ref 70–110)
HCT VFR BLD AUTO: 29.9 % (ref 37–48.5)
HGB BLD-MCNC: 9.5 G/DL (ref 12–16)
HYPOCHROMIA BLD QL SMEAR: ABNORMAL
IMM GRANULOCYTES # BLD AUTO: 0.01 K/UL (ref 0–0.04)
IMM GRANULOCYTES NFR BLD AUTO: 0.2 % (ref 0–0.5)
LYMPHOCYTES # BLD AUTO: 1.3 K/UL (ref 1–4.8)
LYMPHOCYTES NFR BLD: 22 % (ref 18–48)
MCH RBC QN AUTO: 28.3 PG (ref 27–31)
MCHC RBC AUTO-ENTMCNC: 31.8 G/DL (ref 32–36)
MCV RBC AUTO: 89 FL (ref 82–98)
MONOCYTES # BLD AUTO: 0.4 K/UL (ref 0.3–1)
MONOCYTES NFR BLD: 6.2 % (ref 4–15)
NEUTROPHILS # BLD AUTO: 3.9 K/UL (ref 1.8–7.7)
NEUTROPHILS NFR BLD: 68.9 % (ref 38–73)
NRBC BLD-RTO: 0 /100 WBC
OVALOCYTES BLD QL SMEAR: ABNORMAL
PLATELET # BLD AUTO: 118 K/UL (ref 150–450)
PLATELET BLD QL SMEAR: ABNORMAL
PMV BLD AUTO: 10 FL (ref 9.2–12.9)
POTASSIUM SERPL-SCNC: 3.9 MMOL/L (ref 3.5–5.1)
PROT SERPL-MCNC: 7.8 G/DL (ref 6–8.4)
RBC # BLD AUTO: 3.36 M/UL (ref 4–5.4)
SODIUM SERPL-SCNC: 141 MMOL/L (ref 136–145)
WBC # BLD AUTO: 5.36 K/UL (ref 3.9–12.7)

## 2023-12-21 PROCEDURE — 1160F PR REVIEW ALL MEDS BY PRESCRIBER/CLIN PHARMACIST DOCUMENTED: ICD-10-PCS | Mod: CPTII,S$GLB,, | Performed by: PHYSICIAN ASSISTANT

## 2023-12-21 PROCEDURE — 85025 COMPLETE CBC W/AUTO DIFF WBC: CPT | Performed by: INTERNAL MEDICINE

## 2023-12-21 PROCEDURE — 80053 COMPREHEN METABOLIC PANEL: CPT | Performed by: INTERNAL MEDICINE

## 2023-12-21 PROCEDURE — 1126F PR PAIN SEVERITY QUANTIFIED, NO PAIN PRESENT: ICD-10-PCS | Mod: CPTII,S$GLB,, | Performed by: PHYSICIAN ASSISTANT

## 2023-12-21 PROCEDURE — 3074F SYST BP LT 130 MM HG: CPT | Mod: CPTII,S$GLB,, | Performed by: PHYSICIAN ASSISTANT

## 2023-12-21 PROCEDURE — 3008F PR BODY MASS INDEX (BMI) DOCUMENTED: ICD-10-PCS | Mod: CPTII,S$GLB,, | Performed by: PHYSICIAN ASSISTANT

## 2023-12-21 PROCEDURE — 3008F BODY MASS INDEX DOCD: CPT | Mod: CPTII,S$GLB,, | Performed by: PHYSICIAN ASSISTANT

## 2023-12-21 PROCEDURE — 99999 PR PBB SHADOW E&M-EST. PATIENT-LVL III: ICD-10-PCS | Mod: PBBFAC,,, | Performed by: PHYSICIAN ASSISTANT

## 2023-12-21 PROCEDURE — 1160F RVW MEDS BY RX/DR IN RCRD: CPT | Mod: CPTII,S$GLB,, | Performed by: PHYSICIAN ASSISTANT

## 2023-12-21 PROCEDURE — 99214 PR OFFICE/OUTPT VISIT, EST, LEVL IV, 30-39 MIN: ICD-10-PCS | Mod: S$GLB,,, | Performed by: PHYSICIAN ASSISTANT

## 2023-12-21 PROCEDURE — 99214 OFFICE O/P EST MOD 30 MIN: CPT | Mod: S$GLB,,, | Performed by: PHYSICIAN ASSISTANT

## 2023-12-21 PROCEDURE — 1126F AMNT PAIN NOTED NONE PRSNT: CPT | Mod: CPTII,S$GLB,, | Performed by: PHYSICIAN ASSISTANT

## 2023-12-21 PROCEDURE — 1159F PR MEDICATION LIST DOCUMENTED IN MEDICAL RECORD: ICD-10-PCS | Mod: CPTII,S$GLB,, | Performed by: PHYSICIAN ASSISTANT

## 2023-12-21 PROCEDURE — 3044F PR MOST RECENT HEMOGLOBIN A1C LEVEL <7.0%: ICD-10-PCS | Mod: CPTII,S$GLB,, | Performed by: PHYSICIAN ASSISTANT

## 2023-12-21 PROCEDURE — 1159F MED LIST DOCD IN RCRD: CPT | Mod: CPTII,S$GLB,, | Performed by: PHYSICIAN ASSISTANT

## 2023-12-21 PROCEDURE — 3078F DIAST BP <80 MM HG: CPT | Mod: CPTII,S$GLB,, | Performed by: PHYSICIAN ASSISTANT

## 2023-12-21 PROCEDURE — 3078F PR MOST RECENT DIASTOLIC BLOOD PRESSURE < 80 MM HG: ICD-10-PCS | Mod: CPTII,S$GLB,, | Performed by: PHYSICIAN ASSISTANT

## 2023-12-21 PROCEDURE — 3074F PR MOST RECENT SYSTOLIC BLOOD PRESSURE < 130 MM HG: ICD-10-PCS | Mod: CPTII,S$GLB,, | Performed by: PHYSICIAN ASSISTANT

## 2023-12-21 PROCEDURE — 3044F HG A1C LEVEL LT 7.0%: CPT | Mod: CPTII,S$GLB,, | Performed by: PHYSICIAN ASSISTANT

## 2023-12-21 PROCEDURE — 99999 PR PBB SHADOW E&M-EST. PATIENT-LVL III: CPT | Mod: PBBFAC,,, | Performed by: PHYSICIAN ASSISTANT

## 2023-12-21 PROCEDURE — 36415 COLL VENOUS BLD VENIPUNCTURE: CPT | Performed by: INTERNAL MEDICINE

## 2023-12-21 RX ORDER — TEMOZOLOMIDE 100 MG/1
300 CAPSULE ORAL DAILY
Qty: 15 CAPSULE | Refills: 11 | Status: ACTIVE | OUTPATIENT
Start: 2023-12-21 | End: 2024-03-20 | Stop reason: SDUPTHER

## 2023-12-21 RX ORDER — CAPECITABINE 500 MG/1
1000 TABLET, FILM COATED ORAL 2 TIMES DAILY
Qty: 56 TABLET | Refills: 11 | Status: ACTIVE | OUTPATIENT
Start: 2023-12-21 | End: 2024-12-20

## 2023-12-21 NOTE — PROGRESS NOTES
Called and spoke with Ms Marshall. MRI showed good response in treated area. Recc continued TACE. Her counts have recovered. Okay to start captem as a new cycle as her last cycle was stopped after 1 week of xeloda due to thrombocytopenia. New cycle will be with lower dose of xeloda 1000 mg bid days 1-14 and temodar 300 mg days 10-14. Messaged specialty pharmacy to ship patient medications. Asked patient to start next Monday.

## 2023-12-21 NOTE — Clinical Note
Getting TACE scheduled end of January.  Will let you know date so Hope [facility] can be arranged.  She wanted to know if you still need to see her 1/5/23

## 2023-12-21 NOTE — PROGRESS NOTES
"Subjective     Patient ID: Coby Marshall is a 68 y.o. female.    Chief Complaint: Follow-up    Patient referred to Interventional Radiology by Dr. Smart. Coby Marshall is a 67 y.o. 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  Pt underwent cTACE #1 on 3/1/23 with excellent response on MRI 4/4/23. She did well intraoperatively and stayed one night after TACE #1, but was re-admitted to [facility] with pain and bacteremia. She does not have a history of biliary instrumentation, however prior to TACE #2 she started Moxifloxacin 3 days pre-procedure out an abundance of caution.  She completed cTACE #2 4/24/23 to address some small residual in the left hepatic lobe.  She was discharged with moxifloxacin for 17 days post procedure.  No complications, successful chemoembolization of segments 2, 3 and 4B.  She had percutaneous microwave ablation of 3 hepatic lesions in segments 2, 4B, 5 on 7/21/23.  She completed cTACE #3 on 11/3/23 "hepatic angiography demonstrates tumor blush arising in the superior aspect of the right lobe.  Successful conventional tace performed thin this territory.  Additional foci of enhancement may be visualized in segment 4 and may require additional TACE."  MRI 12/20/23 reviewed in tumor board noting good response to cTACE with recommendation for additional cTACE to segments 4&5.     Interval History: Patient reports feeling well.  She has some gas and bloating.  She denies any fever, chills, unexpected wt change, fatigue, CP, SOB, cough, abdominal pain, N/V/D, leg pain or swelling, flank pain,confusion, sleep disturbance.      Review of Systems   Constitutional:  Negative for activity change, appetite change, chills, fatigue, fever and unexpected weight change.   Respiratory:  Negative for cough and shortness of breath.    Cardiovascular:  Negative for chest pain " and leg swelling.   Gastrointestinal:  Negative for abdominal distention, abdominal pain, constipation, diarrhea, nausea and vomiting.   Genitourinary:  Negative for difficulty urinating and flank pain.   Musculoskeletal:  Negative for back pain and gait problem.   Neurological:  Negative for weakness and memory loss.   Psychiatric/Behavioral:  Negative for confusion and sleep disturbance.           Objective     Physical Exam  Constitutional:       General: She is not in acute distress.     Appearance: She is well-developed. She is not diaphoretic.   HENT:      Head: Normocephalic and atraumatic.   Pulmonary:      Effort: Pulmonary effort is normal. No respiratory distress.   Neurological:      Mental Status: She is alert and oriented to person, place, and time.   Psychiatric:         Behavior: Behavior normal.         Thought Content: Thought content normal.         Judgment: Judgment normal.   IMAGING 12/20/23 MRI  EXAMINATION:  MRI ABDOMEN W WO CONTRAST     CLINICAL HISTORY:  Metastatic disease evaluation;  Malignant carcinoid tumor of the rectum     TECHNIQUE:  Multiplanar, multisequence MR imaging of the abdomen was performed before and after intravenous administration of Gadavist 8 cc     COMPARISON:  09/16/2023     FINDINGS:  Multifocal hepatic lesions are again demonstrated.  The largest is present within segment 8, measuring 4.5 cm, previously 5.4 cm.  This demonstrates decreased internal enhancement, with persistent moderately thick/irregular peripheral enhancement, overall improved.  A 2nd segment 8 mass measures 4.0 cm, without significant change in size, however also with decreased internal enhancement suggested.  A segment 4A lesion has decreased in size measuring 2.1 cm compared to 2.6 cm previously and shows only slight peripheral enhancement without internal enhancement.  No new or enlarging lesions are demonstrated.     The spleen is normal size and appearance.  The gallbladder is unremarkable.   There is a no biliary dilatation.     There is significant dilatation of the main pancreatic duct within the distal body and tail, measuring up to 8 mm, without significant change.  The known mass of the midbody of the pancreas is not distinctly visualized on this exam.     A few tiny renal cysts are present.  The adrenal glands are unremarkable.     Impression:     Multifocal hepatic lesions in keeping with known metastatic disease.  Overall mild improvement, and no evidence for interval disease progression.     Significant distal pancreatic ductal dilatation in the setting of a known mass in the mid of the pancreas.  The mass is a poorly visualized on today's examination however continued attention on follow-up is recommended.        Electronically signed by: Jesus Davenport MD  Date:                                            12/20/2023  Time:                                           15:12     Assessment and Plan     1. Neuroendocrine carcinoma metastatic to liver    Coby Marshall is a 67 y.o. 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 Farley).    The procedure, rationale for and against, goals of care (palliation, local tumor control), expectations and risks (including but not limited to, pain, bleeding, infection, damage to nearby structures, damage to liver or kidneys, treatment failure/recurrence, need for additional procedures, death), potential benefits, and alternatives were discussed with the patient.  All questions were  answered to the best of my abilities.  The patient wishes to proceed.     TACE with general anesthesia to be scheduled after 1/25/24 per patient and 's request.  Scheduling team notified and orders placed.  Will message oncology once we have a date to set up [facility].  Pre-op labs ordered.  Reviewed allergies.  Pt does not have allergy to contrast.  Based on stage of disease epinephrine allergy was added to the chart.  Discussed observation stay with the patient, anticipate 1 night stay.  Pt will be admitted to unreferred hospital medicine service during stay.  Pt to continue to see their oncologist.  She has appointment with Dr. Smart 1/5/24.         No follow-ups on file.

## 2023-12-22 NOTE — PROGRESS NOTES
[facility]      NEUROENDOCRINE MULTIDISCIPLINARY TUMOR BOARD  _____________________________________________________________________    PRESENTER:   Ariel Smart MD    REASON FOR PRESENTATION:  Scan Review    ATTENDEES:   Gastroenterology - Not Present  Interventional Radiology - Dhaval Valdez MD and MERVIN Guajardo  Nuclear Medicine - Arsenio Wesley MD  Nursing - NET  Nursing: Latoya Montalvo, RN and Maty Cole RN  Oncology - Ariel Smart MD and Ricardo Mcginnis MD  Palliative Medicine - Not Present  Pathology -  Rai Coles MD  Research - Not Present  Surgery - MD Rayshawn Mireles MD William Kethman, MD    PATIENT STATUS:  Established Patient    PATIENT SUMMARY:  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    Ki 67 -3-20%    Hyperlipidemia     Hypertension     Malignant carcinoid tumor of ileum 12/2017    Ki 67 < 3%    Rectal cancer 2/8/2019       Past Surgical History:   Procedure Laterality Date    COLON SURGERY      ENDOSCOPIC ULTRASOUND OF UPPER GASTROINTESTINAL TRACT N/A 12/17/2021    Procedure: ULTRASOUND, UPPER GI TRACT, ENDOSCOPIC;  Surgeon: Jeffery Clemons MD;  Location: [facility] ([address] [place] [address]);  Service: Endoscopy;  Laterality: N/A;  instructions emailed to pt-BB  fully vaccinated-BB   12/13 arrival time confirmed with pt-rb    HYSTERECTOMY      ILEOSTOMY CLOSURE  01/2018    Lower Anterior Resection  12/2017    NET- Dr. Manjarrez - [facility]    port a cath Right     R chest wall       TUMOR MARKERS:  5-HIAA, Plasma Ref Range: up to 22 ng/mL  Recent Labs   Lab 04/04/23  1450 05/15/23  1254 06/27/23  1300 10/04/23  1051 11/10/23  0941 12/15/23  1308   5-HIAA, Plasma (Neuroend) 7 7 9 10 5 7     Chromogranin A Ref Range: <93 ng/mL  Recent Labs   Lab 11/11/21  1016 02/01/23  0913   Chromogranin A 41 27     Gastrin Ref Range: <100 pg/mL      Neurokinin A Ref Range: 0 - 40 pg/mL       Pancreastatin Ref Range: 10 - 135 pg/mL  Recent Labs   Lab 02/10/23  0840 04/04/23  1450 05/15/23  1254 06/27/23  1300 10/04/23  1051 11/10/23  0941   Pancreastatin 85 80 87 59 51 82     Pancreatic Polypeptide Ref Range: >314 pg/mL  Recent Labs   Lab 11/11/21  1016   Pancreatic Polypeptide 225     Serotonin Ref Range: <=230 ng/mL  Recent Labs   Lab 04/04/23  1450 05/15/23  1254 06/27/23  1300 10/04/23  1051 11/10/23  0941 12/15/23  1308   Serotonin <30 <30 <30 <30 <30 <30           Latest Ref Rng & Units 12/21/2023    10:01 AM 12/21/2023     9:35 AM 12/15/2023     1:18 PM   Neuroendocrine Labs   WBC 3.90 - 12.70 K/uL  5.36     RBC 4.00 - 5.40 M/uL  3.36     HGB 12.0 - 16.0 g/dL  9.5     HCT 37.0 - 48.5 %  29.9     MCV 82 - 98 fL  89     MCH 27.0 - 31.0 pg  28.3     MCHC 32.0 - 36.0 g/dL  31.8     RDW 11.5 - 14.5 %  20.0     PLATLETS 150 - 450 K/uL  118     MPV 9.2 - 12.9 fL  10.0     GRAN # 1.8 - 7.7 K/uL  3.9     LYMPH # 1.0 - 4.8 K/uL  1.3     MONO # 0.3 - 1.0 K/uL  0.4     EOS # 0.0 - 0.5 K/uL  0.1     BASO # 0.00 - 0.20 K/uL  0.01     GRAN % 38.0 - 73.0 %  68.9     LYMPH % 18.0 - 48.0 %  22.0     MONO % 4.0 - 15.0 %  6.2     EOS % 0.0 - 8.0 %  2.5     BASO % 0.0 - 1.9 %  0.2     DIFF METHOD   Automated     GLUCOSE 70 - 110 mg/dL  55     BUN 8 - 23 mg/dL  15     CREATININE 0.5 - 1.4 mg/dL  1.2      - 145 mmol/L  141     K 3.5 - 5.1 mmol/L  3.9     CHLORIDE 95 - 110 mmol/L  101     CO2 23 - 29 mmol/L  28     CALCIUM 8.7 - 10.5 mg/dL  9.4     PROTEIN, TOTAL 6.0 - 8.4 g/dL  7.8     ALBUMIN 3.5 - 5.2 g/dL  3.7     TOTAL BILIRUBIN 0.1 - 1.0 mg/dL  0.4        For infants and newborns, interpretation of results should be based  on gestational age, weight and in agreement with clinical  observations.    Premature Infant recommended reference ranges:  Up to 24 hours.............<8.0 mg/dL  Up to 48 hours............<12.0 mg/dL  3-5 days..................<15.0 mg/dL  6-29 days.................<15.0 mg/dL      ALK  PHOSPHATASE 55 - 135 U/L  101     SGOT (AST) 10 - 40 U/L  24     SGPT (ALT) 10 - 44 U/L  15     Weight  191 lbs 9 oz  186 lbs 5 oz     ____________________________________________________________________    DISCUSSION:69 yo woman with history of ypT3N0 rectal NET, well differentiated, grade 2 (Ki 67 16%) and small bowel NET, well diff, low grade, s/p surgery in Jan 2018. She was found to have metastatic disease to the liver and pancreas in July 2021 s/p biopsy. Liver NET is well diff grade 2 (Ki67 15)%. Pancreatic NET is well diff grade 3 (Ki67 25%). She has been on lanreotide since July 2022, changed to sandostatin in Jan 2023 after CT scan showed progression. S/p TACE to the liver on 3/1/23, 4/24/23, 7/21/23, 11/3/23. last MRI and PET reviewed at tumor board. MRI showed response in ablated cavities. Residual in dome lesions has progressed. Pancreatic lesion increased to 2.3 x 1.6 cm. Copper PET showed stable distribution of disease outside of liver. started captem on 10/14/23. Reviewed MRI from 12/20    INT RAD:MRI demonstrates great response to treated area. Patient will require additional treatment to address lesions in S4.    NUC MED: Defer to Dr. Farley    BOARD RECOMMENDATIONS: Good response TACE, complete TACE.

## 2023-12-23 LAB — PANCREASTATIN SERPL-MCNC: 65 PG/ML (ref 10–135)

## 2023-12-28 ENCOUNTER — TELEPHONE (OUTPATIENT)
Dept: CARDIOLOGY | Facility: HOSPITAL | Age: 68
End: 2023-12-28
Payer: MEDICARE

## 2023-12-28 NOTE — TELEPHONE ENCOUNTER
Called patient to followup on extravasation for MRI.  Pt state arm is back to normal size and no pain or numbness is present.

## 2024-01-02 ENCOUNTER — DOCUMENTATION ONLY (OUTPATIENT)
Dept: HEMATOLOGY/ONCOLOGY | Facility: CLINIC | Age: 69
End: 2024-01-02
Payer: MEDICARE

## 2024-01-02 NOTE — PROGRESS NOTES
Received Vashti Mckee assistance request from Dr. Smart via in basket.     Chart reviewed to verify appointment in addition to notice from MD. Call made to patient who is requesting stay from 1/24-1/26. Referral form completed and pt thanks EM for assistance.     Referral emailed to Vashti Mckee at 08:56.     SYLVIA Lawrence, [facility]  Oncology Social Worker  [facility]   [phone]

## 2024-01-05 ENCOUNTER — OFFICE VISIT (OUTPATIENT)
Dept: HEMATOLOGY/ONCOLOGY | Facility: CLINIC | Age: 69
End: 2024-01-05
Payer: MEDICARE

## 2024-01-05 ENCOUNTER — LAB VISIT (OUTPATIENT)
Dept: LAB | Facility: HOSPITAL | Age: 69
End: 2024-01-05
Attending: INTERNAL MEDICINE
Payer: MEDICARE

## 2024-01-05 VITALS
HEART RATE: 63 BPM | HEIGHT: 65 IN | OXYGEN SATURATION: 98 % | SYSTOLIC BLOOD PRESSURE: 126 MMHG | DIASTOLIC BLOOD PRESSURE: 70 MMHG | BODY MASS INDEX: 32.27 KG/M2 | TEMPERATURE: 97 F | WEIGHT: 193.69 LBS

## 2024-01-05 DIAGNOSIS — C7A.012 MALIGNANT CARCINOID TUMOR OF ILEUM: ICD-10-CM

## 2024-01-05 DIAGNOSIS — D84.81 IMMUNODEFICIENCY SECONDARY TO NEOPLASM: ICD-10-CM

## 2024-01-05 DIAGNOSIS — Z79.899 IMMUNODEFICIENCY DUE TO DRUGS: ICD-10-CM

## 2024-01-05 DIAGNOSIS — D3A.8 NEUROENDOCRINE TUMOR: ICD-10-CM

## 2024-01-05 DIAGNOSIS — C7A.026 MALIGNANT CARCINOID TUMOR OF RECTUM: ICD-10-CM

## 2024-01-05 DIAGNOSIS — C7A.026 MALIGNANT CARCINOID TUMOR OF RECTUM: Primary | ICD-10-CM

## 2024-01-05 DIAGNOSIS — D3A.8 NEUROENDOCRINE TUMOR OF PANCREAS: ICD-10-CM

## 2024-01-05 DIAGNOSIS — I10 ESSENTIAL HYPERTENSION: ICD-10-CM

## 2024-01-05 DIAGNOSIS — D84.821 IMMUNODEFICIENCY DUE TO DRUGS: ICD-10-CM

## 2024-01-05 DIAGNOSIS — D69.59 SECONDARY THROMBOCYTOPENIA: ICD-10-CM

## 2024-01-05 DIAGNOSIS — D49.9 IMMUNODEFICIENCY SECONDARY TO NEOPLASM: ICD-10-CM

## 2024-01-05 DIAGNOSIS — R19.7 DIARRHEA, UNSPECIFIED TYPE: ICD-10-CM

## 2024-01-05 DIAGNOSIS — N18.30 STAGE 3 CHRONIC KIDNEY DISEASE, UNSPECIFIED WHETHER STAGE 3A OR 3B CKD: ICD-10-CM

## 2024-01-05 DIAGNOSIS — C78.7 SECONDARY MALIGNANT NEOPLASM OF LIVER: ICD-10-CM

## 2024-01-05 LAB
ALBUMIN SERPL BCP-MCNC: 3.8 G/DL (ref 3.5–5.2)
ALP SERPL-CCNC: 81 U/L (ref 55–135)
ALT SERPL W/O P-5'-P-CCNC: 13 U/L (ref 10–44)
ANION GAP SERPL CALC-SCNC: 13 MMOL/L (ref 8–16)
AST SERPL-CCNC: 24 U/L (ref 10–40)
BASOPHILS # BLD AUTO: 0.02 K/UL (ref 0–0.2)
BASOPHILS NFR BLD: 0.4 % (ref 0–1.9)
BILIRUB SERPL-MCNC: 0.6 MG/DL (ref 0.1–1)
BUN SERPL-MCNC: 14 MG/DL (ref 8–23)
CALCIUM SERPL-MCNC: 9.4 MG/DL (ref 8.7–10.5)
CHLORIDE SERPL-SCNC: 104 MMOL/L (ref 95–110)
CO2 SERPL-SCNC: 27 MMOL/L (ref 23–29)
CREAT SERPL-MCNC: 1.4 MG/DL (ref 0.5–1.4)
DIFFERENTIAL METHOD BLD: ABNORMAL
EOSINOPHIL # BLD AUTO: 0.1 K/UL (ref 0–0.5)
EOSINOPHIL NFR BLD: 1.9 % (ref 0–8)
ERYTHROCYTE [DISTWIDTH] IN BLOOD BY AUTOMATED COUNT: 20.9 % (ref 11.5–14.5)
EST. GFR  (NO RACE VARIABLE): 40 ML/MIN/1.73 M^2
GLUCOSE SERPL-MCNC: 99 MG/DL (ref 70–110)
HCT VFR BLD AUTO: 30.4 % (ref 37–48.5)
HGB BLD-MCNC: 9.4 G/DL (ref 12–16)
IMM GRANULOCYTES # BLD AUTO: 0.01 K/UL (ref 0–0.04)
IMM GRANULOCYTES NFR BLD AUTO: 0.2 % (ref 0–0.5)
LYMPHOCYTES # BLD AUTO: 1.4 K/UL (ref 1–4.8)
LYMPHOCYTES NFR BLD: 28.6 % (ref 18–48)
MCH RBC QN AUTO: 28.7 PG (ref 27–31)
MCHC RBC AUTO-ENTMCNC: 30.9 G/DL (ref 32–36)
MCV RBC AUTO: 93 FL (ref 82–98)
MONOCYTES # BLD AUTO: 0.6 K/UL (ref 0.3–1)
MONOCYTES NFR BLD: 11.8 % (ref 4–15)
NEUTROPHILS # BLD AUTO: 2.7 K/UL (ref 1.8–7.7)
NEUTROPHILS NFR BLD: 57.1 % (ref 38–73)
NRBC BLD-RTO: 0 /100 WBC
PLATELET # BLD AUTO: 336 K/UL (ref 150–450)
PMV BLD AUTO: 9 FL (ref 9.2–12.9)
POTASSIUM SERPL-SCNC: 4 MMOL/L (ref 3.5–5.1)
PROT SERPL-MCNC: 7.4 G/DL (ref 6–8.4)
RBC # BLD AUTO: 3.28 M/UL (ref 4–5.4)
SODIUM SERPL-SCNC: 144 MMOL/L (ref 136–145)
WBC # BLD AUTO: 4.75 K/UL (ref 3.9–12.7)

## 2024-01-05 PROCEDURE — 1160F RVW MEDS BY RX/DR IN RCRD: CPT | Mod: CPTII,S$GLB,, | Performed by: INTERNAL MEDICINE

## 2024-01-05 PROCEDURE — 3074F SYST BP LT 130 MM HG: CPT | Mod: CPTII,S$GLB,, | Performed by: INTERNAL MEDICINE

## 2024-01-05 PROCEDURE — 99215 OFFICE O/P EST HI 40 MIN: CPT | Mod: S$GLB,,, | Performed by: INTERNAL MEDICINE

## 2024-01-05 PROCEDURE — 80053 COMPREHEN METABOLIC PANEL: CPT | Performed by: INTERNAL MEDICINE

## 2024-01-05 PROCEDURE — 83497 ASSAY OF 5-HIAA: CPT | Performed by: INTERNAL MEDICINE

## 2024-01-05 PROCEDURE — 85025 COMPLETE CBC W/AUTO DIFF WBC: CPT | Performed by: INTERNAL MEDICINE

## 2024-01-05 PROCEDURE — 3078F DIAST BP <80 MM HG: CPT | Mod: CPTII,S$GLB,, | Performed by: INTERNAL MEDICINE

## 2024-01-05 PROCEDURE — 3288F FALL RISK ASSESSMENT DOCD: CPT | Mod: CPTII,S$GLB,, | Performed by: INTERNAL MEDICINE

## 2024-01-05 PROCEDURE — 1159F MED LIST DOCD IN RCRD: CPT | Mod: CPTII,S$GLB,, | Performed by: INTERNAL MEDICINE

## 2024-01-05 PROCEDURE — 3008F BODY MASS INDEX DOCD: CPT | Mod: CPTII,S$GLB,, | Performed by: INTERNAL MEDICINE

## 2024-01-05 PROCEDURE — 1101F PT FALLS ASSESS-DOCD LE1/YR: CPT | Mod: CPTII,S$GLB,, | Performed by: INTERNAL MEDICINE

## 2024-01-05 PROCEDURE — 99999 PR PBB SHADOW E&M-EST. PATIENT-LVL III: CPT | Mod: PBBFAC,,, | Performed by: INTERNAL MEDICINE

## 2024-01-05 PROCEDURE — 83519 RIA NONANTIBODY: CPT | Performed by: INTERNAL MEDICINE

## 2024-01-05 PROCEDURE — 84260 ASSAY OF SEROTONIN: CPT | Performed by: INTERNAL MEDICINE

## 2024-01-05 NOTE — PROGRESS NOTES
PROGRESS NOTE    Subjective:       Patient ID: Coby Marshall is a 68 y.o. female.    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    Tempus: no reportable pathogenic mutation. MSI-low. PDL1 negative. TMB 3.7    Oncologic History:  1. Ms. 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 Her primary oncologist is Dr. Olson at [place].  Her oncology history is detailed below. She was initially diagnosed with rectal adenocarcinoma and she was treated with neoadjuvant chemoRT. Surgical pathology resulted as well-differentiated carcinoid and she was initially observed. A CT obtained for hernia revealed liver metastases in July 2021, and these were confirmed to be neuroendocrine on pathology. She underwent EUS with biopsy in December 2021 which showed grade 3 NET in the pancreas with Ki67 25%. She was started on lanreotide. Her liver lesions have since grown in size, and her oncologist referred her for consideration of liver-directed therapy. Ms. Marshall reports ongoing diarrhea since the time of her initial resection. She has about 6 bowel movements per day. She does not have any flushing, palpitations, or diaphoresis. She otherwise feels well.   Oncology History:   8/2017: Well-differentiated carcinoid tumor in the rectum, grade 2, and in the terminal ileum, grade 1. Initially diagnosed as adenocarcinoma and later changed to carcinoid tumor at time of resection  Treated with concurrent chemoRT with infusional 5-FU under the diagnosis of adenocarcinoma  1/2018: resection of rectum and terminal ileum. Pathology showed a ypT3N0 1.7 cm rectal NET, well  "differentiated, grade 2, Ki 67 was 16%. 1.5 cm T2Nx small bowel NET, well diff, Ki 67 was less than 3%.  7/22/2021: Metastasis to liver on CT for hernia repair.   11/10/2021: Copper PET:    In this patient with rectal and ileal neuroendocrine tumor status post low anterior resection/small bowel resection, there are multiple somatostatin receptor avid hepatic lesions which appear increased in size as compared to outside CT 07/19/2021.  There is also tracer avid focus in the pancreatic body suspicious for malignancy.  All these findings are new since prior Dotatate PET-CT.  12/17/2021: EUS with biopsies:  "1. PANCREAS MASS, ENDOSCOPIC ULTRASOUND-GUIDED FNA:   Well-differentiated neuroendocrine tumor, favor WHO grade 3 (G3).   Comment:  Ki-67 labeling index is approximately 25%.  Average mitotic rate is   9 per 2 mm^2.  Tumor cells are positive with synaptophysin and chromogranin,   supporting the diagnosis.   2. LIVER, LEFT LOWER LOBE, ENDOSCOPIC ULTRASOUND-GUIDED FNA:   Well-differentiated neuroendocrine tumor.   3. LIVER, RIGHT LOWER LOBE, ENDOSCOPIC ULTRASOUND-GUIDED FNA:   Well-differentiated neuroendocrine tumor, favor WHO grade 2 (G2).   Comment (2,3):  Tumor in parts 2 and 3 are histologically identical.  Only   part 3 contains enough cell block lesional material to render a meaningful   labeling index and mitotic count.  Ki-67 labeling index is approximately 15%.    Average mitotic rate is 3 per 2 mm^2.  Tumor cells are positive with   synaptophysin but negative for chromogranin.  The overall findings are still   consistent with metastatic well-differentiated neuroendocrine tumor."  1/3/2022: Lanreotide started  1/4/2023: repeat CT abd/pelvis: worsening hepatic metastases. Changed Lanreotide to Sandostatin  2. Case reviewed at tumor board. [facility] serial TACE. S/p TACE on 3/1/23, 4/24/23, 7/21/23, 11/3/23  3. Scan in Sep/Oct 2023 showed residual in dome lesions progressed. Pancreatic lesion increased to 2.3 x 1.6 " cm. Started captem 10/14/23 for progression of the pancreatic lesion. Had TACE on 11/3/23.     Interval History:   Ms Marshall returns today for follow up. Started captem on 23. Now on xeloda 1000 mg bid days 1-14 and temodar 300 mg days 10-14 every 28 day cycle. Temodar dose reduced for plt count 40 after taking xeloda for a week. She is doing well. Does have diarrhea. Takes imodium and lomotil. Three BM a day. Need to have intraocular injection for retinal bleeds on 24 hence needs TACE rescheduled.     ECO    ROS:   A ten-point system review is obtained and negative except for what was stated in the Interval History.     Physical Examination:   Vital signs reviewed.   General: well hydrated, well developed, in no acute distress  HEENT: normocephalic, PERRLA, EOMI, anicteric sclerae  Neck: supple, no JVD, thyromegaly, cervical or supraclavicular lymphadenopathy  Lungs: clear breath sounds bilaterally, no wheezing, rales, or rhonchi  Heart: RRR, no M/R/G  Abdomen: soft, no tenderness, non-distended, no hepatosplenomegaly, mass, or hernia. BS present  Extremities: no clubbing, cyanosis, or edema  Skin: no rash, ulcer, or open wounds  Neuro: alert and oriented x 4, no focal neuro deficit  Psych: pleasant and appropriate mood and affect    Objective:     Laboratory Data:  Labs reviewed. Plt count normal    Imaging Data:  Gallium PET 23:  Similar number and distribution of radiotracer avid lesions within the liver and pancreatic body.  Several liver lesions demonstrate central PET photopenia and hypodensity on CT consistent with necrosis.     New nonspecific mild radiotracer uptake within several small left axillary lymph nodes.  Finding may relate to left upper extremity injection and partial radiotracer extravasation.     Otherwise, no new somatostatin receptor avid lesion.    I have personally reviewed her CT abdomen/pelvis with Dr Soni: Receptor positive pancreatic lesion and liver mets. Liver mets  appear enlarged since 6/2022, though this may be due to larger cystic components.     MRI abdomen 4/4/23:  Redemonstration of multiple hepatic lesions in keeping with known neuroendocrine tumor metastasis.  Dominant lesions within the right lobe and segment 4 demonstrate decreased central enhancement consistent with response to therapy with residual disease likely present at these sites.  Additional stable enhancing lesions within the left and right lobes consistent with residual disease.  No definite new lesion.     Persistent restricted diffusion within the pancreatic body in keeping with known lesion.     Stable mildly prominent periportal lymph nodes, nonspecific.    MRI abdomen 6/07/23  Impression:  1. Interval hepatic chemoembolization.  Decreased size of multiple hepatic lesions consistent with known metastasis.  Several lesions demonstrate persistent enhancement consistent with residual disease.  New arterial enhancement within a right hepatic lobe lesion consistent with tumor.  2. Persistent restricted diffusion within the pancreatic body in keeping with known lesion.    NM PET 6/27/23  Impression:  Overall findings concerning for mild disease progression with new hepatic lesion, enlarging pancreatic body lesion and new focus of radiotracer uptake within the right hilum.  Post treatment changes of several liver lesions with decreased radiotracer uptake.  Additional nonspecific low level radiotracer uptake within axillary, mediastinal, and bilateral inguinal lymph nodes.  Attention on follow-up.    MRI 9/16/23:  Impression:     Patient with neuroendocrine tumor with hepatic metastases and interval microwave ablation of multiple hepatic lesions.     Small amount of diffusion restriction at the superior aspect of segment 4B treatment cavity raises question of residual tumor.  Attention on follow-up.     Interval decrease in size of segment 5/8 lesion.     Interval mildly increased size of segment 6 lesion with  persistent internal enhancement and nodular diffusion restriction which may reflect residual disease.     No definite new hepatic lesion.     Mildly increased size of pancreatic body mass.    PET scan 10/4/23:  Impression:     Multiple radiotracer avid hepatic lesions with mixed interval changes compared to prior exam, please see above for additional details.     Mild increased uptake within a right hilar lesion/lymph node.  Radiotracer avid pancreatic body lesion appears mildly enlarged.     No definite new lesion elsewhere.     Persistent mild nonspecific radiotracer uptake within small bilateral axillary lymph nodes     Assessment and Plan:     1. Malignant carcinoid tumor of rectum    2. Malignant carcinoid tumor of ileum    3. Neuroendocrine tumor of pancreas    4. Neuroendocrine tumor    5. Secondary malignant neoplasm of liver    6. Immunodeficiency secondary to neoplasm    7. Immunodeficiency due to drugs    8. Stage 3 chronic kidney disease, unspecified whether stage 3a or 3b CKD    9. Essential hypertension    10. Secondary thrombocytopenia    11. Diarrhea, unspecified type        1-7  - Ms Marshall is a 69 yo woman with history of ypT3N0 rectal NET, well differentiated, grade 2 (Ki 67 16%) and small bowel NET, well diff, low grade, s/p surgery in Jan 2018. She was found to have metastatic disease to the liver and pancreas in July 2021 s/p biopsy. Liver NET is well diff grade 2 (Ki67 15)%. Pancreatic NET is well diff grade 3 (Ki67 25%). She has been on lanreotide since July 2022, changed to sandostatin in Jan 2023 after CT scan showed progression. S/p TACE to the liver on 3/1/23, 4/24/23, 7/21/23, 11/3/23  - last MRI and PET reviewed at tumor board. MRI showed response in ablated cavities. Residual in dome lesions has progressed. Pancreatic lesion increased to 2.3 x 1.6 cm. Copper PET showed stable distribution of disease outside of liver.   - started captem on 10/14/23  - doing well. C/w captem.  c/w xeloda  at 1000 mg bid days 1-14, but drop temodar to 300 mg days 10 through 14 every 28 day cycle.   - due to restart the next cycle on 1/22/24. Will get labs in [place] on 1/19/24. I will call her on 1/22 with results  - see me at [facility] on 2/19/24 before starting a new cycle.  - messaged IR to reschedule TACE  - next MRI one month after TACE  - c/w lanreotide with Dr Olson    8.  - stable. Monitor. Xeloda dose reduced    9.  - BP controlled  - c/w current medication    10.  - see above for chemo change. Resolved    11.  - mild  - c/w imodium and lomotil prn    Follow-up:     Labs at [place] on 1/19/24  See me on 2/19/24 with labs  Messaged [facility] scheduling team      Ariel Smart MD  Hematology and Medical Oncology  [facility]

## 2024-01-08 ENCOUNTER — DOCUMENTATION ONLY (OUTPATIENT)
Dept: HEMATOLOGY/ONCOLOGY | Facility: CLINIC | Age: 69
End: 2024-01-08
Payer: MEDICARE

## 2024-01-08 NOTE — PROGRESS NOTES
Received in basket message from Dr. Smart requesting assistance with [facility] referral for new apt date.     Call made to pt and voicemail left encouraging pt to call back at earliest convenience to confirm new dates needed for lodging. Patient coming from [facility] for apt on 2/7/24.     Radha Issa MSW, [facility]  Oncology Social Worker  [facility]   [phone]

## 2024-01-09 ENCOUNTER — DOCUMENTATION ONLY (OUTPATIENT)
Dept: HEMATOLOGY/ONCOLOGY | Facility: CLINIC | Age: 69
End: 2024-01-09
Payer: MEDICARE

## 2024-01-09 LAB — SEROTONIN: <30 NG/ML

## 2024-01-09 NOTE — PROGRESS NOTES
SW called patient to follow up on [facility] stay needs. Patient reports she will need lodging 2/6-2/8 for procedure scheduled on 2/7. No further needs identified. Reassured pt that SW will submit referral to [facility] this morning and pt v/u.Referral emailed at 08:49.    Update sent to Dr. Smart via in basket.    Radha Issa, MSW, hospitalsW  Oncology Social Worker  [facility]   [phone]

## 2024-01-10 LAB — 5OH-INDOLEACETATE SERPL-MCNC: 10 NG/ML

## 2024-01-13 LAB — PANCREASTATIN SERPL-MCNC: 72 PG/ML (ref 10–135)

## 2024-01-19 ENCOUNTER — LAB VISIT (OUTPATIENT)
Dept: LAB | Facility: HOSPITAL | Age: 69
End: 2024-01-19
Attending: INTERNAL MEDICINE
Payer: MEDICARE

## 2024-01-19 DIAGNOSIS — C7A.026 MALIGNANT CARCINOID TUMOR OF RECTUM: ICD-10-CM

## 2024-01-19 LAB
ALBUMIN SERPL BCP-MCNC: 4.1 G/DL (ref 3.5–5.2)
ALP SERPL-CCNC: 72 U/L (ref 55–135)
ALT SERPL W/O P-5'-P-CCNC: 11 U/L (ref 10–44)
ANION GAP SERPL CALC-SCNC: 6 MMOL/L (ref 8–16)
AST SERPL-CCNC: 19 U/L (ref 10–40)
BASOPHILS # BLD AUTO: 0.04 K/UL (ref 0–0.2)
BASOPHILS NFR BLD: 0.6 % (ref 0–1.9)
BILIRUB SERPL-MCNC: 0.5 MG/DL (ref 0.1–1)
BUN SERPL-MCNC: 17 MG/DL (ref 8–23)
CALCIUM SERPL-MCNC: 9.6 MG/DL (ref 8.7–10.5)
CHLORIDE SERPL-SCNC: 104 MMOL/L (ref 95–110)
CO2 SERPL-SCNC: 31 MMOL/L (ref 23–29)
CREAT SERPL-MCNC: 1.3 MG/DL (ref 0.5–1.4)
DIFFERENTIAL METHOD BLD: ABNORMAL
EOSINOPHIL # BLD AUTO: 0.1 K/UL (ref 0–0.5)
EOSINOPHIL NFR BLD: 2 % (ref 0–8)
ERYTHROCYTE [DISTWIDTH] IN BLOOD BY AUTOMATED COUNT: 20.8 % (ref 11.5–14.5)
EST. GFR  (NO RACE VARIABLE): 44.2 ML/MIN/1.73 M^2
GLUCOSE SERPL-MCNC: 86 MG/DL (ref 70–110)
HCT VFR BLD AUTO: 31.6 % (ref 37–48.5)
HGB BLD-MCNC: 9.8 G/DL (ref 12–16)
IMM GRANULOCYTES # BLD AUTO: 0.01 K/UL (ref 0–0.04)
IMM GRANULOCYTES NFR BLD AUTO: 0.2 % (ref 0–0.5)
LYMPHOCYTES # BLD AUTO: 1.5 K/UL (ref 1–4.8)
LYMPHOCYTES NFR BLD: 22.3 % (ref 18–48)
MCH RBC QN AUTO: 29.8 PG (ref 27–31)
MCHC RBC AUTO-ENTMCNC: 31 G/DL (ref 32–36)
MCV RBC AUTO: 96 FL (ref 82–98)
MONOCYTES # BLD AUTO: 0.8 K/UL (ref 0.3–1)
MONOCYTES NFR BLD: 11.5 % (ref 4–15)
NEUTROPHILS # BLD AUTO: 4.2 K/UL (ref 1.8–7.7)
NEUTROPHILS NFR BLD: 63.4 % (ref 38–73)
NRBC BLD-RTO: 0 /100 WBC
PLATELET # BLD AUTO: 242 K/UL (ref 150–450)
PMV BLD AUTO: 10.1 FL (ref 9.2–12.9)
POTASSIUM SERPL-SCNC: 4.2 MMOL/L (ref 3.5–5.1)
PROT SERPL-MCNC: 7.5 G/DL (ref 6–8.4)
RBC # BLD AUTO: 3.29 M/UL (ref 4–5.4)
SODIUM SERPL-SCNC: 141 MMOL/L (ref 136–145)
WBC # BLD AUTO: 6.54 K/UL (ref 3.9–12.7)

## 2024-01-19 PROCEDURE — 85025 COMPLETE CBC W/AUTO DIFF WBC: CPT | Performed by: INTERNAL MEDICINE

## 2024-01-19 PROCEDURE — 36415 COLL VENOUS BLD VENIPUNCTURE: CPT | Performed by: INTERNAL MEDICINE

## 2024-01-19 PROCEDURE — 80053 COMPREHEN METABOLIC PANEL: CPT | Performed by: INTERNAL MEDICINE

## 2024-01-29 ENCOUNTER — DOCUMENTATION ONLY (OUTPATIENT)
Dept: HEMATOLOGY/ONCOLOGY | Facility: CLINIC | Age: 69
End: 2024-01-29
Payer: MEDICARE

## 2024-01-29 NOTE — PROGRESS NOTES
Previously received email from Tayla MIGUEL with request for [employer] assistance for copay of Temozolomide of $277.12. Tayla states pt will more than likely need assistance again for this copay. Patient ineligible for [place] assistance as she lives in [place].     EM spoke with SW supervisor Stephanie Flores LCSW regarding [employer] request with knowledge that patient also has a $5,000 deductible that must be met before copay is $0. SW to discuss further questions with patient before decision is made.     This SW called patient for further assistance however line does not  and voicemail is unable to be left. SW called spouse who states he just left home but will have patient call SW.     Update provided to Tayla MIGUEL via email. SW will provide second attempt to reach pt if no call back is received.

## 2024-01-29 NOTE — PROGRESS NOTES
Received transferred call from call center/patient and discussed [employer] assistance needs. Patient states she can possibly cover $125 of $277.12 copay. When asked if pt has considered long term plan for meeting $5,000 deductible, patient says she does not know as she cannot pay $5,000 for needs/medications. Reassured her that SW would discuss again with supervisor and provide update accordingly.     Discussed update with supervisor Stephanie Flores LCSW who states [employer] can cover this copay assistance but long term plan must be discussed as funding assistance is limited.     SW completed OCI specialty pharmacy form and emailed to Tayla Myers Pharm D.     Call back made to patient to inform that patient assistance fund can cover this copay however reiterated that long term plan must be discussed for future coverage needs. Patient states she will discuss with family and Oncologist Dr. Smart. No further questions identified.

## 2024-02-05 ENCOUNTER — TELEPHONE (OUTPATIENT)
Dept: INTERVENTIONAL RADIOLOGY/VASCULAR | Facility: HOSPITAL | Age: 69
End: 2024-02-05
Payer: MEDICARE

## 2024-02-06 ENCOUNTER — LAB VISIT (OUTPATIENT)
Dept: LAB | Facility: HOSPITAL | Age: 69
End: 2024-02-06
Attending: PHYSICIAN ASSISTANT
Payer: MEDICARE

## 2024-02-06 DIAGNOSIS — C7A.8 NEUROENDOCRINE CARCINOMA METASTATIC TO LIVER: ICD-10-CM

## 2024-02-06 DIAGNOSIS — C7B.8 NEUROENDOCRINE CARCINOMA METASTATIC TO LIVER: ICD-10-CM

## 2024-02-06 LAB
ALBUMIN SERPL BCP-MCNC: 4.2 G/DL (ref 3.5–5.2)
ALP SERPL-CCNC: 74 U/L (ref 55–135)
ALT SERPL W/O P-5'-P-CCNC: 13 U/L (ref 10–44)
ANION GAP SERPL CALC-SCNC: 8 MMOL/L (ref 8–16)
AST SERPL-CCNC: 20 U/L (ref 10–40)
BASOPHILS # BLD AUTO: 0.04 K/UL (ref 0–0.2)
BASOPHILS NFR BLD: 0.6 % (ref 0–1.9)
BILIRUB DIRECT SERPL-MCNC: 0.1 MG/DL (ref 0.1–0.3)
BILIRUB SERPL-MCNC: 0.4 MG/DL (ref 0.1–1)
BUN SERPL-MCNC: 13 MG/DL (ref 8–23)
CALCIUM SERPL-MCNC: 9.7 MG/DL (ref 8.7–10.5)
CHLORIDE SERPL-SCNC: 102 MMOL/L (ref 95–110)
CO2 SERPL-SCNC: 29 MMOL/L (ref 23–29)
CREAT SERPL-MCNC: 1.1 MG/DL (ref 0.5–1.4)
DIFFERENTIAL METHOD BLD: ABNORMAL
EOSINOPHIL # BLD AUTO: 0.3 K/UL (ref 0–0.5)
EOSINOPHIL NFR BLD: 3.8 % (ref 0–8)
ERYTHROCYTE [DISTWIDTH] IN BLOOD BY AUTOMATED COUNT: 18.1 % (ref 11.5–14.5)
EST. GFR  (NO RACE VARIABLE): 54.1 ML/MIN/1.73 M^2
GLUCOSE SERPL-MCNC: 108 MG/DL (ref 70–110)
HCT VFR BLD AUTO: 32.9 % (ref 37–48.5)
HGB BLD-MCNC: 10 G/DL (ref 12–16)
IMM GRANULOCYTES # BLD AUTO: 0.02 K/UL (ref 0–0.04)
IMM GRANULOCYTES NFR BLD AUTO: 0.3 % (ref 0–0.5)
INR PPP: 1 (ref 0.8–1.2)
LYMPHOCYTES # BLD AUTO: 1.4 K/UL (ref 1–4.8)
LYMPHOCYTES NFR BLD: 19.7 % (ref 18–48)
MCH RBC QN AUTO: 29.1 PG (ref 27–31)
MCHC RBC AUTO-ENTMCNC: 30.4 G/DL (ref 32–36)
MCV RBC AUTO: 96 FL (ref 82–98)
MONOCYTES # BLD AUTO: 0.5 K/UL (ref 0.3–1)
MONOCYTES NFR BLD: 7.7 % (ref 4–15)
NEUTROPHILS # BLD AUTO: 4.6 K/UL (ref 1.8–7.7)
NEUTROPHILS NFR BLD: 67.9 % (ref 38–73)
NRBC BLD-RTO: 0 /100 WBC
PLATELET # BLD AUTO: 279 K/UL (ref 150–450)
PMV BLD AUTO: 9.6 FL (ref 9.2–12.9)
POTASSIUM SERPL-SCNC: 4.1 MMOL/L (ref 3.5–5.1)
PROT SERPL-MCNC: 7.5 G/DL (ref 6–8.4)
PROTHROMBIN TIME: 11 SEC (ref 9–12.5)
RBC # BLD AUTO: 3.44 M/UL (ref 4–5.4)
SODIUM SERPL-SCNC: 139 MMOL/L (ref 136–145)
WBC # BLD AUTO: 6.84 K/UL (ref 3.9–12.7)

## 2024-02-06 PROCEDURE — 82248 BILIRUBIN DIRECT: CPT | Performed by: PHYSICIAN ASSISTANT

## 2024-02-06 PROCEDURE — 80053 COMPREHEN METABOLIC PANEL: CPT | Performed by: PHYSICIAN ASSISTANT

## 2024-02-06 PROCEDURE — 36415 COLL VENOUS BLD VENIPUNCTURE: CPT | Performed by: PHYSICIAN ASSISTANT

## 2024-02-06 PROCEDURE — 85025 COMPLETE CBC W/AUTO DIFF WBC: CPT | Performed by: PHYSICIAN ASSISTANT

## 2024-02-06 PROCEDURE — 85610 PROTHROMBIN TIME: CPT | Performed by: PHYSICIAN ASSISTANT

## 2024-02-07 ENCOUNTER — ANESTHESIA (OUTPATIENT)
Dept: INTERVENTIONAL RADIOLOGY/VASCULAR | Facility: HOSPITAL | Age: 69
End: 2024-02-07
Payer: MEDICARE

## 2024-02-07 ENCOUNTER — HOSPITAL ENCOUNTER (OUTPATIENT)
Dept: INTERVENTIONAL RADIOLOGY/VASCULAR | Facility: HOSPITAL | Age: 69
Discharge: HOME | End: 2024-02-08
Attending: FAMILY MEDICINE | Admitting: INTERNAL MEDICINE
Payer: MEDICARE

## 2024-02-07 DIAGNOSIS — R07.9 CHEST PAIN: ICD-10-CM

## 2024-02-07 DIAGNOSIS — C7A.8 NEUROENDOCRINE CARCINOMA METASTATIC TO LIVER: ICD-10-CM

## 2024-02-07 DIAGNOSIS — C7B.8 NEUROENDOCRINE CARCINOMA METASTATIC TO LIVER: ICD-10-CM

## 2024-02-07 DIAGNOSIS — C7B.8 NEUROENDOCRINE CARCINOMA METASTATIC TO MULTIPLE SITES: Primary | ICD-10-CM

## 2024-02-07 DIAGNOSIS — C7A.8 NEUROENDOCRINE CARCINOMA METASTATIC TO MULTIPLE SITES: Primary | ICD-10-CM

## 2024-02-07 PROBLEM — I16.0 HYPERTENSIVE URGENCY: Status: ACTIVE | Noted: 2024-02-07

## 2024-02-07 PROCEDURE — G0269 OCCLUSIVE DEVICE IN VEIN ART: HCPCS | Performed by: RADIOLOGY

## 2024-02-07 PROCEDURE — 36247 INS CATH ABD/L-EXT ART 3RD: CPT | Mod: 51,,, | Performed by: RADIOLOGY

## 2024-02-07 PROCEDURE — 75726 ARTERY X-RAYS ABDOMEN: CPT | Mod: 26,59,, | Performed by: RADIOLOGY

## 2024-02-07 PROCEDURE — 76377 3D RENDER W/INTRP POSTPROCES: CPT | Mod: TC | Performed by: RADIOLOGY

## 2024-02-07 PROCEDURE — 63600175 PHARM REV CODE 636 W HCPCS: Performed by: NURSE ANESTHETIST, CERTIFIED REGISTERED

## 2024-02-07 PROCEDURE — 25000003 PHARM REV CODE 250: Performed by: NURSE ANESTHETIST, CERTIFIED REGISTERED

## 2024-02-07 PROCEDURE — 37243 VASC EMBOLIZE/OCCLUDE ORGAN: CPT | Performed by: RADIOLOGY

## 2024-02-07 PROCEDURE — 25000003 PHARM REV CODE 250: Performed by: PHYSICIAN ASSISTANT

## 2024-02-07 PROCEDURE — D9220A PRA ANESTHESIA: Mod: ANES,,, | Performed by: ANESTHESIOLOGY

## 2024-02-07 PROCEDURE — C1769 GUIDE WIRE: HCPCS

## 2024-02-07 PROCEDURE — C1894 INTRO/SHEATH, NON-LASER: HCPCS

## 2024-02-07 PROCEDURE — 63600175 PHARM REV CODE 636 W HCPCS: Mod: JA | Performed by: PHYSICIAN ASSISTANT

## 2024-02-07 PROCEDURE — 36247 INS CATH ABD/L-EXT ART 3RD: CPT | Performed by: RADIOLOGY

## 2024-02-07 PROCEDURE — 96420 CHEMO IA PUSH TECNIQUE: CPT | Performed by: RADIOLOGY

## 2024-02-07 PROCEDURE — 76377 3D RENDER W/INTRP POSTPROCES: CPT | Mod: 26,,, | Performed by: RADIOLOGY

## 2024-02-07 PROCEDURE — 99900035 HC TECH TIME PER 15 MIN (STAT)

## 2024-02-07 PROCEDURE — D9220A PRA ANESTHESIA: Mod: CRNA,,, | Performed by: NURSE ANESTHETIST, CERTIFIED REGISTERED

## 2024-02-07 PROCEDURE — C1982 CATH, PRESSURE,VALVE-OCCLU: HCPCS

## 2024-02-07 PROCEDURE — 63600175 PHARM REV CODE 636 W HCPCS: Performed by: INTERNAL MEDICINE

## 2024-02-07 PROCEDURE — 25000003 PHARM REV CODE 250: Performed by: FAMILY MEDICINE

## 2024-02-07 PROCEDURE — 25500020 PHARM REV CODE 255: Performed by: PHYSICIAN ASSISTANT

## 2024-02-07 PROCEDURE — 63600175 PHARM REV CODE 636 W HCPCS: Performed by: PHYSICIAN ASSISTANT

## 2024-02-07 PROCEDURE — 75887 VEIN X-RAY LIVER W/O HEMODYN: CPT | Mod: 26,,, | Performed by: RADIOLOGY

## 2024-02-07 PROCEDURE — 37000008 HC ANESTHESIA 1ST 15 MINUTES

## 2024-02-07 PROCEDURE — 75887 VEIN X-RAY LIVER W/O HEMODYN: CPT | Mod: TC | Performed by: RADIOLOGY

## 2024-02-07 PROCEDURE — 25500020 PHARM REV CODE 255: Performed by: RADIOLOGY

## 2024-02-07 PROCEDURE — 25000003 PHARM REV CODE 250: Performed by: RADIOLOGY

## 2024-02-07 PROCEDURE — 75774 ARTERY X-RAY EACH VESSEL: CPT | Mod: 26,59,, | Performed by: RADIOLOGY

## 2024-02-07 PROCEDURE — 94761 N-INVAS EAR/PLS OXIMETRY MLT: CPT

## 2024-02-07 PROCEDURE — C1760 CLOSURE DEV, VASC: HCPCS

## 2024-02-07 PROCEDURE — 76380 CAT SCAN FOLLOW-UP STUDY: CPT | Mod: 26,59,, | Performed by: RADIOLOGY

## 2024-02-07 PROCEDURE — 75774 ARTERY X-RAY EACH VESSEL: CPT | Mod: 59,TC | Performed by: RADIOLOGY

## 2024-02-07 PROCEDURE — 63600175 PHARM REV CODE 636 W HCPCS: Performed by: RADIOLOGY

## 2024-02-07 PROCEDURE — 75726 ARTERY X-RAYS ABDOMEN: CPT | Mod: TC | Performed by: RADIOLOGY

## 2024-02-07 PROCEDURE — 94760 N-INVAS EAR/PLS OXIMETRY 1: CPT

## 2024-02-07 PROCEDURE — A4216 STERILE WATER/SALINE, 10 ML: HCPCS | Performed by: FAMILY MEDICINE

## 2024-02-07 PROCEDURE — 37000009 HC ANESTHESIA EA ADD 15 MINS

## 2024-02-07 PROCEDURE — 76380 CAT SCAN FOLLOW-UP STUDY: CPT | Mod: TC | Performed by: RADIOLOGY

## 2024-02-07 PROCEDURE — 36248 INS CATH ABD/L-EXT ART ADDL: CPT | Performed by: RADIOLOGY

## 2024-02-07 PROCEDURE — 27200996 IR EMBOLIZATION COMP FOR TUMOR_ORGAN ISCHEMIA_INFARC

## 2024-02-07 PROCEDURE — 36248 INS CATH ABD/L-EXT ART ADDL: CPT | Mod: ,,, | Performed by: RADIOLOGY

## 2024-02-07 RX ORDER — NALOXONE HCL 0.4 MG/ML
0.02 VIAL (ML) INJECTION
Status: DISCONTINUED | OUTPATIENT
Start: 2024-02-07 | End: 2024-02-08 | Stop reason: HOSPADM

## 2024-02-07 RX ORDER — PHENYLEPHRINE HYDROCHLORIDE 10 MG/ML
INJECTION INTRAVENOUS
Status: DISCONTINUED | OUTPATIENT
Start: 2024-02-07 | End: 2024-02-07

## 2024-02-07 RX ORDER — PROCHLORPERAZINE EDISYLATE 5 MG/ML
5 INJECTION INTRAMUSCULAR; INTRAVENOUS ONCE
Status: COMPLETED | OUTPATIENT
Start: 2024-02-07 | End: 2024-02-07

## 2024-02-07 RX ORDER — SODIUM CHLORIDE 0.9 % (FLUSH) 0.9 %
10 SYRINGE (ML) INJECTION EVERY 8 HOURS
Status: DISCONTINUED | OUTPATIENT
Start: 2024-02-07 | End: 2024-02-08 | Stop reason: HOSPADM

## 2024-02-07 RX ORDER — AMOXICILLIN 250 MG
1 CAPSULE ORAL 2 TIMES DAILY
Status: DISCONTINUED | OUTPATIENT
Start: 2024-02-07 | End: 2024-02-08 | Stop reason: HOSPADM

## 2024-02-07 RX ORDER — GLUCAGON 1 MG
1 KIT INJECTION
Status: DISCONTINUED | OUTPATIENT
Start: 2024-02-07 | End: 2024-02-08 | Stop reason: HOSPADM

## 2024-02-07 RX ORDER — BRIMONIDINE TARTRATE 2 MG/ML
1 SOLUTION/ DROPS OPHTHALMIC 2 TIMES DAILY
Status: DISCONTINUED | OUTPATIENT
Start: 2024-02-07 | End: 2024-02-08 | Stop reason: HOSPADM

## 2024-02-07 RX ORDER — OXYCODONE HYDROCHLORIDE 5 MG/1
5 TABLET ORAL EVERY 8 HOURS PRN
Status: DISCONTINUED | OUTPATIENT
Start: 2024-02-07 | End: 2024-02-08 | Stop reason: HOSPADM

## 2024-02-07 RX ORDER — HYDRALAZINE HYDROCHLORIDE 20 MG/ML
5 INJECTION INTRAMUSCULAR; INTRAVENOUS ONCE
Status: DISCONTINUED | OUTPATIENT
Start: 2024-02-08 | End: 2024-02-08 | Stop reason: HOSPADM

## 2024-02-07 RX ORDER — SCOLOPAMINE TRANSDERMAL SYSTEM 1 MG/1
1 PATCH, EXTENDED RELEASE TRANSDERMAL
Status: DISCONTINUED | OUTPATIENT
Start: 2024-02-07 | End: 2024-02-08 | Stop reason: HOSPADM

## 2024-02-07 RX ORDER — SUCRALFATE 1 G/10ML
1 SUSPENSION ORAL EVERY 6 HOURS
Status: DISCONTINUED | OUTPATIENT
Start: 2024-02-07 | End: 2024-02-08 | Stop reason: HOSPADM

## 2024-02-07 RX ORDER — DIPHENHYDRAMINE HYDROCHLORIDE 50 MG/ML
50 INJECTION INTRAMUSCULAR; INTRAVENOUS ONCE
Status: COMPLETED | OUTPATIENT
Start: 2024-02-07 | End: 2024-02-07

## 2024-02-07 RX ORDER — SIMETHICONE 80 MG
1 TABLET,CHEWABLE ORAL 4 TIMES DAILY PRN
Status: DISCONTINUED | OUTPATIENT
Start: 2024-02-07 | End: 2024-02-08 | Stop reason: HOSPADM

## 2024-02-07 RX ORDER — DEXTROSE MONOHYDRATE, SODIUM CHLORIDE, AND POTASSIUM CHLORIDE 50; 1.49; 4.5 G/1000ML; G/1000ML; G/1000ML
INJECTION, SOLUTION INTRAVENOUS CONTINUOUS
Status: DISCONTINUED | OUTPATIENT
Start: 2024-02-07 | End: 2024-02-08 | Stop reason: HOSPADM

## 2024-02-07 RX ORDER — POLYETHYLENE GLYCOL 3350 17 G/17G
17 POWDER, FOR SOLUTION ORAL DAILY
Status: DISCONTINUED | OUTPATIENT
Start: 2024-02-08 | End: 2024-02-08 | Stop reason: HOSPADM

## 2024-02-07 RX ORDER — LIDOCAINE HYDROCHLORIDE 10 MG/ML
INJECTION INFILTRATION; PERINEURAL
Status: COMPLETED | OUTPATIENT
Start: 2024-02-07 | End: 2024-02-07

## 2024-02-07 RX ORDER — IBUPROFEN 200 MG
16 TABLET ORAL
Status: DISCONTINUED | OUTPATIENT
Start: 2024-02-07 | End: 2024-02-08 | Stop reason: HOSPADM

## 2024-02-07 RX ORDER — SODIUM CHLORIDE 9 MG/ML
INJECTION, SOLUTION INTRAVENOUS CONTINUOUS
Status: DISCONTINUED | OUTPATIENT
Start: 2024-02-07 | End: 2024-02-07 | Stop reason: HOSPADM

## 2024-02-07 RX ORDER — HEPARIN SODIUM 200 [USP'U]/100ML
INJECTION, SOLUTION INTRAVENOUS
Status: COMPLETED | OUTPATIENT
Start: 2024-02-07 | End: 2024-02-07

## 2024-02-07 RX ORDER — CIPROFLOXACIN 500 MG/1
500 TABLET ORAL EVERY 12 HOURS
Status: DISCONTINUED | OUTPATIENT
Start: 2024-02-07 | End: 2024-02-08 | Stop reason: HOSPADM

## 2024-02-07 RX ORDER — ATORVASTATIN CALCIUM 20 MG/1
20 TABLET, FILM COATED ORAL NIGHTLY
Status: DISCONTINUED | OUTPATIENT
Start: 2024-02-07 | End: 2024-02-08 | Stop reason: HOSPADM

## 2024-02-07 RX ORDER — HYDRALAZINE HYDROCHLORIDE 20 MG/ML
5 INJECTION INTRAMUSCULAR; INTRAVENOUS ONCE
Status: COMPLETED | OUTPATIENT
Start: 2024-02-07 | End: 2024-02-07

## 2024-02-07 RX ORDER — ACETAMINOPHEN 325 MG/1
650 TABLET ORAL EVERY 4 HOURS PRN
Status: DISCONTINUED | OUTPATIENT
Start: 2024-02-07 | End: 2024-02-08

## 2024-02-07 RX ORDER — ONDANSETRON HYDROCHLORIDE 2 MG/ML
INJECTION, SOLUTION INTRAVENOUS
Status: DISCONTINUED | OUTPATIENT
Start: 2024-02-07 | End: 2024-02-07

## 2024-02-07 RX ORDER — ALUMINUM HYDROXIDE, MAGNESIUM HYDROXIDE, AND SIMETHICONE 1200; 120; 1200 MG/30ML; MG/30ML; MG/30ML
30 SUSPENSION ORAL 4 TIMES DAILY PRN
Status: DISCONTINUED | OUTPATIENT
Start: 2024-02-07 | End: 2024-02-08 | Stop reason: HOSPADM

## 2024-02-07 RX ORDER — BRIMONIDINE TARTRATE AND TIMOLOL MALEATE 2; 5 MG/ML; MG/ML
1 SOLUTION OPHTHALMIC 2 TIMES DAILY
Status: DISCONTINUED | OUTPATIENT
Start: 2024-02-07 | End: 2024-02-07

## 2024-02-07 RX ORDER — FENTANYL CITRATE 50 UG/ML
INJECTION, SOLUTION INTRAMUSCULAR; INTRAVENOUS
Status: DISCONTINUED | OUTPATIENT
Start: 2024-02-07 | End: 2024-02-07

## 2024-02-07 RX ORDER — PANTOPRAZOLE SODIUM 40 MG/1
40 TABLET, DELAYED RELEASE ORAL DAILY
Status: DISCONTINUED | OUTPATIENT
Start: 2024-02-08 | End: 2024-02-08 | Stop reason: HOSPADM

## 2024-02-07 RX ORDER — IBUPROFEN 200 MG
24 TABLET ORAL
Status: DISCONTINUED | OUTPATIENT
Start: 2024-02-07 | End: 2024-02-08 | Stop reason: HOSPADM

## 2024-02-07 RX ORDER — DEXAMETHASONE SODIUM PHOSPHATE 4 MG/ML
INJECTION, SOLUTION INTRA-ARTICULAR; INTRALESIONAL; INTRAMUSCULAR; INTRAVENOUS; SOFT TISSUE
Status: DISCONTINUED | OUTPATIENT
Start: 2024-02-07 | End: 2024-02-07

## 2024-02-07 RX ORDER — ONDANSETRON 8 MG/1
8 TABLET, ORALLY DISINTEGRATING ORAL EVERY 8 HOURS PRN
Status: DISCONTINUED | OUTPATIENT
Start: 2024-02-07 | End: 2024-02-08 | Stop reason: HOSPADM

## 2024-02-07 RX ORDER — MAGNESIUM SULFATE HEPTAHYDRATE 40 MG/ML
2 INJECTION, SOLUTION INTRAVENOUS ONCE
Status: COMPLETED | OUTPATIENT
Start: 2024-02-07 | End: 2024-02-07

## 2024-02-07 RX ORDER — ROCURONIUM BROMIDE 10 MG/ML
INJECTION, SOLUTION INTRAVENOUS
Status: DISCONTINUED | OUTPATIENT
Start: 2024-02-07 | End: 2024-02-07

## 2024-02-07 RX ORDER — TALC
6 POWDER (GRAM) TOPICAL NIGHTLY PRN
Status: DISCONTINUED | OUTPATIENT
Start: 2024-02-07 | End: 2024-02-08 | Stop reason: HOSPADM

## 2024-02-07 RX ORDER — HYDROCHLOROTHIAZIDE 12.5 MG/1
12.5 TABLET ORAL DAILY
Status: DISCONTINUED | OUTPATIENT
Start: 2024-02-08 | End: 2024-02-08 | Stop reason: HOSPADM

## 2024-02-07 RX ORDER — ALUMINUM HYDROXIDE, MAGNESIUM HYDROXIDE, AND SIMETHICONE 1200; 120; 1200 MG/30ML; MG/30ML; MG/30ML
30 SUSPENSION ORAL
Status: DISCONTINUED | OUTPATIENT
Start: 2024-02-07 | End: 2024-02-08 | Stop reason: HOSPADM

## 2024-02-07 RX ORDER — LIDOCAINE HYDROCHLORIDE 20 MG/ML
INJECTION, SOLUTION EPIDURAL; INFILTRATION; INTRACAUDAL; PERINEURAL
Status: DISCONTINUED | OUTPATIENT
Start: 2024-02-07 | End: 2024-02-07

## 2024-02-07 RX ORDER — INSULIN ASPART 100 [IU]/ML
0-5 INJECTION, SOLUTION INTRAVENOUS; SUBCUTANEOUS
Status: DISCONTINUED | OUTPATIENT
Start: 2024-02-07 | End: 2024-02-08 | Stop reason: HOSPADM

## 2024-02-07 RX ORDER — HYDRALAZINE HYDROCHLORIDE 20 MG/ML
5 INJECTION INTRAMUSCULAR; INTRAVENOUS EVERY 6 HOURS PRN
Status: DISCONTINUED | OUTPATIENT
Start: 2024-02-08 | End: 2024-02-08 | Stop reason: HOSPADM

## 2024-02-07 RX ORDER — PROPOFOL 10 MG/ML
VIAL (ML) INTRAVENOUS
Status: DISCONTINUED | OUTPATIENT
Start: 2024-02-07 | End: 2024-02-07

## 2024-02-07 RX ORDER — HYDRALAZINE HYDROCHLORIDE 20 MG/ML
10 INJECTION INTRAMUSCULAR; INTRAVENOUS ONCE
Status: COMPLETED | OUTPATIENT
Start: 2024-02-07 | End: 2024-02-07

## 2024-02-07 RX ORDER — IPRATROPIUM BROMIDE AND ALBUTEROL SULFATE 2.5; .5 MG/3ML; MG/3ML
3 SOLUTION RESPIRATORY (INHALATION) EVERY 4 HOURS PRN
Status: DISCONTINUED | OUTPATIENT
Start: 2024-02-07 | End: 2024-02-08 | Stop reason: HOSPADM

## 2024-02-07 RX ORDER — CLONIDINE HYDROCHLORIDE 0.1 MG/1
0.1 TABLET ORAL 2 TIMES DAILY
Status: DISCONTINUED | OUTPATIENT
Start: 2024-02-07 | End: 2024-02-08 | Stop reason: HOSPADM

## 2024-02-07 RX ORDER — HYDROMORPHONE HYDROCHLORIDE 1 MG/ML
0.5 INJECTION, SOLUTION INTRAMUSCULAR; INTRAVENOUS; SUBCUTANEOUS ONCE
Status: COMPLETED | OUTPATIENT
Start: 2024-02-07 | End: 2024-02-07

## 2024-02-07 RX ORDER — TIMOLOL MALEATE 5 MG/ML
1 SOLUTION/ DROPS OPHTHALMIC 2 TIMES DAILY
Status: DISCONTINUED | OUTPATIENT
Start: 2024-02-07 | End: 2024-02-08 | Stop reason: HOSPADM

## 2024-02-07 RX ADMIN — LIDOCAINE HYDROCHLORIDE 75 MG: 20 INJECTION, SOLUTION EPIDURAL; INFILTRATION; INTRACAUDAL; PERINEURAL at 08:02

## 2024-02-07 RX ADMIN — HYDRALAZINE HYDROCHLORIDE 5 MG: 20 INJECTION, SOLUTION INTRAMUSCULAR; INTRAVENOUS at 07:02

## 2024-02-07 RX ADMIN — TIMOLOL MALEATE 1 DROP: 5 SOLUTION OPHTHALMIC at 09:02

## 2024-02-07 RX ADMIN — ROCURONIUM BROMIDE 40 MG: 10 INJECTION, SOLUTION INTRAVENOUS at 08:02

## 2024-02-07 RX ADMIN — BRIMONIDINE TARTRATE 1 DROP: 2 SOLUTION OPHTHALMIC at 09:02

## 2024-02-07 RX ADMIN — ONDANSETRON 8 MG: 8 TABLET, ORALLY DISINTEGRATING ORAL at 07:02

## 2024-02-07 RX ADMIN — OCTREOTIDE ACETATE 250 MCG/HR: 1000 INJECTION, SOLUTION INTRAVENOUS; SUBCUTANEOUS at 08:02

## 2024-02-07 RX ADMIN — ONDANSETRON 4 MG: 2 INJECTION, SOLUTION INTRAMUSCULAR; INTRAVENOUS at 10:02

## 2024-02-07 RX ADMIN — HEPARIN SODIUM 1000 UNITS/HR: 200 INJECTION, SOLUTION INTRAVENOUS at 09:02

## 2024-02-07 RX ADMIN — SUCRALFATE 1 G: 1 SUSPENSION ORAL at 05:02

## 2024-02-07 RX ADMIN — LIDOCAINE HYDROCHLORIDE 25 MG: 20 INJECTION, SOLUTION EPIDURAL; INFILTRATION; INTRACAUDAL; PERINEURAL at 08:02

## 2024-02-07 RX ADMIN — SODIUM CHLORIDE: 0.9 INJECTION, SOLUTION INTRAVENOUS at 08:02

## 2024-02-07 RX ADMIN — Medication 10 ML: at 09:02

## 2024-02-07 RX ADMIN — DEXAMETHASONE SODIUM PHOSPHATE 4 MG: 4 INJECTION, SOLUTION INTRA-ARTICULAR; INTRALESIONAL; INTRAMUSCULAR; INTRAVENOUS; SOFT TISSUE at 10:02

## 2024-02-07 RX ADMIN — HYDROMORPHONE HYDROCHLORIDE 0.5 MG: 1 INJECTION, SOLUTION INTRAMUSCULAR; INTRAVENOUS; SUBCUTANEOUS at 12:02

## 2024-02-07 RX ADMIN — ALUMINUM HYDROXIDE, MAGNESIUM HYDROXIDE, AND SIMETHICONE 30 ML: 200; 200; 20 SUSPENSION ORAL at 05:02

## 2024-02-07 RX ADMIN — PROCHLORPERAZINE EDISYLATE 5 MG: 5 INJECTION INTRAMUSCULAR; INTRAVENOUS at 02:02

## 2024-02-07 RX ADMIN — DIPHENHYDRAMINE HYDROCHLORIDE 50 MG: 50 INJECTION INTRAMUSCULAR; INTRAVENOUS at 07:02

## 2024-02-07 RX ADMIN — PROPOFOL 100 MG: 10 INJECTION, EMULSION INTRAVENOUS at 08:02

## 2024-02-07 RX ADMIN — PROMETHAZINE HYDROCHLORIDE 12.5 MG: 25 INJECTION INTRAMUSCULAR; INTRAVENOUS at 12:02

## 2024-02-07 RX ADMIN — AMPICILLIN SODIUM AND SULBACTAM SODIUM 3 G: 2; 1 INJECTION, POWDER, FOR SOLUTION INTRAMUSCULAR; INTRAVENOUS at 07:02

## 2024-02-07 RX ADMIN — IOHEXOL 50 MG: 350 INJECTION, SOLUTION INTRAVENOUS at 09:02

## 2024-02-07 RX ADMIN — SCOPALAMINE 1 PATCH: 1 PATCH, EXTENDED RELEASE TRANSDERMAL at 07:02

## 2024-02-07 RX ADMIN — CLONIDINE HYDROCHLORIDE 0.1 MG: 0.1 TABLET ORAL at 08:02

## 2024-02-07 RX ADMIN — FENTANYL CITRATE 100 MCG: 50 INJECTION INTRAMUSCULAR; INTRAVENOUS at 08:02

## 2024-02-07 RX ADMIN — DEXTROSE, SODIUM CHLORIDE, AND POTASSIUM CHLORIDE: 5; .45; .15 INJECTION INTRAVENOUS at 05:02

## 2024-02-07 RX ADMIN — HYDRALAZINE HYDROCHLORIDE 5 MG: 20 INJECTION, SOLUTION INTRAMUSCULAR; INTRAVENOUS at 11:02

## 2024-02-07 RX ADMIN — ONDANSETRON 16 MG: 2 INJECTION INTRAMUSCULAR; INTRAVENOUS at 07:02

## 2024-02-07 RX ADMIN — DEXTROSE, SODIUM CHLORIDE, AND POTASSIUM CHLORIDE: 5; .45; .15 INJECTION INTRAVENOUS at 07:02

## 2024-02-07 RX ADMIN — LIDOCAINE HYDROCHLORIDE 2 ML: 10 INJECTION, SOLUTION INFILTRATION; PERINEURAL at 09:02

## 2024-02-07 RX ADMIN — OCTREOTIDE ACETATE 250 MCG: 500 INJECTION, SOLUTION INTRAVENOUS; SUBCUTANEOUS at 08:02

## 2024-02-07 RX ADMIN — MAGNESIUM SULFATE HEPTAHYDRATE 2 G: 40 INJECTION, SOLUTION INTRAVENOUS at 07:02

## 2024-02-07 RX ADMIN — HYDRALAZINE HYDROCHLORIDE 10 MG: 20 INJECTION, SOLUTION INTRAMUSCULAR; INTRAVENOUS at 01:02

## 2024-02-07 RX ADMIN — IOHEXOL 80 ML: 350 INJECTION, SOLUTION INTRAVENOUS at 10:02

## 2024-02-07 RX ADMIN — CIPROFLOXACIN 500 MG: 500 TABLET, FILM COATED ORAL at 08:02

## 2024-02-07 RX ADMIN — PHENYLEPHRINE HYDROCHLORIDE 100 MCG: 10 INJECTION INTRAVENOUS at 10:02

## 2024-02-07 RX ADMIN — ETHIODIZED OIL 10 ML: 480 INJECTION INTRA-ARTERIAL; INTRALYMPHATIC; INTRAUTERINE at 09:02

## 2024-02-07 RX ADMIN — OXYCODONE HYDROCHLORIDE 5 MG: 5 TABLET ORAL at 07:02

## 2024-02-07 NOTE — H&P
Radiology History & Physical      SUBJECTIVE:     Chief Complaint: NET    History of Present Illness:  Coby Marshall is a 68 y.o. 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    Ki 67 -3-20%    Hyperlipidemia     Hypertension     Malignant carcinoid tumor of ileum 12/2017    Ki 67 < 3%    Rectal cancer 2/8/2019     Past Surgical History:   Procedure Laterality Date    COLON SURGERY      ENDOSCOPIC ULTRASOUND OF UPPER GASTROINTESTINAL TRACT N/A 12/17/2021    Procedure: ULTRASOUND, UPPER GI TRACT, ENDOSCOPIC;  Surgeon: Jeffery Clemons MD;  Location: [facility] ([address] [place] [address]);  Service: Endoscopy;  Laterality: N/A;  instructions emailed to pt-BB  fully vaccinated-BB   12/13 arrival time confirmed with pt-rb    HYSTERECTOMY      ILEOSTOMY CLOSURE  01/2018    Lower Anterior Resection  12/2017    NET- Dr. Beck - [facility]    port a cath Right     R chest wall       Home Meds:   Prior to Admission medications    Medication Sig Start Date End Date Taking? Authorizing Provider   atorvastatin (LIPITOR) 20 MG tablet Take 20 mg by mouth every evening.   Yes Provider, Historical   brimonidine-timoloL (COMBIGAN) 0.2-0.5 % Drop Place 1 drop into the right eye 2 (two) times daily. 1/26/23  Yes Provider, Historical   capecitabine (XELODA) 500 MG Tab Take 2 tablets (1,000 mg total) by mouth 2 (two) times daily on days 1-14 of every 28 day cycle. 12/21/23 12/20/24 Yes Ariel Smart MD   cloNIDine (CATAPRES) 0.1 MG tablet Take 0.1 mg by mouth 2 (two) times daily.   Yes Provider, Historical   hydroCHLOROthiazide (MICROZIDE) 12.5 mg capsule Take 12.5 mg by mouth once daily.   Yes Provider, Historical   ondansetron (ZOFRAN-ODT) 8 MG TbDL Dissolve 1 tablet (8 mg total) by mouth every 8 (eight) hours as needed (nausea). 3/2/23  Yes Juve Steiner MD   ondansetron (ZOFRAN-ODT) 8 MG TbDL Take  1 tablet (8 mg total) by mouth every 8 (eight) hours as needed (nausea). Take one tab 30 min before taking temodar 10/10/23  Yes Ariel Smart MD   oxyCODONE (ROXICODONE) 5 MG immediate release tablet Take 1 tablet (5 mg total) by mouth every 8 (eight) hours as needed for Pain. 11/4/23  Yes Kasia Boston MD   temozolomide (TEMODAR) 100 MG capsule Take 3 capsules (300 mg) by mouth once daily at bedtime on days 10-14 of every 28 day cycle. Take zofran 30 min before you take temodar.. 12/21/23 2/29/24 Yes Ariel Smart MD   ALBUTEROL INHL Inhale into the lungs as needed.     Provider, Historical   fluticasone (FLONASE) 50 mcg/actuation nasal spray 1 spray by Each Nare route once daily.    Provider, Historical   ranitidine (ZANTAC) 150 MG tablet Take 150 mg by mouth nightly.  11/13/18 12/23/21  Provider, Historical     Anticoagulants/Antiplatelets: no anticoagulation    Allergies:   Review of patient's allergies indicates:   Allergen Reactions    Arb-angiotensin receptor antagonist Swelling     Had angioedema of lips with ACE inhibitor, so this is a contraindication    Epinephrine      Neuroendocrine Tumor patient      Lisinopril Swelling    Lisinopril-hydrochlorothiazide Swelling     Mouth Swelling    Sulfa (sulfonamide antibiotics) Swelling     Swelling of lips    Sulfasalazine Swelling     Swelling of lips    Hydrocodone-acetaminophen Other (See Comments)     stomach pain (even with food)    Amlodipine Other (See Comments)     edema of ankles and legs.     Sedation History:  no adverse reactions    Review of Systems:   Hematological: no known coagulopathies  Respiratory: no shortness of breath  Cardiovascular: no chest pain  Gastrointestinal: no abdominal pain  Genito-Urinary: no dysuria  Musculoskeletal: negative  Neurological: no TIA or stroke symptoms         OBJECTIVE:     Vital Signs (Most Recent)  Temp: 97.6 °F (36.4 °C) (02/07/24 0712)  Pulse: (!) 59 (02/07/24 0712)  Resp: 20 (02/07/24 0712)  BP: (!) 145/70  (02/07/24 0737)  SpO2: 99 % (02/07/24 0712)    Physical Exam:  ASA: 3  Mallampati: 2    General: no acute distress  Mental Status: alert and oriented to person, place and time  HEENT: normocephalic, atraumatic  Chest: unlabored breathing  Heart: regular heart rate  Abdomen: nondistended  Extremity: moves all extremities    Laboratory  Lab Results   Component Value Date    INR 1.0 02/06/2024       Lab Results   Component Value Date    WBC 6.84 02/06/2024    HGB 10.0 (L) 02/06/2024    HCT 32.9 (L) 02/06/2024    MCV 96 02/06/2024     02/06/2024      Lab Results   Component Value Date     02/06/2024     02/06/2024    K 4.1 02/06/2024     02/06/2024    CO2 29 02/06/2024    BUN 13 02/06/2024    CREATININE 1.1 02/06/2024    CALCIUM 9.7 02/06/2024    MG 1.4 (L) 03/08/2018    ALT 13 02/06/2024    AST 20 02/06/2024    ALBUMIN 4.2 02/06/2024    BILITOT 0.4 02/06/2024    BILIDIR 0.1 02/06/2024       ASSESSMENT/PLAN:     Sedation Plan: per anesthesia  Patient will undergo TACE.    jB Reynolds MD  Diagnostic and Interventional Radiologist  Department of Radiology  Pager: [phone]

## 2024-02-07 NOTE — SEDATION DOCUMENTATION
Pt tolerated procedure well. Transferring to PACU. Site clean, dry, intact with no bleeding or hematoma.

## 2024-02-07 NOTE — PROCEDURES
Radiology Post-Procedure Note    Pre Op Diagnosis: NET  Post Op Diagnosis: Same    Procedure: TACE    Procedure performed by: Bj Reynolds MD    Written Informed Consent Obtained: Yes  Specimen Removed: NO  Estimated Blood Loss: Minimal    Findings:   Successful TACE of seg 4 and seg 5 tumors.  No complications.    Patient tolerated procedure well.    Bj Reynolds MD  Diagnostic and Interventional Radiologist  Department of Radiology  Pager: [phone]

## 2024-02-07 NOTE — CARE UPDATE
69 y/o F NET s/p TACE.      Zofran prn for nausea and oxy 5 mg prn for pain control.  Can escalate pain control as needed.  Avoid tylenol or tylenol containing products.  Cipro bid to start this evening, if not tolerating po can give 400 mg IV.  At DC, recommend Cipro 500 mg BID x 5 days.  Octreotide 125 mcg/hr (2.5 ml/hr) IV for 1 day post op (24 hrs post procedure).  At DC give less than 1 wks supply of PRN Zofran ODT for nausea, and PRN Oxycodone 5 mg for pain.  Avoid acetaminophen-containing products and EtOH for 2 weeks post procedure.  IR will arrange follow up imaging and clinic visit.    IR will continue to follow. Please contact with questions via [employer] secure chat.    Keara Rojas PA-C  Interventional Radiology

## 2024-02-07 NOTE — TRANSFER OF CARE
"Anesthesia Transfer of Care Note    Patient: Coby Marshall    Procedure(s) Performed: * No procedures listed *    Patient location: PACU    Anesthesia Type: general    Transport from OR: Transported from OR on 6-10 L/min O2 by face mask with adequate spontaneous ventilation    Post pain: adequate analgesia    Post assessment: no apparent anesthetic complications    Post vital signs: stable    Level of consciousness: awake    Nausea/Vomiting: no nausea/vomiting    Complications: none    Transfer of care protocol was followed      Last vitals: Visit Vitals  BP (!) 161/69   Pulse 66   Temp 36.4 °C (97.6 °F) (Skin)   Resp 14   Ht 5' 6" (1.676 m)   Wt 83 kg (183 lb)   SpO2 100%   Breastfeeding No   BMI 29.54 kg/m²     "

## 2024-02-07 NOTE — ANESTHESIA PREPROCEDURE EVALUATION
02/07/2024  Coby Marshall is a 68 y.o., female.      Pre-op Assessment     I have reviewed the Nursing Notes.    I have reviewed the Medications.     Review of Systems  Anesthesia Hx:  No problems with previous Anesthesia             Denies Family Hx of Anesthesia complications.     Social:  Non-Smoker, No Alcohol Use       Hematology/Oncology:  Hematology Normal   Oncology Normal                                   EENT/Dental:  EENT/Dental Normal           Cardiovascular:  Exercise tolerance: good   Hypertension                                  Hypertension         Pulmonary:    Asthma       Asthma:               Renal/:  Renal/ Normal                 Hepatic/GI:  Hepatic/GI Normal                 Musculoskeletal:  Musculoskeletal Normal                Neurological:    Neuromuscular Disease,                                 Neuromuscular Disease   Endocrine:  Endocrine Normal                Physical Exam  General: Well nourished    Airway:  Mallampati: II / II  Mouth Opening: Normal  TM Distance: Normal  Tongue: Normal  Neck ROM: Normal ROM    Dental:  Intact        Anesthesia Plan  Type of Anesthesia, risks & benefits discussed:    Anesthesia Type: Gen ETT, Gen Natural Airway  Intra-op Monitoring Plan: Standard ASA Monitors  Post Op Pain Control Plan: multimodal analgesia  Induction:  IV  Airway Plan: Direct, Post-Induction  Informed Consent: Informed consent signed with the Patient and all parties understand the risks and agree with anesthesia plan.  All questions answered.   ASA Score: 3    Ready For Surgery From Anesthesia Perspective.     .

## 2024-02-07 NOTE — NURSING TRANSFER
Nursing Transfer Note      2/7/2024   5:05 PM    Nurse giving handoff: Keyla Bethea   Nurse receiving handoff:KHADIJAH Henry    Reason patient is being transferred: overnight obs    Transfer To: [facility]    Transfer via stretcher    Transfer with MIIVF and octreotide    Transported by RN x2      Any special needs or follow-up needed: n/a    Patient belongings transferred with patient: Yes    Chart send with patient: Yes    Notified: spouse    Patient reassessed at: 1700   Upon arrival to floor: patient oriented to room, call bell in reach, and bed in lowest position. Charge at bedside to assess groin site. IR care complete.

## 2024-02-07 NOTE — ANESTHESIA PROCEDURE NOTES
Intubation    Date/Time: 2/7/2024 8:58 AM    Performed by: Re Lake CRNA  Authorized by: Td Zambrano MD    Intubation:     Induction:  Intravenous    Intubated:  Postinduction    Mask Ventilation:  Easy with oral airway    Attempts:  1    Attempted By:  CRNA    Method of Intubation:  Video laryngoscopy    Blade:  Barrios 3    Laryngeal View Grade: Grade I - full view of cords      Difficult Airway Encountered?: No      Complications:  None    Airway Device:  Oral endotracheal tube    Airway Device Size:  7.0    Style/Cuff Inflation:  Cuffed    Inflation Amount (mL):  5    Tube secured:  20    Secured at:  The lips    Placement Verified By:  Capnometry    Complicating Factors:  Small mouth    Findings Post-Intubation:  BS equal bilateral

## 2024-02-08 VITALS
OXYGEN SATURATION: 98 % | SYSTOLIC BLOOD PRESSURE: 186 MMHG | HEART RATE: 58 BPM | DIASTOLIC BLOOD PRESSURE: 78 MMHG | BODY MASS INDEX: 31.5 KG/M2 | TEMPERATURE: 98 F | RESPIRATION RATE: 17 BRPM | WEIGHT: 196 LBS | HEIGHT: 66 IN

## 2024-02-08 LAB
ALBUMIN SERPL BCP-MCNC: 3.7 G/DL (ref 3.5–5.2)
ALP SERPL-CCNC: 86 U/L (ref 55–135)
ALT SERPL W/O P-5'-P-CCNC: 96 U/L (ref 10–44)
ANION GAP SERPL CALC-SCNC: 11 MMOL/L (ref 8–16)
AST SERPL-CCNC: 138 U/L (ref 10–40)
BASOPHILS # BLD AUTO: 0.02 K/UL (ref 0–0.2)
BASOPHILS NFR BLD: 0.1 % (ref 0–1.9)
BILIRUB SERPL-MCNC: 0.8 MG/DL (ref 0.1–1)
BUN SERPL-MCNC: 11 MG/DL (ref 8–23)
CALCIUM SERPL-MCNC: 9.1 MG/DL (ref 8.7–10.5)
CHLORIDE SERPL-SCNC: 99 MMOL/L (ref 95–110)
CO2 SERPL-SCNC: 25 MMOL/L (ref 23–29)
CREAT SERPL-MCNC: 1.1 MG/DL (ref 0.5–1.4)
DIFFERENTIAL METHOD BLD: ABNORMAL
EOSINOPHIL # BLD AUTO: 0 K/UL (ref 0–0.5)
EOSINOPHIL NFR BLD: 0 % (ref 0–8)
ERYTHROCYTE [DISTWIDTH] IN BLOOD BY AUTOMATED COUNT: 17.6 % (ref 11.5–14.5)
EST. GFR  (NO RACE VARIABLE): 54 ML/MIN/1.73 M^2
GLUCOSE SERPL-MCNC: 149 MG/DL (ref 70–110)
HCT VFR BLD AUTO: 37.2 % (ref 37–48.5)
HGB BLD-MCNC: 11.9 G/DL (ref 12–16)
IMM GRANULOCYTES # BLD AUTO: 0.09 K/UL (ref 0–0.04)
IMM GRANULOCYTES NFR BLD AUTO: 0.5 % (ref 0–0.5)
LYMPHOCYTES # BLD AUTO: 0.9 K/UL (ref 1–4.8)
LYMPHOCYTES NFR BLD: 5 % (ref 18–48)
MCH RBC QN AUTO: 28.8 PG (ref 27–31)
MCHC RBC AUTO-ENTMCNC: 32 G/DL (ref 32–36)
MCV RBC AUTO: 90 FL (ref 82–98)
MONOCYTES # BLD AUTO: 0.7 K/UL (ref 0.3–1)
MONOCYTES NFR BLD: 4 % (ref 4–15)
NEUTROPHILS # BLD AUTO: 16.4 K/UL (ref 1.8–7.7)
NEUTROPHILS NFR BLD: 90.4 % (ref 38–73)
NRBC BLD-RTO: 0 /100 WBC
PLATELET # BLD AUTO: 296 K/UL (ref 150–450)
PMV BLD AUTO: 10.3 FL (ref 9.2–12.9)
POTASSIUM SERPL-SCNC: 4.3 MMOL/L (ref 3.5–5.1)
PROT SERPL-MCNC: 7.9 G/DL (ref 6–8.4)
RBC # BLD AUTO: 4.13 M/UL (ref 4–5.4)
SODIUM SERPL-SCNC: 135 MMOL/L (ref 136–145)
WBC # BLD AUTO: 18.11 K/UL (ref 3.9–12.7)

## 2024-02-08 PROCEDURE — 63600175 PHARM REV CODE 636 W HCPCS: Performed by: PHYSICIAN ASSISTANT

## 2024-02-08 PROCEDURE — 85025 COMPLETE CBC W/AUTO DIFF WBC: CPT | Performed by: FAMILY MEDICINE

## 2024-02-08 PROCEDURE — 99233 SBSQ HOSP IP/OBS HIGH 50: CPT | Mod: NSCH,GC,, | Performed by: PHYSICIAN ASSISTANT

## 2024-02-08 PROCEDURE — A4216 STERILE WATER/SALINE, 10 ML: HCPCS | Performed by: FAMILY MEDICINE

## 2024-02-08 PROCEDURE — 94761 N-INVAS EAR/PLS OXIMETRY MLT: CPT

## 2024-02-08 PROCEDURE — 63600175 PHARM REV CODE 636 W HCPCS: Performed by: INTERNAL MEDICINE

## 2024-02-08 PROCEDURE — 94760 N-INVAS EAR/PLS OXIMETRY 1: CPT

## 2024-02-08 PROCEDURE — 99900035 HC TECH TIME PER 15 MIN (STAT)

## 2024-02-08 PROCEDURE — 25000003 PHARM REV CODE 250: Performed by: FAMILY MEDICINE

## 2024-02-08 PROCEDURE — 80053 COMPREHEN METABOLIC PANEL: CPT | Performed by: FAMILY MEDICINE

## 2024-02-08 PROCEDURE — 36415 COLL VENOUS BLD VENIPUNCTURE: CPT | Performed by: FAMILY MEDICINE

## 2024-02-08 RX ORDER — HYDRALAZINE HYDROCHLORIDE 25 MG/1
25 TABLET, FILM COATED ORAL EVERY 8 HOURS
Status: DISCONTINUED | OUTPATIENT
Start: 2024-02-08 | End: 2024-02-08 | Stop reason: HOSPADM

## 2024-02-08 RX ORDER — CIPROFLOXACIN 500 MG/1
500 TABLET ORAL EVERY 12 HOURS
Qty: 10 TABLET | Refills: 0 | Status: SHIPPED | OUTPATIENT
Start: 2024-02-08 | End: 2024-02-13

## 2024-02-08 RX ORDER — HYDRALAZINE HYDROCHLORIDE 25 MG/1
25 TABLET, FILM COATED ORAL EVERY 8 HOURS
Qty: 90 TABLET | Refills: 11 | Status: SHIPPED | OUTPATIENT
Start: 2024-02-08 | End: 2025-02-07

## 2024-02-08 RX ADMIN — DEXTROSE, SODIUM CHLORIDE, AND POTASSIUM CHLORIDE: 5; .45; .15 INJECTION INTRAVENOUS at 10:02

## 2024-02-08 RX ADMIN — POLYETHYLENE GLYCOL 3350 17 G: 17 POWDER, FOR SOLUTION ORAL at 09:02

## 2024-02-08 RX ADMIN — SUCRALFATE 1 G: 1 SUSPENSION ORAL at 01:02

## 2024-02-08 RX ADMIN — CIPROFLOXACIN 500 MG: 500 TABLET, FILM COATED ORAL at 09:02

## 2024-02-08 RX ADMIN — HYDRALAZINE HYDROCHLORIDE 25 MG: 25 TABLET, FILM COATED ORAL at 01:02

## 2024-02-08 RX ADMIN — ONDANSETRON 8 MG: 8 TABLET, ORALLY DISINTEGRATING ORAL at 05:02

## 2024-02-08 RX ADMIN — OXYCODONE HYDROCHLORIDE 5 MG: 5 TABLET ORAL at 09:02

## 2024-02-08 RX ADMIN — TIMOLOL MALEATE 1 DROP: 5 SOLUTION OPHTHALMIC at 09:02

## 2024-02-08 RX ADMIN — ALUMINUM HYDROXIDE, MAGNESIUM HYDROXIDE, AND SIMETHICONE 30 ML: 200; 200; 20 SUSPENSION ORAL at 05:02

## 2024-02-08 RX ADMIN — Medication 10 ML: at 01:02

## 2024-02-08 RX ADMIN — PANTOPRAZOLE SODIUM 40 MG: 40 TABLET, DELAYED RELEASE ORAL at 09:02

## 2024-02-08 RX ADMIN — SUCRALFATE 1 G: 1 SUSPENSION ORAL at 05:02

## 2024-02-08 RX ADMIN — ALUMINUM HYDROXIDE, MAGNESIUM HYDROXIDE, AND SIMETHICONE 30 ML: 200; 200; 20 SUSPENSION ORAL at 01:02

## 2024-02-08 RX ADMIN — DOCUSATE SODIUM AND SENNOSIDES 1 TABLET: 8.6; 5 TABLET, FILM COATED ORAL at 09:02

## 2024-02-08 RX ADMIN — HYDROCHLOROTHIAZIDE 12.5 MG: 12.5 TABLET ORAL at 09:02

## 2024-02-08 RX ADMIN — BRIMONIDINE TARTRATE 1 DROP: 2 SOLUTION OPHTHALMIC at 09:02

## 2024-02-08 RX ADMIN — Medication 10 ML: at 05:02

## 2024-02-08 RX ADMIN — CLONIDINE HYDROCHLORIDE 0.1 MG: 0.1 TABLET ORAL at 09:02

## 2024-02-08 RX ADMIN — DEXTROSE, SODIUM CHLORIDE, AND POTASSIUM CHLORIDE: 5; .45; .15 INJECTION INTRAVENOUS at 02:02

## 2024-02-08 NOTE — ASSESSMENT & PLAN NOTE
Chronic, controlled. Latest blood pressure and vitals reviewed-     Temp:  [97.2 °F (36.2 °C)-97.6 °F (36.4 °C)]   Pulse:  [57-72]   Resp:  [9-28]   BP: (145-191)/(69-93)   SpO2:  [93 %-100 %] .   Home meds for hypertension were reviewed and noted below.   Hypertension Medications               cloNIDine (CATAPRES) 0.1 MG tablet Take 0.1 mg by mouth 2 (two) times daily.    hydroCHLOROthiazide (MICROZIDE) 12.5 mg capsule Take 12.5 mg by mouth once daily.            While in the hospital, will manage blood pressure as follows; Continue home antihypertensive regimen    Will utilize p.r.n. blood pressure medication only if patient's blood pressure greater than 180/110 and she develops symptoms such as worsening chest pain or shortness of breath.

## 2024-02-08 NOTE — ASSESSMENT & PLAN NOTE
Patient has metastatic cancer of rectum primary. The cancer has metastasized to liver. The patient is under the care of an outpatient oncologist.Their staging information is listed below.   Cancer Staging   Carcinoid tumor of rectum  Staging form: Colon and Rectum, AJCC 7th Edition  - Clinical stage from 12/17/2021: Stage IVB (TX, NX, M1b) - Signed by Ariel Smart MD on 10/10/2023    S/p TACE procedure by IR 2/7/2024   Continue all medications which and advised by IR including prn Zofran, prn oxycodone and ciprofloxacin

## 2024-02-08 NOTE — HPI
68 years old female with past medical history significant for metastatic neuroendocrine tumor who underwent TACE procedure today by IR.  Patient is admitted overnight for observation status post TACE procedure.  During my encounter, patient reported she has mild diffuse abdominal discomfort.  Patient reported she feels nauseous and had an episode of nonbloody vomiting earlier today.  Patient also reported backache.  Patient denied chest pain shortness of breath diarrhea fever chills cough or dizziness.

## 2024-02-08 NOTE — CONSULTS
Interventional Radiology Progress Note      SUBJECTIVE:     History of Present Illness:  Coby Marshall is a 68 y.o. female with a PMHx of NET with met to liver s/p cTACE who was admitted on 2/7 for post procedure pain and nausea management. Pt tolerated the procedure well.  Pt is tolerating po.  She has not required IV pain medication since yesterday.  Noted elevated WBC, pt is afebrile and hemodynamically stable.  Elevated LFTs expected post procedure.    Review of Systems   Constitutional:  Negative for chills, fever, malaise/fatigue and weight loss.   Respiratory:  Negative for cough and shortness of breath.    Cardiovascular:  Negative for chest pain, palpitations and leg swelling.   Gastrointestinal:  Positive for abdominal pain and nausea. Negative for diarrhea and vomiting.        Abdominal pain and nausea improved   Genitourinary:  Negative for dysuria and flank pain.   Musculoskeletal:  Negative for back pain and myalgias.   Neurological:  Negative for weakness and headaches.       Scheduled Meds:   aluminum-magnesium hydroxide-simethicone  30 mL Oral QID (AC & HS)    atorvastatin  20 mg Oral QHS    brimonidine 0.2%  1 drop Right Eye BID    ciprofloxacin HCl  500 mg Oral Q12H    cloNIDine  0.1 mg Oral BID    hydrALAZINE  5 mg Intravenous Once    hydroCHLOROthiazide  12.5 mg Oral Daily    pantoprazole  40 mg Oral Daily    polyethylene glycol  17 g Oral Daily    scopolamine  1 patch Transdermal Q3 Days    senna-docusate 8.6-50 mg  1 tablet Oral BID    sodium chloride 0.9%  10 mL Intravenous Q8H    sucralfate  1 g Oral Q6H    timolol maleate 0.5%  1 drop Right Eye BID     Continuous Infusions:   dextrose 5 % and 0.45 % NaCl with KCl 20 mEq 125 mL/hr at 02/08/24 0625    octreotide (SANDOSTATIN) 5,000 mcg in sodium chloride 0.9% 100 mL infusion 125 mcg/hr (02/07/24 1214)     PRN Meds:albuterol-ipratropium, aluminum-magnesium hydroxide-simethicone, ampicillin-sulbactam, dextrose 10%, dextrose 10%, glucagon  (human recombinant), glucose, glucose, hydrALAZINE, insulin aspart U-100, melatonin, naloxone, ondansetron, oxyCODONE, simethicone    Review of patient's allergies indicates:   Allergen Reactions    Arb-angiotensin receptor antagonist Swelling     Had angioedema of lips with ACE inhibitor, so this is a contraindication    Epinephrine      Neuroendocrine Tumor patient      Lisinopril Swelling    Lisinopril-hydrochlorothiazide Swelling     Mouth Swelling    Sulfa (sulfonamide antibiotics) Swelling     Swelling of lips    Sulfasalazine Swelling     Swelling of lips    Hydrocodone-acetaminophen Other (See Comments)     stomach pain (even with food)    Amlodipine Other (See Comments)     edema of ankles and legs.       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    Ki 67 -3-20%    Hyperlipidemia     Hypertension     Malignant carcinoid tumor of ileum 12/2017    Ki 67 < 3%    Rectal cancer 2/8/2019     Past Surgical History:   Procedure Laterality Date    COLON SURGERY      ENDOSCOPIC ULTRASOUND OF UPPER GASTROINTESTINAL TRACT N/A 12/17/2021    Procedure: ULTRASOUND, UPPER GI TRACT, ENDOSCOPIC;  Surgeon: Jeffery Clemons MD;  Location: [facility] ([address] [place] [address]);  Service: Endoscopy;  Laterality: N/A;  instructions emailed to pt-BB  fully vaccinated-BB   12/13 arrival time confirmed with pt-rb    HYSTERECTOMY      ILEOSTOMY CLOSURE  01/2018    Lower Anterior Resection  12/2017    NET- Dr. Beck - [facility]    port a cath Right     R chest wall     Family History   Problem Relation Age of Onset    Cancer Sister         leukemia    Colon cancer Neg Hx     Esophageal cancer Neg Hx      Social History     Tobacco Use    Smoking status: Never    Smokeless tobacco: Never   Substance Use Topics    Alcohol use: No    Drug use: No       OBJECTIVE:     Vital Signs (Most Recent)  Temp: 98.1 °F (36.7 °C) (02/08/24 0734)  Pulse: 67 (02/08/24  0802)  Resp: 17 (02/08/24 0802)  BP: (!) 186/82 (02/08/24 0734)  SpO2: 98 % (02/08/24 0802)    Physical Exam:  Physical Exam  Vitals and nursing note reviewed.   Constitutional:       Appearance: Normal appearance.   HENT:      Head: Normocephalic and atraumatic.      Right Ear: External ear normal.      Left Ear: External ear normal.      Nose: Nose normal.   Eyes:      Extraocular Movements: Extraocular movements intact.   Cardiovascular:      Rate and Rhythm: Normal rate.      Pulses: Normal pulses.   Pulmonary:      Effort: Pulmonary effort is normal.   Abdominal:      General: Abdomen is flat.      Tenderness: There is no abdominal tenderness.   Musculoskeletal:      Cervical back: Normal range of motion and neck supple.   Skin:     General: Skin is warm and dry.   Neurological:      General: No focal deficit present.      Mental Status: She is alert and oriented to person, place, and time.   Psychiatric:         Mood and Affect: Mood normal.         Behavior: Behavior normal.         Laboratory  I have reviewed all pertinent lab results within the past 24 hours.  CBC:   Recent Labs   Lab 02/08/24  0501   WBC 18.11*   RBC 4.13   HGB 11.9*   HCT 37.2      MCV 90   MCH 28.8   MCHC 32.0     CMP:   Recent Labs   Lab 02/08/24  0501   *   CALCIUM 9.1   ALBUMIN 3.7   PROT 7.9   *   K 4.3   CO2 25   CL 99   BUN 11   CREATININE 1.1   ALKPHOS 86   ALT 96*   *   BILITOT 0.8     Coagulation:   Recent Labs   Lab 02/06/24  0926   LABPROT 11.0   INR 1.0       ASSESSMENT/PLAN:     Assessment:  68 y.o. female with a PMHx of NET with met to liver s/p cTACE who was admitted on 2/7 for post procedure pain and nausea management.    Plan:  Suspect leukocytosis is inflammatory reaction and not infection.  Pt is afebrile and VSS.  Recommend repeat CBC with procal.  Zofran prn for nausea and oxy 5 mg prn for pain control.  Can escalate pain control as needed.  Avoid tylenol or tylenol containing  products.  Continue cipro.  At DC, recommend Cipro 500 mg BID x 5 days.  Octreotide discontinued.  At DC give less than 1 wks supply of PRN Zofran ODT for nausea, and PRN Oxycodone 5 mg for pain.  Avoid acetaminophen-containing products and EtOH for 2 weeks post procedure.  IR will arrange follow up imaging and clinic visit in 1 month.    IR will continue to follow. Please contact with questions via [employer] secure chat.    Keara Rojas PA-C  Interventional Radiology

## 2024-02-08 NOTE — PLAN OF CARE
Pt AAOx4. IV hydralazine given x2 for elevated BPs overnight. PRN oxycodone given for c/o pain. PRN zofran given x2 for c/o nausea. Medications administered per MAR. IVFs and Mike infusing. On RA. Cardiac monitoring maintained. Purewick in place. Urine clear yellow. R groin dressing CDI. Bed alarm set, side rails raised, and call light in reach. Spouse at bedside.

## 2024-02-08 NOTE — ANESTHESIA POSTPROCEDURE EVALUATION
Anesthesia Post Evaluation    Patient: Coby Marshall    Procedure(s) Performed: * No procedures listed *    Final Anesthesia Type: general      Patient location during evaluation: PACU  Patient participation: Yes- Able to Participate  Level of consciousness: awake and alert  Post-procedure vital signs: reviewed and stable  Pain management: adequate  Airway patency: patent    PONV status at discharge: No PONV  Anesthetic complications: no      Cardiovascular status: blood pressure returned to baseline  Respiratory status: unassisted  Hydration status: euvolemic              Vitals Value Taken Time   /99 02/08/24 1256   Temp 36.9 °C (98.4 °F) 02/08/24 1157   Pulse 68 02/08/24 1256   Resp 16 02/08/24 1255   SpO2 99 % 02/08/24 1256   Vitals shown include unvalidated device data.      Event Time   Out of Recovery 11:52:00         Pain/Mikey Score: Pain Rating Prior to Med Admin: 5 (2/8/2024  9:02 AM)  Mikey Score: 10 (2/7/2024  1:00 PM)

## 2024-02-08 NOTE — PLAN OF CARE
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  Home Phone: [phone]  Mobile Phone: [phone]  Relation: Spouse  Secondary Emergency Contact: Arielle Marshall   [facility]  Home Phone: [phone]  Work Phone: [phone]  Mobile Phone: [phone]  Relation: Daughter    Discharge Plan A: (P) Home with family  Discharge Plan B: (P) Home Health      [id number] - [place], [place] - [address]  [address]  [address] [address]  Phone: [phone] Fax: [phone]      Initial Assessment (most recent)       Adult Discharge Assessment - 02/08/24 1015          Discharge Assessment    Assessment Type Discharge Planning Assessment (P)      Confirmed/corrected address, phone number and insurance Yes (P)      Confirmed Demographics Correct on Facesheet (P)      Source of Information patient (P)      Communicated DAVID with patient/caregiver Yes (P)      People in Home spouse (P)    spouseSree ([phone])    Do you expect to return to your current living situation? Yes (P)      Do you have help at home or someone to help you manage your care at home? Yes (P)      Prior to hospitilization cognitive status: Alert/Oriented (P)      Current cognitive status: Alert/Oriented (P)      Equipment Currently Used at Home blood pressure machine (P)      Readmission within 30 days? No (P)      Patient currently being followed by outpatient case management? No (P)      Do you currently have service(s) that help you manage your care at home?  No (P)      Do you take prescription medications? Yes (P)      Do you have prescription coverage? Yes (P)      Do you have any problems affording any of your prescribed medications? No (P)      Is the patient taking medications as prescribed? yes (P)      How do you get to doctors appointments? family or friend will provide;car, drives self (P)      Are you on dialysis? No (P)      Do you take coumadin? No (P)      Discharge Plan A Home with family (P)      Discharge Plan B Home Health (P)      DME Needed Upon Discharge  none (P)      Discharge Plan discussed with: Patient (P)                       1015  Patient resting quietly in bed when CM rounded via [employer]. No family present. Patient was admitted for a scheduled TACE procedure to treat neuroendocrine cancer with mets to liver.     Patient lives with her spouse, Sree Marshall,, is independent of all ADLs, & denied the need for assistance with transportation at time of discharge. Patient in agreement with plan to discharge home today,     CM informed the pt of a previously scheduled appt with Dr Smart (hem/onc) on 2/19/2024 at 1040 with labs at 0940. Patient verbalized understanding.     1020  Message sent to nurse Grimaldo & virtual nurse Rojelio informing that the pt is cleared to discharge.       Will continue to follow.

## 2024-02-08 NOTE — H&P
[facility] Medicine  History & Physical    Patient Name: Coby Marshall  MRN: [id number]  Patient Class: OP- Outpatient Recovery  Admission Date: 2/7/2024  Attending Physician: Radha Meyers*   Primary Care Provider: Randal Casillas MD         Patient information was obtained from patient and ER records.     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       HPI: 68 years old female with past medical history significant for metastatic neuroendocrine tumor who underwent TACE procedure today by IR.  Patient is admitted overnight for observation status post TACE procedure.  During my encounter, patient reported she has mild diffuse abdominal discomfort.  Patient reported she feels nauseous and had an episode of nonbloody vomiting earlier today.  Patient also reported backache.  Patient denied chest pain shortness of breath diarrhea fever chills cough or dizziness.         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    Ki 67 -3-20%    Hyperlipidemia     Hypertension     Malignant carcinoid tumor of ileum 12/2017    Ki 67 < 3%    Rectal cancer 2/8/2019       Past Surgical History:   Procedure Laterality Date    COLON SURGERY      ENDOSCOPIC ULTRASOUND OF UPPER GASTROINTESTINAL TRACT N/A 12/17/2021    Procedure: ULTRASOUND, UPPER GI TRACT, ENDOSCOPIC;  Surgeon: Jeffery Clemons MD;  Location: [facility] ([address] [place] [address]);  Service: Endoscopy;  Laterality: N/A;  instructions emailed to pt-BB  fully vaccinated-BB   12/13 arrival time confirmed with pt-rb    HYSTERECTOMY      ILEOSTOMY CLOSURE  01/2018    Lower Anterior Resection  12/2017    LETITIA- Dr. Beck - [facility]    port a cath Right     R chest wall       Review of patient's allergies indicates:   Allergen Reactions    Arb-angiotensin receptor antagonist Swelling     Had  angioedema of lips with ACE inhibitor, so this is a contraindication    Epinephrine      Neuroendocrine Tumor patient      Lisinopril Swelling    Lisinopril-hydrochlorothiazide Swelling     Mouth Swelling    Sulfa (sulfonamide antibiotics) Swelling     Swelling of lips    Sulfasalazine Swelling     Swelling of lips    Hydrocodone-acetaminophen Other (See Comments)     stomach pain (even with food)    Amlodipine Other (See Comments)     edema of ankles and legs.       No current facility-administered medications on file prior to encounter.     Current Outpatient Medications on File Prior to Encounter   Medication Sig    atorvastatin (LIPITOR) 20 MG tablet Take 20 mg by mouth every evening.    brimonidine-timoloL (COMBIGAN) 0.2-0.5 % Drop Place 1 drop into the right eye 2 (two) times daily.    capecitabine (XELODA) 500 MG Tab Take 2 tablets (1,000 mg total) by mouth 2 (two) times daily on days 1-14 of every 28 day cycle.    cloNIDine (CATAPRES) 0.1 MG tablet Take 0.1 mg by mouth 2 (two) times daily.    hydroCHLOROthiazide (MICROZIDE) 12.5 mg capsule Take 12.5 mg by mouth once daily.    ondansetron (ZOFRAN-ODT) 8 MG TbDL Dissolve 1 tablet (8 mg total) by mouth every 8 (eight) hours as needed (nausea).    ondansetron (ZOFRAN-ODT) 8 MG TbDL Take 1 tablet (8 mg total) by mouth every 8 (eight) hours as needed (nausea). Take one tab 30 min before taking temodar    oxyCODONE (ROXICODONE) 5 MG immediate release tablet Take 1 tablet (5 mg total) by mouth every 8 (eight) hours as needed for Pain.    temozolomide (TEMODAR) 100 MG capsule Take 3 capsules (300 mg) by mouth once daily at bedtime on days 10-14 of every 28 day cycle. Take zofran 30 min before you take temodar..    ALBUTEROL INHL Inhale into the lungs as needed.     fluticasone (FLONASE) 50 mcg/actuation nasal spray 1 spray by Each Nare route once daily.    ranitidine (ZANTAC) 150 MG tablet Take 150 mg by mouth nightly.      Family History       Problem Relation (Age  of Onset)    Cancer Sister          Tobacco Use    Smoking status: Never    Smokeless tobacco: Never   Substance and Sexual Activity    Alcohol use: No    Drug use: No    Sexual activity: Not Currently     Partners: Male     Review of Systems   Constitutional:  Positive for fatigue. Negative for chills and fever.   HENT:  Negative for congestion.    Eyes:  Negative for visual disturbance.   Respiratory:  Negative for cough and shortness of breath.    Cardiovascular:  Negative for chest pain and leg swelling.   Gastrointestinal:  Positive for abdominal pain, nausea and vomiting. Negative for diarrhea.   Genitourinary:  Negative for dysuria.   Musculoskeletal:  Positive for back pain.   Neurological:  Negative for syncope.   Psychiatric/Behavioral:  Negative for agitation.      Objective:     Vital Signs (Most Recent):  Temp: 97.3 °F (36.3 °C) (02/07/24 1938)  Pulse: 72 (02/07/24 1946)  Resp: 18 (02/07/24 1938)  BP: (!) 191/84 (post-hydralazine) (02/07/24 1946)  SpO2: 100 % (02/07/24 1938) Vital Signs (24h Range):  Temp:  [97.2 °F (36.2 °C)-97.6 °F (36.4 °C)] 97.3 °F (36.3 °C)  Pulse:  [57-72] 72  Resp:  [9-28] 18  SpO2:  [93 %-100 %] 100 %  BP: (145-191)/(69-93) 191/84     Weight: 83 kg (183 lb)  Body mass index is 29.54 kg/m².     Physical Exam  Constitutional:       General: She is not in acute distress.     Appearance: She is ill-appearing.   HENT:      Head: Normocephalic.      Nose: Nose normal.      Mouth/Throat:      Mouth: Mucous membranes are moist.   Eyes:      Extraocular Movements: Extraocular movements intact.   Cardiovascular:      Rate and Rhythm: Normal rate.   Pulmonary:      Effort: No respiratory distress.      Breath sounds: No wheezing or rales.   Abdominal:      Tenderness: There is abdominal tenderness.   Musculoskeletal:         General: No swelling.   Skin:     General: Skin is warm.      Capillary Refill: Capillary refill takes less than 2 seconds.   Neurological:      Mental Status: She is  alert and oriented to person, place, and time.   Psychiatric:         Behavior: Behavior normal.                Significant Labs: All pertinent labs within the past 24 hours have been reviewed.  CBC:   Recent Labs   Lab 02/06/24  0926   WBC 6.84   HGB 10.0*   HCT 32.9*        CMP:   Recent Labs   Lab 02/06/24  0926      K 4.1      CO2 29      BUN 13   CREATININE 1.1   CALCIUM 9.7   PROT 7.5   ALBUMIN 4.2   BILITOT 0.4   ALKPHOS 74   AST 20   ALT 13   ANIONGAP 8       Significant Imaging: I have reviewed all pertinent imaging results/findings within the past 24 hours.  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 The cancer has metastasized to liver. The patient is under the care of an outpatient oncologist.Their staging information is listed below.   Cancer Staging   Carcinoid tumor of rectum  Staging form: Colon and Rectum, AJCC 7th Edition  - Clinical stage from 12/17/2021: Stage IVB (TX, NX, M1b) - Signed by Ariel Smart MD on 10/10/2023    S/p TACE procedure by IR 2/7/2024   Continue all medications which and advised by IR including prn Zofran, prn oxycodone and ciprofloxacin    Hypertensive urgency  Patient has a current diagnosis of hypertensive urgency (without evidence of end organ damage) which is uncontrolled.  Latest blood pressure and vitals reviewed-   Temp:  [97.2 °F (36.2 °C)-97.6 °F (36.4 °C)]   Pulse:  [57-72]   Resp:  [9-28]   BP: (145-191)/(69-93)   SpO2:  [93 %-100 %] .   Patient currently on IV antihypertensives.   Home meds for hypertension were reviewed and noted below.   Hypertension Medications               cloNIDine (CATAPRES) 0.1 MG tablet Take 0.1 mg by mouth 2 (two) times daily.    hydroCHLOROthiazide (MICROZIDE) 12.5 mg capsule Take 12.5 mg by mouth once daily.            Medication adjustment for hospital antihypertensives is as follows- IV hydralazine    Will aim for controlled BP  reduction by medications noted above. Monitor and mitigate end organ damage as indicated.    Hyperlipidemia  Continue home meds        VTE Risk Mitigation (From admission, onward)           Ordered     IP VTE HIGH RISK PATIENT  Once         02/07/24 1706     Place sequential compression device  Until discontinued         02/07/24 1706                                    Joon Rodriguez MD  Department of Hospital Medicine  [facility] Surg

## 2024-02-08 NOTE — DISCHARGE SUMMARY
[facility] Medicine  Discharge Summary      Patient Name: Coby Marshall  MRN: [id number]  BRENDA: [id number]  Patient Class: OP- Outpatient Recovery  Admission Date: 2/7/2024  Hospital Length of Stay: 0 days  Discharge Date and Time: 2/8/2024  2:44 PM  Attending Physician: Radha Wall*   Discharging Provider: Radha Wall MD  Primary Care Provider: Randal Casillas MD    Primary Care Team: Networked reference to record PCT     HPI:   68 years old female with past medical history significant for metastatic neuroendocrine tumor who underwent TACE procedure today by IR.  Patient is admitted overnight for observation status post TACE procedure.  During my encounter, patient reported she has mild diffuse abdominal discomfort.  Patient reported she feels nauseous and had an episode of nonbloody vomiting earlier today.  Patient also reported backache.  Patient denied chest pain shortness of breath diarrhea fever chills cough or dizziness.         * No procedures listed *      Hospital Course:   No notes on file     Goals of Care Treatment Preferences:  Code Status: Full Code      Consults:   Consults (From admission, onward)          Status Ordering Provider     Interventional Radiology  Once        Provider:  (Not yet assigned)    Completed RADHA WALL.            Cardiac/Vascular  Hypertensive urgency  Patient has a current diagnosis of hypertensive urgency (without evidence of end organ damage) which is uncontrolled.  Latest blood pressure and vitals reviewed-   Temp:  [97.3 °F (36.3 °C)-98.4 °F (36.9 °C)]   Pulse:  [57-72]   Resp:  [10-28]   BP: (158-191)/(75-91)   SpO2:  [93 %-100 %] .   Patient currently on IV antihypertensives.   Home meds for hypertension were reviewed and noted below.   Hypertension Medications               cloNIDine (CATAPRES) 0.1 MG tablet Take 0.1 mg by mouth 2 (two) times daily.    hydroCHLOROthiazide (MICROZIDE) 12.5 mg capsule Take  12.5 mg by mouth once daily.            Medication adjustment for hospital antihypertensives is as follows- IV hydralazine    Will aim for controlled BP reduction by medications noted above. Monitor and mitigate end organ damage as indicated.    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 The cancer has metastasized to liver. The patient is under the care of an outpatient oncologist.Their staging information is listed below.    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    Specialty: Hematology and Oncology  Why: at 10:40AM; hem/onc appointment labs at 9:40 AM  Contact information:  [address] [facility] [address]  [phone]                           Patient Instructions:   No discharge procedures on file.    Significant Diagnostic Studies: N/A    Pending Diagnostic Studies:       None           Medications:  Reconciled Home Medications:       Medication List        START taking these medications      ciprofloxacin HCl 500 MG tablet  Commonly known as: CIPRO  Take 1 tablet (500 mg total) by mouth every 12 (twelve) hours. for 5 days     hydrALAZINE 25 MG tablet  Commonly known as: APRESOLINE  Take 1 tablet (25 mg total) by mouth every 8 (eight) hours.            CHANGE how you take these medications      ondansetron 8 MG Tbdl  Commonly known as: ZOFRAN-ODT  Take 1 tablet (8 mg total) by mouth every 8 (eight) hours as needed (nausea). Take one tab 30 min before taking temodar  What changed: Another medication with the same name was removed. Continue taking this medication, and follow the directions you see here.            CONTINUE taking these medications      ALBUTEROL INHL  Inhale into the lungs as needed.     atorvastatin 20 MG tablet  Commonly known as: LIPITOR  Take 20 mg by mouth every evening.     brimonidine-timoloL 0.2-0.5 % Drop  Commonly known as: COMBIGAN  Place 1 drop into the right eye 2 (two) times daily.     capecitabine 500 MG Tab  Commonly known as: XELODA  Take 2 tablets (1,000 mg total) by mouth 2 (two) times daily on days 1-14 of every 28 day cycle.     cloNIDine 0.1 MG tablet  Commonly known as: CATAPRES  Take 0.1 mg by mouth 2 (two) times daily.     fluticasone propionate 50 mcg/actuation nasal spray  Commonly known as: FLONASE  1 spray by Each Nare route once daily.     hydroCHLOROthiazide 12.5 mg capsule  Commonly known as: MICROZIDE  Take 12.5 mg by mouth once daily.     oxyCODONE 5 MG immediate release tablet  Commonly known as: ROXICODONE  Take 1 tablet (5 mg total) by mouth every 8 (eight) hours as needed for Pain.     temozolomide 100 MG capsule  Commonly known as: TEMODAR  Take 3 capsules (300 mg) by mouth once daily at bedtime on days 10-14 of every 28 day cycle. Take zofran 30 min before you take temodar..            ASK your doctor about these medications      ranitidine 150 MG tablet  Commonly known as: ZANTAC  Take 150  mg by mouth nightly.              Indwelling Lines/Drains at time of discharge:   Lines/Drains/Airways       Drain  Duration             Female External Urinary Catheter w/ Suction 02/07/24 0855 1 day                    Time spent on the discharge of patient: 35 minutes         Radha Meyers MD  Department of Hospital Medicine  [facility] Surg

## 2024-02-08 NOTE — PROGRESS NOTES
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  Patient is admitted overnight for observation status post TACE procedure.  During my encounter, patient reported she has mild diffuse abdominal discomfort.  Patient reported she feels nauseous and had an episode of nonbloody vomiting earlier today.  Patient also reported backache.  Patient denied chest pain shortness of breath diarrhea fever chills cough or dizziness.         Overview/Hospital Course:  No notes on file    Interval History:  Awake and alert report some nausea improved with nausea medication.  Denies any other symptoms  Resume home blood pressure medication  Possible discharge today     Review of Systems   Constitutional:  Negative for chills, fatigue and fever.   Respiratory:  Negative for cough and shortness of breath.    Cardiovascular:  Negative for chest pain and leg swelling.   Gastrointestinal:  Positive for nausea. Negative for abdominal pain, diarrhea and vomiting.   Genitourinary:  Negative for dysuria.   Musculoskeletal:  Negative for back pain.   Neurological:  Negative for syncope.   Psychiatric/Behavioral:  Negative for agitation.      Objective:     Vital Signs (Most Recent):  Temp: 98.1 °F (36.7 °C) (02/08/24 0734)  Pulse: 67 (02/08/24 0802)  Resp: 18 (02/08/24 0902)  BP: (!) 186/82 (02/08/24 0734)  SpO2: 98 % (02/08/24 0802) Vital Signs (24h Range):  Temp:  [97.3 °F (36.3 °C)-98.1 °F (36.7 °C)] 98.1 °F (36.7 °C)  Pulse:  [57-72] 67  Resp:  [10-28] 18  SpO2:  [93 %-100 %]  "98 %  BP: (158-191)/(75-91) 186/82     Weight: 88.9 kg (195 lb 15.8 oz)  Body mass index is 31.63 kg/m².    Intake/Output Summary (Last 24 hours) at 2/8/2024 1151  Last data filed at 2/8/2024 0647  Gross per 24 hour   Intake 1368.77 ml   Output 1200 ml   Net 168.77 ml         Physical Exam  Constitutional:       General: She is not in acute distress.     Appearance: She is not ill-appearing.   HENT:      Head: Normocephalic.   Eyes:      Extraocular Movements: Extraocular movements intact.   Cardiovascular:      Rate and Rhythm: Normal rate.   Pulmonary:      Effort: No respiratory distress.      Breath sounds: No wheezing or rales.   Abdominal:      Tenderness: There is abdominal tenderness.   Musculoskeletal:         General: No swelling.   Skin:     General: Skin is warm.      Capillary Refill: Capillary refill takes less than 2 seconds.   Neurological:      Mental Status: She is alert and oriented to person, place, and time.   Psychiatric:         Behavior: Behavior normal.             Significant Labs: A1C:   Recent Labs   Lab 09/18/23  1041 01/17/24  0846   HGBA1C 6.3 6.2     ABGs: No results for input(s): "PH", "PCO2", "HCO3", "POCSATURATED", "BE", "TOTALHB", "COHB", "METHB", "O2HB", "POCFIO2", "PO2" in the last 48 hours.  Blood Culture: No results for input(s): "LABBLOO" in the last 48 hours.  CBC:   Recent Labs   Lab 02/08/24  0501   WBC 18.11*   HGB 11.9*   HCT 37.2        CMP:   Recent Labs   Lab 02/08/24  0501   *   K 4.3   CL 99   CO2 25   *   BUN 11   CREATININE 1.1   CALCIUM 9.1   PROT 7.9   ALBUMIN 3.7   BILITOT 0.8   ALKPHOS 86   *   ALT 96*   ANIONGAP 11     Lactic Acid: No results for input(s): "LACTATE" in the last 48 hours.  Lipase: No results for input(s): "LIPASE" in the last 48 hours.  Lipid Panel: No results for input(s): "CHOL", "HDL", "LDLCALC", "TRIG", "CHOLHDL" in the last 48 hours.  Magnesium: No results for input(s): "MG" in the last 48 hours.  Troponin: No " "results for input(s): "TROPONINI", "TROPONINIHS" in the last 48 hours.  TSH: No results for input(s): "TSH" in the last 4320 hours.  Urine Culture: No results for input(s): "LABURIN" in the last 48 hours.  Urine Studies: No results for input(s): "COLORU", "APPEARANCEUA", "PHUR", "SPECGRAV", "PROTEINUA", "GLUCUA", "KETONESU", "BILIRUBINUA", "OCCULTUA", "NITRITE", "UROBILINOGEN", "LEUKOCYTESUR", "RBCUA", "WBCUA", "BACTERIA", "SQUAMEPITHEL", "HYALINECASTS" in the last 48 hours.    Invalid input(s): "WRIGHTSUR"    Significant Imaging: I have reviewed all pertinent imaging results/findings within the past 24 hours.    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 The cancer has metastasized to liver. The patient is under the care of an outpatient oncologist.Their staging information is listed below.    Cancer Staging   Carcinoid tumor of rectum  Staging form: Colon and Rectum, AJCC 7th Edition  - Clinical stage from 12/17/2021: Stage IVB (TX, NX, M1b) - Signed by Ariel Smart MD on 10/10/2023    S/p TACE procedure by IR 2/7/2024   Continue all medications which and advised by IR including prn Zofran, prn oxycodone and ciprofloxacin    Hypertensive urgency  Patient has a current diagnosis of hypertensive urgency (without evidence of end organ damage) which is uncontrolled.  Latest blood pressure and vitals reviewed-   Temp:  [97.3 °F (36.3 °C)-98.4 °F (36.9 °C)]   Pulse:  [57-72]   Resp:  [10-28]   BP: (158-191)/(75-91)   SpO2:  [93 %-100 %] .   Patient currently on IV antihypertensives.   Home meds for hypertension were reviewed and noted below.   Hypertension Medications               cloNIDine (CATAPRES) 0.1 MG tablet Take 0.1 mg by mouth 2 (two) times daily.    hydroCHLOROthiazide (MICROZIDE) 12.5 mg capsule Take 12.5 mg by mouth once daily.            Medication adjustment for hospital antihypertensives is as follows- IV hydralazine    Will aim " for controlled BP reduction by medications noted above. Monitor and mitigate end organ damage as indicated.    Hyperlipidemia  Continue home meds        VTE Risk Mitigation (From admission, onward)           Ordered     IP VTE HIGH RISK PATIENT  Once         02/07/24 1706     Place sequential compression device  Until discontinued         02/07/24 1706                    Discharge Planning   DAVID: 2/8/2024     Code Status: Full Code   Is the patient medically ready for discharge?:     Reason for patient still in hospital (select all that apply): Pending disposition                     Radha Meyers MD  Department of Hospital Medicine   [facility]

## 2024-02-08 NOTE — PLAN OF CARE
VIRTUAL NURSE:  Cued into patient's room.  Permission received per patient to turn camera to view patient.  Introduced as VN that will be working with floor nurse and nursing assistant.  Educated patient on VN's role in patient care and  VIP model.  Plan of care reviewed with patient.  Education per flowsheet.   Informed patient that staff will round on them every 2 hours but to use call light for any other needs they may have; informed of fall risk and fall precautions.  Patient verbalized understanding.  Call light within reach; bed siderails up x3.  Opportunity given for questions and questions answered.  Admission assessment questions answered.  Patient denies complaints or any needs at this time. Instructed to call for assistance.  Will cont to monitor and intervene as needed.    Labs, notes, orders, and careplan reviewed.    Problem: Adult Inpatient Plan of Care  Goal: Plan of Care Review  Outcome: Ongoing, Progressing  Goal: Patient-Specific Goal (Individualized)  Outcome: Ongoing, Progressing

## 2024-02-08 NOTE — ASSESSMENT & PLAN NOTE
Patient has a current diagnosis of hypertensive urgency (without evidence of end organ damage) which is uncontrolled.  Latest blood pressure and vitals reviewed-   Temp:  [97.3 °F (36.3 °C)-98.4 °F (36.9 °C)]   Pulse:  [57-72]   Resp:  [10-28]   BP: (158-191)/(75-91)   SpO2:  [93 %-100 %] .   Patient currently on IV antihypertensives.   Home meds for hypertension were reviewed and noted below.   Hypertension Medications               cloNIDine (CATAPRES) 0.1 MG tablet Take 0.1 mg by mouth 2 (two) times daily.    hydroCHLOROthiazide (MICROZIDE) 12.5 mg capsule Take 12.5 mg by mouth once daily.            Medication adjustment for hospital antihypertensives is as follows- IV hydralazine    Will aim for controlled BP reduction by medications noted above. Monitor and mitigate end organ damage as indicated.

## 2024-02-08 NOTE — SUBJECTIVE & OBJECTIVE
Interval History:  Awake and alert report some nausea improved with nausea medication.  Denies any other symptoms  Resume home blood pressure medication  Possible discharge today     Review of Systems   Constitutional:  Negative for chills, fatigue and fever.   Respiratory:  Negative for cough and shortness of breath.    Cardiovascular:  Negative for chest pain and leg swelling.   Gastrointestinal:  Positive for nausea. Negative for abdominal pain, diarrhea and vomiting.   Genitourinary:  Negative for dysuria.   Musculoskeletal:  Negative for back pain.   Neurological:  Negative for syncope.   Psychiatric/Behavioral:  Negative for agitation.      Objective:     Vital Signs (Most Recent):  Temp: 98.1 °F (36.7 °C) (02/08/24 0734)  Pulse: 67 (02/08/24 0802)  Resp: 18 (02/08/24 0902)  BP: (!) 186/82 (02/08/24 0734)  SpO2: 98 % (02/08/24 0802) Vital Signs (24h Range):  Temp:  [97.3 °F (36.3 °C)-98.1 °F (36.7 °C)] 98.1 °F (36.7 °C)  Pulse:  [57-72] 67  Resp:  [10-28] 18  SpO2:  [93 %-100 %] 98 %  BP: (158-191)/(75-91) 186/82     Weight: 88.9 kg (195 lb 15.8 oz)  Body mass index is 31.63 kg/m².    Intake/Output Summary (Last 24 hours) at 2/8/2024 1151  Last data filed at 2/8/2024 0647  Gross per 24 hour   Intake 1368.77 ml   Output 1200 ml   Net 168.77 ml         Physical Exam  Constitutional:       General: She is not in acute distress.     Appearance: She is not ill-appearing.   HENT:      Head: Normocephalic.   Eyes:      Extraocular Movements: Extraocular movements intact.   Cardiovascular:      Rate and Rhythm: Normal rate.   Pulmonary:      Effort: No respiratory distress.      Breath sounds: No wheezing or rales.   Abdominal:      Tenderness: There is abdominal tenderness.   Musculoskeletal:         General: No swelling.   Skin:     General: Skin is warm.      Capillary Refill: Capillary refill takes less than 2 seconds.   Neurological:      Mental Status: She is alert and oriented to person, place, and time.  "  Psychiatric:         Behavior: Behavior normal.             Significant Labs: A1C:   Recent Labs   Lab 09/18/23  1041 01/17/24  0846   HGBA1C 6.3 6.2     ABGs: No results for input(s): "PH", "PCO2", "HCO3", "POCSATURATED", "BE", "TOTALHB", "COHB", "METHB", "O2HB", "POCFIO2", "PO2" in the last 48 hours.  Blood Culture: No results for input(s): "LABBLOO" in the last 48 hours.  CBC:   Recent Labs   Lab 02/08/24  0501   WBC 18.11*   HGB 11.9*   HCT 37.2        CMP:   Recent Labs   Lab 02/08/24  0501   *   K 4.3   CL 99   CO2 25   *   BUN 11   CREATININE 1.1   CALCIUM 9.1   PROT 7.9   ALBUMIN 3.7   BILITOT 0.8   ALKPHOS 86   *   ALT 96*   ANIONGAP 11     Lactic Acid: No results for input(s): "LACTATE" in the last 48 hours.  Lipase: No results for input(s): "LIPASE" in the last 48 hours.  Lipid Panel: No results for input(s): "CHOL", "HDL", "LDLCALC", "TRIG", "CHOLHDL" in the last 48 hours.  Magnesium: No results for input(s): "MG" in the last 48 hours.  Troponin: No results for input(s): "TROPONINI", "TROPONINIHS" in the last 48 hours.  TSH: No results for input(s): "TSH" in the last 4320 hours.  Urine Culture: No results for input(s): "LABURIN" in the last 48 hours.  Urine Studies: No results for input(s): "COLORU", "APPEARANCEUA", "PHUR", "SPECGRAV", "PROTEINUA", "GLUCUA", "KETONESU", "BILIRUBINUA", "OCCULTUA", "NITRITE", "UROBILINOGEN", "LEUKOCYTESUR", "RBCUA", "WBCUA", "BACTERIA", "SQUAMEPITHEL", "HYALINECASTS" in the last 48 hours.    Invalid input(s): "WRIGHTSUR"    Significant Imaging: I have reviewed all pertinent imaging results/findings within the past 24 hours.  "

## 2024-02-08 NOTE — NURSING
Pt's /83 after one-time dose 5mg IV hydralazine and her home dose of 0.1mg po clonidine. Dr. Rodriguez notified. Additional dose of 5mg IV hydralazine ordered. MD states can give a third dose if BP still elevated in 15-20 minutes.

## 2024-02-08 NOTE — ASSESSMENT & PLAN NOTE
Patient has a current diagnosis of hypertensive urgency (without evidence of end organ damage) which is uncontrolled.  Latest blood pressure and vitals reviewed-   Temp:  [97.2 °F (36.2 °C)-97.6 °F (36.4 °C)]   Pulse:  [57-72]   Resp:  [9-28]   BP: (145-191)/(69-93)   SpO2:  [93 %-100 %] .   Patient currently on IV antihypertensives.   Home meds for hypertension were reviewed and noted below.   Hypertension Medications               cloNIDine (CATAPRES) 0.1 MG tablet Take 0.1 mg by mouth 2 (two) times daily.    hydroCHLOROthiazide (MICROZIDE) 12.5 mg capsule Take 12.5 mg by mouth once daily.            Medication adjustment for hospital antihypertensives is as follows- IV hydralazine    Will aim for controlled BP reduction by medications noted above. Monitor and mitigate end organ damage as indicated.

## 2024-02-08 NOTE — SUBJECTIVE & OBJECTIVE
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 Beck - [facility]    port a cath Right     R chest wall       Review of patient's allergies indicates:   Allergen Reactions    Arb-angiotensin receptor antagonist Swelling     Had angioedema of lips with ACE inhibitor, so this is a contraindication    Epinephrine      Neuroendocrine Tumor patient      Lisinopril Swelling    Lisinopril-hydrochlorothiazide Swelling     Mouth Swelling    Sulfa (sulfonamide antibiotics) Swelling     Swelling of lips    Sulfasalazine Swelling     Swelling of lips    Hydrocodone-acetaminophen Other (See Comments)     stomach pain (even with food)    Amlodipine Other (See Comments)     edema of ankles and legs.       No current facility-administered medications on file prior to encounter.     Current Outpatient Medications on File Prior to Encounter   Medication Sig    atorvastatin (LIPITOR) 20 MG tablet Take 20 mg by mouth every evening.    brimonidine-timoloL (COMBIGAN) 0.2-0.5 % Drop Place 1 drop into the right eye 2 (two) times daily.    capecitabine (XELODA) 500 MG Tab Take 2 tablets (1,000 mg total) by mouth 2 (two) times daily on days 1-14 of every 28 day cycle.    cloNIDine (CATAPRES) 0.1 MG tablet Take 0.1 mg by mouth 2 (two)  times daily.    hydroCHLOROthiazide (MICROZIDE) 12.5 mg capsule Take 12.5 mg by mouth once daily.    ondansetron (ZOFRAN-ODT) 8 MG TbDL Dissolve 1 tablet (8 mg total) by mouth every 8 (eight) hours as needed (nausea).    ondansetron (ZOFRAN-ODT) 8 MG TbDL Take 1 tablet (8 mg total) by mouth every 8 (eight) hours as needed (nausea). Take one tab 30 min before taking temodar    oxyCODONE (ROXICODONE) 5 MG immediate release tablet Take 1 tablet (5 mg total) by mouth every 8 (eight) hours as needed for Pain.    temozolomide (TEMODAR) 100 MG capsule Take 3 capsules (300 mg) by mouth once daily at bedtime on days 10-14 of every 28 day cycle. Take zofran 30 min before you take temodar..    ALBUTEROL INHL Inhale into the lungs as needed.     fluticasone (FLONASE) 50 mcg/actuation nasal spray 1 spray by Each Nare route once daily.    ranitidine (ZANTAC) 150 MG tablet Take 150 mg by mouth nightly.      Family History       Problem Relation (Age of Onset)    Cancer Sister          Tobacco Use    Smoking status: Never    Smokeless tobacco: Never   Substance and Sexual Activity    Alcohol use: No    Drug use: No    Sexual activity: Not Currently     Partners: Male     Review of Systems   Constitutional:  Positive for fatigue. Negative for chills and fever.   HENT:  Negative for congestion.    Eyes:  Negative for visual disturbance.   Respiratory:  Negative for cough and shortness of breath.    Cardiovascular:  Negative for chest pain and leg swelling.   Gastrointestinal:  Positive for abdominal pain, nausea and vomiting. Negative for diarrhea.   Genitourinary:  Negative for dysuria.   Musculoskeletal:  Positive for back pain.   Neurological:  Negative for syncope.   Psychiatric/Behavioral:  Negative for agitation.      Objective:     Vital Signs (Most Recent):  Temp: 97.3 °F (36.3 °C) (02/07/24 1938)  Pulse: 72 (02/07/24 1946)  Resp: 18 (02/07/24 1938)  BP: (!) 191/84 (post-hydralazine) (02/07/24 1946)  SpO2: 100 % (02/07/24  1938) Vital Signs (24h Range):  Temp:  [97.2 °F (36.2 °C)-97.6 °F (36.4 °C)] 97.3 °F (36.3 °C)  Pulse:  [57-72] 72  Resp:  [9-28] 18  SpO2:  [93 %-100 %] 100 %  BP: (145-191)/(69-93) 191/84     Weight: 83 kg (183 lb)  Body mass index is 29.54 kg/m².     Physical Exam  Constitutional:       General: She is not in acute distress.     Appearance: She is ill-appearing.   HENT:      Head: Normocephalic.      Nose: Nose normal.      Mouth/Throat:      Mouth: Mucous membranes are moist.   Eyes:      Extraocular Movements: Extraocular movements intact.   Cardiovascular:      Rate and Rhythm: Normal rate.   Pulmonary:      Effort: No respiratory distress.      Breath sounds: No wheezing or rales.   Abdominal:      Tenderness: There is abdominal tenderness.   Musculoskeletal:         General: No swelling.   Skin:     General: Skin is warm.      Capillary Refill: Capillary refill takes less than 2 seconds.   Neurological:      Mental Status: She is alert and oriented to person, place, and time.   Psychiatric:         Behavior: Behavior normal.                Significant Labs: All pertinent labs within the past 24 hours have been reviewed.  CBC:   Recent Labs   Lab 02/06/24  0926   WBC 6.84   HGB 10.0*   HCT 32.9*        CMP:   Recent Labs   Lab 02/06/24  0926      K 4.1      CO2 29      BUN 13   CREATININE 1.1   CALCIUM 9.7   PROT 7.5   ALBUMIN 4.2   BILITOT 0.4   ALKPHOS 74   AST 20   ALT 13   ANIONGAP 8       Significant Imaging: I have reviewed all pertinent imaging results/findings within the past 24 hours.

## 2024-02-09 NOTE — PLAN OF CARE
[place] [facility]  Discharge Final Note    Primary Care Provider: Randal Casillas MD    Expected Discharge Date: 2/8/2024    Final Discharge Note (most recent)       Final Note - 02/09/24 0800          Final Note    Assessment Type Final Discharge Note (P)      Anticipated Discharge Disposition Home or Self Care (P)      Hospital Resources/Appts/Education Provided Appointments scheduled and added to AVS (P)                        Contact Info       Ariel Smart MD   Specialty: Hematology and Oncology    [address] [place] [id number]   Phone: [phone]       Next Steps: Follow up on 2/19/2024    Instructions: at 10:40AM; hem/onc appointment labs at 9:40 AM

## 2024-02-12 DIAGNOSIS — C7A.8 NEUROENDOCRINE CARCINOMA METASTATIC TO LIVER: Primary | ICD-10-CM

## 2024-02-12 DIAGNOSIS — C7B.8 NEUROENDOCRINE CARCINOMA METASTATIC TO LIVER: Primary | ICD-10-CM

## 2024-02-15 ENCOUNTER — NURSE TRIAGE (OUTPATIENT)
Dept: ADMINISTRATIVE | Facility: CLINIC | Age: 69
End: 2024-02-15
Payer: MEDICARE

## 2024-02-15 NOTE — TELEPHONE ENCOUNTER
Pt's  calling and c/o of wife having a headache and her stomach hurts really bad. Pt triaged and care advice to bring her to the ED or office with approval and told  that I would reach out to provider but he said that they have bad reception at home and wont get call back and will take her to [facility] in [place]. Pts  told to reach out once back home if any worsening symptoms or questions or concerns           Reason for Disposition   SEVERE headache, states 'worst headache' of life    Additional Information   Negative: Difficult to awaken or acting confused (e.g., disoriented, slurred speech)   Negative: Weakness of the face, arm or leg on one side of the body and new-onset   Negative: Numbness of the face, arm or leg on one side of the body and new-onset   Negative: Loss of speech or garbled speech and new-onset   Negative: Passed out (i.e., fainted, collapsed and was not responding)   Negative: Sounds like a life-threatening emergency to the triager   Negative: Unable to walk without falling   Negative: Stiff neck (can't touch chin to chest)   Negative: Possibility of carbon monoxide exposure    Protocols used: Headache-A-[place]

## 2024-02-16 ENCOUNTER — TELEPHONE (OUTPATIENT)
Dept: HEMATOLOGY/ONCOLOGY | Facility: CLINIC | Age: 69
End: 2024-02-16
Payer: MEDICARE

## 2024-02-16 NOTE — TELEPHONE ENCOUNTER
" had reported pt was "having a headache and her stomach hurts really bad".  brought pt to ER  in [place].     Called to check on pt.  She is at home and pain had resolved.  She was constipated and just finished this morning the meds the hospital gave her.   Had to cut the conversation short as she had to go to the bathroom.  Reminded her of labs & visit with Dr Smart on Monday.  "

## 2024-02-19 ENCOUNTER — OFFICE VISIT (OUTPATIENT)
Dept: HEMATOLOGY/ONCOLOGY | Facility: CLINIC | Age: 69
End: 2024-02-19
Payer: MEDICARE

## 2024-02-19 ENCOUNTER — LAB VISIT (OUTPATIENT)
Dept: LAB | Facility: HOSPITAL | Age: 69
End: 2024-02-19
Attending: INTERNAL MEDICINE
Payer: MEDICARE

## 2024-02-19 VITALS
HEART RATE: 73 BPM | OXYGEN SATURATION: 100 % | TEMPERATURE: 98 F | DIASTOLIC BLOOD PRESSURE: 63 MMHG | SYSTOLIC BLOOD PRESSURE: 126 MMHG | RESPIRATION RATE: 18 BRPM | HEIGHT: 66 IN | WEIGHT: 186.63 LBS | BODY MASS INDEX: 29.99 KG/M2

## 2024-02-19 DIAGNOSIS — D84.81 IMMUNODEFICIENCY SECONDARY TO NEOPLASM: ICD-10-CM

## 2024-02-19 DIAGNOSIS — I10 ESSENTIAL HYPERTENSION: ICD-10-CM

## 2024-02-19 DIAGNOSIS — G47.00 INSOMNIA, UNSPECIFIED TYPE: ICD-10-CM

## 2024-02-19 DIAGNOSIS — C7A.012 MALIGNANT CARCINOID TUMOR OF ILEUM: ICD-10-CM

## 2024-02-19 DIAGNOSIS — E87.6 HYPOKALEMIA: ICD-10-CM

## 2024-02-19 DIAGNOSIS — D84.821 IMMUNODEFICIENCY DUE TO DRUGS: ICD-10-CM

## 2024-02-19 DIAGNOSIS — D3A.8 NEUROENDOCRINE TUMOR OF PANCREAS: ICD-10-CM

## 2024-02-19 DIAGNOSIS — D49.9 IMMUNODEFICIENCY SECONDARY TO NEOPLASM: ICD-10-CM

## 2024-02-19 DIAGNOSIS — C7A.026 MALIGNANT CARCINOID TUMOR OF RECTUM: ICD-10-CM

## 2024-02-19 DIAGNOSIS — G89.3 CANCER RELATED PAIN: ICD-10-CM

## 2024-02-19 DIAGNOSIS — D3A.8 NEUROENDOCRINE TUMOR: ICD-10-CM

## 2024-02-19 DIAGNOSIS — Z79.899 IMMUNODEFICIENCY DUE TO DRUGS: ICD-10-CM

## 2024-02-19 DIAGNOSIS — C78.7 SECONDARY MALIGNANT NEOPLASM OF LIVER: ICD-10-CM

## 2024-02-19 DIAGNOSIS — N18.30 STAGE 3 CHRONIC KIDNEY DISEASE, UNSPECIFIED WHETHER STAGE 3A OR 3B CKD: ICD-10-CM

## 2024-02-19 DIAGNOSIS — C7A.026 MALIGNANT CARCINOID TUMOR OF RECTUM: Primary | ICD-10-CM

## 2024-02-19 LAB
ALBUMIN SERPL BCP-MCNC: 3.4 G/DL (ref 3.5–5.2)
ALP SERPL-CCNC: 250 U/L (ref 55–135)
ALT SERPL W/O P-5'-P-CCNC: 102 U/L (ref 10–44)
ANION GAP SERPL CALC-SCNC: 11 MMOL/L (ref 8–16)
AST SERPL-CCNC: 54 U/L (ref 10–40)
BASOPHILS # BLD AUTO: 0.04 K/UL (ref 0–0.2)
BASOPHILS NFR BLD: 0.3 % (ref 0–1.9)
BILIRUB SERPL-MCNC: 1 MG/DL (ref 0.1–1)
BUN SERPL-MCNC: 12 MG/DL (ref 8–23)
CALCIUM SERPL-MCNC: 10.3 MG/DL (ref 8.7–10.5)
CHLORIDE SERPL-SCNC: 95 MMOL/L (ref 95–110)
CO2 SERPL-SCNC: 32 MMOL/L (ref 23–29)
CREAT SERPL-MCNC: 1.2 MG/DL (ref 0.5–1.4)
DIFFERENTIAL METHOD BLD: ABNORMAL
EOSINOPHIL # BLD AUTO: 0.2 K/UL (ref 0–0.5)
EOSINOPHIL NFR BLD: 1.7 % (ref 0–8)
ERYTHROCYTE [DISTWIDTH] IN BLOOD BY AUTOMATED COUNT: 15.9 % (ref 11.5–14.5)
EST. GFR  (NO RACE VARIABLE): 48.7 ML/MIN/1.73 M^2
GLUCOSE SERPL-MCNC: 126 MG/DL (ref 70–110)
HCT VFR BLD AUTO: 37.6 % (ref 37–48.5)
HGB BLD-MCNC: 11.5 G/DL (ref 12–16)
IMM GRANULOCYTES # BLD AUTO: 0.08 K/UL (ref 0–0.04)
IMM GRANULOCYTES NFR BLD AUTO: 0.6 % (ref 0–0.5)
LYMPHOCYTES # BLD AUTO: 1.5 K/UL (ref 1–4.8)
LYMPHOCYTES NFR BLD: 11.1 % (ref 18–48)
MCH RBC QN AUTO: 28.8 PG (ref 27–31)
MCHC RBC AUTO-ENTMCNC: 30.6 G/DL (ref 32–36)
MCV RBC AUTO: 94 FL (ref 82–98)
MONOCYTES # BLD AUTO: 0.7 K/UL (ref 0.3–1)
MONOCYTES NFR BLD: 5.5 % (ref 4–15)
NEUTROPHILS # BLD AUTO: 11 K/UL (ref 1.8–7.7)
NEUTROPHILS NFR BLD: 80.8 % (ref 38–73)
NRBC BLD-RTO: 0 /100 WBC
PLATELET # BLD AUTO: 579 K/UL (ref 150–450)
PMV BLD AUTO: 9.2 FL (ref 9.2–12.9)
POTASSIUM SERPL-SCNC: 3.3 MMOL/L (ref 3.5–5.1)
PROT SERPL-MCNC: 8.6 G/DL (ref 6–8.4)
RBC # BLD AUTO: 3.99 M/UL (ref 4–5.4)
SODIUM SERPL-SCNC: 138 MMOL/L (ref 136–145)
WBC # BLD AUTO: 13.55 K/UL (ref 3.9–12.7)

## 2024-02-19 PROCEDURE — 3288F FALL RISK ASSESSMENT DOCD: CPT | Mod: CPTII,S$GLB,, | Performed by: INTERNAL MEDICINE

## 2024-02-19 PROCEDURE — 83519 RIA NONANTIBODY: CPT | Performed by: INTERNAL MEDICINE

## 2024-02-19 PROCEDURE — 80053 COMPREHEN METABOLIC PANEL: CPT | Performed by: INTERNAL MEDICINE

## 2024-02-19 PROCEDURE — 99999 PR PBB SHADOW E&M-EST. PATIENT-LVL IV: CPT | Mod: PBBFAC,,, | Performed by: INTERNAL MEDICINE

## 2024-02-19 PROCEDURE — 83497 ASSAY OF 5-HIAA: CPT | Performed by: INTERNAL MEDICINE

## 2024-02-19 PROCEDURE — 1101F PT FALLS ASSESS-DOCD LE1/YR: CPT | Mod: CPTII,S$GLB,, | Performed by: INTERNAL MEDICINE

## 2024-02-19 PROCEDURE — 84260 ASSAY OF SEROTONIN: CPT | Performed by: INTERNAL MEDICINE

## 2024-02-19 PROCEDURE — 3074F SYST BP LT 130 MM HG: CPT | Mod: CPTII,S$GLB,, | Performed by: INTERNAL MEDICINE

## 2024-02-19 PROCEDURE — 3078F DIAST BP <80 MM HG: CPT | Mod: CPTII,S$GLB,, | Performed by: INTERNAL MEDICINE

## 2024-02-19 PROCEDURE — G2211 COMPLEX E/M VISIT ADD ON: HCPCS | Mod: S$GLB,,, | Performed by: INTERNAL MEDICINE

## 2024-02-19 PROCEDURE — 99215 OFFICE O/P EST HI 40 MIN: CPT | Mod: S$GLB,,, | Performed by: INTERNAL MEDICINE

## 2024-02-19 PROCEDURE — 1160F RVW MEDS BY RX/DR IN RCRD: CPT | Mod: CPTII,S$GLB,, | Performed by: INTERNAL MEDICINE

## 2024-02-19 PROCEDURE — 85025 COMPLETE CBC W/AUTO DIFF WBC: CPT | Performed by: INTERNAL MEDICINE

## 2024-02-19 PROCEDURE — 1159F MED LIST DOCD IN RCRD: CPT | Mod: CPTII,S$GLB,, | Performed by: INTERNAL MEDICINE

## 2024-02-19 PROCEDURE — 3008F BODY MASS INDEX DOCD: CPT | Mod: CPTII,S$GLB,, | Performed by: INTERNAL MEDICINE

## 2024-02-19 PROCEDURE — 1125F AMNT PAIN NOTED PAIN PRSNT: CPT | Mod: CPTII,S$GLB,, | Performed by: INTERNAL MEDICINE

## 2024-02-19 PROCEDURE — 3044F HG A1C LEVEL LT 7.0%: CPT | Mod: CPTII,S$GLB,, | Performed by: INTERNAL MEDICINE

## 2024-02-19 PROCEDURE — 36415 COLL VENOUS BLD VENIPUNCTURE: CPT | Performed by: INTERNAL MEDICINE

## 2024-02-19 RX ORDER — OXYCODONE HYDROCHLORIDE 5 MG/1
5 TABLET ORAL EVERY 8 HOURS PRN
Qty: 90 TABLET | Refills: 0 | Status: ON HOLD | OUTPATIENT
Start: 2024-02-19 | End: 2024-05-16 | Stop reason: HOSPADM

## 2024-02-19 RX ORDER — POTASSIUM CHLORIDE 20 MEQ/1
40 TABLET, EXTENDED RELEASE ORAL ONCE
Qty: 2 TABLET | Refills: 0 | Status: SHIPPED | OUTPATIENT
Start: 2024-02-19 | End: 2024-02-19

## 2024-02-19 RX ORDER — HYDROXYZINE HYDROCHLORIDE 25 MG/1
25 TABLET, FILM COATED ORAL NIGHTLY PRN
Qty: 30 TABLET | Refills: 2 | Status: SHIPPED | OUTPATIENT
Start: 2024-02-19

## 2024-02-19 NOTE — PROGRESS NOTES
PROGRESS NOTE    Subjective:       Patient ID: Coby Marshall is a 68 y.o. female.    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    Tempus: no reportable pathogenic mutation. MSI-low. PDL1 negative. TMB 3.7    Oncologic History:  1. Ms. 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 Her primary oncologist is Dr. Olson at [place].  Her oncology history is detailed below. She was initially diagnosed with rectal adenocarcinoma and she was treated with neoadjuvant chemoRT. Surgical pathology resulted as well-differentiated carcinoid and she was initially observed. A CT obtained for hernia revealed liver metastases in July 2021, and these were confirmed to be neuroendocrine on pathology. She underwent EUS with biopsy in December 2021 which showed grade 3 NET in the pancreas with Ki67 25%. She was started on lanreotide. Her liver lesions have since grown in size, and her oncologist referred her for consideration of liver-directed therapy. Ms. Marshall reports ongoing diarrhea since the time of her initial resection. She has about 6 bowel movements per day. She does not have any flushing, palpitations, or diaphoresis. She otherwise feels well.   Oncology History:   8/2017: Well-differentiated carcinoid tumor in the rectum, grade 2, and in the terminal ileum, grade 1. Initially diagnosed as adenocarcinoma and later changed to carcinoid tumor at time of resection  Treated with concurrent chemoRT with infusional 5-FU under the diagnosis of adenocarcinoma  1/2018: resection of rectum and terminal ileum. Pathology showed a ypT3N0 1.7 cm rectal NET, well  "differentiated, grade 2, Ki 67 was 16%. 1.5 cm T2Nx small bowel NET, well diff, Ki 67 was less than 3%.  7/22/2021: Metastasis to liver on CT for hernia repair.   11/10/2021: Copper PET:    In this patient with rectal and ileal neuroendocrine tumor status post low anterior resection/small bowel resection, there are multiple somatostatin receptor avid hepatic lesions which appear increased in size as compared to outside CT 07/19/2021.  There is also tracer avid focus in the pancreatic body suspicious for malignancy.  All these findings are new since prior Dotatate PET-CT.  12/17/2021: EUS with biopsies:  "1. PANCREAS MASS, ENDOSCOPIC ULTRASOUND-GUIDED FNA:   Well-differentiated neuroendocrine tumor, favor WHO grade 3 (G3).   Comment:  Ki-67 labeling index is approximately 25%.  Average mitotic rate is   9 per 2 mm^2.  Tumor cells are positive with synaptophysin and chromogranin,   supporting the diagnosis.   2. LIVER, LEFT LOWER LOBE, ENDOSCOPIC ULTRASOUND-GUIDED FNA:   Well-differentiated neuroendocrine tumor.   3. LIVER, RIGHT LOWER LOBE, ENDOSCOPIC ULTRASOUND-GUIDED FNA:   Well-differentiated neuroendocrine tumor, favor WHO grade 2 (G2).   Comment (2,3):  Tumor in parts 2 and 3 are histologically identical.  Only   part 3 contains enough cell block lesional material to render a meaningful   labeling index and mitotic count.  Ki-67 labeling index is approximately 15%.    Average mitotic rate is 3 per 2 mm^2.  Tumor cells are positive with   synaptophysin but negative for chromogranin.  The overall findings are still   consistent with metastatic well-differentiated neuroendocrine tumor."  1/3/2022: Lanreotide started  1/4/2023: repeat CT abd/pelvis: worsening hepatic metastases. Changed Lanreotide to Sandostatin  2. Case reviewed at tumor board. [facility] serial TACE. S/p TACE on 3/1/23, 4/24/23, 7/21/23, 11/3/23  3. Scan in Sep/Oct 2023 showed residual in dome lesions progressed. Pancreatic lesion increased to 2.3 x 1.6 " "cm. Started captem 10/14/23 for progression of the pancreatic lesion. Had TACE on 11/3/23. TACE to segment IV and V on 24     Interval History:   Ms Marshall returns today for follow up. Had TACE on 24. Went to ER one week later. She had pain and fell. CT A/P showed constipation. Treatment changes in the liver. "reactive changes of the gallbladder with diffuse wall thickening and pericholecystic fluid. This is likely a sequela of most recent transarterial chemoembolization and can be managed conservatively. " She still has RUQ pain. Not able to eat very well. Moving her bowels. Has insomnia and headache since TACE.     ECO    ROS:   A ten-point system review is obtained and negative except for what was stated in the Interval History.     Physical Examination:   Vital signs reviewed.   General: well hydrated, well developed, in no acute distress  HEENT: normocephalic, PERRLA, EOMI, anicteric sclerae  Neck: supple, no JVD, thyromegaly, cervical or supraclavicular lymphadenopathy  Lungs: clear breath sounds bilaterally, no wheezing, rales, or rhonchi  Heart: RRR, no M/R/G  Abdomen: soft, no tenderness, non-distended, no hepatosplenomegaly, mass, or hernia. BS present  Extremities: no clubbing, cyanosis, or edema  Skin: no rash, ulcer, or open wounds  Neuro: alert and oriented x 4, no focal neuro deficit  Psych: pleasant and appropriate mood and affect    Objective:     Laboratory Data:  Labs reviewed. K 3.3. Cr 1.2    Imaging Data:  Gallium PET 23:  Similar number and distribution of radiotracer avid lesions within the liver and pancreatic body.  Several liver lesions demonstrate central PET photopenia and hypodensity on CT consistent with necrosis.     New nonspecific mild radiotracer uptake within several small left axillary lymph nodes.  Finding may relate to left upper extremity injection and partial radiotracer extravasation.     Otherwise, no new somatostatin receptor avid lesion.    I have personally " reviewed her CT abdomen/pelvis with Dr Soni: Receptor positive pancreatic lesion and liver mets. Liver mets appear enlarged since 6/2022, though this may be due to larger cystic components.     MRI abdomen 4/4/23:  Redemonstration of multiple hepatic lesions in keeping with known neuroendocrine tumor metastasis.  Dominant lesions within the right lobe and segment 4 demonstrate decreased central enhancement consistent with response to therapy with residual disease likely present at these sites.  Additional stable enhancing lesions within the left and right lobes consistent with residual disease.  No definite new lesion.     Persistent restricted diffusion within the pancreatic body in keeping with known lesion.     Stable mildly prominent periportal lymph nodes, nonspecific.    MRI abdomen 6/07/23  Impression:  1. Interval hepatic chemoembolization.  Decreased size of multiple hepatic lesions consistent with known metastasis.  Several lesions demonstrate persistent enhancement consistent with residual disease.  New arterial enhancement within a right hepatic lobe lesion consistent with tumor.  2. Persistent restricted diffusion within the pancreatic body in keeping with known lesion.    NM PET 6/27/23  Impression:  Overall findings concerning for mild disease progression with new hepatic lesion, enlarging pancreatic body lesion and new focus of radiotracer uptake within the right hilum.  Post treatment changes of several liver lesions with decreased radiotracer uptake.  Additional nonspecific low level radiotracer uptake within axillary, mediastinal, and bilateral inguinal lymph nodes.  Attention on follow-up.    MRI 9/16/23:  Impression:     Patient with neuroendocrine tumor with hepatic metastases and interval microwave ablation of multiple hepatic lesions.     Small amount of diffusion restriction at the superior aspect of segment 4B treatment cavity raises question of residual tumor.  Attention on follow-up.      Interval decrease in size of segment 5/8 lesion.     Interval mildly increased size of segment 6 lesion with persistent internal enhancement and nodular diffusion restriction which may reflect residual disease.     No definite new hepatic lesion.     Mildly increased size of pancreatic body mass.    PET scan 10/4/23:  Impression:     Multiple radiotracer avid hepatic lesions with mixed interval changes compared to prior exam, please see above for additional details.     Mild increased uptake within a right hilar lesion/lymph node.  Radiotracer avid pancreatic body lesion appears mildly enlarged.     No definite new lesion elsewhere.     Persistent mild nonspecific radiotracer uptake within small bilateral axillary lymph nodes     Assessment and Plan:     1. Malignant carcinoid tumor of rectum    2. Malignant carcinoid tumor of ileum    3. Neuroendocrine tumor of pancreas    4. Neuroendocrine tumor    5. Secondary malignant neoplasm of liver    6. Immunodeficiency secondary to neoplasm    7. Immunodeficiency due to drugs    8. Stage 3 chronic kidney disease, unspecified whether stage 3a or 3b CKD    9. Essential hypertension    10. Cancer related pain    11. Hypokalemia    12. Insomnia, unspecified type        1-7  - Ms  is a 69 yo woman with history of ypT3N0 rectal NET, well differentiated, grade 2 (Ki 67 16%) and small bowel NET, well diff, low grade, s/p surgery in Jan 2018. She was found to have metastatic disease to the liver and pancreas in July 2021 s/p biopsy. Liver NET is well diff grade 2 (Ki67 15)%. Pancreatic NET is well diff grade 3 (Ki67 25%). She has been on lanreotide since July 2022, changed to sandostatin in Jan 2023 after CT scan showed progression. S/p TACE to the liver on 3/1/23, 4/24/23, 7/21/23, 11/3/23  - last MRI and PET reviewed at tumor board. MRI showed response in ablated cavities. Residual in dome lesions has progressed. Pancreatic lesion increased to 2.3 x 1.6 cm. Copper PET  showed stable distribution of disease outside of liver.   - started captem on 10/14/23. TACE on 11/3/23 and 2/7/24  - doing well. Hold Captem for now since she is still recovering from TACEC/w captem.    - scheduled for MRI in 2 weeks. Will add copper PET now that she has been on captem for 4 months  - c/w lanreotide with Dr Olson    8.  - stable.   - encouraged oral hydration    9.  - BP controlled  - c/w current medication    10.  - K-Dur 40 mEq x 1    12.  - try atarax  Follow-up:     RTC after PET and MRI      Ariel Smart MD  Hematology and Medical Oncology  [facility]    Route Chart for Scheduling    Med Onc Chart Routing  Urgent    Follow up with physician . Please schedule copper PET at [place] on 3/6. see me 3/11 or after with CBC, CMP   Follow up with ELLY    Infusion scheduling note    Injection scheduling note    Labs CBC and CMP   Scheduling:  Preferred lab:  Lab interval:     Imaging PET scan      Pharmacy appointment    Other referrals

## 2024-02-23 LAB
5OH-INDOLEACETATE SERPL-MCNC: 9 NG/ML
SEROTONIN: <30 NG/ML

## 2024-02-27 LAB — PANCREASTATIN SERPL-MCNC: 58 PG/ML (ref 10–135)

## 2024-03-03 ENCOUNTER — PATIENT MESSAGE (OUTPATIENT)
Dept: HEMATOLOGY/ONCOLOGY | Facility: CLINIC | Age: 69
End: 2024-03-03
Payer: MEDICARE

## 2024-03-18 ENCOUNTER — HOSPITAL ENCOUNTER (OUTPATIENT)
Dept: RADIOLOGY | Facility: HOSPITAL | Age: 69
Discharge: HOME | End: 2024-03-18
Attending: INTERNAL MEDICINE
Payer: MEDICARE

## 2024-03-18 DIAGNOSIS — C7A.012 MALIGNANT CARCINOID TUMOR OF ILEUM: ICD-10-CM

## 2024-03-18 DIAGNOSIS — D3A.8 NEUROENDOCRINE TUMOR OF PANCREAS: ICD-10-CM

## 2024-03-18 DIAGNOSIS — C7A.026 MALIGNANT CARCINOID TUMOR OF RECTUM: ICD-10-CM

## 2024-03-18 PROCEDURE — 78815 PET IMAGE W/CT SKULL-THIGH: CPT | Mod: TC,PS,PN

## 2024-03-18 PROCEDURE — 78815 PET IMAGE W/CT SKULL-THIGH: CPT | Mod: 26,PS,, | Performed by: STUDENT IN AN ORGANIZED HEALTH CARE EDUCATION/TRAINING PROGRAM

## 2024-03-18 PROCEDURE — A9592 HC COPPER CU-64, DOTATE, DX, PER 1 MCI: HCPCS | Mod: PN | Performed by: INTERNAL MEDICINE

## 2024-03-18 RX ADMIN — COPPER CU 64 DOTATATE 3.8 MILLICURIE: 1 INJECTION, SOLUTION INTRAVENOUS at 02:03

## 2024-03-18 NOTE — PROGRESS NOTES
PET Imaging Questionnaire    Are you a Diabetic? Recent Blood Sugar level? No    Are you anemic? Bone Marrow Stimulation Meds? No    Have you had a CT Scan, if so when & where was your last one? Yes -     Have you had a PET Scan, if so when & where was your last one? Yes -     Chemotherapy or currently on Chemotherapy? Yes    Radiation therapy? Yes    Surgical History:   Past Surgical History:   Procedure Laterality Date    COLON SURGERY      ENDOSCOPIC ULTRASOUND OF UPPER GASTROINTESTINAL TRACT N/A 12/17/2021    Procedure: ULTRASOUND, UPPER GI TRACT, ENDOSCOPIC;  Surgeon: Jeffery Clemons MD;  Location: [facility] ([address] [place] [address]);  Service: Endoscopy;  Laterality: N/A;  instructions emailed to pt-BB  fully vaccinated-BB   12/13 arrival time confirmed with pt-rb    HYSTERECTOMY      ILEOSTOMY CLOSURE  01/2018    Lower Anterior Resection  12/2017    NET- Dr. Beck - [facility]    port a cath Right     R chest wall        Have you been fasting for at least 6 hours? No    Is there any chance you may be pregnant or breastfeeding? No    Assay: 3.85 MCi@:14.09   Injection Site:rt wrist    Residual: .0045 mCi@: 14.13   Technologist: Chasity Bright Injected:3.8mCi

## 2024-03-20 ENCOUNTER — OFFICE VISIT (OUTPATIENT)
Dept: HEMATOLOGY/ONCOLOGY | Facility: CLINIC | Age: 69
End: 2024-03-20
Payer: MEDICARE

## 2024-03-20 ENCOUNTER — OFFICE VISIT (OUTPATIENT)
Dept: INTERVENTIONAL RADIOLOGY/VASCULAR | Facility: CLINIC | Age: 69
End: 2024-03-20
Payer: MEDICARE

## 2024-03-20 VITALS
HEIGHT: 66 IN | HEART RATE: 68 BPM | BODY MASS INDEX: 30.58 KG/M2 | OXYGEN SATURATION: 100 % | SYSTOLIC BLOOD PRESSURE: 127 MMHG | WEIGHT: 190.25 LBS | DIASTOLIC BLOOD PRESSURE: 70 MMHG | TEMPERATURE: 98 F

## 2024-03-20 DIAGNOSIS — D84.81 IMMUNODEFICIENCY SECONDARY TO NEOPLASM: ICD-10-CM

## 2024-03-20 DIAGNOSIS — C7A.026 MALIGNANT CARCINOID TUMOR OF RECTUM: ICD-10-CM

## 2024-03-20 DIAGNOSIS — N18.30 STAGE 3 CHRONIC KIDNEY DISEASE, UNSPECIFIED WHETHER STAGE 3A OR 3B CKD: ICD-10-CM

## 2024-03-20 DIAGNOSIS — C78.7 SECONDARY MALIGNANT NEOPLASM OF LIVER: ICD-10-CM

## 2024-03-20 DIAGNOSIS — Z79.899 IMMUNODEFICIENCY DUE TO DRUGS: ICD-10-CM

## 2024-03-20 DIAGNOSIS — C7A.012 MALIGNANT CARCINOID TUMOR OF ILEUM: Primary | ICD-10-CM

## 2024-03-20 DIAGNOSIS — I10 ESSENTIAL HYPERTENSION: ICD-10-CM

## 2024-03-20 DIAGNOSIS — C7B.8 NEUROENDOCRINE CARCINOMA METASTATIC TO LIVER: Primary | ICD-10-CM

## 2024-03-20 DIAGNOSIS — D84.821 IMMUNODEFICIENCY DUE TO DRUGS: ICD-10-CM

## 2024-03-20 DIAGNOSIS — D49.9 IMMUNODEFICIENCY SECONDARY TO NEOPLASM: ICD-10-CM

## 2024-03-20 DIAGNOSIS — D3A.8 NEUROENDOCRINE TUMOR OF PANCREAS: ICD-10-CM

## 2024-03-20 DIAGNOSIS — C7A.8 NEUROENDOCRINE CARCINOMA METASTATIC TO LIVER: Primary | ICD-10-CM

## 2024-03-20 PROCEDURE — 1101F PT FALLS ASSESS-DOCD LE1/YR: CPT | Mod: CPTII,S$GLB,, | Performed by: INTERNAL MEDICINE

## 2024-03-20 PROCEDURE — 3074F SYST BP LT 130 MM HG: CPT | Mod: CPTII,S$GLB,, | Performed by: INTERNAL MEDICINE

## 2024-03-20 PROCEDURE — 3078F DIAST BP <80 MM HG: CPT | Mod: CPTII,S$GLB,, | Performed by: INTERNAL MEDICINE

## 2024-03-20 PROCEDURE — 3288F FALL RISK ASSESSMENT DOCD: CPT | Mod: CPTII,S$GLB,, | Performed by: INTERNAL MEDICINE

## 2024-03-20 PROCEDURE — G2211 COMPLEX E/M VISIT ADD ON: HCPCS | Mod: S$GLB,,, | Performed by: INTERNAL MEDICINE

## 2024-03-20 PROCEDURE — 99212 OFFICE O/P EST SF 10 MIN: CPT | Mod: S$GLB,,,

## 2024-03-20 PROCEDURE — 1126F AMNT PAIN NOTED NONE PRSNT: CPT | Mod: CPTII,S$GLB,, | Performed by: INTERNAL MEDICINE

## 2024-03-20 PROCEDURE — 3044F HG A1C LEVEL LT 7.0%: CPT | Mod: CPTII,S$GLB,,

## 2024-03-20 PROCEDURE — 1160F RVW MEDS BY RX/DR IN RCRD: CPT | Mod: CPTII,S$GLB,, | Performed by: INTERNAL MEDICINE

## 2024-03-20 PROCEDURE — 99999 PR PBB SHADOW E&M-EST. PATIENT-LVL III: CPT | Mod: PBBFAC,,, | Performed by: INTERNAL MEDICINE

## 2024-03-20 PROCEDURE — 99215 OFFICE O/P EST HI 40 MIN: CPT | Mod: S$GLB,,, | Performed by: INTERNAL MEDICINE

## 2024-03-20 PROCEDURE — 3044F HG A1C LEVEL LT 7.0%: CPT | Mod: CPTII,S$GLB,, | Performed by: INTERNAL MEDICINE

## 2024-03-20 PROCEDURE — 1159F MED LIST DOCD IN RCRD: CPT | Mod: CPTII,S$GLB,, | Performed by: INTERNAL MEDICINE

## 2024-03-20 PROCEDURE — 3008F BODY MASS INDEX DOCD: CPT | Mod: CPTII,S$GLB,, | Performed by: INTERNAL MEDICINE

## 2024-03-20 RX ORDER — TEMOZOLOMIDE 100 MG/1
300 CAPSULE ORAL SEE ADMIN INSTRUCTIONS
Qty: 15 CAPSULE | Refills: 11 | Status: ACTIVE | OUTPATIENT
Start: 2024-03-20 | End: 2025-03-20

## 2024-03-20 NOTE — PROGRESS NOTES
PROGRESS NOTE    Subjective:       Patient ID: Coby Marshall is a 68 y.o. female.    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    Tempus: no reportable pathogenic mutation. MSI-low. PDL1 negative. TMB 3.7    Oncologic History:  1. Ms. 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 Her primary oncologist is Dr. Olson at [place].  Her oncology history is detailed below. She was initially diagnosed with rectal adenocarcinoma and she was treated with neoadjuvant chemoRT. Surgical pathology resulted as well-differentiated carcinoid and she was initially observed. A CT obtained for hernia revealed liver metastases in July 2021, and these were confirmed to be neuroendocrine on pathology. She underwent EUS with biopsy in December 2021 which showed grade 3 NET in the pancreas with Ki67 25%. She was started on lanreotide. Her liver lesions have since grown in size, and her oncologist referred her for consideration of liver-directed therapy. Ms. Marshall reports ongoing diarrhea since the time of her initial resection. She has about 6 bowel movements per day. She does not have any flushing, palpitations, or diaphoresis. She otherwise feels well.   Oncology History:   8/2017: Well-differentiated carcinoid tumor in the rectum, grade 2, and in the terminal ileum, grade 1. Initially diagnosed as adenocarcinoma and later changed to carcinoid tumor at time of resection  Treated with concurrent chemoRT with infusional 5-FU under the diagnosis of adenocarcinoma  1/2018: resection of rectum and terminal ileum. Pathology showed a ypT3N0 1.7 cm rectal NET, well  "differentiated, grade 2, Ki 67 was 16%. 1.5 cm T2Nx small bowel NET, well diff, Ki 67 was less than 3%.  7/22/2021: Metastasis to liver on CT for hernia repair.   11/10/2021: Copper PET:    In this patient with rectal and ileal neuroendocrine tumor status post low anterior resection/small bowel resection, there are multiple somatostatin receptor avid hepatic lesions which appear increased in size as compared to outside CT 07/19/2021.  There is also tracer avid focus in the pancreatic body suspicious for malignancy.  All these findings are new since prior Dotatate PET-CT.  12/17/2021: EUS with biopsies:  "1. PANCREAS MASS, ENDOSCOPIC ULTRASOUND-GUIDED FNA:   Well-differentiated neuroendocrine tumor, favor WHO grade 3 (G3).   Comment:  Ki-67 labeling index is approximately 25%.  Average mitotic rate is   9 per 2 mm^2.  Tumor cells are positive with synaptophysin and chromogranin,   supporting the diagnosis.   2. LIVER, LEFT LOWER LOBE, ENDOSCOPIC ULTRASOUND-GUIDED FNA:   Well-differentiated neuroendocrine tumor.   3. LIVER, RIGHT LOWER LOBE, ENDOSCOPIC ULTRASOUND-GUIDED FNA:   Well-differentiated neuroendocrine tumor, favor WHO grade 2 (G2).   Comment (2,3):  Tumor in parts 2 and 3 are histologically identical.  Only   part 3 contains enough cell block lesional material to render a meaningful   labeling index and mitotic count.  Ki-67 labeling index is approximately 15%.    Average mitotic rate is 3 per 2 mm^2.  Tumor cells are positive with   synaptophysin but negative for chromogranin.  The overall findings are still   consistent with metastatic well-differentiated neuroendocrine tumor."  1/3/2022: Lanreotide started  1/4/2023: repeat CT abd/pelvis: worsening hepatic metastases. Changed Lanreotide to Sandostatin.   2. Case reviewed at tumor board. [facility]. S/p TACE on 3/1/23, 4/24/23, 7/21/23, 11/3/23  3. Scan in Sep/Oct 2023 showed residual in dome lesions progressed. Pancreatic lesion increased to 2.3 x " 1.6 cm. Started captem 10/14/23 for progression of the pancreatic lesion. Had TACE on 11/3/23. TACE to segment IV and V on 24     Interval History:   Ms Marshall returns today for follow up. Held chemo last month due to patient not feeling well. She is feeling better now. Wonders if she can resume lanreotide. Skipped lanreotide in Feb as she was not feeling well. MRI is not covered by insurance as we are out of network.     ECO    ROS:   A ten-point system review is obtained and negative except for what was stated in the Interval History.     Physical Examination:   Vital signs reviewed.   General: well hydrated, well developed, in no acute distress  HEENT: normocephalic, PERRLA, EOMI, anicteric sclerae  Neck: supple, no JVD, thyromegaly, cervical or supraclavicular lymphadenopathy  Lungs: clear breath sounds bilaterally, no wheezing, rales, or rhonchi  Heart: RRR, no M/R/G  Abdomen: soft, no tenderness, non-distended, no hepatosplenomegaly, mass, or hernia. BS present  Extremities: no clubbing, cyanosis, or edema  Skin: no rash, ulcer, or open wounds  Neuro: alert and oriented x 4, no focal neuro deficit  Psych: pleasant and appropriate mood and affect    Objective:     Laboratory Data:  Labs reviewed. Cr 1.3    Imaging Data:  Gallium PET 23:  Similar number and distribution of radiotracer avid lesions within the liver and pancreatic body.  Several liver lesions demonstrate central PET photopenia and hypodensity on CT consistent with necrosis.     New nonspecific mild radiotracer uptake within several small left axillary lymph nodes.  Finding may relate to left upper extremity injection and partial radiotracer extravasation.     Otherwise, no new somatostatin receptor avid lesion.    I have personally reviewed her CT abdomen/pelvis with Dr Soni: Receptor positive pancreatic lesion and liver mets. Liver mets appear enlarged since 2022, though this may be due to larger cystic components.     MRI abdomen  4/4/23:  Redemonstration of multiple hepatic lesions in keeping with known neuroendocrine tumor metastasis.  Dominant lesions within the right lobe and segment 4 demonstrate decreased central enhancement consistent with response to therapy with residual disease likely present at these sites.  Additional stable enhancing lesions within the left and right lobes consistent with residual disease.  No definite new lesion.     Persistent restricted diffusion within the pancreatic body in keeping with known lesion.     Stable mildly prominent periportal lymph nodes, nonspecific.    MRI abdomen 6/07/23  Impression:  1. Interval hepatic chemoembolization.  Decreased size of multiple hepatic lesions consistent with known metastasis.  Several lesions demonstrate persistent enhancement consistent with residual disease.  New arterial enhancement within a right hepatic lobe lesion consistent with tumor.  2. Persistent restricted diffusion within the pancreatic body in keeping with known lesion.    NM PET 6/27/23  Impression:  Overall findings concerning for mild disease progression with new hepatic lesion, enlarging pancreatic body lesion and new focus of radiotracer uptake within the right hilum.  Post treatment changes of several liver lesions with decreased radiotracer uptake.  Additional nonspecific low level radiotracer uptake within axillary, mediastinal, and bilateral inguinal lymph nodes.  Attention on follow-up.    MRI 9/16/23:  Impression:     Patient with neuroendocrine tumor with hepatic metastases and interval microwave ablation of multiple hepatic lesions.     Small amount of diffusion restriction at the superior aspect of segment 4B treatment cavity raises question of residual tumor.  Attention on follow-up.     Interval decrease in size of segment 5/8 lesion.     Interval mildly increased size of segment 6 lesion with persistent internal enhancement and nodular diffusion restriction which may reflect residual  disease.     No definite new hepatic lesion.     Mildly increased size of pancreatic body mass.    PET scan 10/4/23:  Impression:     Multiple radiotracer avid hepatic lesions with mixed interval changes compared to prior exam, please see above for additional details.     Mild increased uptake within a right hilar lesion/lymph node.  Radiotracer avid pancreatic body lesion appears mildly enlarged.     No definite new lesion elsewhere.     Persistent mild nonspecific radiotracer uptake within small bilateral axillary lymph nodes    Copper PET 3/18/24:  Impression:     Mixed response to treatment.  The hepatic metastases have decreased in their radiotracer avidity well-appearing unchanged in CT appearance.  However, the pancreatic body lesion and right perihilar metastasis demonstrate worsening radiotracer uptake.  There is no new site of PET avid disease.     Assessment and Plan:     1. Malignant carcinoid tumor of ileum    2. Malignant carcinoid tumor of rectum    3. Neuroendocrine tumor of pancreas    4. Secondary malignant neoplasm of liver    5. Immunodeficiency secondary to neoplasm    6. Immunodeficiency due to drugs    7. Stage 3 chronic kidney disease, unspecified whether stage 3a or 3b CKD    8. Essential hypertension        1-6  - Ms  is a 67 yo woman with history of ypT3N0 rectal NET, well differentiated, grade 2 (Ki 67 16%) and small bowel NET, well diff, low grade, s/p surgery in Jan 2018. She was found to have metastatic disease to the liver and pancreas in July 2021 s/p biopsy. Liver NET is well diff grade 2 (Ki67 15)%. Pancreatic NET is well diff grade 3 (Ki67 25%). She has been on lanreotide since July 2022, changed to sandostatin in Jan 2023 after CT scan showed progression. S/p TACE to the liver on 3/1/23, 4/24/23, 7/21/23, 11/3/23  - last MRI and PET reviewed at tumor board. MRI showed response in ablated cavities. Residual in dome lesions has progressed. Pancreatic lesion increased to 2.3 x  1.6 cm. Copper PET showed stable distribution of disease outside of liver.   - started captem on 10/14/23. TACE on 11/3/23 and 2/7/24  - doing better. Reviewed PET scan results with patient. Increased avidity does not mean tumor progression. Stable size of pancreatic lesion and mediastinal LN, response in the liver after TACE. Treatment is working  - c/w captem. Start a new cycle when she gets medication. Takes xeloda 1000 mg bid days 1-14, temodar 300 mg nightly days 10-14 every 28 day cycle  - MRI not covered as we are out of network. Will see if we can get it at [place] and get the disc to us  - c/w lanreotide with Dr Olson    7.  - stable.   - encouraged oral hydration    8.  - BP controlled  - c/w current medication    Follow-up:     RTC in 4 weeks      Ariel Smart MD  Hematology and Medical Oncology  [facility]    Route Chart for Scheduling    Med Onc Chart Routing      Follow up with physician 4 weeks. see me in 4 weeks with CBC, CMP, serotonin, pancreastatin, 5-HIAA   Follow up with ELLY    Infusion scheduling note    Injection scheduling note    Labs    Imaging    Pharmacy appointment    Other referrals

## 2024-03-20 NOTE — PROGRESS NOTES
Subjective     Patient ID: Coby Marshall is a 68 y.o. female.    Chief Complaint: Follow-up    Patient referred to Interventional Radiology by Dr. Smart. Coby Marshall is a 67 y.o. 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 She is now s/p multiple TACE procedures. Most recent TACE on 2/7/2024. She reports she experienced some nausea for a few days following the procedure but otherwise tolerated it well. She reports she is staying active and busy with [employer] and her grandson's basketball team. Her  has some upcoming health test upcoming.       Review of Systems   Constitutional:  Positive for fatigue. Negative for appetite change and unexpected weight change.   Respiratory:  Negative for shortness of breath.    Cardiovascular:  Negative for chest pain.   Gastrointestinal:  Negative for abdominal pain.   Neurological:  Negative for facial asymmetry, speech difficulty and coordination difficulties.   Psychiatric/Behavioral:  Negative for behavioral problems and confusion.         Objective     Physical Exam  Vitals reviewed.   Constitutional:       Appearance: Normal appearance.   HENT:      Head: Normocephalic and atraumatic.      Nose: Nose normal.   Eyes:      Conjunctiva/sclera: Conjunctivae normal.   Pulmonary:      Effort: Pulmonary effort is normal.   Musculoskeletal:      Right lower leg: No edema.      Left lower leg: No edema.   Skin:     General: Skin is warm and dry.      Coloration: Skin is not jaundiced.   Neurological:      General: No focal deficit present.      Mental Status: She is alert and oriented to person, place, and time.   Psychiatric:         Mood and Affect: Mood normal.         Behavior: Behavior normal.          Assessment and Plan     1. Neuroendocrine carcinoma metastatic to liver  -     IR Embolization for Tumor_Organ Ischemia; Future; Expected date: 03/20/2024    - MRI not covered by  "insurance. Oncology will be see if patient can get MRI closer to home.  - PET scan reviewed by Dr. Reynolds. "There are small foci of uptake along the margin of the treated lesions.  We can touch those up with repeat TACE." Patient reports her  has some health test coming up and she would prefer to hold off on repeat TACE at this time.   - Message sent to Dr. Smart. Will plan to complete a follow up call 2 weeks from today to see if she is ready to schedule.   - Discussed results of PET with patient and discussed we will update patient if the plan changes.     Pily Murcia PA-C  Interventional Radiology  [phone]    "

## 2024-03-21 ENCOUNTER — DOCUMENTATION ONLY (OUTPATIENT)
Dept: HEMATOLOGY/ONCOLOGY | Facility: CLINIC | Age: 69
End: 2024-03-21
Payer: MEDICARE

## 2024-03-21 NOTE — PROGRESS NOTES
Request received for assistance with medication co-pay. Co-pay for medication is $277.12. Cost transfer form emailed to Tayla in specialty pharmacy. Pt. Has used $554.24 of [employer] PAF. Pt. Has $445.76 remaining. Informed Tayla with specialty pharmacy of the above, she will inform pt. Of the above so that pt. Can work on long term plan for medication assistance. Will follow as needed.

## 2024-03-25 ENCOUNTER — TUMOR BOARD CONFERENCE (OUTPATIENT)
Dept: HEMATOLOGY/ONCOLOGY | Facility: CLINIC | Age: 69
End: 2024-03-25
Payer: MEDICARE

## 2024-03-25 NOTE — PROGRESS NOTES
[facility]      NEUROENDOCRINE MULTIDISCIPLINARY TUMOR BOARD  _____________________________________________________________________    PRESENTER:   Ariel Smart MD    REASON FOR PRESENTATION:  Scan Review    ATTENDEES:   Gastroenterology - Not Present  Interventional Radiology - Dhaval Valdez MD  Nuclear Medicine - Arsenio Wesley MD  Nursing - [facility] Nursing: Mariam Perry, RN, Maty Cole, RN, Carrie Serna, RN, and Vielka Kincaid, RN  Oncology - Ariel Smart MD and Ricardo Mcginnis MD  Palliative Medicine - Not Present  Pathology -  Anny Dent MD and Rai Coles MD  Research - Not Present  Surgery - MD Mykel Devries MD    PATIENT STATUS:  Established Patient    PATIENT SUMMARY:  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    Ki 67 -3-20%    Hyperlipidemia     Hypertension     Malignant carcinoid tumor of ileum 12/2017    Ki 67 < 3%    Rectal cancer 2/8/2019       Past Surgical History:   Procedure Laterality Date    COLON SURGERY      ENDOSCOPIC ULTRASOUND OF UPPER GASTROINTESTINAL TRACT N/A 12/17/2021    Procedure: ULTRASOUND, UPPER GI TRACT, ENDOSCOPIC;  Surgeon: Jeffery Clemons MD;  Location: [address]);  Service: Endoscopy;  Laterality: N/A;  instructions emailed to pt-BB  fully vaccinated-BB   12/13 arrival time confirmed with pt-rb    HYSTERECTOMY      ILEOSTOMY CLOSURE  01/2018    Lower Anterior Resection  12/2017    [facility]- Dr. Manjarrez - [facility]    port a cath Right     R chest wall       TUMOR MARKERS:  5-HIAA, Plasma Ref Range: up to 22 ng/mL  Recent Labs   Lab 06/27/23  1300 10/04/23  1051 11/10/23  0941 12/15/23  1308 01/05/24  0837 02/19/24  0843   5-HIAA, Plasma (Neuroend) 9 10 5 7 10 9     Chromogranin A Ref Range: <93 ng/mL  Recent Labs   Lab 11/11/21  1016 02/01/23  0913   Chromogranin A 41 27     Gastrin Ref Range: <100 pg/mL      Neurokinin A Ref Range: 0 -  40 pg/mL      Pancreastatin Ref Range: 10 - 135 pg/mL  Recent Labs   Lab 06/27/23  1300 10/04/23  1051 11/10/23  0941 12/15/23  1308 01/05/24  0837 02/19/24  0844   Pancreastatin 59 51 82 65 72 58     Pancreatic Polypeptide Ref Range: >314 pg/mL  Recent Labs   Lab 11/11/21  1016   Pancreatic Polypeptide 225     Serotonin Ref Range: <=230 ng/mL  Recent Labs   Lab 06/27/23  1300 10/04/23  1051 11/10/23  0941 12/15/23  1308 01/05/24  0837 02/19/24  0843   Serotonin <30 <30 <30 <30 <30 <30           Latest Ref Rng & Units 3/20/2024     8:33 AM 3/18/2024    11:52 AM 2/19/2024    10:49 AM   Neuroendocrine Labs   WBC 3.90 - 12.70 K/uL  9.27     RBC 4.00 - 5.40 M/uL  3.80     HGB 12.0 - 16.0 g/dL  10.6     HCT 37.0 - 48.5 %  33.9     MCV 82 - 98 fL  89     MCH 27.0 - 31.0 pg  27.9     MCHC 32.0 - 36.0 g/dL  31.3     RDW 11.5 - 14.5 %  15.2     PLATLETS 150 - 450 K/uL  285     MPV 9.2 - 12.9 fL  9.6     GRAN # 1.8 - 7.7 K/uL  5.8     LYMPH # 1.0 - 4.8 K/uL  2.6     MONO # 0.3 - 1.0 K/uL  0.7     EOS # 0.0 - 0.5 K/uL  0.1     BASO # 0.00 - 0.20 K/uL  0.04     GRAN % 38.0 - 73.0 %  62.1     LYMPH % 18.0 - 48.0 %  27.9     MONO % 4.0 - 15.0 %  7.8     EOS % 0.0 - 8.0 %  1.5     BASO % 0.0 - 1.9 %  0.4     DIFF METHOD   Automated     GLUCOSE 70 - 110 mg/dL  97     BUN 8 - 23 mg/dL  22     CREATININE 0.5 - 1.4 mg/dL  1.3      - 145 mmol/L  144     K 3.5 - 5.1 mmol/L  3.9     CHLORIDE 95 - 110 mmol/L  101     CO2 23 - 29 mmol/L  32     CALCIUM 8.7 - 10.5 mg/dL  10.2     PROTEIN, TOTAL 6.0 - 8.4 g/dL  7.9     ALBUMIN 3.5 - 5.2 g/dL  3.7     TOTAL BILIRUBIN 0.1 - 1.0 mg/dL  0.4        For infants and newborns, interpretation of results should be based  on gestational age, weight and in agreement with clinical  observations.    Premature Infant recommended reference ranges:  Up to 24 hours.............<8.0 mg/dL  Up to 48 hours............<12.0 mg/dL  3-5 days..................<15.0 mg/dL  6-29 days.................<15.0  mg/dL      ALK PHOSPHATASE 55 - 135 U/L  95     SGOT (AST) 10 - 40 U/L  23     SGPT (ALT) 10 - 44 U/L  18     Weight  190 lbs 4 oz  186 lbs 10 oz     ____________________________________________________________________    DISCUSSION: 69 yo woman with history of ypT3N0 rectal NET, well differentiated, grade 2 (Ki 67 16%) and small bowel NET, well diff, low grade, s/p surgery in Jan 2018. She was found to have metastatic disease to the liver and pancreas in July 2021 s/p biopsy. Liver NET is well diff grade 2 (Ki67 15)%. Pancreatic NET is well diff grade 3 (Ki67 25%). She has been on lanreotide since July 2022, changed to sandostatin in Jan 2023 after CT scan showed progression. S/p TACE to the liver on 3/1/23, 4/24/23, 7/21/23, 11/3/23, 2/7/24. started captem on 10/14/23 for pancreas progression. Review PET.    INT RAD: Defer to Dr. Wesley. Good response to TACE.    NUC MED: Postreatment change and improved tumor burden in liver. Similar tracer avid lesions in right hilum and pancreas noting different tracers.    BOARD RECOMMENDATIONS: Continue captem.

## 2024-04-05 ENCOUNTER — TELEPHONE (OUTPATIENT)
Dept: INTERVENTIONAL RADIOLOGY/VASCULAR | Facility: HOSPITAL | Age: 69
End: 2024-04-05
Payer: MEDICARE

## 2024-04-05 NOTE — PROGRESS NOTES
Discussed plan with patient for repeat TACE procedure. Patient agreed to proceed as planned.     Order is placed.     Schedulers messaged to call patient to schedule.     Pily Murcia PA-C  Interventional Radiology  [phone]

## 2024-04-08 ENCOUNTER — TELEPHONE (OUTPATIENT)
Dept: INTERVENTIONAL RADIOLOGY/VASCULAR | Facility: CLINIC | Age: 69
End: 2024-04-08
Payer: MEDICARE

## 2024-04-16 ENCOUNTER — DOCUMENTATION ONLY (OUTPATIENT)
Dept: HEMATOLOGY/ONCOLOGY | Facility: CLINIC | Age: 69
End: 2024-04-16
Payer: MEDICARE

## 2024-04-16 NOTE — PROGRESS NOTES
Request received for assistance with medication co-pay. Co-pay for medication is $277.12. Cost transfer form emailed to Tayla in specialty pharmacy. Pt. Has used $831.36 of [employer] PAF. Pt. Has $168.64 remaining. Informed Tayla with specialty pharmacy of the above, she will inform pt. Of the above so that pt. Can work on long term plan for medication assistance. Will follow as needed.

## 2024-05-01 DIAGNOSIS — C7B.8 NEUROENDOCRINE CARCINOMA METASTATIC TO LIVER: Primary | ICD-10-CM

## 2024-05-01 DIAGNOSIS — C7A.8 NEUROENDOCRINE CARCINOMA METASTATIC TO LIVER: Primary | ICD-10-CM

## 2024-05-01 RX ORDER — DIPHENHYDRAMINE HYDROCHLORIDE 50 MG/ML
50 INJECTION INTRAMUSCULAR; INTRAVENOUS ONCE
Status: CANCELLED | OUTPATIENT
Start: 2024-05-01 | End: 2024-05-01

## 2024-05-01 RX ORDER — SODIUM CHLORIDE 9 MG/ML
INJECTION, SOLUTION INTRAVENOUS CONTINUOUS
Status: CANCELLED | OUTPATIENT
Start: 2024-05-01

## 2024-05-01 RX ORDER — DEXTROSE MONOHYDRATE, SODIUM CHLORIDE, AND POTASSIUM CHLORIDE 50; 1.49; 4.5 G/1000ML; G/1000ML; G/1000ML
INJECTION, SOLUTION INTRAVENOUS CONTINUOUS
Status: CANCELLED | OUTPATIENT
Start: 2024-05-01

## 2024-05-01 RX ORDER — MAGNESIUM SULFATE HEPTAHYDRATE 40 MG/ML
2 INJECTION, SOLUTION INTRAVENOUS ONCE
Status: CANCELLED | OUTPATIENT
Start: 2024-05-01 | End: 2024-05-01

## 2024-05-08 ENCOUNTER — DOCUMENTATION ONLY (OUTPATIENT)
Dept: HEMATOLOGY/ONCOLOGY | Facility: CLINIC | Age: 69
End: 2024-05-08
Payer: MEDICARE

## 2024-05-09 NOTE — PROGRESS NOTES
Request received for assistance with medication co-pay. Co-pay for medication is $268.05. Cost transfer form emailed to Macarena in specialty pharmacy. Pt. Has exhausted all of the allotted funds available to her thru the [employer] PAF.  Informed Macarena with specialty pharmacy of the above, requested that she speak with the pt. To inform of the above and discuss a long term plan. Will follow as needed.

## 2024-05-14 ENCOUNTER — TELEPHONE (OUTPATIENT)
Dept: INTERVENTIONAL RADIOLOGY/VASCULAR | Facility: HOSPITAL | Age: 69
End: 2024-05-14
Payer: MEDICARE

## 2024-05-14 ENCOUNTER — DOCUMENTATION ONLY (OUTPATIENT)
Dept: ONCOLOGY | Facility: HOSPITAL | Age: 69
End: 2024-05-14
Payer: MEDICARE

## 2024-05-14 ENCOUNTER — TELEPHONE (OUTPATIENT)
Dept: HEMATOLOGY/ONCOLOGY | Facility: CLINIC | Age: 69
End: 2024-05-14
Payer: MEDICARE

## 2024-05-14 NOTE — TELEPHONE ENCOUNTER
----- Message from Vita Bethea sent at 5/14/2024 12:29 PM CDT -----  Regarding: Consult/Advisory  Contact: Coby Marshall  Consult/Advisory    Name Of Caller: Coby Marshall       Contact Preference: [phone] (home)    or    [phone] (Patient's  Mobile)    Nature of call: Patient calling to speak to staff to verify if she will have a tace today so she can prepare to stay overnight. Patient will try to get a reservation at the [facility] for tonight for appt tomorrow. Requesting a call back to confirm.

## 2024-05-14 NOTE — TELEPHONE ENCOUNTER
Patient calling for a reservation at [facility] for this evening as she has a tace procedure tomorrow morning.  Message sent to Sarah Ritter via teams and routed message in Epic.

## 2024-05-14 NOTE — PROGRESS NOTES
Received referral from the clinic that the patient was in need of referral to the [facility] for tonight. She called the [facility] but they were unable to accommodate her for tonight. She expressed a financial hardship to afford the $109 at the [facility]. Sent request to [facility] for room using [facility] lodging funds. Confirmation number [id number]. Provided patient the confirmation number.

## 2024-05-14 NOTE — NURSING
Advised to arrive at 7:00, where to check in, have nothing to eat/drink past midnight, have ride to/from procedure, prep for overnight, take a.m. meds with small sip of water, bring current home meds list. Confirmed eight allergies and no blood thinners.

## 2024-05-15 ENCOUNTER — ANESTHESIA (OUTPATIENT)
Dept: INTERVENTIONAL RADIOLOGY/VASCULAR | Facility: HOSPITAL | Age: 69
End: 2024-05-15
Payer: MEDICARE

## 2024-05-15 ENCOUNTER — HOSPITAL ENCOUNTER (OUTPATIENT)
Dept: INTERVENTIONAL RADIOLOGY/VASCULAR | Facility: HOSPITAL | Age: 69
Discharge: HOME | End: 2024-05-16
Attending: STUDENT IN AN ORGANIZED HEALTH CARE EDUCATION/TRAINING PROGRAM | Admitting: STUDENT IN AN ORGANIZED HEALTH CARE EDUCATION/TRAINING PROGRAM
Payer: MEDICARE

## 2024-05-15 DIAGNOSIS — C7A.026 MALIGNANT CARCINOID TUMOR OF RECTUM: ICD-10-CM

## 2024-05-15 DIAGNOSIS — C7A.8 NEUROENDOCRINE CARCINOMA METASTATIC TO LIVER: ICD-10-CM

## 2024-05-15 DIAGNOSIS — C7B.8 NEUROENDOCRINE CARCINOMA METASTATIC TO LIVER: ICD-10-CM

## 2024-05-15 DIAGNOSIS — D3A.8 NEUROENDOCRINE TUMOR: ICD-10-CM

## 2024-05-15 PROBLEM — G47.00 INSOMNIA: Status: ACTIVE | Noted: 2024-05-15

## 2024-05-15 LAB
ALBUMIN SERPL BCP-MCNC: 3.7 G/DL (ref 3.5–5.2)
ALP SERPL-CCNC: 79 U/L (ref 55–135)
ALT SERPL W/O P-5'-P-CCNC: 16 U/L (ref 10–44)
ANION GAP SERPL CALC-SCNC: 9 MMOL/L (ref 8–16)
AST SERPL-CCNC: 30 U/L (ref 10–40)
BASOPHILS # BLD AUTO: 0.02 K/UL (ref 0–0.2)
BASOPHILS NFR BLD: 0.3 % (ref 0–1.9)
BILIRUB DIRECT SERPL-MCNC: 0.3 MG/DL (ref 0.1–0.3)
BILIRUB SERPL-MCNC: 0.8 MG/DL (ref 0.1–1)
BUN SERPL-MCNC: 15 MG/DL (ref 8–23)
CALCIUM SERPL-MCNC: 9.1 MG/DL (ref 8.7–10.5)
CHLORIDE SERPL-SCNC: 100 MMOL/L (ref 95–110)
CO2 SERPL-SCNC: 31 MMOL/L (ref 23–29)
CREAT SERPL-MCNC: 1.3 MG/DL (ref 0.5–1.4)
DIFFERENTIAL METHOD BLD: ABNORMAL
EOSINOPHIL # BLD AUTO: 0.2 K/UL (ref 0–0.5)
EOSINOPHIL NFR BLD: 3.2 % (ref 0–8)
ERYTHROCYTE [DISTWIDTH] IN BLOOD BY AUTOMATED COUNT: 17.9 % (ref 11.5–14.5)
EST. GFR  (NO RACE VARIABLE): 44 ML/MIN/1.73 M^2
GLUCOSE SERPL-MCNC: 118 MG/DL (ref 70–110)
HCT VFR BLD AUTO: 34.5 % (ref 37–48.5)
HGB BLD-MCNC: 10.7 G/DL (ref 12–16)
IMM GRANULOCYTES # BLD AUTO: 0.04 K/UL (ref 0–0.04)
IMM GRANULOCYTES NFR BLD AUTO: 0.6 % (ref 0–0.5)
INR PPP: 1.1 (ref 0.8–1.2)
LYMPHOCYTES # BLD AUTO: 1.4 K/UL (ref 1–4.8)
LYMPHOCYTES NFR BLD: 20.3 % (ref 18–48)
MCH RBC QN AUTO: 27.3 PG (ref 27–31)
MCHC RBC AUTO-ENTMCNC: 31 G/DL (ref 32–36)
MCV RBC AUTO: 88 FL (ref 82–98)
MONOCYTES # BLD AUTO: 0.6 K/UL (ref 0.3–1)
MONOCYTES NFR BLD: 8.5 % (ref 4–15)
NEUTROPHILS # BLD AUTO: 4.7 K/UL (ref 1.8–7.7)
NEUTROPHILS NFR BLD: 67.1 % (ref 38–73)
NRBC BLD-RTO: 0 /100 WBC
PLATELET # BLD AUTO: 213 K/UL (ref 150–450)
PMV BLD AUTO: 9.6 FL (ref 9.2–12.9)
POTASSIUM SERPL-SCNC: 4.8 MMOL/L (ref 3.5–5.1)
PROT SERPL-MCNC: 7.8 G/DL (ref 6–8.4)
PROTHROMBIN TIME: 11.9 SEC (ref 9–12.5)
RBC # BLD AUTO: 3.92 M/UL (ref 4–5.4)
SODIUM SERPL-SCNC: 140 MMOL/L (ref 136–145)
WBC # BLD AUTO: 6.95 K/UL (ref 3.9–12.7)

## 2024-05-15 PROCEDURE — 25000003 PHARM REV CODE 250: Performed by: NURSE ANESTHETIST, CERTIFIED REGISTERED

## 2024-05-15 PROCEDURE — 37000009 HC ANESTHESIA EA ADD 15 MINS

## 2024-05-15 PROCEDURE — 25500020 PHARM REV CODE 255: Performed by: NURSE ANESTHETIST, CERTIFIED REGISTERED

## 2024-05-15 PROCEDURE — 76377 3D RENDER W/INTRP POSTPROCES: CPT | Mod: 26,,, | Performed by: RADIOLOGY

## 2024-05-15 PROCEDURE — 80053 COMPREHEN METABOLIC PANEL: CPT | Performed by: RADIOLOGY

## 2024-05-15 PROCEDURE — 76380 CAT SCAN FOLLOW-UP STUDY: CPT | Mod: 26,59,, | Performed by: RADIOLOGY

## 2024-05-15 PROCEDURE — 27201423 OPTIME MED/SURG SUP & DEVICES STERILE SUPPLY

## 2024-05-15 PROCEDURE — 76937 US GUIDE VASCULAR ACCESS: CPT | Mod: 26,,, | Performed by: RADIOLOGY

## 2024-05-15 PROCEDURE — 76937 US GUIDE VASCULAR ACCESS: CPT | Mod: TC | Performed by: RADIOLOGY

## 2024-05-15 PROCEDURE — 75774 ARTERY X-RAY EACH VESSEL: CPT | Mod: 26,59,, | Performed by: RADIOLOGY

## 2024-05-15 PROCEDURE — 75887 VEIN X-RAY LIVER W/O HEMODYN: CPT | Mod: TC | Performed by: RADIOLOGY

## 2024-05-15 PROCEDURE — 76377 3D RENDER W/INTRP POSTPROCES: CPT | Mod: TC | Performed by: RADIOLOGY

## 2024-05-15 PROCEDURE — 63600175 PHARM REV CODE 636 W HCPCS: Performed by: PHYSICIAN ASSISTANT

## 2024-05-15 PROCEDURE — D9220A PRA ANESTHESIA: Mod: CRNA,,, | Performed by: NURSE ANESTHETIST, CERTIFIED REGISTERED

## 2024-05-15 PROCEDURE — 36247 INS CATH ABD/L-EXT ART 3RD: CPT | Mod: RT | Performed by: RADIOLOGY

## 2024-05-15 PROCEDURE — 75774 ARTERY X-RAY EACH VESSEL: CPT | Mod: TC,59 | Performed by: RADIOLOGY

## 2024-05-15 PROCEDURE — 36247 INS CATH ABD/L-EXT ART 3RD: CPT | Mod: 51,RT,, | Performed by: RADIOLOGY

## 2024-05-15 PROCEDURE — 75726 ARTERY X-RAYS ABDOMEN: CPT | Mod: 26,59,, | Performed by: RADIOLOGY

## 2024-05-15 PROCEDURE — 37000008 HC ANESTHESIA 1ST 15 MINUTES

## 2024-05-15 PROCEDURE — C1894 INTRO/SHEATH, NON-LASER: HCPCS

## 2024-05-15 PROCEDURE — 82248 BILIRUBIN DIRECT: CPT | Performed by: RADIOLOGY

## 2024-05-15 PROCEDURE — 36248 INS CATH ABD/L-EXT ART ADDL: CPT | Performed by: RADIOLOGY

## 2024-05-15 PROCEDURE — 75726 ARTERY X-RAYS ABDOMEN: CPT | Mod: TC | Performed by: RADIOLOGY

## 2024-05-15 PROCEDURE — 36248 INS CATH ABD/L-EXT ART ADDL: CPT | Mod: ,,, | Performed by: RADIOLOGY

## 2024-05-15 PROCEDURE — C1769 GUIDE WIRE: HCPCS

## 2024-05-15 PROCEDURE — 75887 VEIN X-RAY LIVER W/O HEMODYN: CPT | Mod: 26,,, | Performed by: RADIOLOGY

## 2024-05-15 PROCEDURE — 25000003 PHARM REV CODE 250: Performed by: PHYSICIAN ASSISTANT

## 2024-05-15 PROCEDURE — 37243 VASC EMBOLIZE/OCCLUDE ORGAN: CPT | Performed by: RADIOLOGY

## 2024-05-15 PROCEDURE — 85610 PROTHROMBIN TIME: CPT | Performed by: RADIOLOGY

## 2024-05-15 PROCEDURE — G0269 OCCLUSIVE DEVICE IN VEIN ART: HCPCS | Performed by: RADIOLOGY

## 2024-05-15 PROCEDURE — 25000003 PHARM REV CODE 250

## 2024-05-15 PROCEDURE — C1982 CATH, PRESSURE,VALVE-OCCLU: HCPCS

## 2024-05-15 PROCEDURE — 63600175 PHARM REV CODE 636 W HCPCS: Performed by: NURSE ANESTHETIST, CERTIFIED REGISTERED

## 2024-05-15 PROCEDURE — D9220A PRA ANESTHESIA: Mod: ANES,,, | Performed by: ANESTHESIOLOGY

## 2024-05-15 PROCEDURE — 85025 COMPLETE CBC W/AUTO DIFF WBC: CPT | Performed by: RADIOLOGY

## 2024-05-15 RX ORDER — CIPROFLOXACIN 500 MG/1
500 TABLET ORAL EVERY 12 HOURS
Qty: 10 TABLET | Refills: 0 | Status: SHIPPED | OUTPATIENT
Start: 2024-05-15 | End: 2024-05-16

## 2024-05-15 RX ORDER — ATORVASTATIN CALCIUM 20 MG/1
20 TABLET, FILM COATED ORAL NIGHTLY
Status: DISCONTINUED | OUTPATIENT
Start: 2024-05-15 | End: 2024-05-16 | Stop reason: HOSPADM

## 2024-05-15 RX ORDER — PHENYLEPHRINE HYDROCHLORIDE 10 MG/ML
INJECTION INTRAVENOUS
Status: DISCONTINUED | OUTPATIENT
Start: 2024-05-15 | End: 2024-05-15

## 2024-05-15 RX ORDER — SODIUM CHLORIDE 9 MG/ML
INJECTION, SOLUTION INTRAVENOUS CONTINUOUS
Status: DISCONTINUED | OUTPATIENT
Start: 2024-05-15 | End: 2024-05-15

## 2024-05-15 RX ORDER — ONDANSETRON HYDROCHLORIDE 2 MG/ML
4 INJECTION, SOLUTION INTRAVENOUS EVERY 8 HOURS PRN
Status: DISCONTINUED | OUTPATIENT
Start: 2024-05-15 | End: 2024-05-16 | Stop reason: HOSPADM

## 2024-05-15 RX ORDER — ONDANSETRON HYDROCHLORIDE 2 MG/ML
4 INJECTION, SOLUTION INTRAVENOUS ONCE AS NEEDED
Status: DISCONTINUED | OUTPATIENT
Start: 2024-05-15 | End: 2024-05-15

## 2024-05-15 RX ORDER — HYDROMORPHONE HYDROCHLORIDE 2 MG/ML
0.5 INJECTION, SOLUTION INTRAMUSCULAR; INTRAVENOUS; SUBCUTANEOUS EVERY 5 MIN PRN
Status: DISCONTINUED | OUTPATIENT
Start: 2024-05-15 | End: 2024-05-15

## 2024-05-15 RX ORDER — DULOXETIN HYDROCHLORIDE 30 MG/1
30 CAPSULE, DELAYED RELEASE ORAL DAILY
Status: DISCONTINUED | OUTPATIENT
Start: 2024-05-16 | End: 2024-05-16 | Stop reason: HOSPADM

## 2024-05-15 RX ORDER — TALC
9 POWDER (GRAM) TOPICAL NIGHTLY PRN
Status: DISCONTINUED | OUTPATIENT
Start: 2024-05-15 | End: 2024-05-16 | Stop reason: HOSPADM

## 2024-05-15 RX ORDER — DEXTROSE MONOHYDRATE, SODIUM CHLORIDE, AND POTASSIUM CHLORIDE 50; 1.49; 4.5 G/1000ML; G/1000ML; G/1000ML
INJECTION, SOLUTION INTRAVENOUS CONTINUOUS
Status: DISCONTINUED | OUTPATIENT
Start: 2024-05-15 | End: 2024-05-15

## 2024-05-15 RX ORDER — CIPROFLOXACIN 500 MG/1
500 TABLET ORAL EVERY 12 HOURS
Status: DISCONTINUED | OUTPATIENT
Start: 2024-05-15 | End: 2024-05-16 | Stop reason: HOSPADM

## 2024-05-15 RX ORDER — DIPHENHYDRAMINE HYDROCHLORIDE 50 MG/ML
50 INJECTION INTRAMUSCULAR; INTRAVENOUS ONCE
Status: COMPLETED | OUTPATIENT
Start: 2024-05-15 | End: 2024-05-15

## 2024-05-15 RX ORDER — SODIUM CHLORIDE 0.9 % (FLUSH) 0.9 %
10 SYRINGE (ML) INJECTION
Status: DISCONTINUED | OUTPATIENT
Start: 2024-05-15 | End: 2024-05-15

## 2024-05-15 RX ORDER — MAGNESIUM SULFATE HEPTAHYDRATE 40 MG/ML
2 INJECTION, SOLUTION INTRAVENOUS ONCE
Status: COMPLETED | OUTPATIENT
Start: 2024-05-15 | End: 2024-05-15

## 2024-05-15 RX ORDER — SODIUM CHLORIDE 0.9 % (FLUSH) 0.9 %
5 SYRINGE (ML) INJECTION
Status: DISCONTINUED | OUTPATIENT
Start: 2024-05-15 | End: 2024-05-16 | Stop reason: HOSPADM

## 2024-05-15 RX ORDER — SCOLOPAMINE TRANSDERMAL SYSTEM 1 MG/1
1 PATCH, EXTENDED RELEASE TRANSDERMAL
Status: DISCONTINUED | OUTPATIENT
Start: 2024-05-15 | End: 2024-05-16 | Stop reason: HOSPADM

## 2024-05-15 RX ORDER — LIDOCAINE HYDROCHLORIDE 20 MG/ML
INJECTION INTRAVENOUS
Status: DISCONTINUED | OUTPATIENT
Start: 2024-05-15 | End: 2024-05-15

## 2024-05-15 RX ORDER — PROPOFOL 10 MG/ML
VIAL (ML) INTRAVENOUS
Status: DISCONTINUED | OUTPATIENT
Start: 2024-05-15 | End: 2024-05-15

## 2024-05-15 RX ORDER — HYDROCHLOROTHIAZIDE 25 MG/1
25 TABLET ORAL DAILY
Status: DISCONTINUED | OUTPATIENT
Start: 2024-05-16 | End: 2024-05-16 | Stop reason: HOSPADM

## 2024-05-15 RX ORDER — HYDROXYZINE HYDROCHLORIDE 25 MG/1
25 TABLET, FILM COATED ORAL NIGHTLY PRN
Status: DISCONTINUED | OUTPATIENT
Start: 2024-05-15 | End: 2024-05-16 | Stop reason: HOSPADM

## 2024-05-15 RX ORDER — LIDOCAINE 50 MG/G
1 PATCH TOPICAL DAILY PRN
Status: DISCONTINUED | OUTPATIENT
Start: 2024-05-15 | End: 2024-05-16 | Stop reason: HOSPADM

## 2024-05-15 RX ORDER — DEXAMETHASONE SODIUM PHOSPHATE 4 MG/ML
INJECTION, SOLUTION INTRA-ARTICULAR; INTRALESIONAL; INTRAMUSCULAR; INTRAVENOUS; SOFT TISSUE
Status: DISCONTINUED | OUTPATIENT
Start: 2024-05-15 | End: 2024-05-15

## 2024-05-15 RX ORDER — ROCURONIUM BROMIDE 10 MG/ML
INJECTION, SOLUTION INTRAVENOUS
Status: DISCONTINUED | OUTPATIENT
Start: 2024-05-15 | End: 2024-05-15

## 2024-05-15 RX ORDER — FENTANYL CITRATE 50 UG/ML
INJECTION, SOLUTION INTRAMUSCULAR; INTRAVENOUS
Status: DISCONTINUED | OUTPATIENT
Start: 2024-05-15 | End: 2024-05-15

## 2024-05-15 RX ORDER — AMOXICILLIN 250 MG
1 CAPSULE ORAL 2 TIMES DAILY PRN
Status: DISCONTINUED | OUTPATIENT
Start: 2024-05-15 | End: 2024-05-16 | Stop reason: HOSPADM

## 2024-05-15 RX ORDER — OXYCODONE HYDROCHLORIDE 5 MG/1
5 TABLET ORAL EVERY 6 HOURS PRN
Status: DISCONTINUED | OUTPATIENT
Start: 2024-05-15 | End: 2024-05-16 | Stop reason: HOSPADM

## 2024-05-15 RX ORDER — LIDOCAINE HYDROCHLORIDE 10 MG/ML
INJECTION INFILTRATION; PERINEURAL
Status: COMPLETED | OUTPATIENT
Start: 2024-05-15 | End: 2024-05-15

## 2024-05-15 RX ADMIN — ATORVASTATIN CALCIUM 20 MG: 20 TABLET, FILM COATED ORAL at 09:05

## 2024-05-15 RX ADMIN — DEXAMETHASONE SODIUM PHOSPHATE 8 MG: 4 INJECTION, SOLUTION INTRA-ARTICULAR; INTRALESIONAL; INTRAMUSCULAR; INTRAVENOUS; SOFT TISSUE at 09:05

## 2024-05-15 RX ADMIN — PHENYLEPHRINE HYDROCHLORIDE 100 MCG: 10 INJECTION INTRAVENOUS at 09:05

## 2024-05-15 RX ADMIN — SODIUM CHLORIDE: 0.9 INJECTION, SOLUTION INTRAVENOUS at 09:05

## 2024-05-15 RX ADMIN — AMPICILLIN AND SULBACTAM 3 G: 2; 1 INJECTION, POWDER, FOR SOLUTION INTRAVENOUS at 08:05

## 2024-05-15 RX ADMIN — SCOPALAMINE 1 PATCH: 1 PATCH, EXTENDED RELEASE TRANSDERMAL at 07:05

## 2024-05-15 RX ADMIN — OCTREOTIDE ACETATE 250 MCG: 500 INJECTION, SOLUTION INTRAVENOUS; SUBCUTANEOUS at 08:05

## 2024-05-15 RX ADMIN — LIDOCAINE HYDROCHLORIDE 5 ML: 10 INJECTION, SOLUTION INFILTRATION; PERINEURAL at 09:05

## 2024-05-15 RX ADMIN — ROCURONIUM BROMIDE 50 MG: 10 INJECTION, SOLUTION INTRAVENOUS at 09:05

## 2024-05-15 RX ADMIN — DEXTROSE, SODIUM CHLORIDE, AND POTASSIUM CHLORIDE: 5; .45; .15 INJECTION INTRAVENOUS at 08:05

## 2024-05-15 RX ADMIN — SUGAMMADEX 200 MG: 100 INJECTION, SOLUTION INTRAVENOUS at 10:05

## 2024-05-15 RX ADMIN — FENTANYL CITRATE 100 MCG: 50 INJECTION INTRAMUSCULAR; INTRAVENOUS at 09:05

## 2024-05-15 RX ADMIN — MAGNESIUM SULFATE HEPTAHYDRATE 2 G: 40 INJECTION, SOLUTION INTRAVENOUS at 08:05

## 2024-05-15 RX ADMIN — CIPROFLOXACIN 500 MG: 500 TABLET ORAL at 09:05

## 2024-05-15 RX ADMIN — IOHEXOL 85 ML: 350 INJECTION, SOLUTION INTRAVENOUS at 10:05

## 2024-05-15 RX ADMIN — LIDOCAINE HYDROCHLORIDE 75 MG: 20 INJECTION, SOLUTION INTRAVENOUS at 09:05

## 2024-05-15 RX ADMIN — PROPOFOL 100 MG: 10 INJECTION, EMULSION INTRAVENOUS at 09:05

## 2024-05-15 RX ADMIN — DIPHENHYDRAMINE HYDROCHLORIDE 50 MG: 50 INJECTION INTRAMUSCULAR; INTRAVENOUS at 07:05

## 2024-05-15 RX ADMIN — ONDANSETRON 16 MG: 2 INJECTION INTRAMUSCULAR; INTRAVENOUS at 07:05

## 2024-05-15 RX ADMIN — OCTREOTIDE ACETATE 250 MCG/HR: 1000 INJECTION, SOLUTION INTRAVENOUS; SUBCUTANEOUS at 08:05

## 2024-05-15 NOTE — H&P
[facility]  History & Physical    Patient Name: Coby Marshall  MRN: [id number]  Patient Class: OP- Outpatient Recovery  Admission Date: 5/15/2024  Attending Physician: Tyshawn Whittington DO   Primary Care Provider: Randal Casillas MD         Patient information was obtained from patient, spouse/SO, and ER records.     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       HPI: 68-year-old woman with PMHx of Metastatic Neuroendocrine Tumor, HTN, HLD is s/p TACE procedure 5/15/24 by IR. Patient is admitted to [facility] for overnight observation &  pain & nausea control.    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    Ki 67 -3-20%    Hyperlipidemia     Hypertension     Malignant carcinoid tumor of ileum 12/2017    Ki 67 < 3%    Rectal cancer 2/8/2019       Past Surgical History:   Procedure Laterality Date    COLON SURGERY      ENDOSCOPIC ULTRASOUND OF UPPER GASTROINTESTINAL TRACT N/A 12/17/2021    Procedure: ULTRASOUND, UPPER GI TRACT, ENDOSCOPIC;  Surgeon: Jeffery Clemons MD;  Location: [facility] ([address] [place] [address]);  Service: Endoscopy;  Laterality: N/A;  instructions emailed to pt-BB  fully vaccinated-BB   12/13 arrival time confirmed with pt-rb    HYSTERECTOMY      ILEOSTOMY CLOSURE  01/2018    Lower Anterior Resection  12/2017    [facility]- Dr. Beck - [facility]    port a cath Right     R chest wall       Review of patient's allergies indicates:   Allergen Reactions    Arb-angiotensin receptor antagonist Swelling     Had angioedema of lips with ACE inhibitor, so this is a contraindication    Epinephrine      Neuroendocrine Tumor patient      Lisinopril-hydrochlorothiazide Swelling     Mouth Swelling    Sulfa (sulfonamide antibiotics) Swelling     Swelling of lips    Hydrocodone-acetaminophen Other (See Comments)      stomach pain (even with food)    Amlodipine Other (See Comments)     edema of ankles and legs.       No current facility-administered medications on file prior to encounter.     Current Outpatient Medications on File Prior to Encounter   Medication Sig    atorvastatin (LIPITOR) 20 MG tablet Take 20 mg by mouth every evening.    brimonidine-timoloL (COMBIGAN) 0.2-0.5 % Drop Place 1 drop into the right eye 2 (two) times daily.    capecitabine (XELODA) 500 MG Tab Take 2 tablets (1,000 mg total) by mouth 2 (two) times daily on days 1-14 of every 28 day cycle.    cloNIDine (CATAPRES) 0.1 MG tablet Take 0.1 mg by mouth 2 (two) times daily.    hydroCHLOROthiazide (MICROZIDE) 12.5 mg capsule Take 12.5 mg by mouth once daily.    hydrOXYzine HCL (ATARAX) 25 MG tablet Take 1 tablet (25 mg total) by mouth nightly as needed (insomnia).    ondansetron (ZOFRAN-ODT) 8 MG TbDL Take 1 tablet (8 mg total) by mouth every 8 (eight) hours as needed (nausea). Take one tab 30 min before taking temodar    temozolomide (TEMODAR) 100 MG capsule Take 3 capsules (300 mg total) by mouth As instructed (take three cap (300 mg total) by mouth once nightly on days 10-14 of every 28 day cycle).    ALBUTEROL INHL Inhale into the lungs as needed.     fluticasone (FLONASE) 50 mcg/actuation nasal spray 1 spray by Each Nare route once daily.    hydrALAZINE (APRESOLINE) 25 MG tablet Take 1 tablet (25 mg total) by mouth every 8 (eight) hours.    oxyCODONE (ROXICODONE) 5 MG immediate release tablet Take 1 tablet (5 mg total) by mouth every 8 (eight) hours as needed for Pain.    ranitidine (ZANTAC) 150 MG tablet Take 150 mg by mouth nightly.      Family History       Problem Relation (Age of Onset)    Cancer Sister          Tobacco Use    Smoking status: Never    Smokeless tobacco: Never   Substance and Sexual Activity    Alcohol use: No    Drug use: No    Sexual activity: Not Currently     Partners: Male     Review of Systems   Constitutional:  Negative for  chills and fever.   Eyes:  Negative for visual disturbance.   Respiratory:  Negative for shortness of breath.    Cardiovascular:  Negative for chest pain and palpitations.   Gastrointestinal:  Negative for abdominal pain, nausea and vomiting.   Genitourinary:  Negative for dysuria.   Neurological:  Negative for headaches.     Objective:     Vital Signs (Most Recent):  Temp: 97.9 °F (36.6 °C) (05/15/24 1527)  Pulse: 73 (05/15/24 1527)  Resp: 20 (05/15/24 1527)  BP: (!) 148/69 (05/15/24 1527)  SpO2: 98 % (05/15/24 1527) Vital Signs (24h Range):  Temp:  [97.9 °F (36.6 °C)] 97.9 °F (36.6 °C)  Pulse:  [62-80] 73  Resp:  [11-23] 20  SpO2:  [92 %-100 %] 98 %  BP: (131-162)/(60-85) 148/69     Weight: 91.4 kg (201 lb 8 oz)  Body mass index is 32.52 kg/m².     Physical Exam  Constitutional:       Appearance: Normal appearance.   HENT:      Head: Normocephalic and atraumatic.      Mouth/Throat:      Pharynx: Oropharynx is clear.   Eyes:      General:         Right eye: No discharge.         Left eye: No discharge.      Conjunctiva/sclera: Conjunctivae normal.   Cardiovascular:      Rate and Rhythm: Normal rate and regular rhythm.      Pulses: Normal pulses.   Pulmonary:      Effort: Pulmonary effort is normal. No respiratory distress.   Abdominal:      General: There is distension.      Palpations: Abdomen is soft.   Musculoskeletal:      Right lower leg: No edema.      Left lower leg: No edema.   Skin:     General: Skin is warm.      Coloration: Skin is not jaundiced.   Neurological:      Mental Status: She is alert and oriented to person, place, and time.   Psychiatric:         Mood and Affect: Mood normal.         Behavior: Behavior normal.                Significant Labs: All pertinent labs within the past 24 hours have been reviewed.  CBC:   Recent Labs   Lab 05/15/24  0751   WBC 6.95   HGB 10.7*   HCT 34.5*        CMP:   Recent Labs   Lab 05/15/24  0751      K 4.8      CO2 31*   *   BUN 15    CREATININE 1.3   CALCIUM 9.1   PROT 7.8   ALBUMIN 3.7   BILITOT 0.8   ALKPHOS 79   AST 30   ALT 16   ANIONGAP 9       Significant Imaging: I have reviewed all pertinent imaging results/findings within the past 24 hours.  Assessment/Plan:     * Neuroendocrine carcinoma metastatic to liver  Patient with diagnosed NET, s/p TACE procedure 5/15    Plan:  Start Cipro 500mg BID evening of 5/15; plan to discharge with 5-day course  If patient cannot tolerate PO post procedure, will switch to 400mg IV  Will continue Octreotide 24 hours post procedure  Zofran PRN for nausea  Oxy 5mg PRN for pain control  Will avoid Tylenol and all tylenol-containing medications  Follow-up imaging and clinic visit to be arranged by IR    HTN (hypertension)  Continue home HCTZ 25mg    Hyperlipidemia  Continue home Atorvastatin 20mg      Insomnia  Continue home Hydroxyzine PRN        VTE Risk Mitigation (From admission, onward)           Ordered     IP VTE HIGH RISK PATIENT  Once         05/15/24 1535     Place sequential compression device  Until discontinued         05/15/24 1535                               VIRTUAL NURSE: Cued into patient's room. Permission received per patient to turn camera to view patient. Introduced as VN that will be working with floor nurse this shift. Educated the patient and their family on VN's role in patient care. Plan of care reviewed with patient. Informed patient that staff will round on them every 2 hours but to use call light for any other needs they may have. Also informed of fall risk and fall precautions. Patient verbalized understanding. Call light within reach; bed siderails up x2. Opportunity given for questions and questions answered. Admission assessment questions completed. Patient denies complaints or any needs at this time.     05/15/24 1604   Admission   Initial VN Admission Questions Complete   Communication Issues? None   Shift   Virtual Nurse - Patient Verbalized Approval Of Camera Use    Safety/Activity   Patient Rounds bed in low position;placement of personal items at bedside;call light in patient/parent reach;visualized patient   Safety Promotion/Fall Prevention assistive device/personal item within reach;instructed to call staff for mobility;side rails raised x 2;Fall Risk reviewed with patient/family   Positioning   Body Position position changed independently   Pain/Comfort/Sleep   Preferred Pain Scale number (Numeric Rating Pain Scale)   Pain Rating (0-10): Rest 0         Luana Appiah MD  Department of Hospital Medicine  [facility]

## 2024-05-15 NOTE — NURSING
Report received from KHADIJAH Jernigan in IR. Whittington team notified patient will be up shortly.

## 2024-05-15 NOTE — HOSPITAL COURSE
"Patient recovered well after procedure, and required no nausea or pain medications. Patient given strict instructions to avoid acetaminophen-containing products and EtOH for 2 weeks post procedure. Patient given 1 week supply of PRN Zofran ODT for nausea, and PRN oxycodone 5mg for pain, as well as a 5-day course of Ciprofloxacin 500mg BID. Follow up imaging was set up by the IR team.  On day of discharge, patient was hemodynamically stable. She was sent home with instructions to continue home medications, change dosage/frequency of home medications, and/or take new medications as mentioned under "Medication Reconciliation" below. These changes were further explained by patient's nurse or the clinical pharmacist. Patient will need to schedule with their PCP for hospital discharge followup. Patient agreeable to discharge plan & expressed understanding of all aforementioned changes. All questions were answered and return precautions were discussed & detailed in the after-visit summary.  "

## 2024-05-15 NOTE — SUBJECTIVE & OBJECTIVE
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    Ki 67 -3-20%    Hyperlipidemia     Hypertension     Malignant carcinoid tumor of ileum 12/2017    Ki 67 < 3%    Rectal cancer 2/8/2019       Past Surgical History:   Procedure Laterality Date    COLON SURGERY      ENDOSCOPIC ULTRASOUND OF UPPER GASTROINTESTINAL TRACT N/A 12/17/2021    Procedure: ULTRASOUND, UPPER GI TRACT, ENDOSCOPIC;  Surgeon: Jeffery Clemons MD;  Location: [facility] ([address] [place] [address]);  Service: Endoscopy;  Laterality: N/A;  instructions emailed to pt-BB  fully vaccinated-BB   12/13 arrival time confirmed with pt-rb    HYSTERECTOMY      ILEOSTOMY CLOSURE  01/2018    Lower Anterior Resection  12/2017    LETITIA- Dr. Beck - [facility]    port a cath Right     R chest wall       Review of patient's allergies indicates:   Allergen Reactions    Arb-angiotensin receptor antagonist Swelling     Had angioedema of lips with ACE inhibitor, so this is a contraindication    Epinephrine      Neuroendocrine Tumor patient      Lisinopril-hydrochlorothiazide Swelling     Mouth Swelling    Sulfa (sulfonamide antibiotics) Swelling     Swelling of lips    Hydrocodone-acetaminophen Other (See Comments)     stomach pain (even with food)    Amlodipine Other (See Comments)     edema of ankles and legs.       No current facility-administered medications on file prior to encounter.     Current Outpatient Medications on File Prior to Encounter   Medication Sig    atorvastatin (LIPITOR) 20 MG tablet Take 20 mg by mouth every evening.    brimonidine-timoloL (COMBIGAN) 0.2-0.5 % Drop Place 1 drop into the right eye 2 (two) times daily.    capecitabine (XELODA) 500 MG Tab Take 2 tablets (1,000 mg total) by mouth 2 (two) times daily on days 1-14 of every 28 day cycle.    cloNIDine (CATAPRES) 0.1 MG tablet Take 0.1 mg by mouth 2 (two) times daily.    hydroCHLOROthiazide (MICROZIDE) 12.5 mg capsule Take  12.5 mg by mouth once daily.    hydrOXYzine HCL (ATARAX) 25 MG tablet Take 1 tablet (25 mg total) by mouth nightly as needed (insomnia).    ondansetron (ZOFRAN-ODT) 8 MG TbDL Take 1 tablet (8 mg total) by mouth every 8 (eight) hours as needed (nausea). Take one tab 30 min before taking temodar    temozolomide (TEMODAR) 100 MG capsule Take 3 capsules (300 mg total) by mouth As instructed (take three cap (300 mg total) by mouth once nightly on days 10-14 of every 28 day cycle).    ALBUTEROL INHL Inhale into the lungs as needed.     fluticasone (FLONASE) 50 mcg/actuation nasal spray 1 spray by Each Nare route once daily.    hydrALAZINE (APRESOLINE) 25 MG tablet Take 1 tablet (25 mg total) by mouth every 8 (eight) hours.    oxyCODONE (ROXICODONE) 5 MG immediate release tablet Take 1 tablet (5 mg total) by mouth every 8 (eight) hours as needed for Pain.    ranitidine (ZANTAC) 150 MG tablet Take 150 mg by mouth nightly.      Family History       Problem Relation (Age of Onset)    Cancer Sister          Tobacco Use    Smoking status: Never    Smokeless tobacco: Never   Substance and Sexual Activity    Alcohol use: No    Drug use: No    Sexual activity: Not Currently     Partners: Male     Review of Systems   Constitutional:  Negative for chills and fever.   Eyes:  Negative for visual disturbance.   Respiratory:  Negative for shortness of breath.    Cardiovascular:  Negative for chest pain and palpitations.   Gastrointestinal:  Negative for abdominal pain, nausea and vomiting.   Genitourinary:  Negative for dysuria.   Neurological:  Negative for headaches.     Objective:     Vital Signs (Most Recent):  Temp: 97.9 °F (36.6 °C) (05/15/24 1527)  Pulse: 73 (05/15/24 1527)  Resp: 20 (05/15/24 1527)  BP: (!) 148/69 (05/15/24 1527)  SpO2: 98 % (05/15/24 1527) Vital Signs (24h Range):  Temp:  [97.9 °F (36.6 °C)] 97.9 °F (36.6 °C)  Pulse:  [62-80] 73  Resp:  [11-23] 20  SpO2:  [92 %-100 %] 98 %  BP: (131-162)/(60-85) 148/69      Weight: 91.4 kg (201 lb 8 oz)  Body mass index is 32.52 kg/m².     Physical Exam  Constitutional:       Appearance: Normal appearance.   HENT:      Head: Normocephalic and atraumatic.      Mouth/Throat:      Pharynx: Oropharynx is clear.   Eyes:      General:         Right eye: No discharge.         Left eye: No discharge.      Conjunctiva/sclera: Conjunctivae normal.   Cardiovascular:      Rate and Rhythm: Normal rate and regular rhythm.      Pulses: Normal pulses.   Pulmonary:      Effort: Pulmonary effort is normal. No respiratory distress.   Abdominal:      General: There is distension.      Palpations: Abdomen is soft.   Musculoskeletal:      Right lower leg: No edema.      Left lower leg: No edema.   Skin:     General: Skin is warm.      Coloration: Skin is not jaundiced.   Neurological:      Mental Status: She is alert and oriented to person, place, and time.   Psychiatric:         Mood and Affect: Mood normal.         Behavior: Behavior normal.                Significant Labs: All pertinent labs within the past 24 hours have been reviewed.  CBC:   Recent Labs   Lab 05/15/24  0751   WBC 6.95   HGB 10.7*   HCT 34.5*        CMP:   Recent Labs   Lab 05/15/24  0751      K 4.8      CO2 31*   *   BUN 15   CREATININE 1.3   CALCIUM 9.1   PROT 7.8   ALBUMIN 3.7   BILITOT 0.8   ALKPHOS 79   AST 30   ALT 16   ANIONGAP 9       Significant Imaging: I have reviewed all pertinent imaging results/findings within the past 24 hours.

## 2024-05-15 NOTE — ASSESSMENT & PLAN NOTE
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

## 2024-05-15 NOTE — HPI
68-year-old woman with PMHx of Metastatic Neuroendocrine Tumor, HTN, HLD is s/p TACE procedure 5/15/24 by IR. Patient is admitted to [facility] for overnight observation &  pain & nausea control.

## 2024-05-15 NOTE — NURSING
Patient received from IR in stable condition in no acute distress. Bed weight and VS taken. Oriented to room and call light. Will continue to monitor.

## 2024-05-15 NOTE — ANESTHESIA POSTPROCEDURE EVALUATION
Anesthesia Post Evaluation    Patient: Coby Marshall    Procedure(s) Performed: * No procedures listed *    Final Anesthesia Type: general      Patient location during evaluation: PACU  Patient participation: Yes- Able to Participate  Level of consciousness: awake and alert  Post-procedure vital signs: reviewed and stable  Pain management: adequate  Airway patency: patent    PONV status at discharge: No PONV  Anesthetic complications: no      Cardiovascular status: blood pressure returned to baseline and hemodynamically stable  Respiratory status: unassisted  Hydration status: euvolemic  Follow-up not needed.              Vitals Value Taken Time   /69 05/15/24 1527   Temp 36.6 °C (97.9 °F) 05/15/24 1527   Pulse 73 05/15/24 1527   Resp 20 05/15/24 1527   SpO2 98 % 05/15/24 1527         Event Time   Out of Recovery 11:25:11         Pain/Mikey Score: Mikey Score: 9 (5/15/2024 12:30 PM)

## 2024-05-15 NOTE — ANESTHESIA PREPROCEDURE EVALUATION
05/15/2024  Coby Marshall is a 68 y.o., female.      Pre-op Assessment     I have reviewed the Nursing Notes.    I have reviewed the Medications.     Review of Systems  Anesthesia Hx:  No problems with previous Anesthesia             Denies Family Hx of Anesthesia complications.     Social:  Non-Smoker, No Alcohol Use       Hematology/Oncology:  Hematology Normal   Oncology Normal                                   EENT/Dental:  EENT/Dental Normal           Cardiovascular:  Exercise tolerance: good   Hypertension                                  Hypertension         Pulmonary:    Asthma       Asthma:               Renal/:  Renal/ Normal                 Hepatic/GI:  Hepatic/GI Normal                 Musculoskeletal:  Musculoskeletal Normal                Neurological:    Neuromuscular Disease,                                 Neuromuscular Disease   Endocrine:  Endocrine Normal                Physical Exam  General: Well nourished    Airway:  Mallampati: II / II  Mouth Opening: Normal  TM Distance: Normal  Tongue: Normal  Neck ROM: Normal ROM    Dental:  Intact        Anesthesia Plan  Type of Anesthesia, risks & benefits discussed:    Anesthesia Type: Gen ETT, Gen Natural Airway  Intra-op Monitoring Plan: Standard ASA Monitors  Post Op Pain Control Plan: multimodal analgesia  Induction:  IV  Airway Plan: Direct, Post-Induction  Informed Consent: Informed consent signed with the Patient and all parties understand the risks and agree with anesthesia plan.  All questions answered.   ASA Score: 3    Ready For Surgery From Anesthesia Perspective.     .

## 2024-05-15 NOTE — H&P
Radiology History & Physical      SUBJECTIVE:     Chief Complaint: NET    History of Present Illness:  Coby Marshall is a 68 y.o. female with NET who presents for TACE.    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    Ki 67 -3-20%    Hyperlipidemia     Hypertension     Malignant carcinoid tumor of ileum 12/2017    Ki 67 < 3%    Rectal cancer 2/8/2019     Past Surgical History:   Procedure Laterality Date    COLON SURGERY      ENDOSCOPIC ULTRASOUND OF UPPER GASTROINTESTINAL TRACT N/A 12/17/2021    Procedure: ULTRASOUND, UPPER GI TRACT, ENDOSCOPIC;  Surgeon: Jeffery Clemons MD;  Location: [facility] ([address] [place] [address]);  Service: Endoscopy;  Laterality: N/A;  instructions emailed to pt-BB  fully vaccinated-BB   12/13 arrival time confirmed with pt-rb    HYSTERECTOMY      ILEOSTOMY CLOSURE  01/2018    Lower Anterior Resection  12/2017    NET- Dr. Beck - [facility]    port a cath Right     R chest wall       Home Meds:   Prior to Admission medications    Medication Sig Start Date End Date Taking? Authorizing Provider   atorvastatin (LIPITOR) 20 MG tablet Take 20 mg by mouth every evening.   Yes Provider, Historical   brimonidine-timoloL (COMBIGAN) 0.2-0.5 % Drop Place 1 drop into the right eye 2 (two) times daily. 1/26/23  Yes Provider, Historical   capecitabine (XELODA) 500 MG Tab Take 2 tablets (1,000 mg total) by mouth 2 (two) times daily on days 1-14 of every 28 day cycle. 12/21/23 12/20/24 Yes Ariel Smart MD   cloNIDine (CATAPRES) 0.1 MG tablet Take 0.1 mg by mouth 2 (two) times daily.   Yes Provider, Historical   hydroCHLOROthiazide (MICROZIDE) 12.5 mg capsule Take 12.5 mg by mouth once daily.   Yes Provider, Historical   hydrOXYzine HCL (ATARAX) 25 MG tablet Take 1 tablet (25 mg total) by mouth nightly as needed (insomnia). 2/19/24  Yes Ariel Smart MD   ondansetron (ZOFRAN-ODT) 8 MG TbDL Take 1 tablet (8 mg total) by mouth  every 8 (eight) hours as needed (nausea). Take one tab 30 min before taking temodar 10/10/23  Yes Ariel Smart MD   temozolomide (TEMODAR) 100 MG capsule Take 3 capsules (300 mg total) by mouth As instructed (take three cap (300 mg total) by mouth once nightly on days 10-14 of every 28 day cycle). 3/20/24 3/20/25 Yes Airel Smart MD   ALBUTEROL INHL Inhale into the lungs as needed.     Provider, Historical   fluticasone (FLONASE) 50 mcg/actuation nasal spray 1 spray by Each Nare route once daily.    Provider, Historical   hydrALAZINE (APRESOLINE) 25 MG tablet Take 1 tablet (25 mg total) by mouth every 8 (eight) hours. 2/8/24 2/7/25  Radha Meyers MD   oxyCODONE (ROXICODONE) 5 MG immediate release tablet Take 1 tablet (5 mg total) by mouth every 8 (eight) hours as needed for Pain. 2/19/24   Ariel Smart MD   ranitidine (ZANTAC) 150 MG tablet Take 150 mg by mouth nightly.  11/13/18 3/28/24  Provider, Historical     Anticoagulants/Antiplatelets: no anticoagulation    Allergies:   Review of patient's allergies indicates:   Allergen Reactions    Arb-angiotensin receptor antagonist Swelling     Had angioedema of lips with ACE inhibitor, so this is a contraindication    Epinephrine      Neuroendocrine Tumor patient      Lisinopril Swelling    Lisinopril-hydrochlorothiazide Swelling     Mouth Swelling    Sulfa (sulfonamide antibiotics) Swelling     Swelling of lips    Sulfasalazine Swelling     Swelling of lips    Hydrocodone-acetaminophen Other (See Comments)     stomach pain (even with food)    Amlodipine Other (See Comments)     edema of ankles and legs.     Sedation History:  no adverse reactions    Review of Systems:   Hematological: no known coagulopathies  Respiratory: no shortness of breath  Cardiovascular: no chest pain  Gastrointestinal: no abdominal pain  Genito-Urinary: no dysuria  Musculoskeletal: negative  Neurological: no TIA or stroke symptoms         OBJECTIVE:     Vital Signs (Most  Recent)  Temp: 97.9 °F (36.6 °C) (05/15/24 0725)  Pulse: 62 (05/15/24 0725)  Resp: 16 (05/15/24 0725)  BP: 131/60 (05/15/24 0725)  SpO2: 100 % (05/15/24 0725)    Physical Exam:  ASA: 3  Mallampati: 2    General: no acute distress  Mental Status: alert and oriented to person, place and time  HEENT: normocephalic, atraumatic  Chest: unlabored breathing  Heart: regular heart rate  Abdomen: nondistended  Extremity: moves all extremities    Laboratory  Lab Results   Component Value Date    INR 1.1 05/15/2024       Lab Results   Component Value Date    WBC 6.95 05/15/2024    HGB 10.7 (L) 05/15/2024    HCT 34.5 (L) 05/15/2024    MCV 88 05/15/2024     05/15/2024      Lab Results   Component Value Date     (H) 05/15/2024     05/15/2024    K 4.8 05/15/2024     05/15/2024    CO2 31 (H) 05/15/2024    BUN 15 05/15/2024    CREATININE 1.3 05/15/2024    CALCIUM 9.1 05/15/2024    MG 1.4 (L) 03/08/2018    ALT 16 05/15/2024    AST 30 05/15/2024    ALBUMIN 3.7 05/15/2024    BILITOT 0.8 05/15/2024    BILIDIR 0.3 05/15/2024       ASSESSMENT/PLAN:     Sedation Plan: per anesthesia.  Patient will undergo TACE.    Bj Reynolds MD  Diagnostic and Interventional Radiologist  Department of Radiology  Pager: [phone]

## 2024-05-15 NOTE — Clinical Note
A pre-sedation assessment was completed by the physician immediately prior to sedation start/ intubation By anesthesia team

## 2024-05-15 NOTE — PLAN OF CARE
Coby Marshall is a 68 y.o. female with NET who is s/p bland embo 5/15.    Recommendations    Zofran prn for nausea and oxy 5 mg prn for pain control.  Can escalate pain control as needed.  Avoid tylenol or tylenol containing products.  Cipro bid to start this evening, if not tolerating po can give 400 mg IV.  At [place], recommend Cipro 500 mg BID x 5 days.  Octreotide 125 mcg/hr (2.5 ml/hr) IV for 1 day post op (24 hrs post procedure).  At [place] give less than 1 wks supply of PRN Zofran ODT for nausea, and PRN Oxycodone 5 mg for pain.  Avoid acetaminophen-containing products and EtOH for 2 weeks post procedure.  IR will arrange follow up imaging and clinic visit.    IR will continue to follow. Please contact with questions via [facility] secure chat.    Keara Rojas PA-C  Interventional Radiology

## 2024-05-15 NOTE — ANESTHESIA PROCEDURE NOTES
Intubation    Date/Time: 5/15/2024 9:16 AM    Performed by: Priay Kasper CRNA  Authorized by: Ricardo Salinas MD    Intubation:     Induction:  Intravenous    Intubated:  Postinduction    Mask Ventilation:  Easy mask    Attempts:  1    Attempted By:  CRNA    Method of Intubation:  Video laryngoscopy    Blade:  Barrios 3    Laryngeal View Grade: Grade I - full view of cords      Difficult Airway Encountered?: No      Complications:  None    Airway Device:  Oral endotracheal tube    Airway Device Size:  7.0    Style/Cuff Inflation:  Cuffed (inflated to minimal occlusive pressure)    Inflation Amount (mL):  6    Tube secured:  21    Secured at:  The lips    Placement Verified By:  Capnometry    Complicating Factors:  None    Findings Post-Intubation:  BS equal bilateral and atraumatic/condition of teeth unchanged

## 2024-05-15 NOTE — TRANSFER OF CARE
"Anesthesia Transfer of Care Note    Patient: Coby Marshall    Procedure(s) Performed: * No procedures listed *    Patient location: PACU    Anesthesia Type: general    Transport from OR: Transported from OR on 6-10 L/min O2 by face mask with adequate spontaneous ventilation    Post pain: adequate analgesia    Post assessment: no apparent anesthetic complications    Post vital signs: stable    Level of consciousness: awake, alert and oriented    Nausea/Vomiting: no nausea/vomiting    Complications: none    Transfer of care protocol was followed      Last vitals: Visit Vitals  /72   Pulse 80   Temp 36.6 °C (97.9 °F) (Skin)   Resp 11   Ht 5' 6" (1.676 m)   Wt 88 kg (194 lb)   SpO2 (!) 92%   Breastfeeding No   BMI 31.31 kg/m²     "

## 2024-05-15 NOTE — SEDATION DOCUMENTATION
Patient presents for TACE procedure with general anesthesia. Introduced to staff. Tolerated procedure well. PACU aware patient to come to them after extubation. Vascade deployed. Manual pressure held by tech.

## 2024-05-15 NOTE — NURSING
Report called to Libertad LUCIO.   Family updated via text system and given a call at this time to notify of transport out of IR to Floor

## 2024-05-15 NOTE — PROGRESS NOTES
VIRTUAL NURSE: Cued into patient's room. Permission received per patient to turn camera to view patient. Introduced as VN that will be working with floor nurse this shift. Educated the patient and their family on VN's role in patient care. Plan of care reviewed with patient. Informed patient that staff will round on them every 2 hours but to use call light for any other needs they may have. Also informed of fall risk and fall precautions. Patient verbalized understanding. Call light within reach; bed siderails up x2. Opportunity given for questions and questions answered. Admission assessment questions completed. Patient denies complaints or any needs at this time.     05/15/24 1604   Admission   Initial VN Admission Questions Complete   Communication Issues? None   Shift   Virtual Nurse - Patient Verbalized Approval Of Camera Use   Safety/Activity   Patient Rounds bed in low position;placement of personal items at bedside;call light in patient/parent reach;visualized patient   Safety Promotion/Fall Prevention assistive device/personal item within reach;instructed to call staff for mobility;side rails raised x 2;Fall Risk reviewed with patient/family   Positioning   Body Position position changed independently   Pain/Comfort/Sleep   Preferred Pain Scale number (Numeric Rating Pain Scale)   Pain Rating (0-10): Rest 0

## 2024-05-15 NOTE — NURSING TRANSFER
Nursing Transfer Note      5/15/2024   3:25 PM    Nurse giving handoff:Rere KOCH RN   Nurse receiving handoff:Libertad LUCIO     Reason patient is being transferred: overnight obs for pain and nausea control post bland TACE     Transfer To: [id number]    Transfer via stretcher    Transfer with patient belongings     Transported by Patient transport and this RN       Medicines sent: MIVF and octreotide infusing     Any special needs or follow-up needed: none     Patient belongings transferred with patient: Yes    Chart send with patient: Yes    Notified: spouse    Patient reassessed at: 1520    Upon arrival to floor: patient oriented to room, call bell in reach, and bed in lowest position. Pure wick hooked up to suction.Rosey RN came to bedside assessed groin site. C/D/I soft to palpation

## 2024-05-16 VITALS
HEART RATE: 66 BPM | BODY MASS INDEX: 33.94 KG/M2 | HEIGHT: 66 IN | WEIGHT: 211.19 LBS | TEMPERATURE: 98 F | OXYGEN SATURATION: 96 % | DIASTOLIC BLOOD PRESSURE: 49 MMHG | RESPIRATION RATE: 18 BRPM | SYSTOLIC BLOOD PRESSURE: 106 MMHG

## 2024-05-16 PROCEDURE — 99232 SBSQ HOSP IP/OBS MODERATE 35: CPT | Mod: NSCH,GC,, | Performed by: PHYSICIAN ASSISTANT

## 2024-05-16 PROCEDURE — 93010 ELECTROCARDIOGRAM REPORT: CPT | Mod: ,,, | Performed by: INTERNAL MEDICINE

## 2024-05-16 PROCEDURE — 25000003 PHARM REV CODE 250

## 2024-05-16 PROCEDURE — 93005 ELECTROCARDIOGRAM TRACING: CPT

## 2024-05-16 RX ORDER — ONDANSETRON 4 MG/1
4 TABLET, ORALLY DISINTEGRATING ORAL EVERY 8 HOURS PRN
Qty: 10 TABLET | Refills: 0 | Status: SHIPPED | OUTPATIENT
Start: 2024-05-16 | End: 2024-05-23

## 2024-05-16 RX ORDER — CIPROFLOXACIN 500 MG/1
500 TABLET ORAL EVERY 12 HOURS
Qty: 10 TABLET | Refills: 0 | Status: SHIPPED | OUTPATIENT
Start: 2024-05-16 | End: 2024-05-21

## 2024-05-16 RX ORDER — OXYCODONE HYDROCHLORIDE 5 MG/1
5 TABLET ORAL EVERY 8 HOURS PRN
Qty: 21 TABLET | Refills: 0 | Status: SHIPPED | OUTPATIENT
Start: 2024-05-16 | End: 2024-05-23

## 2024-05-16 RX ADMIN — CIPROFLOXACIN 500 MG: 500 TABLET ORAL at 08:05

## 2024-05-16 RX ADMIN — DULOXETINE HYDROCHLORIDE 30 MG: 30 CAPSULE, DELAYED RELEASE ORAL at 08:05

## 2024-05-16 RX ADMIN — HYDROCHLOROTHIAZIDE 25 MG: 25 TABLET ORAL at 08:05

## 2024-05-16 NOTE — PROGRESS NOTES
Interventional Radiology Progress Note      SUBJECTIVE:     History of Present Illness:  Coby Marshall is a 68 y.o. female with a PMHx of NET who was admitted on 5/15 for pain and nausea control post embolization. Pt tolerated the procedure well.  She is tolerating po and has no complaints this morning.    Review of Systems   Constitutional:  Negative for chills, fever, malaise/fatigue and weight loss.   Respiratory:  Negative for cough and shortness of breath.    Cardiovascular:  Negative for chest pain, palpitations and leg swelling.   Gastrointestinal:  Negative for abdominal pain, diarrhea, nausea and vomiting.   Genitourinary:  Negative for dysuria and flank pain.   Musculoskeletal:  Negative for back pain and myalgias.   Neurological:  Negative for weakness and headaches.       Scheduled Meds:   atorvastatin  20 mg Oral QHS    ciprofloxacin HCl  500 mg Oral Q12H    DULoxetine  30 mg Oral Daily    hydroCHLOROthiazide  25 mg Oral Daily    scopolamine  1 patch Transdermal Q3 Days     Continuous Infusions:  PRN Meds:  Current Facility-Administered Medications:     hydrOXYzine HCL, 25 mg, Oral, Nightly PRN    LIDOcaine, 1 patch, Transdermal, Daily PRN    melatonin, 9 mg, Oral, Nightly PRN    ondansetron, 4 mg, Intravenous, Q8H PRN    oxyCODONE, 5 mg, Oral, Q6H PRN    senna-docusate 8.6-50 mg, 1 tablet, Oral, BID PRN    sodium chloride 0.9%, 5 mL, Intravenous, PRN    Review of patient's allergies indicates:   Allergen Reactions    Arb-angiotensin receptor antagonist Swelling     Had angioedema of lips with ACE inhibitor, so this is a contraindication    Epinephrine      Neuroendocrine Tumor patient      Lisinopril-hydrochlorothiazide Swelling     Mouth Swelling    Sulfa (sulfonamide antibiotics) Swelling     Swelling of lips    Hydrocodone-acetaminophen Other (See Comments)     stomach pain (even with food)    Amlodipine Other (See Comments)     edema of ankles and legs.       Past Medical History:   Diagnosis  Date    Asthma     Cancer     rectal cancer in remission    Carcinoid tumor, rectal, malignant 12/2017    Ki 67 -3-20%    Hyperlipidemia     Hypertension     Malignant carcinoid tumor of ileum 12/2017    Ki 67 < 3%    Rectal cancer 2/8/2019     Past Surgical History:   Procedure Laterality Date    COLON SURGERY      ENDOSCOPIC ULTRASOUND OF UPPER GASTROINTESTINAL TRACT N/A 12/17/2021    Procedure: ULTRASOUND, UPPER GI TRACT, ENDOSCOPIC;  Surgeon: Jeffery Clemons MD;  [facility] ([address] [place] [address]);  Service: Endoscopy;  Laterality: N/A;  instructions emailed to pt-BB  fully vaccinated-BB   12/13 arrival time confirmed with pt-rb    HYSTERECTOMY      ILEOSTOMY CLOSURE  01/2018    Lower Anterior Resection  12/2017    LETITIA- Dr. Beck - [facility]    port a cath Right     R chest wall     Family History   Problem Relation Name Age of Onset    Cancer Sister          leukemia    Colon cancer Neg Hx      Esophageal cancer Neg Hx       Social History     Tobacco Use    Smoking status: Never    Smokeless tobacco: Never   Substance Use Topics    Alcohol use: No    Drug use: No       OBJECTIVE:     Vital Signs (Most Recent)  Temp: 98.1 °F (36.7 °C) (05/16/24 0716)  Pulse: 71 (05/16/24 0716)  Resp: 18 (05/16/24 0451)  BP: (!) 119/54 (05/16/24 0716)  SpO2: 98 % (05/16/24 0716)    Physical Exam:  Physical Exam  Vitals and nursing note reviewed.   Constitutional:       Appearance: Normal appearance.   HENT:      Head: Normocephalic and atraumatic.      Right Ear: External ear normal.      Left Ear: External ear normal.      Nose: Nose normal.   Eyes:      Extraocular Movements: Extraocular movements intact.   Cardiovascular:      Rate and Rhythm: Normal rate.      Pulses: Normal pulses.   Pulmonary:      Effort: Pulmonary effort is normal.   Abdominal:      General: Abdomen is flat.   Musculoskeletal:      Cervical back: Normal range of motion and neck supple.   Skin:     General: Skin is warm and dry.   Neurological:      General:  No focal deficit present.      Mental Status: She is alert and oriented to person, place, and time.   Psychiatric:         Mood and Affect: Mood normal.         Behavior: Behavior normal.         Laboratory  I have reviewed all pertinent lab results within the past 24 hours.  CBC:   Recent Labs   Lab 05/15/24  0751   WBC 6.95   RBC 3.92*   HGB 10.7*   HCT 34.5*      MCV 88   MCH 27.3   MCHC 31.0*     CMP:   Recent Labs   Lab 05/15/24  0751   *   CALCIUM 9.1   ALBUMIN 3.7   PROT 7.8      K 4.8   CO2 31*      BUN 15   CREATININE 1.3   ALKPHOS 79   ALT 16   AST 30   BILITOT 0.8     Coagulation:   Recent Labs   Lab 05/15/24  0751   LABPROT 11.9   INR 1.1     Microbiology Results (last 7 days)       ** No results found for the last 168 hours. **              ASSESSMENT/PLAN:     Assessment:  68 y.o. female with a PMHx of NET s/p bland embolization 5/15/24.  Pt tolerated the procedure well.  This is her 5th embolization procedure with us.  She has not required any pain medication overnight and remained afebrile.  Pt stable for discharge from IR standpoint.    At DC, recommend Cipro 500 mg BID for treatment duration of 5 days total.  At DC give less than 1 wks supply of PRN Zofran ODT for nausea, and PRN Oxycodone 5 mg for pain.  Avoid acetaminophen-containing products and EtOH for 2 weeks post procedure.  IR will arrange follow up imaging and clinic visit in 1 month (on or around June 12th).    IR will continue to follow. Please contact with questions via [facility] secure chat.    Keara Rojas PA-C  Interventional Radiology

## 2024-05-16 NOTE — PLAN OF CARE
VN note: Progress notes, plan of care, labs, vital signs, and orders reviewed.    Problem: Adult Inpatient Plan of Care  Goal: Plan of Care Review  Outcome: Progressing

## 2024-05-16 NOTE — NURSING
AAO x 4.   Regular diet.  No complaints of N/V, pain at this time.  Safety maintained.  Bed alarm on.  IVs removed intact.  AVS printed and handed to patient.  Encouraged to call for assistance.

## 2024-05-16 NOTE — PLAN OF CARE
CM met with pt --  Sree  -- at bedside   Pt is AAO x 3; confirmed demographics   Dx  NET - pt here for TACE procedure by IR   Pt will f/u with IR post d/c   independent prior to admit, drives, no dme, no hh        05/16/24 1134   Discharge Planning   Assessment Type Discharge Planning Brief Assessment   Resource/Environmental Concerns none   Support Systems Spouse/significant other   Equipment Currently Used at Home none   Current Living Arrangements home   Patient/Family Anticipates Transition to home;home with family   Patient/Family Anticipated Services at Transition none   DME Needed Upon Discharge  none   Discharge Plan A Home;Home with family   Discharge Plan B Home;Home with family;Home Health

## 2024-05-16 NOTE — PROGRESS NOTES
Coby Marshall #[id number] (CSN: [id number]) (68 y.o. 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     Chief Complaint    None  Documents Filed to Patient    Power of  Living Will Clinical Unknown Study Attachment Consent Form ABN Waiver After Visit Summary Lab Result Scan Code Status Patient Portal Status    Not on File  Not on File  Not on File  Not on File  Filed  Not on File  Filed  Not on File  Prior  Active     Admission Information    Current Information    Attending at Discharge Admitting Provider Admission Type Admission Status   Tyshawn Whittington, Tyshawn Black,  Elective Confirmed Discharge          Admission Date/Time Discharge Date Hospital Service Auth/Cert Status   05/15/24  1522 05/16/24 Med/Surg           Hospital Area Unit Room/Bed    [facility] MEDICAL SURGICAL UNIT ACUTE [id number] A            Discharge Disposition Discharge Destination   Home or Self Care      Hospital Account    Name Acct ID Class Status Primary Coverage   MarshallCoby nicolas LANRE [id number] OP- Outpatient Recovery Discharged/Not Billed CIGNA MANAGED MEDICARE - CIGNA MEDICARE ADVANTAGE            Guarantor Account (for Hospital Account #[id number])    Name Relation to Pt Service Area Active? Acct Type   MarshallCoby Self OHSSA Yes Personal/Family   Address Phone     [address] [place] [address] [phone](H)              Coverage Information (for Hospital Account #[id number])    F/O Payor/Plan Precert #   CIGNA MANAGED MEDICARE/CIGNA MEDICARE ADVANTAGE     Subscriber Subscriber #   Coby Marshall [id number]   Address Phone   [address]  [place] [address] [phone]            Emergency Contact Information    Name: Sree Marshall Relationship: Spouse   Address:     City:  State:  Zip:  Phone: [phone]    Business phone:

## 2024-05-16 NOTE — PLAN OF CARE
Pt for d/c to home today    to transport pt to home -   No dme, no hh ordered.    Discharging nurse to review all d/c meds/instructions   OLLOW UP           Next Steps: Follow up  Instructions: YOU WILL BE CONTACTED WITH HOSPITAL FOLLOW UP APTS. IR will arrange follow up imaging and clinic visit in 1 month (on or around June 12th).    Tres Olson MD  Hematology and Oncology [phone] [facility] [place] [address] [address] [place] [address] [facility] [place] [address]     Next Steps: Follow up  Instructions: DISCHARGE SUMMARY WILL BE FAXED TO DR. OLSON'S OFFICE.                05/16/24 7236   Final Note   Assessment Type Final Discharge Note   Anticipated Discharge Disposition Home   What phone number can be called within the next 1-3 days to see how you are doing after discharge? [phone]   Hospital Resources/Appts/Education Provided Appointments scheduled and added to AVS   Post-Acute Status   Discharge Delays None known at this time

## 2024-05-16 NOTE — PROGRESS NOTES
Discharge orders noted. Additional clinical references attached. Patient's discharge instructions given by bedside RN. Virtual nurse cued into room and reviewed discharge instructions. Education provided on new medication, diagnosis, and follow-up appointments. Teach back method used. Patient verbalized understanding. All questions answered. Transport to [facility] requested. Bedside nurse updated on patient status and transportation request.      05/16/24 1410   AVS Confirmation   Discharge instructions and AVS given to and reviewed with patient and/or significant other. Yes

## 2024-05-16 NOTE — PLAN OF CARE
Pt AAOx4. No c/o pain, N/V. Medications administered per MAR. Mike infusing. On RA. Dressing to R groin site CDI. Purewick in place. Voiding without difficulty. Bed alarm set, side rails raised, and call light in reach. Spouse at bedside.

## 2024-05-16 NOTE — DISCHARGE SUMMARY
"[facility]  Discharge Summary      Patient Name: Coby Marshall  MRN: [id number]  BRENDA: [id number]  Patient Class: OP- Outpatient Recovery  Admission Date: 5/15/2024  Hospital Length of Stay: 0 days  Discharge Date and Time:  05/16/2024 11:15 AM  Attending Physician: Tyshawn Whittington DO   Discharging Provider: Luana Appiah MD  Primary Care Provider: Randal Casillas MD    Primary Care Team: Networked reference to record PCT     HPI:   68-year-old woman with PMHx of Metastatic Neuroendocrine Tumor, HTN, HLD is s/p TACE procedure 5/15/24 by IR. Patient is admitted to [facility] for overnight observation &  pain & nausea control.    * No procedures listed *      Hospital Course:   Patient recovered well after procedure, and required no nausea or pain medications. Patient given strict instructions to avoid acetaminophen-containing products and EtOH for 2 weeks post procedure. Patient given 1 week supply of PRN Zofran ODT for nausea, and PRN oxycodone 5mg for pain, as well as a 5-day course of Ciprofloxacin 500mg BID. Follow up imaging was set up by the IR team.  On day of discharge, patient was hemodynamically stable. She was sent home with instructions to continue home medications, change dosage/frequency of home medications, and/or take new medications as mentioned under "Medication Reconciliation" below. These changes were further explained by patient's nurse or the clinical pharmacist. Patient will need to schedule with their PCP for hospital discharge followup. Patient agreeable to discharge plan & expressed understanding of all aforementioned changes. All questions were answered and return precautions were discussed & detailed in the after-visit summary.       Physical Exam  Constitutional:       Appearance: Normal appearance.   HENT:      Head: Normocephalic and atraumatic.      Mouth/Throat:      Pharynx: Oropharynx is clear.   Eyes:      General:         Right eye: No " discharge.         Left eye: No discharge.      Conjunctiva/sclera: Conjunctivae normal.   Cardiovascular:      Rate and Rhythm: Normal rate and regular rhythm.      Pulses: Normal pulses.   Pulmonary:      Effort: Pulmonary effort is normal. No respiratory distress.   Abdominal:      General: There is distension.      Palpations: Abdomen is soft.   Musculoskeletal:      Right lower leg: No edema.      Left lower leg: No edema.   Skin:     General: Skin is warm.      Coloration: Skin is not jaundiced.   Neurological:      Mental Status: She is alert and oriented to person, place, and time.   Psychiatric:         Mood and Affect: Mood normal.         Behavior: Behavior normal.       Goals of Care Treatment Preferences:  Code Status: Full Code      Consults:     No new Assessment & Plan notes have been filed under this hospital service since the last note was generated.  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 [C7A.8, C7B.8] 12/23/2021 Yes    HTN (hypertension) [I10] 04/27/2015 Yes    Hyperlipidemia [E78.5] 04/24/2023 Yes    Insomnia [G47.00] 05/15/2024 Unknown      Problems Resolved During this Admission:       Discharged Condition: stable    Disposition: Home or Self Care    Follow Up:    Patient Instructions:      Diet Adult Regular     Notify your health care provider if you experience any of the following:  temperature >100.4     Notify your health care provider if you experience any of the following:  persistent nausea and vomiting or diarrhea     Notify your health care provider if you experience any of the following:  severe uncontrolled pain     Notify your health care provider if you experience any of the following:  persistent dizziness, light-headedness, or visual disturbances     Notify your health care provider if you experience any of the following:  redness, tenderness, or signs of infection  (pain, swelling, redness, odor or green/yellow discharge around incision site)     Activity as tolerated       Significant Diagnostic Studies: Labs: CMP   Recent Labs   Lab 05/15/24  0751      K 4.8      CO2 31*   *   BUN 15   CREATININE 1.3   CALCIUM 9.1   PROT 7.8   ALBUMIN 3.7   BILITOT 0.8   ALKPHOS 79   AST 30   ALT 16   ANIONGAP 9    and CBC   Recent Labs   Lab 05/15/24  0751   WBC 6.95   HGB 10.7*   HCT 34.5*          Pending Diagnostic Studies:       None           Medications:  Reconciled Home Medications:      Medication List        START taking these medications      ciprofloxacin HCl 500 MG tablet  Commonly known as: CIPRO  Take 1 tablet (500 mg total) by mouth every 12 (twelve) hours. for 5 days            CHANGE how you take these medications      ondansetron 4 MG Tbdl  Commonly known as: ZOFRAN-ODT  Take 1 tablet (4 mg total) by mouth every 8 (eight) hours as needed (for nausea).  What changed:   medication strength  how much to take  reasons to take this  additional instructions            CONTINUE taking these medications      ALBUTEROL INHL  Inhale into the lungs as needed.     atorvastatin 20 MG tablet  Commonly known as: LIPITOR  Take 20 mg by mouth every evening.     brimonidine-timoloL 0.2-0.5 % Drop  Commonly known as: COMBIGAN  Place 1 drop into the right eye 2 (two) times daily.     capecitabine 500 MG Tab  Commonly known as: XELODA  Take 2 tablets (1,000 mg total) by mouth 2 (two) times daily on days 1-14 of every 28 day cycle.     cloNIDine 0.1 MG tablet  Commonly known as: CATAPRES  Take 0.1 mg by mouth 2 (two) times daily.     fluticasone propionate 50 mcg/actuation nasal spray  Commonly known as: FLONASE  1 spray by Each Nare route once daily.     hydrALAZINE 25 MG tablet  Commonly known as: APRESOLINE  Take 1 tablet (25 mg total) by mouth every 8 (eight) hours.     hydroCHLOROthiazide 12.5 mg capsule  Commonly known as: MICROZIDE  Take 12.5 mg by mouth once  daily.     hydrOXYzine HCL 25 MG tablet  Commonly known as: ATARAX  Take 1 tablet (25 mg total) by mouth nightly as needed (insomnia).     oxyCODONE 5 MG immediate release tablet  Commonly known as: ROXICODONE  Take 1 tablet (5 mg total) by mouth every 8 (eight) hours as needed for Pain.     ranitidine 150 MG tablet  Commonly known as: ZANTAC  Take 150 mg by mouth nightly.     temozolomide 100 MG capsule  Commonly known as: TEMODAR  Take 3 capsules (300 mg total) by mouth As instructed (take three cap (300 mg total) by mouth once nightly on days 10-14 of every 28 day cycle).              Indwelling Lines/Drains at time of discharge:   Lines/Drains/Airways       Drain  Duration             Female External Urinary Catheter w/ Suction 05/15/24 1500 <1 day                    Time spent on the discharge of patient: >30 minutes         Luana Appiah MD  Department of Hospital Medicine  [facility]

## 2024-05-20 LAB
OHS QRS DURATION: 82 MS
OHS QTC CALCULATION: 460 MS

## 2024-05-22 DIAGNOSIS — C7A.8 NEUROENDOCRINE CARCINOMA METASTATIC TO LIVER: Primary | ICD-10-CM

## 2024-05-22 DIAGNOSIS — C7B.8 NEUROENDOCRINE CARCINOMA METASTATIC TO LIVER: Primary | ICD-10-CM

## 2024-06-24 ENCOUNTER — TELEPHONE (OUTPATIENT)
Dept: INTERVENTIONAL RADIOLOGY/VASCULAR | Facility: CLINIC | Age: 69
End: 2024-06-24
Payer: MEDICARE

## 2024-07-19 ENCOUNTER — TELEPHONE (OUTPATIENT)
Dept: HEMATOLOGY/ONCOLOGY | Facility: CLINIC | Age: 69
End: 2024-07-19
Payer: MEDICARE

## 2024-07-19 NOTE — TELEPHONE ENCOUNTER
Tried to return call to patient but patient unable to hear RN as phone lines may be down.  Will try again later.  Lab orders faxed to [place] as well.

## 2024-07-19 NOTE — TELEPHONE ENCOUNTER
----- Message from Hazel Simmons sent at 7/19/2024  9:50 AM CDT -----  Regarding: returning missed call  Contact: Pt@[phone]  Pt is returning a missed call from someone in the office and is asking for a return call back soon. Thanks.

## 2024-07-19 NOTE — TELEPHONE ENCOUNTER
Appointment for follow up for August 7 at 8:40 am.  Called patient to confirm date and time.  Left voicemail for return call to clinic to confirm appointment.   Doing well post ablation. Some sore throat but no dysphagia  Exam, groin ok  Home today.

## 2024-08-07 ENCOUNTER — OFFICE VISIT (OUTPATIENT)
Dept: HEMATOLOGY/ONCOLOGY | Facility: CLINIC | Age: 69
End: 2024-08-07
Payer: MEDICARE

## 2024-08-07 ENCOUNTER — LAB VISIT (OUTPATIENT)
Dept: LAB | Facility: HOSPITAL | Age: 69
End: 2024-08-07
Attending: INTERNAL MEDICINE
Payer: MEDICARE

## 2024-08-07 VITALS
SYSTOLIC BLOOD PRESSURE: 147 MMHG | DIASTOLIC BLOOD PRESSURE: 67 MMHG | OXYGEN SATURATION: 100 % | RESPIRATION RATE: 17 BRPM | WEIGHT: 200.06 LBS | BODY MASS INDEX: 32.15 KG/M2 | HEIGHT: 66 IN | TEMPERATURE: 98 F | HEART RATE: 64 BPM

## 2024-08-07 DIAGNOSIS — C78.7 SECONDARY MALIGNANT NEOPLASM OF LIVER: ICD-10-CM

## 2024-08-07 DIAGNOSIS — C7A.026 MALIGNANT CARCINOID TUMOR OF RECTUM: ICD-10-CM

## 2024-08-07 DIAGNOSIS — D84.821 IMMUNODEFICIENCY DUE TO DRUGS: ICD-10-CM

## 2024-08-07 DIAGNOSIS — D49.9 IMMUNODEFICIENCY SECONDARY TO NEOPLASM: ICD-10-CM

## 2024-08-07 DIAGNOSIS — C7A.012 MALIGNANT CARCINOID TUMOR OF ILEUM: ICD-10-CM

## 2024-08-07 DIAGNOSIS — D3A.8 NEUROENDOCRINE TUMOR OF PANCREAS: ICD-10-CM

## 2024-08-07 DIAGNOSIS — C7A.012 MALIGNANT CARCINOID TUMOR OF ILEUM: Primary | ICD-10-CM

## 2024-08-07 DIAGNOSIS — I10 ESSENTIAL HYPERTENSION: ICD-10-CM

## 2024-08-07 DIAGNOSIS — N18.30 STAGE 3 CHRONIC KIDNEY DISEASE, UNSPECIFIED WHETHER STAGE 3A OR 3B CKD: ICD-10-CM

## 2024-08-07 DIAGNOSIS — R11.0 NAUSEA: ICD-10-CM

## 2024-08-07 DIAGNOSIS — D84.81 IMMUNODEFICIENCY SECONDARY TO NEOPLASM: ICD-10-CM

## 2024-08-07 DIAGNOSIS — Z79.899 IMMUNODEFICIENCY DUE TO DRUGS: ICD-10-CM

## 2024-08-07 LAB
ALBUMIN SERPL BCP-MCNC: 3.7 G/DL (ref 3.5–5.2)
ALP SERPL-CCNC: 111 U/L (ref 55–135)
ALT SERPL W/O P-5'-P-CCNC: 14 U/L (ref 10–44)
ANION GAP SERPL CALC-SCNC: 11 MMOL/L (ref 8–16)
AST SERPL-CCNC: 27 U/L (ref 10–40)
BASOPHILS # BLD AUTO: 0.05 K/UL (ref 0–0.2)
BASOPHILS NFR BLD: 0.7 % (ref 0–1.9)
BILIRUB SERPL-MCNC: 0.6 MG/DL (ref 0.1–1)
BUN SERPL-MCNC: 14 MG/DL (ref 8–23)
CALCIUM SERPL-MCNC: 10 MG/DL (ref 8.7–10.5)
CHLORIDE SERPL-SCNC: 101 MMOL/L (ref 95–110)
CO2 SERPL-SCNC: 28 MMOL/L (ref 23–29)
CREAT SERPL-MCNC: 1.4 MG/DL (ref 0.5–1.4)
DIFFERENTIAL METHOD BLD: ABNORMAL
EOSINOPHIL # BLD AUTO: 0.3 K/UL (ref 0–0.5)
EOSINOPHIL NFR BLD: 3.6 % (ref 0–8)
ERYTHROCYTE [DISTWIDTH] IN BLOOD BY AUTOMATED COUNT: 17 % (ref 11.5–14.5)
EST. GFR  (NO RACE VARIABLE): 40.5 ML/MIN/1.73 M^2
GLUCOSE SERPL-MCNC: 123 MG/DL (ref 70–110)
HCT VFR BLD AUTO: 34.3 % (ref 37–48.5)
HGB BLD-MCNC: 10.3 G/DL (ref 12–16)
IMM GRANULOCYTES # BLD AUTO: 0.03 K/UL (ref 0–0.04)
IMM GRANULOCYTES NFR BLD AUTO: 0.4 % (ref 0–0.5)
LYMPHOCYTES # BLD AUTO: 1.3 K/UL (ref 1–4.8)
LYMPHOCYTES NFR BLD: 17.3 % (ref 18–48)
MCH RBC QN AUTO: 26.7 PG (ref 27–31)
MCHC RBC AUTO-ENTMCNC: 30 G/DL (ref 32–36)
MCV RBC AUTO: 89 FL (ref 82–98)
MONOCYTES # BLD AUTO: 0.7 K/UL (ref 0.3–1)
MONOCYTES NFR BLD: 9.7 % (ref 4–15)
NEUTROPHILS # BLD AUTO: 5.1 K/UL (ref 1.8–7.7)
NEUTROPHILS NFR BLD: 68.3 % (ref 38–73)
NRBC BLD-RTO: 0 /100 WBC
PLATELET # BLD AUTO: 276 K/UL (ref 150–450)
PMV BLD AUTO: 9.5 FL (ref 9.2–12.9)
POTASSIUM SERPL-SCNC: 4.8 MMOL/L (ref 3.5–5.1)
PROT SERPL-MCNC: 7.7 G/DL (ref 6–8.4)
RBC # BLD AUTO: 3.86 M/UL (ref 4–5.4)
SODIUM SERPL-SCNC: 140 MMOL/L (ref 136–145)
WBC # BLD AUTO: 7.5 K/UL (ref 3.9–12.7)

## 2024-08-07 PROCEDURE — 83519 RIA NONANTIBODY: CPT | Performed by: INTERNAL MEDICINE

## 2024-08-07 PROCEDURE — 3077F SYST BP >= 140 MM HG: CPT | Mod: CPTII,S$GLB,, | Performed by: INTERNAL MEDICINE

## 2024-08-07 PROCEDURE — 3078F DIAST BP <80 MM HG: CPT | Mod: CPTII,S$GLB,, | Performed by: INTERNAL MEDICINE

## 2024-08-07 PROCEDURE — 99215 OFFICE O/P EST HI 40 MIN: CPT | Mod: S$GLB,,, | Performed by: INTERNAL MEDICINE

## 2024-08-07 PROCEDURE — 99999 PR PBB SHADOW E&M-EST. PATIENT-LVL IV: CPT | Mod: PBBFAC,,, | Performed by: INTERNAL MEDICINE

## 2024-08-07 PROCEDURE — 3288F FALL RISK ASSESSMENT DOCD: CPT | Mod: CPTII,S$GLB,, | Performed by: INTERNAL MEDICINE

## 2024-08-07 PROCEDURE — 3008F BODY MASS INDEX DOCD: CPT | Mod: CPTII,S$GLB,, | Performed by: INTERNAL MEDICINE

## 2024-08-07 PROCEDURE — 1159F MED LIST DOCD IN RCRD: CPT | Mod: CPTII,S$GLB,, | Performed by: INTERNAL MEDICINE

## 2024-08-07 PROCEDURE — 83497 ASSAY OF 5-HIAA: CPT | Performed by: INTERNAL MEDICINE

## 2024-08-07 PROCEDURE — 85025 COMPLETE CBC W/AUTO DIFF WBC: CPT | Performed by: INTERNAL MEDICINE

## 2024-08-07 PROCEDURE — G2211 COMPLEX E/M VISIT ADD ON: HCPCS | Mod: S$GLB,,, | Performed by: INTERNAL MEDICINE

## 2024-08-07 PROCEDURE — 84260 ASSAY OF SEROTONIN: CPT | Performed by: INTERNAL MEDICINE

## 2024-08-07 PROCEDURE — 80053 COMPREHEN METABOLIC PANEL: CPT | Performed by: INTERNAL MEDICINE

## 2024-08-07 PROCEDURE — 1126F AMNT PAIN NOTED NONE PRSNT: CPT | Mod: CPTII,S$GLB,, | Performed by: INTERNAL MEDICINE

## 2024-08-07 PROCEDURE — 1160F RVW MEDS BY RX/DR IN RCRD: CPT | Mod: CPTII,S$GLB,, | Performed by: INTERNAL MEDICINE

## 2024-08-07 PROCEDURE — 3044F HG A1C LEVEL LT 7.0%: CPT | Mod: CPTII,S$GLB,, | Performed by: INTERNAL MEDICINE

## 2024-08-07 PROCEDURE — 1101F PT FALLS ASSESS-DOCD LE1/YR: CPT | Mod: CPTII,S$GLB,, | Performed by: INTERNAL MEDICINE

## 2024-08-09 LAB — 5OH-INDOLEACETATE SERPL-MCNC: 10 NG/ML

## 2024-08-13 ENCOUNTER — TELEPHONE (OUTPATIENT)
Dept: HEMATOLOGY/ONCOLOGY | Facility: CLINIC | Age: 69
End: 2024-08-13
Payer: MEDICARE

## 2024-08-13 LAB
PANCREASTATIN SERPL-MCNC: 38 PG/ML (ref 10–135)
SEROTONIN: <30 NG/ML

## 2024-08-13 NOTE — TELEPHONE ENCOUNTER
Dr Smart ordered cu PET scan.  Scheduled for 8/23 and notified patient that we are out of network and she may be liable for charges insurance will not cover.  Pt has F.A,  and we will check it in case of insurance denial.  RTC 9/13.

## 2024-08-14 ENCOUNTER — TELEPHONE (OUTPATIENT)
Dept: HEMATOLOGY/ONCOLOGY | Facility: CLINIC | Age: 69
End: 2024-08-14
Payer: MEDICARE

## 2024-08-14 NOTE — TELEPHONE ENCOUNTER
Confirmed appointments for PET scan 8/23 and visit 8/28      ----- Message from Mag Ann sent at 8/14/2024  2:22 PM CDT -----  Regarding: CT Scan Approved  Contact: STONE KIDD [[id number]]  CONSULT/ADVISORY    Name of Caller:  STONE KIDD [[id number]]    Contact Preference:  [phone] (home) [phone] (work)      Nature of Call:  Pt states she received a call from Financial Assistance this morning that the CT Scan has been approved.  Please call to confirm.

## 2024-08-22 ENCOUNTER — TELEPHONE (OUTPATIENT)
Dept: INTERVENTIONAL RADIOLOGY/VASCULAR | Facility: CLINIC | Age: 69
End: 2024-08-22
Payer: MEDICARE

## 2024-08-22 NOTE — TELEPHONE ENCOUNTER
"Spoke to pt on phone, related message from Vaishnavi "Alternatively, please let patient know MRI shows good response to treatment of her liver. And Dr Reynolds's Recommendation is to repeat MRI in 3 months". Pt verbally understood.  "

## 2024-08-23 ENCOUNTER — HOSPITAL ENCOUNTER (OUTPATIENT)
Dept: RADIOLOGY | Facility: HOSPITAL | Age: 69
Discharge: HOME | End: 2024-08-23
Attending: INTERNAL MEDICINE
Payer: MEDICARE

## 2024-08-23 DIAGNOSIS — C7A.026 MALIGNANT CARCINOID TUMOR OF RECTUM: ICD-10-CM

## 2024-08-23 DIAGNOSIS — D3A.8 NEUROENDOCRINE TUMOR OF PANCREAS: ICD-10-CM

## 2024-08-23 DIAGNOSIS — C7A.012 MALIGNANT CARCINOID TUMOR OF ILEUM: ICD-10-CM

## 2024-08-23 PROCEDURE — A9592 HC COPPER CU-64, DOTATE, DX, PER 1 MCI: HCPCS | Performed by: INTERNAL MEDICINE

## 2024-08-23 PROCEDURE — 78815 PET IMAGE W/CT SKULL-THIGH: CPT | Mod: 26,PS,, | Performed by: NUCLEAR MEDICINE

## 2024-08-23 PROCEDURE — 78815 PET IMAGE W/CT SKULL-THIGH: CPT | Mod: TC

## 2024-08-23 RX ADMIN — COPPER CU 64 DOTATATE 4.29 MILLICURIE: 1 INJECTION, SOLUTION INTRAVENOUS at 11:08

## 2024-08-26 ENCOUNTER — TUMOR BOARD CONFERENCE (OUTPATIENT)
Dept: HEMATOLOGY/ONCOLOGY | Facility: CLINIC | Age: 69
End: 2024-08-26
Payer: MEDICARE

## 2024-08-26 NOTE — PROGRESS NOTES
[facility]      NEUROENDOCRINE MULTIDISCIPLINARY TUMOR BOARD  _____________________________________________________________________    PRESENTER:   Ariel Smart MD    REASON FOR PRESENTATION:  Scan Review    ATTENDEES:   Gastroenterology - Not Present  Interventional Radiology - Dhaval Valdez MD and MERVIN Guajardo  Nuclear Medicine - Petra Howard MD  Nursing - NET TB Nursing: Mariam Perry, RN, Carrie Serna, RN, Vielka Kincaid, RN, and Antonino Rowley RN  Oncology - Ariel Smart MD and Ricardo Mcginnis MD  Palliative Medicine - Not Present  Pathology -  Rai Coles MD  Research - Not Present  Surgery - MD Rai Mireles MD    PATIENT STATUS:  Established Patient    PATIENT SUMMARY:  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    Ki 67 -3-20%    Hyperlipidemia     Hypertension     Malignant carcinoid tumor of ileum 12/2017    Ki 67 < 3%    Rectal cancer 2/8/2019       Past Surgical History:   Procedure Laterality Date    COLON SURGERY      ENDOSCOPIC ULTRASOUND OF UPPER GASTROINTESTINAL TRACT N/A 12/17/2021    Procedure: ULTRASOUND, UPPER GI TRACT, ENDOSCOPIC;  Surgeon: Jeffery Clemons MD;  Location: [facility] ([address] [place] [address]);  Service: Endoscopy;  Laterality: N/A;  instructions emailed to pt-BB  fully vaccinated-BB   12/13 arrival time confirmed with pt-rb    HYSTERECTOMY      ILEOSTOMY CLOSURE  01/2018    Lower Anterior Resection  12/2017    [facility]- Dr. Manjarrez - [facility]    port a cath Right     R chest wall       TUMOR MARKERS:  5-HIAA, Plasma Ref Range: up to 22 ng/mL  Recent Labs   Lab 10/04/23  1051 11/10/23  0941 12/15/23  1308 01/05/24  0837 02/19/24  0843 08/07/24  0930   5-HIAA, Plasma (Neuroend) 10 5 7 10 9 10     Chromogranin A Ref Range: <93 ng/mL  Recent Labs   Lab 11/11/21  1016 02/01/23  0913   Chromogranin A 41 27     Gastrin Ref Range: <100 pg/mL      Neurokinin A Ref Range: 0 -  40 pg/mL      Pancreastatin Ref Range: 10 - 135 pg/mL  Recent Labs   Lab 10/04/23  1051 11/10/23  0941 12/15/23  1308 01/05/24  0837 02/19/24  0844 08/07/24  0930   Pancreastatin 51 82 65 72 58 38     Pancreatic Polypeptide Ref Range: >314 pg/mL  Recent Labs   Lab 11/11/21  1016   Pancreatic Polypeptide 225     Serotonin Ref Range: <=230 ng/mL  Recent Labs   Lab 10/04/23  1051 11/10/23  0941 12/15/23  1308 01/05/24  0837 02/19/24  0843 08/07/24  0930   Serotonin <30 <30 <30 <30 <30 <30           Latest Ref Rng & Units 8/23/2024     9:59 AM 8/7/2024     9:30 AM 8/7/2024     8:34 AM   Neuroendocrine Labs   5 HIAA BLOOD <=30 ng/mL  10        In this sample, the concentration of 5HIAA was within   normal limits.    -------------------ADDITIONAL INFORMATION-------------------  Liquid Chromatography-Tandem Mass Spectrometry (LC-MS/MS).  Values obtained from different assay methods or kits may be   different and cannot be used interchangeably. The results   cannot be interpreted as absolute evidence for the presence   or absence of malignant disease.  This test was developed and its performance characteristics   determined by [facility] in a manner consistent with CLIA   requirements. This test has not been cleared or approved by   the U.S. Food and Drug Administration.    Test Performed by:  [facility] [place] [id number]  : Kina Lewis Ph.D.; CLIA# [id number]      SEROTONIN <=230 ng/mL  <30        -------------------ADDITIONAL INFORMATION-------------------  This test was developed and its performance characteristics   determined by [facility] in a manner consistent with CLIA   requirements. This test has not been cleared or approved by   the U.S. Food and Drug Administration.    Test Performed by:  [facility]  [address] [id number]  : Kina Lewis Ph.D.; [facility]#  [id number]      PANCREASTATIN 10 - 135 pg/mL  38        This Pancreastatin result was determined by radioimmunoassay    -------------------ADDITIONAL INFORMATION-------------------  This radioimmunoassay was developed and its  performance characteristics determined by  [employer]. It has not been  cleared or approved by the FDA and such  approval or clearance is not required at this  time. Values obtained with different methods,  different laboratories or with kits cannot  be used interchangeably with the results on  this report. The results cannot be interpreted  as absolute evidence of the presence or absence  of malignant disease.    Test Performed by:  [facility] anchor[employer]travel [place]  [address] [place] [address]      WBC 3.90 - 12.70 K/uL 7.94  7.50     RBC 4.00 - 5.40 M/uL 4.27  3.86     HGB 12.0 - 16.0 g/dL 11.4  10.3     HCT 37.0 - 48.5 % 37.6  34.3     MCV 82 - 98 fL 88  89     MCH 27.0 - 31.0 pg 26.7  26.7     MCHC 32.0 - 36.0 g/dL 30.3  30.0     RDW 11.5 - 14.5 % 15.9  17.0     PLATLETS 150 - 450 K/uL 256  276     MPV 9.2 - 12.9 fL 10.1  9.5     GRAN # 1.8 - 7.7 K/uL 5.8  5.1     LYMPH # 1.0 - 4.8 K/uL 1.4  1.3     MONO # 0.3 - 1.0 K/uL 0.4  0.7     EOS # 0.0 - 0.5 K/uL 0.2  0.3     BASO # 0.00 - 0.20 K/uL 0.05  0.05     GRAN % 38.0 - 73.0 % 73.5  68.3     LYMPH % 18.0 - 48.0 % 17.6  17.3     MONO % 4.0 - 15.0 % 5.3  9.7     EOS % 0.0 - 8.0 % 2.6  3.6     BASO % 0.0 - 1.9 % 0.6  0.7     DIFF METHOD  Automated  Automated     GLUCOSE 70 - 110 mg/dL 113  123     BUN 8 - 23 mg/dL 16  14     CREATININE 0.5 - 1.4 mg/dL 1.2  1.4      - 145 mmol/L 139  140     K 3.5 - 5.1 mmol/L 3.4  4.8     CHLORIDE 95 - 110 mmol/L 102  101     CO2 23 - 29 mmol/L 25  28     CALCIUM 8.7 - 10.5 mg/dL 9.8  10.0     PROTEIN, TOTAL 6.0 - 8.4 g/dL 8.2  7.7     ALBUMIN 3.5 - 5.2 g/dL 3.9  3.7     TOTAL BILIRUBIN 0.1 - 1.0 mg/dL 0.5        For infants and newborns, interpretation of results should be based  on  gestational age, weight and in agreement with clinical  observations.    Premature Infant recommended reference ranges:  Up to 24 hours.............<8.0 mg/dL  Up to 48 hours............<12.0 mg/dL  3-5 days..................<15.0 mg/dL  6-29 days.................<15.0 mg/dL   0.6        For infants and newborns, interpretation of results should be based  on gestational age, weight and in agreement with clinical  observations.    Premature Infant recommended reference ranges:  Up to 24 hours.............<8.0 mg/dL  Up to 48 hours............<12.0 mg/dL  3-5 days..................<15.0 mg/dL  6-29 days.................<15.0 mg/dL      ALK PHOSPHATASE 55 - 135 U/L 116  111     SGOT (AST) 10 - 40 U/L 26  27     SGPT (ALT) 10 - 44 U/L 15  14     Weight    200 lbs 1 oz     ____________________________________________________________________    DISCUSSION: Ms Marshall is a 67 yo woman with history of ypT3N0 rectal NET, well differentiated, grade 2 (Ki 67 16%) and small bowel NET, well diff, low grade, s/p surgery in Jan 2018. She was found to have metastatic disease to the liver and pancreas in July 2021 s/p biopsy. Liver NET is well diff grade 2 (Ki67 15)%. Pancreatic NET is well diff grade 3 (Ki67 25%). She has been on lanreotide since July 2022, changed to sandostatin in Jan 2023 after CT scan showed progression. S/p TACE to the liver on 3/1/23, 4/24/23, 7/21/23, 11/3/23,  Review PET 8/23.    INT RAD: Defer PET to NM. MRI demonstrates new subcm foci of DWI c/f metastatic disease. Pancreatic mass is larger than on prior study (3.7 x 2.3 cm now, 2.6 x 1.3 cm on prior).    NUC MED: Stable and improved right perihilar, liver lesions,,pancreatic body lesion    BOARD RECOMMENDATIONS: PRRT.

## 2024-08-28 ENCOUNTER — TELEPHONE (OUTPATIENT)
Dept: INFUSION THERAPY | Facility: HOSPITAL | Age: 69
End: 2024-08-28
Payer: MEDICARE

## 2024-08-28 ENCOUNTER — OFFICE VISIT (OUTPATIENT)
Dept: HEMATOLOGY/ONCOLOGY | Facility: CLINIC | Age: 69
End: 2024-08-28
Payer: MEDICARE

## 2024-08-28 VITALS
HEART RATE: 73 BPM | RESPIRATION RATE: 15 BRPM | HEIGHT: 66 IN | SYSTOLIC BLOOD PRESSURE: 123 MMHG | DIASTOLIC BLOOD PRESSURE: 58 MMHG | WEIGHT: 198.44 LBS | BODY MASS INDEX: 31.89 KG/M2 | TEMPERATURE: 98 F | OXYGEN SATURATION: 100 %

## 2024-08-28 DIAGNOSIS — D49.9 IMMUNODEFICIENCY SECONDARY TO NEOPLASM: ICD-10-CM

## 2024-08-28 DIAGNOSIS — N18.30 STAGE 3 CHRONIC KIDNEY DISEASE, UNSPECIFIED WHETHER STAGE 3A OR 3B CKD: ICD-10-CM

## 2024-08-28 DIAGNOSIS — C7A.026 MALIGNANT CARCINOID TUMOR OF RECTUM: ICD-10-CM

## 2024-08-28 DIAGNOSIS — D84.81 IMMUNODEFICIENCY SECONDARY TO NEOPLASM: ICD-10-CM

## 2024-08-28 DIAGNOSIS — C7A.012 MALIGNANT CARCINOID TUMOR OF ILEUM: Primary | ICD-10-CM

## 2024-08-28 DIAGNOSIS — Z79.899 IMMUNODEFICIENCY DUE TO DRUGS: ICD-10-CM

## 2024-08-28 DIAGNOSIS — C78.89 SECONDARY MALIGNANT NEOPLASM OF TAIL OF PANCREAS: ICD-10-CM

## 2024-08-28 DIAGNOSIS — D3A.026 CARCINOID TUMOR OF RECTUM: Primary | ICD-10-CM

## 2024-08-28 DIAGNOSIS — C78.7 SECONDARY MALIGNANT NEOPLASM OF LIVER: ICD-10-CM

## 2024-08-28 DIAGNOSIS — D3A.8 NEUROENDOCRINE TUMOR OF PANCREAS: ICD-10-CM

## 2024-08-28 DIAGNOSIS — I10 ESSENTIAL HYPERTENSION: ICD-10-CM

## 2024-08-28 DIAGNOSIS — D84.821 IMMUNODEFICIENCY DUE TO DRUGS: ICD-10-CM

## 2024-08-28 PROCEDURE — G2211 COMPLEX E/M VISIT ADD ON: HCPCS | Mod: S$GLB,,, | Performed by: INTERNAL MEDICINE

## 2024-08-28 PROCEDURE — 3078F DIAST BP <80 MM HG: CPT | Mod: CPTII,S$GLB,, | Performed by: INTERNAL MEDICINE

## 2024-08-28 PROCEDURE — 1159F MED LIST DOCD IN RCRD: CPT | Mod: CPTII,S$GLB,, | Performed by: INTERNAL MEDICINE

## 2024-08-28 PROCEDURE — 3044F HG A1C LEVEL LT 7.0%: CPT | Mod: CPTII,S$GLB,, | Performed by: INTERNAL MEDICINE

## 2024-08-28 PROCEDURE — 1126F AMNT PAIN NOTED NONE PRSNT: CPT | Mod: CPTII,S$GLB,, | Performed by: INTERNAL MEDICINE

## 2024-08-28 PROCEDURE — 99215 OFFICE O/P EST HI 40 MIN: CPT | Mod: S$GLB,,, | Performed by: INTERNAL MEDICINE

## 2024-08-28 PROCEDURE — 1101F PT FALLS ASSESS-DOCD LE1/YR: CPT | Mod: CPTII,S$GLB,, | Performed by: INTERNAL MEDICINE

## 2024-08-28 PROCEDURE — 3074F SYST BP LT 130 MM HG: CPT | Mod: CPTII,S$GLB,, | Performed by: INTERNAL MEDICINE

## 2024-08-28 PROCEDURE — 3288F FALL RISK ASSESSMENT DOCD: CPT | Mod: CPTII,S$GLB,, | Performed by: INTERNAL MEDICINE

## 2024-08-28 PROCEDURE — 3008F BODY MASS INDEX DOCD: CPT | Mod: CPTII,S$GLB,, | Performed by: INTERNAL MEDICINE

## 2024-08-28 PROCEDURE — 99999 PR PBB SHADOW E&M-EST. PATIENT-LVL IV: CPT | Mod: PBBFAC,,, | Performed by: INTERNAL MEDICINE

## 2024-08-28 RX ORDER — DIPHENOXYLATE HYDROCHLORIDE AND ATROPINE SULFATE 2.5; .025 MG/1; MG/1
1 TABLET ORAL 4 TIMES DAILY PRN
COMMUNITY
Start: 2023-12-12

## 2024-08-28 RX ORDER — PROMETHAZINE HYDROCHLORIDE 12.5 MG/1
12.5-25 TABLET ORAL EVERY 6 HOURS PRN
COMMUNITY
Start: 2024-08-15 | End: 2025-08-15

## 2024-08-28 RX ORDER — ONDANSETRON 8 MG/1
8 TABLET, ORALLY DISINTEGRATING ORAL 3 TIMES DAILY
COMMUNITY
Start: 2024-06-21

## 2024-08-28 RX ORDER — CIPROFLOXACIN 500 MG/1
500 TABLET ORAL EVERY 8 HOURS PRN
COMMUNITY
Start: 2024-05-16

## 2024-08-28 RX ORDER — ACETAMINOPHEN 325 MG/1
650 TABLET ORAL
OUTPATIENT
Start: 2024-09-25

## 2024-08-28 RX ORDER — ALBUTEROL SULFATE 90 UG/1
2 INHALANT RESPIRATORY (INHALATION) EVERY 4 HOURS PRN
COMMUNITY

## 2024-08-28 RX ORDER — ARGININE 100 %
1000 CRYSTALS MISCELLANEOUS
OUTPATIENT
Start: 2024-09-25

## 2024-08-28 RX ORDER — DULOXETIN HYDROCHLORIDE 30 MG/1
30 CAPSULE, DELAYED RELEASE ORAL DAILY
COMMUNITY
Start: 2024-06-04

## 2024-08-28 RX ORDER — SODIUM CHLORIDE 0.9 % (FLUSH) 0.9 %
10 SYRINGE (ML) INJECTION
OUTPATIENT
Start: 2024-09-25

## 2024-08-28 RX ORDER — DIPHENHYDRAMINE HYDROCHLORIDE 50 MG/ML
50 INJECTION INTRAMUSCULAR; INTRAVENOUS
OUTPATIENT
Start: 2024-09-25

## 2024-08-28 RX ORDER — ATROPINE SULFATE 10 MG/ML
1 SOLUTION/ DROPS OPHTHALMIC 2 TIMES DAILY
COMMUNITY

## 2024-08-28 RX ORDER — SODIUM CHLORIDE 9 MG/ML
INJECTION, SOLUTION INTRAVENOUS ONCE
OUTPATIENT
Start: 2024-09-25

## 2024-08-28 RX ORDER — LATANOPROST 50 UG/ML
1 SOLUTION/ DROPS OPHTHALMIC NIGHTLY
COMMUNITY
Start: 2023-12-08

## 2024-08-28 RX ORDER — OFLOXACIN 3 MG/ML
1 SOLUTION/ DROPS OPHTHALMIC EVERY 4 HOURS
COMMUNITY

## 2024-08-28 RX ORDER — POTASSIUM CHLORIDE 20 MEQ/1
20 TABLET, EXTENDED RELEASE ORAL DAILY
COMMUNITY
Start: 2023-11-10

## 2024-08-28 RX ORDER — CEPHALEXIN 500 MG/1
500 CAPSULE ORAL EVERY 6 HOURS
COMMUNITY

## 2024-08-28 NOTE — PROGRESS NOTES
PROGRESS NOTE    Subjective:       Patient ID: Coby Marshall is a 68 y.o. female.    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    Tempus: no reportable pathogenic mutation. MSI-low. PDL1 negative. TMB 3.7    Oncologic History:  1. Ms. 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 Her primary oncologist is Dr. Olson at [facility].  Her oncology history is detailed below. She was initially diagnosed with rectal adenocarcinoma and she was treated with neoadjuvant chemoRT. Surgical pathology resulted as well-differentiated carcinoid and she was initially observed. A CT obtained for hernia revealed liver metastases in July 2021, and these were confirmed to be neuroendocrine on pathology. She underwent EUS with biopsy in December 2021 which showed grade 3 NET in the pancreas with Ki67 25%. She was started on lanreotide. Her liver lesions have since grown in size, and her oncologist referred her for consideration of liver-directed therapy. Ms. Marshall reports ongoing diarrhea since the time of her initial resection. She has about 6 bowel movements per day. She does not have any flushing, palpitations, or diaphoresis. She otherwise feels well.   Oncology History:   8/2017: Well-differentiated carcinoid tumor in the rectum, grade 2, and in the terminal ileum, grade 1. Initially diagnosed as adenocarcinoma and later changed to carcinoid tumor at time of resection  Treated with concurrent chemoRT with infusional 5-FU under the diagnosis of adenocarcinoma  1/2018: resection of rectum and terminal ileum. Pathology showed a ypT3N0 1.7 cm rectal NET, well  "differentiated, grade 2, Ki 67 was 16%. 1.5 cm T2Nx small bowel NET, well diff, Ki 67 was less than 3%.  7/22/2021: Metastasis to liver on CT for hernia repair.   11/10/2021: Copper PET:    In this patient with rectal and ileal neuroendocrine tumor status post low anterior resection/small bowel resection, there are multiple somatostatin receptor avid hepatic lesions which appear increased in size as compared to outside CT 07/19/2021.  There is also tracer avid focus in the pancreatic body suspicious for malignancy.  All these findings are new since prior Dotatate PET-CT.  12/17/2021: EUS with biopsies:  "1. PANCREAS MASS, ENDOSCOPIC ULTRASOUND-GUIDED FNA:   Well-differentiated neuroendocrine tumor, favor WHO grade 3 (G3).   Comment:  Ki-67 labeling index is approximately 25%.  Average mitotic rate is   9 per 2 mm^2.  Tumor cells are positive with synaptophysin and chromogranin,   supporting the diagnosis.   2. LIVER, LEFT LOWER LOBE, ENDOSCOPIC ULTRASOUND-GUIDED FNA:   Well-differentiated neuroendocrine tumor.   3. LIVER, RIGHT LOWER LOBE, ENDOSCOPIC ULTRASOUND-GUIDED FNA:   Well-differentiated neuroendocrine tumor, favor WHO grade 2 (G2).   Comment (2,3):  Tumor in parts 2 and 3 are histologically identical.  Only   part 3 contains enough cell block lesional material to render a meaningful   labeling index and mitotic count.  Ki-67 labeling index is approximately 15%.    Average mitotic rate is 3 per 2 mm^2.  Tumor cells are positive with   synaptophysin but negative for chromogranin.  The overall findings are still   consistent with metastatic well-differentiated neuroendocrine tumor."  1/3/2022: Lanreotide started  1/4/2023: repeat CT abd/pelvis: worsening hepatic metastases. Changed Lanreotide to Sandostatin.   2. Case reviewed at tumor board. [facility]. S/p TACE on 3/1/23, 4/24/23, 7/21/23, 11/3/23  3. Scan in Sep/Oct 2023 showed residual in dome lesions progressed. Pancreatic lesion increased to 2.3 x " 1.6 cm. Started captem 10/14/23 for progression of the pancreatic lesion. Had TACE on 11/3/23. TACE to segment IV and V on 24. TACE on 5/15/24.   4. MRI abdomen from 24 compared to MRI abdomen 24 at [facility] tumor board. MRI demonstrates new subcm foci of DWI at the periphery of treated jluia c/w recurrent disease. Pancreatic mass is larger than on prior study (3.7 x 2.3 cm now, 2.6 x 1.3 cm on prior).      Interval History:   Ms Marshall returns today for follow up. On captem. Last pills will be today. Got lanreotide on 8/15/24. Feeling well.     ECO    ROS:   A ten-point system review is obtained and negative except for what was stated in the Interval History.     Physical Examination:   Vital signs reviewed.   General: well hydrated, well developed, in no acute distress  HEENT: normocephalic, PERRLA, EOMI, anicteric sclerae  Neck: supple, no JVD, thyromegaly, cervical or supraclavicular lymphadenopathy  Lungs: clear breath sounds bilaterally, no wheezing, rales, or rhonchi  Heart: RRR, no M/R/G  Abdomen: soft, no tenderness, non-distended, no hepatosplenomegaly, mass, or hernia. BS present  Extremities: no clubbing, cyanosis, or edema  Skin: no rash, ulcer, or open wounds  Neuro: alert and oriented x 4, no focal neuro deficit  Psych: pleasant and appropriate mood and affect    Objective:     Laboratory Data:  Labs from  reviewed. Labs from today pending    Imaging Data:  Gallium PET 23:  Similar number and distribution of radiotracer avid lesions within the liver and pancreatic body.  Several liver lesions demonstrate central PET photopenia and hypodensity on CT consistent with necrosis.     New nonspecific mild radiotracer uptake within several small left axillary lymph nodes.  Finding may relate to left upper extremity injection and partial radiotracer extravasation.     Otherwise, no new somatostatin receptor avid lesion.    I have personally reviewed her CT abdomen/pelvis with Dr Soni:  Receptor positive pancreatic lesion and liver mets. Liver mets appear enlarged since 6/2022, though this may be due to larger cystic components.     MRI abdomen 4/4/23:  Redemonstration of multiple hepatic lesions in keeping with known neuroendocrine tumor metastasis.  Dominant lesions within the right lobe and segment 4 demonstrate decreased central enhancement consistent with response to therapy with residual disease likely present at these sites.  Additional stable enhancing lesions within the left and right lobes consistent with residual disease.  No definite new lesion.     Persistent restricted diffusion within the pancreatic body in keeping with known lesion.     Stable mildly prominent periportal lymph nodes, nonspecific.    MRI abdomen 6/07/23  Impression:  1. Interval hepatic chemoembolization.  Decreased size of multiple hepatic lesions consistent with known metastasis.  Several lesions demonstrate persistent enhancement consistent with residual disease.  New arterial enhancement within a right hepatic lobe lesion consistent with tumor.  2. Persistent restricted diffusion within the pancreatic body in keeping with known lesion.    NM PET 6/27/23  Impression:  Overall findings concerning for mild disease progression with new hepatic lesion, enlarging pancreatic body lesion and new focus of radiotracer uptake within the right hilum.  Post treatment changes of several liver lesions with decreased radiotracer uptake.  Additional nonspecific low level radiotracer uptake within axillary, mediastinal, and bilateral inguinal lymph nodes.  Attention on follow-up.    MRI 9/16/23:  Impression:     Patient with neuroendocrine tumor with hepatic metastases and interval microwave ablation of multiple hepatic lesions.     Small amount of diffusion restriction at the superior aspect of segment 4B treatment cavity raises question of residual tumor.  Attention on follow-up.     Interval decrease in size of segment 5/8  lesion.     Interval mildly increased size of segment 6 lesion with persistent internal enhancement and nodular diffusion restriction which may reflect residual disease.     No definite new hepatic lesion.     Mildly increased size of pancreatic body mass.    PET scan 10/4/23:  Impression:     Multiple radiotracer avid hepatic lesions with mixed interval changes compared to prior exam, please see above for additional details.     Mild increased uptake within a right hilar lesion/lymph node.  Radiotracer avid pancreatic body lesion appears mildly enlarged.     No definite new lesion elsewhere.     Persistent mild nonspecific radiotracer uptake within small bilateral axillary lymph nodes    Copper PET 3/18/24:  Impression:     Mixed response to treatment.  The hepatic metastases have decreased in their radiotracer avidity well-appearing unchanged in CT appearance.  However, the pancreatic body lesion and right perihilar metastasis demonstrate worsening radiotracer uptake.  There is no new site of PET avid disease.    [facility] MRI abdomen 7/26/24 report:  IMPRESSION:     1.  Posttreatment changes in the liver with no new enhancing liver masses to suggest progressive metastatic disease   2.  Chronic changes in the pancreas including pancreatic duct dilatation was present on the prior study   3.  Cholelithiasis and chronic gallbladder wall thickening        MRI abdomen from 7/26/24 compared to MRI abdomen 12/20/24 at [facility] tumor board. MRI demonstrates new subcm foci of DWI at the periphery of treated lesions c/w recurrent disease. Pancreatic mass is larger than on prior study (3.7 x 2.3 cm now, 2.6 x 1.3 cm on prior).     Copper PET 8/23/24:  Impression:     Overall stable to improved size and radiotracer uptake in the neuroendocrine metastatic lesions, as detailed above with parial favorable treatment response.  No new tracer avid lesions concerning for progression of disease.     Assessment and Plan:     1. Malignant  carcinoid tumor of ileum    2. Secondary malignant neoplasm of tail of pancreas    3. Malignant carcinoid tumor of rectum    4. Neuroendocrine tumor of pancreas    5. Secondary malignant neoplasm of liver    6. Immunodeficiency secondary to neoplasm    7. Immunodeficiency due to drugs    8. Stage 3 chronic kidney disease, unspecified whether stage 3a or 3b CKD    9. Essential hypertension      1-7  - Ms Marshall is a 67 yo woman with history of ypT3N0 rectal NET, well differentiated, grade 2 (Ki 67 16%) and small bowel NET, well diff, low grade, s/p surgery in Jan 2018. She was found to have metastatic disease to the liver and pancreas in July 2021 s/p biopsy. Liver NET is well diff grade 2 (Ki67 15)%. Pancreatic NET is well diff grade 3 (Ki67 25%). She has been on lanreotide since July 2022, changed to sandostatin in Jan 2023 after CT scan showed progression. S/p TACE to the liver on 3/1/23, 4/24/23, 7/21/23, 11/3/23  - MRI and PET in Sep and Oct 2023 reviewed at tumor board. MRI showed response in ablated cavities. Residual in dome lesions has progressed. Pancreatic lesion increased to 2.3 x 1.6 cm. Copper PET showed stable distribution of disease outside of liver.   - started captem on 10/14/23. TACE on 11/3/23, 2/7/24, 5/15/24  - MRI abdomen from 7/26/24 compared to MRI abdomen 12/20/24 at NET tumor board. MRI demonstrates new subcm foci of DWI at the periphery of treated areas c/w recurrent disease. Pancreatic mass is larger than on prior study (3.7 x 2.3 cm now, 2.6 x 1.3 cm on prior).   - long discussion with patient and . Things looks table on copper PET. MRI showed small recurrent disease at the periphery of treated areas.Pancreatic mass is larger than on prior study (3.7 x 2.3 cm now, 2.6 x 1.3 cm on prior).   - She has been on captem for 9 months and it is about time for us to stop captem any way. Discussed slow progression of tumor. Recc PRRT.   - Discussed with patient re the schedule, regimen, and  side effects of PRRT. Side effects include but are not limited to bone marrow suppression and cytopenias that may increase the risk of infection, bleeding and symptomatic anemia, hair loss, damage to any body organs including lungs, kidney, liver, heart, carcinoid crisis, small bowel obstruction, and blood cancer such as leukemia or myelodysplastic syndrome. Handouts provided to patient  - will bring her back in one month to start PRRT. Hold lanreotide before PRRT. Will need lanreotide at Dr Olson's office 1 day after each PRRT (every 8 weeks for 4 doses). Will coordinate with Dr Olson's office.     8.  - stable.   - encouraged oral hydration    9.  - BP controlled  - c/w current medication    Follow-up:     RTC in 4 weeks      Ariel Smart MD  Hematology and Medical Oncology  [facility]    Route Chart for Scheduling    Med Onc Chart Routing      Follow up with physician . PRRT patient. [place] to schedule.   Follow up with ELLY    Infusion scheduling note    Injection scheduling note    Labs    Imaging    Pharmacy appointment    Other referrals

## 2024-08-31 ENCOUNTER — TELEPHONE (OUTPATIENT)
Dept: INTERVENTIONAL RADIOLOGY/VASCULAR | Facility: CLINIC | Age: 69
End: 2024-08-31
Payer: MEDICARE

## 2024-08-31 NOTE — TELEPHONE ENCOUNTER
Call and spoke to patient regarding scheduling 3 month follow up repeat  MRI  Patient stated she would like to have MRI done in [place], [place] where she had the last one.  I explained to patient that our IR providers order does not cross over to [place].  She said Dr. Smart office place the last one. Sent msg to Dr. Jesus to see if they can place MRI order. Patient stated she will call them also.

## 2024-09-05 ENCOUNTER — TELEPHONE (OUTPATIENT)
Dept: HEMATOLOGY/ONCOLOGY | Facility: CLINIC | Age: 69
End: 2024-09-05
Payer: MEDICARE

## 2024-09-05 NOTE — TELEPHONE ENCOUNTER
Notified pt that her PRRT scheduled for 9/18 will not be covered by insurance as we are out of network and she has not [employer] benefits.  Ms Marshall plans to contact Finance and see if her F.A. will cover it

## 2024-09-06 ENCOUNTER — TELEPHONE (OUTPATIENT)
Dept: INFUSION THERAPY | Facility: HOSPITAL | Age: 69
End: 2024-09-06
Payer: MEDICARE

## 2024-09-06 NOTE — TELEPHONE ENCOUNTER
Basket message received that PA is denied at [facility] facilities. RN Navigator called the patient's insurance ([phone]) to find a location that would be authorized. I spoke with Rere SALAZAR She states the the patient is actually authorized for Lutathera treatment at [facility]. Provided me with auth# [id number] x 4 doses from 8/21/24-9/4/25. Message will be sent to Pre-certification department to update referral status.

## 2024-09-06 NOTE — TELEPHONE ENCOUNTER
Spoke with Dr. Olson's nurse Jazmín regarding the pt starting PRRT at [facility] on 9/25. Confirmed lab appt for 9/16/24 and injection appt for 9/26

## 2024-09-06 NOTE — TELEPHONE ENCOUNTER
Confirmed upcoming infusion appointments with the patient.   9/16 labs at Dr. Olson's office  9/24 at 1020a office visit with Dr. Smart at [facility]  9/25 at 730a PRRT at 730a  9/26 injection at Dr. Olson's office  Also confirmed [facility] information with the patient. Appointment letter reminder sent out via mail

## 2024-09-23 ENCOUNTER — DOCUMENTATION ONLY (OUTPATIENT)
Dept: HEMATOLOGY/ONCOLOGY | Facility: CLINIC | Age: 69
End: 2024-09-23
Payer: MEDICARE

## 2024-09-23 NOTE — PROGRESS NOTES
Received message to call patient regarding [facility] reservation needed.     EM called patient who states [facility] needs [facility]24 as [facility] does not have availability. Patient aware of discounted rate for [facility]. SW informed her she will call back with further guidance. Call returned and EM provides number for [facility] and encouraged pt to call number provided for reservation. She thanks EM for assistance.

## 2024-09-24 ENCOUNTER — LAB VISIT (OUTPATIENT)
Dept: LAB | Facility: HOSPITAL | Age: 69
End: 2024-09-24
Attending: INTERNAL MEDICINE
Payer: MEDICARE

## 2024-09-24 ENCOUNTER — OFFICE VISIT (OUTPATIENT)
Dept: HEMATOLOGY/ONCOLOGY | Facility: CLINIC | Age: 69
End: 2024-09-24
Payer: MEDICARE

## 2024-09-24 VITALS
BODY MASS INDEX: 32.22 KG/M2 | OXYGEN SATURATION: 98 % | WEIGHT: 200.5 LBS | HEIGHT: 66 IN | HEART RATE: 70 BPM | TEMPERATURE: 98 F | RESPIRATION RATE: 16 BRPM | SYSTOLIC BLOOD PRESSURE: 129 MMHG | DIASTOLIC BLOOD PRESSURE: 60 MMHG

## 2024-09-24 DIAGNOSIS — Z79.899 IMMUNODEFICIENCY DUE TO DRUGS: ICD-10-CM

## 2024-09-24 DIAGNOSIS — C78.7 SECONDARY MALIGNANT NEOPLASM OF LIVER: ICD-10-CM

## 2024-09-24 DIAGNOSIS — D53.9 NUTRITIONAL ANEMIA: ICD-10-CM

## 2024-09-24 DIAGNOSIS — D84.81 IMMUNODEFICIENCY SECONDARY TO NEOPLASM: ICD-10-CM

## 2024-09-24 DIAGNOSIS — C7A.012 MALIGNANT CARCINOID TUMOR OF ILEUM: Primary | ICD-10-CM

## 2024-09-24 DIAGNOSIS — D84.821 IMMUNODEFICIENCY DUE TO DRUGS: ICD-10-CM

## 2024-09-24 DIAGNOSIS — C7A.026 MALIGNANT CARCINOID TUMOR OF RECTUM: ICD-10-CM

## 2024-09-24 DIAGNOSIS — I10 ESSENTIAL HYPERTENSION: ICD-10-CM

## 2024-09-24 DIAGNOSIS — C7A.012 MALIGNANT CARCINOID TUMOR OF ILEUM: ICD-10-CM

## 2024-09-24 DIAGNOSIS — C78.89 SECONDARY MALIGNANT NEOPLASM OF TAIL OF PANCREAS: ICD-10-CM

## 2024-09-24 DIAGNOSIS — N18.30 STAGE 3 CHRONIC KIDNEY DISEASE, UNSPECIFIED WHETHER STAGE 3A OR 3B CKD: ICD-10-CM

## 2024-09-24 DIAGNOSIS — D49.9 IMMUNODEFICIENCY SECONDARY TO NEOPLASM: ICD-10-CM

## 2024-09-24 LAB
ALBUMIN SERPL BCP-MCNC: 3.7 G/DL (ref 3.5–5.2)
ALP SERPL-CCNC: 127 U/L (ref 55–135)
ALT SERPL W/O P-5'-P-CCNC: 21 U/L (ref 10–44)
ANION GAP SERPL CALC-SCNC: 9 MMOL/L (ref 8–16)
AST SERPL-CCNC: 29 U/L (ref 10–40)
BASOPHILS # BLD AUTO: 0.04 K/UL (ref 0–0.2)
BASOPHILS NFR BLD: 0.5 % (ref 0–1.9)
BILIRUB SERPL-MCNC: 0.6 MG/DL (ref 0.1–1)
BUN SERPL-MCNC: 14 MG/DL (ref 8–23)
CALCIUM SERPL-MCNC: 9.8 MG/DL (ref 8.7–10.5)
CHLORIDE SERPL-SCNC: 104 MMOL/L (ref 95–110)
CO2 SERPL-SCNC: 29 MMOL/L (ref 23–29)
CREAT SERPL-MCNC: 1.3 MG/DL (ref 0.5–1.4)
DIFFERENTIAL METHOD BLD: ABNORMAL
EOSINOPHIL # BLD AUTO: 0.2 K/UL (ref 0–0.5)
EOSINOPHIL NFR BLD: 3 % (ref 0–8)
ERYTHROCYTE [DISTWIDTH] IN BLOOD BY AUTOMATED COUNT: 15.7 % (ref 11.5–14.5)
EST. GFR  (NO RACE VARIABLE): 44 ML/MIN/1.73 M^2
FERRITIN SERPL-MCNC: 87 NG/ML (ref 20–300)
FOLATE SERPL-MCNC: 10 NG/ML (ref 4–24)
GLUCOSE SERPL-MCNC: 114 MG/DL (ref 70–110)
HCT VFR BLD AUTO: 34.6 % (ref 37–48.5)
HGB BLD-MCNC: 10.5 G/DL (ref 12–16)
IMM GRANULOCYTES # BLD AUTO: 0.02 K/UL (ref 0–0.04)
IMM GRANULOCYTES NFR BLD AUTO: 0.2 % (ref 0–0.5)
IRON SERPL-MCNC: 41 UG/DL (ref 30–160)
LYMPHOCYTES # BLD AUTO: 1.7 K/UL (ref 1–4.8)
LYMPHOCYTES NFR BLD: 20.7 % (ref 18–48)
MCH RBC QN AUTO: 26.4 PG (ref 27–31)
MCHC RBC AUTO-ENTMCNC: 30.3 G/DL (ref 32–36)
MCV RBC AUTO: 87 FL (ref 82–98)
MONOCYTES # BLD AUTO: 0.6 K/UL (ref 0.3–1)
MONOCYTES NFR BLD: 7.1 % (ref 4–15)
NEUTROPHILS # BLD AUTO: 5.5 K/UL (ref 1.8–7.7)
NEUTROPHILS NFR BLD: 68.5 % (ref 38–73)
NRBC BLD-RTO: 0 /100 WBC
PLATELET # BLD AUTO: 277 K/UL (ref 150–450)
PMV BLD AUTO: 9.6 FL (ref 9.2–12.9)
POTASSIUM SERPL-SCNC: 4 MMOL/L (ref 3.5–5.1)
PROT SERPL-MCNC: 7.8 G/DL (ref 6–8.4)
RBC # BLD AUTO: 3.97 M/UL (ref 4–5.4)
SATURATED IRON: 12 % (ref 20–50)
SODIUM SERPL-SCNC: 142 MMOL/L (ref 136–145)
TOTAL IRON BINDING CAPACITY: 349 UG/DL (ref 250–450)
TRANSFERRIN SERPL-MCNC: 236 MG/DL (ref 200–375)
VIT B12 SERPL-MCNC: 393 PG/ML (ref 210–950)
WBC # BLD AUTO: 8.08 K/UL (ref 3.9–12.7)

## 2024-09-24 PROCEDURE — 83497 ASSAY OF 5-HIAA: CPT | Performed by: INTERNAL MEDICINE

## 2024-09-24 PROCEDURE — 83540 ASSAY OF IRON: CPT | Performed by: INTERNAL MEDICINE

## 2024-09-24 PROCEDURE — 82728 ASSAY OF FERRITIN: CPT | Performed by: INTERNAL MEDICINE

## 2024-09-24 PROCEDURE — 3074F SYST BP LT 130 MM HG: CPT | Mod: CPTII,S$GLB,, | Performed by: INTERNAL MEDICINE

## 2024-09-24 PROCEDURE — 85025 COMPLETE CBC W/AUTO DIFF WBC: CPT | Performed by: INTERNAL MEDICINE

## 2024-09-24 PROCEDURE — 80053 COMPREHEN METABOLIC PANEL: CPT | Performed by: INTERNAL MEDICINE

## 2024-09-24 PROCEDURE — 99999 PR PBB SHADOW E&M-EST. PATIENT-LVL V: CPT | Mod: PBBFAC,,, | Performed by: INTERNAL MEDICINE

## 2024-09-24 PROCEDURE — 36415 COLL VENOUS BLD VENIPUNCTURE: CPT | Performed by: INTERNAL MEDICINE

## 2024-09-24 PROCEDURE — 82607 VITAMIN B-12: CPT | Performed by: INTERNAL MEDICINE

## 2024-09-24 PROCEDURE — 1159F MED LIST DOCD IN RCRD: CPT | Mod: CPTII,S$GLB,, | Performed by: INTERNAL MEDICINE

## 2024-09-24 PROCEDURE — 1160F RVW MEDS BY RX/DR IN RCRD: CPT | Mod: CPTII,S$GLB,, | Performed by: INTERNAL MEDICINE

## 2024-09-24 PROCEDURE — 84260 ASSAY OF SEROTONIN: CPT | Performed by: INTERNAL MEDICINE

## 2024-09-24 PROCEDURE — G2211 COMPLEX E/M VISIT ADD ON: HCPCS | Mod: S$GLB,,, | Performed by: INTERNAL MEDICINE

## 2024-09-24 PROCEDURE — 1126F AMNT PAIN NOTED NONE PRSNT: CPT | Mod: CPTII,S$GLB,, | Performed by: INTERNAL MEDICINE

## 2024-09-24 PROCEDURE — 3078F DIAST BP <80 MM HG: CPT | Mod: CPTII,S$GLB,, | Performed by: INTERNAL MEDICINE

## 2024-09-24 PROCEDURE — 83519 RIA NONANTIBODY: CPT | Performed by: INTERNAL MEDICINE

## 2024-09-24 PROCEDURE — 99215 OFFICE O/P EST HI 40 MIN: CPT | Mod: S$GLB,,, | Performed by: INTERNAL MEDICINE

## 2024-09-24 PROCEDURE — 82746 ASSAY OF FOLIC ACID SERUM: CPT | Performed by: INTERNAL MEDICINE

## 2024-09-24 PROCEDURE — 3008F BODY MASS INDEX DOCD: CPT | Mod: CPTII,S$GLB,, | Performed by: INTERNAL MEDICINE

## 2024-09-24 PROCEDURE — 3044F HG A1C LEVEL LT 7.0%: CPT | Mod: CPTII,S$GLB,, | Performed by: INTERNAL MEDICINE

## 2024-09-24 NOTE — PROGRESS NOTES
PROGRESS NOTE    Subjective:       Patient ID: Coby Marshall is a 69 y.o. female.    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    Tempus: no reportable pathogenic mutation. MSI-low. PDL1 negative. TMB 3.7    Oncologic History:  1. Ms. 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 Her primary oncologist is Dr. Olson at [facility].  Her oncology history is detailed below. She was initially diagnosed with rectal adenocarcinoma and she was treated with neoadjuvant chemoRT. Surgical pathology resulted as well-differentiated carcinoid and she was initially observed. A CT obtained for hernia revealed liver metastases in July 2021, and these were confirmed to be neuroendocrine on pathology. She underwent EUS with biopsy in December 2021 which showed grade 3 NET in the pancreas with Ki67 25%. She was started on lanreotide. Her liver lesions have since grown in size, and her oncologist referred her for consideration of liver-directed therapy. Ms. Marshall reports ongoing diarrhea since the time of her initial resection. She has about 6 bowel movements per day. She does not have any flushing, palpitations, or diaphoresis. She otherwise feels well.   Oncology History:   8/2017: Well-differentiated carcinoid tumor in the rectum, grade 2, and in the terminal ileum, grade 1. Initially diagnosed as adenocarcinoma and later changed to carcinoid tumor at time of resection  Treated with concurrent chemoRT with infusional 5-FU under the diagnosis of adenocarcinoma  1/2018: resection of rectum and terminal ileum. Pathology showed a ypT3N0 1.7 cm rectal NET, well  "differentiated, grade 2, Ki 67 was 16%. 1.5 cm T2Nx small bowel NET, well diff, Ki 67 was less than 3%.  7/22/2021: Metastasis to liver on CT for hernia repair.   11/10/2021: Copper PET:    In this patient with rectal and ileal neuroendocrine tumor status post low anterior resection/small bowel resection, there are multiple somatostatin receptor avid hepatic lesions which appear increased in size as compared to outside CT 07/19/2021.  There is also tracer avid focus in the pancreatic body suspicious for malignancy.  All these findings are new since prior [facility] PET-CT.  12/17/2021: EUS with biopsies:  "1. PANCREAS MASS, ENDOSCOPIC ULTRASOUND-GUIDED FNA:   Well-differentiated neuroendocrine tumor, favor WHO grade 3 (G3).   Comment:  Ki-67 labeling index is approximately 25%.  Average mitotic rate is   9 per 2 mm^2.  Tumor cells are positive with synaptophysin and chromogranin,   supporting the diagnosis.   2. LIVER, LEFT LOWER LOBE, ENDOSCOPIC ULTRASOUND-GUIDED FNA:   Well-differentiated neuroendocrine tumor.   3. LIVER, RIGHT LOWER LOBE, ENDOSCOPIC ULTRASOUND-GUIDED FNA:   Well-differentiated neuroendocrine tumor, favor WHO grade 2 (G2).   Comment (2,3):  Tumor in parts 2 and 3 are histologically identical.  Only   part 3 contains enough cell block lesional material to render a meaningful   labeling index and mitotic count.  Ki-67 labeling index is approximately 15%.    Average mitotic rate is 3 per 2 mm^2.  Tumor cells are positive with   synaptophysin but negative for chromogranin.  The overall findings are still   consistent with metastatic well-differentiated neuroendocrine tumor."  1/3/2022: Lanreotide started  1/4/2023: repeat CT abd/pelvis: worsening hepatic metastases. Changed Lanreotide to Sandostatin.   2. Case reviewed at tumor board. [facility]. S/p TACE on 3/1/23, 4/24/23, 7/21/23, 11/3/23  3. Scan in Sep/Oct 2023 showed residual in dome lesions progressed. Pancreatic lesion increased to 2.3 x " 1.6 cm. Started captem 10/14/23 for progression of the pancreatic lesion. Had TACE on 11/3/23. TACE to segment IV and V on 24. TACE on 5/15/24.   4. MRI abdomen from 24 compared to MRI abdomen 24 at [facility] tumor board. MRI demonstrates new subcm foci of DWI at the periphery of treated julia c/w recurrent disease. Pancreatic mass is larger than on prior study (3.7 x 2.3 cm now, 2.6 x 1.3 cm on prior). Captem stopped on 24. Recc PRRT     Interval History:   Ms Marshall returns today for follow up. Feeling well. Eating and drinking well.     ECO    ROS:   A ten-point system review is obtained and negative except for what was stated in the Interval History.     Physical Examination:   Vital signs reviewed.   General: well hydrated, well developed, in no acute distress  HEENT: normocephalic, PERRLA, EOMI, anicteric sclerae  Neck: supple, no JVD, thyromegaly, cervical or supraclavicular lymphadenopathy  Lungs: clear breath sounds bilaterally, no wheezing, rales, or rhonchi  Heart: RRR, no M/R/G  Abdomen: soft, no tenderness, non-distended, no hepatosplenomegaly, mass, or hernia. BS present  Extremities: no clubbing, cyanosis, or edema  Skin: no rash, ulcer, or open wounds  Neuro: alert and oriented x 4, no focal neuro deficit  Psych: pleasant and appropriate mood and affect    Objective:     Laboratory Data:  Labs from Dr Olson's office reviewed. Adequate for treatment. Cr 1.4. CrCl 53 mL/min    Imaging Data:  Gallium PET 23:  Similar number and distribution of radiotracer avid lesions within the liver and pancreatic body.  Several liver lesions demonstrate central PET photopenia and hypodensity on CT consistent with necrosis.     New nonspecific mild radiotracer uptake within several small left axillary lymph nodes.  Finding may relate to left upper extremity injection and partial radiotracer extravasation.     Otherwise, no new somatostatin receptor avid lesion.    I have personally reviewed her CT  abdomen/pelvis with Dr Soni: Receptor positive pancreatic lesion and liver mets. Liver mets appear enlarged since 6/2022, though this may be due to larger cystic components.     MRI abdomen 4/4/23:  Redemonstration of multiple hepatic lesions in keeping with known neuroendocrine tumor metastasis.  Dominant lesions within the right lobe and segment 4 demonstrate decreased central enhancement consistent with response to therapy with residual disease likely present at these sites.  Additional stable enhancing lesions within the left and right lobes consistent with residual disease.  No definite new lesion.     Persistent restricted diffusion within the pancreatic body in keeping with known lesion.     Stable mildly prominent periportal lymph nodes, nonspecific.    MRI abdomen 6/07/23  Impression:  1. Interval hepatic chemoembolization.  Decreased size of multiple hepatic lesions consistent with known metastasis.  Several lesions demonstrate persistent enhancement consistent with residual disease.  New arterial enhancement within a right hepatic lobe lesion consistent with tumor.  2. Persistent restricted diffusion within the pancreatic body in keeping with known lesion.    NM PET 6/27/23  Impression:  Overall findings concerning for mild disease progression with new hepatic lesion, enlarging pancreatic body lesion and new focus of radiotracer uptake within the right hilum.  Post treatment changes of several liver lesions with decreased radiotracer uptake.  Additional nonspecific low level radiotracer uptake within axillary, mediastinal, and bilateral inguinal lymph nodes.  Attention on follow-up.    MRI 9/16/23:  Impression:     Patient with neuroendocrine tumor with hepatic metastases and interval microwave ablation of multiple hepatic lesions.     Small amount of diffusion restriction at the superior aspect of segment 4B treatment cavity raises question of residual tumor.  Attention on follow-up.     Interval  decrease in size of segment 5/8 lesion.     Interval mildly increased size of segment 6 lesion with persistent internal enhancement and nodular diffusion restriction which may reflect residual disease.     No definite new hepatic lesion.     Mildly increased size of pancreatic body mass.    PET scan 10/4/23:  Impression:     Multiple radiotracer avid hepatic lesions with mixed interval changes compared to prior exam, please see above for additional details.     Mild increased uptake within a right hilar lesion/lymph node.  Radiotracer avid pancreatic body lesion appears mildly enlarged.     No definite new lesion elsewhere.     Persistent mild nonspecific radiotracer uptake within small bilateral axillary lymph nodes    Copper PET 3/18/24:  Impression:     Mixed response to treatment.  The hepatic metastases have decreased in their radiotracer avidity well-appearing unchanged in CT appearance.  However, the pancreatic body lesion and right perihilar metastasis demonstrate worsening radiotracer uptake.  There is no new site of PET avid disease.    [facility] MRI abdomen 7/26/24 report:  IMPRESSION:     1.  Posttreatment changes in the liver with no new enhancing liver masses to suggest progressive metastatic disease   2.  Chronic changes in the pancreas including pancreatic duct dilatation was present on the prior study   3.  Cholelithiasis and chronic gallbladder wall thickening        MRI abdomen from 7/26/24 compared to MRI abdomen 12/20/24 at [facility] tumor board. MRI demonstrates new subcm foci of DWI at the periphery of treated lesions c/w recurrent disease. Pancreatic mass is larger than on prior study (3.7 x 2.3 cm now, 2.6 x 1.3 cm on prior).     Copper PET 8/23/24:  Impression:     Overall stable to improved size and radiotracer uptake in the neuroendocrine metastatic lesions, as detailed above with parial favorable treatment response.  No new tracer avid lesions concerning for progression of disease.     Assessment  and Plan:     1. Malignant carcinoid tumor of ileum    2. Malignant carcinoid tumor of rectum    3. Secondary malignant neoplasm of tail of pancreas    4. Secondary malignant neoplasm of liver    5. Immunodeficiency secondary to neoplasm    6. Immunodeficiency due to drugs    7. Nutritional anemia    8. Stage 3 chronic kidney disease, unspecified whether stage 3a or 3b CKD    9. Essential hypertension        1-6  - Ms  is a 67 yo woman with history of ypT3N0 rectal NET, well differentiated, grade 2 (Ki 67 16%) and small bowel NET, well diff, low grade, s/p surgery in Jan 2018. She was found to have metastatic disease to the liver and pancreas in July 2021 s/p biopsy. Liver NET is well diff grade 2 (Ki67 15)%. Pancreatic NET is well diff grade 3 (Ki67 25%). She has been on lanreotide since July 2022, changed to sandostatin in Jan 2023 after CT scan showed progression. S/p TACE to the liver on 3/1/23, 4/24/23, 7/21/23, 11/3/23  - MRI and PET in Sep and Oct 2023 reviewed at tumor board. MRI showed response in ablated cavities. Residual in dome lesions has progressed. Pancreatic lesion increased to 2.3 x 1.6 cm. Copper PET showed stable distribution of disease outside of liver.   - started captem on 10/14/23. TACE on 11/3/23, 2/7/24, 5/15/24  - MRI abdomen from 7/26/24 compared to MRI abdomen 12/20/24 at NET tumor board. MRI demonstrates new subcm foci of DWI at the periphery of treated areas c/w recurrent disease. Pancreatic mass is larger than on prior study (3.7 x 2.3 cm now, 2.6 x 1.3 cm on prior).   - Captem stopped on 8/28/24. Recc PRRT  - PRRT to be started on 9/25/24  - Discussed with patient re the schedule, regimen, and side effects of PRRT. Side effects include but are not limited to bone marrow suppression and cytopenias that may increase the risk of infection, bleeding and symptomatic anemia, hair loss, damage to any body organs including lungs, kidney, liver, heart, carcinoid crisis, small bowel  obstruction, and blood cancer such as leukemia or myelodysplastic syndrome.   - Consented the patient to the treatment plan and the patient was educated on the planned duration of the treatment and schedule of the treatment administration.  - given anemia, will need to monitor CBC, CMP every 2 weeks for the next two months. Messaged nurse to let Dr Olson's office know  - she is scheduled for lanreotide with Dr Olson's office this Thursday  - RTC in 8 weeks    7.  - CBC at Dr Olson's office last week showed slightly worsened anemia. Will repeat today  - check vitamin levels  - need CBC, CMP every 2 weeks with Dr Olson for the next 2 months    8.  - stable.   - encouraged oral hydration    9.  - BP controlled  - c/w current medication    Follow-up:     RTC in 8 weeks      Ariel Smart MD  Hematology and Medical Oncology  [facility]    Route Chart for Scheduling    Med Onc Chart Routing      Follow up with physician . Please keep scheduled appts. get CBC, CMP, serotonin, pancreastatin, 5-HIAA, copper PET on 12/17, see me on 12/18 to review results   Follow up with ELLY    Infusion scheduling note    Injection scheduling note    Labs CBC and CMP   Scheduling:  Preferred lab:  Lab interval:  serotonin, pancreastatin, 5-HIAA   Imaging PET scan   on 12/17   Pharmacy appointment    Other referrals          Therapy Plan Information  LUTATHERA SUPPORT for Secondary malignant neoplasm of tail of pancreas, noted on 8/28/2024  LUTATHERA SUPPORT for Secondary malignant neoplasm of liver, noted on 12/23/2021  LUTATHERA SUPPORT for Carcinoid tumor of ileum, noted on 12/27/2017  LUTATHERA SUPPORT for Carcinoid tumor of rectum, noted on 12/27/2017  IV Fluids  0.9%  NaCl infusion  Intravenous, Every 8 weeks  Pre-Medications  ondansetron-dexAMETHasone 16-12 mg in sodium chloride 0.9% 50 mL IVPB 16 mg  16 mg, Intravenous, Every 8 weeks  amino acid (25 g L-LYSINE, 25 g L-ARGININE) in sodium chloride 0.9% 1000 mL  infusion  1,000 mL, Intravenous, Every 8 weeks  promethazine (PHENERGAN) 12.5 mg in 0.9% NaCl 50 mL IVPB  12.5 mg, Intravenous, PRN  acetaminophen tablet 650 mg  650 mg, Oral, Every 8 weeks  Flushes  sodium chloride 0.9% flush 10 mL  10 mL, Intravenous, Every 8 weeks  PRN Medications  diphenhydrAMINE injection 50 mg  50 mg, Intravenous, Every 8 weeks  hydrocortisone sodium succinate injection 100 mg  100 mg, Intravenous, Every 8 weeks      No therapy plan of the specified type found.    No therapy plan of the specified type found.

## 2024-09-25 ENCOUNTER — HOSPITAL ENCOUNTER (OUTPATIENT)
Dept: RADIOLOGY | Facility: HOSPITAL | Age: 69
Discharge: HOME | End: 2024-09-25
Attending: INTERNAL MEDICINE
Payer: MEDICARE

## 2024-09-25 ENCOUNTER — INFUSION (OUTPATIENT)
Dept: INFUSION THERAPY | Facility: HOSPITAL | Age: 69
End: 2024-09-25
Attending: INTERNAL MEDICINE
Payer: MEDICARE

## 2024-09-25 ENCOUNTER — TELEPHONE (OUTPATIENT)
Dept: INFUSION THERAPY | Facility: HOSPITAL | Age: 69
End: 2024-09-25
Payer: MEDICARE

## 2024-09-25 VITALS
HEIGHT: 66 IN | RESPIRATION RATE: 17 BRPM | OXYGEN SATURATION: 100 % | DIASTOLIC BLOOD PRESSURE: 56 MMHG | HEART RATE: 70 BPM | BODY MASS INDEX: 32.22 KG/M2 | WEIGHT: 200.5 LBS | TEMPERATURE: 98 F | SYSTOLIC BLOOD PRESSURE: 109 MMHG

## 2024-09-25 DIAGNOSIS — C78.89 SECONDARY MALIGNANT NEOPLASM OF TAIL OF PANCREAS: Primary | ICD-10-CM

## 2024-09-25 DIAGNOSIS — C7A.012 MALIGNANT CARCINOID TUMOR OF ILEUM: ICD-10-CM

## 2024-09-25 DIAGNOSIS — C78.7 SECONDARY MALIGNANT NEOPLASM OF LIVER: ICD-10-CM

## 2024-09-25 DIAGNOSIS — D3A.026 CARCINOID TUMOR OF RECTUM: ICD-10-CM

## 2024-09-25 DIAGNOSIS — C7A.026 MALIGNANT CARCINOID TUMOR OF RECTUM: ICD-10-CM

## 2024-09-25 PROCEDURE — A9513 LUTETIUM LU 177 DOTATAT THER: HCPCS | Mod: JZ,JG | Performed by: INTERNAL MEDICINE

## 2024-09-25 PROCEDURE — A4216 STERILE WATER/SALINE, 10 ML: HCPCS | Performed by: INTERNAL MEDICINE

## 2024-09-25 PROCEDURE — 96366 THER/PROPH/DIAG IV INF ADDON: CPT

## 2024-09-25 PROCEDURE — 63600175 PHARM REV CODE 636 W HCPCS: Performed by: INTERNAL MEDICINE

## 2024-09-25 PROCEDURE — 96367 TX/PROPH/DG ADDL SEQ IV INF: CPT

## 2024-09-25 PROCEDURE — 25000003 PHARM REV CODE 250: Performed by: INTERNAL MEDICINE

## 2024-09-25 PROCEDURE — 96365 THER/PROPH/DIAG IV INF INIT: CPT | Mod: 59

## 2024-09-25 PROCEDURE — 79101 NUCLEAR RX IV ADMIN: CPT | Mod: TC

## 2024-09-25 PROCEDURE — 79101 NUCLEAR RX IV ADMIN: CPT | Mod: 26,,, | Performed by: STUDENT IN AN ORGANIZED HEALTH CARE EDUCATION/TRAINING PROGRAM

## 2024-09-25 RX ORDER — SODIUM CHLORIDE 0.9 % (FLUSH) 0.9 %
10 SYRINGE (ML) INJECTION
OUTPATIENT
Start: 2024-11-20

## 2024-09-25 RX ORDER — DIPHENHYDRAMINE HYDROCHLORIDE 50 MG/ML
50 INJECTION INTRAMUSCULAR; INTRAVENOUS
Status: DISCONTINUED | OUTPATIENT
Start: 2024-09-25 | End: 2024-09-25 | Stop reason: HOSPADM

## 2024-09-25 RX ORDER — SODIUM CHLORIDE 9 MG/ML
INJECTION, SOLUTION INTRAVENOUS ONCE
Status: COMPLETED | OUTPATIENT
Start: 2024-09-25 | End: 2024-09-25

## 2024-09-25 RX ORDER — ACETAMINOPHEN 325 MG/1
650 TABLET ORAL
Status: DISCONTINUED | OUTPATIENT
Start: 2024-09-25 | End: 2024-09-25 | Stop reason: HOSPADM

## 2024-09-25 RX ORDER — SODIUM CHLORIDE 9 MG/ML
INJECTION, SOLUTION INTRAVENOUS ONCE
OUTPATIENT
Start: 2024-11-20

## 2024-09-25 RX ORDER — ACETAMINOPHEN 325 MG/1
650 TABLET ORAL
OUTPATIENT
Start: 2024-11-20

## 2024-09-25 RX ORDER — DIPHENHYDRAMINE HYDROCHLORIDE 50 MG/ML
50 INJECTION INTRAMUSCULAR; INTRAVENOUS
OUTPATIENT
Start: 2024-11-20

## 2024-09-25 RX ORDER — SODIUM CHLORIDE 0.9 % (FLUSH) 0.9 %
10 SYRINGE (ML) INJECTION
Status: DISCONTINUED | OUTPATIENT
Start: 2024-09-25 | End: 2024-09-25 | Stop reason: HOSPADM

## 2024-09-25 RX ADMIN — Medication 1000 ML: at 08:09

## 2024-09-25 RX ADMIN — LUTETIUM LU 177 DOTATATE 204.7 MILLICURIE: 10 INJECTION INTRAVENOUS at 08:09

## 2024-09-25 RX ADMIN — DEXAMETHASONE SODIUM PHOSPHATE 16 MG: 4 INJECTION INTRA-ARTICULAR; INTRALESIONAL; INTRAMUSCULAR; INTRAVENOUS; SOFT TISSUE at 07:09

## 2024-09-25 RX ADMIN — SODIUM CHLORIDE: 9 INJECTION, SOLUTION INTRAVENOUS at 07:09

## 2024-09-25 RX ADMIN — Medication 10 ML: at 12:09

## 2024-09-25 RX ADMIN — Medication 10 ML: at 07:09

## 2024-09-25 NOTE — NURSING
09:54. Lee Ann-177 completed per [employer]. VSS. No complaints at this time. PIV x2 clean, dry, intact, patent, and no irritation. Amino acids remain infusing per orders.      10:05-Pt scanned with simona counter by NM tech, cleared for discharge.    primary team

## 2024-09-25 NOTE — TELEPHONE ENCOUNTER
12/13/2019  Veronique Louise is a 35 y.o., female.    Anesthesia Evaluation    I have reviewed the Patient Summary Reports.    I have reviewed the Nursing Notes.   I have reviewed the Medications.     Review of Systems  Anesthesia Hx:  No problems with previous Anesthesia    Social:  Non-Smoker, No Alcohol Use    Hematology/Oncology:     Oncology Normal    -- Anemia:   EENT/Dental:EENT/Dental Normal   Cardiovascular:  Cardiovascular Normal Exercise tolerance: good     Pulmonary:  Pulmonary Normal    Renal/:  Renal/ Normal     Hepatic/GI:  Hepatic/GI Normal    Musculoskeletal:  Musculoskeletal Normal    Neurological:  Neurology Normal    Endocrine:   Hyperthyroidism    Dermatological:  Skin Normal    Psych:   Bipolar 2.         Physical Exam  General:  Obesity    Airway/Jaw/Neck:  Airway Findings: Mouth Opening: Normal Tongue: Normal  General Airway Assessment: Adult, Average  Mallampati: II  TM Distance: Normal, at least 6 cm  Jaw/Neck Findings:  Neck ROM: Normal ROM      Dental:  Dental Findings: In tact        Mental Status:  Mental Status Findings:  Cooperative, Alert and Oriented         Anesthesia Plan  Type of Anesthesia, risks & benefits discussed:  Anesthesia Type:  general  Patient's Preference:   Intra-op Monitoring Plan: standard ASA monitors  Intra-op Monitoring Plan Comments:   Post Op Pain Control Plan: per primary service following discharge from PACU and multimodal analgesia  Post Op Pain Control Plan Comments:   Induction:    Beta Blocker:         Informed Consent: Patient understands risks and agrees with Anesthesia plan.  Questions answered. Anesthesia consent signed with patient.  ASA Score: 3     Day of Surgery Review of History & Physical:    H&P update referred to the surgeon.     Anesthesia Plan Notes: CBC and T&S.        Ready For Surgery From Anesthesia Perspective.        Called Dr. Olson's office to set up labs (cbc/cmp) q2w for 2 months.  took message for Jazmín to call me back

## 2024-09-25 NOTE — NURSING
10:30- Assisted pt up to restroom. Radiation and Safety precautions in place. No personal body fluids noted on clothing, floor or treatment area.

## 2024-09-25 NOTE — NURSING
09:14 Lee Ann-177 started per NM tech. VSS. No complaints at this time. Assisted pt up to restroom prior to start without difficulty. PIV x2 clean, dry, intact, patent, and no irritation. Amino acids remain infusing per MD orders.

## 2024-09-25 NOTE — NURSING
12:30 Pt completed and tolerated PRRT #1 of 4. VSS throughout treatment. No complaints at this time. Prior to start of treatment RN sat at chairside with pt and family, reviewed handouts and discussed therapy initiation, regimen prescribed, and  timeline of therapy administration. Went over what to expect and radiation precautions during and after treatment. Pt and family verbalized understanding. PIV x 2 removed per policy, catheter intact. Discharge instructions and precautions reviewed. Reinforced importance of hydration. AVS and calendar printed and reviewed. Reminded pt and family about appt tomorrow for Lanreotide injection at home clinic and labs to be drawn q2wks per Dr. Smart. Pt and family verbalized understanding. Pt ambulated out clinic with no difficulty.

## 2024-09-27 LAB
5OH-INDOLEACETATE SERPL-MCNC: 8 NG/ML
SEROTONIN: <30 NG/ML

## 2024-09-30 ENCOUNTER — TELEPHONE (OUTPATIENT)
Dept: INFUSION THERAPY | Facility: HOSPITAL | Age: 69
End: 2024-09-30
Payer: MEDICARE

## 2024-09-30 NOTE — TELEPHONE ENCOUNTER
Spoke with Azucena at Dr. Olson's office. Confirmed lab appt set up for 10/8, 10/22, 11/5, and 11/11

## 2024-10-02 LAB — PANCREASTATIN SERPL-MCNC: 69 PG/ML (ref 10–135)

## 2024-11-19 ENCOUNTER — LAB VISIT (OUTPATIENT)
Dept: LAB | Facility: HOSPITAL | Age: 69
End: 2024-11-19
Attending: INTERNAL MEDICINE
Payer: MEDICARE

## 2024-11-19 ENCOUNTER — OFFICE VISIT (OUTPATIENT)
Dept: HEMATOLOGY/ONCOLOGY | Facility: CLINIC | Age: 69
End: 2024-11-19
Payer: MEDICARE

## 2024-11-19 ENCOUNTER — TELEPHONE (OUTPATIENT)
Dept: INFUSION THERAPY | Facility: HOSPITAL | Age: 69
End: 2024-11-19
Payer: MEDICARE

## 2024-11-19 VITALS
DIASTOLIC BLOOD PRESSURE: 84 MMHG | TEMPERATURE: 98 F | RESPIRATION RATE: 17 BRPM | SYSTOLIC BLOOD PRESSURE: 139 MMHG | HEART RATE: 69 BPM | OXYGEN SATURATION: 98 % | WEIGHT: 206.56 LBS | HEIGHT: 66 IN | BODY MASS INDEX: 33.2 KG/M2

## 2024-11-19 DIAGNOSIS — Z79.899 IMMUNODEFICIENCY DUE TO DRUGS: ICD-10-CM

## 2024-11-19 DIAGNOSIS — D53.9 NUTRITIONAL ANEMIA: ICD-10-CM

## 2024-11-19 DIAGNOSIS — C7A.012 MALIGNANT CARCINOID TUMOR OF ILEUM: ICD-10-CM

## 2024-11-19 DIAGNOSIS — C7A.012 MALIGNANT CARCINOID TUMOR OF ILEUM: Primary | ICD-10-CM

## 2024-11-19 DIAGNOSIS — D84.81 IMMUNODEFICIENCY SECONDARY TO NEOPLASM: ICD-10-CM

## 2024-11-19 DIAGNOSIS — N18.30 STAGE 3 CHRONIC KIDNEY DISEASE, UNSPECIFIED WHETHER STAGE 3A OR 3B CKD: ICD-10-CM

## 2024-11-19 DIAGNOSIS — C7A.026 MALIGNANT CARCINOID TUMOR OF RECTUM: ICD-10-CM

## 2024-11-19 DIAGNOSIS — I10 ESSENTIAL HYPERTENSION: ICD-10-CM

## 2024-11-19 DIAGNOSIS — D84.821 IMMUNODEFICIENCY DUE TO DRUGS: ICD-10-CM

## 2024-11-19 DIAGNOSIS — D49.9 IMMUNODEFICIENCY SECONDARY TO NEOPLASM: ICD-10-CM

## 2024-11-19 DIAGNOSIS — C78.7 SECONDARY MALIGNANT NEOPLASM OF LIVER: ICD-10-CM

## 2024-11-19 DIAGNOSIS — C78.89 SECONDARY MALIGNANT NEOPLASM OF TAIL OF PANCREAS: ICD-10-CM

## 2024-11-19 LAB
ALBUMIN SERPL BCP-MCNC: 4.1 G/DL (ref 3.5–5.2)
ALP SERPL-CCNC: 121 U/L (ref 40–150)
ALT SERPL W/O P-5'-P-CCNC: 17 U/L (ref 10–44)
ANION GAP SERPL CALC-SCNC: 9 MMOL/L (ref 8–16)
AST SERPL-CCNC: 25 U/L (ref 10–40)
BASOPHILS # BLD AUTO: 0.03 K/UL (ref 0–0.2)
BASOPHILS NFR BLD: 0.5 % (ref 0–1.9)
BILIRUB SERPL-MCNC: 0.8 MG/DL (ref 0.1–1)
BUN SERPL-MCNC: 16 MG/DL (ref 8–23)
CALCIUM SERPL-MCNC: 9.6 MG/DL (ref 8.7–10.5)
CHLORIDE SERPL-SCNC: 102 MMOL/L (ref 95–110)
CO2 SERPL-SCNC: 29 MMOL/L (ref 23–29)
CREAT SERPL-MCNC: 1.3 MG/DL (ref 0.5–1.4)
DIFFERENTIAL METHOD BLD: ABNORMAL
EOSINOPHIL # BLD AUTO: 0.3 K/UL (ref 0–0.5)
EOSINOPHIL NFR BLD: 4.4 % (ref 0–8)
ERYTHROCYTE [DISTWIDTH] IN BLOOD BY AUTOMATED COUNT: 16.8 % (ref 11.5–14.5)
EST. GFR  (NO RACE VARIABLE): 44 ML/MIN/1.73 M^2
GLUCOSE SERPL-MCNC: 87 MG/DL (ref 70–110)
HCT VFR BLD AUTO: 35.5 % (ref 37–48.5)
HGB BLD-MCNC: 10.6 G/DL (ref 12–16)
IMM GRANULOCYTES # BLD AUTO: 0.02 K/UL (ref 0–0.04)
IMM GRANULOCYTES NFR BLD AUTO: 0.3 % (ref 0–0.5)
LYMPHOCYTES # BLD AUTO: 1.2 K/UL (ref 1–4.8)
LYMPHOCYTES NFR BLD: 18.8 % (ref 18–48)
MCH RBC QN AUTO: 26.5 PG (ref 27–31)
MCHC RBC AUTO-ENTMCNC: 29.9 G/DL (ref 32–36)
MCV RBC AUTO: 89 FL (ref 82–98)
MONOCYTES # BLD AUTO: 0.6 K/UL (ref 0.3–1)
MONOCYTES NFR BLD: 9.8 % (ref 4–15)
NEUTROPHILS # BLD AUTO: 4.3 K/UL (ref 1.8–7.7)
NEUTROPHILS NFR BLD: 66.2 % (ref 38–73)
NRBC BLD-RTO: 0 /100 WBC
PLATELET # BLD AUTO: 259 K/UL (ref 150–450)
PMV BLD AUTO: 10.3 FL (ref 9.2–12.9)
POTASSIUM SERPL-SCNC: 3.5 MMOL/L (ref 3.5–5.1)
PROT SERPL-MCNC: 8.4 G/DL (ref 6–8.4)
RBC # BLD AUTO: 4 M/UL (ref 4–5.4)
SODIUM SERPL-SCNC: 140 MMOL/L (ref 136–145)
WBC # BLD AUTO: 6.42 K/UL (ref 3.9–12.7)

## 2024-11-19 PROCEDURE — 1160F RVW MEDS BY RX/DR IN RCRD: CPT | Mod: CPTII,S$GLB,, | Performed by: INTERNAL MEDICINE

## 2024-11-19 PROCEDURE — 3075F SYST BP GE 130 - 139MM HG: CPT | Mod: CPTII,S$GLB,, | Performed by: INTERNAL MEDICINE

## 2024-11-19 PROCEDURE — 85025 COMPLETE CBC W/AUTO DIFF WBC: CPT | Performed by: INTERNAL MEDICINE

## 2024-11-19 PROCEDURE — G2211 COMPLEX E/M VISIT ADD ON: HCPCS | Mod: S$GLB,,, | Performed by: INTERNAL MEDICINE

## 2024-11-19 PROCEDURE — 83497 ASSAY OF 5-HIAA: CPT | Performed by: INTERNAL MEDICINE

## 2024-11-19 PROCEDURE — 1126F AMNT PAIN NOTED NONE PRSNT: CPT | Mod: CPTII,S$GLB,, | Performed by: INTERNAL MEDICINE

## 2024-11-19 PROCEDURE — 84260 ASSAY OF SEROTONIN: CPT | Performed by: INTERNAL MEDICINE

## 2024-11-19 PROCEDURE — 99999 PR PBB SHADOW E&M-EST. PATIENT-LVL IV: CPT | Mod: PBBFAC,,, | Performed by: INTERNAL MEDICINE

## 2024-11-19 PROCEDURE — 3008F BODY MASS INDEX DOCD: CPT | Mod: CPTII,S$GLB,, | Performed by: INTERNAL MEDICINE

## 2024-11-19 PROCEDURE — 99215 OFFICE O/P EST HI 40 MIN: CPT | Mod: S$GLB,,, | Performed by: INTERNAL MEDICINE

## 2024-11-19 PROCEDURE — 80053 COMPREHEN METABOLIC PANEL: CPT | Performed by: INTERNAL MEDICINE

## 2024-11-19 PROCEDURE — 1159F MED LIST DOCD IN RCRD: CPT | Mod: CPTII,S$GLB,, | Performed by: INTERNAL MEDICINE

## 2024-11-19 PROCEDURE — 1101F PT FALLS ASSESS-DOCD LE1/YR: CPT | Mod: CPTII,S$GLB,, | Performed by: INTERNAL MEDICINE

## 2024-11-19 PROCEDURE — 3079F DIAST BP 80-89 MM HG: CPT | Mod: CPTII,S$GLB,, | Performed by: INTERNAL MEDICINE

## 2024-11-19 PROCEDURE — 3044F HG A1C LEVEL LT 7.0%: CPT | Mod: CPTII,S$GLB,, | Performed by: INTERNAL MEDICINE

## 2024-11-19 PROCEDURE — 3288F FALL RISK ASSESSMENT DOCD: CPT | Mod: CPTII,S$GLB,, | Performed by: INTERNAL MEDICINE

## 2024-11-19 PROCEDURE — 83519 RIA NONANTIBODY: CPT | Performed by: INTERNAL MEDICINE

## 2024-11-19 NOTE — TELEPHONE ENCOUNTER
Spoke with Jazmín at Dr. Olson's office. Confirmed appt for injection at 1p on 11/21. Scheduled future labs on 12/18 and 1/6

## 2024-11-19 NOTE — PROGRESS NOTES
PROGRESS NOTE    Subjective:       Patient ID: Coby Marshall is a 69 y.o. female.    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    Tempus: no reportable pathogenic mutation. MSI-low. PDL1 negative. TMB 3.7    Oncologic History:  1. Ms. 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 Her primary oncologist is Dr. Olson at [place].  Her oncology history is detailed below. She was initially diagnosed with rectal adenocarcinoma and she was treated with neoadjuvant chemoRT. Surgical pathology resulted as well-differentiated carcinoid and she was initially observed. A CT obtained for hernia revealed liver metastases in July 2021, and these were confirmed to be neuroendocrine on pathology. She underwent EUS with biopsy in December 2021 which showed grade 3 NET in the pancreas with Ki67 25%. She was started on lanreotide. Her liver lesions have since grown in size, and her oncologist referred her for consideration of liver-directed therapy. Ms. Marshall reports ongoing diarrhea since the time of her initial resection. She has about 6 bowel movements per day. She does not have any flushing, palpitations, or diaphoresis. She otherwise feels well.   Oncology History:   8/2017: Well-differentiated carcinoid tumor in the rectum, grade 2, and in the terminal ileum, grade 1. Initially diagnosed as adenocarcinoma and later changed to carcinoid tumor at time of resection  Treated with concurrent chemoRT with infusional 5-FU under the diagnosis of adenocarcinoma  1/2018: resection of rectum and terminal ileum. Pathology showed a ypT3N0 1.7 cm rectal NET, well  "differentiated, grade 2, Ki 67 was 16%. 1.5 cm T2Nx small bowel NET, well diff, Ki 67 was less than 3%.  7/22/2021: Metastasis to liver on CT for hernia repair.   11/10/2021: Copper PET:    In this patient with rectal and ileal neuroendocrine tumor status post low anterior resection/small bowel resection, there are multiple somatostatin receptor avid hepatic lesions which appear increased in size as compared to outside CT 07/19/2021.  There is also tracer avid focus in the pancreatic body suspicious for malignancy.  All these findings are new since prior Dotatate PET-CT.  12/17/2021: EUS with biopsies:  "1. PANCREAS MASS, ENDOSCOPIC ULTRASOUND-GUIDED FNA:   Well-differentiated neuroendocrine tumor, favor WHO grade 3 (G3).   Comment:  Ki-67 labeling index is approximately 25%.  Average mitotic rate is   9 per 2 mm^2.  Tumor cells are positive with synaptophysin and chromogranin,   supporting the diagnosis.   2. LIVER, LEFT LOWER LOBE, ENDOSCOPIC ULTRASOUND-GUIDED FNA:   Well-differentiated neuroendocrine tumor.   3. LIVER, RIGHT LOWER LOBE, ENDOSCOPIC ULTRASOUND-GUIDED FNA:   Well-differentiated neuroendocrine tumor, favor WHO grade 2 (G2).   Comment (2,3):  Tumor in parts 2 and 3 are histologically identical.  Only   part 3 contains enough cell block lesional material to render a meaningful   labeling index and mitotic count.  Ki-67 labeling index is approximately 15%.    Average mitotic rate is 3 per 2 mm^2.  Tumor cells are positive with   synaptophysin but negative for chromogranin.  The overall findings are still   consistent with metastatic well-differentiated neuroendocrine tumor."  1/3/2022: Lanreotide started  1/4/2023: repeat CT abd/pelvis: worsening hepatic metastases. Changed Lanreotide to Sandostatin.   2. [facility]. S/p TACE on 3/1/23, 4/24/23, 7/21/23, 11/3/23  3. Scan in Sep/Oct 2023 showed residual in dome lesions progressed. Pancreatic lesion increased to 2.3 x " 1.6 cm. Started captem 10/14/23 for progression of the pancreatic lesion. Had TACE on 11/3/23. TACE to segment IV and V on 24. TACE on 5/15/24.   4. MRI abdomen from 24 compared to MRI abdomen 24 at [facility] tumor board. MRI demonstrates new subcm foci of DWI at the periphery of treated julia c/w recurrent disease. Pancreatic mass is larger than on prior study (3.7 x 2.3 cm now, 2.6 x 1.3 cm on prior). Captem stopped on 24. Recc PRRT  5. PRRT started on 24, s/p 1 dose     Interval History:   Ms Marshall returns today for follow up. Feeling well. Eating and drinking well. Noted skin darkening of the fingers and toes. Got lanreotide on 10/23/24.     ECO    ROS:   A ten-point system review is obtained and negative except for what was stated in the Interval History.     Physical Examination:   Vital signs reviewed.   General: well hydrated, well developed, in no acute distress  HEENT: normocephalic, PERRLA, EOMI, anicteric sclerae  Neck: supple, no JVD, thyromegaly, cervical or supraclavicular lymphadenopathy  Lungs: clear breath sounds bilaterally, no wheezing, rales, or rhonchi  Heart: RRR, no M/R/G  Abdomen: soft, no tenderness, non-distended, no hepatosplenomegaly, mass, or hernia. BS present  Extremities: no clubbing, cyanosis, or edema  Skin: no rash, ulcer, or open wounds. Darkening of skin color of fingers and toes  Neuro: alert and oriented x 4, no focal neuro deficit  Psych: pleasant and appropriate mood and affect    Objective:     Laboratory Data:  Labs from Dr Olson's office reviewed. Adequate for treatment. Cr 1.5. CrCl 51 mL/min. Today's Creatinine pending    Imaging Data:  Gallium PET 23:  Similar number and distribution of radiotracer avid lesions within the liver and pancreatic body.  Several liver lesions demonstrate central PET photopenia and hypodensity on CT consistent with necrosis.     New nonspecific mild radiotracer uptake within several small left axillary lymph nodes.   Finding may relate to left upper extremity injection and partial radiotracer extravasation.     Otherwise, no new somatostatin receptor avid lesion.    I have personally reviewed her CT abdomen/pelvis with Dr Soni: Receptor positive pancreatic lesion and liver mets. Liver mets appear enlarged since 6/2022, though this may be due to larger cystic components.     MRI abdomen 4/4/23:  Redemonstration of multiple hepatic lesions in keeping with known neuroendocrine tumor metastasis.  Dominant lesions within the right lobe and segment 4 demonstrate decreased central enhancement consistent with response to therapy with residual disease likely present at these sites.  Additional stable enhancing lesions within the left and right lobes consistent with residual disease.  No definite new lesion.     Persistent restricted diffusion within the pancreatic body in keeping with known lesion.     Stable mildly prominent periportal lymph nodes, nonspecific.    MRI abdomen 6/07/23  Impression:  1. Interval hepatic chemoembolization.  Decreased size of multiple hepatic lesions consistent with known metastasis.  Several lesions demonstrate persistent enhancement consistent with residual disease.  New arterial enhancement within a right hepatic lobe lesion consistent with tumor.  2. Persistent restricted diffusion within the pancreatic body in keeping with known lesion.    NM PET 6/27/23  Impression:  Overall findings concerning for mild disease progression with new hepatic lesion, enlarging pancreatic body lesion and new focus of radiotracer uptake within the right hilum.  Post treatment changes of several liver lesions with decreased radiotracer uptake.  Additional nonspecific low level radiotracer uptake within axillary, mediastinal, and bilateral inguinal lymph nodes.  Attention on follow-up.    MRI 9/16/23:  Impression:     Patient with neuroendocrine tumor with hepatic metastases and interval microwave ablation of multiple  hepatic lesions.     Small amount of diffusion restriction at the superior aspect of segment 4B treatment cavity raises question of residual tumor.  Attention on follow-up.     Interval decrease in size of segment 5/8 lesion.     Interval mildly increased size of segment 6 lesion with persistent internal enhancement and nodular diffusion restriction which may reflect residual disease.     No definite new hepatic lesion.     Mildly increased size of pancreatic body mass.    PET scan 10/4/23:  Impression:     Multiple radiotracer avid hepatic lesions with mixed interval changes compared to prior exam, please see above for additional details.     Mild increased uptake within a right hilar lesion/lymph node.  Radiotracer avid pancreatic body lesion appears mildly enlarged.     No definite new lesion elsewhere.     Persistent mild nonspecific radiotracer uptake within small bilateral axillary lymph nodes    Copper PET 3/18/24:  Impression:     Mixed response to treatment.  The hepatic metastases have decreased in their radiotracer avidity well-appearing unchanged in CT appearance.  However, the pancreatic body lesion and right perihilar metastasis demonstrate worsening radiotracer uptake.  There is no new site of PET avid disease.    [facility] MRI abdomen 7/26/24 report:  IMPRESSION:     1.  Posttreatment changes in the liver with no new enhancing liver masses to suggest progressive metastatic disease   2.  Chronic changes in the pancreas including pancreatic duct dilatation was present on the prior study   3.  Cholelithiasis and chronic gallbladder wall thickening        MRI abdomen from 7/26/24 [facility] MRI demonstrates new subcm foci of DWI at the periphery of treated lesions c/w recurrent disease. Pancreatic mass is larger than on prior study (3.7 x 2.3 cm now, 2.6 x 1.3 cm on prior).     Copper PET 8/23/24:  Impression:     Overall stable to improved size and radiotracer uptake  in the neuroendocrine metastatic lesions, as detailed above with parial favorable treatment response.  No new tracer avid lesions concerning for progression of disease.     Assessment and Plan:     1. Malignant carcinoid tumor of ileum    2. Malignant carcinoid tumor of rectum    3. Secondary malignant neoplasm of tail of pancreas    4. Secondary malignant neoplasm of liver    5. Immunodeficiency secondary to neoplasm    6. Immunodeficiency due to drugs    7. Nutritional anemia    8. Stage 3 chronic kidney disease, unspecified whether stage 3a or 3b CKD    9. Essential hypertension        1-6  - Ms Marshall is a 69 yo woman with history of ypT3N0 rectal NET, well differentiated, grade 2 (Ki 67 16%) and small bowel NET, well diff, low grade, s/p surgery in Jan 2018. She was found to have metastatic disease to the liver and pancreas in July 2021 s/p biopsy. Liver NET is well diff grade 2 (Ki67 15)%. Pancreatic NET is well diff grade 3 (Ki67 25%). She has been on lanreotide since July 2022, changed to sandostatin in Jan 2023 after CT scan showed progression. S/p TACE to the liver on 3/1/23, 4/24/23, 7/21/23, 11/3/23  - MRI and PET in Sep and Oct 2023 reviewed at tumor board. MRI showed response in ablated cavities. Residual in dome lesions has progressed. Pancreatic lesion increased to 2.3 x 1.6 cm. Copper PET showed stable distribution of disease outside of liver.   - started captem on 10/14/23. TACE on 11/3/23, 2/7/24, 5/15/24  - MRI abdomen from 7/26/24 compared to MRI abdomen 12/20/24 at NET tumor board. MRI demonstrates new subcm foci of DWI at the periphery of treated areas c/w recurrent disease. Pancreatic mass is larger than on prior study (3.7 x 2.3 cm now, 2.6 x 1.3 cm on prior).   - Captem stopped on 8/28/24. Recc PRRT  - PRRT started on 9/25/24, s/p 1 dose  - doing well. CBC, CMP adequate.   - dose 2 PRRT tomorrow  - she will get lanreotide at Dr Olson's office on 11/21. Lanreotide should be given every 8  weeks, 1 day after PRRT treatment. Prefer not to give lanreotide 1 month after PRRT treatment  - RTC in one month with restaging scan  - reassured patient skin darkening could be a side effect but I don't see any signs of infection    7.  - monitor labs. She follows with Dr Olson    8.  - repeat CMP today  - monitor    9.  - BP controlled  - c/w current medication    Follow-up:     RTC in one month      Ariel mSart MD  Hematology and Medical Oncology  [facility]    Route Chart for Scheduling    Med Onc Chart Routing      Follow up with physician . Keep scheduled appts.   Follow up with ELLY    Infusion scheduling note    Injection scheduling note    Labs    Imaging    Pharmacy appointment    Other referrals          Therapy Plan Information  LUTATHERA SUPPORT for Secondary malignant neoplasm of tail of pancreas, noted on 8/28/2024  LUTATHERA SUPPORT for Secondary malignant neoplasm of liver, noted on 12/23/2021  LUTATHERA SUPPORT for Carcinoid tumor of ileum, noted on 12/27/2017  LUTATHERA SUPPORT for Carcinoid tumor of rectum, noted on 12/27/2017  IV Fluids  0.9%  NaCl infusion  Intravenous, Every 8 weeks  Pre-Medications  ondansetron-dexAMETHasone 16-12 mg in sodium chloride 0.9% 50 mL IVPB 16 mg  16 mg, Intravenous, Every 8 weeks  arginine-lysine-sterile water (AminoProtect) 25-25 mg/mL intravenous solution 1,000 mL  1,000 mL, Intravenous, Every 8 weeks  promethazine (PHENERGAN) 12.5 mg in 0.9% NaCl 50 mL IVPB  12.5 mg, Intravenous, PRN  acetaminophen tablet 650 mg  650 mg, Oral, Every 8 weeks  Flushes  sodium chloride 0.9% flush 10 mL  10 mL, Intravenous, Every 8 weeks  PRN Medications  diphenhydrAMINE injection 50 mg  50 mg, Intravenous, Every 8 weeks  hydrocortisone sodium succinate injection 100 mg  100 mg, Intravenous, Every 8 weeks      No therapy plan of the specified type found.    No therapy plan of the specified type found.

## 2024-11-20 ENCOUNTER — INFUSION (OUTPATIENT)
Dept: INFUSION THERAPY | Facility: HOSPITAL | Age: 69
End: 2024-11-20
Attending: INTERNAL MEDICINE
Payer: MEDICARE

## 2024-11-20 ENCOUNTER — HOSPITAL ENCOUNTER (OUTPATIENT)
Dept: RADIOLOGY | Facility: HOSPITAL | Age: 69
Discharge: HOME | End: 2024-11-20
Attending: INTERNAL MEDICINE
Payer: MEDICARE

## 2024-11-20 VITALS
RESPIRATION RATE: 17 BRPM | HEIGHT: 66 IN | DIASTOLIC BLOOD PRESSURE: 66 MMHG | BODY MASS INDEX: 33.2 KG/M2 | WEIGHT: 206.56 LBS | OXYGEN SATURATION: 99 % | HEART RATE: 71 BPM | SYSTOLIC BLOOD PRESSURE: 116 MMHG | TEMPERATURE: 98 F

## 2024-11-20 DIAGNOSIS — C78.7 SECONDARY MALIGNANT NEOPLASM OF LIVER: ICD-10-CM

## 2024-11-20 DIAGNOSIS — C7A.012 MALIGNANT CARCINOID TUMOR OF ILEUM: ICD-10-CM

## 2024-11-20 DIAGNOSIS — D3A.026 CARCINOID TUMOR OF RECTUM: ICD-10-CM

## 2024-11-20 DIAGNOSIS — C7A.026 MALIGNANT CARCINOID TUMOR OF RECTUM: ICD-10-CM

## 2024-11-20 DIAGNOSIS — C78.89 SECONDARY MALIGNANT NEOPLASM OF TAIL OF PANCREAS: Primary | ICD-10-CM

## 2024-11-20 PROCEDURE — 25000003 PHARM REV CODE 250: Performed by: INTERNAL MEDICINE

## 2024-11-20 PROCEDURE — 63600175 PHARM REV CODE 636 W HCPCS: Performed by: INTERNAL MEDICINE

## 2024-11-20 PROCEDURE — 96367 TX/PROPH/DG ADDL SEQ IV INF: CPT

## 2024-11-20 PROCEDURE — A9513 LUTETIUM LU 177 DOTATAT THER: HCPCS | Mod: JZ,JG | Performed by: INTERNAL MEDICINE

## 2024-11-20 PROCEDURE — 79101 NUCLEAR RX IV ADMIN: CPT | Mod: TC

## 2024-11-20 PROCEDURE — 96365 THER/PROPH/DIAG IV INF INIT: CPT | Mod: 59

## 2024-11-20 PROCEDURE — A4216 STERILE WATER/SALINE, 10 ML: HCPCS | Performed by: INTERNAL MEDICINE

## 2024-11-20 PROCEDURE — 96366 THER/PROPH/DIAG IV INF ADDON: CPT

## 2024-11-20 PROCEDURE — 79101 NUCLEAR RX IV ADMIN: CPT | Mod: 26,,, | Performed by: STUDENT IN AN ORGANIZED HEALTH CARE EDUCATION/TRAINING PROGRAM

## 2024-11-20 RX ORDER — SODIUM CHLORIDE 9 MG/ML
INJECTION, SOLUTION INTRAVENOUS ONCE
OUTPATIENT
Start: 2025-01-15

## 2024-11-20 RX ORDER — ACETAMINOPHEN 325 MG/1
650 TABLET ORAL
Status: DISCONTINUED | OUTPATIENT
Start: 2024-11-20 | End: 2024-11-20 | Stop reason: HOSPADM

## 2024-11-20 RX ORDER — SODIUM CHLORIDE 0.9 % (FLUSH) 0.9 %
10 SYRINGE (ML) INJECTION
Status: DISCONTINUED | OUTPATIENT
Start: 2024-11-20 | End: 2024-11-20 | Stop reason: HOSPADM

## 2024-11-20 RX ORDER — SODIUM CHLORIDE 9 MG/ML
INJECTION, SOLUTION INTRAVENOUS ONCE
Status: COMPLETED | OUTPATIENT
Start: 2024-11-20 | End: 2024-11-20

## 2024-11-20 RX ORDER — DIPHENHYDRAMINE HYDROCHLORIDE 50 MG/ML
50 INJECTION INTRAMUSCULAR; INTRAVENOUS
Status: DISCONTINUED | OUTPATIENT
Start: 2024-11-20 | End: 2024-11-20 | Stop reason: HOSPADM

## 2024-11-20 RX ORDER — ACETAMINOPHEN 325 MG/1
650 TABLET ORAL
OUTPATIENT
Start: 2025-01-15

## 2024-11-20 RX ORDER — SODIUM CHLORIDE 0.9 % (FLUSH) 0.9 %
10 SYRINGE (ML) INJECTION
OUTPATIENT
Start: 2025-01-15

## 2024-11-20 RX ORDER — DIPHENHYDRAMINE HYDROCHLORIDE 50 MG/ML
50 INJECTION INTRAMUSCULAR; INTRAVENOUS
OUTPATIENT
Start: 2025-01-15

## 2024-11-20 RX ADMIN — SODIUM CHLORIDE: 9 INJECTION, SOLUTION INTRAVENOUS at 07:11

## 2024-11-20 RX ADMIN — Medication 1000 ML: at 08:11

## 2024-11-20 RX ADMIN — LUTETIUM LU 177 DOTATATE 200 MILLICURIE: 10 INJECTION INTRAVENOUS at 09:11

## 2024-11-20 RX ADMIN — Medication 10 ML: at 12:11

## 2024-11-20 RX ADMIN — DEXAMETHASONE SODIUM PHOSPHATE 16 MG: 10 INJECTION, SOLUTION INTRAMUSCULAR; INTRAVENOUS at 07:11

## 2024-11-20 NOTE — NURSING
12:23 Pt completed and tolerated PRRT 2 of 4.  VSS. No complaints at this time. PIV x 2 removed per policy, catheter intact. Discharge instructions and precautions reviewed. AVS printed and handout given. No questions at this time. Pt ambulated out clinic with no difficulty.

## 2024-11-20 NOTE — NURSING
0945- Lee Ann-177 completed per [facility]. VSS. No complaints at this time. PIV x2 clean, dry, intact, patent, and no irritation. Amino acids remain infusing per orders.      0952 Pt scanned with simona counter by NM tech , cleared for discharge.

## 2024-11-20 NOTE — NURSING
0904 Lee Ann-177 started per NM tech. VSS. No complaints at this time. PIV x2 clean, dry, intact, patent, and no irritation. Amino acids remain infusing per MD orders.  Pt up to restroom prior to start of Lee Ann-177, safety and radiation precautions in place, pt verbalized understanding.

## 2024-11-21 LAB — 5OH-INDOLEACETATE SERPL-MCNC: 10 NG/ML

## 2024-11-25 LAB — SEROTONIN: <30 NG/ML

## 2024-11-27 LAB — PANCREASTATIN SERPL-MCNC: 90 PG/ML (ref 10–135)

## 2024-12-11 ENCOUNTER — DOCUMENTATION ONLY (OUTPATIENT)
Dept: HEMATOLOGY/ONCOLOGY | Facility: CLINIC | Age: 69
End: 2024-12-11
Payer: MEDICARE

## 2024-12-11 NOTE — PROGRESS NOTES
SW received [employer] message about pt trying to reach a SW to talk to about financial issues.  SW called patient, who said she was told by scheduling that her [employer] Medicare will not cover her upcoming treatments. SW told pt she will look into this and find out how to best help.    EM sent a message to Kurt Samayoa jr to get input from him about next steps.    Marina Bear, [facility]  Oncology Social Worker  [facility]  [phone]

## 2024-12-12 ENCOUNTER — DOCUMENTATION ONLY (OUTPATIENT)
Dept: HEMATOLOGY/ONCOLOGY | Facility: CLINIC | Age: 69
End: 2024-12-12
Payer: MEDICARE

## 2024-12-12 NOTE — PROGRESS NOTES
EM heard back from Kurt Samayoa jr, who said he will reach out to the patient to begin an OFA application. Apparently she had OFA before, but it  in November.    EM will assist and support pt as needed.     Marina Bear, [facility]  Oncology Social Worker  [facility]  [phone]

## 2024-12-17 ENCOUNTER — HOSPITAL ENCOUNTER (OUTPATIENT)
Dept: RADIOLOGY | Facility: HOSPITAL | Age: 69
Discharge: HOME | End: 2024-12-17
Attending: INTERNAL MEDICINE
Payer: MEDICARE

## 2024-12-17 DIAGNOSIS — C7A.026 MALIGNANT CARCINOID TUMOR OF RECTUM: ICD-10-CM

## 2024-12-17 DIAGNOSIS — C7A.012 MALIGNANT CARCINOID TUMOR OF ILEUM: ICD-10-CM

## 2024-12-17 PROCEDURE — 78815 PET IMAGE W/CT SKULL-THIGH: CPT | Mod: 26,PS,, | Performed by: STUDENT IN AN ORGANIZED HEALTH CARE EDUCATION/TRAINING PROGRAM

## 2024-12-17 PROCEDURE — 78815 PET IMAGE W/CT SKULL-THIGH: CPT | Mod: TC

## 2024-12-17 PROCEDURE — A9587 GALLIUM GA-68: HCPCS | Performed by: INTERNAL MEDICINE

## 2024-12-17 RX ADMIN — 68GA-DOTATATE 3.95 MILLICURIE: KIT INTRAVENOUS at 11:12

## 2024-12-20 ENCOUNTER — OFFICE VISIT (OUTPATIENT)
Dept: HEMATOLOGY/ONCOLOGY | Facility: CLINIC | Age: 69
End: 2024-12-20
Payer: MEDICARE

## 2024-12-20 VITALS
DIASTOLIC BLOOD PRESSURE: 77 MMHG | HEART RATE: 81 BPM | SYSTOLIC BLOOD PRESSURE: 145 MMHG | TEMPERATURE: 98 F | OXYGEN SATURATION: 98 % | HEIGHT: 66 IN | RESPIRATION RATE: 17 BRPM | BODY MASS INDEX: 33.66 KG/M2 | WEIGHT: 209.44 LBS

## 2024-12-20 DIAGNOSIS — C7A.012 MALIGNANT CARCINOID TUMOR OF ILEUM: Primary | ICD-10-CM

## 2024-12-20 DIAGNOSIS — N18.30 STAGE 3 CHRONIC KIDNEY DISEASE, UNSPECIFIED WHETHER STAGE 3A OR 3B CKD: ICD-10-CM

## 2024-12-20 DIAGNOSIS — D84.81 IMMUNODEFICIENCY SECONDARY TO NEOPLASM: ICD-10-CM

## 2024-12-20 DIAGNOSIS — Z79.899 IMMUNODEFICIENCY DUE TO DRUGS: ICD-10-CM

## 2024-12-20 DIAGNOSIS — C78.89 SECONDARY MALIGNANT NEOPLASM OF TAIL OF PANCREAS: ICD-10-CM

## 2024-12-20 DIAGNOSIS — C7A.026 MALIGNANT CARCINOID TUMOR OF RECTUM: ICD-10-CM

## 2024-12-20 DIAGNOSIS — D84.821 IMMUNODEFICIENCY DUE TO DRUGS: ICD-10-CM

## 2024-12-20 DIAGNOSIS — C78.7 SECONDARY MALIGNANT NEOPLASM OF LIVER: ICD-10-CM

## 2024-12-20 DIAGNOSIS — I10 ESSENTIAL HYPERTENSION: ICD-10-CM

## 2024-12-20 DIAGNOSIS — D49.9 IMMUNODEFICIENCY SECONDARY TO NEOPLASM: ICD-10-CM

## 2024-12-20 PROCEDURE — 99999 PR PBB SHADOW E&M-EST. PATIENT-LVL IV: CPT | Mod: PBBFAC,,, | Performed by: INTERNAL MEDICINE

## 2024-12-20 NOTE — PROGRESS NOTES
PROGRESS NOTE    Subjective:       Patient ID: Coby Marshlal is a 69 y.o. female.    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    Tempus: no reportable pathogenic mutation. MSI-low. PDL1 negative. TMB 3.7    Oncologic History:  1. Ms. 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 Her primary oncologist is Dr. Olson at [place].  Her oncology history is detailed below. She was initially diagnosed with rectal adenocarcinoma and she was treated with neoadjuvant chemoRT. Surgical pathology resulted as well-differentiated carcinoid and she was initially observed. A CT obtained for hernia revealed liver metastases in July 2021, and these were confirmed to be neuroendocrine on pathology. She underwent EUS with biopsy in December 2021 which showed grade 3 NET in the pancreas with Ki67 25%. She was started on lanreotide. Her liver lesions have since grown in size, and her oncologist referred her for consideration of liver-directed therapy. Ms. Marshall reports ongoing diarrhea since the time of her initial resection. She has about 6 bowel movements per day. She does not have any flushing, palpitations, or diaphoresis. She otherwise feels well.   Oncology History:   8/2017: Well-differentiated carcinoid tumor in the rectum, grade 2, and in the terminal ileum, grade 1. Initially diagnosed as adenocarcinoma and later changed to carcinoid tumor at time of resection  Treated with concurrent chemoRT with infusional 5-FU under the diagnosis of adenocarcinoma  1/2018: resection of rectum and terminal ileum. Pathology showed a ypT3N0 1.7 cm rectal NET, well  "differentiated, grade 2, Ki 67 was 16%. 1.5 cm T2Nx small bowel NET, well diff, Ki 67 was less than 3%.  7/22/2021: Metastasis to liver on CT for hernia repair.   11/10/2021: Copper PET:    In this patient with rectal and ileal neuroendocrine tumor status post low anterior resection/small bowel resection, there are multiple somatostatin receptor avid hepatic lesions which appear increased in size as compared to outside CT 07/19/2021.  There is also tracer avid focus in the pancreatic body suspicious for malignancy.  All these findings are new since prior Dotatate PET-CT.  12/17/2021: EUS with biopsies:  "1. PANCREAS MASS, ENDOSCOPIC ULTRASOUND-GUIDED FNA:   Well-differentiated neuroendocrine tumor, favor WHO grade 3 (G3).   Comment:  Ki-67 labeling index is approximately 25%.  Average mitotic rate is   9 per 2 mm^2.  Tumor cells are positive with synaptophysin and chromogranin,   supporting the diagnosis.   2. LIVER, LEFT LOWER LOBE, ENDOSCOPIC ULTRASOUND-GUIDED FNA:   Well-differentiated neuroendocrine tumor.   3. LIVER, RIGHT LOWER LOBE, ENDOSCOPIC ULTRASOUND-GUIDED FNA:   Well-differentiated neuroendocrine tumor, favor WHO grade 2 (G2).   Comment (2,3):  Tumor in parts 2 and 3 are histologically identical.  Only   part 3 contains enough cell block lesional material to render a meaningful   labeling index and mitotic count.  Ki-67 labeling index is approximately 15%.    Average mitotic rate is 3 per 2 mm^2.  Tumor cells are positive with   synaptophysin but negative for chromogranin.  The overall findings are still   consistent with metastatic well-differentiated neuroendocrine tumor."  1/3/2022: Lanreotide started  1/4/2023: repeat CT abd/pelvis: worsening hepatic metastases. Changed Lanreotide to Sandostatin.   2. Case reviewed at tumor board. [facility]. S/p TACE on 3/1/23, 4/24/23, 7/21/23, 11/3/23  3. Scan in Sep/Oct 2023 showed residual in dome lesions progressed. Pancreatic lesion increased to 2.3 x " 1.6 cm. Started captem 10/14/23 for progression of the pancreatic lesion. Had TACE on 11/3/23. TACE to segment IV and V on 24. TACE on 5/15/24.   4. MRI abdomen from 24 compared to MRI abdomen 24 at [facility] tumor board. MRI demonstrates new subcm foci of DWI at the periphery of treated julia c/w recurrent disease. Pancreatic mass is larger than on prior study (3.7 x 2.3 cm now, 2.6 x 1.3 cm on prior). Captem stopped on 24. Recc PRRT  5. PRRT started on 24, s/p 2 doses     Interval History:   Ms Marshall returns today for follow up. Feeling well. Eating and drinking well.     ECO    ROS:   A ten-point system review is obtained and negative except for what was stated in the Interval History.     Physical Examination:   Vital signs reviewed.   General: well hydrated, well developed, in no acute distress  HEENT: normocephalic, PERRLA, EOMI, anicteric sclerae  Neck: supple, no JVD, thyromegaly, cervical or supraclavicular lymphadenopathy  Lungs: clear breath sounds bilaterally, no wheezing, rales, or rhonchi  Heart: RRR, no M/R/G  Abdomen: soft, no tenderness, non-distended, no hepatosplenomegaly, mass, or hernia. BS present  Extremities: no clubbing, cyanosis, or edema  Skin: no rash, ulcer, or open wounds. Darkening of skin color of fingers and toes  Neuro: alert and oriented x 4, no focal neuro deficit  Psych: pleasant and appropriate mood and affect    Objective:     Laboratory Data:  Labs reviewed. Creatinine 1.2    Imaging Data:  Gallium PET 23:  Similar number and distribution of radiotracer avid lesions within the liver and pancreatic body.  Several liver lesions demonstrate central PET photopenia and hypodensity on CT consistent with necrosis.     New nonspecific mild radiotracer uptake within several small left axillary lymph nodes.  Finding may relate to left upper extremity injection and partial radiotracer extravasation.     Otherwise, no new somatostatin receptor avid lesion.    I  have personally reviewed her CT abdomen/pelvis with Dr Soni: Receptor positive pancreatic lesion and liver mets. Liver mets appear enlarged since 6/2022, though this may be due to larger cystic components.     MRI abdomen 4/4/23:  Redemonstration of multiple hepatic lesions in keeping with known neuroendocrine tumor metastasis.  Dominant lesions within the right lobe and segment 4 demonstrate decreased central enhancement consistent with response to therapy with residual disease likely present at these sites.  Additional stable enhancing lesions within the left and right lobes consistent with residual disease.  No definite new lesion.     Persistent restricted diffusion within the pancreatic body in keeping with known lesion.     Stable mildly prominent periportal lymph nodes, nonspecific.    MRI abdomen 6/07/23  Impression:  1. Interval hepatic chemoembolization.  Decreased size of multiple hepatic lesions consistent with known metastasis.  Several lesions demonstrate persistent enhancement consistent with residual disease.  New arterial enhancement within a right hepatic lobe lesion consistent with tumor.  2. Persistent restricted diffusion within the pancreatic body in keeping with known lesion.    NM PET 6/27/23  Impression:  Overall findings concerning for mild disease progression with new hepatic lesion, enlarging pancreatic body lesion and new focus of radiotracer uptake within the right hilum.  Post treatment changes of several liver lesions with decreased radiotracer uptake.  Additional nonspecific low level radiotracer uptake within axillary, mediastinal, and bilateral inguinal lymph nodes.  Attention on follow-up.    MRI 9/16/23:  Impression:     Patient with neuroendocrine tumor with hepatic metastases and interval microwave ablation of multiple hepatic lesions.     Small amount of diffusion restriction at the superior aspect of segment 4B treatment cavity raises question of residual tumor.  Attention  on follow-up.     Interval decrease in size of segment 5/8 lesion.     Interval mildly increased size of segment 6 lesion with persistent internal enhancement and nodular diffusion restriction which may reflect residual disease.     No definite new hepatic lesion.     Mildly increased size of pancreatic body mass.    PET scan 10/4/23:  Impression:     Multiple radiotracer avid hepatic lesions with mixed interval changes compared to prior exam, please see above for additional details.     Mild increased uptake within a right hilar lesion/lymph node.  Radiotracer avid pancreatic body lesion appears mildly enlarged.     No definite new lesion elsewhere.     Persistent mild nonspecific radiotracer uptake within small bilateral axillary lymph nodes    Copper PET 3/18/24:  Impression:     Mixed response to treatment.  The hepatic metastases have decreased in their radiotracer avidity well-appearing unchanged in CT appearance.  However, the pancreatic body lesion and right perihilar metastasis demonstrate worsening radiotracer uptake.  There is no new site of PET avid disease.    [facility] MRI abdomen 7/26/24 report:  IMPRESSION:     1.  Posttreatment changes in the liver with no new enhancing liver masses to suggest progressive metastatic disease   2.  Chronic changes in the pancreas including pancreatic duct dilatation was present on the prior study   3.  Cholelithiasis and chronic gallbladder wall thickening        MRI abdomen from 7/26/24 compared to MRI abdomen 12/20/24 at NET tumor board. MRI demonstrates new subcm foci of DWI at the periphery of treated lesions c/w recurrent disease. Pancreatic mass is larger than on prior study (3.7 x 2.3 cm now, 2.6 x 1.3 cm on prior).     Copper PET 8/23/24:  Impression:     Overall stable to improved size and radiotracer uptake in the neuroendocrine metastatic lesions, as detailed above with parial favorable treatment response.  No new tracer avid lesions concerning for progression of  disease.    Gallium PET 12/17/24:     FINDINGS:  Quality of the study: Adequate.     SUV measurements may not be directly comparable with those made on Cu-64 DOTATATE PET/CT.     In the head and neck, there is physiologic distribution in the pituitary, salivary, and thyroid glands, and there are no tracer avid lesions suspicious for malignancy.     In the chest, tracer avid right hilar lesion is again seen now with SUV max of 9.6 (axial image 124), previously with SUV max of 16.  Similar low level tracer uptake in the axillary lymph nodes.     In the abdomen and pelvis, there is physiologic uptake in the pancreatic uncinate process and body, liver, spleen, adrenal glands and  tract.  Posttreatment changes of the hepatic dome.  There are tracer avid lesions within the liver and pancreas with index lesions as follows:     Increased tracer uptake within hepatic segment 5 lesion with maximum SUV of 36 (axial image 152), previously with SUV max of 19.     Peripheral hepatic dome lesion lateral to the embolization site with SUV max of 31 (axial image 140), previously with SUV max of 14.     Pancreatic body lesion with SUV max of 41 (axial image 169), previously with SUV max of 52.     In the bones, there are no tracer avid lesions suspicious for malignancy.     Incidental CT findings: Similar hypodense 1.6 cm right thyroid nodule. Mild calcific atherosclerosis of the aorta. Moderate calcific atherosclerosis of the coronary arteries. Postoperative change of bowel resection.  Anterior abdominal hernia containing bowel, no evidence of obstruction.  Layering material in the gallbladder consistent with sludge.     Impression:     Similar distribution of tracer avid lesions involving the right hilum, liver and pancreas. No definite new lesion.           Assessment and Plan:     1. Malignant carcinoid tumor of ileum    2. Malignant carcinoid tumor of rectum    3. Secondary malignant neoplasm of tail of pancreas    4. Secondary  malignant neoplasm of liver    5. Immunodeficiency secondary to neoplasm    6. Immunodeficiency due to drugs    7. Stage 3 chronic kidney disease, unspecified whether stage 3a or 3b CKD    8. Essential hypertension          1-6  - Ms Marshall is a 69 yo woman with history of ypT3N0 rectal NET, well differentiated, grade 2 (Ki 67 16%) and small bowel NET, well diff, low grade, s/p surgery in Jan 2018. She was found to have metastatic disease to the liver and pancreas in July 2021 s/p biopsy. Liver NET is well diff grade 2 (Ki67 15)%. Pancreatic NET is well diff grade 3 (Ki67 25%). She has been on lanreotide since July 2022, changed to sandostatin in Jan 2023 after CT scan showed progression. S/p TACE to the liver on 3/1/23, 4/24/23, 7/21/23, 11/3/23  - MRI and PET in Sep and Oct 2023 reviewed at tumor board. MRI showed response in ablated cavities. Residual in dome lesions has progressed. Pancreatic lesion increased to 2.3 x 1.6 cm. Copper PET showed stable distribution of disease outside of liver.   - started captem on 10/14/23. TACE on 11/3/23, 2/7/24, 5/15/24  - MRI abdomen from 7/26/24 compared to MRI abdomen 12/20/24 at NET tumor board. MRI demonstrates new subcm foci of DWI at the periphery of treated areas c/w recurrent disease. Pancreatic mass is larger than on prior study (3.7 x 2.3 cm now, 2.6 x 1.3 cm on prior).   - Captem stopped on 8/28/24. Recc PRRT  - PRRT started on 9/25/24, s/p 2 doses  - reviewed PET results with patient. Stable disease  - c/w PRRT. Return in 1 month for dose 3.   - she gets lanreotide at Dr Olson's office 1 day after PRRT    7.  - Cr stable  - encouraged oral hydration    9.  - BP controlled  - c/w current medication    Follow-up:     RTC in one month      Ariel Smart MD  Hematology and Medical Oncology  [facility]    Route Chart for Scheduling    Med Onc Chart Routing  Urgent    Follow up with physician . See me on 1/14 with CBC, CMP, serotonin, pancreastatin, 5-HIAA.  see me on 3/11 with the same labs   Follow up with ELLY    Infusion scheduling note    Injection scheduling note    Labs CBC and CMP   Scheduling:  Preferred lab:  Lab interval:  serotonin, pancreastatin, 5-HIAA   Imaging    Pharmacy appointment    Other referrals          Therapy Plan Information  LUTATHERA SUPPORT for Secondary malignant neoplasm of tail of pancreas, noted on 8/28/2024  LUTATHERA SUPPORT for Secondary malignant neoplasm of liver, noted on 12/23/2021  LUTATHERA SUPPORT for Carcinoid tumor of ileum, noted on 12/27/2017  LUTATHERA SUPPORT for Carcinoid tumor of rectum, noted on 12/27/2017  IV Fluids  0.9%  NaCl infusion  Intravenous, Every 8 weeks  Pre-Medications  ondansetron-dexAMETHasone 16-12 mg in sodium chloride 0.9% 50 mL IVPB 16 mg  16 mg, Intravenous, Every 8 weeks  arginine-lysine-sterile water (AminoProtect) 25-25 mg/mL intravenous solution 1,000 mL  1,000 mL, Intravenous, Every 8 weeks  promethazine (PHENERGAN) 12.5 mg in 0.9% NaCl 50 mL IVPB  12.5 mg, Intravenous, PRN  acetaminophen tablet 650 mg  650 mg, Oral, Every 8 weeks  Flushes  sodium chloride 0.9% flush 10 mL  10 mL, Intravenous, Every 8 weeks  PRN Medications  diphenhydrAMINE injection 50 mg  50 mg, Intravenous, Every 8 weeks  hydrocortisone sodium succinate injection 100 mg  100 mg, Intravenous, Every 8 weeks      No therapy plan of the specified type found.    No therapy plan of the specified type found.

## 2024-12-30 ENCOUNTER — TUMOR BOARD CONFERENCE (OUTPATIENT)
Dept: HEMATOLOGY/ONCOLOGY | Facility: CLINIC | Age: 69
End: 2024-12-30
Payer: MEDICARE

## 2024-12-30 NOTE — PROGRESS NOTES
[facility]      NEUROENDOCRINE MULTIDISCIPLINARY TUMOR BOARD  _____________________________________________________________________    PRESENTER:   Ariel Smart MD    REASON FOR PRESENTATION:  Scan Review    ATTENDEES:   Gastroenterology - Not Present  Interventional Radiology - Dhaval Valdez MD and MERVIN Guajardo  Nuclear Medicine - Petra Howard MD  Nursing - NET TB Nursing: Mariam Perry, RN, Carrie Serna, RN, and Antonino Rowley, RN  Oncology - Ariel Smart MD  Palliative Medicine - Not Present  Pathology -  Not Present  Research - Not Present  Surgery - Suhas Rivera MD    PATIENT STATUS:  Established Patient    PATIENT SUMMARY:  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    Ki 67 -3-20%    Hyperlipidemia     Hypertension     Malignant carcinoid tumor of ileum 12/2017    Ki 67 < 3%    Rectal cancer 2/8/2019       Past Surgical History:   Procedure Laterality Date    COLON SURGERY      ENDOSCOPIC ULTRASOUND OF UPPER GASTROINTESTINAL TRACT N/A 12/17/2021    Procedure: ULTRASOUND, UPPER GI TRACT, ENDOSCOPIC;  Surgeon: Jeffery Clemons MD;  Location: [facility] ([address] [place] [address]);  Service: Endoscopy;  Laterality: N/A;  instructions emailed to pt-BB  fully vaccinated-BB   12/13 arrival time confirmed with pt-rb    HYSTERECTOMY      ILEOSTOMY CLOSURE  01/2018    Lower Anterior Resection  12/2017    NET- Dr. Manjarrez - [facility]    port a cath Right     R chest wall       TUMOR MARKERS:  5-HIAA, Plasma Ref Range: up to 22 ng/mL  Recent Labs   Lab 02/19/24  0843 08/07/24  0930 08/23/24  0959 09/24/24  1133 11/19/24  1428 12/17/24  1009   5-HIAA, Plasma (Neuroend) 9 10 7 8 10 9     Chromogranin A Ref Range: <93 ng/mL  Recent Labs   Lab 02/01/23  0913   Chromogranin A 27     Gastrin Ref Range: <100 pg/mL      Neurokinin A Ref Range: 0 - 40 pg/mL      Pancreastatin Ref Range: 10 - 135 pg/mL  Recent Labs   Lab  02/19/24  0844 08/07/24  0930 08/23/24  0959 09/24/24  1133 11/19/24  1428 12/17/24  1009   Pancreastatin 58 38 74 69 90 60     Pancreatic Polypeptide Ref Range: >314 pg/mL      Serotonin Ref Range: <=230 ng/mL  Recent Labs   Lab 02/19/24  0843 08/07/24  0930 08/23/24  0959 09/24/24  1133 11/19/24  1428 12/17/24  1009   Serotonin <30 <30 <30 <30 <30 <30           Latest Ref Rng & Units 12/20/2024    12:00 PM 12/17/2024    10:09 AM 11/20/2024     7:40 AM   Neuroendocrine Labs   5 HIAA BLOOD <=30 ng/mL  9        In this sample, the concentration of 5HIAA was within   normal limits.    -------------------ADDITIONAL INFORMATION-------------------  Liquid Chromatography-Tandem Mass Spectrometry (LC-MS/MS).  Values obtained from different assay methods or kits may be   different and cannot be used interchangeably. The results   cannot be interpreted as absolute evidence for the presence   or absence of malignant disease.  This test was developed and its performance characteristics   determined by [facility] in a manner consistent with CLIA   requirements. This test has not been cleared or approved by   the U.S. Food and Drug Administration.    Test Performed by:  [facility] [place] [address]  : Kina Lewis Ph.D.; CLIA# [id number]      SEROTONIN <=230 ng/mL  <30        -------------------ADDITIONAL INFORMATION-------------------  This test was developed and its performance characteristics   determined by [facility] in a manner consistent with CLIA   requirements. This test has not been cleared or approved by   the U.S. Food and Drug Administration.    Test Performed by:  [facility]  [address] [place] [address]  : Kina Lewis Ph.D.; CLIA# [id number]      PANCREASTATIN pg/mL  60  C           Testing is complete. Final report has been sent to the  referring  laboratory.  Note: There may be a delay of up to 2 hours before  report is available to view.    Test Performed by:  [facility]  [address] [place] [address]  CORRECTED RESULT; previously reported as SEE BELOW on 12/24/2024 at   09:12.      WBC 3.90 - 12.70 K/uL  6.18     RBC 4.00 - 5.40 M/uL  3.45     HGB 12.0 - 16.0 g/dL  9.4     HCT 37.0 - 48.5 %  31.4     MCV 82 - 98 fL  91     MCH 27.0 - 31.0 pg  27.2     MCHC 32.0 - 36.0 g/dL  29.9     RDW 11.5 - 14.5 %  16.4     PLATLETS 150 - 450 K/uL  206     MPV 9.2 - 12.9 fL  9.7     GRAN # 1.8 - 7.7 K/uL  4.4     LYMPH # 1.0 - 4.8 K/uL  0.8     MONO # 0.3 - 1.0 K/uL  0.7     EOS # 0.0 - 0.5 K/uL  0.2     BASO # 0.00 - 0.20 K/uL  0.03     GRAN % 38.0 - 73.0 %  70.9     LYMPH % 18.0 - 48.0 %  13.4     MONO % 4.0 - 15.0 %  11.0     EOS % 0.0 - 8.0 %  3.9     BASO % 0.0 - 1.9 %  0.5     DIFF METHOD   Automated     GLUCOSE 70 - 110 mg/dL  115     BUN 8 - 23 mg/dL  13     CREATININE 0.5 - 1.4 mg/dL  1.2      - 145 mmol/L  142     K 3.5 - 5.1 mmol/L  4.0     CHLORIDE 95 - 110 mmol/L  106     CO2 23 - 29 mmol/L  28     CALCIUM 8.7 - 10.5 mg/dL  9.3     PROTEIN, TOTAL 6.0 - 8.4 g/dL  7.3     ALBUMIN 3.5 - 5.2 g/dL  3.7     TOTAL BILIRUBIN 0.1 - 1.0 mg/dL  0.5        For infants and newborns, interpretation of results should be based  on gestational age, weight and in agreement with clinical  observations.    Premature Infant recommended reference ranges:  Up to 24 hours.............<8.0 mg/dL  Up to 48 hours............<12.0 mg/dL  3-5 days..................<15.0 mg/dL  6-29 days.................<15.0 mg/dL      ALK PHOSPHATASE 40 - 150 U/L  96     SGOT (AST) 10 - 40 U/L  23     SGPT (ALT) 10 - 44 U/L  15     Weight  209 lbs 7 oz  206 lbs 9 oz      C Corrected result     ____________________________________________________________________    DISCUSSION:67 yo woman with history of ypT3N0 rectal NET, well differentiated, grade 2 (Ki 67 16%) and small  bowel NET, well diff, low grade, s/p surgery in Jan 2018. She was found to have metastatic disease to the liver and pancreas in July 2021 s/p biopsy. Liver NET is well diff grade 2 (Ki67 15)%. Pancreatic NET is well diff grade 3 (Ki67 25%). She has been on lanreotide since July 2022, changed to sandostatin in Jan 2023 after CT scan showed progression. S/p TACE to the liver on 3/1/23, 4/24/23, 7/21/23, 11/3/23  PRRT.    INT RAD:Defer to NM team.    NUC MED:  Similar right hilum, liver and pancreas per report, slightly mixed.     BOARD RECOMMENDATIONS: Stable disease, c/w PRRT. Return for dose 3.

## 2024-12-31 ENCOUNTER — TELEPHONE (OUTPATIENT)
Dept: INFUSION THERAPY | Facility: HOSPITAL | Age: 69
End: 2024-12-31
Payer: MEDICARE

## 2024-12-31 NOTE — TELEPHONE ENCOUNTER
Called the pt to remind her of 1/6 lab appt at Dr. Olson's office. No answer. Left detailed message on voicemail with call back number

## 2025-01-14 ENCOUNTER — OFFICE VISIT (OUTPATIENT)
Dept: HEMATOLOGY/ONCOLOGY | Facility: CLINIC | Age: 70
End: 2025-01-14
Payer: MEDICARE

## 2025-01-14 ENCOUNTER — LAB VISIT (OUTPATIENT)
Dept: LAB | Facility: HOSPITAL | Age: 70
End: 2025-01-14
Payer: MEDICARE

## 2025-01-14 VITALS
HEART RATE: 64 BPM | SYSTOLIC BLOOD PRESSURE: 142 MMHG | WEIGHT: 211.19 LBS | BODY MASS INDEX: 33.94 KG/M2 | OXYGEN SATURATION: 100 % | DIASTOLIC BLOOD PRESSURE: 74 MMHG | HEIGHT: 66 IN | TEMPERATURE: 98 F

## 2025-01-14 DIAGNOSIS — C7A.026 MALIGNANT CARCINOID TUMOR OF RECTUM: ICD-10-CM

## 2025-01-14 DIAGNOSIS — Z79.899 IMMUNODEFICIENCY DUE TO DRUGS: ICD-10-CM

## 2025-01-14 DIAGNOSIS — I10 ESSENTIAL HYPERTENSION: ICD-10-CM

## 2025-01-14 DIAGNOSIS — C78.7 SECONDARY MALIGNANT NEOPLASM OF LIVER: ICD-10-CM

## 2025-01-14 DIAGNOSIS — D84.821 IMMUNODEFICIENCY DUE TO DRUGS: ICD-10-CM

## 2025-01-14 DIAGNOSIS — C78.89 SECONDARY MALIGNANT NEOPLASM OF TAIL OF PANCREAS: ICD-10-CM

## 2025-01-14 DIAGNOSIS — N18.30 STAGE 3 CHRONIC KIDNEY DISEASE, UNSPECIFIED WHETHER STAGE 3A OR 3B CKD: ICD-10-CM

## 2025-01-14 DIAGNOSIS — D84.81 IMMUNODEFICIENCY SECONDARY TO NEOPLASM: ICD-10-CM

## 2025-01-14 DIAGNOSIS — C7A.012 MALIGNANT CARCINOID TUMOR OF ILEUM: Primary | ICD-10-CM

## 2025-01-14 DIAGNOSIS — C7A.012 MALIGNANT CARCINOID TUMOR OF ILEUM: ICD-10-CM

## 2025-01-14 DIAGNOSIS — D49.9 IMMUNODEFICIENCY SECONDARY TO NEOPLASM: ICD-10-CM

## 2025-01-14 LAB
ALBUMIN SERPL BCP-MCNC: 3.8 G/DL (ref 3.5–5.2)
ALP SERPL-CCNC: 107 U/L (ref 40–150)
ALT SERPL W/O P-5'-P-CCNC: 15 U/L (ref 10–44)
ANION GAP SERPL CALC-SCNC: 10 MMOL/L (ref 8–16)
AST SERPL-CCNC: 25 U/L (ref 10–40)
BASOPHILS # BLD AUTO: 0.03 K/UL (ref 0–0.2)
BASOPHILS NFR BLD: 0.5 % (ref 0–1.9)
BILIRUB SERPL-MCNC: 0.6 MG/DL (ref 0.1–1)
BUN SERPL-MCNC: 15 MG/DL (ref 8–23)
CALCIUM SERPL-MCNC: 9.3 MG/DL (ref 8.7–10.5)
CHLORIDE SERPL-SCNC: 103 MMOL/L (ref 95–110)
CO2 SERPL-SCNC: 26 MMOL/L (ref 23–29)
CREAT SERPL-MCNC: 1.3 MG/DL (ref 0.5–1.4)
DIFFERENTIAL METHOD BLD: ABNORMAL
EOSINOPHIL # BLD AUTO: 0.2 K/UL (ref 0–0.5)
EOSINOPHIL NFR BLD: 4.3 % (ref 0–8)
ERYTHROCYTE [DISTWIDTH] IN BLOOD BY AUTOMATED COUNT: 15.4 % (ref 11.5–14.5)
EST. GFR  (NO RACE VARIABLE): 44 ML/MIN/1.73 M^2
GLUCOSE SERPL-MCNC: 142 MG/DL (ref 70–110)
HCT VFR BLD AUTO: 32 % (ref 37–48.5)
HGB BLD-MCNC: 9.7 G/DL (ref 12–16)
IMM GRANULOCYTES # BLD AUTO: 0 K/UL (ref 0–0.04)
IMM GRANULOCYTES NFR BLD AUTO: 0 % (ref 0–0.5)
LYMPHOCYTES # BLD AUTO: 0.8 K/UL (ref 1–4.8)
LYMPHOCYTES NFR BLD: 14.5 % (ref 18–48)
MCH RBC QN AUTO: 27.2 PG (ref 27–31)
MCHC RBC AUTO-ENTMCNC: 30.3 G/DL (ref 32–36)
MCV RBC AUTO: 90 FL (ref 82–98)
MONOCYTES # BLD AUTO: 0.5 K/UL (ref 0.3–1)
MONOCYTES NFR BLD: 8.2 % (ref 4–15)
NEUTROPHILS # BLD AUTO: 4.1 K/UL (ref 1.8–7.7)
NEUTROPHILS NFR BLD: 72.5 % (ref 38–73)
NRBC BLD-RTO: 0 /100 WBC
PLATELET # BLD AUTO: 211 K/UL (ref 150–450)
PMV BLD AUTO: 9.6 FL (ref 9.2–12.9)
POTASSIUM SERPL-SCNC: 4.1 MMOL/L (ref 3.5–5.1)
PROT SERPL-MCNC: 7.9 G/DL (ref 6–8.4)
RBC # BLD AUTO: 3.57 M/UL (ref 4–5.4)
SODIUM SERPL-SCNC: 139 MMOL/L (ref 136–145)
WBC # BLD AUTO: 5.6 K/UL (ref 3.9–12.7)

## 2025-01-14 PROCEDURE — 99999 PR PBB SHADOW E&M-EST. PATIENT-LVL IV: CPT | Mod: PBBFAC,,, | Performed by: PHYSICIAN ASSISTANT

## 2025-01-14 PROCEDURE — G2211 COMPLEX E/M VISIT ADD ON: HCPCS | Mod: S$GLB,,, | Performed by: PHYSICIAN ASSISTANT

## 2025-01-14 PROCEDURE — 3078F DIAST BP <80 MM HG: CPT | Mod: CPTII,S$GLB,, | Performed by: PHYSICIAN ASSISTANT

## 2025-01-14 PROCEDURE — 99214 OFFICE O/P EST MOD 30 MIN: CPT | Mod: S$GLB,,, | Performed by: PHYSICIAN ASSISTANT

## 2025-01-14 PROCEDURE — 84260 ASSAY OF SEROTONIN: CPT | Performed by: INTERNAL MEDICINE

## 2025-01-14 PROCEDURE — 1101F PT FALLS ASSESS-DOCD LE1/YR: CPT | Mod: CPTII,S$GLB,, | Performed by: PHYSICIAN ASSISTANT

## 2025-01-14 PROCEDURE — 80053 COMPREHEN METABOLIC PANEL: CPT | Performed by: INTERNAL MEDICINE

## 2025-01-14 PROCEDURE — 1126F AMNT PAIN NOTED NONE PRSNT: CPT | Mod: CPTII,S$GLB,, | Performed by: PHYSICIAN ASSISTANT

## 2025-01-14 PROCEDURE — 85025 COMPLETE CBC W/AUTO DIFF WBC: CPT | Performed by: INTERNAL MEDICINE

## 2025-01-14 PROCEDURE — 83497 ASSAY OF 5-HIAA: CPT | Performed by: INTERNAL MEDICINE

## 2025-01-14 PROCEDURE — 3077F SYST BP >= 140 MM HG: CPT | Mod: CPTII,S$GLB,, | Performed by: PHYSICIAN ASSISTANT

## 2025-01-14 PROCEDURE — 3008F BODY MASS INDEX DOCD: CPT | Mod: CPTII,S$GLB,, | Performed by: PHYSICIAN ASSISTANT

## 2025-01-14 PROCEDURE — 1160F RVW MEDS BY RX/DR IN RCRD: CPT | Mod: CPTII,S$GLB,, | Performed by: PHYSICIAN ASSISTANT

## 2025-01-14 PROCEDURE — 83519 RIA NONANTIBODY: CPT | Performed by: INTERNAL MEDICINE

## 2025-01-14 PROCEDURE — 1159F MED LIST DOCD IN RCRD: CPT | Mod: CPTII,S$GLB,, | Performed by: PHYSICIAN ASSISTANT

## 2025-01-14 PROCEDURE — 3288F FALL RISK ASSESSMENT DOCD: CPT | Mod: CPTII,S$GLB,, | Performed by: PHYSICIAN ASSISTANT

## 2025-01-14 PROCEDURE — 36415 COLL VENOUS BLD VENIPUNCTURE: CPT | Performed by: INTERNAL MEDICINE

## 2025-01-14 NOTE — PROGRESS NOTES
PROGRESS NOTE    Subjective:       Patient ID: Coby Marshall is a 69 y.o. female.    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    Tempus: no reportable pathogenic mutation. MSI-low. PDL1 negative. TMB 3.7    Oncologic History copied from medical chart:  1. Ms. 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 Her primary oncologist is Dr. Olson at [place].  Her oncology history is detailed below. She was initially diagnosed with rectal adenocarcinoma and she was treated with neoadjuvant chemoRT. Surgical pathology resulted as well-differentiated carcinoid and she was initially observed. A CT obtained for hernia revealed liver metastases in July 2021, and these were confirmed to be neuroendocrine on pathology. She underwent EUS with biopsy in December 2021 which showed grade 3 NET in the pancreas with Ki67 25%. She was started on lanreotide. Her liver lesions have since grown in size, and her oncologist referred her for consideration of liver-directed therapy. Ms. Marshall reports ongoing diarrhea since the time of her initial resection. She has about 6 bowel movements per day. She does not have any flushing, palpitations, or diaphoresis. She otherwise feels well.   Oncology History:   8/2017: Well-differentiated carcinoid tumor in the rectum, grade 2, and in the terminal ileum, grade 1. Initially diagnosed as adenocarcinoma and later changed to carcinoid tumor at time of resection  Treated with concurrent chemoRT with infusional 5-FU under the diagnosis of adenocarcinoma  1/2018: resection of rectum and terminal ileum. Pathology showed a ypT3N0 1.7  "cm rectal NET, well differentiated, grade 2, Ki 67 was 16%. 1.5 cm T2Nx small bowel NET, well diff, Ki 67 was less than 3%.  7/22/2021: Metastasis to liver on CT for hernia repair.   11/10/2021: Copper PET:    In this patient with rectal and ileal neuroendocrine tumor status post low anterior resection/small bowel resection, there are multiple somatostatin receptor avid hepatic lesions which appear increased in size as compared to outside CT 07/19/2021.  There is also tracer avid focus in the pancreatic body suspicious for malignancy.  All these findings are new since prior Dotatate PET-CT.  12/17/2021: EUS with biopsies:  "1. PANCREAS MASS, ENDOSCOPIC ULTRASOUND-GUIDED FNA:   Well-differentiated neuroendocrine tumor, favor WHO grade 3 (G3).   Comment:  Ki-67 labeling index is approximately 25%.  Average mitotic rate is   9 per 2 mm^2.  Tumor cells are positive with synaptophysin and chromogranin,   supporting the diagnosis.   2. LIVER, LEFT LOWER LOBE, ENDOSCOPIC ULTRASOUND-GUIDED FNA:   Well-differentiated neuroendocrine tumor.   3. LIVER, RIGHT LOWER LOBE, ENDOSCOPIC ULTRASOUND-GUIDED FNA:   Well-differentiated neuroendocrine tumor, favor WHO grade 2 (G2).   Comment (2,3):  Tumor in parts 2 and 3 are histologically identical.  Only   part 3 contains enough cell block lesional material to render a meaningful   labeling index and mitotic count.  Ki-67 labeling index is approximately 15%.    Average mitotic rate is 3 per 2 mm^2.  Tumor cells are positive with   synaptophysin but negative for chromogranin.  The overall findings are still   consistent with metastatic well-differentiated neuroendocrine tumor."  1/3/2022: Lanreotide started  1/4/2023: repeat CT abd/pelvis: worsening hepatic metastases. Changed Lanreotide to Sandostatin.   2. [facility]. [facility] on 3/1/23, 4/24/23, 7/21/23, 11/3/23  3. Scan in Sep/Oct 2023 showed residual in dome lesions progressed. Pancreatic lesion " increased to 2.3 x 1.6 cm. Started captem 10/14/23 for progression of the pancreatic lesion. Had TACE on 11/3/23. TACE to segment IV and V on 24. TACE on 5/15/24.   4. MRI abdomen from 24 compared to MRI abdomen 24 at [facility] tumor board. MRI demonstrates new subcm foci of DWI at the periphery of treated julia c/w recurrent disease. Pancreatic mass is larger than on prior study (3.7 x 2.3 cm now, 2.6 x 1.3 cm on prior). Captem stopped on 24. Recc PRRT  5. PRRT started on 24, s/p 2 doses     Interval History:   Ms Marshall returns today for follow up. Feeling well. Eating and drinking well. Tolerating PRRT and Lanreotide well. No other concerns or complaints. She is following closely with her Endocrinologist for close control of her diabetes. Overall, doing well.     ECO. Presents with her  today    ROS:   A ten-point system review is obtained and negative except for what was stated in the Interval History.     Physical Examination:   Vital signs reviewed.   General: well hydrated, well developed, in no acute distress  HEENT: normocephalic, EOMI, anicteric sclerae  Neck: supple, no JVD  Lungs: clear breath sounds bilaterally, no wheezing, rales, or rhonchi  Heart: RRR, no M/R/G  Abdomen: soft, no tenderness, non-distended. BS present  Extremities: no clubbing, cyanosis, or edema  Skin: no rash, ulcer, or open wounds. Darkening of skin color of fingers and toes  Neuro: alert and oriented x 4, no focal neuro deficit  Psych: pleasant and appropriate mood and affect    Objective:     Laboratory Data:  Labs reviewed. Creatinine 1.3    Imaging Data:  Gallium PET 23:  Similar number and distribution of radiotracer avid lesions within the liver and pancreatic body.  Several liver lesions demonstrate central PET photopenia and hypodensity on CT consistent with necrosis.     New nonspecific mild radiotracer uptake within several small left axillary lymph nodes.  Finding may relate to left upper  extremity injection and partial radiotracer extravasation.     Otherwise, no new somatostatin receptor avid lesion.    I have personally reviewed her CT abdomen/pelvis with Dr Soni: Receptor positive pancreatic lesion and liver mets. Liver mets appear enlarged since 6/2022, though this may be due to larger cystic components.     MRI abdomen 4/4/23:  Redemonstration of multiple hepatic lesions in keeping with known neuroendocrine tumor metastasis.  Dominant lesions within the right lobe and segment 4 demonstrate decreased central enhancement consistent with response to therapy with residual disease likely present at these sites.  Additional stable enhancing lesions within the left and right lobes consistent with residual disease.  No definite new lesion.     Persistent restricted diffusion within the pancreatic body in keeping with known lesion.     Stable mildly prominent periportal lymph nodes, nonspecific.    MRI abdomen 6/07/23  Impression:  1. Interval hepatic chemoembolization.  Decreased size of multiple hepatic lesions consistent with known metastasis.  Several lesions demonstrate persistent enhancement consistent with residual disease.  New arterial enhancement within a right hepatic lobe lesion consistent with tumor.  2. Persistent restricted diffusion within the pancreatic body in keeping with known lesion.    NM PET 6/27/23  Impression:  Overall findings concerning for mild disease progression with new hepatic lesion, enlarging pancreatic body lesion and new focus of radiotracer uptake within the right hilum.  Post treatment changes of several liver lesions with decreased radiotracer uptake.  Additional nonspecific low level radiotracer uptake within axillary, mediastinal, and bilateral inguinal lymph nodes.  Attention on follow-up.    MRI 9/16/23:  Impression:     Patient with neuroendocrine tumor with hepatic metastases and interval microwave ablation of multiple hepatic lesions.     Small amount of  diffusion restriction at the superior aspect of segment 4B treatment cavity raises question of residual tumor.  Attention on follow-up.     Interval decrease in size of segment 5/8 lesion.     Interval mildly increased size of segment 6 lesion with persistent internal enhancement and nodular diffusion restriction which may reflect residual disease.     No definite new hepatic lesion.     Mildly increased size of pancreatic body mass.    PET scan 10/4/23:  Impression:     Multiple radiotracer avid hepatic lesions with mixed interval changes compared to prior exam, please see above for additional details.     Mild increased uptake within a right hilar lesion/lymph node.  Radiotracer avid pancreatic body lesion appears mildly enlarged.     No definite new lesion elsewhere.     Persistent mild nonspecific radiotracer uptake within small bilateral axillary lymph nodes    Copper PET 3/18/24:  Impression:     Mixed response to treatment.  The hepatic metastases have decreased in their radiotracer avidity well-appearing unchanged in CT appearance.  However, the pancreatic body lesion and right perihilar metastasis demonstrate worsening radiotracer uptake.  There is no new site of PET avid disease.    [facility] MRI abdomen 7/26/24 report:  IMPRESSION:     1.  Posttreatment changes in the liver with no new enhancing liver masses to suggest progressive metastatic disease   2.  Chronic changes in the pancreas including pancreatic duct dilatation was present on the prior study   3.  Cholelithiasis and chronic gallbladder wall thickening        MRI abdomen from 7/26/24 compared to MRI abdomen 12/20/24 at [facility] tumor board. MRI demonstrates new subcm foci of DWI at the periphery of treated lesions c/w recurrent disease. Pancreatic mass is larger than on prior study (3.7 x 2.3 cm now, 2.6 x 1.3 cm on prior).     Copper PET 8/23/24:  Impression:     Overall stable to improved size and radiotracer uptake in the neuroendocrine metastatic  lesions, as detailed above with parial favorable treatment response.  No new tracer avid lesions concerning for progression of disease.    Gallium PET 12/17/24:     FINDINGS:  Quality of the study: Adequate.     SUV measurements may not be directly comparable with those made on Cu-64 DOTATATE PET/CT.     In the head and neck, there is physiologic distribution in the pituitary, salivary, and thyroid glands, and there are no tracer avid lesions suspicious for malignancy.     In the chest, tracer avid right hilar lesion is again seen now with SUV max of 9.6 (axial image 124), previously with SUV max of 16.  Similar low level tracer uptake in the axillary lymph nodes.     In the abdomen and pelvis, there is physiologic uptake in the pancreatic uncinate process and body, liver, spleen, adrenal glands and  tract.  Posttreatment changes of the hepatic dome.  There are tracer avid lesions within the liver and pancreas with index lesions as follows:     Increased tracer uptake within hepatic segment 5 lesion with maximum SUV of 36 (axial image 152), previously with SUV max of 19.     Peripheral hepatic dome lesion lateral to the embolization site with SUV max of 31 (axial image 140), previously with SUV max of 14.     Pancreatic body lesion with SUV max of 41 (axial image 169), previously with SUV max of 52.     In the bones, there are no tracer avid lesions suspicious for malignancy.     Incidental CT findings: Similar hypodense 1.6 cm right thyroid nodule. Mild calcific atherosclerosis of the aorta. Moderate calcific atherosclerosis of the coronary arteries. Postoperative change of bowel resection.  Anterior abdominal hernia containing bowel, no evidence of obstruction.  Layering material in the gallbladder consistent with sludge.     Impression:     Similar distribution of tracer avid lesions involving the right hilum, liver and pancreas. No definite new lesion.           Assessment and Plan:     1. Malignant carcinoid  tumor of ileum    2. Malignant carcinoid tumor of rectum    3. Secondary malignant neoplasm of tail of pancreas    4. Secondary malignant neoplasm of liver    5. Immunodeficiency secondary to neoplasm    6. Immunodeficiency due to drugs            1-6  - Ms Marshall is a 69 yo woman with history of ypT3N0 rectal NET, well differentiated, grade 2 (Ki 67 16%) and small bowel NET, well diff, low grade, s/p surgery in Jan 2018. She was found to have metastatic disease to the liver and pancreas in July 2021 s/p biopsy. Liver NET is well diff grade 2 (Ki67 15)%. Pancreatic NET is well diff grade 3 (Ki67 25%). She has been on lanreotide since July 2022, changed to sandostatin in Jan 2023 after CT scan showed progression. S/p TACE to the liver on 3/1/23, 4/24/23, 7/21/23, 11/3/23  - MRI and PET in Sep and Oct 2023 reviewed at tumor board. MRI showed response in ablated cavities. Residual in dome lesions has progressed. Pancreatic lesion increased to 2.3 x 1.6 cm. Copper PET showed stable distribution of disease outside of liver.   - started captem on 10/14/23. TACE on 11/3/23, 2/7/24, 5/15/24  - MRI abdomen from 7/26/24 compared to MRI abdomen 12/20/24 at [facility] tumor board. MRI demonstrates new subcm foci of DWI at the periphery of treated areas c/w recurrent disease. Pancreatic mass is larger than on prior study (3.7 x 2.3 cm now, 2.6 x 1.3 cm on prior).   - Captem stopped on 8/28/24. Recc PRRT  - PRRT started on 9/25/24, s/p 2 doses  - reviewed PET results with patient. Stable disease    - doing well. Eating well and has a good appetite.   - I have reviewed the CBC and CMP, adequate for treatment. Bio-markers are pending.   - c/w PRRT tomorrow.     - Return in 1 month for dose 4.   - she gets lanreotide at Dr Olson's office 1 day after PRRT    7.  - Cr stable, 1.3  - encouraged to continue good oral hydration    9.  - BP controlled. Feels well.   - c/w current medication    Follow-up:     RTC in one month    Pte and family  members displayed understanding of the above encounter and treatment plan. All thoughtful questions were answered to their satisfaction. Pte was advised to notify the care team or proceed to the ER if signs and symptoms worsen.     30 minutes were spent today on this encounter including face to face time with the patient, data gathering/interpretation and documentation. Greater than 50% of this time involved counseling or coordination of care. I have provided the patient with an opportunity to ask questions and have all questions answered to patient's satisfaction.     Visit today included increased complexity associated with the care of the episodic problem chemotherapy  addressed and managing the longitudinal care of the patient due to the serious and/or complex managed problem(s) GI malignancies/cancer      JANIS Zuluaga, PA-C [facility]  Dept of Hematology/Oncology  RICHARD to GI Oncology team         Route Chart for Scheduling    Med Onc Chart Routing      Follow up with physician 1 month and 2 months. PRRT in 1 month, Lanreotide in 2 mos   Follow up with ELLY 3 months. Lanreotide   Infusion scheduling note    Injection scheduling note    Labs CBC and CMP   Scheduling:  Preferred lab:  Lab interval: every 4 weeks  Pancreastatin, Serotonin, 5-HIAA   Imaging    Pharmacy appointment    Other referrals          Therapy Plan Information  LUTATHERA SUPPORT for Secondary malignant neoplasm of tail of pancreas, noted on 8/28/2024  LUTATHERA SUPPORT for Secondary malignant neoplasm of liver, noted on 12/23/2021  LUTATHERA SUPPORT for Carcinoid tumor of ileum, noted on 12/27/2017  LUTATHERA SUPPORT for Carcinoid tumor of rectum, noted on 12/27/2017  IV Fluids  0.9%  NaCl infusion  Intravenous, Every 8 weeks  Pre-Medications  ondansetron-dexAMETHasone 16-12 mg in sodium chloride 0.9% 50 mL IVPB 16 mg  16 mg, Intravenous, Every 8 weeks  arginine-lysine-sterile water (AminoProtect) 25-25 mg/mL intravenous  solution 1,000 mL  1,000 mL, Intravenous, Every 8 weeks  promethazine (PHENERGAN) 12.5 mg in 0.9% NaCl 50 mL IVPB  12.5 mg, Intravenous, PRN  acetaminophen tablet 650 mg  650 mg, Oral, Every 8 weeks  Flushes  sodium chloride 0.9% flush 10 mL  10 mL, Intravenous, Every 8 weeks  PRN Medications  diphenhydrAMINE injection 50 mg  50 mg, Intravenous, Every 8 weeks  hydrocortisone sodium succinate injection 100 mg  100 mg, Intravenous, Every 8 weeks      No therapy plan of the specified type found.    No therapy plan of the specified type found.

## 2025-01-15 ENCOUNTER — HOSPITAL ENCOUNTER (OUTPATIENT)
Dept: RADIOLOGY | Facility: HOSPITAL | Age: 70
Discharge: HOME | End: 2025-01-15
Attending: INTERNAL MEDICINE
Payer: MEDICARE

## 2025-01-15 ENCOUNTER — INFUSION (OUTPATIENT)
Dept: INFUSION THERAPY | Facility: HOSPITAL | Age: 70
End: 2025-01-15
Attending: INTERNAL MEDICINE
Payer: MEDICARE

## 2025-01-15 VITALS
TEMPERATURE: 98 F | OXYGEN SATURATION: 100 % | DIASTOLIC BLOOD PRESSURE: 61 MMHG | HEART RATE: 72 BPM | SYSTOLIC BLOOD PRESSURE: 128 MMHG

## 2025-01-15 DIAGNOSIS — D3A.026 CARCINOID TUMOR OF RECTUM: ICD-10-CM

## 2025-01-15 DIAGNOSIS — C7A.026 MALIGNANT CARCINOID TUMOR OF RECTUM: ICD-10-CM

## 2025-01-15 DIAGNOSIS — C7A.012 MALIGNANT CARCINOID TUMOR OF ILEUM: ICD-10-CM

## 2025-01-15 DIAGNOSIS — C78.7 SECONDARY MALIGNANT NEOPLASM OF LIVER: ICD-10-CM

## 2025-01-15 DIAGNOSIS — C78.89 SECONDARY MALIGNANT NEOPLASM OF TAIL OF PANCREAS: Primary | ICD-10-CM

## 2025-01-15 PROCEDURE — 63600175 PHARM REV CODE 636 W HCPCS: Performed by: INTERNAL MEDICINE

## 2025-01-15 PROCEDURE — 79101 NUCLEAR RX IV ADMIN: CPT | Mod: TC

## 2025-01-15 PROCEDURE — A9513 LUTETIUM LU 177 DOTATAT THER: HCPCS | Mod: JZ,TB | Performed by: INTERNAL MEDICINE

## 2025-01-15 PROCEDURE — 25000003 PHARM REV CODE 250: Performed by: INTERNAL MEDICINE

## 2025-01-15 PROCEDURE — 96365 THER/PROPH/DIAG IV INF INIT: CPT | Mod: 59

## 2025-01-15 PROCEDURE — 96366 THER/PROPH/DIAG IV INF ADDON: CPT

## 2025-01-15 PROCEDURE — 96367 TX/PROPH/DG ADDL SEQ IV INF: CPT

## 2025-01-15 PROCEDURE — 79101 NUCLEAR RX IV ADMIN: CPT | Mod: 26,,, | Performed by: NUCLEAR MEDICINE

## 2025-01-15 RX ORDER — SODIUM CHLORIDE 9 MG/ML
INJECTION, SOLUTION INTRAVENOUS ONCE
Status: COMPLETED | OUTPATIENT
Start: 2025-01-15 | End: 2025-01-15

## 2025-01-15 RX ORDER — SODIUM CHLORIDE 9 MG/ML
INJECTION, SOLUTION INTRAVENOUS ONCE
OUTPATIENT
Start: 2025-03-12

## 2025-01-15 RX ORDER — DIPHENHYDRAMINE HYDROCHLORIDE 50 MG/ML
50 INJECTION INTRAMUSCULAR; INTRAVENOUS
Status: DISCONTINUED | OUTPATIENT
Start: 2025-01-15 | End: 2025-01-15 | Stop reason: HOSPADM

## 2025-01-15 RX ORDER — DIPHENHYDRAMINE HYDROCHLORIDE 50 MG/ML
50 INJECTION INTRAMUSCULAR; INTRAVENOUS
OUTPATIENT
Start: 2025-03-12

## 2025-01-15 RX ORDER — ACETAMINOPHEN 325 MG/1
650 TABLET ORAL
Status: DISCONTINUED | OUTPATIENT
Start: 2025-01-15 | End: 2025-01-15 | Stop reason: HOSPADM

## 2025-01-15 RX ORDER — SODIUM CHLORIDE 0.9 % (FLUSH) 0.9 %
10 SYRINGE (ML) INJECTION
Status: DISCONTINUED | OUTPATIENT
Start: 2025-01-15 | End: 2025-01-15 | Stop reason: HOSPADM

## 2025-01-15 RX ORDER — SODIUM CHLORIDE 0.9 % (FLUSH) 0.9 %
10 SYRINGE (ML) INJECTION
OUTPATIENT
Start: 2025-03-12

## 2025-01-15 RX ORDER — ACETAMINOPHEN 325 MG/1
650 TABLET ORAL
OUTPATIENT
Start: 2025-03-12

## 2025-01-15 RX ADMIN — DEXAMETHASONE SODIUM PHOSPHATE 16 MG: 10 INJECTION, SOLUTION INTRAMUSCULAR; INTRAVENOUS at 07:01

## 2025-01-15 RX ADMIN — Medication 1000 ML: at 08:01

## 2025-01-15 RX ADMIN — LUTETIUM LU 177 DOTATATE 205.8 MILLICURIE: 10 INJECTION INTRAVENOUS at 09:01

## 2025-01-15 RX ADMIN — SODIUM CHLORIDE: 9 INJECTION, SOLUTION INTRAVENOUS at 07:01

## 2025-01-15 NOTE — NURSING
1005-Pt up to restroom. Radiation and safety precautions in place. No body fluids noted on pt or surrounding area. Will continue to monitor.

## 2025-01-15 NOTE — NURSING
1230-Discharge instructions reviewed,copy given with home care precautions. Pt verbalized understanding, has no further questions. Ambulated self off unit in good condition.

## 2025-01-20 LAB — SEROTONIN: <30 NG/ML

## 2025-01-21 LAB — 5OH-INDOLEACETATE SERPL-MCNC: 9 NG/ML

## 2025-01-28 LAB — PANCREASTATIN SERPL-MCNC: 67 PG/ML (ref 10–135)

## 2025-02-11 ENCOUNTER — LAB VISIT (OUTPATIENT)
Dept: LAB | Facility: HOSPITAL | Age: 70
End: 2025-02-11
Payer: MEDICARE

## 2025-02-11 ENCOUNTER — OFFICE VISIT (OUTPATIENT)
Dept: HEMATOLOGY/ONCOLOGY | Facility: CLINIC | Age: 70
End: 2025-02-11
Payer: MEDICARE

## 2025-02-11 VITALS
SYSTOLIC BLOOD PRESSURE: 137 MMHG | OXYGEN SATURATION: 99 % | WEIGHT: 213.31 LBS | BODY MASS INDEX: 34.28 KG/M2 | HEIGHT: 66 IN | HEART RATE: 64 BPM | DIASTOLIC BLOOD PRESSURE: 63 MMHG | TEMPERATURE: 98 F | RESPIRATION RATE: 16 BRPM

## 2025-02-11 DIAGNOSIS — C7A.012 MALIGNANT CARCINOID TUMOR OF ILEUM: ICD-10-CM

## 2025-02-11 DIAGNOSIS — D84.821 IMMUNODEFICIENCY DUE TO DRUGS: ICD-10-CM

## 2025-02-11 DIAGNOSIS — N18.30 STAGE 3 CHRONIC KIDNEY DISEASE, UNSPECIFIED WHETHER STAGE 3A OR 3B CKD: ICD-10-CM

## 2025-02-11 DIAGNOSIS — C78.89 SECONDARY MALIGNANT NEOPLASM OF TAIL OF PANCREAS: ICD-10-CM

## 2025-02-11 DIAGNOSIS — I10 ESSENTIAL HYPERTENSION: ICD-10-CM

## 2025-02-11 DIAGNOSIS — C78.7 SECONDARY MALIGNANT NEOPLASM OF LIVER: ICD-10-CM

## 2025-02-11 DIAGNOSIS — D84.81 IMMUNODEFICIENCY SECONDARY TO NEOPLASM: ICD-10-CM

## 2025-02-11 DIAGNOSIS — D49.9 IMMUNODEFICIENCY SECONDARY TO NEOPLASM: ICD-10-CM

## 2025-02-11 DIAGNOSIS — C7A.026 MALIGNANT CARCINOID TUMOR OF RECTUM: ICD-10-CM

## 2025-02-11 DIAGNOSIS — Z79.899 IMMUNODEFICIENCY DUE TO DRUGS: ICD-10-CM

## 2025-02-11 DIAGNOSIS — C7A.012 MALIGNANT CARCINOID TUMOR OF ILEUM: Primary | ICD-10-CM

## 2025-02-11 LAB
ALBUMIN SERPL BCP-MCNC: 3.6 G/DL (ref 3.5–5.2)
ALP SERPL-CCNC: 92 U/L (ref 40–150)
ALT SERPL W/O P-5'-P-CCNC: 18 U/L (ref 10–44)
ANION GAP SERPL CALC-SCNC: 12 MMOL/L (ref 8–16)
AST SERPL-CCNC: 24 U/L (ref 10–40)
BASOPHILS # BLD AUTO: 0.02 K/UL (ref 0–0.2)
BASOPHILS NFR BLD: 0.4 % (ref 0–1.9)
BILIRUB SERPL-MCNC: 0.6 MG/DL (ref 0.1–1)
BUN SERPL-MCNC: 16 MG/DL (ref 8–23)
CALCIUM SERPL-MCNC: 9.4 MG/DL (ref 8.7–10.5)
CHLORIDE SERPL-SCNC: 100 MMOL/L (ref 95–110)
CO2 SERPL-SCNC: 29 MMOL/L (ref 23–29)
CREAT SERPL-MCNC: 1.5 MG/DL (ref 0.5–1.4)
DIFFERENTIAL METHOD BLD: ABNORMAL
EOSINOPHIL # BLD AUTO: 0.2 K/UL (ref 0–0.5)
EOSINOPHIL NFR BLD: 3.3 % (ref 0–8)
ERYTHROCYTE [DISTWIDTH] IN BLOOD BY AUTOMATED COUNT: 14.5 % (ref 11.5–14.5)
EST. GFR  (NO RACE VARIABLE): 37 ML/MIN/1.73 M^2
GLUCOSE SERPL-MCNC: 119 MG/DL (ref 70–110)
HCT VFR BLD AUTO: 30.6 % (ref 37–48.5)
HGB BLD-MCNC: 9.4 G/DL (ref 12–16)
IMM GRANULOCYTES # BLD AUTO: 0.02 K/UL (ref 0–0.04)
IMM GRANULOCYTES NFR BLD AUTO: 0.4 % (ref 0–0.5)
LYMPHOCYTES # BLD AUTO: 0.6 K/UL (ref 1–4.8)
LYMPHOCYTES NFR BLD: 11 % (ref 18–48)
MCH RBC QN AUTO: 27.3 PG (ref 27–31)
MCHC RBC AUTO-ENTMCNC: 30.7 G/DL (ref 32–36)
MCV RBC AUTO: 89 FL (ref 82–98)
MONOCYTES # BLD AUTO: 0.7 K/UL (ref 0.3–1)
MONOCYTES NFR BLD: 13 % (ref 4–15)
NEUTROPHILS # BLD AUTO: 4.1 K/UL (ref 1.8–7.7)
NEUTROPHILS NFR BLD: 71.9 % (ref 38–73)
NRBC BLD-RTO: 0 /100 WBC
PLATELET # BLD AUTO: 166 K/UL (ref 150–450)
PMV BLD AUTO: 9.4 FL (ref 9.2–12.9)
POTASSIUM SERPL-SCNC: 4 MMOL/L (ref 3.5–5.1)
PROT SERPL-MCNC: 7.2 G/DL (ref 6–8.4)
RBC # BLD AUTO: 3.44 M/UL (ref 4–5.4)
SODIUM SERPL-SCNC: 141 MMOL/L (ref 136–145)
WBC # BLD AUTO: 5.71 K/UL (ref 3.9–12.7)

## 2025-02-11 PROCEDURE — 1101F PT FALLS ASSESS-DOCD LE1/YR: CPT | Mod: CPTII,S$GLB,, | Performed by: REGISTERED NURSE

## 2025-02-11 PROCEDURE — 3078F DIAST BP <80 MM HG: CPT | Mod: CPTII,S$GLB,, | Performed by: REGISTERED NURSE

## 2025-02-11 PROCEDURE — 84260 ASSAY OF SEROTONIN: CPT | Performed by: PHYSICIAN ASSISTANT

## 2025-02-11 PROCEDURE — 80053 COMPREHEN METABOLIC PANEL: CPT | Performed by: PHYSICIAN ASSISTANT

## 2025-02-11 PROCEDURE — 1159F MED LIST DOCD IN RCRD: CPT | Mod: CPTII,S$GLB,, | Performed by: REGISTERED NURSE

## 2025-02-11 PROCEDURE — 99999 PR PBB SHADOW E&M-EST. PATIENT-LVL IV: CPT | Mod: PBBFAC,,, | Performed by: REGISTERED NURSE

## 2025-02-11 PROCEDURE — 83497 ASSAY OF 5-HIAA: CPT | Performed by: PHYSICIAN ASSISTANT

## 2025-02-11 PROCEDURE — 3288F FALL RISK ASSESSMENT DOCD: CPT | Mod: CPTII,S$GLB,, | Performed by: REGISTERED NURSE

## 2025-02-11 PROCEDURE — 1126F AMNT PAIN NOTED NONE PRSNT: CPT | Mod: CPTII,S$GLB,, | Performed by: REGISTERED NURSE

## 2025-02-11 PROCEDURE — 83519 RIA NONANTIBODY: CPT | Performed by: PHYSICIAN ASSISTANT

## 2025-02-11 PROCEDURE — G2211 COMPLEX E/M VISIT ADD ON: HCPCS | Mod: S$GLB,,, | Performed by: REGISTERED NURSE

## 2025-02-11 PROCEDURE — 85025 COMPLETE CBC W/AUTO DIFF WBC: CPT | Performed by: PHYSICIAN ASSISTANT

## 2025-02-11 PROCEDURE — 99214 OFFICE O/P EST MOD 30 MIN: CPT | Mod: S$GLB,,, | Performed by: REGISTERED NURSE

## 2025-02-11 PROCEDURE — 3075F SYST BP GE 130 - 139MM HG: CPT | Mod: CPTII,S$GLB,, | Performed by: REGISTERED NURSE

## 2025-02-11 PROCEDURE — 3008F BODY MASS INDEX DOCD: CPT | Mod: CPTII,S$GLB,, | Performed by: REGISTERED NURSE

## 2025-02-11 PROCEDURE — 1160F RVW MEDS BY RX/DR IN RCRD: CPT | Mod: CPTII,S$GLB,, | Performed by: REGISTERED NURSE

## 2025-02-11 RX ORDER — BRIMONIDINE TARTRATE AND TIMOLOL MALEATE 2; 5 MG/ML; MG/ML
1 SOLUTION OPHTHALMIC 2 TIMES DAILY
COMMUNITY
Start: 2025-01-07

## 2025-02-11 NOTE — PROGRESS NOTES
PROGRESS NOTE    Subjective:      Patient ID: Coby Marshall is a 69 y.o. female.    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    Tempus: no reportable pathogenic mutation. MSI-low. PDL1 negative. TMB 3.7    Oncologic History copied from medical chart:  1. Ms. 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 Her primary oncologist is Dr. Olson at [facility].  Her oncology history is detailed below. She was initially diagnosed with rectal adenocarcinoma and she was treated with neoadjuvant chemoRT. Surgical pathology resulted as well-differentiated carcinoid and she was initially observed. A CT obtained for hernia revealed liver metastases in July 2021, and these were confirmed to be neuroendocrine on pathology. She underwent EUS with biopsy in December 2021 which showed grade 3 NET in the pancreas with Ki67 25%. She was started on lanreotide. Her liver lesions have since grown in size, and her oncologist referred her for consideration of liver-directed therapy. Ms. Marshall reports ongoing diarrhea since the time of her initial resection. She has about 6 bowel movements per day. She does not have any flushing, palpitations, or diaphoresis. She otherwise feels well.   Oncology History:   8/2017: Well-differentiated carcinoid tumor in the rectum, grade 2, and in the terminal ileum, grade 1. Initially diagnosed as adenocarcinoma and later changed to carcinoid tumor at time of resection  Treated with concurrent chemoRT with infusional 5-FU under the diagnosis of adenocarcinoma  1/2018: resection of rectum and terminal ileum. Pathology showed a ypT3N0 1.7 cm rectal NET, well differentiated, grade 2, Ki 67 was  "16%. 1.5 cm T2Nx small bowel NET, well diff, Ki 67 was less than 3%.  7/22/2021: Metastasis to liver on CT for hernia repair.   11/10/2021: Copper PET:    In this patient with rectal and ileal neuroendocrine tumor status post low anterior resection/small bowel resection, there are multiple somatostatin receptor avid hepatic lesions which appear increased in size as compared to outside CT 07/19/2021.  There is also tracer avid focus in the pancreatic body suspicious for malignancy.  All these findings are new since prior Dotatate PET-CT.  12/17/2021: EUS with biopsies:  "1. PANCREAS MASS, ENDOSCOPIC ULTRASOUND-GUIDED FNA:   Well-differentiated neuroendocrine tumor, favor WHO grade 3 (G3).   Comment:  Ki-67 labeling index is approximately 25%.  Average mitotic rate is   9 per 2 mm^2.  Tumor cells are positive with synaptophysin and chromogranin,   supporting the diagnosis.   2. LIVER, LEFT LOWER LOBE, ENDOSCOPIC ULTRASOUND-GUIDED FNA:   Well-differentiated neuroendocrine tumor.   3. LIVER, RIGHT LOWER LOBE, ENDOSCOPIC ULTRASOUND-GUIDED FNA:   Well-differentiated neuroendocrine tumor, favor WHO grade 2 (G2).   Comment (2,3):  Tumor in parts 2 and 3 are histologically identical.  Only   part 3 contains enough cell block lesional material to render a meaningful   labeling index and mitotic count.  Ki-67 labeling index is approximately 15%.    Average mitotic rate is 3 per 2 mm^2.  Tumor cells are positive with   synaptophysin but negative for chromogranin.  The overall findings are still   consistent with metastatic well-differentiated neuroendocrine tumor."  1/3/2022: Lanreotide started  1/4/2023: repeat CT abd/pelvis: worsening hepatic metastases. Changed Lanreotide to Sandostatin.   2. Case reviewed at tumor board. [facility] serial TACE. S/p TACE on 3/1/23, 4/24/23, 7/21/23, 11/3/23  3. Scan in Sep/Oct 2023 showed residual in dome lesions progressed. Pancreatic lesion increased to 2.3 x 1.6 cm. Started captem 10/14/23 for " progression of the pancreatic lesion. Had TACE on 11/3/23. TACE to segment IV and V on 24. TACE on 5/15/24.   4. MRI abdomen from 24 compared to MRI abdomen 24 at [facility] tumor board. MRI demonstrates new subcm foci of DWI at the periphery of treated julia c/w recurrent disease. Pancreatic mass is larger than on prior study (3.7 x 2.3 cm now, 2.6 x 1.3 cm on prior). Captem stopped on 24. Recc PRRT  5. PRRT started on 24, s/p 3 doses     Interval History:   Ms Marshall returns today for follow up. Had 3rd dose of PRRT on 1/15/25. Has been tolerating PRRT and lanreotide well. Started Jardiance yesterday. Eating well, trying to stay hydration. No significant nausea or diarrhea. She does have occasional constipation that is improved with laxatives. Has been staying busy taking care of/visiting her family members who are admitted in [place].     ECO. Presents alone today    ROS:   A ten-point system review is obtained and negative except for what was stated in the Interval History.     Physical Examination:   Vital signs reviewed.   General: well hydrated, well developed, in no acute distress  HEENT: normocephalic, EOMI, anicteric sclerae  Neck: supple, no JVD  Lungs: clear breath sounds bilaterally, no wheezing, rales, or rhonchi  Heart: RRR, no M/R/G  Abdomen: soft, no tenderness, non-distended. BS present  Extremities: no clubbing, cyanosis, or edema  Skin: no rash, ulcer, or open wounds. Darkening of skin color of fingers and toes  Neuro: alert and oriented x 4, no focal neuro deficit  Psych: pleasant and appropriate mood and affect    Objective:     Laboratory Data:  Labs reviewed. Creatinine 1.5.     Imaging Data:  Gallium PET 23:  Similar number and distribution of radiotracer avid lesions within the liver and pancreatic body.  Several liver lesions demonstrate central PET photopenia and hypodensity on CT consistent with necrosis.     New nonspecific mild radiotracer uptake within several small  left axillary lymph nodes.  Finding may relate to left upper extremity injection and partial radiotracer extravasation.     Otherwise, no new somatostatin receptor avid lesion.    I have personally reviewed her CT abdomen/pelvis with Dr Soni: Receptor positive pancreatic lesion and liver mets. Liver mets appear enlarged since 6/2022, though this may be due to larger cystic components.     MRI abdomen 4/4/23:  Redemonstration of multiple hepatic lesions in keeping with known neuroendocrine tumor metastasis.  Dominant lesions within the right lobe and segment 4 demonstrate decreased central enhancement consistent with response to therapy with residual disease likely present at these sites.  Additional stable enhancing lesions within the left and right lobes consistent with residual disease.  No definite new lesion.     Persistent restricted diffusion within the pancreatic body in keeping with known lesion.     Stable mildly prominent periportal lymph nodes, nonspecific.    MRI abdomen 6/07/23  Impression:  1. Interval hepatic chemoembolization.  Decreased size of multiple hepatic lesions consistent with known metastasis.  Several lesions demonstrate persistent enhancement consistent with residual disease.  New arterial enhancement within a right hepatic lobe lesion consistent with tumor.  2. Persistent restricted diffusion within the pancreatic body in keeping with known lesion.    NM PET 6/27/23  Impression:  Overall findings concerning for mild disease progression with new hepatic lesion, enlarging pancreatic body lesion and new focus of radiotracer uptake within the right hilum.  Post treatment changes of several liver lesions with decreased radiotracer uptake.  Additional nonspecific low level radiotracer uptake within axillary, mediastinal, and bilateral inguinal lymph nodes.  Attention on follow-up.    MRI 9/16/23:  Impression:     Patient with neuroendocrine tumor with hepatic metastases and interval  microwave ablation of multiple hepatic lesions.     Small amount of diffusion restriction at the superior aspect of segment 4B treatment cavity raises question of residual tumor.  Attention on follow-up.     Interval decrease in size of segment 5/8 lesion.     Interval mildly increased size of segment 6 lesion with persistent internal enhancement and nodular diffusion restriction which may reflect residual disease.     No definite new hepatic lesion.     Mildly increased size of pancreatic body mass.    PET scan 10/4/23:  Impression:     Multiple radiotracer avid hepatic lesions with mixed interval changes compared to prior exam, please see above for additional details.     Mild increased uptake within a right hilar lesion/lymph node.  Radiotracer avid pancreatic body lesion appears mildly enlarged.     No definite new lesion elsewhere.     Persistent mild nonspecific radiotracer uptake within small bilateral axillary lymph nodes    Copper PET 3/18/24:  Impression:     Mixed response to treatment.  The hepatic metastases have decreased in their radiotracer avidity well-appearing unchanged in CT appearance.  However, the pancreatic body lesion and right perihilar metastasis demonstrate worsening radiotracer uptake.  There is no new site of PET avid disease.    [facility] MRI abdomen 7/26/24 report:  IMPRESSION:     1.  Posttreatment changes in the liver with no new enhancing liver masses to suggest progressive metastatic disease   2.  Chronic changes in the pancreas including pancreatic duct dilatation was present on the prior study   3.  Cholelithiasis and chronic gallbladder wall thickening        MRI abdomen from 7/26/24 compared to MRI abdomen 12/20/24 at [facility] tumor board. MRI demonstrates new subcm foci of DWI at the periphery of treated lesions c/w recurrent disease. Pancreatic mass is larger than on prior study (3.7 x 2.3 cm now, 2.6 x 1.3 cm on prior).     Copper PET 8/23/24:  Impression:     Overall stable to  improved size and radiotracer uptake in the neuroendocrine metastatic lesions, as detailed above with parial favorable treatment response.  No new tracer avid lesions concerning for progression of disease.    Gallium PET 12/17/24:     FINDINGS:  Quality of the study: Adequate.     SUV measurements may not be directly comparable with those made on Cu-64 DOTATATE PET/CT.     In the head and neck, there is physiologic distribution in the pituitary, salivary, and thyroid glands, and there are no tracer avid lesions suspicious for malignancy.     In the chest, tracer avid right hilar lesion is again seen now with SUV max of 9.6 (axial image 124), previously with SUV max of 16.  Similar low level tracer uptake in the axillary lymph nodes.     In the abdomen and pelvis, there is physiologic uptake in the pancreatic uncinate process and body, liver, spleen, adrenal glands and  tract.  Posttreatment changes of the hepatic dome.  There are tracer avid lesions within the liver and pancreas with index lesions as follows:     Increased tracer uptake within hepatic segment 5 lesion with maximum SUV of 36 (axial image 152), previously with SUV max of 19.     Peripheral hepatic dome lesion lateral to the embolization site with SUV max of 31 (axial image 140), previously with SUV max of 14.     Pancreatic body lesion with SUV max of 41 (axial image 169), previously with SUV max of 52.     In the bones, there are no tracer avid lesions suspicious for malignancy.     Incidental CT findings: Similar hypodense 1.6 cm right thyroid nodule. Mild calcific atherosclerosis of the aorta. Moderate calcific atherosclerosis of the coronary arteries. Postoperative change of bowel resection.  Anterior abdominal hernia containing bowel, no evidence of obstruction.  Layering material in the gallbladder consistent with sludge.     Impression:     Similar distribution of tracer avid lesions involving the right hilum, liver and pancreas. No definite  new lesion.        Assessment and Plan:     1. Malignant carcinoid tumor of ileum    2. Malignant carcinoid tumor of rectum    3. Secondary malignant neoplasm of tail of pancreas    4. Secondary malignant neoplasm of liver    5. Immunodeficiency secondary to neoplasm    6. Immunodeficiency due to drugs    7. Stage 3 chronic kidney disease, unspecified whether stage 3a or 3b CKD    8. Essential hypertension        1-6  - Ms Marshall is a 69 yo woman with history of ypT3N0 rectal NET, well differentiated, grade 2 (Ki 67 16%) and small bowel NET, well diff, low grade, s/p surgery in Jan 2018. She was found to have metastatic disease to the liver and pancreas in July 2021 s/p biopsy. Liver NET is well diff grade 2 (Ki67 15)%. Pancreatic NET is well diff grade 3 (Ki67 25%). She has been on lanreotide since July 2022, changed to sandostatin in Jan 2023 after CT scan showed progression. S/p TACE to the liver on 3/1/23, 4/24/23, 7/21/23, 11/3/23  - MRI and PET in Sep and Oct 2023 reviewed at tumor board. MRI showed response in ablated cavities. Residual in dome lesions has progressed. Pancreatic lesion increased to 2.3 x 1.6 cm. Copper PET showed stable distribution of disease outside of liver.   - started captem on 10/14/23. TACE on 11/3/23, 2/7/24, 5/15/24  - MRI abdomen from 7/26/24 compared to MRI abdomen 12/20/24 at [facility] tumor board. MRI demonstrates new subcm foci of DWI at the periphery of treated areas c/w recurrent disease. Pancreatic mass is larger than on prior study (3.7 x 2.3 cm now, 2.6 x 1.3 cm on prior).   - Captem stopped on 8/28/24. Recc PRRT  - PRRT started on 9/25/24, s/p 2 doses  - reviewed PET results with patient. Stable disease    - Most recent PRRT given 1/15/25. Tolerating well.   - I have reviewed the CBC and CMP, stable. Bio-markers are pending.     - Return in 1 month for dose 4.   - she gets lanreotide at Dr Olson's office 1 day after PRRT.    7.  - Cr stable, 1.5  - encouraged to continue good  oral hydration  - started jardiance 2/10/25 per PCP     8.  - BP controlled. Feels well.   - c/w current medication.    Follow-up:     RTC in one month for next PRRT     Patient is in agreement with the proposed treatment plan. All questions were answered to the patient's satisfaction. Pt knows to call clinic if anything is needed before the next clinic visit.    Patient discussed with collaborating physician, Dr. Smart.    At least 30 minutes were spent today on this encounter including face to face time with the patient, data gathering/interpretation and documentation.       Ramya Patterson, MSN, APRN, [facility]  Hematology and Medical Oncology  Clinical Nurse Specialist to Dr. Berger, Dr. Cabrera & Dr. Smart    Route Chart for Scheduling    Med Onc Chart Routing      Follow up with physician 4 weeks. keep as scheduled - thanks!   Follow up with ELLY    Infusion scheduling note    Injection scheduling note    Labs    Imaging    Pharmacy appointment    Other referrals            Therapy Plan Information  LUTATHERA SUPPORT for Secondary malignant neoplasm of tail of pancreas, noted on 8/28/2024  LUTATHERA SUPPORT for Secondary malignant neoplasm of liver, noted on 12/23/2021  LUTATHERA SUPPORT for Carcinoid tumor of ileum, noted on 12/27/2017  LUTATHERA SUPPORT for Carcinoid tumor of rectum, noted on 12/27/2017  IV Fluids  0.9% NaCl infusion  Intravenous, Every 8 weeks  Pre-Medications  ondansetron-dexAMETHasone 16-12 mg in sodium chloride 0.9% 50 mL IVPB 16 mg  16 mg, Intravenous, Every 8 weeks  amino acid (25 g L-LYSINE, 25 g L-ARGININE) in sodium chloride 0.9% 1000 mL infusion  1,000 mL, Intravenous, Every 8 weeks  promethazine (PHENERGAN) 12.5 mg in 0.9% NaCl 50 mL IVPB  12.5 mg, Intravenous, PRN  acetaminophen tablet 650 mg  650 mg, Oral, Every 8 weeks  Flushes  sodium chloride 0.9% flush 10 mL  10 mL, Intravenous, Every 8 weeks  PRN Medications  diphenhydrAMINE injection 50 mg  50 mg, Intravenous, Every 8  weeks  hydrocortisone sodium succinate injection 100 mg  100 mg, Intravenous, Every 8 weeks      No therapy plan of the specified type found.    No therapy plan of the specified type found.

## 2025-02-14 LAB — 5OH-INDOLEACETATE SERPL-MCNC: 7 NG/ML

## 2025-02-17 LAB — SEROTONIN: <30 NG/ML

## 2025-02-18 LAB — PANCREASTATIN SERPL-MCNC: 63 PG/ML (ref 10–135)

## 2025-03-06 ENCOUNTER — TELEPHONE (OUTPATIENT)
Dept: INFUSION THERAPY | Facility: HOSPITAL | Age: 70
End: 2025-03-06
Payer: MEDICARE

## 2025-03-06 NOTE — TELEPHONE ENCOUNTER
Called the patient to ask if she had gotten labs done for PRRT treatment. Pt states she has not, and just forgot.     Called Patricia Noyola at Dr. Olson's office. Message given to call me back to set up labs for the patient that need to be done no later than tomorrow morning

## 2025-03-06 NOTE — TELEPHONE ENCOUNTER
Called the patient a second time to let her know I have not gotten a call from Dr. Olson's [place] to set up labs. I advised the patient to go first thing in the morning and request a cbc/cmp. Pt wrote down instructions and verbalized understanding. I will follow up tomorrow morning.

## 2025-03-11 ENCOUNTER — OFFICE VISIT (OUTPATIENT)
Dept: HEMATOLOGY/ONCOLOGY | Facility: CLINIC | Age: 70
End: 2025-03-11
Payer: MEDICARE

## 2025-03-11 ENCOUNTER — LAB VISIT (OUTPATIENT)
Dept: LAB | Facility: HOSPITAL | Age: 70
End: 2025-03-11
Payer: MEDICARE

## 2025-03-11 VITALS
HEIGHT: 66 IN | TEMPERATURE: 98 F | OXYGEN SATURATION: 100 % | BODY MASS INDEX: 34.07 KG/M2 | WEIGHT: 212 LBS | HEART RATE: 66 BPM | DIASTOLIC BLOOD PRESSURE: 70 MMHG | RESPIRATION RATE: 14 BRPM | SYSTOLIC BLOOD PRESSURE: 147 MMHG

## 2025-03-11 DIAGNOSIS — D84.81 IMMUNODEFICIENCY SECONDARY TO NEOPLASM: ICD-10-CM

## 2025-03-11 DIAGNOSIS — D84.821 IMMUNODEFICIENCY DUE TO DRUGS: ICD-10-CM

## 2025-03-11 DIAGNOSIS — Z79.899 IMMUNODEFICIENCY DUE TO DRUGS: ICD-10-CM

## 2025-03-11 DIAGNOSIS — N18.30 STAGE 3 CHRONIC KIDNEY DISEASE, UNSPECIFIED WHETHER STAGE 3A OR 3B CKD: ICD-10-CM

## 2025-03-11 DIAGNOSIS — C7A.026 MALIGNANT CARCINOID TUMOR OF RECTUM: ICD-10-CM

## 2025-03-11 DIAGNOSIS — C78.89 SECONDARY MALIGNANT NEOPLASM OF TAIL OF PANCREAS: ICD-10-CM

## 2025-03-11 DIAGNOSIS — D53.9 NUTRITIONAL ANEMIA: ICD-10-CM

## 2025-03-11 DIAGNOSIS — I10 ESSENTIAL HYPERTENSION: ICD-10-CM

## 2025-03-11 DIAGNOSIS — C7A.012 MALIGNANT CARCINOID TUMOR OF ILEUM: ICD-10-CM

## 2025-03-11 DIAGNOSIS — D49.9 IMMUNODEFICIENCY SECONDARY TO NEOPLASM: ICD-10-CM

## 2025-03-11 DIAGNOSIS — C7A.012 MALIGNANT CARCINOID TUMOR OF ILEUM: Primary | ICD-10-CM

## 2025-03-11 DIAGNOSIS — C78.7 SECONDARY MALIGNANT NEOPLASM OF LIVER: ICD-10-CM

## 2025-03-11 LAB
ALBUMIN SERPL BCP-MCNC: 3.7 G/DL (ref 3.5–5.2)
ALP SERPL-CCNC: 102 U/L (ref 40–150)
ALT SERPL W/O P-5'-P-CCNC: 16 U/L (ref 10–44)
ANION GAP SERPL CALC-SCNC: 10 MMOL/L (ref 8–16)
AST SERPL-CCNC: 29 U/L (ref 10–40)
BASOPHILS # BLD AUTO: 0.02 K/UL (ref 0–0.2)
BASOPHILS NFR BLD: 0.4 % (ref 0–1.9)
BILIRUB SERPL-MCNC: 0.6 MG/DL (ref 0.1–1)
BUN SERPL-MCNC: 14 MG/DL (ref 8–23)
CALCIUM SERPL-MCNC: 9.5 MG/DL (ref 8.7–10.5)
CHLORIDE SERPL-SCNC: 104 MMOL/L (ref 95–110)
CO2 SERPL-SCNC: 27 MMOL/L (ref 23–29)
CREAT SERPL-MCNC: 1.3 MG/DL (ref 0.5–1.4)
DIFFERENTIAL METHOD BLD: ABNORMAL
EOSINOPHIL # BLD AUTO: 0.2 K/UL (ref 0–0.5)
EOSINOPHIL NFR BLD: 4.8 % (ref 0–8)
ERYTHROCYTE [DISTWIDTH] IN BLOOD BY AUTOMATED COUNT: 14.5 % (ref 11.5–14.5)
EST. GFR  (NO RACE VARIABLE): 44 ML/MIN/1.73 M^2
GLUCOSE SERPL-MCNC: 116 MG/DL (ref 70–110)
HCT VFR BLD AUTO: 31.7 % (ref 37–48.5)
HGB BLD-MCNC: 9.7 G/DL (ref 12–16)
IMM GRANULOCYTES # BLD AUTO: 0.01 K/UL (ref 0–0.04)
IMM GRANULOCYTES NFR BLD AUTO: 0.2 % (ref 0–0.5)
LYMPHOCYTES # BLD AUTO: 0.9 K/UL (ref 1–4.8)
LYMPHOCYTES NFR BLD: 18.1 % (ref 18–48)
MCH RBC QN AUTO: 27.9 PG (ref 27–31)
MCHC RBC AUTO-ENTMCNC: 30.6 G/DL (ref 32–36)
MCV RBC AUTO: 91 FL (ref 82–98)
MONOCYTES # BLD AUTO: 0.4 K/UL (ref 0.3–1)
MONOCYTES NFR BLD: 8 % (ref 4–15)
NEUTROPHILS # BLD AUTO: 3.4 K/UL (ref 1.8–7.7)
NEUTROPHILS NFR BLD: 68.5 % (ref 38–73)
NRBC BLD-RTO: 0 /100 WBC
PLATELET # BLD AUTO: 203 K/UL (ref 150–450)
PMV BLD AUTO: 9.6 FL (ref 9.2–12.9)
POTASSIUM SERPL-SCNC: 4 MMOL/L (ref 3.5–5.1)
PROT SERPL-MCNC: 7.5 G/DL (ref 6–8.4)
RBC # BLD AUTO: 3.48 M/UL (ref 4–5.4)
SODIUM SERPL-SCNC: 141 MMOL/L (ref 136–145)
WBC # BLD AUTO: 4.98 K/UL (ref 3.9–12.7)

## 2025-03-11 PROCEDURE — 3008F BODY MASS INDEX DOCD: CPT | Mod: CPTII,S$GLB,, | Performed by: INTERNAL MEDICINE

## 2025-03-11 PROCEDURE — 3288F FALL RISK ASSESSMENT DOCD: CPT | Mod: CPTII,S$GLB,, | Performed by: INTERNAL MEDICINE

## 2025-03-11 PROCEDURE — 3077F SYST BP >= 140 MM HG: CPT | Mod: CPTII,S$GLB,, | Performed by: INTERNAL MEDICINE

## 2025-03-11 PROCEDURE — 83519 RIA NONANTIBODY: CPT | Performed by: INTERNAL MEDICINE

## 2025-03-11 PROCEDURE — G2211 COMPLEX E/M VISIT ADD ON: HCPCS | Mod: S$GLB,,, | Performed by: INTERNAL MEDICINE

## 2025-03-11 PROCEDURE — 1160F RVW MEDS BY RX/DR IN RCRD: CPT | Mod: CPTII,S$GLB,, | Performed by: INTERNAL MEDICINE

## 2025-03-11 PROCEDURE — 1159F MED LIST DOCD IN RCRD: CPT | Mod: CPTII,S$GLB,, | Performed by: INTERNAL MEDICINE

## 2025-03-11 PROCEDURE — 99215 OFFICE O/P EST HI 40 MIN: CPT | Mod: S$GLB,,, | Performed by: INTERNAL MEDICINE

## 2025-03-11 PROCEDURE — 84260 ASSAY OF SEROTONIN: CPT | Performed by: INTERNAL MEDICINE

## 2025-03-11 PROCEDURE — 80053 COMPREHEN METABOLIC PANEL: CPT | Performed by: INTERNAL MEDICINE

## 2025-03-11 PROCEDURE — 36415 COLL VENOUS BLD VENIPUNCTURE: CPT | Performed by: INTERNAL MEDICINE

## 2025-03-11 PROCEDURE — 83497 ASSAY OF 5-HIAA: CPT | Performed by: INTERNAL MEDICINE

## 2025-03-11 PROCEDURE — 85025 COMPLETE CBC W/AUTO DIFF WBC: CPT | Performed by: INTERNAL MEDICINE

## 2025-03-11 PROCEDURE — 1126F AMNT PAIN NOTED NONE PRSNT: CPT | Mod: CPTII,S$GLB,, | Performed by: INTERNAL MEDICINE

## 2025-03-11 PROCEDURE — 1101F PT FALLS ASSESS-DOCD LE1/YR: CPT | Mod: CPTII,S$GLB,, | Performed by: INTERNAL MEDICINE

## 2025-03-11 PROCEDURE — 99999 PR PBB SHADOW E&M-EST. PATIENT-LVL V: CPT | Mod: PBBFAC,,, | Performed by: INTERNAL MEDICINE

## 2025-03-11 PROCEDURE — 3078F DIAST BP <80 MM HG: CPT | Mod: CPTII,S$GLB,, | Performed by: INTERNAL MEDICINE

## 2025-03-11 NOTE — PROGRESS NOTES
PROGRESS NOTE    Subjective:       Patient ID: Coby Marshall is a 69 y.o. female.    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    Tempus: no reportable pathogenic mutation. MSI-low. PDL1 negative. TMB 3.7    Oncologic History:  1. Ms. 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 Her primary oncologist is Dr. Olson at [place].  Her oncology history is detailed below. She was initially diagnosed with rectal adenocarcinoma and she was treated with neoadjuvant chemoRT. Surgical pathology resulted as well-differentiated carcinoid and she was initially observed. A CT obtained for hernia revealed liver metastases in July 2021, and these were confirmed to be neuroendocrine on pathology. She underwent EUS with biopsy in December 2021 which showed grade 3 NET in the pancreas with Ki67 25%. She was started on lanreotide. Her liver lesions have since grown in size, and her oncologist referred her for consideration of liver-directed therapy. Ms. Marshall reports ongoing diarrhea since the time of her initial resection. She has about 6 bowel movements per day. She does not have any flushing, palpitations, or diaphoresis. She otherwise feels well.   Oncology History:   8/2017: Well-differentiated carcinoid tumor in the rectum, grade 2, and in the terminal ileum, grade 1. Initially diagnosed as adenocarcinoma and later changed to carcinoid tumor at time of resection  Treated with concurrent chemoRT with infusional 5-FU under the diagnosis of adenocarcinoma  1/2018: resection of rectum and terminal ileum. Pathology showed a ypT3N0 1.7 cm rectal NET, well  "differentiated, grade 2, Ki 67 was 16%. 1.5 cm T2Nx small bowel NET, well diff, Ki 67 was less than 3%.  7/22/2021: Metastasis to liver on CT for hernia repair.   11/10/2021: Copper PET:    In this patient with rectal and ileal neuroendocrine tumor status post low anterior resection/small bowel resection, there are multiple somatostatin receptor avid hepatic lesions which appear increased in size as compared to outside CT 07/19/2021.  There is also tracer avid focus in the pancreatic body suspicious for malignancy.  All these findings are new since prior Dotatate PET-CT.  12/17/2021: EUS with biopsies:  "1. PANCREAS MASS, ENDOSCOPIC ULTRASOUND-GUIDED FNA:   Well-differentiated neuroendocrine tumor, favor WHO grade 3 (G3).   Comment:  Ki-67 labeling index is approximately 25%.  Average mitotic rate is   9 per 2 mm^2.  Tumor cells are positive with synaptophysin and chromogranin,   supporting the diagnosis.   2. LIVER, LEFT LOWER LOBE, ENDOSCOPIC ULTRASOUND-GUIDED FNA:   Well-differentiated neuroendocrine tumor.   3. LIVER, RIGHT LOWER LOBE, ENDOSCOPIC ULTRASOUND-GUIDED FNA:   Well-differentiated neuroendocrine tumor, favor WHO grade 2 (G2).   Comment (2,3):  Tumor in parts 2 and 3 are histologically identical.  Only   part 3 contains enough cell block lesional material to render a meaningful   labeling index and mitotic count.  Ki-67 labeling index is approximately 15%.    Average mitotic rate is 3 per 2 mm^2.  Tumor cells are positive with   synaptophysin but negative for chromogranin.  The overall findings are still   consistent with metastatic well-differentiated neuroendocrine tumor."  1/3/2022: Lanreotide started  1/4/2023: repeat CT abd/pelvis: worsening hepatic metastases. Changed Lanreotide to Sandostatin.   2. [facility]. S/p TACE on 3/1/23, 4/24/23, 7/21/23, 11/3/23  3. Scan in Sep/Oct 2023 showed residual in dome lesions progressed. Pancreatic lesion increased to 2.3 x " 1.6 cm. Started captem 10/14/23 for progression of the pancreatic lesion. Had TACE on 11/3/23. TACE to segment IV and V on 24. TACE on 5/15/24.   4. MRI abdomen from 24 compared to MRI abdomen 24 at [facility] tumor board. MRI demonstrates new subcm foci of DWI at the periphery of treated julia c/w recurrent disease. Pancreatic mass is larger than on prior study (3.7 x 2.3 cm now, 2.6 x 1.3 cm on prior). Captem stopped on 24. Recc PRRT  5. PRRT started on 24, s/p 3 doses     Interval History:   Ms Marshall returns today for follow up. Feeling well. Eating and drinking well.     ECO    ROS:   A ten-point system review is obtained and negative except for what was stated in the Interval History.     Physical Examination:   Vital signs reviewed.   General: well hydrated, well developed, in no acute distress  HEENT: normocephalic, PERRLA, EOMI, anicteric sclerae  Neck: supple, no JVD, thyromegaly, cervical or supraclavicular lymphadenopathy  Lungs: clear breath sounds bilaterally, no wheezing, rales, or rhonchi  Heart: RRR, no M/R/G  Abdomen: soft, no tenderness, non-distended, no hepatosplenomegaly, mass, or hernia. BS present  Extremities: no clubbing, cyanosis, or edema  Skin: no rash, ulcer, or open wounds. Darkening of skin color of fingers and toes  Neuro: alert and oriented x 4, no focal neuro deficit  Psych: pleasant and appropriate mood and affect    Objective:     Laboratory Data:  Labs reviewed. Creatinine 1.3    Imaging Data:  Gallium PET 23:  Similar number and distribution of radiotracer avid lesions within the liver and pancreatic body.  Several liver lesions demonstrate central PET photopenia and hypodensity on CT consistent with necrosis.     New nonspecific mild radiotracer uptake within several small left axillary lymph nodes.  Finding may relate to left upper extremity injection and partial radiotracer extravasation.     Otherwise, no new somatostatin receptor avid lesion.    I  have personally reviewed her CT abdomen/pelvis with Dr Soni: Receptor positive pancreatic lesion and liver mets. Liver mets appear enlarged since 6/2022, though this may be due to larger cystic components.     MRI abdomen 4/4/23:  Redemonstration of multiple hepatic lesions in keeping with known neuroendocrine tumor metastasis.  Dominant lesions within the right lobe and segment 4 demonstrate decreased central enhancement consistent with response to therapy with residual disease likely present at these sites.  Additional stable enhancing lesions within the left and right lobes consistent with residual disease.  No definite new lesion.     Persistent restricted diffusion within the pancreatic body in keeping with known lesion.     Stable mildly prominent periportal lymph nodes, nonspecific.    MRI abdomen 6/07/23  Impression:  1. Interval hepatic chemoembolization.  Decreased size of multiple hepatic lesions consistent with known metastasis.  Several lesions demonstrate persistent enhancement consistent with residual disease.  New arterial enhancement within a right hepatic lobe lesion consistent with tumor.  2. Persistent restricted diffusion within the pancreatic body in keeping with known lesion.    NM PET 6/27/23  Impression:  Overall findings concerning for mild disease progression with new hepatic lesion, enlarging pancreatic body lesion and new focus of radiotracer uptake within the right hilum.  Post treatment changes of several liver lesions with decreased radiotracer uptake.  Additional nonspecific low level radiotracer uptake within axillary, mediastinal, and bilateral inguinal lymph nodes.  Attention on follow-up.    MRI 9/16/23:  Impression:     Patient with neuroendocrine tumor with hepatic metastases and interval microwave ablation of multiple hepatic lesions.     Small amount of diffusion restriction at the superior aspect of segment 4B treatment cavity raises question of residual tumor.  Attention  on follow-up.     Interval decrease in size of segment 5/8 lesion.     Interval mildly increased size of segment 6 lesion with persistent internal enhancement and nodular diffusion restriction which may reflect residual disease.     No definite new hepatic lesion.     Mildly increased size of pancreatic body mass.    PET scan 10/4/23:  Impression:     Multiple radiotracer avid hepatic lesions with mixed interval changes compared to prior exam, please see above for additional details.     Mild increased uptake within a right hilar lesion/lymph node.  Radiotracer avid pancreatic body lesion appears mildly enlarged.     No definite new lesion elsewhere.     Persistent mild nonspecific radiotracer uptake within small bilateral axillary lymph nodes    Copper PET 3/18/24:  Impression:     Mixed response to treatment.  The hepatic metastases have decreased in their radiotracer avidity well-appearing unchanged in CT appearance.  However, the pancreatic body lesion and right perihilar metastasis demonstrate worsening radiotracer uptake.  There is no new site of PET avid disease.    [facility] MRI abdomen 7/26/24 report:  IMPRESSION:     1.  Posttreatment changes in the liver with no new enhancing liver masses to suggest progressive metastatic disease   2.  Chronic changes in the pancreas including pancreatic duct dilatation was present on the prior study   3.  Cholelithiasis and chronic gallbladder wall thickening        MRI abdomen from 7/26/24 compared to MRI abdomen 12/20/24 at NET tumor board. MRI demonstrates new subcm foci of DWI at the periphery of treated lesions c/w recurrent disease. Pancreatic mass is larger than on prior study (3.7 x 2.3 cm now, 2.6 x 1.3 cm on prior).     Copper PET 8/23/24:  Impression:     Overall stable to improved size and radiotracer uptake in the neuroendocrine metastatic lesions, as detailed above with parial favorable treatment response.  No new tracer avid lesions concerning for progression of  disease.    Gallium PET 12/17/24:     FINDINGS:  Quality of the study: Adequate.     SUV measurements may not be directly comparable with those made on Cu-64 DOTATATE PET/CT.     In the head and neck, there is physiologic distribution in the pituitary, salivary, and thyroid glands, and there are no tracer avid lesions suspicious for malignancy.     In the chest, tracer avid right hilar lesion is again seen now with SUV max of 9.6 (axial image 124), previously with SUV max of 16.  Similar low level tracer uptake in the axillary lymph nodes.     In the abdomen and pelvis, there is physiologic uptake in the pancreatic uncinate process and body, liver, spleen, adrenal glands and  tract.  Posttreatment changes of the hepatic dome.  There are tracer avid lesions within the liver and pancreas with index lesions as follows:     Increased tracer uptake within hepatic segment 5 lesion with maximum SUV of 36 (axial image 152), previously with SUV max of 19.     Peripheral hepatic dome lesion lateral to the embolization site with SUV max of 31 (axial image 140), previously with SUV max of 14.     Pancreatic body lesion with SUV max of 41 (axial image 169), previously with SUV max of 52.     In the bones, there are no tracer avid lesions suspicious for malignancy.     Incidental CT findings: Similar hypodense 1.6 cm right thyroid nodule. Mild calcific atherosclerosis of the aorta. Moderate calcific atherosclerosis of the coronary arteries. Postoperative change of bowel resection.  Anterior abdominal hernia containing bowel, no evidence of obstruction.  Layering material in the gallbladder consistent with sludge.     Impression:     Similar distribution of tracer avid lesions involving the right hilum, liver and pancreas. No definite new lesion.           Assessment and Plan:     1. Malignant carcinoid tumor of ileum    2. Malignant carcinoid tumor of rectum    3. Secondary malignant neoplasm of tail of pancreas    4. Secondary  malignant neoplasm of liver    5. Immunodeficiency secondary to neoplasm    6. Immunodeficiency due to drugs    7. Stage 3 chronic kidney disease, unspecified whether stage 3a or 3b CKD    8. Essential hypertension    9. Nutritional anemia          1-6  - Ms Marshall is a 67 yo woman with history of ypT3N0 rectal NET, well differentiated, grade 2 (Ki 67 16%) and small bowel NET, well diff, low grade, s/p surgery in Jan 2018. She was found to have metastatic disease to the liver and pancreas in July 2021 s/p biopsy. Liver NET is well diff grade 2 (Ki67 15)%. Pancreatic NET is well diff grade 3 (Ki67 25%). She has been on lanreotide since July 2022, changed to sandostatin in Jan 2023 after CT scan showed progression. S/p TACE to the liver on 3/1/23, 4/24/23, 7/21/23, 11/3/23  - MRI and PET in Sep and Oct 2023 reviewed at tumor board. MRI showed response in ablated cavities. Residual in dome lesions has progressed. Pancreatic lesion increased to 2.3 x 1.6 cm. Copper PET showed stable distribution of disease outside of liver.   - started captem on 10/14/23. TACE on 11/3/23, 2/7/24, 5/15/24  - MRI abdomen from 7/26/24 compared to MRI abdomen 12/20/24 at [facility] tumor board. MRI demonstrates new subcm foci of DWI at the periphery of treated areas c/w recurrent disease. Pancreatic mass is larger than on prior study (3.7 x 2.3 cm now, 2.6 x 1.3 cm on prior).   - Captem stopped on 8/28/24. Recc PRRT  - PRRT started on 9/25/24, s/p 3 doses  - doing well.   - labs adequate. Dose 4 PRRT tomorrow  - lanreotide at Dr Olson's office 1 day after PRRT  - return in 2 months with restaging copper PET    7.  - Cr stable  - encouraged oral hydration    8.  - BP controlled  - c/w current medication    9.  - stable  - continue with CBC monitoring at Dr Olson's office    Follow-up:     RTC in 2 months      Ariel Smart MD  Hematology and Medical Oncology  [facility]    Route Chart for Scheduling    Med Onc Chart Routing      Follow  up with physician 2 months. see me in 2 months with CBC, CMP, serotonin, pancreastatin, 5-HIAA, copper PET 1 day prior, overnight [facility] stay   Follow up with [facility]    Infusion scheduling note    Injection scheduling note    Labs CBC and CMP   Scheduling:  Preferred lab:  Lab interval:  serotonin, pancreastatin, 5-HIAA   Imaging PET scan      Pharmacy appointment    Other referrals          Therapy Plan Information  LUTATHERA SUPPORT for Secondary malignant neoplasm of tail of pancreas, noted on 8/28/2024  LUTATHERA SUPPORT for Secondary malignant neoplasm of liver, noted on 12/23/2021  LUTATHERA SUPPORT for Carcinoid tumor of ileum, noted on 12/27/2017  LUTATHERA SUPPORT for Carcinoid tumor of rectum, noted on 12/27/2017  IV Fluids  0.9% NaCl infusion  Intravenous, Every 8 weeks  Pre-Medications  ondansetron-dexAMETHasone 16-12 mg in sodium chloride 0.9% 50 mL IVPB 16 mg  16 mg, Intravenous, Every 8 weeks  amino acid (25 g L-LYSINE, 25 g L-ARGININE) in sodium chloride 0.9% 1000 mL infusion  1,000 mL, Intravenous, Every 8 weeks  promethazine (PHENERGAN) 12.5 mg in 0.9% NaCl 50 mL IVPB  12.5 mg, Intravenous, PRN  acetaminophen tablet 650 mg  650 mg, Oral, Every 8 weeks  Flushes  sodium chloride 0.9% flush 10 mL  10 mL, Intravenous, Every 8 weeks  PRN Medications  diphenhydrAMINE injection 50 mg  50 mg, Intravenous, Every 8 weeks  hydrocortisone sodium succinate injection 100 mg  100 mg, Intravenous, Every 8 weeks      No therapy plan of the specified type found.    No therapy plan of the specified type found.

## 2025-03-12 ENCOUNTER — INFUSION (OUTPATIENT)
Dept: INFUSION THERAPY | Facility: HOSPITAL | Age: 70
End: 2025-03-12
Attending: STUDENT IN AN ORGANIZED HEALTH CARE EDUCATION/TRAINING PROGRAM
Payer: MEDICARE

## 2025-03-12 ENCOUNTER — HOSPITAL ENCOUNTER (OUTPATIENT)
Dept: RADIOLOGY | Facility: HOSPITAL | Age: 70
Discharge: HOME | End: 2025-03-12
Attending: INTERNAL MEDICINE

## 2025-03-12 VITALS
TEMPERATURE: 98 F | HEIGHT: 66 IN | HEART RATE: 64 BPM | DIASTOLIC BLOOD PRESSURE: 64 MMHG | SYSTOLIC BLOOD PRESSURE: 133 MMHG | OXYGEN SATURATION: 97 % | WEIGHT: 211 LBS | RESPIRATION RATE: 16 BRPM | BODY MASS INDEX: 33.91 KG/M2

## 2025-03-12 DIAGNOSIS — C78.7 SECONDARY MALIGNANT NEOPLASM OF LIVER: ICD-10-CM

## 2025-03-12 DIAGNOSIS — C78.89 SECONDARY MALIGNANT NEOPLASM OF TAIL OF PANCREAS: Primary | ICD-10-CM

## 2025-03-12 DIAGNOSIS — C7A.012 MALIGNANT CARCINOID TUMOR OF ILEUM: ICD-10-CM

## 2025-03-12 DIAGNOSIS — C7A.026 MALIGNANT CARCINOID TUMOR OF RECTUM: ICD-10-CM

## 2025-03-12 DIAGNOSIS — D3A.026 CARCINOID TUMOR OF RECTUM: ICD-10-CM

## 2025-03-12 PROCEDURE — 96365 THER/PROPH/DIAG IV INF INIT: CPT

## 2025-03-12 PROCEDURE — 63600175 PHARM REV CODE 636 W HCPCS: Performed by: INTERNAL MEDICINE

## 2025-03-12 PROCEDURE — 96366 THER/PROPH/DIAG IV INF ADDON: CPT

## 2025-03-12 PROCEDURE — 96367 TX/PROPH/DG ADDL SEQ IV INF: CPT

## 2025-03-12 PROCEDURE — A9513 LUTETIUM LU 177 DOTATAT THER: HCPCS | Mod: JZ,TB | Performed by: INTERNAL MEDICINE

## 2025-03-12 PROCEDURE — 25000003 PHARM REV CODE 250: Performed by: INTERNAL MEDICINE

## 2025-03-12 PROCEDURE — 79101 NUCLEAR RX IV ADMIN: CPT | Mod: TC

## 2025-03-12 RX ORDER — SODIUM CHLORIDE 0.9 % (FLUSH) 0.9 %
10 SYRINGE (ML) INJECTION
Status: CANCELLED | OUTPATIENT
Start: 2025-05-07

## 2025-03-12 RX ORDER — ACETAMINOPHEN 325 MG/1
650 TABLET ORAL
Status: DISCONTINUED | OUTPATIENT
Start: 2025-03-12 | End: 2025-03-12 | Stop reason: HOSPADM

## 2025-03-12 RX ORDER — SODIUM CHLORIDE 9 MG/ML
INJECTION, SOLUTION INTRAVENOUS ONCE
Status: COMPLETED | OUTPATIENT
Start: 2025-03-12 | End: 2025-03-12

## 2025-03-12 RX ORDER — ACETAMINOPHEN 325 MG/1
650 TABLET ORAL
Status: CANCELLED | OUTPATIENT
Start: 2025-05-07

## 2025-03-12 RX ORDER — SODIUM CHLORIDE 9 MG/ML
INJECTION, SOLUTION INTRAVENOUS ONCE
Status: CANCELLED | OUTPATIENT
Start: 2025-05-07

## 2025-03-12 RX ORDER — SODIUM CHLORIDE 0.9 % (FLUSH) 0.9 %
10 SYRINGE (ML) INJECTION
Status: DISCONTINUED | OUTPATIENT
Start: 2025-03-12 | End: 2025-03-12 | Stop reason: HOSPADM

## 2025-03-12 RX ORDER — DIPHENHYDRAMINE HYDROCHLORIDE 50 MG/ML
50 INJECTION, SOLUTION INTRAMUSCULAR; INTRAVENOUS
OUTPATIENT
Start: 2025-05-07

## 2025-03-12 RX ADMIN — SODIUM CHLORIDE: 9 INJECTION, SOLUTION INTRAVENOUS at 07:03

## 2025-03-12 RX ADMIN — Medication 1000 ML: at 07:03

## 2025-03-12 RX ADMIN — DEXAMETHASONE SODIUM PHOSPHATE 16 MG: 10 INJECTION, SOLUTION INTRAMUSCULAR; INTRAVENOUS at 07:03

## 2025-03-12 RX ADMIN — LUTETIUM LU 177 DOTATATE 205.8 MILLICURIE: 10 INJECTION INTRAVENOUS at 10:03

## 2025-03-12 NOTE — NURSING
1155-Discharge instructions reviewed,copy given with home care precautions. Pt verbalized understanding, has no further questions. Ambulated self off unit in good condition.

## 2025-03-14 LAB — 5OH-INDOLEACETATE SERPL-MCNC: 10 NG/ML

## 2025-03-16 LAB — SEROTONIN: <30 NG/ML

## 2025-03-19 LAB — PANCREASTATIN SERPL-MCNC: 34 PG/ML (ref 10–135)

## 2025-04-30 ENCOUNTER — TELEPHONE (OUTPATIENT)
Dept: HEMATOLOGY/ONCOLOGY | Facility: CLINIC | Age: 70
End: 2025-04-30
Payer: MEDICARE

## 2025-05-12 ENCOUNTER — HOSPITAL ENCOUNTER (OUTPATIENT)
Dept: RADIOLOGY | Facility: HOSPITAL | Age: 70
Discharge: HOME | End: 2025-05-12
Attending: INTERNAL MEDICINE
Payer: MEDICARE

## 2025-05-12 DIAGNOSIS — C78.7 SECONDARY MALIGNANT NEOPLASM OF LIVER: ICD-10-CM

## 2025-05-12 DIAGNOSIS — C78.89 SECONDARY MALIGNANT NEOPLASM OF TAIL OF PANCREAS: ICD-10-CM

## 2025-05-12 DIAGNOSIS — C7A.026 MALIGNANT CARCINOID TUMOR OF RECTUM: ICD-10-CM

## 2025-05-12 DIAGNOSIS — C7A.012 MALIGNANT CARCINOID TUMOR OF ILEUM: ICD-10-CM

## 2025-05-12 PROCEDURE — A9592 HC COPPER CU-64, DOTATE, DX, PER 1 MCI: HCPCS | Mod: JZ,TB | Performed by: INTERNAL MEDICINE

## 2025-05-12 PROCEDURE — 78815 PET IMAGE W/CT SKULL-THIGH: CPT | Mod: 26,PS,, | Performed by: NUCLEAR MEDICINE

## 2025-05-12 PROCEDURE — 78815 PET IMAGE W/CT SKULL-THIGH: CPT | Mod: TC

## 2025-05-12 RX ADMIN — COPPER CU 64 DOTATATE 4.12 MILLICURIE: 1 INJECTION, SOLUTION INTRAVENOUS at 12:05

## 2025-05-29 ENCOUNTER — OFFICE VISIT (OUTPATIENT)
Dept: HEMATOLOGY/ONCOLOGY | Facility: CLINIC | Age: 70
End: 2025-05-29
Payer: MEDICARE

## 2025-05-29 VITALS
BODY MASS INDEX: 33.66 KG/M2 | HEART RATE: 59 BPM | TEMPERATURE: 98 F | WEIGHT: 208.56 LBS | DIASTOLIC BLOOD PRESSURE: 65 MMHG | OXYGEN SATURATION: 98 % | SYSTOLIC BLOOD PRESSURE: 139 MMHG

## 2025-05-29 DIAGNOSIS — C78.89 SECONDARY MALIGNANT NEOPLASM OF TAIL OF PANCREAS: ICD-10-CM

## 2025-05-29 DIAGNOSIS — R60.0 BILATERAL LEG EDEMA: ICD-10-CM

## 2025-05-29 DIAGNOSIS — C78.7 SECONDARY MALIGNANT NEOPLASM OF LIVER: ICD-10-CM

## 2025-05-29 DIAGNOSIS — C7A.026 MALIGNANT CARCINOID TUMOR OF RECTUM: ICD-10-CM

## 2025-05-29 DIAGNOSIS — I10 ESSENTIAL HYPERTENSION: ICD-10-CM

## 2025-05-29 DIAGNOSIS — C7A.012 MALIGNANT CARCINOID TUMOR OF ILEUM: Primary | ICD-10-CM

## 2025-05-29 DIAGNOSIS — D84.81 IMMUNODEFICIENCY SECONDARY TO NEOPLASM: ICD-10-CM

## 2025-05-29 DIAGNOSIS — D84.821 IMMUNODEFICIENCY DUE TO DRUGS: ICD-10-CM

## 2025-05-29 DIAGNOSIS — Z79.899 IMMUNODEFICIENCY DUE TO DRUGS: ICD-10-CM

## 2025-05-29 DIAGNOSIS — N18.30 STAGE 3 CHRONIC KIDNEY DISEASE, UNSPECIFIED WHETHER STAGE 3A OR 3B CKD: ICD-10-CM

## 2025-05-29 DIAGNOSIS — D49.9 IMMUNODEFICIENCY SECONDARY TO NEOPLASM: ICD-10-CM

## 2025-05-29 PROCEDURE — 99999 PR PBB SHADOW E&M-EST. PATIENT-LVL V: CPT | Mod: PBBFAC,,, | Performed by: INTERNAL MEDICINE

## 2025-05-29 PROCEDURE — 3288F FALL RISK ASSESSMENT DOCD: CPT | Mod: CPTII,S$GLB,, | Performed by: INTERNAL MEDICINE

## 2025-05-29 PROCEDURE — G2211 COMPLEX E/M VISIT ADD ON: HCPCS | Mod: S$GLB,,, | Performed by: INTERNAL MEDICINE

## 2025-05-29 PROCEDURE — 1160F RVW MEDS BY RX/DR IN RCRD: CPT | Mod: CPTII,S$GLB,, | Performed by: INTERNAL MEDICINE

## 2025-05-29 PROCEDURE — 1101F PT FALLS ASSESS-DOCD LE1/YR: CPT | Mod: CPTII,S$GLB,, | Performed by: INTERNAL MEDICINE

## 2025-05-29 PROCEDURE — 1159F MED LIST DOCD IN RCRD: CPT | Mod: CPTII,S$GLB,, | Performed by: INTERNAL MEDICINE

## 2025-05-29 PROCEDURE — 3075F SYST BP GE 130 - 139MM HG: CPT | Mod: CPTII,S$GLB,, | Performed by: INTERNAL MEDICINE

## 2025-05-29 PROCEDURE — 3078F DIAST BP <80 MM HG: CPT | Mod: CPTII,S$GLB,, | Performed by: INTERNAL MEDICINE

## 2025-05-29 PROCEDURE — 99215 OFFICE O/P EST HI 40 MIN: CPT | Mod: S$GLB,,, | Performed by: INTERNAL MEDICINE

## 2025-05-29 PROCEDURE — 3008F BODY MASS INDEX DOCD: CPT | Mod: CPTII,S$GLB,, | Performed by: INTERNAL MEDICINE

## 2025-05-29 NOTE — PROGRESS NOTES
PROGRESS NOTE    Subjective:       Patient ID: Coby Marshall is a 69 y.o. female.    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    Tempus: no reportable pathogenic mutation. MSI-low. PDL1 negative. TMB 3.7    Oncologic History:  1. Ms. 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 Her primary oncologist is Dr. Olson at [place].  Her oncology history is detailed below. She was initially diagnosed with rectal adenocarcinoma and she was treated with neoadjuvant chemoRT. Surgical pathology resulted as well-differentiated carcinoid and she was initially observed. A CT obtained for hernia revealed liver metastases in July 2021, and these were confirmed to be neuroendocrine on pathology. She underwent EUS with biopsy in December 2021 which showed grade 3 NET in the pancreas with Ki67 25%. She was started on lanreotide. Her liver lesions have since grown in size, and her oncologist referred her for consideration of liver-directed therapy. Ms. Marshall reports ongoing diarrhea since the time of her initial resection. She has about 6 bowel movements per day. She does not have any flushing, palpitations, or diaphoresis. She otherwise feels well.   Oncology History:   8/2017: Well-differentiated carcinoid tumor in the rectum, grade 2, and in the terminal ileum, grade 1. Initially diagnosed as adenocarcinoma and later changed to carcinoid tumor at time of resection  Treated with concurrent chemoRT with infusional 5-FU under the diagnosis of adenocarcinoma  1/2018: resection of rectum and terminal ileum. Pathology showed a ypT3N0 1.7 cm rectal NET, well  "differentiated, grade 2, Ki 67 was 16%. 1.5 cm T2Nx small bowel NET, well diff, Ki 67 was less than 3%.  7/22/2021: Metastasis to liver on CT for hernia repair.   11/10/2021: Copper PET:    In this patient with rectal and ileal neuroendocrine tumor status post low anterior resection/small bowel resection, there are multiple somatostatin receptor avid hepatic lesions which appear increased in size as compared to outside CT 07/19/2021.  There is also tracer avid focus in the pancreatic body suspicious for malignancy.  All these findings are new since prior Dotatate PET-CT.  12/17/2021: EUS with biopsies:  "1. PANCREAS MASS, ENDOSCOPIC ULTRASOUND-GUIDED FNA:   Well-differentiated neuroendocrine tumor, favor WHO grade 3 (G3).   Comment:  Ki-67 labeling index is approximately 25%.  Average mitotic rate is   9 per 2 mm^2.  Tumor cells are positive with synaptophysin and chromogranin,   supporting the diagnosis.   2. LIVER, LEFT LOWER LOBE, ENDOSCOPIC ULTRASOUND-GUIDED FNA:   Well-differentiated neuroendocrine tumor.   3. LIVER, RIGHT LOWER LOBE, ENDOSCOPIC ULTRASOUND-GUIDED FNA:   Well-differentiated neuroendocrine tumor, favor WHO grade 2 (G2).   Comment (2,3):  Tumor in parts 2 and 3 are histologically identical.  Only   part 3 contains enough cell block lesional material to render a meaningful   labeling index and mitotic count.  Ki-67 labeling index is approximately 15%.    Average mitotic rate is 3 per 2 mm^2.  Tumor cells are positive with   synaptophysin but negative for chromogranin.  The overall findings are still   consistent with metastatic well-differentiated neuroendocrine tumor."  1/3/2022: Lanreotide started  1/4/2023: repeat CT abd/pelvis: worsening hepatic metastases. Changed Lanreotide to Sandostatin.   2. [facility]. S/p TACE on 3/1/23, 4/24/23, 7/21/23, 11/3/23  3. Scan in Sep/Oct 2023 showed residual in dome lesions progressed. Pancreatic lesion increased to 2.3 x " 1.6 cm. Started captem 10/14/23 for progression of the pancreatic lesion. Had TACE on 11/3/23. TACE to segment IV and V on 24. TACE on 5/15/24.   4. MRI abdomen from 24 compared to MRI abdomen 23 at [facility] tumor board. MRI demonstrates new subcm foci of DWI at the periphery of treated julia c/w recurrent disease. Pancreatic mass is larger than on prior study (3.7 x 2.3 cm now, 2.6 x 1.3 cm on prior). Captem stopped on 24. Recc PRRT  5. PRRT started on 24, completed 4 doses on 3/12/25.      Interval History:   Ms Marshall returns today for follow up. Feeling well. Eating and drinking well. Noted leg swelling for 2 months. Seen by PCP. Was told that PCP needs to check with me to see if there is any medication she should not get because she is on cancer treatment.     ECO    ROS:   A ten-point system review is obtained and negative except for what was stated in the Interval History.     Physical Examination:   Vital signs reviewed.   General: well hydrated, well developed, in no acute distress  HEENT: normocephalic, EOMI, anicteric sclerae  Neck: supple, no JVD, thyromegaly, cervical or supraclavicular lymphadenopathy  Lungs: clear breath sounds bilaterally, no wheezing, rales, or rhonchi  Heart: RRR, no M/R/G  Abdomen: soft, no tenderness, non-distended, no hepatosplenomegaly, mass, or hernia. BS present  Extremities: b/l LE mild edema L>R  Skin: no rash, ulcer, or open wounds.   Neuro: alert and oriented x 4, no focal neuro deficit  Psych: pleasant and appropriate mood and affect    Objective:     Laboratory Data:  Labs reviewed. Creatinine 1.3    Imaging Data:  Gallium PET 23:  Similar number and distribution of radiotracer avid lesions within the liver and pancreatic body.  Several liver lesions demonstrate central PET photopenia and hypodensity on CT consistent with necrosis.     New nonspecific mild radiotracer uptake within several small left axillary lymph nodes.  Finding may relate to  left upper extremity injection and partial radiotracer extravasation.     Otherwise, no new somatostatin receptor avid lesion.    I have personally reviewed her CT abdomen/pelvis with Dr Soni: Receptor positive pancreatic lesion and liver mets. Liver mets appear enlarged since 6/2022, though this may be due to larger cystic components.     MRI abdomen 4/4/23:  Redemonstration of multiple hepatic lesions in keeping with known neuroendocrine tumor metastasis.  Dominant lesions within the right lobe and segment 4 demonstrate decreased central enhancement consistent with response to therapy with residual disease likely present at these sites.  Additional stable enhancing lesions within the left and right lobes consistent with residual disease.  No definite new lesion.     Persistent restricted diffusion within the pancreatic body in keeping with known lesion.     Stable mildly prominent periportal lymph nodes, nonspecific.    MRI abdomen 6/07/23  Impression:  1. Interval hepatic chemoembolization.  Decreased size of multiple hepatic lesions consistent with known metastasis.  Several lesions demonstrate persistent enhancement consistent with residual disease.  New arterial enhancement within a right hepatic lobe lesion consistent with tumor.  2. Persistent restricted diffusion within the pancreatic body in keeping with known lesion.    NM PET 6/27/23  Impression:  Overall findings concerning for mild disease progression with new hepatic lesion, enlarging pancreatic body lesion and new focus of radiotracer uptake within the right hilum.  Post treatment changes of several liver lesions with decreased radiotracer uptake.  Additional nonspecific low level radiotracer uptake within axillary, mediastinal, and bilateral inguinal lymph nodes.  Attention on follow-up.    MRI 9/16/23:  Impression:     Patient with neuroendocrine tumor with hepatic metastases and interval microwave ablation of multiple hepatic lesions.     Small  amount of diffusion restriction at the superior aspect of segment 4B treatment cavity raises question of residual tumor.  Attention on follow-up.     Interval decrease in size of segment 5/8 lesion.     Interval mildly increased size of segment 6 lesion with persistent internal enhancement and nodular diffusion restriction which may reflect residual disease.     No definite new hepatic lesion.     Mildly increased size of pancreatic body mass.    PET scan 10/4/23:  Impression:     Multiple radiotracer avid hepatic lesions with mixed interval changes compared to prior exam, please see above for additional details.     Mild increased uptake within a right hilar lesion/lymph node.  Radiotracer avid pancreatic body lesion appears mildly enlarged.     No definite new lesion elsewhere.     Persistent mild nonspecific radiotracer uptake within small bilateral axillary lymph nodes    Copper PET 3/18/24:  Impression:     Mixed response to treatment.  The hepatic metastases have decreased in their radiotracer avidity well-appearing unchanged in CT appearance.  However, the pancreatic body lesion and right perihilar metastasis demonstrate worsening radiotracer uptake.  There is no new site of PET avid disease.    [facility] MRI abdomen 7/26/24 report:  IMPRESSION:     1.  Posttreatment changes in the liver with no new enhancing liver masses to suggest progressive metastatic disease   2.  Chronic changes in the pancreas including pancreatic duct dilatation was present on the prior study   3.  Cholelithiasis and chronic gallbladder wall thickening        MRI abdomen from 7/26/24 compared to MRI abdomen 12/20/24 at NET tumor board. MRI demonstrates new subcm foci of DWI at the periphery of treated lesions c/w recurrent disease. Pancreatic mass is larger than on prior study (3.7 x 2.3 cm now, 2.6 x 1.3 cm on prior).     Copper PET 8/23/24:  Impression:     Overall stable to improved size and radiotracer uptake in the neuroendocrine  metastatic lesions, as detailed above with parial favorable treatment response.  No new tracer avid lesions concerning for progression of disease.    Gallium PET 12/17/24:     FINDINGS:  Quality of the study: Adequate.     SUV measurements may not be directly comparable with those made on Cu-64 DOTATATE PET/CT.     In the head and neck, there is physiologic distribution in the pituitary, salivary, and thyroid glands, and there are no tracer avid lesions suspicious for malignancy.     In the chest, tracer avid right hilar lesion is again seen now with SUV max of 9.6 (axial image 124), previously with SUV max of 16.  Similar low level tracer uptake in the axillary lymph nodes.     In the abdomen and pelvis, there is physiologic uptake in the pancreatic uncinate process and body, liver, spleen, adrenal glands and  tract.  Posttreatment changes of the hepatic dome.  There are tracer avid lesions within the liver and pancreas with index lesions as follows:     Increased tracer uptake within hepatic segment 5 lesion with maximum SUV of 36 (axial image 152), previously with SUV max of 19.     Peripheral hepatic dome lesion lateral to the embolization site with SUV max of 31 (axial image 140), previously with SUV max of 14.     Pancreatic body lesion with SUV max of 41 (axial image 169), previously with SUV max of 52.     In the bones, there are no tracer avid lesions suspicious for malignancy.     Incidental CT findings: Similar hypodense 1.6 cm right thyroid nodule. Mild calcific atherosclerosis of the aorta. Moderate calcific atherosclerosis of the coronary arteries. Postoperative change of bowel resection.  Anterior abdominal hernia containing bowel, no evidence of obstruction.  Layering material in the gallbladder consistent with sludge.     Impression:     Similar distribution of tracer avid lesions involving the right hilum, liver and pancreas. No definite new lesion.      copper PET 5/12/25:  COMPARISON:  NM  03/12/2025 08/23/25     FINDINGS:  Quality of the study: Adequate.     In the head and neck, there is physiologic distribution in the pituitary, salivary, and thyroid glands, and there are no tracer avid lesions suspicious for malignancy.     In the chest, there are no tracer avid lesions suspicious for malignancy.  There has been interval resolution of radiotracer avid right perihilar lymph node.     In the abdomen and pelvis, there is similar appearance of partially calcified hepatic metastases status post TACE.  Few areas of focal relatively increased radiotracer uptake in the liver compared to the surrounding parenchyma, without CT correlate, for example in the anterior mid right hepatic lobe with SUV max measuring 20 (image 162), previously measuring 19.     Similar tracer avid hypodense lesion in the pancreatic body measuring 1.9 x 3.3 cm with an SUV max measuring 22, previously measuring 3.1 x 2.2 cm with an SUV max measuring 42.     There are new radiotracer avid lymph nodes with retained fatty madai in the bilateral inguinal regions for example in the left inguinal region measuring 1.9 x 0.9 cm, with max SUV measuring 3.5 (image 265), and in the right inguinal region measuring 1.5 x 1.0 cm, with max SUV measuring 2.7 (image 270).     In the bones, there are no tracer avid lesions suspicious for malignancy.     In the extremities, there are no hypermetabolic lesions worrisome for malignancy.     Additional CT findings: Patchy calcific atherosclerosis throughout the coronary arteries.  Postsurgical change of low anterior resection and small bowel resection.  Ventral abdominal hernia containing bowel without evidence of obstruction.  Hyperdense material layering within the gallbladder, likely representing sludge.  Impression:     1. Stable size with interval decrease in radiotracer uptake of pancreatic body lesion.  2. New radiotracer avid non pathologically enlarged lymph nodes in the bilateral inguinal  regions, attention on follow-up.        Assessment and Plan:     1. Malignant carcinoid tumor of ileum    2. Malignant carcinoid tumor of rectum    3. Secondary malignant neoplasm of tail of pancreas    4. Secondary malignant neoplasm of liver    5. Immunodeficiency secondary to neoplasm    6. Immunodeficiency due to drugs    7. Essential hypertension    8. Bilateral leg edema    9. Stage 3 chronic kidney disease, unspecified whether stage 3a or 3b CKD        1-6  - Ms Marshall is a 69 yo woman with history of ypT3N0 rectal NET, well differentiated, grade 2 (Ki 67 16%) and small bowel NET, well diff, low grade, s/p surgery in Jan 2018. She was found to have metastatic disease to the liver and pancreas in July 2021 s/p biopsy. Liver NET is well diff grade 2 (Ki67 15)%. Pancreatic NET is well diff grade 3 (Ki67 25%). She has been on lanreotide since July 2022, changed to sandostatin in Jan 2023 after CT scan showed progression. S/p TACE to the liver on 3/1/23, 4/24/23, 7/21/23, 11/3/23  - MRI and PET in Sep and Oct 2023 reviewed at tumor board. MRI showed response in ablated cavities. Residual in dome lesions has progressed. Pancreatic lesion increased to 2.3 x 1.6 cm. Copper PET showed stable distribution of disease outside of liver.   - started captem on 10/14/23. TACE on 11/3/23, 2/7/24, 5/15/24  - MRI abdomen from 7/26/24 compared to MRI abdomen 12/20/23 at [facility] tumor board. MRI demonstrates new subcm foci of DWI at the periphery of treated areas c/w recurrent disease. Pancreatic mass is larger than on prior study (3.7 x 2.3 cm now, 2.6 x 1.3 cm on prior).   - Captem stopped on 8/28/24. Recc PRRT  - PRRT started on 9/25/24, s/p completed 4 doses on 3/12/25  - doing well.   - I have personally reviewed her PET scan images. Discussed results with patient. Stable disease. Discussed the uptake in bilateral inguinal lymph nodes could be reactive/inflammatory. Will do MRI A/P in 3 months to monitor. If lymph nodes get bigger  at that time, will biopsy. Patient understands and agrees with the plan.   - c/w lanreotide every 28 days with Dr Olson's office.   - RTC in 3 months with restaging MRI abdomen and pelvis.     7.  - BP controlled  - c/w current medication    8.  - check US leg to r/o DVT  - asked patient to f/u with PCP. Discussed PCP can use whatever medication she feels appropriate. There is no interaction with lanreotide.     9.  - stable  - monitor    Follow-up:     RTC in 3 months      Ariel Smart MD  Hematology and Medical Oncology  [facility]    Route Chart for Scheduling    Med Onc Chart Routing      Follow up with physician . See me in 3 months with fasting CBC, CMP, serotonin, pancreastatin, 5-HIAA, MRI abdomen/pelvis done at main campus 2 weeks prior to seeing me   Follow up with ELLY    Infusion scheduling note    Injection scheduling note    Labs CBC and CMP   Scheduling:  Preferred lab:  Lab interval:  serotonin, pancreastatin, 5-HIAA   Imaging MRI   MRI abdomen/pelvis   Pharmacy appointment    Other referrals

## 2025-08-04 ENCOUNTER — TELEPHONE (OUTPATIENT)
Dept: HEMATOLOGY/ONCOLOGY | Facility: CLINIC | Age: 70
End: 2025-08-04
Payer: MEDICARE

## 2025-08-04 NOTE — TELEPHONE ENCOUNTER
I called the patient and she said she completed a MRI last week which was ordered by her PCP, Dr. Randal Casillas. I requested a copy of MRI from [facility] Release of Records

## 2025-08-04 NOTE — TELEPHONE ENCOUNTER
"Copied from CRM #[id number]. Topic: General Inquiry - Patient Advice  >> Aug 4, 2025 12:29 PM Eugenio wrote:  Consult/Advisory    Name Of Caller: Self    Contact Preference?: [phone]    Provider Name: Berry    What is the nature of the call?: Requesting clarification if 8/15 MRI is necessary. Stating she recently had one completed in [place] last week    Additional Notes:  "Thank you for all that you do for our patients"  "

## 2025-08-07 ENCOUNTER — TELEPHONE (OUTPATIENT)
Dept: HEMATOLOGY/ONCOLOGY | Facility: CLINIC | Age: 70
End: 2025-08-07
Payer: MEDICARE

## 2025-08-07 NOTE — TELEPHONE ENCOUNTER
I returned the patient's phone call and told her that Dr. Smart will review the MRI that was done at [facility] when she returns back to the office next week. Pt verbalized understanding

## 2025-08-07 NOTE — TELEPHONE ENCOUNTER
Copied from CRM #[id number]. Topic: General Inquiry - Patient Advice  >> Aug 7, 2025 11:49 AM Cindy wrote:  Type:  Needs Medical Advice    Who Called: Coby Marshall   Want to confirm if the MRI scheduled for 8/15 will be needed   There was a MRI done on 7/28 at the [facility] and could that be used  Would the patient rather a call back or a response via [facility]? Call back   Best Call Back Number: [phone]    Additional Information: The insurance may not pay due to being so close

## 2025-08-18 ENCOUNTER — TUMOR BOARD CONFERENCE (OUTPATIENT)
Dept: INFUSION THERAPY | Facility: HOSPITAL | Age: 70
End: 2025-08-18
Payer: MEDICARE

## 2025-08-26 ENCOUNTER — TELEPHONE (OUTPATIENT)
Dept: HEMATOLOGY/ONCOLOGY | Facility: CLINIC | Age: 70
End: 2025-08-26
Payer: MEDICARE

## 2025-08-27 ENCOUNTER — OFFICE VISIT (OUTPATIENT)
Dept: HEMATOLOGY/ONCOLOGY | Facility: CLINIC | Age: 70
End: 2025-08-27
Payer: MEDICARE

## 2025-08-27 ENCOUNTER — LAB VISIT (OUTPATIENT)
Dept: LAB | Facility: HOSPITAL | Age: 70
End: 2025-08-27
Attending: INTERNAL MEDICINE
Payer: MEDICARE

## 2025-08-27 VITALS
SYSTOLIC BLOOD PRESSURE: 197 MMHG | BODY MASS INDEX: 32.95 KG/M2 | WEIGHT: 205 LBS | OXYGEN SATURATION: 100 % | RESPIRATION RATE: 18 BRPM | TEMPERATURE: 98 F | DIASTOLIC BLOOD PRESSURE: 79 MMHG | HEART RATE: 66 BPM | HEIGHT: 66 IN

## 2025-08-27 DIAGNOSIS — C7A.012 MALIGNANT CARCINOID TUMOR OF ILEUM: ICD-10-CM

## 2025-08-27 DIAGNOSIS — D49.9 IMMUNODEFICIENCY SECONDARY TO NEOPLASM: ICD-10-CM

## 2025-08-27 DIAGNOSIS — C7A.026 MALIGNANT CARCINOID TUMOR OF RECTUM: ICD-10-CM

## 2025-08-27 DIAGNOSIS — N18.30 STAGE 3 CHRONIC KIDNEY DISEASE, UNSPECIFIED WHETHER STAGE 3A OR 3B CKD: ICD-10-CM

## 2025-08-27 DIAGNOSIS — D84.821 IMMUNODEFICIENCY DUE TO DRUGS: ICD-10-CM

## 2025-08-27 DIAGNOSIS — C7B.8 SECONDARY MALIGNANT NEUROENDOCRINE TUMOR OF LIVER: ICD-10-CM

## 2025-08-27 DIAGNOSIS — Z79.899 IMMUNODEFICIENCY DUE TO DRUGS: ICD-10-CM

## 2025-08-27 DIAGNOSIS — C7B.8 OTHER SECONDARY NEUROENDOCRINE TUMORS: ICD-10-CM

## 2025-08-27 DIAGNOSIS — D84.81 IMMUNODEFICIENCY SECONDARY TO NEOPLASM: ICD-10-CM

## 2025-08-27 DIAGNOSIS — I10 ESSENTIAL HYPERTENSION: ICD-10-CM

## 2025-08-27 DIAGNOSIS — C7A.012 MALIGNANT CARCINOID TUMOR OF ILEUM: Primary | ICD-10-CM

## 2025-08-27 LAB
ABSOLUTE EOSINOPHIL (OHS): 0.16 K/UL
ABSOLUTE MONOCYTE (OHS): 0.41 K/UL (ref 0.3–1)
ABSOLUTE NEUTROPHIL COUNT (OHS): 4.31 K/UL (ref 1.8–7.7)
ALBUMIN SERPL BCP-MCNC: 3.9 G/DL (ref 3.5–5.2)
ALP SERPL-CCNC: 103 UNIT/L (ref 40–150)
ALT SERPL W/O P-5'-P-CCNC: 19 UNIT/L (ref 0–55)
ANION GAP (OHS): 13 MMOL/L (ref 8–16)
AST SERPL-CCNC: 32 UNIT/L (ref 0–50)
BASOPHILS # BLD AUTO: 0.02 K/UL
BASOPHILS NFR BLD AUTO: 0.3 %
BILIRUB SERPL-MCNC: 0.5 MG/DL (ref 0.1–1)
BUN SERPL-MCNC: 12 MG/DL (ref 8–23)
CALCIUM SERPL-MCNC: 9.3 MG/DL (ref 8.7–10.5)
CHLORIDE SERPL-SCNC: 105 MMOL/L (ref 95–110)
CO2 SERPL-SCNC: 23 MMOL/L (ref 23–29)
CREAT SERPL-MCNC: 1.2 MG/DL (ref 0.5–1.4)
ERYTHROCYTE [DISTWIDTH] IN BLOOD BY AUTOMATED COUNT: 15.9 % (ref 11.5–14.5)
GFR SERPLBLD CREATININE-BSD FMLA CKD-EPI: 48 ML/MIN/1.73/M2
GLUCOSE SERPL-MCNC: 116 MG/DL (ref 70–110)
HCT VFR BLD AUTO: 32 % (ref 37–48.5)
HGB BLD-MCNC: 9.8 GM/DL (ref 12–16)
IMM GRANULOCYTES # BLD AUTO: 0.02 K/UL (ref 0–0.04)
IMM GRANULOCYTES NFR BLD AUTO: 0.3 % (ref 0–0.5)
LYMPHOCYTES # BLD AUTO: 1.08 K/UL (ref 1–4.8)
MCH RBC QN AUTO: 27.3 PG (ref 27–31)
MCHC RBC AUTO-ENTMCNC: 30.6 G/DL (ref 32–36)
MCV RBC AUTO: 89 FL (ref 82–98)
NUCLEATED RBC (/100WBC) (OHS): 0 /100 WBC
PLATELET # BLD AUTO: 195 K/UL (ref 150–450)
PMV BLD AUTO: 9.7 FL (ref 9.2–12.9)
POTASSIUM SERPL-SCNC: 4.3 MMOL/L (ref 3.5–5.1)
PROT SERPL-MCNC: 7.6 GM/DL (ref 6–8.4)
RBC # BLD AUTO: 3.59 M/UL (ref 4–5.4)
RELATIVE EOSINOPHIL (OHS): 2.7 %
RELATIVE LYMPHOCYTE (OHS): 18 % (ref 18–48)
RELATIVE MONOCYTE (OHS): 6.8 % (ref 4–15)
RELATIVE NEUTROPHIL (OHS): 71.9 % (ref 38–73)
SODIUM SERPL-SCNC: 141 MMOL/L (ref 136–145)
WBC # BLD AUTO: 6 K/UL (ref 3.9–12.7)

## 2025-08-27 PROCEDURE — 36415 COLL VENOUS BLD VENIPUNCTURE: CPT

## 2025-08-27 PROCEDURE — 85025 COMPLETE CBC W/AUTO DIFF WBC: CPT

## 2025-08-27 PROCEDURE — 3078F DIAST BP <80 MM HG: CPT | Mod: CPTII,S$GLB,, | Performed by: INTERNAL MEDICINE

## 2025-08-27 PROCEDURE — 84260 ASSAY OF SEROTONIN: CPT

## 2025-08-27 PROCEDURE — 1160F RVW MEDS BY RX/DR IN RCRD: CPT | Mod: CPTII,S$GLB,, | Performed by: INTERNAL MEDICINE

## 2025-08-27 PROCEDURE — G2211 COMPLEX E/M VISIT ADD ON: HCPCS | Mod: S$GLB,,, | Performed by: INTERNAL MEDICINE

## 2025-08-27 PROCEDURE — 80053 COMPREHEN METABOLIC PANEL: CPT

## 2025-08-27 PROCEDURE — 3008F BODY MASS INDEX DOCD: CPT | Mod: CPTII,S$GLB,, | Performed by: INTERNAL MEDICINE

## 2025-08-27 PROCEDURE — 1101F PT FALLS ASSESS-DOCD LE1/YR: CPT | Mod: CPTII,S$GLB,, | Performed by: INTERNAL MEDICINE

## 2025-08-27 PROCEDURE — 3288F FALL RISK ASSESSMENT DOCD: CPT | Mod: CPTII,S$GLB,, | Performed by: INTERNAL MEDICINE

## 2025-08-27 PROCEDURE — 1126F AMNT PAIN NOTED NONE PRSNT: CPT | Mod: CPTII,S$GLB,, | Performed by: INTERNAL MEDICINE

## 2025-08-27 PROCEDURE — 83497 ASSAY OF 5-HIAA: CPT

## 2025-08-27 PROCEDURE — 99215 OFFICE O/P EST HI 40 MIN: CPT | Mod: S$GLB,,, | Performed by: INTERNAL MEDICINE

## 2025-08-27 PROCEDURE — 3077F SYST BP >= 140 MM HG: CPT | Mod: CPTII,S$GLB,, | Performed by: INTERNAL MEDICINE

## 2025-08-27 PROCEDURE — 99999 PR PBB SHADOW E&M-EST. PATIENT-LVL V: CPT | Mod: PBBFAC,,, | Performed by: INTERNAL MEDICINE

## 2025-08-27 PROCEDURE — 1159F MED LIST DOCD IN RCRD: CPT | Mod: CPTII,S$GLB,, | Performed by: INTERNAL MEDICINE

## 2025-08-27 PROCEDURE — 83519 RIA NONANTIBODY: CPT

## 2025-08-29 LAB — 5OH-INDOLEACETATE SERPL-MCNC: 8 NG/ML

## 2025-09-02 LAB — SEROTONIN SER-MCNC: <30 NG/ML

## (undated) DEVICE — SUT 4/0 36IN ETHIBOND EXCE

## (undated) DEVICE — TRAY FOLEY 16FR INFECTION CONT

## (undated) DEVICE — SUT 0 54IN CHROMIC GUT

## (undated) DEVICE — BLADE SURG CARBON STEEL SZ11

## (undated) DEVICE — DRESSING TEGADERM IV 3.5 X 4.5

## (undated) DEVICE — SUT CTD VICRYL VIL BR CT-2

## (undated) DEVICE — SEE MEDLINE ITEM 157117

## (undated) DEVICE — DRESSING ADH ISLAND 3.6 X 14

## (undated) DEVICE — SUT VICRYL PLUS 3-0 FS1 27

## (undated) DEVICE — NDL BOX COUNTER

## (undated) DEVICE — COVER LIGHT HANDLE 80/CA

## (undated) DEVICE — BINDER ABDOMINAL 9 46-62

## (undated) DEVICE — SEE MEDLINE ITEM 157148

## (undated) DEVICE — SYR ONLY LUER LOCK 20CC

## (undated) DEVICE — RELOAD PROXIMATE GREEN 75MM

## (undated) DEVICE — TAPE SURG MEDIPORE 6X72IN

## (undated) DEVICE — SUT PDS II 1 TP-1 VIL

## (undated) DEVICE — SEE MEDLINE ITEM 146347

## (undated) DEVICE — SUT CTD VICRYL VIL BR SH 27

## (undated) DEVICE — CUTTER PROXIMATE BLUE 75MM

## (undated) DEVICE — LUBRICANT SURGILUBE 2 OZ

## (undated) DEVICE — DRESSING TRANS 4X4 TEGADERM

## (undated) DEVICE — ELECTRODE REM PLYHSV RETURN 9

## (undated) DEVICE — TRAY MINOR GEN SURG

## (undated) DEVICE — SEE MEDLINE ITEM 146417

## (undated) DEVICE — BANDAGE KERLIX P/P 2.25IN STER

## (undated) DEVICE — PAD ABD 8X10 STERILE

## (undated) DEVICE — SEE MEDLINE ITEM 154981

## (undated) DEVICE — DRAPE STERI 7IN

## (undated) DEVICE — SPONGE DERMACEA 4X4IN 12PLY

## (undated) DEVICE — SEE MEDLINE ITEM 152487

## (undated) DEVICE — SEE MEDLINE ITEM 157144

## (undated) DEVICE — ADHESIVE DERMABOND ADVANCED

## (undated) DEVICE — CATH PEZZAR 34 FR

## (undated) DEVICE — NDL HYPO A BEVEL 22X1 1/2

## (undated) DEVICE — SEE MEDLINE ITEM 156902

## (undated) DEVICE — SEE MEDLINE ITEM 146373

## (undated) DEVICE — TAPE UMBILICAL 1/8X36IN WHITE

## (undated) DEVICE — SPONGE DERMACEA GAUZE 4X4

## (undated) DEVICE — SEE L#95700

## (undated) DEVICE — LEGGINGS 48X31 INCH

## (undated) DEVICE — CLIPPER BLADE MOD 4406 (CAREF)

## (undated) DEVICE — NDL 22GA X1 1/2 REG BEVEL

## (undated) DEVICE — DRAPE ABDOMINAL TIBURON 14X11

## (undated) DEVICE — SPONGE DERMA 8PLY 2X2

## (undated) DEVICE — SEE MEDLINE ITEM 157181

## (undated) DEVICE — SYR 50ML CATH TIP

## (undated) DEVICE — CUTTER PROXIMATE GREEN 75MM

## (undated) DEVICE — SUT 1 36IN PDS II VIO MONO

## (undated) DEVICE — POUCH SENSURA MIO 3/8X2 1/8IN

## (undated) DEVICE — SUT 0 27IN CHROMIC GUT CT-1

## (undated) DEVICE — SPONGE LAP 18X18 PREWASHED

## (undated) DEVICE — SEE MEDLINE ITEM 152622

## (undated) DEVICE — SUT 2/0 30IN SILK BLK BRAI